# Patient Record
Sex: MALE | Race: BLACK OR AFRICAN AMERICAN | Employment: OTHER | URBAN - METROPOLITAN AREA
[De-identification: names, ages, dates, MRNs, and addresses within clinical notes are randomized per-mention and may not be internally consistent; named-entity substitution may affect disease eponyms.]

---

## 2017-04-26 ENCOUNTER — HOSPITAL ENCOUNTER (OUTPATIENT)
Age: 57
Discharge: HOME | End: 2017-04-26
Payer: COMMERCIAL

## 2017-04-26 DIAGNOSIS — N18.4 CKD (CHRONIC KIDNEY DISEASE) STAGE 4, GFR 15-29 ML/MIN (HCC): ICD-10-CM

## 2017-04-26 LAB
ABSOLUTE EOS #: 0.1 K/UL (ref 0–0.4)
ABSOLUTE LYMPH #: 0.7 K/UL (ref 1–4.8)
ABSOLUTE MONO #: 0.4 K/UL (ref 0.1–1.2)
ANION GAP SERPL CALCULATED.3IONS-SCNC: 17 MMOL/L (ref 9–17)
BASOPHILS # BLD: 0 %
BASOPHILS ABSOLUTE: 0 K/UL (ref 0–0.2)
BUN BLDV-MCNC: 35 MG/DL (ref 6–20)
BUN/CREAT BLD: ABNORMAL (ref 9–20)
CALCIUM IONIZED: 1.11 MMOL/L (ref 1.13–1.33)
CALCIUM SERPL-MCNC: 9.2 MG/DL (ref 8.6–10.4)
CHLORIDE BLD-SCNC: 101 MMOL/L (ref 98–107)
CO2: 22 MMOL/L (ref 20–31)
CREAT SERPL-MCNC: 3.1 MG/DL (ref 0.7–1.2)
CREATININE URINE: 249.8 MG/DL (ref 39–259)
DIFFERENTIAL TYPE: ABNORMAL
EOSINOPHILS RELATIVE PERCENT: 2 %
GFR AFRICAN AMERICAN: 25 ML/MIN
GFR NON-AFRICAN AMERICAN: 21 ML/MIN
GFR SERPL CREATININE-BSD FRML MDRD: ABNORMAL ML/MIN/{1.73_M2}
GFR SERPL CREATININE-BSD FRML MDRD: ABNORMAL ML/MIN/{1.73_M2}
GLUCOSE BLD-MCNC: 238 MG/DL (ref 70–99)
HCT VFR BLD CALC: 34.4 % (ref 41–53)
HEMOGLOBIN: 11.9 G/DL (ref 13.5–17.5)
LYMPHOCYTES # BLD: 15 %
MCH RBC QN AUTO: 28.9 PG (ref 26–34)
MCHC RBC AUTO-ENTMCNC: 34.6 G/DL (ref 31–37)
MCV RBC AUTO: 83.4 FL (ref 80–100)
MONOCYTES # BLD: 10 %
PDW BLD-RTO: 13.8 % (ref 12.5–15.4)
PHOSPHORUS: 3.5 MG/DL (ref 2.5–4.5)
PLATELET # BLD: 253 K/UL (ref 140–450)
PLATELET ESTIMATE: ABNORMAL
PMV BLD AUTO: 7.1 FL (ref 6–12)
POTASSIUM SERPL-SCNC: 4.6 MMOL/L (ref 3.7–5.3)
PTH INTACT: 155 PG/ML (ref 15–65)
RBC # BLD: 4.12 M/UL (ref 4.5–5.9)
RBC # BLD: ABNORMAL 10*6/UL
SEG NEUTROPHILS: 73 %
SEGMENTED NEUTROPHILS ABSOLUTE COUNT: 3.3 K/UL (ref 1.8–7.7)
SODIUM BLD-SCNC: 140 MMOL/L (ref 135–144)
TOTAL PROTEIN, URINE: 450 MG/DL
VITAMIN D 25-HYDROXY: 11.5 NG/ML (ref 30–100)
WBC # BLD: 4.5 K/UL (ref 3.5–11)
WBC # BLD: ABNORMAL 10*3/UL

## 2017-04-26 PROCEDURE — 82306 VITAMIN D 25 HYDROXY: CPT

## 2017-04-26 PROCEDURE — 36415 COLL VENOUS BLD VENIPUNCTURE: CPT

## 2017-04-26 PROCEDURE — 84100 ASSAY OF PHOSPHORUS: CPT

## 2017-04-26 PROCEDURE — 84156 ASSAY OF PROTEIN URINE: CPT

## 2017-04-26 PROCEDURE — 85025 COMPLETE CBC W/AUTO DIFF WBC: CPT

## 2017-04-26 PROCEDURE — 82330 ASSAY OF CALCIUM: CPT

## 2017-04-26 PROCEDURE — 80048 BASIC METABOLIC PNL TOTAL CA: CPT

## 2017-04-26 PROCEDURE — 83970 ASSAY OF PARATHORMONE: CPT

## 2017-04-26 PROCEDURE — 82570 ASSAY OF URINE CREATININE: CPT

## 2017-05-31 ENCOUNTER — HOSPITAL ENCOUNTER (OUTPATIENT)
Age: 57
Discharge: HOME | End: 2017-05-31
Payer: COMMERCIAL

## 2017-05-31 DIAGNOSIS — N18.4 CKD (CHRONIC KIDNEY DISEASE) STAGE 4, GFR 15-29 ML/MIN (HCC): ICD-10-CM

## 2017-05-31 LAB
ANION GAP SERPL CALCULATED.3IONS-SCNC: 14 MMOL/L (ref 9–17)
BUN BLDV-MCNC: 35 MG/DL (ref 6–20)
BUN/CREAT BLD: ABNORMAL (ref 9–20)
CALCIUM SERPL-MCNC: 9 MG/DL (ref 8.6–10.4)
CHLORIDE BLD-SCNC: 103 MMOL/L (ref 98–107)
CO2: 22 MMOL/L (ref 20–31)
CREAT SERPL-MCNC: 2.98 MG/DL (ref 0.7–1.2)
GFR AFRICAN AMERICAN: 26 ML/MIN
GFR NON-AFRICAN AMERICAN: 22 ML/MIN
GFR SERPL CREATININE-BSD FRML MDRD: ABNORMAL ML/MIN/{1.73_M2}
GFR SERPL CREATININE-BSD FRML MDRD: ABNORMAL ML/MIN/{1.73_M2}
GLUCOSE BLD-MCNC: 191 MG/DL (ref 70–99)
POTASSIUM SERPL-SCNC: 4.1 MMOL/L (ref 3.7–5.3)
SODIUM BLD-SCNC: 139 MMOL/L (ref 135–144)

## 2017-05-31 PROCEDURE — 36415 COLL VENOUS BLD VENIPUNCTURE: CPT

## 2017-05-31 PROCEDURE — 80048 BASIC METABOLIC PNL TOTAL CA: CPT

## 2017-11-17 ENCOUNTER — HOSPITAL ENCOUNTER (OUTPATIENT)
Age: 57
Setting detail: OUTPATIENT SURGERY
Discharge: HOME | End: 2017-11-17
Attending: UROLOGY | Admitting: UROLOGY
Payer: COMMERCIAL

## 2017-11-17 ENCOUNTER — HOSPITAL ENCOUNTER (OUTPATIENT)
Dept: ULTRASOUND IMAGING | Age: 57
Discharge: HOME | End: 2017-11-17
Payer: COMMERCIAL

## 2017-11-17 VITALS
DIASTOLIC BLOOD PRESSURE: 82 MMHG | WEIGHT: 261.2 LBS | TEMPERATURE: 97.7 F | BODY MASS INDEX: 37.39 KG/M2 | RESPIRATION RATE: 21 BRPM | OXYGEN SATURATION: 98 % | HEART RATE: 74 BPM | HEIGHT: 70 IN | SYSTOLIC BLOOD PRESSURE: 144 MMHG

## 2017-11-17 DIAGNOSIS — R97.20 ELEVATED PSA: ICD-10-CM

## 2017-11-17 PROBLEM — N40.0 PROSTATISM: Status: ACTIVE | Noted: 2017-11-17

## 2017-11-17 LAB
-: NORMAL
AMORPHOUS: NORMAL
BACTERIA: NORMAL
BILIRUBIN URINE: NEGATIVE
CASTS UA: NORMAL /LPF
COLOR: YELLOW
COMMENT UA: ABNORMAL
CRYSTALS, UA: NORMAL /HPF
EPITHELIAL CELLS UA: NORMAL /HPF (ref 0–5)
GLUCOSE URINE: NEGATIVE
KETONES, URINE: NEGATIVE
LEUKOCYTE ESTERASE, URINE: NEGATIVE
MUCUS: NORMAL
NITRITE, URINE: NEGATIVE
OTHER OBSERVATIONS UA: NORMAL
PH UA: 6.5 (ref 5–8)
PROTEIN UA: ABNORMAL
RBC UA: NORMAL /HPF (ref 0–2)
RENAL EPITHELIAL, UA: NORMAL /HPF
SPECIFIC GRAVITY UA: 1 (ref 1–1.03)
TRICHOMONAS: NORMAL
TURBIDITY: CLEAR
URINE HGB: ABNORMAL
UROBILINOGEN, URINE: NORMAL
WBC UA: NORMAL /HPF (ref 0–5)
YEAST: NORMAL

## 2017-11-17 PROCEDURE — 88305 TISSUE EXAM BY PATHOLOGIST: CPT

## 2017-11-17 PROCEDURE — 55700 US PROSTATE NEEDLE PUNCH: CPT

## 2017-11-17 PROCEDURE — 99152 MOD SED SAME PHYS/QHP 5/>YRS: CPT | Performed by: UROLOGY

## 2017-11-17 PROCEDURE — 7100000011 HC PHASE II RECOVERY - ADDTL 15 MIN: Performed by: UROLOGY

## 2017-11-17 PROCEDURE — 6360000002 HC RX W HCPCS: Performed by: UROLOGY

## 2017-11-17 PROCEDURE — 99153 MOD SED SAME PHYS/QHP EA: CPT | Performed by: UROLOGY

## 2017-11-17 PROCEDURE — 81001 URINALYSIS AUTO W/SCOPE: CPT

## 2017-11-17 PROCEDURE — 7100000010 HC PHASE II RECOVERY - FIRST 15 MIN: Performed by: UROLOGY

## 2017-11-17 PROCEDURE — 3600000012 HC SURGERY LEVEL 2 ADDTL 15MIN: Performed by: UROLOGY

## 2017-11-17 PROCEDURE — 2580000003 HC RX 258: Performed by: UROLOGY

## 2017-11-17 PROCEDURE — 3600000002 HC SURGERY LEVEL 2 BASE: Performed by: UROLOGY

## 2017-11-17 RX ORDER — SODIUM CHLORIDE 9 MG/ML
INJECTION, SOLUTION INTRAVENOUS CONTINUOUS
Status: DISCONTINUED | OUTPATIENT
Start: 2017-11-17 | End: 2017-11-17 | Stop reason: HOSPADM

## 2017-11-17 RX ORDER — HYDROCODONE BITARTRATE AND ACETAMINOPHEN 5; 325 MG/1; MG/1
1 TABLET ORAL EVERY 8 HOURS PRN
Qty: 12 TABLET | Refills: 0 | Status: SHIPPED | OUTPATIENT
Start: 2017-11-17 | End: 2017-11-20

## 2017-11-17 RX ORDER — CEPHALEXIN 500 MG/1
500 CAPSULE ORAL 3 TIMES DAILY
Qty: 15 CAPSULE | Refills: 0 | Status: SHIPPED | OUTPATIENT
Start: 2017-11-17 | End: 2017-11-22

## 2017-11-17 RX ORDER — FENTANYL CITRATE 50 UG/ML
INJECTION, SOLUTION INTRAMUSCULAR; INTRAVENOUS PRN
Status: DISCONTINUED | OUTPATIENT
Start: 2017-11-17 | End: 2017-11-17 | Stop reason: HOSPADM

## 2017-11-17 RX ORDER — MIDAZOLAM HYDROCHLORIDE 1 MG/ML
INJECTION INTRAMUSCULAR; INTRAVENOUS PRN
Status: DISCONTINUED | OUTPATIENT
Start: 2017-11-17 | End: 2017-11-17 | Stop reason: HOSPADM

## 2017-11-17 RX ADMIN — SODIUM CHLORIDE: 9 INJECTION, SOLUTION INTRAVENOUS at 11:44

## 2017-11-17 ASSESSMENT — PAIN SCALES - GENERAL: PAINLEVEL_OUTOF10: 0

## 2017-11-17 NOTE — H&P
HISTORY and PHYSICAL    NAME:  Rene Cruz  MRN: [id number]   YOB: 1960   Date: 11/17/2017   Age: 59 y.o. Gender: male         COMPLAINT AND PRESENT HISTORY: Rene Cruz is a 59 y.o. male who is scheduled to have a cystoscopy with US prostate biopsy by Dr. Brendan Iverson. He states that his PCP found that he has an elevated PSA. He denies any hematuria, dysuria, abdominal, flank pain or fever or chills. He states he has lost 15 pounds over the past 6 weeks due to lack of appetite. History of chronic kidney disease stage 4 reported in the past with chronic anemia. 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 Family History   Problem Relation Age of Onset    Heart Disease Mother     Early Death Mother     High Blood Pressure Brother     Diabetes Brother     High Blood Pressure Brother     Diabetes Brother     Cancer Brother     High Blood Pressure Brother     High Blood Pressure Brother          Social History     Social History    Marital status: Single     Spouse name: N/A    Number of children: N/A    Years of education: N/A     Social History Main Topics    Smoking status: Never Smoker    Smokeless tobacco: Never Used    Alcohol use No    Drug use: No    Sexual activity: Not Asked     Other Topics Concern    None     Social History Narrative    None                 Review of Systems:    Allergies   Allergen Reactions    Lisinopril Swelling       No current facility-administered medications on file prior to encounter.       Current Outpatient Prescriptions on File Prior to Encounter   Medication Sig Dispense Refill    NIFEdipine (PROCARDIA XL) 90 MG extended release tablet Take 1 tablet by mouth daily 90 tablet 3    doxazosin (CARDURA) 2 MG tablet Take 2 tablets by mouth nightly 180 tablet 3    metolazone (ZAROXOLYN) 5 MG tablet Take 1 tablet by mouth three times a week 15 tablet 3    bumetanide (BUMEX) 2 MG tablet Take 1 tablet by mouth 2 times daily 60 tablet 5    spironolactone (ALDACTONE) 25 MG tablet Take 1 tablet by mouth daily 90 tablet 3    glimepiride (AMARYL) 2 MG tablet Take 2 mg by mouth 2 times daily      vitamin D (CHOLECALCIFEROL) 90443 UNIT CAPS Take 1 capsule by mouth once a week for 8 doses 8 capsule 0    vitamin D (ERGOCALCIFEROL) 63946 UNITS CAPS capsule Take 1 capsule by mouth once a week for 8 doses 8 capsule 0    aspirin 325 MG tablet Take 325 mg by mouth daily            ROS:    GENERAL HEALTH:  Denies any problems with weight, appetite, or sleep. Skin:  No itching or rashes. No lesions. No easy bruising or bleeding. HEENT:  Denies cephalgia or head injury. No eye or ear pain. No sinusitis, rhinorhea or epistaxis. No frequent sorethroat, dysphagia or hoarseness. No masses, pain, stiffness or injury to the neck. Cardio-Respiratory: No hemoptysis, shortness of breath, chest pain, dizziness, orthopnea or palpitations. Gastrointestinal:  No constipation, diarrhea, uriel stools, red or black in the stool. No nausea or vomiting. Genitourinary:  No dysuria, hematuria, discharge, incontinence. No recent urinary tract infection. Locomotor:  Denies bone, joint or muscle  problems. No need for assistance with ambulation. Neuro:  Denies  neuropathy, syncopal episodes, weakness, vertigo, arthralgia, myalgia or numbness or tingling. Behavioral/Psych:  Pt denies current feeling of depression or significant anxiety.              GENERAL PHYSICAL EXAM:            Vitals: BP (!) 155/83   Pulse 79   Temp 97.9 °F (36.6 °C) (Oral)   Resp 17   Ht 5' 10\" (1.778

## 2017-11-21 LAB — SURGICAL PATHOLOGY REPORT: NORMAL

## 2017-11-29 ENCOUNTER — HOSPITAL ENCOUNTER (OUTPATIENT)
Age: 57
Discharge: HOME | End: 2017-11-29
Payer: COMMERCIAL

## 2017-11-29 DIAGNOSIS — N18.4 ANEMIA IN STAGE 4 CHRONIC KIDNEY DISEASE (HCC): ICD-10-CM

## 2017-11-29 DIAGNOSIS — E55.9 AVITAMINOSIS D: ICD-10-CM

## 2017-11-29 DIAGNOSIS — N18.4 CKD (CHRONIC KIDNEY DISEASE) STAGE 4, GFR 15-29 ML/MIN (HCC): ICD-10-CM

## 2017-11-29 DIAGNOSIS — D63.1 ANEMIA IN STAGE 4 CHRONIC KIDNEY DISEASE (HCC): ICD-10-CM

## 2017-11-29 LAB
ABSOLUTE EOS #: 0.14 K/UL (ref 0–0.44)
ABSOLUTE IMMATURE GRANULOCYTE: <0.03 K/UL (ref 0–0.3)
ABSOLUTE LYMPH #: 1.11 K/UL (ref 1.1–3.7)
ABSOLUTE MONO #: 0.64 K/UL (ref 0.1–1.2)
ALBUMIN SERPL-MCNC: 4 G/DL (ref 3.5–5.2)
ANION GAP SERPL CALCULATED.3IONS-SCNC: 16 MMOL/L (ref 9–17)
BASOPHILS # BLD: 1 % (ref 0–2)
BASOPHILS ABSOLUTE: 0.03 K/UL (ref 0–0.2)
BUN BLDV-MCNC: 52 MG/DL (ref 6–20)
BUN/CREAT BLD: ABNORMAL (ref 9–20)
CALCIUM SERPL-MCNC: 8.7 MG/DL (ref 8.6–10.4)
CHLORIDE BLD-SCNC: 97 MMOL/L (ref 98–107)
CO2: 27 MMOL/L (ref 20–31)
CREAT SERPL-MCNC: 3.66 MG/DL (ref 0.7–1.2)
CREATININE URINE: 151 MG/DL (ref 39–259)
CREATININE URINE: 159.6 MG/DL (ref 39–259)
DIFFERENTIAL TYPE: ABNORMAL
EOSINOPHILS RELATIVE PERCENT: 2 % (ref 1–4)
GFR AFRICAN AMERICAN: 21 ML/MIN
GFR NON-AFRICAN AMERICAN: 17 ML/MIN
GFR SERPL CREATININE-BSD FRML MDRD: ABNORMAL ML/MIN/{1.73_M2}
GFR SERPL CREATININE-BSD FRML MDRD: ABNORMAL ML/MIN/{1.73_M2}
GLUCOSE BLD-MCNC: 174 MG/DL (ref 70–99)
HCT VFR BLD CALC: 38.2 % (ref 40.7–50.3)
HEMOGLOBIN: 12.3 G/DL (ref 13–17)
IMMATURE GRANULOCYTES: 0 %
LYMPHOCYTES # BLD: 19 % (ref 24–43)
MCH RBC QN AUTO: 28.2 PG (ref 25.2–33.5)
MCHC RBC AUTO-ENTMCNC: 32.2 G/DL (ref 28.4–34.8)
MCV RBC AUTO: 87.6 FL (ref 82.6–102.9)
MICROALBUMIN/CREAT 24H UR: 1823 MG/L
MICROALBUMIN/CREAT UR-RTO: 1142 MCG/MG CREAT
MONOCYTES # BLD: 11 % (ref 3–12)
PDW BLD-RTO: 13.4 % (ref 11.8–14.4)
PHOSPHORUS: 3.1 MG/DL (ref 2.5–4.5)
PLATELET # BLD: 287 K/UL (ref 138–453)
PLATELET ESTIMATE: ABNORMAL
PMV BLD AUTO: 9.3 FL (ref 8.1–13.5)
POTASSIUM SERPL-SCNC: 3.9 MMOL/L (ref 3.7–5.3)
PTH INTACT: 144.1 PG/ML (ref 15–65)
RBC # BLD: 4.36 M/UL (ref 4.21–5.77)
RBC # BLD: ABNORMAL 10*6/UL
SEG NEUTROPHILS: 67 % (ref 36–65)
SEGMENTED NEUTROPHILS ABSOLUTE COUNT: 3.91 K/UL (ref 1.5–8.1)
SODIUM BLD-SCNC: 140 MMOL/L (ref 135–144)
TOTAL PROTEIN, URINE: 261 MG/DL
VITAMIN D 25-HYDROXY: 14 NG/ML (ref 30–100)
WBC # BLD: 5.8 K/UL (ref 3.5–11.3)
WBC # BLD: ABNORMAL 10*3/UL

## 2017-11-29 PROCEDURE — 82570 ASSAY OF URINE CREATININE: CPT

## 2017-11-29 PROCEDURE — 83970 ASSAY OF PARATHORMONE: CPT

## 2017-11-29 PROCEDURE — 80048 BASIC METABOLIC PNL TOTAL CA: CPT

## 2017-11-29 PROCEDURE — 82306 VITAMIN D 25 HYDROXY: CPT

## 2017-11-29 PROCEDURE — 82040 ASSAY OF SERUM ALBUMIN: CPT

## 2017-11-29 PROCEDURE — 36415 COLL VENOUS BLD VENIPUNCTURE: CPT

## 2017-11-29 PROCEDURE — 84156 ASSAY OF PROTEIN URINE: CPT

## 2017-11-29 PROCEDURE — 85025 COMPLETE CBC W/AUTO DIFF WBC: CPT

## 2017-11-29 PROCEDURE — 84100 ASSAY OF PHOSPHORUS: CPT

## 2017-11-29 PROCEDURE — 82043 UR ALBUMIN QUANTITATIVE: CPT

## 2018-02-02 ENCOUNTER — HOSPITAL ENCOUNTER (OUTPATIENT)
Dept: PREADMISSION TESTING | Age: 58
Discharge: HOME | End: 2018-02-06
Payer: COMMERCIAL

## 2018-02-02 VITALS
HEIGHT: 70 IN | DIASTOLIC BLOOD PRESSURE: 78 MMHG | HEART RATE: 89 BPM | OXYGEN SATURATION: 100 % | BODY MASS INDEX: 38.37 KG/M2 | WEIGHT: 268 LBS | RESPIRATION RATE: 18 BRPM | SYSTOLIC BLOOD PRESSURE: 145 MMHG

## 2018-02-02 LAB
ABSOLUTE EOS #: 0.1 K/UL (ref 0–0.4)
ABSOLUTE IMMATURE GRANULOCYTE: ABNORMAL K/UL (ref 0–0.3)
ABSOLUTE LYMPH #: 0.8 K/UL (ref 1–4.8)
ABSOLUTE MONO #: 0.4 K/UL (ref 0.2–0.8)
ANION GAP SERPL CALCULATED.3IONS-SCNC: 12 MMOL/L (ref 9–17)
BASOPHILS # BLD: 0 % (ref 0–2)
BASOPHILS ABSOLUTE: 0 K/UL (ref 0–0.2)
BUN BLDV-MCNC: 41 MG/DL (ref 6–20)
BUN/CREAT BLD: 13 (ref 9–20)
CALCIUM SERPL-MCNC: 9.2 MG/DL (ref 8.6–10.4)
CHLORIDE BLD-SCNC: 96 MMOL/L (ref 98–107)
CO2: 27 MMOL/L (ref 20–31)
CREAT SERPL-MCNC: 3.24 MG/DL (ref 0.7–1.2)
DIFFERENTIAL TYPE: ABNORMAL
EKG ATRIAL RATE: 75 BPM
EKG P AXIS: 60 DEGREES
EKG P-R INTERVAL: 208 MS
EKG Q-T INTERVAL: 388 MS
EKG QRS DURATION: 92 MS
EKG QTC CALCULATION (BAZETT): 433 MS
EKG R AXIS: -64 DEGREES
EKG T AXIS: -35 DEGREES
EKG VENTRICULAR RATE: 75 BPM
EOSINOPHILS RELATIVE PERCENT: 3 % (ref 1–4)
GFR AFRICAN AMERICAN: 24 ML/MIN
GFR NON-AFRICAN AMERICAN: 20 ML/MIN
GFR SERPL CREATININE-BSD FRML MDRD: ABNORMAL ML/MIN/{1.73_M2}
GFR SERPL CREATININE-BSD FRML MDRD: ABNORMAL ML/MIN/{1.73_M2}
GLUCOSE BLD-MCNC: 161 MG/DL (ref 70–99)
HCT VFR BLD CALC: 37.9 % (ref 41–53)
HEMOGLOBIN: 12.6 G/DL (ref 13.5–17.5)
IMMATURE GRANULOCYTES: ABNORMAL %
LYMPHOCYTES # BLD: 18 % (ref 24–44)
MCH RBC QN AUTO: 28.8 PG (ref 26–34)
MCHC RBC AUTO-ENTMCNC: 33.3 G/DL (ref 31–37)
MCV RBC AUTO: 86.6 FL (ref 80–100)
MONOCYTES # BLD: 8 % (ref 1–7)
NRBC AUTOMATED: ABNORMAL PER 100 WBC
PDW BLD-RTO: 13.6 % (ref 11.5–14.5)
PLATELET # BLD: 299 K/UL (ref 130–400)
PLATELET ESTIMATE: ABNORMAL
PMV BLD AUTO: 6.4 FL (ref 6–12)
POTASSIUM SERPL-SCNC: 3.9 MMOL/L (ref 3.7–5.3)
RBC # BLD: 4.38 M/UL (ref 4.5–5.9)
RBC # BLD: ABNORMAL 10*6/UL
SEG NEUTROPHILS: 71 % (ref 36–66)
SEGMENTED NEUTROPHILS ABSOLUTE COUNT: 3.2 K/UL (ref 1.8–7.7)
SODIUM BLD-SCNC: 135 MMOL/L (ref 135–144)
WBC # BLD: 4.5 K/UL (ref 3.5–11)
WBC # BLD: ABNORMAL 10*3/UL

## 2018-02-02 PROCEDURE — 93005 ELECTROCARDIOGRAM TRACING: CPT

## 2018-02-02 PROCEDURE — 36415 COLL VENOUS BLD VENIPUNCTURE: CPT

## 2018-02-02 PROCEDURE — 85025 COMPLETE CBC W/AUTO DIFF WBC: CPT

## 2018-02-02 PROCEDURE — 80048 BASIC METABOLIC PNL TOTAL CA: CPT

## 2018-02-02 RX ORDER — HYDROCHLOROTHIAZIDE 25 MG/1
25 TABLET ORAL DAILY
COMMUNITY
End: 2018-09-21

## 2018-02-02 NOTE — H&P
urinary incontinence HAS MICROSCOPIC  hematuria  INTEGUMENT/BREAST:  negative for rash and skin lesion(s)  HEMATOLOGIC/LYMPHATIC:  negative for lymphadenopathy  ENDOCRINE: HAS  Diabetes TYPE 2, MONITORS OCCASIONALLY , RANGES 180 . thyroid irregularities  MUSCULOSKELETAL:  negative for  pain, joint swelling, stiff joints, decreased range of motion and muscle weakness  NEUROLOGICAL:  negative for headaches, dizziness, seizures, memory problems, gait problems, tremor, numbness, syncope, near syncope and history of head injury, stroke or TIA  BEHAVIOR/PSYCH:  negative for depressed mood, anxiety and substance abuse issues    EXAM:  BP (!) 145/78   Pulse 89   Resp 18   Ht 5' 10\" (1.778 m)   Wt 268 lb (121.6 kg)   SpO2 100%   BMI 38.45 kg/m²   BP (!) 145/78   Pulse 89   Resp 18   Ht 5' 10\" (1.778 m)   Wt 268 lb (121.6 kg)   SpO2 100%   BMI 38.45 kg/m²   General appearance: alert, appears stated age and cooperative  Head: Normocephalic, without obvious abnormality, atraumatic  Eyes: conjunctivae/corneas clear. PERRL, EOM's intact. Fundi benign. Nose: Nares normal. Septum midline. Mucosa normal. No drainage or sinus tenderness. Throat: lips, mucosa, and tongue normal; teeth and gums normal AIRWAY IS NOT VISIBLE   Neck: no adenopathy, no carotid bruit, no JVD, supple, symmetrical, trachea midline and thyroid not enlarged, symmetric, no tenderness/mass/nodules  Lungs: clear to auscultation bilaterally LUNG SOUNDS ARE DIMINISHED BILATERALLY WITH RONCHORUS COUGH   Heart: regular rate and rhythm, S1, S2 normal, no murmur, click, rub or gallop  Abdomen: soft, non-tender; bowel sounds normal; no masses,  no organomegaly  Extremities: extremities normal, atraumatic, no cyanosis or edema RIGHT ANKLE + 3 EDEMA   Pulses: 2+ and symmetric  Skin: Skin color, texture, turgor normal. No rashes or lesions  Neurologic: Alert and oriented X 3, normal strength and tone. Normal symmetric reflexes.  Normal coordination and

## 2018-02-15 ENCOUNTER — ANESTHESIA EVENT (OUTPATIENT)
Dept: OPERATING ROOM | Age: 58
End: 2018-02-15
Payer: COMMERCIAL

## 2018-02-15 RX ORDER — SODIUM CHLORIDE 9 MG/ML
INJECTION, SOLUTION INTRAVENOUS CONTINUOUS
Status: CANCELLED | OUTPATIENT
Start: 2018-02-15

## 2018-02-16 ENCOUNTER — HOSPITAL ENCOUNTER (OUTPATIENT)
Age: 58
Setting detail: OUTPATIENT SURGERY
Discharge: HOME | End: 2018-02-16
Attending: UROLOGY | Admitting: UROLOGY
Payer: COMMERCIAL

## 2018-02-16 ENCOUNTER — HOSPITAL ENCOUNTER (OUTPATIENT)
Dept: ULTRASOUND IMAGING | Age: 58
Discharge: HOME | End: 2018-02-18
Payer: COMMERCIAL

## 2018-02-16 ENCOUNTER — ANESTHESIA (OUTPATIENT)
Dept: OPERATING ROOM | Age: 58
End: 2018-02-16
Payer: COMMERCIAL

## 2018-02-16 VITALS
WEIGHT: 268 LBS | DIASTOLIC BLOOD PRESSURE: 70 MMHG | RESPIRATION RATE: 18 BRPM | HEART RATE: 83 BPM | TEMPERATURE: 97.7 F | BODY MASS INDEX: 38.37 KG/M2 | OXYGEN SATURATION: 97 % | SYSTOLIC BLOOD PRESSURE: 118 MMHG | HEIGHT: 70 IN

## 2018-02-16 VITALS
DIASTOLIC BLOOD PRESSURE: 69 MMHG | RESPIRATION RATE: 20 BRPM | OXYGEN SATURATION: 98 % | SYSTOLIC BLOOD PRESSURE: 127 MMHG

## 2018-02-16 DIAGNOSIS — R97.20 ELEVATED PSA: ICD-10-CM

## 2018-02-16 LAB
GLUCOSE BLD-MCNC: 159 MG/DL (ref 75–110)
GLUCOSE BLD-MCNC: 159 MG/DL (ref 75–110)

## 2018-02-16 PROCEDURE — 2580000003 HC RX 258: Performed by: UROLOGY

## 2018-02-16 PROCEDURE — 2580000003 HC RX 258: Performed by: NURSE ANESTHETIST, CERTIFIED REGISTERED

## 2018-02-16 PROCEDURE — 7100000011 HC PHASE II RECOVERY - ADDTL 15 MIN: Performed by: UROLOGY

## 2018-02-16 PROCEDURE — 6360000002 HC RX W HCPCS: Performed by: NURSE ANESTHETIST, CERTIFIED REGISTERED

## 2018-02-16 PROCEDURE — 88305 TISSUE EXAM BY PATHOLOGIST: CPT

## 2018-02-16 PROCEDURE — 2500000003 HC RX 250 WO HCPCS: Performed by: NURSE ANESTHETIST, CERTIFIED REGISTERED

## 2018-02-16 PROCEDURE — 88344 IMHCHEM/IMCYTCHM EA MLT ANTB: CPT

## 2018-02-16 PROCEDURE — 6360000002 HC RX W HCPCS: Performed by: UROLOGY

## 2018-02-16 PROCEDURE — 2580000003 HC RX 258: Performed by: ANESTHESIOLOGY

## 2018-02-16 PROCEDURE — 3700000000 HC ANESTHESIA ATTENDED CARE: Performed by: UROLOGY

## 2018-02-16 PROCEDURE — 55700 US BIOPSY PROSTATE NEEDLE/PUNCH: CPT

## 2018-02-16 PROCEDURE — 7100000010 HC PHASE II RECOVERY - FIRST 15 MIN: Performed by: UROLOGY

## 2018-02-16 PROCEDURE — 3600000002 HC SURGERY LEVEL 2 BASE: Performed by: UROLOGY

## 2018-02-16 PROCEDURE — 3700000001 HC ADD 15 MINUTES (ANESTHESIA): Performed by: UROLOGY

## 2018-02-16 PROCEDURE — 82947 ASSAY GLUCOSE BLOOD QUANT: CPT

## 2018-02-16 PROCEDURE — 3600000012 HC SURGERY LEVEL 2 ADDTL 15MIN: Performed by: UROLOGY

## 2018-02-16 RX ORDER — SODIUM CHLORIDE 0.9 % (FLUSH) 0.9 %
10 SYRINGE (ML) INJECTION PRN
Status: DISCONTINUED | OUTPATIENT
Start: 2018-02-16 | End: 2018-02-16 | Stop reason: HOSPADM

## 2018-02-16 RX ORDER — FENTANYL CITRATE 50 UG/ML
50 INJECTION, SOLUTION INTRAMUSCULAR; INTRAVENOUS EVERY 5 MIN PRN
Status: DISCONTINUED | OUTPATIENT
Start: 2018-02-16 | End: 2018-02-16 | Stop reason: HOSPADM

## 2018-02-16 RX ORDER — FENTANYL CITRATE 50 UG/ML
INJECTION, SOLUTION INTRAMUSCULAR; INTRAVENOUS PRN
Status: DISCONTINUED | OUTPATIENT
Start: 2018-02-16 | End: 2018-02-16 | Stop reason: SDUPTHER

## 2018-02-16 RX ORDER — ONDANSETRON 2 MG/ML
4 INJECTION INTRAMUSCULAR; INTRAVENOUS
Status: DISCONTINUED | OUTPATIENT
Start: 2018-02-16 | End: 2018-02-16 | Stop reason: HOSPADM

## 2018-02-16 RX ORDER — HYDROMORPHONE HCL 110MG/55ML
0.5 PATIENT CONTROLLED ANALGESIA SYRINGE INTRAVENOUS EVERY 5 MIN PRN
Status: DISCONTINUED | OUTPATIENT
Start: 2018-02-16 | End: 2018-02-16 | Stop reason: HOSPADM

## 2018-02-16 RX ORDER — SODIUM CHLORIDE 0.9 % (FLUSH) 0.9 %
10 SYRINGE (ML) INJECTION EVERY 12 HOURS SCHEDULED
Status: DISCONTINUED | OUTPATIENT
Start: 2018-02-16 | End: 2018-02-16 | Stop reason: HOSPADM

## 2018-02-16 RX ORDER — SODIUM CHLORIDE, SODIUM LACTATE, POTASSIUM CHLORIDE, CALCIUM CHLORIDE 600; 310; 30; 20 MG/100ML; MG/100ML; MG/100ML; MG/100ML
INJECTION, SOLUTION INTRAVENOUS CONTINUOUS
Status: DISCONTINUED | OUTPATIENT
Start: 2018-02-16 | End: 2018-02-16 | Stop reason: HOSPADM

## 2018-02-16 RX ORDER — FENTANYL CITRATE 50 UG/ML
25 INJECTION, SOLUTION INTRAMUSCULAR; INTRAVENOUS EVERY 5 MIN PRN
Status: DISCONTINUED | OUTPATIENT
Start: 2018-02-16 | End: 2018-02-16 | Stop reason: HOSPADM

## 2018-02-16 RX ORDER — PROMETHAZINE HYDROCHLORIDE 25 MG/ML
6.25 INJECTION, SOLUTION INTRAMUSCULAR; INTRAVENOUS
Status: DISCONTINUED | OUTPATIENT
Start: 2018-02-16 | End: 2018-02-16 | Stop reason: HOSPADM

## 2018-02-16 RX ORDER — HYDROMORPHONE HCL 110MG/55ML
0.25 PATIENT CONTROLLED ANALGESIA SYRINGE INTRAVENOUS EVERY 5 MIN PRN
Status: DISCONTINUED | OUTPATIENT
Start: 2018-02-16 | End: 2018-02-16 | Stop reason: HOSPADM

## 2018-02-16 RX ORDER — PROPOFOL 10 MG/ML
INJECTION, EMULSION INTRAVENOUS PRN
Status: DISCONTINUED | OUTPATIENT
Start: 2018-02-16 | End: 2018-02-16 | Stop reason: SDUPTHER

## 2018-02-16 RX ORDER — LIDOCAINE HYDROCHLORIDE 10 MG/ML
1 INJECTION, SOLUTION EPIDURAL; INFILTRATION; INTRACAUDAL; PERINEURAL
Status: DISCONTINUED | OUTPATIENT
Start: 2018-02-16 | End: 2018-02-16 | Stop reason: HOSPADM

## 2018-02-16 RX ORDER — SODIUM CHLORIDE, SODIUM LACTATE, POTASSIUM CHLORIDE, CALCIUM CHLORIDE 600; 310; 30; 20 MG/100ML; MG/100ML; MG/100ML; MG/100ML
INJECTION, SOLUTION INTRAVENOUS CONTINUOUS PRN
Status: DISCONTINUED | OUTPATIENT
Start: 2018-02-16 | End: 2018-02-16 | Stop reason: SDUPTHER

## 2018-02-16 RX ORDER — LIDOCAINE HYDROCHLORIDE 20 MG/ML
INJECTION, SOLUTION INFILTRATION; PERINEURAL PRN
Status: DISCONTINUED | OUTPATIENT
Start: 2018-02-16 | End: 2018-02-16 | Stop reason: SDUPTHER

## 2018-02-16 RX ORDER — CEPHALEXIN 250 MG/1
250 CAPSULE ORAL 3 TIMES DAILY
Qty: 15 CAPSULE | Refills: 0 | Status: SHIPPED | OUTPATIENT
Start: 2018-02-16 | End: 2018-02-21

## 2018-02-16 RX ADMIN — PROPOFOL 50 MG: 10 INJECTION, EMULSION INTRAVENOUS at 10:43

## 2018-02-16 RX ADMIN — PROPOFOL 50 MG: 10 INJECTION, EMULSION INTRAVENOUS at 10:30

## 2018-02-16 RX ADMIN — PROPOFOL 50 MG: 10 INJECTION, EMULSION INTRAVENOUS at 10:22

## 2018-02-16 RX ADMIN — CEFAZOLIN 3 G: 1 INJECTION, POWDER, FOR SOLUTION INTRAMUSCULAR; INTRAVENOUS at 10:21

## 2018-02-16 RX ADMIN — SODIUM CHLORIDE, POTASSIUM CHLORIDE, SODIUM LACTATE AND CALCIUM CHLORIDE: 600; 310; 30; 20 INJECTION, SOLUTION INTRAVENOUS at 10:18

## 2018-02-16 RX ADMIN — LIDOCAINE HYDROCHLORIDE 60 MG: 20 INJECTION, SOLUTION INFILTRATION; PERINEURAL at 10:22

## 2018-02-16 RX ADMIN — PROPOFOL 50 MG: 10 INJECTION, EMULSION INTRAVENOUS at 10:38

## 2018-02-16 RX ADMIN — FENTANYL CITRATE 50 MCG: 50 INJECTION, SOLUTION INTRAMUSCULAR; INTRAVENOUS at 10:22

## 2018-02-16 RX ADMIN — FENTANYL CITRATE 50 MCG: 50 INJECTION, SOLUTION INTRAMUSCULAR; INTRAVENOUS at 10:28

## 2018-02-16 RX ADMIN — PROPOFOL 50 MG: 10 INJECTION, EMULSION INTRAVENOUS at 10:34

## 2018-02-16 RX ADMIN — SODIUM CHLORIDE, POTASSIUM CHLORIDE, SODIUM LACTATE AND CALCIUM CHLORIDE: 600; 310; 30; 20 INJECTION, SOLUTION INTRAVENOUS at 08:58

## 2018-02-16 ASSESSMENT — PULMONARY FUNCTION TESTS
PIF_VALUE: 1

## 2018-02-16 ASSESSMENT — PAIN SCALES - GENERAL
PAINLEVEL_OUTOF10: 0

## 2018-02-16 ASSESSMENT — PAIN - FUNCTIONAL ASSESSMENT: PAIN_FUNCTIONAL_ASSESSMENT: 0-10

## 2018-02-16 NOTE — ANESTHESIA PRE PROCEDURE
Department of Anesthesiology  Preprocedure Note       Name:  Kathia Zamora   Age:  59 y.o.  :  1960                                          MRN:  [id number]         Date:  2018      Surgeon: Bobby Chan):  Josefina Araujo MD    Procedure: Procedure(s):  US  PROSTATE BIOPSY WITH SATURATION    Medications prior to admission:   Prior to Admission medications    Medication Sig Start Date End Date Taking?  Authorizing Provider   SITagliptin (JANUVIA) 50 MG tablet Take 50 mg by mouth daily   Yes Historical Provider, MD   ciprofloxacin (CIPRO) 500 MG tablet Take 500 mg by mouth 2 times daily Taking for 5 days started this on    Yes Historical Provider, MD   hydrochlorothiazide (HYDRODIURIL) 25 MG tablet Take 25 mg by mouth daily   Yes Historical Provider, MD   atorvastatin (LIPITOR) 10 MG tablet Take 10 mg by mouth daily   Yes Historical Provider, MD   furosemide (LASIX) 40 MG tablet Take 40 mg by mouth 2 times daily On hold as of 2018   Yes Historical Provider, MD   doxazosin (CARDURA) 2 MG tablet Take 1 tablet by mouth daily  Patient taking differently: Take 2 mg by mouth nightly Takes 2 tablets nightly 17  Yes Anabel Aguilera MD   NIFEdipine (PROCARDIA XL) 90 MG extended release tablet Take 1 tablet by mouth daily 10/20/17  Yes Anabel Aguilera MD   metolazone (ZAROXOLYN) 5 MG tablet Take 1 tablet by mouth three times a week  Patient taking differently: Take 5 mg by mouth three times a week On hold as of 2018  Yes Anabel Aguilera MD   vitamin D (CHOLECALCIFEROL) 01381 UNIT CAPS Take 1 capsule by mouth once a week for 8 doses 17 Yes Anabel Aguilera MD   aspirin 325 MG tablet Take 325 mg by mouth daily Taking as needed, on hold as of 2018   Yes Historical Provider, MD   glimepiride (AMARYL) 2 MG tablet Take 2 mg by mouth 2 times daily   Yes Historical Provider, MD   vitamin D (CHOLECALCIFEROL) 65307 UNIT CAPS Take 1 capsule by mouth once a week for 8 doses 17 Evaluation   no history of anesthetic complications:   Airway: Mallampati: III  TM distance: >3 FB   Neck ROM: full  Mouth opening: > = 3 FB Dental: normal exam         Pulmonary: breath sounds clear to auscultation  (+) sleep apnea: on CPAP and noncompliant,      (-) COPD and asthma                           Cardiovascular:  Exercise tolerance: good (>4 METS),   (+) hypertension:, past MI: > 6 months, CAD:, hyperlipidemia    (-) dysrhythmias,  angina and  RACHEL      Rhythm: regular  Rate: normal           Beta Blocker:  Not on Beta Blocker         Neuro/Psych:      (-) seizures and CVA           GI/Hepatic/Renal:   (+) renal disease: CRI,      (-) GERD and liver disease       Endo/Other:        (-) hypothyroidism, hyperthyroidism               Abdominal:   (+) obese,         Vascular: negative vascular ROS. Anesthesia Plan      MAC     ASA 3       Induction: intravenous. MIPS: Postoperative opioids intended and Prophylactic antiemetics administered. Anesthetic plan and risks discussed with patient. Plan discussed with CRNA.     Attending anesthesiologist reviewed and agrees with Claribel Bal MD   2/16/2018

## 2018-02-16 NOTE — H&P (VIEW-ONLY)
gait     Labs:  Hemoglobin   Date/Time Value Ref Range Status   02/02/2018 10:08 AM 12.6 (L) 13.5 - 17.5 g/dL Final     Hematocrit   Date/Time Value Ref Range Status   02/02/2018 10:08 AM 37.9 (L) 41 - 53 % Final     WBC   Date/Time Value Ref Range Status   02/02/2018 10:08 AM 4.5 3.5 - 11.0 k/uL Final     Sodium   Date/Time Value Ref Range Status   11/29/2017 12:50  135 - 144 mmol/L Final     Potassium   Date/Time Value Ref Range Status   11/29/2017 12:50 PM 3.9 3.7 - 5.3 mmol/L Final     Chloride   Date/Time Value Ref Range Status   11/29/2017 12:50 PM 97 (L) 98 - 107 mmol/L Final     CO2   Date/Time Value Ref Range Status   11/29/2017 12:50 PM 27 20 - 31 mmol/L Final     BUN   Date/Time Value Ref Range Status   11/29/2017 12:50 PM 52 (H) 6 - 20 mg/dL Final     CREATININE   Date/Time Value Ref Range Status   11/29/2017 12:50 PM 3.66 (H) 0.70 - 1.20 mg/dL Final     Glucose   Date/Time Value Ref Range Status   11/29/2017 12:50  (H) 70 - 99 mg/dL Final       Lab Review:    CBC:   Lab Results   Component Value Date    WBC 4.5 02/02/2018    RBC 4.38 02/02/2018    HGB 12.6 02/02/2018    HCT 37.9 02/02/2018    MCV 86.6 02/02/2018    MCH 28.8 02/02/2018    MCHC 33.3 02/02/2018    RDW 13.6 02/02/2018     02/02/2018     BMP:    Lab Results   Component Value Date    GLUCOSE 161 02/02/2018     02/02/2018    K 3.9 02/02/2018    CL 96 02/02/2018    CO2 27 02/02/2018    ANIONGAP 12 02/02/2018    BUN 41 02/02/2018    CREATININE 3.24 02/02/2018    BUNCRER 13 02/02/2018    CALCIUM 9.2 02/02/2018    LABGLOM 20 02/02/2018    GFRAA 24 02/02/2018    GFR      02/02/2018    GFR NOT REPORTED 02/02/2018     U/A:    Lab Results   Component Value Date    COLORU YELLOW 11/17/2017    TURBIDITY CLEAR 11/17/2017    SPECGRAV 1.003 11/17/2017    HGBUR 1+ 11/17/2017    PHUR 6.5 11/17/2017    PROTEINU 2+ 11/17/2017    GLUCOSEU NEGATIVE 11/17/2017    KETUA NEGATIVE 11/17/2017    BILIRUBINUR NEGATIVE 11/17/2017    UROBILINOGEN

## 2018-02-16 NOTE — INTERVAL H&P NOTE
History and Physical Update    Pt Name: Ilir Anne  MRN: [id number]  YOB: 1960  Date of evaluation: 2/16/2018  ELIDA LAUGHLIN PRESENTS TODAY FOR A CYSTOSCOPY WITH PROSTATE BIOPSY. HE IS ALERT AND ORIENTED AND DENIES ANY INTERVAL CHANGES. HIS BLOOD SUGAR AT 9:00 . HE DENIES TAKING BLOOD THINNERS. [] Please see the original hard copy H&P by                            in the short chart . [x] Please see the original H&P in the [facility] notes. BY ME BELOW. [x] I have examined the patient and reviewed the H&P/Consult and there are no changes   to the patient or plans. Vital signs: BP (!) 146/63   Pulse 98   Temp 97.7 °F (36.5 °C) (Oral)   Resp 16   Ht 5' 10\" (1.778 m)   Wt 268 lb (121.6 kg)   SpO2 96%   BMI 38.45 kg/m²     Allergies:  Lisinopril    Medications:   No current facility-administered medications on file prior to encounter.       Current Outpatient Prescriptions on File Prior to Encounter   Medication Sig Dispense Refill    atorvastatin (LIPITOR) 10 MG tablet Take 10 mg by mouth daily      furosemide (LASIX) 40 MG tablet Take 40 mg by mouth 2 times daily On hold as of 2/14/2018      doxazosin (CARDURA) 2 MG tablet Take 1 tablet by mouth daily (Patient taking differently: Take 2 mg by mouth nightly Takes 2 tablets nightly) 90 tablet 3    NIFEdipine (PROCARDIA XL) 90 MG extended release tablet Take 1 tablet by mouth daily 90 tablet 3    vitamin D (CHOLECALCIFEROL) 53432 UNIT CAPS Take 1 capsule by mouth once a week for 8 doses 8 capsule 0    aspirin 325 MG tablet Take 325 mg by mouth daily Taking as needed, on hold as of 2/14/2018      glimepiride (AMARYL) 2 MG tablet Take 2 mg by mouth 2 times daily      vitamin D (CHOLECALCIFEROL) 96322 UNIT CAPS Take 1 capsule by mouth once a week for 8 doses 8 capsule 0    metolazone (ZAROXOLYN) 5 MG tablet Take 1 tablet by mouth three times a week (Patient taking differently: Take 5 mg by mouth three times a week On hold as of 2/14/2018) 15 tablet 3       Heart: Heart sounds are normal.  Regular rate and rhythm without murmur, gallop or rub.    Lungs:clear to auscultation without wheezes or rales   Abdomen: soft, nontender, normal bowel sounds  PEDAL PULSES + 2   [] I have examined the patient and reviewed the H&P/Consult and have noted the following changes:        ROB TEE, ACNP-BC  Electronically signed 2/16/2018 at 9:03 AM

## 2018-02-16 NOTE — OP NOTE
[facility]    Operative Note    Rebecca Galarza  YOB: 1960  [id number]      Pre-operative Diagnosis: pSA elevation    Post-operative Diagnosis: Same    Procedure: prostate ultrasound, with ultrasound-guided saturation biopsy    Anesthesia: MAC    Surgeons/Assistants: Dr Bacilio Mast    Estimated Blood Loss: None    Complications: None    Specimens: Was Obtained: Saturation biopsy prostate tissue    Indications:this patient is 59years old e has a persistent pSA elevation patient was scheduled for Ultrasound-guided biopsy, since the prior biopsy was negative we discussed with the patient saturation biopsy    Operative Findings: the patient was brought to the operating room he was positioned I left lateral  Position we then proceeded  with ultrasound of the prostate  it was found to be heterogeneous.     Saturation biopsies were then obtained a total of 24 biopsies    The patient was returned to recovery in stable condition    Recommendations follow-up in the office to discuss pathology report    Electronically signed by Sydnee Bolanos MD on 2/16/2018 at 9:03 AM

## 2018-02-16 NOTE — ANESTHESIA POSTPROCEDURE EVALUATION
Department of Anesthesiology  Postprocedure Note    Patient: Bee Gan  MRN: [id number]  YOB: 1960  Date of evaluation: 2/16/2018  Time:  11:52 AM     Procedure Summary     Date:  02/16/18 Room / Location:  [facility]    Anesthesia Start:  1018 Anesthesia Stop:  7265    Procedure:  US  PROSTATE BIOPSY WITH SATURATION (N/A ) Diagnosis:  (PSA ELEVATION)    Surgeon:  Titi Monge MD Responsible Provider:  Mushtaq Colindres MD    Anesthesia Type:  MAC ASA Status:  3          Anesthesia Type: MAC    Last vitals: Reviewed and per EMR flowsheets.        Anesthesia Post Evaluation    Patient location during evaluation: PACU  Patient participation: complete - patient participated  Level of consciousness: awake and alert  Airway patency: patent  Nausea & Vomiting: no nausea and no vomiting  Complications: no  Cardiovascular status: blood pressure returned to baseline  Respiratory status: acceptable  Hydration status: euvolemic

## 2018-02-20 LAB — SURGICAL PATHOLOGY REPORT: NORMAL

## 2018-11-24 ENCOUNTER — HOSPITAL ENCOUNTER (INPATIENT)
Age: 58
LOS: 5 days | Discharge: HOME HEALTH | DRG: 871 | End: 2018-11-29
Attending: EMERGENCY MEDICINE | Admitting: INTERNAL MEDICINE
Payer: COMMERCIAL

## 2018-11-24 ENCOUNTER — APPOINTMENT (OUTPATIENT)
Dept: GENERAL RADIOLOGY | Age: 58
DRG: 871 | End: 2018-11-24
Payer: COMMERCIAL

## 2018-11-24 DIAGNOSIS — I21.4 NSTEMI (NON-ST ELEVATED MYOCARDIAL INFARCTION) (HCC): ICD-10-CM

## 2018-11-24 DIAGNOSIS — Z99.2 STAGE 5 CHRONIC KIDNEY DISEASE ON CHRONIC DIALYSIS (HCC): ICD-10-CM

## 2018-11-24 DIAGNOSIS — N18.6 STAGE 5 CHRONIC KIDNEY DISEASE ON CHRONIC DIALYSIS (HCC): ICD-10-CM

## 2018-11-24 DIAGNOSIS — J18.9 PNEUMONIA DUE TO ORGANISM: ICD-10-CM

## 2018-11-24 DIAGNOSIS — I50.9 ACUTE ON CHRONIC CONGESTIVE HEART FAILURE, UNSPECIFIED HEART FAILURE TYPE (HCC): Primary | ICD-10-CM

## 2018-11-24 DIAGNOSIS — A41.9 SEPSIS, DUE TO UNSPECIFIED ORGANISM: ICD-10-CM

## 2018-11-24 LAB
-: ABNORMAL
ABSOLUTE EOS #: 0.51 K/UL (ref 0–0.4)
ABSOLUTE IMMATURE GRANULOCYTE: 0 K/UL (ref 0–0.3)
ABSOLUTE LYMPH #: 1.79 K/UL (ref 1–4.8)
ABSOLUTE MONO #: 1.15 K/UL (ref 0.1–0.8)
ALBUMIN SERPL-MCNC: 3.3 G/DL (ref 3.5–5.2)
ALBUMIN/GLOBULIN RATIO: 0.8 (ref 1–2.5)
ALLEN TEST: ABNORMAL
ALP BLD-CCNC: 68 U/L (ref 40–129)
ALT SERPL-CCNC: 22 U/L (ref 5–41)
AMORPHOUS: ABNORMAL
ANION GAP SERPL CALCULATED.3IONS-SCNC: 16 MMOL/L (ref 9–17)
ANION GAP: 8 MMOL/L (ref 7–16)
AST SERPL-CCNC: 24 U/L
ATYPICAL LYMPHOCYTE ABSOLUTE COUNT: 0.13 K/UL
ATYPICAL LYMPHOCYTES: 1 %
BACTERIA: ABNORMAL
BASOPHILS # BLD: 0 % (ref 0–2)
BASOPHILS ABSOLUTE: 0 K/UL (ref 0–0.2)
BILIRUB SERPL-MCNC: 0.3 MG/DL (ref 0.3–1.2)
BILIRUBIN URINE: NEGATIVE
BNP INTERPRETATION: ABNORMAL
BUN BLDV-MCNC: 50 MG/DL (ref 8–23)
BUN/CREAT BLD: ABNORMAL (ref 9–20)
CALCIUM SERPL-MCNC: 8.8 MG/DL (ref 8.6–10.4)
CASTS UA: ABNORMAL /LPF (ref 0–2)
CHLORIDE BLD-SCNC: 101 MMOL/L (ref 98–107)
CO2: 21 MMOL/L (ref 20–31)
COLOR: YELLOW
CREAT SERPL-MCNC: 4.46 MG/DL (ref 0.7–1.2)
CRYSTALS, UA: ABNORMAL /HPF
DIFFERENTIAL TYPE: ABNORMAL
EOSINOPHILS RELATIVE PERCENT: 4 % (ref 1–4)
EPITHELIAL CELLS UA: ABNORMAL /HPF (ref 0–5)
FIO2: ABNORMAL
GFR AFRICAN AMERICAN: 16 ML/MIN
GFR NON-AFRICAN AMERICAN: 14 ML/MIN
GFR NON-AFRICAN AMERICAN: 16 ML/MIN
GFR SERPL CREATININE-BSD FRML MDRD: 19 ML/MIN
GFR SERPL CREATININE-BSD FRML MDRD: ABNORMAL ML/MIN/{1.73_M2}
GLUCOSE BLD-MCNC: 136 MG/DL (ref 74–100)
GLUCOSE BLD-MCNC: 137 MG/DL (ref 70–99)
GLUCOSE BLD-MCNC: 203 MG/DL (ref 75–110)
GLUCOSE URINE: NEGATIVE
HCO3 VENOUS: 25.8 MMOL/L (ref 22–29)
HCT VFR BLD CALC: 28.5 % (ref 40.7–50.3)
HCT VFR BLD CALC: 31.5 % (ref 40.7–50.3)
HEMOGLOBIN: 8.6 G/DL (ref 13–17)
HEMOGLOBIN: 9.7 G/DL (ref 13–17)
IMMATURE GRANULOCYTES: 0 %
INR BLD: 1
KETONES, URINE: NEGATIVE
LACTIC ACID, WHOLE BLOOD: 1.1 MMOL/L (ref 0.7–2.1)
LACTIC ACID, WHOLE BLOOD: 2.2 MMOL/L (ref 0.7–2.1)
LEUKOCYTE ESTERASE, URINE: NEGATIVE
LYMPHOCYTES # BLD: 14 % (ref 24–44)
MCH RBC QN AUTO: 27.2 PG (ref 25.2–33.5)
MCH RBC QN AUTO: 27.5 PG (ref 25.2–33.5)
MCHC RBC AUTO-ENTMCNC: 30.2 G/DL (ref 28.4–34.8)
MCHC RBC AUTO-ENTMCNC: 30.8 G/DL (ref 28.4–34.8)
MCV RBC AUTO: 89.2 FL (ref 82.6–102.9)
MCV RBC AUTO: 90.2 FL (ref 82.6–102.9)
MODE: ABNORMAL
MONOCYTES # BLD: 9 % (ref 1–7)
MORPHOLOGY: ABNORMAL
MUCUS: ABNORMAL
NEGATIVE BASE EXCESS, VEN: ABNORMAL (ref 0–2)
NITRITE, URINE: NEGATIVE
NRBC AUTOMATED: 0 PER 100 WBC
NRBC AUTOMATED: 0 PER 100 WBC
O2 DEVICE/FLOW/%: ABNORMAL
O2 SAT, VEN: 36 % (ref 60–85)
OTHER OBSERVATIONS UA: ABNORMAL
PARTIAL THROMBOPLASTIN TIME: 25 SEC (ref 20.5–30.5)
PARTIAL THROMBOPLASTIN TIME: 26.7 SEC (ref 20.5–30.5)
PARTIAL THROMBOPLASTIN TIME: 27.8 SEC (ref 20.5–30.5)
PATIENT TEMP: ABNORMAL
PCO2, VEN: 41.5 MM HG (ref 41–51)
PDW BLD-RTO: 14.5 % (ref 11.8–14.4)
PDW BLD-RTO: 14.5 % (ref 11.8–14.4)
PH UA: 5 (ref 5–8)
PH VENOUS: 7.4 (ref 7.32–7.43)
PLATELET # BLD: 235 K/UL (ref 138–453)
PLATELET # BLD: 278 K/UL (ref 138–453)
PLATELET ESTIMATE: ABNORMAL
PMV BLD AUTO: 9.7 FL (ref 8.1–13.5)
PMV BLD AUTO: 9.8 FL (ref 8.1–13.5)
PO2, VEN: 21.3 MM HG (ref 30–50)
POC CHLORIDE: 107 MMOL/L (ref 98–107)
POC CREATININE: 3.97 MG/DL (ref 0.51–1.19)
POC HEMATOCRIT: 33 % (ref 41–53)
POC HEMOGLOBIN: 11.1 G/DL (ref 13.5–17.5)
POC IONIZED CALCIUM: 1.15 MMOL/L (ref 1.15–1.33)
POC LACTIC ACID: 2.09 MMOL/L (ref 0.56–1.39)
POC PCO2 TEMP: ABNORMAL MM HG
POC PH TEMP: ABNORMAL
POC PO2 TEMP: ABNORMAL MM HG
POC POTASSIUM: 4.4 MMOL/L (ref 3.5–4.5)
POC SODIUM: 141 MMOL/L (ref 138–146)
POSITIVE BASE EXCESS, VEN: 1 (ref 0–3)
POTASSIUM SERPL-SCNC: 4.5 MMOL/L (ref 3.7–5.3)
PRO-BNP: 7894 PG/ML
PROCALCITONIN: 0.49 NG/ML
PROTEIN UA: ABNORMAL
PROTHROMBIN TIME: 11 SEC (ref 9–12)
RBC # BLD: 3.16 M/UL (ref 4.21–5.77)
RBC # BLD: 3.53 M/UL (ref 4.21–5.77)
RBC # BLD: ABNORMAL 10*6/UL
RBC UA: ABNORMAL /HPF (ref 0–2)
RENAL EPITHELIAL, UA: ABNORMAL /HPF
SAMPLE SITE: ABNORMAL
SEG NEUTROPHILS: 72 % (ref 36–66)
SEGMENTED NEUTROPHILS ABSOLUTE COUNT: 9.22 K/UL (ref 1.8–7.7)
SODIUM BLD-SCNC: 138 MMOL/L (ref 135–144)
SPECIFIC GRAVITY UA: 1.02 (ref 1–1.03)
TOTAL CO2, VENOUS: 27 MMOL/L (ref 23–30)
TOTAL PROTEIN: 7.4 G/DL (ref 6.4–8.3)
TRICHOMONAS: ABNORMAL
TROPONIN INTERP: ABNORMAL
TROPONIN T: 0.07 NG/ML
TROPONIN T: 0.09 NG/ML
TROPONIN T: 0.1 NG/ML
TSH SERPL DL<=0.05 MIU/L-ACNC: 1.5 MIU/L (ref 0.3–5)
TURBIDITY: CLEAR
URINE HGB: ABNORMAL
UROBILINOGEN, URINE: NORMAL
WBC # BLD: 10.5 K/UL (ref 3.5–11.3)
WBC # BLD: 12.8 K/UL (ref 3.5–11.3)
WBC # BLD: ABNORMAL 10*3/UL
WBC UA: ABNORMAL /HPF (ref 0–5)
YEAST: ABNORMAL

## 2018-11-24 PROCEDURE — 96367 TX/PROPH/DG ADDL SEQ IV INF: CPT

## 2018-11-24 PROCEDURE — 83605 ASSAY OF LACTIC ACID: CPT

## 2018-11-24 PROCEDURE — 83036 HEMOGLOBIN GLYCOSYLATED A1C: CPT

## 2018-11-24 PROCEDURE — 94660 CPAP INITIATION&MGMT: CPT

## 2018-11-24 PROCEDURE — 84484 ASSAY OF TROPONIN QUANT: CPT

## 2018-11-24 PROCEDURE — 84145 PROCALCITONIN (PCT): CPT

## 2018-11-24 PROCEDURE — 2700000000 HC OXYGEN THERAPY PER DAY

## 2018-11-24 PROCEDURE — 85014 HEMATOCRIT: CPT

## 2018-11-24 PROCEDURE — 85025 COMPLETE CBC W/AUTO DIFF WBC: CPT

## 2018-11-24 PROCEDURE — 93005 ELECTROCARDIOGRAM TRACING: CPT

## 2018-11-24 PROCEDURE — 85610 PROTHROMBIN TIME: CPT

## 2018-11-24 PROCEDURE — 99285 EMERGENCY DEPT VISIT HI MDM: CPT

## 2018-11-24 PROCEDURE — 82435 ASSAY OF BLOOD CHLORIDE: CPT

## 2018-11-24 PROCEDURE — 2580000003 HC RX 258: Performed by: EMERGENCY MEDICINE

## 2018-11-24 PROCEDURE — 87086 URINE CULTURE/COLONY COUNT: CPT

## 2018-11-24 PROCEDURE — 83880 ASSAY OF NATRIURETIC PEPTIDE: CPT

## 2018-11-24 PROCEDURE — 85027 COMPLETE CBC AUTOMATED: CPT

## 2018-11-24 PROCEDURE — 83550 IRON BINDING TEST: CPT

## 2018-11-24 PROCEDURE — 81001 URINALYSIS AUTO W/SCOPE: CPT

## 2018-11-24 PROCEDURE — 71046 X-RAY EXAM CHEST 2 VIEWS: CPT

## 2018-11-24 PROCEDURE — 86738 MYCOPLASMA ANTIBODY: CPT

## 2018-11-24 PROCEDURE — 96375 TX/PRO/DX INJ NEW DRUG ADDON: CPT

## 2018-11-24 PROCEDURE — 82330 ASSAY OF CALCIUM: CPT

## 2018-11-24 PROCEDURE — 82728 ASSAY OF FERRITIN: CPT

## 2018-11-24 PROCEDURE — 6360000002 HC RX W HCPCS: Performed by: EMERGENCY MEDICINE

## 2018-11-24 PROCEDURE — 96365 THER/PROPH/DIAG IV INF INIT: CPT

## 2018-11-24 PROCEDURE — 82947 ASSAY GLUCOSE BLOOD QUANT: CPT

## 2018-11-24 PROCEDURE — 94640 AIRWAY INHALATION TREATMENT: CPT

## 2018-11-24 PROCEDURE — 94762 N-INVAS EAR/PLS OXIMTRY CONT: CPT

## 2018-11-24 PROCEDURE — 6370000000 HC RX 637 (ALT 250 FOR IP): Performed by: EMERGENCY MEDICINE

## 2018-11-24 PROCEDURE — 6360000002 HC RX W HCPCS: Performed by: STUDENT IN AN ORGANIZED HEALTH CARE EDUCATION/TRAINING PROGRAM

## 2018-11-24 PROCEDURE — 84443 ASSAY THYROID STIM HORMONE: CPT

## 2018-11-24 PROCEDURE — 80053 COMPREHEN METABOLIC PANEL: CPT

## 2018-11-24 PROCEDURE — 82803 BLOOD GASES ANY COMBINATION: CPT

## 2018-11-24 PROCEDURE — 85730 THROMBOPLASTIN TIME PARTIAL: CPT

## 2018-11-24 PROCEDURE — 87040 BLOOD CULTURE FOR BACTERIA: CPT

## 2018-11-24 PROCEDURE — 94664 DEMO&/EVAL PT USE INHALER: CPT

## 2018-11-24 PROCEDURE — 84295 ASSAY OF SERUM SODIUM: CPT

## 2018-11-24 PROCEDURE — 2060000000 HC ICU INTERMEDIATE R&B

## 2018-11-24 PROCEDURE — 36415 COLL VENOUS BLD VENIPUNCTURE: CPT

## 2018-11-24 PROCEDURE — 84132 ASSAY OF SERUM POTASSIUM: CPT

## 2018-11-24 PROCEDURE — 6370000000 HC RX 637 (ALT 250 FOR IP): Performed by: STUDENT IN AN ORGANIZED HEALTH CARE EDUCATION/TRAINING PROGRAM

## 2018-11-24 PROCEDURE — 85045 AUTOMATED RETICULOCYTE COUNT: CPT

## 2018-11-24 PROCEDURE — 82565 ASSAY OF CREATININE: CPT

## 2018-11-24 PROCEDURE — 83540 ASSAY OF IRON: CPT

## 2018-11-24 RX ORDER — FUROSEMIDE 10 MG/ML
40 INJECTION INTRAMUSCULAR; INTRAVENOUS 2 TIMES DAILY
Status: DISCONTINUED | OUTPATIENT
Start: 2018-11-25 | End: 2018-11-24

## 2018-11-24 RX ORDER — 0.9 % SODIUM CHLORIDE 0.9 %
250 INTRAVENOUS SOLUTION INTRAVENOUS ONCE
Status: DISCONTINUED | OUTPATIENT
Start: 2018-11-24 | End: 2018-11-24

## 2018-11-24 RX ORDER — ALBUTEROL SULFATE 90 UG/1
2 AEROSOL, METERED RESPIRATORY (INHALATION)
Status: DISCONTINUED | OUTPATIENT
Start: 2018-11-24 | End: 2018-11-29 | Stop reason: HOSPADM

## 2018-11-24 RX ORDER — POTASSIUM CHLORIDE 7.45 MG/ML
10 INJECTION INTRAVENOUS PRN
Status: DISCONTINUED | OUTPATIENT
Start: 2018-11-24 | End: 2018-11-29 | Stop reason: HOSPADM

## 2018-11-24 RX ORDER — FUROSEMIDE 10 MG/ML
40 INJECTION INTRAMUSCULAR; INTRAVENOUS 2 TIMES DAILY
Status: DISCONTINUED | OUTPATIENT
Start: 2018-11-25 | End: 2018-11-26

## 2018-11-24 RX ORDER — POTASSIUM CHLORIDE 20 MEQ/1
40 TABLET, EXTENDED RELEASE ORAL PRN
Status: DISCONTINUED | OUTPATIENT
Start: 2018-11-24 | End: 2018-11-29 | Stop reason: HOSPADM

## 2018-11-24 RX ORDER — HEPARIN SODIUM 1000 [USP'U]/ML
4000 INJECTION, SOLUTION INTRAVENOUS; SUBCUTANEOUS ONCE
Status: DISCONTINUED | OUTPATIENT
Start: 2018-11-24 | End: 2018-11-26

## 2018-11-24 RX ORDER — ASPIRIN 81 MG/1
81 TABLET, CHEWABLE ORAL ONCE
Status: DISCONTINUED | OUTPATIENT
Start: 2018-11-24 | End: 2018-11-24

## 2018-11-24 RX ORDER — ONDANSETRON 2 MG/ML
4 INJECTION INTRAMUSCULAR; INTRAVENOUS ONCE
Status: COMPLETED | OUTPATIENT
Start: 2018-11-24 | End: 2018-11-24

## 2018-11-24 RX ORDER — DEXTROSE MONOHYDRATE 25 G/50ML
12.5 INJECTION, SOLUTION INTRAVENOUS PRN
Status: DISCONTINUED | OUTPATIENT
Start: 2018-11-24 | End: 2018-11-29 | Stop reason: HOSPADM

## 2018-11-24 RX ORDER — HEPARIN SODIUM 1000 [USP'U]/ML
2000 INJECTION, SOLUTION INTRAVENOUS; SUBCUTANEOUS PRN
Status: DISCONTINUED | OUTPATIENT
Start: 2018-11-24 | End: 2018-11-24

## 2018-11-24 RX ORDER — ACETAMINOPHEN 325 MG/1
650 TABLET ORAL EVERY 4 HOURS PRN
Status: DISCONTINUED | OUTPATIENT
Start: 2018-11-24 | End: 2018-11-29 | Stop reason: HOSPADM

## 2018-11-24 RX ORDER — ASPIRIN 81 MG/1
324 TABLET, CHEWABLE ORAL ONCE
Status: COMPLETED | OUTPATIENT
Start: 2018-11-24 | End: 2018-11-24

## 2018-11-24 RX ORDER — ONDANSETRON 2 MG/ML
4 INJECTION INTRAMUSCULAR; INTRAVENOUS EVERY 6 HOURS PRN
Status: DISCONTINUED | OUTPATIENT
Start: 2018-11-24 | End: 2018-11-29 | Stop reason: HOSPADM

## 2018-11-24 RX ORDER — FUROSEMIDE 10 MG/ML
40 INJECTION INTRAMUSCULAR; INTRAVENOUS ONCE
Status: COMPLETED | OUTPATIENT
Start: 2018-11-24 | End: 2018-11-24

## 2018-11-24 RX ORDER — HEPARIN SODIUM 1000 [USP'U]/ML
4000 INJECTION, SOLUTION INTRAVENOUS; SUBCUTANEOUS PRN
Status: DISCONTINUED | OUTPATIENT
Start: 2018-11-24 | End: 2018-11-24

## 2018-11-24 RX ORDER — NICOTINE POLACRILEX 4 MG
15 LOZENGE BUCCAL PRN
Status: DISCONTINUED | OUTPATIENT
Start: 2018-11-24 | End: 2018-11-29 | Stop reason: HOSPADM

## 2018-11-24 RX ORDER — ALBUTEROL SULFATE 2.5 MG/3ML
2.5 SOLUTION RESPIRATORY (INHALATION)
Status: DISCONTINUED | OUTPATIENT
Start: 2018-11-24 | End: 2018-11-29 | Stop reason: HOSPADM

## 2018-11-24 RX ORDER — DOXAZOSIN 2 MG/1
4 TABLET ORAL DAILY
Status: DISCONTINUED | OUTPATIENT
Start: 2018-11-25 | End: 2018-11-29 | Stop reason: HOSPADM

## 2018-11-24 RX ORDER — HEPARIN SODIUM 1000 [USP'U]/ML
4000 INJECTION, SOLUTION INTRAVENOUS; SUBCUTANEOUS PRN
Status: DISCONTINUED | OUTPATIENT
Start: 2018-11-24 | End: 2018-11-25

## 2018-11-24 RX ORDER — HEPARIN SODIUM 10000 [USP'U]/100ML
1000 INJECTION, SOLUTION INTRAVENOUS CONTINUOUS
Status: DISCONTINUED | OUTPATIENT
Start: 2018-11-24 | End: 2018-11-24

## 2018-11-24 RX ORDER — ATORVASTATIN CALCIUM 40 MG/1
40 TABLET, FILM COATED ORAL NIGHTLY
Status: DISCONTINUED | OUTPATIENT
Start: 2018-11-25 | End: 2018-11-29 | Stop reason: HOSPADM

## 2018-11-24 RX ORDER — ACETAMINOPHEN 325 MG/1
650 TABLET ORAL EVERY 4 HOURS PRN
Status: CANCELLED | OUTPATIENT
Start: 2018-11-24

## 2018-11-24 RX ORDER — LEVOFLOXACIN 5 MG/ML
500 INJECTION, SOLUTION INTRAVENOUS
Status: DISCONTINUED | OUTPATIENT
Start: 2018-11-26 | End: 2018-11-29 | Stop reason: HOSPADM

## 2018-11-24 RX ORDER — IPRATROPIUM BROMIDE AND ALBUTEROL SULFATE 2.5; .5 MG/3ML; MG/3ML
1 SOLUTION RESPIRATORY (INHALATION)
Status: DISCONTINUED | OUTPATIENT
Start: 2018-11-24 | End: 2018-11-24

## 2018-11-24 RX ORDER — ALBUTEROL SULFATE 90 UG/1
2 AEROSOL, METERED RESPIRATORY (INHALATION)
Status: DISCONTINUED | OUTPATIENT
Start: 2018-11-24 | End: 2018-11-24

## 2018-11-24 RX ORDER — DEXTROSE MONOHYDRATE 50 MG/ML
100 INJECTION, SOLUTION INTRAVENOUS PRN
Status: DISCONTINUED | OUTPATIENT
Start: 2018-11-24 | End: 2018-11-29 | Stop reason: HOSPADM

## 2018-11-24 RX ORDER — POTASSIUM CHLORIDE 20MEQ/15ML
40 LIQUID (ML) ORAL PRN
Status: DISCONTINUED | OUTPATIENT
Start: 2018-11-24 | End: 2018-11-29 | Stop reason: HOSPADM

## 2018-11-24 RX ORDER — SODIUM CHLORIDE 0.9 % (FLUSH) 0.9 %
10 SYRINGE (ML) INJECTION PRN
Status: CANCELLED | OUTPATIENT
Start: 2018-11-24

## 2018-11-24 RX ORDER — SODIUM CHLORIDE 0.9 % (FLUSH) 0.9 %
10 SYRINGE (ML) INJECTION PRN
Status: DISCONTINUED | OUTPATIENT
Start: 2018-11-24 | End: 2018-11-29 | Stop reason: HOSPADM

## 2018-11-24 RX ORDER — ASPIRIN 81 MG/1
81 TABLET, CHEWABLE ORAL DAILY
Status: DISCONTINUED | OUTPATIENT
Start: 2018-11-25 | End: 2018-11-29 | Stop reason: HOSPADM

## 2018-11-24 RX ORDER — LEVOFLOXACIN 5 MG/ML
750 INJECTION, SOLUTION INTRAVENOUS EVERY 24 HOURS
Status: DISCONTINUED | OUTPATIENT
Start: 2018-11-24 | End: 2018-11-24 | Stop reason: DRUGHIGH

## 2018-11-24 RX ORDER — ATORVASTATIN CALCIUM 80 MG/1
40 TABLET, FILM COATED ORAL ONCE
Status: COMPLETED | OUTPATIENT
Start: 2018-11-24 | End: 2018-11-24

## 2018-11-24 RX ORDER — HEPARIN SODIUM 1000 [USP'U]/ML
4000 INJECTION, SOLUTION INTRAVENOUS; SUBCUTANEOUS ONCE
Status: DISCONTINUED | OUTPATIENT
Start: 2018-11-24 | End: 2018-11-24

## 2018-11-24 RX ORDER — SODIUM CHLORIDE 0.9 % (FLUSH) 0.9 %
10 SYRINGE (ML) INJECTION EVERY 12 HOURS SCHEDULED
Status: DISCONTINUED | OUTPATIENT
Start: 2018-11-24 | End: 2018-11-29 | Stop reason: HOSPADM

## 2018-11-24 RX ORDER — NIFEDIPINE 90 MG/1
90 TABLET, FILM COATED, EXTENDED RELEASE ORAL DAILY
Status: DISCONTINUED | OUTPATIENT
Start: 2018-11-25 | End: 2018-11-29 | Stop reason: HOSPADM

## 2018-11-24 RX ORDER — SODIUM CHLORIDE 0.9 % (FLUSH) 0.9 %
10 SYRINGE (ML) INJECTION EVERY 12 HOURS SCHEDULED
Status: CANCELLED | OUTPATIENT
Start: 2018-11-24

## 2018-11-24 RX ORDER — LEVOFLOXACIN 5 MG/ML
750 INJECTION, SOLUTION INTRAVENOUS ONCE
Status: COMPLETED | OUTPATIENT
Start: 2018-11-24 | End: 2018-11-25

## 2018-11-24 RX ORDER — HEPARIN SODIUM 1000 [USP'U]/ML
2000 INJECTION, SOLUTION INTRAVENOUS; SUBCUTANEOUS PRN
Status: DISCONTINUED | OUTPATIENT
Start: 2018-11-24 | End: 2018-11-25

## 2018-11-24 RX ORDER — ALBUTEROL SULFATE 2.5 MG/3ML
5 SOLUTION RESPIRATORY (INHALATION)
Status: DISCONTINUED | OUTPATIENT
Start: 2018-11-24 | End: 2018-11-24

## 2018-11-24 RX ADMIN — ONDANSETRON 4 MG: 2 INJECTION INTRAMUSCULAR; INTRAVENOUS at 17:31

## 2018-11-24 RX ADMIN — CEFTRIAXONE SODIUM 1 G: 1 INJECTION, POWDER, FOR SOLUTION INTRAMUSCULAR; INTRAVENOUS at 17:29

## 2018-11-24 RX ADMIN — AZITHROMYCIN MONOHYDRATE 500 MG: 500 INJECTION, POWDER, LYOPHILIZED, FOR SOLUTION INTRAVENOUS at 18:20

## 2018-11-24 RX ADMIN — LEVOFLOXACIN 750 MG: 5 INJECTION, SOLUTION INTRAVENOUS at 23:11

## 2018-11-24 RX ADMIN — INSULIN LISPRO 2 UNITS: 100 INJECTION, SOLUTION INTRAVENOUS; SUBCUTANEOUS at 23:22

## 2018-11-24 RX ADMIN — ATORVASTATIN CALCIUM 40 MG: 80 TABLET, FILM COATED ORAL at 19:16

## 2018-11-24 RX ADMIN — FUROSEMIDE 40 MG: 10 INJECTION, SOLUTION INTRAMUSCULAR; INTRAVENOUS at 19:43

## 2018-11-24 RX ADMIN — ALBUTEROL SULFATE 5 MG: 5 SOLUTION RESPIRATORY (INHALATION) at 16:28

## 2018-11-24 RX ADMIN — ASPIRIN 81 MG 324 MG: 81 TABLET ORAL at 17:31

## 2018-11-24 ASSESSMENT — ENCOUNTER SYMPTOMS
ABDOMINAL PAIN: 0
BLOOD IN STOOL: 0
COUGH: 1
DIARRHEA: 0
RHINORRHEA: 0
SHORTNESS OF BREATH: 1
VOMITING: 1
NAUSEA: 1
SORE THROAT: 0

## 2018-11-24 NOTE — ED PROVIDER NOTES
N/A    Years of education: N/A     Occupational History    Not on file. Social History Main Topics    Smoking status: Never Smoker    Smokeless tobacco: Never Used    Alcohol use No    Drug use: No    Sexual activity: Not on file     Other Topics Concern    Not on file     Social History Narrative    No narrative on file       Family History   Problem Relation Age of Onset    Heart Disease Mother     Early Death Mother     High Blood Pressure Brother     Diabetes Brother     High Blood Pressure Brother     Diabetes Brother     Cancer Brother     High Blood Pressure Brother     High Blood Pressure Brother      Allergies:  Lisinopril    Home Medications:  Prior to Admission medications    Medication Sig Start Date End Date Taking?  Authorizing Provider   NIFEdipine (ADALAT CC) 90 MG extended release tablet TAKE 1 TABLET DAILY 9/28/18   Jay Jones MD   Mirabegron ER (MYRBETRIQ) 25 MG TB24 Take by mouth    Historical Provider, MD   calcitRIOL (ROCALTROL) 0.25 MCG capsule Take 1 capsule by mouth three times a week Monday/Wednesday/Friday 9/21/18 10/21/18  Braulio Phillips MD   spironolactone (ALDACTONE) 25 MG tablet Take 25 mg by mouth daily    Historical Provider, MD   doxazosin (CARDURA) 4 MG tablet Take 1 tablet by mouth daily 6/14/18   Arin Hernandez MD   SITagliptin (JANUVIA) 50 MG tablet Take 50 mg by mouth daily    Historical Provider, MD   furosemide (LASIX) 40 MG tablet Take 40 mg by mouth 2 times daily On hold as of 2/14/2018    Historical Provider, MD   vitamin D (CHOLECALCIFEROL) 32317 UNIT CAPS Take 1 capsule by mouth once a week for 8 doses 11/30/17 1/19/18  Arin Hernandez MD   metolazone (ZAROXOLYN) 5 MG tablet Take 1 tablet by mouth three times a week  Patient taking differently: Take 5 mg by mouth three times a week On hold as of 2/14/2018 6/2/17   Arin Hernandez MD   vitamin D (CHOLECALCIFEROL) 94511 UNIT CAPS Take 1 capsule by mouth once a week for 8 doses 4/27/17 2/16/18  Arin Hernandez MD   glimepiride (AMARYL) 2 MG tablet Take 2 mg by mouth 2 times daily    Historical Provider, MD       REVIEW OF SYSTEMS    (2-9 systems for level 4, 10 or more for level 5)      Review of Systems   Constitutional: Positive for chills. Negative for fever. HENT: Negative for rhinorrhea and sore throat. Respiratory: Positive for cough and shortness of breath. Cardiovascular: Positive for leg swelling. Negative for chest pain. Gastrointestinal: Positive for nausea and vomiting. Negative for abdominal pain, blood in stool and diarrhea. Genitourinary: Negative for hematuria. Musculoskeletal: Negative for myalgias. Skin: Negative for rash. Neurological: Negative for syncope. Hematological: Does not bruise/bleed easily. PHYSICAL EXAM   (up to 7 for level 4, 8 or more for level 5)      INITIAL VITALS:   /70   Pulse 103   Temp 98.3 °F (36.8 °C) (Oral)   Resp 30   Ht 5' 10\" (1.778 m)   Wt 260 lb 1.6 oz (118 kg)   SpO2 98%   BMI 37.32 kg/m²     Physical Exam   Constitutional: He is oriented to person, place, and time. He appears well-developed and well-nourished. He appears distressed (mild respiratory distress: tachypnea 35). HENT:   Head: Normocephalic and atraumatic. Eyes: Right eye exhibits no discharge. Left eye exhibits no discharge. Neck: No tracheal deviation present. Cardiovascular: Regular rhythm, normal heart sounds and intact distal pulses. No murmur heard. tachycardic   Pulmonary/Chest: No stridor. Tachypnea noted. He is in respiratory distress (mild). He has wheezes. He has rales. crackles   Abdominal: Soft. Bowel sounds are normal. He exhibits no distension and no mass. There is no tenderness. There is no guarding. Obese, no rigidity or rebound tenderness   Musculoskeletal: Normal range of motion. He exhibits edema (2+ pitting to right pre-tibial region, 1+ pitting to leg pretibial region). He exhibits no deformity.    No tenderness with palpation of b/l Cardiology    Inpatient consult to Nephrology    Inpatient consult to Internal Medicine    Inpatient consult to Social Work    OT eval and treat    PT evaluation and treat    Initiate ED Aerosol Protocol    MDI Treatment    BIPAP    Initiate Oxygen Therapy Protocol    Pulse oximetry, continuous    Initiate RT Protocol    Respiratory care evaluation only    HHN Treatment    Venous Blood Gas, POC    Creatinine W/GFR Point of Care    Lactic Acid, POC    POCT Glucose    Anion Gap (Calc) POC    POCT Glucose    POC Glucose Fingerstick    EKG 12 Lead    EKG 12 Lead    ECHO Complete 2D W Doppler W Color    Insert peripheral IV    PATIENT STATUS (FROM ED OR OR/PROCEDURAL) Inpatient       MEDICATIONS ORDERED:  Orders Placed This Encounter   Medications    DISCONTD: albuterol (PROVENTIL) nebulizer solution 5 mg    DISCONTD: ipratropium-albuterol (DUONEB) nebulizer solution 1 ampule    DISCONTD: albuterol (PROVENTIL) nebulizer solution 5 mg    DISCONTD: albuterol sulfate  (90 Base) MCG/ACT inhaler 2 puff    AND Linked Order Group     albuterol sulfate  (90 Base) MCG/ACT inhaler 2 puff     ipratropium (ATROVENT HFA) 17 MCG/ACT inhaler 2 puff    DISCONTD: ipratropium (ATROVENT) 0.02 % nebulizer solution 0.5 mg    aspirin chewable tablet 324 mg    ondansetron (ZOFRAN) injection 4 mg    cefTRIAXone (ROCEPHIN) 1 g IVPB in 50 mL D5W minibag    azithromycin (ZITHROMAX) 500 mg in dextrose 5 % 250 mL IVPB    DISCONTD: aspirin chewable tablet 81 mg    atorvastatin (LIPITOR) tablet 40 mg    furosemide (LASIX) injection 40 mg    DISCONTD: heparin (porcine) injection 4,000 Units    DISCONTD: heparin (porcine) injection 4,000 Units    DISCONTD: heparin (porcine) injection 2,000 Units    DISCONTD: heparin 25,000 units in dextrose 5% 250 mL infusion    DISCONTD: 0.9 % sodium chloride bolus    doxazosin (CARDURA) tablet 4 mg    NIFEdipine (ADALAT CC) extended release tablet 90 mg    Result Value Ref Range    PTT 27.8 20.5 - 30.5 sec   Troponin   Result Value Ref Range    Troponin T 0.09 (H) <0.03 ng/mL    Troponin Interp         TSH with Reflex   Result Value Ref Range    TSH 1.50 0.30 - 5.00 mIU/L   Venous Blood Gas, POC   Result Value Ref Range    pH, Johnnie 7.401 7.320 - 7.430    pCO2, Johnnie 41.5 41.0 - 51.0 mm Hg    pO2, Johnnie 21.3 (L) 30.0 - 50.0 mm Hg    HCO3, Venous 25.8 22.0 - 29.0 mmol/L    Total CO2, Venous 27 23.0 - 30.0 mmol/L    Negative Base Excess, Johnnie NOT REPORTED 0.0 - 2.0    Positive Base Excess, Johnnie 1 0.0 - 3.0    O2 Sat, Johnnie 36 (L) 60.0 - 85.0 %    O2 Device/Flow/% NOT REPORTED     Syd Test NOT REPORTED     Sample Site NOT REPORTED     Mode NOT REPORTED     FIO2 NOT REPORTED     Pt Temp NOT REPORTED     POC pH Temp NOT REPORTED     POC pCO2 Temp NOT REPORTED mm Hg    POC pO2 Temp NOT REPORTED mm Hg   Creatinine W/GFR Point of Care   Result Value Ref Range    POC Creatinine 3.97 (H) 0.51 - 1.19 mg/dL    GFR Comment 19 (L) >60 mL/min    GFR Non- 16 (L) >60 mL/min    GFR Comment         Lactic Acid, POC   Result Value Ref Range    POC Lactic Acid 2.09 (H) 0.56 - 1.39 mmol/L   POCT Glucose   Result Value Ref Range    POC Glucose 136 (H) 74 - 100 mg/dL   Anion Gap (Calc) POC   Result Value Ref Range    Anion Gap 8 7 - 16 mmol/L   POC Glucose Fingerstick   Result Value Ref Range    POC Glucose 203 (H) 75 - 110 mg/dL   EKG 12 Lead   Result Value Ref Range    Ventricular Rate 103 BPM    Atrial Rate 103 BPM    P-R Interval 186 ms    QRS Duration 90 ms    Q-T Interval 356 ms    QTc Calculation (Bazett) 466 ms    P Axis 64 degrees    R Axis -56 degrees    T Axis 74 degrees       RADIOLOGY:  Xr Chest Standard (2 Vw)    Result Date: 11/24/2018  EXAMINATION: TWO VIEWS OF THE CHEST 11/24/2018 5:03 pm COMPARISON: Chest x-ray from 12/21/2016 HISTORY: ORDERING SYSTEM PROVIDED HISTORY: SOB TECHNOLOGIST PROVIDED HISTORY: SOB FINDINGS: There is stable enlargement of cardiac (>2)   Temp > 38.3C or < 36C   HR > 90   RR > 20   WBC > 12 or < 4 or >10% bands  SIRS (>2) and confirmed or suspected source of infection = Sepsis    Sepsis Identified   Date: 11/24/18  Time: 1714  Sepsis Orders:   CBC: Yes   CMP: Yes   PT/PTT: Yes   Blood Cultures x2: Yes   Urinalysis and Urine Culture: Yes   Lactate: Yes   Broad Spectrum Antibiotics Given (within 3 hours of sepsis identification, after blood cultures): Yes              IV Crystalloid given: Yes    If lactate >2.0 MUST repeat within 6 hours    Is lactate > 4.0:  No  If lactate >4.0 OR hypotension 30ml/kg crystalloid MUST be ordered. Fluids must be completed within 3 hours of sepsis identification. PROCEDURES:  None    CONSULTS:  IP CONSULT TO CARDIOLOGY  IP CONSULT TO NEPHROLOGY  IP CONSULT TO INTERNAL MEDICINE  IP CONSULT TO SOCIAL WORK    CRITICAL CARE:  Please see attending physician note. FINAL IMPRESSION      1. Acute on chronic congestive heart failure, unspecified heart failure type ([facility] [address]    2. Pneumonia due to organism    3. Sepsis, due to unspecified organism ([facility] [address]    4.  NSTEMI (non-ST elevated myocardial infarction) [facility])          DISPOSITION / PLAN     DISPOSITION Admitted 11/24/2018 07:52:36 PM    PATIENT REFERRED TO:  [address] [facility],[address]  [address]  [place] [id number]            DISCHARGE MEDICATIONS:  Current Discharge Medication List          Carole Cheema [place]  Emergency Medicine Resident    (Please note that portions ofthis note were completed with a voice recognition program.  Efforts were made to edit the dictations but occasionally words are mis-transcribed.)       Carole Cheema DO  Resident  11/25/18 [id number]

## 2018-11-24 NOTE — ED NOTES
Patient came in complaining of SOB, frequent non-productive cough. RR in 30's, O2 sats on RA were 90%, patient placed on 2L NC. Patient had no complaints of pain. Patient was recently hospitalized approximately 6 weeks ago for the same thing, admitted to [facility] for 4 days, and given IV atb's. Symptoms had improved until about 2-3 days ago. Labs obtained, EKG completed, [facility] sent. Will continue to monitor.      Cory Funez RN  11/24/18 [id number]

## 2018-11-25 ENCOUNTER — APPOINTMENT (OUTPATIENT)
Dept: GENERAL RADIOLOGY | Age: 58
DRG: 871 | End: 2018-11-25
Payer: COMMERCIAL

## 2018-11-25 PROBLEM — R06.03 RESPIRATORY DISTRESS: Status: ACTIVE | Noted: 2018-11-25

## 2018-11-25 PROBLEM — R79.89 ELEVATED TROPONIN: Status: ACTIVE | Noted: 2018-02-16

## 2018-11-25 PROBLEM — D64.9 ANEMIA: Status: ACTIVE | Noted: 2018-11-25

## 2018-11-25 PROBLEM — I50.33 ACUTE ON CHRONIC DIASTOLIC CONGESTIVE HEART FAILURE (HCC): Status: ACTIVE | Noted: 2018-11-24

## 2018-11-25 PROBLEM — R77.8 ELEVATED TROPONIN: Status: ACTIVE | Noted: 2018-02-16

## 2018-11-25 PROBLEM — I10 ESSENTIAL HYPERTENSION: Status: ACTIVE | Noted: 2018-11-25

## 2018-11-25 LAB
ABSOLUTE EOS #: 0.1 K/UL (ref 0–0.4)
ABSOLUTE IMMATURE GRANULOCYTE: 0.29 K/UL (ref 0–0.3)
ABSOLUTE LYMPH #: 0.67 K/UL (ref 1–4.8)
ABSOLUTE MONO #: 1.15 K/UL (ref 0.1–0.8)
ABSOLUTE RETIC #: 0.05 M/UL (ref 0.03–0.08)
ADENOVIRUS PCR: NOT DETECTED
AMPHETAMINE SCREEN URINE: NEGATIVE
ANION GAP SERPL CALCULATED.3IONS-SCNC: 16 MMOL/L (ref 9–17)
BARBITURATE SCREEN URINE: NEGATIVE
BASOPHILS # BLD: 0 % (ref 0–2)
BASOPHILS ABSOLUTE: 0 K/UL (ref 0–0.2)
BENZODIAZEPINE SCREEN, URINE: NEGATIVE
BORDETELLA PERTUSSIS PCR: NOT DETECTED
BUN BLDV-MCNC: 47 MG/DL (ref 8–23)
BUN/CREAT BLD: ABNORMAL (ref 9–20)
BUPRENORPHINE URINE: NORMAL
CALCIUM SERPL-MCNC: 8.4 MG/DL (ref 8.6–10.4)
CANNABINOID SCREEN URINE: NEGATIVE
CHLAMYDIA PNEUMONIAE BY PCR: NOT DETECTED
CHLORIDE BLD-SCNC: 107 MMOL/L (ref 98–107)
CO2: 20 MMOL/L (ref 20–31)
COCAINE METABOLITE, URINE: NEGATIVE
CORONAVIRUS 229E PCR: NOT DETECTED
CORONAVIRUS HKU1 PCR: NOT DETECTED
CORONAVIRUS NL63 PCR: NOT DETECTED
CORONAVIRUS OC43 PCR: NOT DETECTED
CREAT SERPL-MCNC: 4.28 MG/DL (ref 0.7–1.2)
CREATININE URINE: 73.8 MG/DL (ref 39–259)
CULTURE: NO GROWTH
DIFFERENTIAL TYPE: ABNORMAL
DIRECT EXAM: NORMAL
EOSINOPHILS RELATIVE PERCENT: 1 % (ref 1–4)
FERRITIN: 363 UG/L (ref 30–400)
GFR AFRICAN AMERICAN: 17 ML/MIN
GFR NON-AFRICAN AMERICAN: 14 ML/MIN
GFR SERPL CREATININE-BSD FRML MDRD: ABNORMAL ML/MIN/{1.73_M2}
GFR SERPL CREATININE-BSD FRML MDRD: ABNORMAL ML/MIN/{1.73_M2}
GLUCOSE BLD-MCNC: 151 MG/DL (ref 75–110)
GLUCOSE BLD-MCNC: 161 MG/DL (ref 75–110)
GLUCOSE BLD-MCNC: 168 MG/DL (ref 75–110)
GLUCOSE BLD-MCNC: 181 MG/DL (ref 70–99)
GLUCOSE BLD-MCNC: 197 MG/DL (ref 75–110)
GLUCOSE BLD-MCNC: 199 MG/DL (ref 75–110)
HCT VFR BLD CALC: 26.5 % (ref 40.7–50.3)
HEMOGLOBIN: 8.2 G/DL (ref 13–17)
HUMAN METAPNEUMOVIRUS PCR: NOT DETECTED
IMMATURE GRANULOCYTES: 3 %
IMMATURE RETIC FRACT: 17.1 % (ref 2.7–18.3)
INFLUENZA A BY PCR: NOT DETECTED
INFLUENZA A H1 (2009) PCR: NORMAL
INFLUENZA A H1 PCR: NORMAL
INFLUENZA A H3 PCR: NORMAL
INFLUENZA B BY PCR: NOT DETECTED
IRON SATURATION: 8 % (ref 20–55)
IRON: 13 UG/DL (ref 59–158)
LYMPHOCYTES # BLD: 7 % (ref 24–44)
Lab: NORMAL
MCH RBC QN AUTO: 27.9 PG (ref 25.2–33.5)
MCHC RBC AUTO-ENTMCNC: 30.9 G/DL (ref 28.4–34.8)
MCV RBC AUTO: 90.1 FL (ref 82.6–102.9)
MDMA URINE: NORMAL
METHADONE SCREEN, URINE: NEGATIVE
METHAMPHETAMINE, URINE: NORMAL
MONOCYTES # BLD: 12 % (ref 1–7)
MORPHOLOGY: ABNORMAL
MYCOPLASMA PNEUMONIAE PCR: NOT DETECTED
NRBC AUTOMATED: 0 PER 100 WBC
OPIATES, URINE: NEGATIVE
OXYCODONE SCREEN URINE: NEGATIVE
PARAINFLUENZA 1 PCR: NOT DETECTED
PARAINFLUENZA 2 PCR: NOT DETECTED
PARAINFLUENZA 3 PCR: NOT DETECTED
PARAINFLUENZA 4 PCR: NOT DETECTED
PDW BLD-RTO: 14.6 % (ref 11.8–14.4)
PHENCYCLIDINE, URINE: NEGATIVE
PLATELET # BLD: 228 K/UL (ref 138–453)
PLATELET ESTIMATE: ABNORMAL
PMV BLD AUTO: 10.4 FL (ref 8.1–13.5)
POTASSIUM SERPL-SCNC: 4.5 MMOL/L (ref 3.7–5.3)
PROPOXYPHENE, URINE: NORMAL
RBC # BLD: 2.94 M/UL (ref 4.21–5.77)
RBC # BLD: ABNORMAL 10*6/UL
RESP SYNCYTIAL VIRUS PCR: NOT DETECTED
RETIC %: 1.7 % (ref 0.5–1.9)
RETIC HEMOGLOBIN: 21.7 PG (ref 28.2–35.7)
RHINO/ENTEROVIRUS PCR: NOT DETECTED
SEG NEUTROPHILS: 77 % (ref 36–66)
SEGMENTED NEUTROPHILS ABSOLUTE COUNT: 7.39 K/UL (ref 1.8–7.7)
SODIUM BLD-SCNC: 143 MMOL/L (ref 135–144)
SOURCE: NORMAL
SPECIMEN DESCRIPTION: NORMAL
STATUS: NORMAL
TEST INFORMATION: NORMAL
TOTAL IRON BINDING CAPACITY: 156 UG/DL (ref 250–450)
TRICYCLIC ANTIDEPRESSANTS, UR: NORMAL
TROPONIN INTERP: ABNORMAL
TROPONIN T: 0.06 NG/ML
TROPONIN T: 0.08 NG/ML
TROPONIN T: 0.09 NG/ML
UNSATURATED IRON BINDING CAPACITY: 143 UG/DL (ref 112–347)
UREA NITROGEN, UR: 356 MG/DL
WBC # BLD: 9.6 K/UL (ref 3.5–11.3)
WBC # BLD: ABNORMAL 10*3/UL

## 2018-11-25 PROCEDURE — 86317 IMMUNOASSAY INFECTIOUS AGENT: CPT

## 2018-11-25 PROCEDURE — 85025 COMPLETE CBC W/AUTO DIFF WBC: CPT

## 2018-11-25 PROCEDURE — 2580000003 HC RX 258: Performed by: STUDENT IN AN ORGANIZED HEALTH CARE EDUCATION/TRAINING PROGRAM

## 2018-11-25 PROCEDURE — 83540 ASSAY OF IRON: CPT

## 2018-11-25 PROCEDURE — 87899 AGENT NOS ASSAY W/OPTIC: CPT

## 2018-11-25 PROCEDURE — 2060000000 HC ICU INTERMEDIATE R&B

## 2018-11-25 PROCEDURE — 87804 INFLUENZA ASSAY W/OPTIC: CPT

## 2018-11-25 PROCEDURE — 87486 CHLMYD PNEUM DNA AMP PROBE: CPT

## 2018-11-25 PROCEDURE — 86704 HEP B CORE ANTIBODY TOTAL: CPT

## 2018-11-25 PROCEDURE — G8987 SELF CARE CURRENT STATUS: HCPCS

## 2018-11-25 PROCEDURE — 6360000002 HC RX W HCPCS: Performed by: STUDENT IN AN ORGANIZED HEALTH CARE EDUCATION/TRAINING PROGRAM

## 2018-11-25 PROCEDURE — 71046 X-RAY EXAM CHEST 2 VIEWS: CPT

## 2018-11-25 PROCEDURE — 6370000000 HC RX 637 (ALT 250 FOR IP): Performed by: STUDENT IN AN ORGANIZED HEALTH CARE EDUCATION/TRAINING PROGRAM

## 2018-11-25 PROCEDURE — 87633 RESP VIRUS 12-25 TARGETS: CPT

## 2018-11-25 PROCEDURE — 80048 BASIC METABOLIC PNL TOTAL CA: CPT

## 2018-11-25 PROCEDURE — 99223 1ST HOSP IP/OBS HIGH 75: CPT | Performed by: INTERNAL MEDICINE

## 2018-11-25 PROCEDURE — 87449 NOS EACH ORGANISM AG IA: CPT

## 2018-11-25 PROCEDURE — G8988 SELF CARE GOAL STATUS: HCPCS

## 2018-11-25 PROCEDURE — 94660 CPAP INITIATION&MGMT: CPT

## 2018-11-25 PROCEDURE — 87340 HEPATITIS B SURFACE AG IA: CPT

## 2018-11-25 PROCEDURE — 87581 M.PNEUMON DNA AMP PROBE: CPT

## 2018-11-25 PROCEDURE — 82570 ASSAY OF URINE CREATININE: CPT

## 2018-11-25 PROCEDURE — 85045 AUTOMATED RETICULOCYTE COUNT: CPT

## 2018-11-25 PROCEDURE — 80307 DRUG TEST PRSMV CHEM ANLYZR: CPT

## 2018-11-25 PROCEDURE — 2700000000 HC OXYGEN THERAPY PER DAY

## 2018-11-25 PROCEDURE — 84540 ASSAY OF URINE/UREA-N: CPT

## 2018-11-25 PROCEDURE — 87798 DETECT AGENT NOS DNA AMP: CPT

## 2018-11-25 PROCEDURE — 97166 OT EVAL MOD COMPLEX 45 MIN: CPT

## 2018-11-25 PROCEDURE — 36415 COLL VENOUS BLD VENIPUNCTURE: CPT

## 2018-11-25 PROCEDURE — 84484 ASSAY OF TROPONIN QUANT: CPT

## 2018-11-25 PROCEDURE — 83550 IRON BINDING TEST: CPT

## 2018-11-25 PROCEDURE — 97535 SELF CARE MNGMENT TRAINING: CPT

## 2018-11-25 PROCEDURE — 94762 N-INVAS EAR/PLS OXIMTRY CONT: CPT

## 2018-11-25 PROCEDURE — 82947 ASSAY GLUCOSE BLOOD QUANT: CPT

## 2018-11-25 PROCEDURE — 82728 ASSAY OF FERRITIN: CPT

## 2018-11-25 RX ORDER — CARVEDILOL 6.25 MG/1
6.25 TABLET ORAL 2 TIMES DAILY WITH MEALS
Status: DISCONTINUED | OUTPATIENT
Start: 2018-11-25 | End: 2018-11-25

## 2018-11-25 RX ORDER — CARVEDILOL 3.12 MG/1
3.12 TABLET ORAL 2 TIMES DAILY WITH MEALS
Status: DISCONTINUED | OUTPATIENT
Start: 2018-11-25 | End: 2018-11-29 | Stop reason: HOSPADM

## 2018-11-25 RX ADMIN — DOXAZOSIN 4 MG: 2 TABLET ORAL at 10:00

## 2018-11-25 RX ADMIN — INSULIN LISPRO 3 UNITS: 100 INJECTION, SOLUTION INTRAVENOUS; SUBCUTANEOUS at 12:31

## 2018-11-25 RX ADMIN — FUROSEMIDE 40 MG: 10 INJECTION, SOLUTION INTRAMUSCULAR; INTRAVENOUS at 10:00

## 2018-11-25 RX ADMIN — CEFEPIME HYDROCHLORIDE 2 G: 2 INJECTION, POWDER, FOR SOLUTION INTRAVENOUS at 10:31

## 2018-11-25 RX ADMIN — INSULIN LISPRO 3 UNITS: 100 INJECTION, SOLUTION INTRAVENOUS; SUBCUTANEOUS at 10:02

## 2018-11-25 RX ADMIN — NIFEDIPINE 90 MG: 90 TABLET, FILM COATED, EXTENDED RELEASE ORAL at 09:59

## 2018-11-25 RX ADMIN — CARVEDILOL 3.12 MG: 3.12 TABLET, FILM COATED ORAL at 17:45

## 2018-11-25 RX ADMIN — ACETAMINOPHEN 650 MG: 325 TABLET ORAL at 01:05

## 2018-11-25 RX ADMIN — DESMOPRESSIN ACETATE 40 MG: 0.2 TABLET ORAL at 20:53

## 2018-11-25 RX ADMIN — FUROSEMIDE 40 MG: 10 INJECTION, SOLUTION INTRAMUSCULAR; INTRAVENOUS at 17:48

## 2018-11-25 RX ADMIN — ASPIRIN 81 MG: 81 TABLET, CHEWABLE ORAL at 10:00

## 2018-11-25 RX ADMIN — Medication 10 ML: at 21:00

## 2018-11-25 RX ADMIN — INSULIN LISPRO 2 UNITS: 100 INJECTION, SOLUTION INTRAVENOUS; SUBCUTANEOUS at 20:53

## 2018-11-25 RX ADMIN — INSULIN LISPRO 3 UNITS: 100 INJECTION, SOLUTION INTRAVENOUS; SUBCUTANEOUS at 17:48

## 2018-11-25 RX ADMIN — Medication 10 ML: at 10:00

## 2018-11-25 RX ADMIN — CARVEDILOL 6.25 MG: 6.25 TABLET, FILM COATED ORAL at 11:56

## 2018-11-25 ASSESSMENT — PAIN SCALES - GENERAL
PAINLEVEL_OUTOF10: 0
PAINLEVEL_OUTOF10: 0

## 2018-11-25 NOTE — PROGRESS NOTES
down depending on response in the next few days. May need a repeat CT chest x-ray does not show improvement of infiltrates after diuresis. Monitor I's and O's closely. Attending Physician Statement  I have discussed the care of Lori Burr, including pertinent history and exam findings,  with the resident. I have seen and examined the patient and the key elements of all parts of the encounter have been performed by me. I agree with the assessment, plan and orders as documented by the resident.     Divina Del Toro MD

## 2018-11-25 NOTE — PROGRESS NOTES
Pain: Denies    Oxygen Therapy  O2 Device: Nasal cannula  O2 Flow Rate (L/min): 4 L/min    Social/Functional History  Social/Functional History  Lives With: Alone  Type of Home: House  Home Layout: Two level, Bed/Bath upstairs, 1/2 bath on main level  Home Access: Stairs to enter without rails  Entrance Stairs - Number of Steps: 1 step   Bathroom Shower/Tub: Tub/Shower unit  Bathroom Toilet: Standard  Bathroom Equipment: Grab bars in shower  Home Equipment:  (pt reports no use of DME at baseline )  ADL Assistance: [address]: Independent  Homemaking Responsibilities: Yes  Meal Prep Responsibility: Primary  Laundry Responsibility: Primary  Cleaning Responsibility: Primary  Shopping Responsibility: Primary  Ambulation Assistance: Independent  Transfer Assistance: Independent  Active : Yes  Mode of Transportation: Truck  Occupation: Retired  Type of occupation: 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 Objective   Vision: Within Functional Limits  Hearing: Within functional limits    Orientation  Overall Orientation Status: Within Functional Limits     Balance  Sitting Balance: Modified independent  (~4 minutes on EOB )  Standing Balance: Supervision  Standing Balance  Time: ~1 minute  Activity: pt completed sit > stand transfer and took a few steps  Sit to stand: Supervision  Stand to sit: Supervision  Comment: pt steady on feet but with increased SOB upon standing.  Pt returned to supine in bed and reported feeling fatigued     ADL  Feeding: Modified independent   Grooming: Modified independent   UE Bathing: Supervision  LE Bathing: Stand by assistance  UE Dressing: Supervision  LE Dressing: Stand by assistance  Toileting: Stand by assistance    RUE Tone: Normotonic  LUE Tone: Normotonic    Coordination  Movements Are Fluid And Coordinated:

## 2018-11-25 NOTE — H&P
[facility]     Department of Internal Medicine - Staff Internal Medicine Service          ADMISSION NOTE/HISTORY AND PHYSICAL EXAMINATION   ______________________________________________________________________    HISTORY OBTAIN FROM:  patient    CHIEF COMPLAINT:  SOB    HISTORY OF PRESENT ILLNESS:      PMH: CAD, CHF, CKD  Stage IV, HLD, HTN, DM type II. The patient is a pleasant 59 y.o. male with significant past medical history as above presents with a chief complaint of shortness of breath ×3 days. Associated intermittent clear/yellowish sputum production. Associated chills ×3 days. Nausea today with one episode of non-bloody/nonbilious emesis. Patient is also noted that he normally has bilateral lower extremity swelling but has not worsened recently. Denies any recent travel, surgeries, or long travel. Patient denies fevers, neck stiffness, chest pain, abdominal pain, diarrhea, numbness/tingling, or weight loss. Patient has a history of diastolic heart failure. Echo in 2016 showed an ejection fraction of 55%. Some wall hypokinesis. Patient also has a history of CK stage IV and follows with Dr. Montana Bolanos. Chronic kidney disease is secondary to diabetic nephropathy and hypertension. Baseline creatinine appears to be between 3-3 0.2. Renal ultrasound in 9/2018 showed no hydronephrosis but increased echogenicity indicating medical renal disease. In the emergency department patient was afebrile. Patient was tachycardic at 111 but hemodynamically stable. Patient was noted to be tachypneic and in mild respiratory distress. BiPAP was placed on patient in the emergency department. BMP showed a creatinine elevated at 4.46 and a BUN of 50. Glucose 137. Lactic acid 2.2. ProBNP [id number]. Troponin T elevated at 0.10 and 0.07. Leukocytosis at 12.8. Patient was found to be anemic at 9.7. Blood cultures and urine culture were drawn. UA negative.   Pro-calcitonin elevated at 0.49. Chest x-ray demonstrated moderate pulmonary vascular congestion with somewhat asymmetrical pulmonary edema. Concern for pneumonia underlying the pulmonary edema. Cardiology and nephrology were consulted from the emergency department. Nephrology clear patient for 40 mg IV Lasix twice a day. Cardiology requested heparin drip, aspirin, Lipitor-trend troponins. Patient was given 1 dose of azithromycin and ceftriaxone in the emergency department.     PAST MEDICAL HISTORY:        Diagnosis Date    Bowel obstruction ([facility] [address] 12/26/13    CAD (coronary artery disease)     CHF (congestive heart failure) (HCC)     last episode 18 months ago (2/2/18)    Chronic kidney disease     Diabetes mellitus ([facility] [address]     Hyperlipidemia     Hypertension     MI, old 12/26/13    Sleep apnea     pt has machine and does not use it       PAST SURGICAL HISTORY:        Procedure Laterality Date    ABDOMEN SURGERY      COLON SURGERY      COLONOSCOPY      CYSTOSCOPY  11/17/2017    FOOT SURGERY      [place] GENITAL SURG PROC,MALE UNLISTED N/A 2/16/2018    US  PROSTATE BIOPSY WITH SATURATION performed by Lo Pinzon MD at [facility] [place] operated by [facility]  11/17/2017    PROSTATE BIOPSY N/A 11/17/2017    CYSTOSCOPY PROSTATE US BIOPSY performed by Lo Pinzon MD at [facility], [place] [place] by [facility]  02/16/2018       MEDICATION PRIOR TO ADMISSION:  Prescriptions Prior to Admission: NIFEdipine (ADALAT CC) 90 MG extended release tablet, TAKE 1 TABLET DAILY  Mirabegron ER (MYRBETRIQ) 25 MG TB24, Take by mouth  calcitRIOL (ROCALTROL) 0.25 MCG capsule, Take 1 capsule by mouth three times a week Monday/Wednesday/Friday  spironolactone (ALDACTONE) 25 MG tablet, Take 25 mg by mouth daily  doxazosin (CARDURA) 4 MG tablet, Take 1 tablet by mouth daily  SITagliptin (JANUVIA) 50 MG tablet, Take 50 mg by mouth daily  furosemide (LASIX) 40 MG tablet, Take 40 mg by mouth 2 times daily On hold as of 2/14/2018  vitamin D (CHOLECALCIFEROL) 39920 UNIT CAPS,

## 2018-11-25 NOTE — CONSULTS
Saleem Aceves MD   SITagliptin (JANUVIA) 50 MG tablet Take 50 mg by mouth daily    Historical Provider, MD   furosemide (LASIX) 40 MG tablet Take 40 mg by mouth 2 times daily On hold as of 2/14/2018    Historical Provider, MD   vitamin D (CHOLECALCIFEROL) 64552 UNIT CAPS Take 1 capsule by mouth once a week for 8 doses 11/30/17 1/19/18  Saleem Aceves MD   metolazone (ZAROXOLYN) 5 MG tablet Take 1 tablet by mouth three times a week  Patient taking differently: Take 5 mg by mouth three times a week On hold as of 2/14/2018 6/2/17   Saleem Acevse MD   vitamin D (CHOLECALCIFEROL) 51501 UNIT CAPS Take 1 capsule by mouth once a week for 8 doses 4/27/17 2/16/18  Saleem Aceves MD   glimepiride (AMARYL) 2 MG tablet Take 2 mg by mouth 2 times daily    Historical Provider, MD      Current Facility-Administered Medications: insulin lispro (HUMALOG) injection vial 0-18 Units, 0-18 Units, Subcutaneous, TID   insulin lispro (HUMALOG) injection vial 0-9 Units, 0-9 Units, Subcutaneous, Nightly  cefepime (MAXIPIME) 2 g IVPB minibag, 2 g, Intravenous, Q24H  carvedilol (COREG) tablet 6.25 mg, 6.25 mg, Oral, BID WC  albuterol sulfate  (90 Base) MCG/ACT inhaler 2 puff, 2 puff, Inhalation, As Directed RT PRN **AND** ipratropium (ATROVENT HFA) 17 MCG/ACT inhaler 2 puff, 2 puff, Inhalation, As Directed RT PRN **AND** MDI Treatment, , , As Directed RT PRN  doxazosin (CARDURA) tablet 4 mg, 4 mg, Oral, Daily  NIFEdipine (ADALAT CC) extended release tablet 90 mg, 90 mg, Oral, Daily  sodium chloride flush 0.9 % injection 10 mL, 10 mL, Intravenous, 2 times per day  sodium chloride flush 0.9 % injection 10 mL, 10 mL, Intravenous, PRN  magnesium hydroxide (MILK OF MAGNESIA) 400 MG/5ML suspension 30 mL, 30 mL, Oral, Daily PRN  ondansetron (ZOFRAN) injection 4 mg, 4 mg, Intravenous, Q6H PRN  potassium chloride (KLOR-CON M) extended release tablet 40 mEq, 40 mEq, Oral, PRN **OR** potassium chloride 20 MEQ/15ML (10%) oral solution 40 mEq, 40 mEq,
09/14/2016    . PR3 ANCA:    Lab Results   Component Value Date    PR3 51 09/14/2016     Urine Creatinine:    Lab Results   Component Value Date    LABCREA 73.8 11/25/2018     Urinalysis:  U/A: Lab Results   Component Value Date    NITRU NEGATIVE 11/24/2018    COLORU YELLOW 11/24/2018    PHUR 5.0 11/24/2018    WBCUA None 11/24/2018    RBCUA 0 TO 2 11/24/2018    MUCUS 1+ 11/24/2018    TRICHOMONAS NOT REPORTED 11/24/2018    YEAST NOT REPORTED 11/24/2018    BACTERIA NOT REPORTED 11/24/2018    SPECGRAV 1.016 11/24/2018    LEUKOCYTESUR NEGATIVE 11/24/2018    UROBILINOGEN Normal 11/24/2018    BILIRUBINUR NEGATIVE 11/24/2018    GLUCOSEU NEGATIVE 11/24/2018    [address] NEGATIVE 11/24/2018    AMORPHOUS NOT REPORTED 11/24/2018     Radiology:  Reviewed as available. CXR   Impression   Findings most suggestive of moderate pulmonary vascular congestion with   somewhat asymmetric pulmonary edema.  Superimposed atelectasis and/or   pneumonia is not excluded.  Continued imaging follow-up is recommended. Assessment:  1. JAGRUTI - sec to CRS 1 vs progression of intrinsic renal disease   2. Chronic Kidney Disease Stage 4 with baseline creatinine 4  mg per DL. Etiology likely secondary to diabetic nephropathy and hypertension. Ultrasound showed 10.6 and 10.8 cm kidneys in past.   3. Acute on Chronic CHF exacerbation - secondary to poor med compliance   4. HTN  5. Elevated Troponin   6. DM II    Plan:  1. Continue IV diuretics and call with labs once available. 2. Avoid nephrotoxic agents if possible. 3. DW option of RRT if diuretic response not achieved    Thank you for the consultation. Please do not hesitate to call with questions. Electronically signed by Hattie Feng CNP, APRN - CNP on 11/25/2018 at 9:04 AM    Attending Physician Statement  I have discussed the care of Nicole Larkin, including pertinent history and exam findings,  with the CNP. I have reviewed the key elements of all parts of the encounter with the CNP.

## 2018-11-26 LAB
-: NORMAL
ABSOLUTE EOS #: 0.31 K/UL (ref 0–0.44)
ABSOLUTE IMMATURE GRANULOCYTE: 0.07 K/UL (ref 0–0.3)
ABSOLUTE LYMPH #: 0.7 K/UL (ref 1.1–3.7)
ABSOLUTE MONO #: 1.18 K/UL (ref 0.1–1.2)
ADENOVIRUS PCR: ABNORMAL
ANION GAP SERPL CALCULATED.3IONS-SCNC: 15 MMOL/L (ref 9–17)
BASOPHILS # BLD: 0 % (ref 0–2)
BASOPHILS ABSOLUTE: 0.03 K/UL (ref 0–0.2)
BORDETELLA PERTUSSIS PCR: ABNORMAL
BUN BLDV-MCNC: 54 MG/DL (ref 8–23)
BUN/CREAT BLD: ABNORMAL (ref 9–20)
CALCIUM SERPL-MCNC: 8.3 MG/DL (ref 8.6–10.4)
CHLAMYDIA PNEUMONIAE BY PCR: ABNORMAL
CHLORIDE BLD-SCNC: 104 MMOL/L (ref 98–107)
CO2: 20 MMOL/L (ref 20–31)
CORONAVIRUS 229E PCR: ABNORMAL
CORONAVIRUS HKU1 PCR: ABNORMAL
CORONAVIRUS NL63 PCR: ABNORMAL
CORONAVIRUS OC43 PCR: ABNORMAL
CREAT SERPL-MCNC: 4.78 MG/DL (ref 0.7–1.2)
DIFFERENTIAL TYPE: ABNORMAL
EKG ATRIAL RATE: 103 BPM
EKG ATRIAL RATE: 104 BPM
EKG ATRIAL RATE: 112 BPM
EKG P AXIS: 64 DEGREES
EKG P AXIS: 64 DEGREES
EKG P AXIS: 67 DEGREES
EKG P-R INTERVAL: 178 MS
EKG P-R INTERVAL: 182 MS
EKG P-R INTERVAL: 186 MS
EKG Q-T INTERVAL: 338 MS
EKG Q-T INTERVAL: 356 MS
EKG Q-T INTERVAL: 374 MS
EKG QRS DURATION: 82 MS
EKG QRS DURATION: 90 MS
EKG QRS DURATION: 90 MS
EKG QTC CALCULATION (BAZETT): 461 MS
EKG QTC CALCULATION (BAZETT): 466 MS
EKG QTC CALCULATION (BAZETT): 491 MS
EKG R AXIS: -52 DEGREES
EKG R AXIS: -54 DEGREES
EKG R AXIS: -56 DEGREES
EKG T AXIS: 66 DEGREES
EKG T AXIS: 67 DEGREES
EKG T AXIS: 74 DEGREES
EKG VENTRICULAR RATE: 103 BPM
EKG VENTRICULAR RATE: 104 BPM
EKG VENTRICULAR RATE: 112 BPM
EOSINOPHILS RELATIVE PERCENT: 3 % (ref 1–4)
ESTIMATED AVERAGE GLUCOSE: 120 MG/DL
GFR AFRICAN AMERICAN: 15 ML/MIN
GFR NON-AFRICAN AMERICAN: 13 ML/MIN
GFR SERPL CREATININE-BSD FRML MDRD: ABNORMAL ML/MIN/{1.73_M2}
GFR SERPL CREATININE-BSD FRML MDRD: ABNORMAL ML/MIN/{1.73_M2}
GLUCOSE BLD-MCNC: 190 MG/DL (ref 75–110)
GLUCOSE BLD-MCNC: 194 MG/DL (ref 70–99)
GLUCOSE BLD-MCNC: 195 MG/DL (ref 75–110)
GLUCOSE BLD-MCNC: 210 MG/DL (ref 75–110)
HBA1C MFR BLD: 5.8 % (ref 4–6)
HCT VFR BLD CALC: 28.7 % (ref 40.7–50.3)
HEMOGLOBIN: 8.5 G/DL (ref 13–17)
HUMAN METAPNEUMOVIRUS PCR: ABNORMAL
IMMATURE GRANULOCYTES: 1 %
INFLUENZA A BY PCR: ABNORMAL
INFLUENZA A H1 (2009) PCR: ABNORMAL
INFLUENZA A H1 PCR: ABNORMAL
INFLUENZA A H3 PCR: ABNORMAL
INFLUENZA B BY PCR: ABNORMAL
LYMPHOCYTES # BLD: 7 % (ref 24–43)
MCH RBC QN AUTO: 26.9 PG (ref 25.2–33.5)
MCHC RBC AUTO-ENTMCNC: 29.6 G/DL (ref 28.4–34.8)
MCV RBC AUTO: 90.8 FL (ref 82.6–102.9)
MONOCYTES # BLD: 12 % (ref 3–12)
MYCOPLASMA PNEUMONIAE IGG: 4.6
MYCOPLASMA PNEUMONIAE IGM: 0.61
MYCOPLASMA PNEUMONIAE PCR: ABNORMAL
NRBC AUTOMATED: 0 PER 100 WBC
PARAINFLUENZA 1 PCR: ABNORMAL
PARAINFLUENZA 2 PCR: ABNORMAL
PARAINFLUENZA 3 PCR: ABNORMAL
PARAINFLUENZA 4 PCR: ABNORMAL
PDW BLD-RTO: 14.4 % (ref 11.8–14.4)
PLATELET # BLD: 240 K/UL (ref 138–453)
PLATELET ESTIMATE: ABNORMAL
PMV BLD AUTO: 10.1 FL (ref 8.1–13.5)
POTASSIUM SERPL-SCNC: 4.1 MMOL/L (ref 3.7–5.3)
RBC # BLD: 3.16 M/UL (ref 4.21–5.77)
RBC # BLD: ABNORMAL 10*6/UL
REASON FOR REJECTION: NORMAL
RESP SYNCYTIAL VIRUS PCR: ABNORMAL
RHINO/ENTEROVIRUS PCR: ABNORMAL
SEG NEUTROPHILS: 77 % (ref 36–65)
SEGMENTED NEUTROPHILS ABSOLUTE COUNT: 7.66 K/UL (ref 1.5–8.1)
SODIUM BLD-SCNC: 139 MMOL/L (ref 135–144)
SOURCE: ABNORMAL
SURGICAL PATHOLOGY REPORT: NORMAL
TROPONIN INTERP: ABNORMAL
TROPONIN INTERP: ABNORMAL
TROPONIN T: 0.09 NG/ML
TROPONIN T: 0.09 NG/ML
WBC # BLD: 10 K/UL (ref 3.5–11.3)
WBC # BLD: ABNORMAL 10*3/UL
ZZ NTE CLEAN UP: ORDERED TEST: NORMAL
ZZ NTE WITH NAME CLEAN UP: SPECIMEN SOURCE: NORMAL

## 2018-11-26 PROCEDURE — G8978 MOBILITY CURRENT STATUS: HCPCS

## 2018-11-26 PROCEDURE — 2060000000 HC ICU INTERMEDIATE R&B

## 2018-11-26 PROCEDURE — 94660 CPAP INITIATION&MGMT: CPT

## 2018-11-26 PROCEDURE — 6370000000 HC RX 637 (ALT 250 FOR IP): Performed by: STUDENT IN AN ORGANIZED HEALTH CARE EDUCATION/TRAINING PROGRAM

## 2018-11-26 PROCEDURE — 84484 ASSAY OF TROPONIN QUANT: CPT

## 2018-11-26 PROCEDURE — 85025 COMPLETE CBC W/AUTO DIFF WBC: CPT

## 2018-11-26 PROCEDURE — 99233 SBSQ HOSP IP/OBS HIGH 50: CPT | Performed by: INTERNAL MEDICINE

## 2018-11-26 PROCEDURE — G8979 MOBILITY GOAL STATUS: HCPCS

## 2018-11-26 PROCEDURE — 97161 PT EVAL LOW COMPLEX 20 MIN: CPT

## 2018-11-26 PROCEDURE — 6360000002 HC RX W HCPCS

## 2018-11-26 PROCEDURE — 82947 ASSAY GLUCOSE BLOOD QUANT: CPT

## 2018-11-26 PROCEDURE — 80048 BASIC METABOLIC PNL TOTAL CA: CPT

## 2018-11-26 PROCEDURE — 6360000002 HC RX W HCPCS: Performed by: STUDENT IN AN ORGANIZED HEALTH CARE EDUCATION/TRAINING PROGRAM

## 2018-11-26 PROCEDURE — 36415 COLL VENOUS BLD VENIPUNCTURE: CPT

## 2018-11-26 PROCEDURE — 2580000003 HC RX 258: Performed by: STUDENT IN AN ORGANIZED HEALTH CARE EDUCATION/TRAINING PROGRAM

## 2018-11-26 PROCEDURE — 97116 GAIT TRAINING THERAPY: CPT

## 2018-11-26 RX ORDER — FUROSEMIDE 10 MG/ML
INJECTION INTRAMUSCULAR; INTRAVENOUS
Status: COMPLETED
Start: 2018-11-26 | End: 2018-11-26

## 2018-11-26 RX ORDER — HEPARIN SODIUM 5000 [USP'U]/ML
5000 INJECTION, SOLUTION INTRAVENOUS; SUBCUTANEOUS EVERY 8 HOURS SCHEDULED
Status: DISCONTINUED | OUTPATIENT
Start: 2018-11-26 | End: 2018-11-29 | Stop reason: HOSPADM

## 2018-11-26 RX ORDER — FUROSEMIDE 10 MG/ML
80 INJECTION INTRAMUSCULAR; INTRAVENOUS EVERY 8 HOURS
Status: DISCONTINUED | OUTPATIENT
Start: 2018-11-26 | End: 2018-11-28

## 2018-11-26 RX ORDER — ALPRAZOLAM 0.25 MG/1
0.25 TABLET ORAL NIGHTLY PRN
Status: COMPLETED | OUTPATIENT
Start: 2018-11-26 | End: 2018-11-26

## 2018-11-26 RX ADMIN — INSULIN LISPRO 3 UNITS: 100 INJECTION, SOLUTION INTRAVENOUS; SUBCUTANEOUS at 20:14

## 2018-11-26 RX ADMIN — ASPIRIN 81 MG: 81 TABLET, CHEWABLE ORAL at 09:37

## 2018-11-26 RX ADMIN — CEFEPIME HYDROCHLORIDE 2 G: 2 INJECTION, POWDER, FOR SOLUTION INTRAVENOUS at 09:30

## 2018-11-26 RX ADMIN — FUROSEMIDE 80 MG: 10 INJECTION, SOLUTION INTRAMUSCULAR; INTRAVENOUS at 16:07

## 2018-11-26 RX ADMIN — HEPARIN SODIUM 5000 UNITS: 5000 INJECTION, SOLUTION INTRAVENOUS; SUBCUTANEOUS at 09:36

## 2018-11-26 RX ADMIN — DESMOPRESSIN ACETATE 40 MG: 0.2 TABLET ORAL at 19:52

## 2018-11-26 RX ADMIN — DOXAZOSIN 4 MG: 2 TABLET ORAL at 09:37

## 2018-11-26 RX ADMIN — HEPARIN SODIUM 5000 UNITS: 5000 INJECTION, SOLUTION INTRAVENOUS; SUBCUTANEOUS at 20:14

## 2018-11-26 RX ADMIN — INSULIN LISPRO 3 UNITS: 100 INJECTION, SOLUTION INTRAVENOUS; SUBCUTANEOUS at 12:58

## 2018-11-26 RX ADMIN — Medication 10 ML: at 19:53

## 2018-11-26 RX ADMIN — ACETAMINOPHEN 650 MG: 325 TABLET ORAL at 00:31

## 2018-11-26 RX ADMIN — CARVEDILOL 3.12 MG: 3.12 TABLET, FILM COATED ORAL at 18:16

## 2018-11-26 RX ADMIN — ALPRAZOLAM 0.25 MG: 0.25 TABLET ORAL at 21:33

## 2018-11-26 RX ADMIN — FUROSEMIDE 40 MG: 10 INJECTION, SOLUTION INTRAMUSCULAR; INTRAVENOUS at 09:37

## 2018-11-26 RX ADMIN — LEVOFLOXACIN 500 MG: 5 INJECTION, SOLUTION INTRAVENOUS at 21:34

## 2018-11-26 RX ADMIN — CARVEDILOL 3.12 MG: 3.12 TABLET, FILM COATED ORAL at 09:37

## 2018-11-26 RX ADMIN — NIFEDIPINE 90 MG: 90 TABLET, FILM COATED, EXTENDED RELEASE ORAL at 09:37

## 2018-11-26 RX ADMIN — INSULIN LISPRO 3 UNITS: 100 INJECTION, SOLUTION INTRAVENOUS; SUBCUTANEOUS at 18:46

## 2018-11-26 ASSESSMENT — PAIN SCALES - GENERAL
PAINLEVEL_OUTOF10: 2
PAINLEVEL_OUTOF10: 0

## 2018-11-26 NOTE — PROGRESS NOTES
[facility]  Progress Note                   Date:   11/26/2018  Patient name: Dianna Medina  Date of admission:  11/24/2018  4:13 PM  MRN:   [id number]  YOB: 1960  PCP: Mansi Wang    Reason for Admission: Heart failure exacerbated by sotalol ([address] [I50.9]    Subjective:       Clinical Changes /Abnormalities: Patient seen & examined alone in room. Denies CP. States SOB is a little better. Does not like CPAP and did not wear overnight. Tele reviewed - SR/ST with occ PVCs. Review of Systems    Medications:   Scheduled Meds:   heparin (porcine)  5,000 Units Subcutaneous 3 times per day    insulin lispro  0-18 Units Subcutaneous TID WC    insulin lispro  0-9 Units Subcutaneous Nightly    cefepime  2 g Intravenous Q24H    carvedilol  3.125 mg Oral BID WC    doxazosin  4 mg Oral Daily    NIFEdipine  90 mg Oral Daily    sodium chloride flush  10 mL Intravenous 2 times per day    atorvastatin  40 mg Oral Nightly    aspirin  81 mg Oral Daily    levofloxacin  500 mg Intravenous Q48H    furosemide  40 mg Intravenous BID     Continuous Infusions:   dextrose       CBC:   Recent Labs      11/24/18   2216  11/25/18   0403  11/26/18   0608   WBC  10.5  9.6  10.0   HGB  8.6*  8.2*  8.5*   PLT  235  228  240     BMP:    Recent Labs      11/24/18   1658  11/25/18   1053  11/26/18   0608   NA  138  143  139   K  4.5  4.5  4.1   CL  101  107  104   CO2  21  20  20   BUN  50*  47*  54*   CREATININE  4.46*  4.28*  4.78*   GLUCOSE  137*  181*  194*     Hepatic:  Recent Labs      11/24/18   1658   AST  24   ALT  22   BILITOT  0.30   ALKPHOS  68     Troponin: No results for input(s): TROPONINI in the last 72 hours. BNP: No results for input(s): BNP in the last 72 hours. Lipids: No results for input(s): CHOL, HDL in the last 72 hours.     Invalid input(s): LDLCALCU  INR:   Recent Labs      11/24/18   1658   INR  1.0       Objective:   Vitals: /63   Pulse 95   Temp 97.9 °F

## 2018-11-26 NOTE — PROGRESS NOTES
4.1   CL  101  107  104   CO2  21  20  20   BUN  50*  47*  54*   CREATININE  4.46*  4.28*  4.78*   GLUCOSE  137*  181*  194*   CALCIUM  8.8  8.4*  8.3*      BNP:  Lab Results   Component Value Date     09/08/2018     PHOSPHORUS:  No results for input(s): PHOS in the last 72 hours. MAGNESIUM: No results for input(s): MG in the last 72 hours. ALBUMIN:   Recent Labs      11/24/18   1658   LABALBU  3.3*     IRON:    Lab Results   Component Value Date    IRON 13 11/25/2018     IRON SATURATION:  Lab Results   Component Value Date    LABIRON 8 11/25/2018     TIBC:    Lab Results   Component Value Date    TIBC 156 11/25/2018     FERRITIN:    Lab Results   Component Value Date    FERRITIN 363 11/25/2018     TATIANA: No results found for: TATIANA    SPEP: Lab Results   Component Value Date    PROT 7.4 11/24/2018     UPEP: No results found for: TPU   HEPBSAG:No results found for: HEPBSAG  HEPCAB:No results found for: HEPCAB  C3: No results found for: C3  C4: No results found for: C4  MPO ANCA:   Lab Results   Component Value Date    MPO 25 09/14/2016    .   PR3 ANCA:    Lab Results   Component Value Date    PR3 51 09/14/2016     URINE SODIUM:  No results found for: JEFFERSON   URINE CREATININE:  Lab Results   Component Value Date    LABCREA 73.8 11/25/2018     URINE EOSINOPHILS: No results found for: UREO  URINE PROTEIN:  No results found for: TPU  URINALYSIS:  U/A: Lab Results   Component Value Date    NITRU NEGATIVE 11/24/2018    COLORU YELLOW 11/24/2018    PHUR 5.0 11/24/2018    WBCUA None 11/24/2018    RBCUA 0 TO 2 11/24/2018    MUCUS 1+ 11/24/2018    TRICHOMONAS NOT REPORTED 11/24/2018    YEAST NOT REPORTED 11/24/2018    BACTERIA NOT REPORTED 11/24/2018    SPECGRAV 1.016 11/24/2018    LEUKOCYTESUR NEGATIVE 11/24/2018    UROBILINOGEN Normal 11/24/2018    BILIRUBINUR NEGATIVE 11/24/2018    GLUCOSEU NEGATIVE 11/24/2018    KETUA NEGATIVE 11/24/2018    AMORPHOUS NOT REPORTED 11/24/2018     ANTIGBM:  Lab Results   Component Value

## 2018-11-26 NOTE — PROGRESS NOTES
main level  Home Access: Stairs to enter without rails  Entrance Stairs - Number of Steps: 1 step   Bathroom Shower/Tub: Tub/Shower unit  Bathroom Toilet: Standard  Bathroom Equipment: Grab bars in shower  Home Equipment:  (pt reports no use of DME at baseline )  ADL Assistance: [address]: Independent  Homemaking Responsibilities: Yes  Meal Prep Responsibility: Primary  Laundry Responsibility: Primary  Cleaning Responsibility: Primary  Shopping Responsibility: Primary  Ambulation Assistance: Independent  Transfer Assistance: Independent  Active : Yes  Mode of Transportation: Truck  Occupation: Retired  Type of occupation: 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    Cognition   Cognition  Overall Cognitive Status: Exceptions  Problem Solving: Assistance required to generate solutions  Insights: Decreased awareness of deficits    Objective     Observation/Palpation  Posture: Good    AROM RLE (degrees)  RLE AROM: WFL  AROM LLE (degrees)  LLE AROM : WFL  AROM RUE (degrees)  RUE AROM : WFL  AROM LUE (degrees)  LUE AROM : WFL  Strength RLE  Strength RLE: WFL  Strength LLE  Strength LLE: WFL  Strength RUE  Strength RUE: WFL  Strength LUE  Strength LUE: WFL     Sensation  Overall Sensation Status: WFL  Bed mobility  Rolling to Right: Modified independent  Supine to Sit: Independent  Scooting: Independent  Transfers  Sit to Stand: Supervision  Stand to sit: Supervision  Comment: no loss of balance  Ambulation  Ambulation?: Yes  More Ambulation?: No  Ambulation 1  Surface: level tile  Device: No Device  Other Apparatus: O2 (2L)  Assistance: Stand by assistance  Quality of Gait: slow gait, short step length  Distance: 150 ft with 1 standing rest break  Comments: cues throughout for proper breathing  Stairs/Curb  Stairs?: No     Balance  Posture: Good  Sitting - Static: Good  Sitting - Dynamic: Good  Standing - Static: Good  Standing - Dynamic: Fair;+  Comments: standing balance without device  Other exercises  Other exercises?: Yes  Other exercises 1: Seated B LE AROM x 10-15 reps, rests for breathing  Other exercises 2: Education and demonstration of decreased resp rate and deep breaths to raise chest, pt understands but limited follow through     Assessment   Body structures, Functions, Activity limitations: Decreased functional mobility ; Decreased endurance  Assessment: Pt limited by SOB, SpO2 >90% throughout, resp rate 20-44, able to improve with cues   Prognosis: Good  Decision Making: Low Complexity  Patient Education: PT POC, safety, breathing  REQUIRES PT FOLLOW UP: Yes  Activity Tolerance  Activity Tolerance: Patient Tolerated treatment well;Patient limited by endurance         Plan   Plan  Times per week: 5-6x/week  Current Treatment Recommendations: Strengthening, Balance Training, Functional Mobility Training, Stair training, Gait Training, Endurance Training  Safety Devices  Type of devices: Call light within reach, Left in chair, Nurse notified, Gait belt  Restraints  Initially in place: No    G-Code  PT G-Codes  Functional Assessment Tool Used: [facility]  Score: 23/24  Functional Limitation: Mobility: Walking and moving around  Mobility: Walking and Moving Around Current Status ():  At least 1 percent but less than 20 percent impaired, limited or restricted  Mobility: Walking and Moving Around Goal Status (): 0 percent impaired, limited or restricted    -PAC Score  -PAC Inpatient Mobility Raw Score : 23  AM-PAC Inpatient T-Scale Score : 56.93  Mobility Inpatient CMS 0-100% Score: 11.2  Mobility Inpatient CMS G-Code Modifier : CI          Goals  Short term goals  Time Frame for Short term goals: 4 visits  Short term goal 1: Pt amb 300 ft without device indep, proper awareness of need for rests  Short term goal 2: Pt negotiate 12 stairs with 1 rail and supervision  Short term goal 3: Pt indep with HEP for endurance and breathing  Patient Goals   Patient goals :  To go home       Therapy Time   Individual Concurrent Group Co-treatment   Time In 0903         Time Out 0927         Minutes 24         Timed Code Treatment Minutes: [address] [facility] [address]

## 2018-11-26 NOTE — PROGRESS NOTES
Patient complaining of not being able to breath while on bipap. Bipap taken off. Breath sounds clear/dim. RN in with patient at this time. Patient sitting on side of bed.

## 2018-11-27 ENCOUNTER — APPOINTMENT (OUTPATIENT)
Dept: INTERVENTIONAL RADIOLOGY/VASCULAR | Age: 58
DRG: 871 | End: 2018-11-27
Payer: COMMERCIAL

## 2018-11-27 LAB
ABSOLUTE EOS #: 0.21 K/UL (ref 0–0.44)
ABSOLUTE IMMATURE GRANULOCYTE: 0.06 K/UL (ref 0–0.3)
ABSOLUTE LYMPH #: 0.7 K/UL (ref 1.1–3.7)
ABSOLUTE MONO #: 1.04 K/UL (ref 0.1–1.2)
ALBUMIN SERPL-MCNC: 3.1 G/DL (ref 3.5–5.2)
ALBUMIN SERPL-MCNC: 3.1 G/DL (ref 3.5–5.2)
ANION GAP SERPL CALCULATED.3IONS-SCNC: 15 MMOL/L (ref 9–17)
BASOPHILS # BLD: 0 % (ref 0–2)
BASOPHILS ABSOLUTE: <0.03 K/UL (ref 0–0.2)
BUN BLDV-MCNC: 68 MG/DL (ref 8–23)
BUN/CREAT BLD: ABNORMAL (ref 9–20)
CALCIUM SERPL-MCNC: 8.6 MG/DL (ref 8.6–10.4)
CHLORIDE BLD-SCNC: 101 MMOL/L (ref 98–107)
CO2: 21 MMOL/L (ref 20–31)
CREAT SERPL-MCNC: 5.48 MG/DL (ref 0.7–1.2)
DIFFERENTIAL TYPE: ABNORMAL
EOSINOPHILS RELATIVE PERCENT: 2 % (ref 1–4)
GFR AFRICAN AMERICAN: 13 ML/MIN
GFR NON-AFRICAN AMERICAN: 11 ML/MIN
GFR SERPL CREATININE-BSD FRML MDRD: ABNORMAL ML/MIN/{1.73_M2}
GFR SERPL CREATININE-BSD FRML MDRD: ABNORMAL ML/MIN/{1.73_M2}
GLUCOSE BLD-MCNC: 139 MG/DL (ref 75–110)
GLUCOSE BLD-MCNC: 147 MG/DL (ref 75–110)
GLUCOSE BLD-MCNC: 156 MG/DL (ref 70–99)
GLUCOSE BLD-MCNC: 196 MG/DL (ref 75–110)
HBV SURFACE AB TITR SER: <3.5 MIU/ML
HBV SURFACE AB TITR SER: <3.5 MIU/ML
HCT VFR BLD CALC: 26.3 % (ref 40.7–50.3)
HEMOGLOBIN: 8.1 G/DL (ref 13–17)
HEPATITIS B CORE TOTAL ANTIBODY: NONREACTIVE
HEPATITIS B SURFACE ANTIGEN: NONREACTIVE
HEPATITIS B SURFACE ANTIGEN: NONREACTIVE
IMMATURE GRANULOCYTES: 1 %
LV EF: 48 %
LVEF MODALITY: NORMAL
LYMPHOCYTES # BLD: 7 % (ref 24–43)
MCH RBC QN AUTO: 27.3 PG (ref 25.2–33.5)
MCHC RBC AUTO-ENTMCNC: 30.8 G/DL (ref 28.4–34.8)
MCV RBC AUTO: 88.6 FL (ref 82.6–102.9)
MONOCYTES # BLD: 11 % (ref 3–12)
NRBC AUTOMATED: 0 PER 100 WBC
PARTIAL THROMBOPLASTIN TIME: 31.2 SEC (ref 20.5–30.5)
PATHOLOGIST REVIEW: NORMAL
PDW BLD-RTO: 14.3 % (ref 11.8–14.4)
PLATELET # BLD: 253 K/UL (ref 138–453)
PLATELET ESTIMATE: ABNORMAL
PMV BLD AUTO: 10.1 FL (ref 8.1–13.5)
POTASSIUM SERPL-SCNC: 4.1 MMOL/L (ref 3.7–5.3)
RBC # BLD: 2.97 M/UL (ref 4.21–5.77)
RBC # BLD: ABNORMAL 10*6/UL
SEG NEUTROPHILS: 79 % (ref 36–65)
SEGMENTED NEUTROPHILS ABSOLUTE COUNT: 7.84 K/UL (ref 1.5–8.1)
SODIUM BLD-SCNC: 137 MMOL/L (ref 135–144)
WBC # BLD: 9.9 K/UL (ref 3.5–11.3)
WBC # BLD: ABNORMAL 10*3/UL

## 2018-11-27 PROCEDURE — 85025 COMPLETE CBC W/AUTO DIFF WBC: CPT

## 2018-11-27 PROCEDURE — 36556 INSERT NON-TUNNEL CV CATH: CPT

## 2018-11-27 PROCEDURE — 5A1D70Z PERFORMANCE OF URINARY FILTRATION, INTERMITTENT, LESS THAN 6 HOURS PER DAY: ICD-10-PCS | Performed by: INTERNAL MEDICINE

## 2018-11-27 PROCEDURE — 77001 FLUOROGUIDE FOR VEIN DEVICE: CPT

## 2018-11-27 PROCEDURE — B2141ZZ FLUOROSCOPY OF RIGHT HEART USING LOW OSMOLAR CONTRAST: ICD-10-PCS | Performed by: RADIOLOGY

## 2018-11-27 PROCEDURE — 86317 IMMUNOASSAY INFECTIOUS AGENT: CPT

## 2018-11-27 PROCEDURE — C1769 GUIDE WIRE: HCPCS

## 2018-11-27 PROCEDURE — 6370000000 HC RX 637 (ALT 250 FOR IP): Performed by: STUDENT IN AN ORGANIZED HEALTH CARE EDUCATION/TRAINING PROGRAM

## 2018-11-27 PROCEDURE — 2709999900 HC NON-CHARGEABLE SUPPLY

## 2018-11-27 PROCEDURE — 90937 HEMODIALYSIS REPEATED EVAL: CPT

## 2018-11-27 PROCEDURE — 93306 TTE W/DOPPLER COMPLETE: CPT

## 2018-11-27 PROCEDURE — 6360000002 HC RX W HCPCS: Performed by: INTERNAL MEDICINE

## 2018-11-27 PROCEDURE — 94660 CPAP INITIATION&MGMT: CPT

## 2018-11-27 PROCEDURE — 87340 HEPATITIS B SURFACE AG IA: CPT

## 2018-11-27 PROCEDURE — 80048 BASIC METABOLIC PNL TOTAL CA: CPT

## 2018-11-27 PROCEDURE — 2580000003 HC RX 258: Performed by: STUDENT IN AN ORGANIZED HEALTH CARE EDUCATION/TRAINING PROGRAM

## 2018-11-27 PROCEDURE — 6360000002 HC RX W HCPCS: Performed by: STUDENT IN AN ORGANIZED HEALTH CARE EDUCATION/TRAINING PROGRAM

## 2018-11-27 PROCEDURE — 86704 HEP B CORE ANTIBODY TOTAL: CPT

## 2018-11-27 PROCEDURE — 99233 SBSQ HOSP IP/OBS HIGH 50: CPT | Performed by: INTERNAL MEDICINE

## 2018-11-27 PROCEDURE — 82947 ASSAY GLUCOSE BLOOD QUANT: CPT

## 2018-11-27 PROCEDURE — C1894 INTRO/SHEATH, NON-LASER: HCPCS

## 2018-11-27 PROCEDURE — 76937 US GUIDE VASCULAR ACCESS: CPT

## 2018-11-27 PROCEDURE — 85730 THROMBOPLASTIN TIME PARTIAL: CPT

## 2018-11-27 PROCEDURE — 82040 ASSAY OF SERUM ALBUMIN: CPT

## 2018-11-27 PROCEDURE — 02H633Z INSERTION OF INFUSION DEVICE INTO RIGHT ATRIUM, PERCUTANEOUS APPROACH: ICD-10-PCS | Performed by: RADIOLOGY

## 2018-11-27 PROCEDURE — 6360000002 HC RX W HCPCS: Performed by: RADIOLOGY

## 2018-11-27 PROCEDURE — 36415 COLL VENOUS BLD VENIPUNCTURE: CPT

## 2018-11-27 PROCEDURE — 2060000000 HC ICU INTERMEDIATE R&B

## 2018-11-27 PROCEDURE — C1752 CATH,HEMODIALYSIS,SHORT-TERM: HCPCS

## 2018-11-27 PROCEDURE — 2700000000 HC OXYGEN THERAPY PER DAY

## 2018-11-27 RX ORDER — 0.9 % SODIUM CHLORIDE 0.9 %
250 INTRAVENOUS SOLUTION INTRAVENOUS PRN
Status: DISCONTINUED | OUTPATIENT
Start: 2018-11-27 | End: 2018-11-29 | Stop reason: HOSPADM

## 2018-11-27 RX ORDER — HEPARIN SODIUM 1000 [USP'U]/ML
1200 INJECTION, SOLUTION INTRAVENOUS; SUBCUTANEOUS PRN
Status: DISCONTINUED | OUTPATIENT
Start: 2018-11-27 | End: 2018-11-29

## 2018-11-27 RX ORDER — HEPARIN SODIUM 5000 [USP'U]/ML
INJECTION, SOLUTION INTRAVENOUS; SUBCUTANEOUS
Status: COMPLETED | OUTPATIENT
Start: 2018-11-27 | End: 2018-11-27

## 2018-11-27 RX ORDER — FENTANYL CITRATE 50 UG/ML
INJECTION, SOLUTION INTRAMUSCULAR; INTRAVENOUS
Status: COMPLETED | OUTPATIENT
Start: 2018-11-27 | End: 2018-11-27

## 2018-11-27 RX ORDER — 0.9 % SODIUM CHLORIDE 0.9 %
150 INTRAVENOUS SOLUTION INTRAVENOUS PRN
Status: DISCONTINUED | OUTPATIENT
Start: 2018-11-27 | End: 2018-11-29 | Stop reason: HOSPADM

## 2018-11-27 RX ORDER — HEPARIN SODIUM 1000 [USP'U]/ML
1300 INJECTION, SOLUTION INTRAVENOUS; SUBCUTANEOUS PRN
Status: DISCONTINUED | OUTPATIENT
Start: 2018-11-27 | End: 2018-11-29

## 2018-11-27 RX ADMIN — HEPARIN SODIUM 5000 UNITS: 5000 INJECTION, SOLUTION INTRAVENOUS; SUBCUTANEOUS at 21:13

## 2018-11-27 RX ADMIN — HEPARIN SODIUM 6000 UNITS: 5000 INJECTION, SOLUTION INTRAVENOUS; SUBCUTANEOUS at 11:30

## 2018-11-27 RX ADMIN — DESMOPRESSIN ACETATE 40 MG: 0.2 TABLET ORAL at 21:11

## 2018-11-27 RX ADMIN — HEPARIN SODIUM 1200 UNITS: 1000 INJECTION, SOLUTION INTRAVENOUS; SUBCUTANEOUS at 16:15

## 2018-11-27 RX ADMIN — CEFEPIME HYDROCHLORIDE 2 G: 2 INJECTION, POWDER, FOR SOLUTION INTRAVENOUS at 10:57

## 2018-11-27 RX ADMIN — NIFEDIPINE 90 MG: 90 TABLET, FILM COATED, EXTENDED RELEASE ORAL at 13:01

## 2018-11-27 RX ADMIN — FENTANYL CITRATE 50 MCG: 50 INJECTION INTRAMUSCULAR; INTRAVENOUS at 11:23

## 2018-11-27 RX ADMIN — CARVEDILOL 3.12 MG: 3.12 TABLET, FILM COATED ORAL at 13:01

## 2018-11-27 RX ADMIN — DOXAZOSIN 4 MG: 2 TABLET ORAL at 13:00

## 2018-11-27 RX ADMIN — FUROSEMIDE 80 MG: 10 INJECTION, SOLUTION INTRAMUSCULAR; INTRAVENOUS at 03:52

## 2018-11-27 RX ADMIN — Medication 10 ML: at 21:12

## 2018-11-27 RX ADMIN — HEPARIN SODIUM 1300 UNITS: 1000 INJECTION, SOLUTION INTRAVENOUS; SUBCUTANEOUS at 16:15

## 2018-11-27 RX ADMIN — INSULIN LISPRO 2 UNITS: 100 INJECTION, SOLUTION INTRAVENOUS; SUBCUTANEOUS at 21:11

## 2018-11-27 RX ADMIN — HEPARIN SODIUM 5000 UNITS: 5000 INJECTION, SOLUTION INTRAVENOUS; SUBCUTANEOUS at 06:40

## 2018-11-27 RX ADMIN — HEPARIN SODIUM 6500 UNITS: 5000 INJECTION, SOLUTION INTRAVENOUS; SUBCUTANEOUS at 11:31

## 2018-11-27 ASSESSMENT — PAIN SCALES - GENERAL
PAINLEVEL_OUTOF10: 0
PAINLEVEL_OUTOF10: 0

## 2018-11-27 NOTE — CARE COORDINATION
Attempted to meet with pt to discuss OP dialysis placement. Pt currently in IR per bedside RN. SW will f/u with pt this afternoon.

## 2018-11-27 NOTE — PROGRESS NOTES
[facility]  Progress Note                   Date:   11/27/2018  Patient name: Kristine Escalona  Date of admission:  11/24/2018  4:13 PM  MRN:   [id number]  YOB: 1960  PCP: Skye Bangura    Reason for Admission: Heart failure exacerbated by sotalol ([address] [I50.9]    Subjective:       Clinical Changes /Abnormalities: Patient seen & examined. Denies CP. Improving sob. On 02 per NC. Family in room   Review of Systems    Medications:   Scheduled Meds:   heparin (porcine)  5,000 Units Subcutaneous 3 times per day    furosemide  80 mg Intravenous Q8H    insulin lispro  0-18 Units Subcutaneous TID WC    insulin lispro  0-9 Units Subcutaneous Nightly    cefepime  2 g Intravenous Q24H    carvedilol  3.125 mg Oral BID WC    doxazosin  4 mg Oral Daily    NIFEdipine  90 mg Oral Daily    sodium chloride flush  10 mL Intravenous 2 times per day    atorvastatin  40 mg Oral Nightly    aspirin  81 mg Oral Daily    levofloxacin  500 mg Intravenous Q48H     Continuous Infusions:   dextrose       CBC:   Recent Labs      11/25/18   0403  11/26/18   0608  11/27/18   0554   WBC  9.6  10.0  9.9   HGB  8.2*  8.5*  8.1*   PLT  228  240  253     BMP:    Recent Labs      11/25/18   1053  11/26/18   0608  11/27/18   0554   NA  143  139  137   K  4.5  4.1  4.1   CL  107  104  101   CO2  20  20  21   BUN  47*  54*  68*   CREATININE  4.28*  4.78*  5.48*   GLUCOSE  181*  194*  156*     Hepatic:  Recent Labs      11/24/18   1658   AST  24   ALT  22   BILITOT  0.30   ALKPHOS  68     Troponin: No results for input(s): TROPONINI in the last 72 hours. BNP: No results for input(s): BNP in the last 72 hours. Lipids: No results for input(s): CHOL, HDL in the last 72 hours.     Invalid input(s): LDLCALCU  INR:   Recent Labs      11/24/18   1658   INR  1.0       Objective:   Vitals: BP (!) 142/65   Pulse 94   Temp 97.9 °F (36.6 °C) (Axillary)   Resp 25   Ht 5' 10\" (1.778 m)   Wt 251 lb 9.6 oz (114.1

## 2018-11-27 NOTE — PROGRESS NOTES
NEPHROLOGY PROGRESS NOTE      SUBJECTIVE     Continues to have shortness of breath. Had some issues with BiPAP last night. Lasix dose was increased to 80 mg 3 times a day. Suboptimal urine output. Not accurately recorded below. Appetite is decent. No chest pain. Blood pressure stable. Renal function continues to decline. OBJECTIVE     Vitals:    11/27/18 0259 11/27/18 0400 11/27/18 0645 11/27/18 0702   BP:  (!) 125/54  115/73   Pulse:  86  91   Resp: 25 (!) 31  28   Temp:  97.8 °F (36.6 °C)  97.9 °F (36.6 °C)   TempSrc:  Oral  Axillary   SpO2:  99%  100%   Weight:   251 lb 9.6 oz (114.1 kg)    Height:         24HR INTAKE/OUTPUT:    Intake/Output Summary (Last 24 hours) at 11/27/18 0952  Last data filed at 11/27/18 0820   Gross per 24 hour   Intake             1196 ml   Output              600 ml   Net              596 ml       General appearance:Awake, alert, in no acute distress  HEENT: PERRLA  Respiratory::vesicular breath sounds,Reduced air entry  Cardiovascular:S1 S2 normal,no gallop or organic murmur. Abdomen:Non tender/non distended. Bowel sounds present  Extremities: No Cyanosis or Clubbing,Lower extremity edema  Neurological:Alert and oriented. No abnormalities of mood, affect, memory, mentation, or behavior are noted      MEDICATIONS     Scheduled Meds:    heparin (porcine)  5,000 Units Subcutaneous 3 times per day    furosemide  80 mg Intravenous Q8H    insulin lispro  0-18 Units Subcutaneous TID WC    insulin lispro  0-9 Units Subcutaneous Nightly    cefepime  2 g Intravenous Q24H    carvedilol  3.125 mg Oral BID WC    doxazosin  4 mg Oral Daily    NIFEdipine  90 mg Oral Daily    sodium chloride flush  10 mL Intravenous 2 times per day    atorvastatin  40 mg Oral Nightly    aspirin  81 mg Oral Daily    levofloxacin  500 mg Intravenous Q48H     Continuous Infusions:    dextrose       PRN Meds:  albuterol sulfate HFA **AND** ipratropium **AND** MDI Treatment, sodium chloride flush,

## 2018-11-27 NOTE — PROGRESS NOTES
[facility]  Occupational Therapy Not Seen Note    DATE: 2018  Name: Dia Quintana  : 1960  MRN: [id number]    Patient not available for Occupational Therapy due to:    [] Testing:    [x] Hemodialysis: Per RN, [facility]     [] Blood Transfusion in Progress    []Refusal by Patient:    [] Surgery/Procedure:    [] Strict Bedrest    [] Sedation    [] Spine Precautions     [] Pt being transferred to palliative care at this time. Spoke with pt/family and OT services to be defered. [] Pt independent with functional mobility and functional tasks.  Pt with no OT acute care needs at this time, will defer OT eval.    [] Other    Next Scheduled Treatment: Check back     Signature: STARR Baker

## 2018-11-27 NOTE — PROGRESS NOTES
hours.  PT/INR:   Recent Labs      11/24/18   1658   PROTIME  11.0   INR  1.0       CARDIAC ENZYMES: No results for input(s): CKMB, CKMBINDEX, TROPONINI in the last 72 hours. Invalid input(s): CKTOTAL;3  FASTING LIPID PANEL:  Lab Results   Component Value Date    CHOL 206 (H) 12/21/2016    HDL 33 (L) 12/21/2016    TRIG 235 (H) 12/21/2016     LIVER PROFILE:   Recent Labs      11/24/18   1658   AST  24   ALT  22   BILITOT  0.30   ALKPHOS  68        Imaging:  CXR   Impression   Findings most suggestive of moderate pulmonary vascular congestion with   somewhat asymmetric pulmonary edema.  Superimposed atelectasis and/or   pneumonia is not excluded.  Continued imaging follow-up is recommended.      Assessment and Plan:     Acute on Chronic CHF exacerbation  -CXR= Moderate pulmonary congestion with irregularities. -Echo 0116: Grade 1 diastolic dysfunction. EF >55%  -Follow up Echocardiogram  -Pro-BNP 2,230.   -Cardiology consulted from ED.  -TSH 1.50  -Urine drug screen negative.   -Strict I/O's, daily weights, and fluid restriction.      Acute respiratory Failure- secondary to pulmonary edema and pneumonia  -Tachpneic 35 requiring BiPAP on admission.   -Refusing to wear BiPAP still.  -Lasix 40 mg BID per nephrology.   -Follow up CXR. CXR yesterday edema vs pneumonia. -BiPAP as needed. -Monitor vitals and pulse oximetry      Lactic Acidosis  -Likely decreased perfusion.   -2.2 on admission. Bicarb 21  -Diuretics and plan as above.      Pneumonia  -CXR= Moderate pulmonary congestion with irregularities.   -Procalcitonin elevated  -SIRS 2/4. Qsofa= 1  -Follow up blood and urine cultures (UA negative)- Negative thus far. -Pending resp. Culture. -Mycoplasma IgM negative.   -Rapid influenza, legionella negative,strep pneumonia negative.   -Ceftriaxone and Azithromycin in ED  -Levaquin and Cefepime renall dosed IV.   -Monitor daily vitals and labs.      Sepsis -SIRS 2/4 (Tachycardia/Leukocytosis)- on admission.

## 2018-11-28 ENCOUNTER — APPOINTMENT (OUTPATIENT)
Dept: INTERVENTIONAL RADIOLOGY/VASCULAR | Age: 58
DRG: 871 | End: 2018-11-28
Payer: COMMERCIAL

## 2018-11-28 LAB
ABSOLUTE EOS #: 0.25 K/UL (ref 0–0.44)
ABSOLUTE IMMATURE GRANULOCYTE: 0.08 K/UL (ref 0–0.3)
ABSOLUTE LYMPH #: 0.67 K/UL (ref 1.1–3.7)
ABSOLUTE MONO #: 0.92 K/UL (ref 0.1–1.2)
ANION GAP SERPL CALCULATED.3IONS-SCNC: 15 MMOL/L (ref 9–17)
BASOPHILS # BLD: 0 % (ref 0–2)
BASOPHILS ABSOLUTE: 0 K/UL (ref 0–0.2)
BUN BLDV-MCNC: 55 MG/DL (ref 8–23)
BUN/CREAT BLD: ABNORMAL (ref 9–20)
C-REACTIVE PROTEIN: 172.2 MG/L (ref 0–5)
CALCIUM SERPL-MCNC: 8.5 MG/DL (ref 8.6–10.4)
CHLORIDE BLD-SCNC: 103 MMOL/L (ref 98–107)
CO2: 23 MMOL/L (ref 20–31)
CREAT SERPL-MCNC: 4.41 MG/DL (ref 0.7–1.2)
DIFFERENTIAL TYPE: ABNORMAL
EOSINOPHILS RELATIVE PERCENT: 3 % (ref 1–4)
GFR AFRICAN AMERICAN: 17 ML/MIN
GFR NON-AFRICAN AMERICAN: 14 ML/MIN
GFR SERPL CREATININE-BSD FRML MDRD: ABNORMAL ML/MIN/{1.73_M2}
GFR SERPL CREATININE-BSD FRML MDRD: ABNORMAL ML/MIN/{1.73_M2}
GLUCOSE BLD-MCNC: 165 MG/DL (ref 70–99)
GLUCOSE BLD-MCNC: 167 MG/DL (ref 75–110)
GLUCOSE BLD-MCNC: 198 MG/DL (ref 75–110)
GLUCOSE BLD-MCNC: 208 MG/DL (ref 75–110)
HCT VFR BLD CALC: 27.6 % (ref 40.7–50.3)
HEMOGLOBIN: 8.3 G/DL (ref 13–17)
IMMATURE GRANULOCYTES: 1 %
LYMPHOCYTES # BLD: 8 % (ref 24–43)
MCH RBC QN AUTO: 26.9 PG (ref 25.2–33.5)
MCHC RBC AUTO-ENTMCNC: 30.1 G/DL (ref 28.4–34.8)
MCV RBC AUTO: 89.3 FL (ref 82.6–102.9)
MONOCYTES # BLD: 11 % (ref 3–12)
MORPHOLOGY: NORMAL
NRBC AUTOMATED: 0 PER 100 WBC
PDW BLD-RTO: 14.4 % (ref 11.8–14.4)
PLATELET # BLD: 265 K/UL (ref 138–453)
PLATELET ESTIMATE: ABNORMAL
PMV BLD AUTO: 9.8 FL (ref 8.1–13.5)
POTASSIUM SERPL-SCNC: 3.9 MMOL/L (ref 3.7–5.3)
RBC # BLD: 3.09 M/UL (ref 4.21–5.77)
RBC # BLD: ABNORMAL 10*6/UL
SEG NEUTROPHILS: 77 % (ref 36–65)
SEGMENTED NEUTROPHILS ABSOLUTE COUNT: 6.48 K/UL (ref 1.5–8.1)
SODIUM BLD-SCNC: 141 MMOL/L (ref 135–144)
WBC # BLD: 8.4 K/UL (ref 3.5–11.3)
WBC # BLD: ABNORMAL 10*3/UL

## 2018-11-28 PROCEDURE — 90935 HEMODIALYSIS ONE EVALUATION: CPT

## 2018-11-28 PROCEDURE — C1750 CATH, HEMODIALYSIS,LONG-TERM: HCPCS

## 2018-11-28 PROCEDURE — 76937 US GUIDE VASCULAR ACCESS: CPT

## 2018-11-28 PROCEDURE — B2141ZZ FLUOROSCOPY OF RIGHT HEART USING LOW OSMOLAR CONTRAST: ICD-10-PCS | Performed by: RADIOLOGY

## 2018-11-28 PROCEDURE — 6360000002 HC RX W HCPCS: Performed by: STUDENT IN AN ORGANIZED HEALTH CARE EDUCATION/TRAINING PROGRAM

## 2018-11-28 PROCEDURE — 85025 COMPLETE CBC W/AUTO DIFF WBC: CPT

## 2018-11-28 PROCEDURE — 77001 FLUOROGUIDE FOR VEIN DEVICE: CPT

## 2018-11-28 PROCEDURE — 2060000000 HC ICU INTERMEDIATE R&B

## 2018-11-28 PROCEDURE — 6360000002 HC RX W HCPCS: Performed by: INTERNAL MEDICINE

## 2018-11-28 PROCEDURE — 6370000000 HC RX 637 (ALT 250 FOR IP): Performed by: STUDENT IN AN ORGANIZED HEALTH CARE EDUCATION/TRAINING PROGRAM

## 2018-11-28 PROCEDURE — 2580000003 HC RX 258: Performed by: STUDENT IN AN ORGANIZED HEALTH CARE EDUCATION/TRAINING PROGRAM

## 2018-11-28 PROCEDURE — 0JH63XZ INSERTION OF TUNNELED VASCULAR ACCESS DEVICE INTO CHEST SUBCUTANEOUS TISSUE AND FASCIA, PERCUTANEOUS APPROACH: ICD-10-PCS | Performed by: RADIOLOGY

## 2018-11-28 PROCEDURE — 36415 COLL VENOUS BLD VENIPUNCTURE: CPT

## 2018-11-28 PROCEDURE — 82947 ASSAY GLUCOSE BLOOD QUANT: CPT

## 2018-11-28 PROCEDURE — 2709999900 HC NON-CHARGEABLE SUPPLY

## 2018-11-28 PROCEDURE — 80048 BASIC METABOLIC PNL TOTAL CA: CPT

## 2018-11-28 PROCEDURE — 6360000002 HC RX W HCPCS: Performed by: RADIOLOGY

## 2018-11-28 PROCEDURE — 6370000000 HC RX 637 (ALT 250 FOR IP): Performed by: INTERNAL MEDICINE

## 2018-11-28 PROCEDURE — 86140 C-REACTIVE PROTEIN: CPT

## 2018-11-28 PROCEDURE — C1769 GUIDE WIRE: HCPCS

## 2018-11-28 PROCEDURE — 99232 SBSQ HOSP IP/OBS MODERATE 35: CPT | Performed by: INTERNAL MEDICINE

## 2018-11-28 PROCEDURE — 36558 INSERT TUNNELED CV CATH: CPT

## 2018-11-28 RX ORDER — MIDAZOLAM HYDROCHLORIDE 1 MG/ML
INJECTION INTRAMUSCULAR; INTRAVENOUS
Status: COMPLETED | OUTPATIENT
Start: 2018-11-28 | End: 2018-11-28

## 2018-11-28 RX ORDER — HEPARIN SODIUM 1000 [USP'U]/ML
1300 INJECTION, SOLUTION INTRAVENOUS; SUBCUTANEOUS PRN
Status: DISCONTINUED | OUTPATIENT
Start: 2018-11-28 | End: 2018-11-29 | Stop reason: HOSPADM

## 2018-11-28 RX ORDER — HEPARIN SODIUM 1000 [USP'U]/ML
1200 INJECTION, SOLUTION INTRAVENOUS; SUBCUTANEOUS PRN
Status: DISCONTINUED | OUTPATIENT
Start: 2018-11-28 | End: 2018-11-29 | Stop reason: HOSPADM

## 2018-11-28 RX ORDER — HEPARIN SODIUM 5000 [USP'U]/ML
INJECTION, SOLUTION INTRAVENOUS; SUBCUTANEOUS
Status: COMPLETED | OUTPATIENT
Start: 2018-11-28 | End: 2018-11-28

## 2018-11-28 RX ORDER — FENTANYL CITRATE 50 UG/ML
INJECTION, SOLUTION INTRAMUSCULAR; INTRAVENOUS
Status: COMPLETED | OUTPATIENT
Start: 2018-11-28 | End: 2018-11-28

## 2018-11-28 RX ADMIN — LEVOFLOXACIN 500 MG: 5 INJECTION, SOLUTION INTRAVENOUS at 21:44

## 2018-11-28 RX ADMIN — HEPARIN SODIUM 1.6 ML: 5000 INJECTION, SOLUTION INTRAVENOUS; SUBCUTANEOUS at 12:32

## 2018-11-28 RX ADMIN — ASPIRIN 81 MG: 81 TABLET, CHEWABLE ORAL at 13:28

## 2018-11-28 RX ADMIN — NIFEDIPINE 90 MG: 90 TABLET, FILM COATED, EXTENDED RELEASE ORAL at 13:28

## 2018-11-28 RX ADMIN — HEPARIN SODIUM 1200 UNITS: 1000 INJECTION, SOLUTION INTRAVENOUS; SUBCUTANEOUS at 11:42

## 2018-11-28 RX ADMIN — INSULIN LISPRO 6 UNITS: 100 INJECTION, SOLUTION INTRAVENOUS; SUBCUTANEOUS at 14:22

## 2018-11-28 RX ADMIN — Medication 10 ML: at 13:30

## 2018-11-28 RX ADMIN — CARVEDILOL 3.12 MG: 3.12 TABLET, FILM COATED ORAL at 19:07

## 2018-11-28 RX ADMIN — CARVEDILOL 3.12 MG: 3.12 TABLET, FILM COATED ORAL at 13:29

## 2018-11-28 RX ADMIN — FENTANYL CITRATE 50 MCG: 50 INJECTION INTRAMUSCULAR; INTRAVENOUS at 12:25

## 2018-11-28 RX ADMIN — DOXAZOSIN 4 MG: 2 TABLET ORAL at 13:29

## 2018-11-28 RX ADMIN — HEPARIN SODIUM 1300 UNITS: 1000 INJECTION, SOLUTION INTRAVENOUS; SUBCUTANEOUS at 11:42

## 2018-11-28 RX ADMIN — Medication 10 ML: at 21:46

## 2018-11-28 RX ADMIN — INSULIN LISPRO 2 UNITS: 100 INJECTION, SOLUTION INTRAVENOUS; SUBCUTANEOUS at 21:42

## 2018-11-28 RX ADMIN — FUROSEMIDE 80 MG: 10 INJECTION, SOLUTION INTRAMUSCULAR; INTRAVENOUS at 02:59

## 2018-11-28 RX ADMIN — DESMOPRESSIN ACETATE 40 MG: 0.2 TABLET ORAL at 21:42

## 2018-11-28 RX ADMIN — CEFEPIME HYDROCHLORIDE 2 G: 2 INJECTION, POWDER, FOR SOLUTION INTRAVENOUS at 14:23

## 2018-11-28 RX ADMIN — MIDAZOLAM HYDROCHLORIDE 1 MG: 1 INJECTION, SOLUTION INTRAMUSCULAR; INTRAVENOUS at 12:25

## 2018-11-28 ASSESSMENT — PAIN SCALES - GENERAL
PAINLEVEL_OUTOF10: 0

## 2018-11-28 NOTE — PROGRESS NOTES
CO2  20  21  23   BUN  54*  68*  55*   CREATININE  4.78*  5.48*  4.41*     BNP: No results for input(s): BNP in the last 72 hours. PT/INR:   No results for input(s): PROTIME, INR in the last 72 hours. CARDIAC ENZYMES: No results for input(s): CKMB, CKMBINDEX, TROPONINI in the last 72 hours. Invalid input(s): CKTOTAL;3  FASTING LIPID PANEL:  Lab Results   Component Value Date    CHOL 206 (H) 12/21/2016    HDL 33 (L) 12/21/2016    TRIG 235 (H) 12/21/2016     LIVER PROFILE:   No results for input(s): AST, ALT, ALB, BILIDIR, BILITOT, ALKPHOS in the last 72 hours. Imaging:  CXR   Impression   Findings most suggestive of moderate pulmonary vascular congestion with   somewhat asymmetric pulmonary edema.  Superimposed atelectasis and/or   pneumonia is not excluded.  Continued imaging follow-up is recommended.      Assessment and Plan:     Acute on Chronic systolic/diastolic CHF exacerbation  -CXR= Moderate pulmonary congestion with irregularities. -Echo 5700: Grade 1 diastolic dysfunction. EF >55%  -Echo: EF 48%. Diastolic dysfunction.   -Pro-BNP 7,894.   -Cardiology consulted from ED.  -TSH 1.50  -Urine drug screen negative.   -Strict I/O's, daily weights, and fluid restriction.      Acute respiratory Failure- secondary to pulmonary edema and pneumonia-resolved  -Tachpneic 35 requiring BiPAP on admission.   -Follow up CXR. CXR yesterday edema vs pneumonia. -BiPAP as needed. -Monitor vitals and pulse oximetry      Pneumonia  -CXR= Moderate pulmonary congestion with irregularities.   -Procalcitonin elevated  -SIRS 2/4. Qsofa= 1  -Follow up blood and urine cultures (UA negative)- Negative thus far. -Pending resp. Culture. -Mycoplasma IgM negative.   -Rapid influenza, legionella negative,strep pneumonia negative.   -Ceftriaxone and Azithromycin in ED  -Levaquin and Cefepime renall dosed IV.   -Monitor daily vitals and labs.      Sepsis -SIRS 2/4 (Tachycardia/Leukocytosis)- on admission. performed by me.

## 2018-11-28 NOTE — CARE COORDINATION
Received voicemail from [facility] with [facility]. [facility] stated that referral for OP dialysis placement was received and is currently being processed. SW to f/u on status of referral.     Pt receiving his 2nd tx this day. Needs perm cath prior to d/c.

## 2018-11-29 ENCOUNTER — APPOINTMENT (OUTPATIENT)
Dept: GENERAL RADIOLOGY | Age: 58
DRG: 871 | End: 2018-11-29
Payer: COMMERCIAL

## 2018-11-29 VITALS
HEART RATE: 64 BPM | BODY MASS INDEX: 34.09 KG/M2 | RESPIRATION RATE: 18 BRPM | OXYGEN SATURATION: 96 % | HEIGHT: 70 IN | DIASTOLIC BLOOD PRESSURE: 72 MMHG | TEMPERATURE: 97.4 F | SYSTOLIC BLOOD PRESSURE: 134 MMHG | WEIGHT: 238.1 LBS

## 2018-11-29 LAB
ABSOLUTE EOS #: 0.35 K/UL (ref 0–0.44)
ABSOLUTE IMMATURE GRANULOCYTE: 0.06 K/UL (ref 0–0.3)
ABSOLUTE LYMPH #: 0.79 K/UL (ref 1.1–3.7)
ABSOLUTE MONO #: 1.04 K/UL (ref 0.1–1.2)
ANION GAP SERPL CALCULATED.3IONS-SCNC: 15 MMOL/L (ref 9–17)
BASOPHILS # BLD: 0 % (ref 0–2)
BASOPHILS ABSOLUTE: 0.03 K/UL (ref 0–0.2)
BUN BLDV-MCNC: 44 MG/DL (ref 8–23)
BUN/CREAT BLD: ABNORMAL (ref 9–20)
CALCIUM SERPL-MCNC: 8.7 MG/DL (ref 8.6–10.4)
CHLORIDE BLD-SCNC: 97 MMOL/L (ref 98–107)
CO2: 25 MMOL/L (ref 20–31)
CREAT SERPL-MCNC: 4.32 MG/DL (ref 0.7–1.2)
DIFFERENTIAL TYPE: ABNORMAL
EOSINOPHILS RELATIVE PERCENT: 4 % (ref 1–4)
GFR AFRICAN AMERICAN: 17 ML/MIN
GFR NON-AFRICAN AMERICAN: 14 ML/MIN
GFR SERPL CREATININE-BSD FRML MDRD: ABNORMAL ML/MIN/{1.73_M2}
GFR SERPL CREATININE-BSD FRML MDRD: ABNORMAL ML/MIN/{1.73_M2}
GLUCOSE BLD-MCNC: 108 MG/DL (ref 75–110)
GLUCOSE BLD-MCNC: 111 MG/DL (ref 75–110)
GLUCOSE BLD-MCNC: 131 MG/DL (ref 75–110)
GLUCOSE BLD-MCNC: 138 MG/DL (ref 70–99)
HCT VFR BLD CALC: 28.7 % (ref 40.7–50.3)
HEMOGLOBIN: 8.9 G/DL (ref 13–17)
HEPATITIS B CORE TOTAL ANTIBODY: NONREACTIVE
IMMATURE GRANULOCYTES: 1 %
LYMPHOCYTES # BLD: 10 % (ref 24–43)
MCH RBC QN AUTO: 26.9 PG (ref 25.2–33.5)
MCHC RBC AUTO-ENTMCNC: 31 G/DL (ref 28.4–34.8)
MCV RBC AUTO: 86.7 FL (ref 82.6–102.9)
MONOCYTES # BLD: 13 % (ref 3–12)
NRBC AUTOMATED: 0 PER 100 WBC
PDW BLD-RTO: 14.1 % (ref 11.8–14.4)
PLATELET # BLD: 296 K/UL (ref 138–453)
PLATELET ESTIMATE: ABNORMAL
PMV BLD AUTO: 10.2 FL (ref 8.1–13.5)
POTASSIUM SERPL-SCNC: 4 MMOL/L (ref 3.7–5.3)
RBC # BLD: 3.31 M/UL (ref 4.21–5.77)
RBC # BLD: ABNORMAL 10*6/UL
SEG NEUTROPHILS: 72 % (ref 36–65)
SEGMENTED NEUTROPHILS ABSOLUTE COUNT: 6.07 K/UL (ref 1.5–8.1)
SODIUM BLD-SCNC: 137 MMOL/L (ref 135–144)
WBC # BLD: 8.3 K/UL (ref 3.5–11.3)
WBC # BLD: ABNORMAL 10*3/UL

## 2018-11-29 PROCEDURE — 99239 HOSP IP/OBS DSCHRG MGMT >30: CPT | Performed by: INTERNAL MEDICINE

## 2018-11-29 PROCEDURE — 6360000002 HC RX W HCPCS: Performed by: STUDENT IN AN ORGANIZED HEALTH CARE EDUCATION/TRAINING PROGRAM

## 2018-11-29 PROCEDURE — 80048 BASIC METABOLIC PNL TOTAL CA: CPT

## 2018-11-29 PROCEDURE — 2700000000 HC OXYGEN THERAPY PER DAY

## 2018-11-29 PROCEDURE — 6370000000 HC RX 637 (ALT 250 FOR IP): Performed by: INTERNAL MEDICINE

## 2018-11-29 PROCEDURE — 82947 ASSAY GLUCOSE BLOOD QUANT: CPT

## 2018-11-29 PROCEDURE — 71045 X-RAY EXAM CHEST 1 VIEW: CPT

## 2018-11-29 PROCEDURE — 85025 COMPLETE CBC W/AUTO DIFF WBC: CPT

## 2018-11-29 PROCEDURE — 36415 COLL VENOUS BLD VENIPUNCTURE: CPT

## 2018-11-29 PROCEDURE — 6370000000 HC RX 637 (ALT 250 FOR IP): Performed by: STUDENT IN AN ORGANIZED HEALTH CARE EDUCATION/TRAINING PROGRAM

## 2018-11-29 PROCEDURE — 2580000003 HC RX 258: Performed by: STUDENT IN AN ORGANIZED HEALTH CARE EDUCATION/TRAINING PROGRAM

## 2018-11-29 PROCEDURE — 90937 HEMODIALYSIS REPEATED EVAL: CPT

## 2018-11-29 PROCEDURE — 94762 N-INVAS EAR/PLS OXIMTRY CONT: CPT

## 2018-11-29 RX ORDER — HEPARIN SODIUM 1000 [USP'U]/ML
1600 INJECTION, SOLUTION INTRAVENOUS; SUBCUTANEOUS PRN
Status: DISCONTINUED | OUTPATIENT
Start: 2018-11-29 | End: 2018-11-29 | Stop reason: HOSPADM

## 2018-11-29 RX ORDER — ASPIRIN 81 MG/1
81 TABLET, CHEWABLE ORAL DAILY
Qty: 30 TABLET | Refills: 3 | Status: ON HOLD | OUTPATIENT
Start: 2018-11-30 | End: 2019-10-23

## 2018-11-29 RX ORDER — CARVEDILOL 3.12 MG/1
3.12 TABLET ORAL 2 TIMES DAILY WITH MEALS
Qty: 60 TABLET | Refills: 3 | Status: ON HOLD | OUTPATIENT
Start: 2018-11-29 | End: 2019-06-19 | Stop reason: HOSPADM

## 2018-11-29 RX ORDER — ATORVASTATIN CALCIUM 40 MG/1
40 TABLET, FILM COATED ORAL NIGHTLY
Qty: 30 TABLET | Refills: 3 | Status: SHIPPED | OUTPATIENT
Start: 2018-11-29 | End: 2019-02-19

## 2018-11-29 RX ORDER — LEVOFLOXACIN 500 MG/1
500 TABLET, FILM COATED ORAL EVERY OTHER DAY
Qty: 3 TABLET | Refills: 0 | Status: SHIPPED | OUTPATIENT
Start: 2018-11-29 | End: 2018-12-04

## 2018-11-29 RX ADMIN — CARVEDILOL 3.12 MG: 3.12 TABLET, FILM COATED ORAL at 14:49

## 2018-11-29 RX ADMIN — DOXAZOSIN 4 MG: 2 TABLET ORAL at 14:49

## 2018-11-29 RX ADMIN — ASPIRIN 81 MG: 81 TABLET, CHEWABLE ORAL at 14:49

## 2018-11-29 RX ADMIN — Medication 10 ML: at 14:51

## 2018-11-29 RX ADMIN — CEFEPIME HYDROCHLORIDE 2 G: 2 INJECTION, POWDER, FOR SOLUTION INTRAVENOUS at 14:50

## 2018-11-29 RX ADMIN — HEPARIN SODIUM 5000 UNITS: 5000 INJECTION, SOLUTION INTRAVENOUS; SUBCUTANEOUS at 07:50

## 2018-11-29 RX ADMIN — NIFEDIPINE 90 MG: 90 TABLET, FILM COATED, EXTENDED RELEASE ORAL at 14:50

## 2018-11-29 ASSESSMENT — PAIN SCALES - GENERAL
PAINLEVEL_OUTOF10: 0

## 2018-11-29 NOTE — PROGRESS NOTES
NEPHROLOGY PROGRESS NOTE      SUBJECTIVE     Patient seen and examined. Hemodynamically stable. Blood pressure under Good control  No SOB  Blood pressure under good control. OBJECTIVE     Vitals:    11/29/18 0737 11/29/18 0925 11/29/18 0935 11/29/18 1005   BP: 118/70 127/76 (!) 142/67 (!) 160/79   Pulse: 94 90 86 85   Resp:  18     Temp: 97.5 °F (36.4 °C) 98.6 °F (37 °C)     TempSrc: Oral      SpO2:       Weight:  242 lb 11.6 oz (110.1 kg)     Height:         24HR INTAKE/OUTPUT:      Intake/Output Summary (Last 24 hours) at 11/29/18 1120  Last data filed at 11/29/18 0737   Gross per 24 hour   Intake              710 ml   Output             3295 ml   Net            -2585 ml       General appearance:Awake and alert  HEENT: Pupils are reactive to light  Respiratory: Bilateral air entry and clear to auscultation   Cardiovascular: S1 and S2 audible no S3.   Abdomen: Soft and nontender bowel sounds are positive  Extremities: No edema  Neurological: Alert and awake      MEDICATIONS     Scheduled Meds:    heparin (porcine)  5,000 Units Subcutaneous 3 times per day    insulin lispro  0-18 Units Subcutaneous TID WC    insulin lispro  0-9 Units Subcutaneous Nightly    cefepime  2 g Intravenous Q24H    carvedilol  3.125 mg Oral BID WC    doxazosin  4 mg Oral Daily    NIFEdipine  90 mg Oral Daily    sodium chloride flush  10 mL Intravenous 2 times per day    atorvastatin  40 mg Oral Nightly    aspirin  81 mg Oral Daily    levofloxacin  500 mg Intravenous Q48H       INVESTIGATIONS     Last 3 CMP:    Recent Labs      11/27/18   0554  11/27/18   1233  11/28/18   0613  11/29/18   0541   NA  137   --   141  137   K  4.1   --   3.9  4.0   CL  101   --   103  97*   CO2  21   --   23  25   BUN  68*   --   55*  44*   CREATININE  5.48*   --   4.41*  4.32*   CALCIUM  8.6   --   8.5*  8.7   LABALBU  3.1*  3.1*   --    --        Last 3 CBC:  Recent Labs      11/27/18   0554  11/28/18   0613  11/29/18   0541   WBC  9.9  8.4

## 2018-11-29 NOTE — DISCHARGE INSTR - DIET
is normal and important to have some cholesterol in your bloodstream. But too much cholesterol can cause plaque to build up within your arteries, which can eventually lead to a heart attack or stroke. The two types of cholesterol that are most commonly referred to are:   · Low-density lipoprotein (LDL) cholesterol  Also known as bad cholesterol, this is the cholesterol that tends to build up along your arteries. Bad cholesterol levels are increased by eating fats that are saturated or hydrogenated. Optimal level of this cholesterol is less than 100. Over 130 starts to get risky for heart disease. · High-density lipoprotein (HDL) cholesterol  Also known as good cholesterol, this type of cholesterol actually carries cholesterol away from your arteries and may, therefore, help lower your risk of having a heart attack. You want this level to be high (ideally greater than 60). It is a risk to have a level less than 40. You can raise this good cholesterol by eating olive oil, canola oil, avocados, or nuts. Exercise raises this level, too. Fat    Fat is calorie dense and packs a lot of calories into a small amount of food. Even though fats should be limited due to their high calorie content, not all fats are bad. In fact, some fats are quite healthful. Fat can be broken down into four main types.    · The good-for-you fats are:   ¨ Monounsaturated fat  found in oils such as olive and canola, avocados, and nuts and natural nut butters; can decrease cholesterol levels, while keeping levels of HDL cholesterol high   ¨ Polyunsaturated fat  found in oils such as safflower, sunflower, soybean, corn, and sesame; can decrease total cholesterol and LDL cholesterol   ¨ Omega-3 fatty acids  particularly those found in fatty fish (such as salmon, trout, tuna, mackerel, herring, and sardines); can decrease risk of arrhythmias, decrease triglyceride levels, and slightly lower blood pressure   · The fats that you want to limit are: ¨ Saturated fat  found in animal products, many fast foods, and a few vegetables; increases total blood cholesterol, including LDL levels   § Animal fats that are saturated include: butter, lard, whole-milk dairy products, meat fat, and poultry skin   § Vegetable fats that are saturated include: hydrogenated shortening, palm oil, coconut oil, cocoa butter   ¨ Hydrogenated or trans fat  found in margarine and vegetable shortening, most shelf stable snack foods, and fried foods; increases LDL and decreases HDL     It is generally recommended that you limit your total fat for the day to less than 30% of your total calories. If you follow an 1800-calorie heart healthy diet, for example, this would mean 60 grams of fat or less per day. Saturated fat and trans fat in your diet raises your blood cholesterol the most, much more than dietary cholesterol does. For this reason, on a heart-healthy diet, less than 7% of your calories should come from saturated fat and ideally 0% from trans fat. On an 1800-calorie diet, this translates into less than 14 grams of saturated fat per day, leaving 46 grams of fat to come from mono- and polyunsaturated fats.    Food Choices on a Heart Healthy Diet   Food Category   Foods Recommended   Foods to Avoid   Grains   Breads and rolls without salted tops Most dry and cooked cereals Unsalted crackers and breadsticks Low-sodium or homemade breadcrumbs or stuffing All rice and pastas   Breads, rolls, and crackers with salted tops High-fat baked goods (eg, muffins, donuts, pastries) Quick breads, self-rising flour, and biscuit mixes Regular bread crumbs Instant hot cereals Commercially prepared rice, pasta, or stuffing mixes   Vegetables   Most fresh, frozen, and low-sodium canned vegetables Low-sodium and salt-free vegetable juices Canned vegetables if unsalted or rinsed   Regular canned vegetables and juices, including sauerkraut and pickled vegetables Frozen vegetables with sauces

## 2018-11-29 NOTE — PROGRESS NOTES
[facility]  Internal Medicine Residency Program  Inpatient Daily Progress Note  ______________________________________________________________________________    Patient: Ariane Pump  YOB: 1960   ZBJ:[id number]    Acct: [de-identified]     Admit date: 11/24/2018  Today's date: 11/29/18  Number of days in the hospital: 5  Expected Discharge Date: 11/27/18    Admitting Diagnosis: Acute on chronic diastolic congestive heart failure ([address]    Subjective:   Pt examined at bedside. Chart &results reviewed. Afebrile last 24 hours. Hemodynamically stable. -130- improved. Patient wore Bipap intermittently     Troponin 0.10->0.07->0.09->0.09->0.08->0. 09. Likely renal origin per cardiology. Echo: EF 48%. Diastolic dysfunction. Cardiology started Coreg 3.125 BID. Creatinine 4.46->4.28->4.78->5.48-4.41->4.32  Diuretic resistant. IR placement of non-tunneled cath. Dialysis yesterday & today per nephrology. Dialysis scheduled for this Saturday. TRS starting. Patient states that he feels his breathing is improving. CXR yesterday shows pulmonary edema vs pneumonia. Follow up CXR today to evaluate after dialysis. Will treat as hospital acquired pneumonia as patient recently at [facility] for pneumonia. Renally dosed Cefepime and Levaquin.     Denies chest pain, abdominal pain, N/V/D, abdominal pain, numbness/tingling. Review of Systems    Objective:   Vital Sign:  /61   Pulse 88   Temp 98.6 °F (37 °C) (Oral)   Resp 20   Ht 5' 10\" (1.778 m)   Wt 242 lb 1 oz (109.8 kg)   SpO2 96%   BMI 34.73 kg/m²       In: 410   Out: 400 [Urine:400]    Physical Exam:  CONSTITUTIONAL:  awake, alert, cooperative, no apparent distress, and appears stated age  LUNGS: Good inspiration b/l. No rales appreciated.    CARDIOVASCULAR:  Normal apical impulse, regular rate and rhythm, normal S1 and S2, no S3 or S4, and no murmur noted  ABDOMEN:  No scars, normal

## 2018-11-30 LAB
CULTURE: NORMAL
CULTURE: NORMAL
Lab: NORMAL
Lab: NORMAL
SPECIMEN DESCRIPTION: NORMAL
SPECIMEN DESCRIPTION: NORMAL
STATUS: NORMAL
STATUS: NORMAL

## 2018-12-02 NOTE — DISCHARGE SUMMARY
pneumonia underlying pulmonary edema. a tone and was elevated at 0.49. Per nephrology patient was cleared for 40 mg IV Lasix twice daily. Patient also demonstrated troponemia and cardiology was consulted but ultimately did not recommend heparin drip. Sirs 2 out of 4. Diuresis was continued. However, due to the SIRS criteria and elevated pro-calcitonin as well as history of fevers and chills patient was started on Levaquin and cefepime. Patient had a recent history 2 months ago being treated in the hospital for pneumonia and escalation of antibiotics was performed. Mycoplasma IgM, rapid influenza, Legionella, and strep pneumonia antigen negative. Troponin plateaued between 7.92-0.94. Per cardiology troponin elevation likely secondary to CKD. Fractional excretion of urea was 43% indicating intrinsic renal disease. Renal ultrasound from September showed no hydronephrosis and increased echogenicity indicating medical renal disease. Creatinine kept climbing despite Lasix 40 mg IV twice a day. At this time nephrology increase Lasix to 80 mg IV every 8 hours. However, patient was diuretic resistant and urine output did not increase. Creatinine increased to a high of 5.48. Nephrology then placed a non-tunneled catheter and dialysis started. Prior to discharge patient received 3 sessions of dialysis. And patient was set up for outpatient dialysis on Tuesdays Thursdays and Saturdays. Prior to discharge patient was afebrile and leukocytosis had resolved. Patient was given Levaquin by mouth to finish course of a total 10 day therapy of antibiotics. Patient was restarted on home diabetic medications of Amaryl and Januvia. Patient was restarted on home antihypertensives. Coreg 3.125 was started per cardiology and the prescription was given to the patient. Outpatient ischemia workup per cardiology. Echo during this admission showed an ejection fraction of 99% with diastolic dysfunction.   After 3 sessions of dialysis patient's breathing improved. Patient will follow up with cardiology and nephrology. Follow up with outpatient scheduled dialysis sessions. Lipitor and aspirin were also started on the patient.     Procedures:  None    Any Hospital Acquired Infections: none    Discharge Functional Status:  stable    Disposition: home    Patient Instructions:   Discharge Medication List as of 11/30/2018  8:50 AM      START taking these medications    Details   aspirin 81 MG chewable tablet Take 1 tablet by mouth daily, Disp-30 tablet, R-3Normal      atorvastatin (LIPITOR) 40 MG tablet Take 1 tablet by mouth nightly, Disp-30 tablet, R-3Normal      carvedilol (COREG) 3.125 MG tablet Take 1 tablet by mouth 2 times daily (with meals), Disp-60 tablet, R-3Normal      levofloxacin (LEVAQUIN) 500 MG tablet Take 1 tablet by mouth every other day for 3 doses, Disp-3 tablet, R-0Normal         CONTINUE these medications which have CHANGED    Details   SITagliptin (JANUVIA) 25 MG tablet Take 1 tablet by mouth daily, Disp-60 tablet, R-2Normal         CONTINUE these medications which have NOT CHANGED    Details   NIFEdipine (ADALAT CC) 90 MG extended release tablet TAKE 1 TABLET DAILY, Disp-90 tablet, R-3Normal      Mirabegron ER (MYRBETRIQ) 25 MG TB24 Take by mouthHistorical Med      calcitRIOL (ROCALTROL) 0.25 MCG capsule Take 1 capsule by mouth three times a week Monday/Wednesday/Friday, Disp-12 capsule, R-3Normal      doxazosin (CARDURA) 4 MG tablet Take 1 tablet by mouth daily, Disp-90 tablet, R-3Normal      furosemide (LASIX) 40 MG tablet Take 40 mg by mouth 2 times daily On hold as of 2/14/2018Historical Med      vitamin D (CHOLECALCIFEROL) 13519 UNIT CAPS Take 1 capsule by mouth once a week for 8 doses, Disp-8 capsule, R-0Normal      metolazone (ZAROXOLYN) 5 MG tablet Take 1 tablet by mouth three times a week, Disp-15 tablet, R-3Normal      vitamin D (CHOLECALCIFEROL) 88425 UNIT CAPS Take 1 capsule by mouth once a

## 2018-12-17 ENCOUNTER — HOSPITAL ENCOUNTER (EMERGENCY)
Age: 58
Discharge: HOME | End: 2018-12-17
Attending: EMERGENCY MEDICINE
Payer: COMMERCIAL

## 2018-12-17 VITALS
BODY MASS INDEX: 37.03 KG/M2 | DIASTOLIC BLOOD PRESSURE: 78 MMHG | SYSTOLIC BLOOD PRESSURE: 145 MMHG | WEIGHT: 250 LBS | HEART RATE: 82 BPM | RESPIRATION RATE: 16 BRPM | TEMPERATURE: 97.7 F | OXYGEN SATURATION: 99 % | HEIGHT: 69 IN

## 2018-12-17 DIAGNOSIS — T18.5XXA RECTAL FOREIGN BODY, INITIAL ENCOUNTER: Primary | ICD-10-CM

## 2018-12-17 PROCEDURE — 99284 EMERGENCY DEPT VISIT MOD MDM: CPT

## 2018-12-17 ASSESSMENT — PAIN DESCRIPTION - LOCATION: LOCATION: RECTUM

## 2018-12-17 ASSESSMENT — ENCOUNTER SYMPTOMS
NAUSEA: 0
BLOOD IN STOOL: 0
ANAL BLEEDING: 0
ABDOMINAL PAIN: 0
RECTAL PAIN: 1
DIARRHEA: 0
VOMITING: 0

## 2018-12-17 ASSESSMENT — PAIN DESCRIPTION - PAIN TYPE: TYPE: ACUTE PAIN

## 2018-12-17 ASSESSMENT — PAIN SCALES - WONG BAKER: WONGBAKER_NUMERICALRESPONSE: 6

## 2018-12-17 NOTE — ED NOTES
LOU from [place] [facility] sent to radiology dept to be loaded into [email or web address], [address]  12/17/18 5601

## 2018-12-17 NOTE — ED PROVIDER NOTES
D (CHOLECALCIFEROL) 08583 UNIT CAPS Take 1 capsule by mouth once a week for 8 doses 4/27/17 2/16/18  Soha Quiles MD       REVIEW OF SYSTEMS    (2-9 systems for level 4, 10 or more for level 5)      Review of Systems   Gastrointestinal: Positive for rectal pain. Negative for abdominal pain, anal bleeding, blood in stool, diarrhea, nausea and vomiting. PHYSICAL EXAM   (up to 7 for level 4, 8 or more for level 5)      INITIAL VITALS:   BP (!) 145/78   Pulse 82   Temp 97.7 °F (36.5 °C) (Oral)   Resp 16   Ht 5' 9\" (1.753 m)   Wt 250 lb (113.4 kg)   SpO2 99%   BMI 36.92 kg/m²     Physical Exam   Constitutional: He is oriented to person, place, and time. He appears well-developed and well-nourished. No distress. HENT:   Head: Normocephalic. Eyes: Conjunctivae are normal. No scleral icterus. Cardiovascular: Normal rate. Exam reveals no friction rub. No murmur heard. Regularly irregular. Pulmonary/Chest: Effort normal and breath sounds normal. No stridor. No respiratory distress. He has no wheezes. He has no rales. Abdominal: Soft. There is no tenderness. There is no rebound and no guarding. Neurological: He is alert and oriented to person, place, and time. Skin: Skin is warm and dry. No rash (over exposed skin) noted. He is not diaphoretic. Psychiatric: He has a normal mood and affect. His behavior is normal.   Nursing note and vitals reviewed. DIAGNOSTICS     PLAN (LABS / IMAGING / EKG):  Orders Placed This Encounter   Procedures    Inpatient consult to General Surgery     MEDICATIONS ORDERED:  No orders of the defined types were placed in this encounter. DIAGNOSTIC RESULTS / EMERGENCYDEPARTMENT COURSE / [facility]     LABS:  No results found for this visit on 12/17/18. EMERGENCY DEPARTMENT COURSE:  Patient evaluated by attending physician and myself. Patient sent in with concerns for foreign body in his rectum.   He states that he inserted last night into his rectum, and subsequently

## 2018-12-17 NOTE — CONSULTS
(CHOLECALCIFEROL) 90920 UNIT CAPS Take 1 capsule by mouth once a week for 8 doses 11/30/17 1/19/18  Mejia Stringer MD   vitamin D (CHOLECALCIFEROL) 08906 UNIT CAPS Take 1 capsule by mouth once a week for 8 doses 4/27/17 2/16/18  Mejia Stringer MD    Scheduled Meds:  Continuous Infusions:  PRN Meds:. Allergies  is allergic to lisinopril. Family History  family history includes Cancer in his brother; Diabetes in his brother and brother; Early Death in his mother; Heart Disease in his mother; High Blood Pressure in his brother, brother, brother, and brother. Social History   reports that he has never smoked. He has never used smokeless tobacco.   reports that he does not drink alcohol. reports that he does not use drugs. Review of Systems  General Denies any fever or chills  HEENT  Denies any diplopia, tinnitus or vertigo  Resp Denies any shortness of breath, cough or wheezing  Cardiac Denies any chest pain, palpitations, claudication or edema  GI Reports carrot stuck in his rectum    Denies any frequency, urgency, hesitancy or incontinence  Heme Denies bruising or bleeding easily  Endocrine Reports hx of diabetes mellitus   Neuro Denies any focal motor or sensory deficits    PHYSICAL:   VITALS:  height is 5' 9\" (1.753 m) and weight is 250 lb (113.4 kg). His oral temperature is 97.7 °F (36.5 °C). His blood pressure is 145/78 (abnormal) and his pulse is 82. His respiration is 16 and oxygen saturation is 99%. CONSTITUTIONAL: Alert and oriented times 3, no acute distress and cooperative to examination.   HEENT: Head is normocephalic, atraumatic  NECK: Soft, trachea midline and straight  LUNGS: no respiratory distress, no audible wheezing  CARDIOVASCULAR: +S1/2  ABDOMEN: soft, nontender, nondistended, no guarding or rebound tenderness, no peritoneal signs  ANORECTAL: patent normal appearing anus, rectal tone intact, no bloody on exam, carrot lodged in the rectum approximately 5cm from the anal verge   NEUROLOGIC: no

## 2018-12-25 PROBLEM — R77.8 ELEVATED TROPONIN: Status: RESOLVED | Noted: 2018-02-16 | Resolved: 2018-12-25

## 2018-12-25 PROBLEM — R79.89 ELEVATED TROPONIN: Status: RESOLVED | Noted: 2018-02-16 | Resolved: 2018-12-25

## 2019-01-11 ENCOUNTER — HOSPITAL ENCOUNTER (OUTPATIENT)
Dept: VASCULAR LAB | Age: 59
Discharge: HOME | End: 2019-01-11
Payer: COMMERCIAL

## 2019-01-11 PROCEDURE — G0365 VESSEL MAPPING HEMO ACCESS: HCPCS

## 2019-02-04 ENCOUNTER — INITIAL CONSULT (OUTPATIENT)
Dept: VASCULAR SURGERY | Age: 59
End: 2019-02-04
Payer: MEDICARE

## 2019-02-04 VITALS
OXYGEN SATURATION: 97 % | HEART RATE: 85 BPM | BODY MASS INDEX: 36.22 KG/M2 | WEIGHT: 253 LBS | SYSTOLIC BLOOD PRESSURE: 138 MMHG | DIASTOLIC BLOOD PRESSURE: 76 MMHG | HEIGHT: 70 IN

## 2019-02-04 DIAGNOSIS — Z99.2 ESRD ON DIALYSIS (HCC): Primary | ICD-10-CM

## 2019-02-04 DIAGNOSIS — N18.6 ESRD ON DIALYSIS (HCC): Primary | ICD-10-CM

## 2019-02-04 PROCEDURE — 99203 OFFICE O/P NEW LOW 30 MIN: CPT | Performed by: SURGERY

## 2019-02-04 RX ORDER — NIFEDIPINE 30 MG/1
30 TABLET, FILM COATED, EXTENDED RELEASE ORAL 2 TIMES DAILY
COMMUNITY
End: 2021-12-24 | Stop reason: HOSPADM

## 2019-02-07 ASSESSMENT — ENCOUNTER SYMPTOMS
GASTROINTESTINAL NEGATIVE: 1
ALLERGIC/IMMUNOLOGIC NEGATIVE: 1
CHEST TIGHTNESS: 0
SHORTNESS OF BREATH: 0

## 2019-02-20 ENCOUNTER — HOSPITAL ENCOUNTER (OUTPATIENT)
Age: 59
Setting detail: OUTPATIENT SURGERY
Discharge: HOME | End: 2019-02-20
Attending: SURGERY | Admitting: SURGERY
Payer: MEDICARE

## 2019-02-20 ENCOUNTER — ANESTHESIA EVENT (OUTPATIENT)
Dept: OPERATING ROOM | Age: 59
End: 2019-02-20
Payer: MEDICARE

## 2019-02-20 ENCOUNTER — ANESTHESIA (OUTPATIENT)
Dept: OPERATING ROOM | Age: 59
End: 2019-02-20
Payer: MEDICARE

## 2019-02-20 VITALS
HEART RATE: 74 BPM | OXYGEN SATURATION: 95 % | BODY MASS INDEX: 35.79 KG/M2 | SYSTOLIC BLOOD PRESSURE: 135 MMHG | DIASTOLIC BLOOD PRESSURE: 53 MMHG | HEIGHT: 70 IN | TEMPERATURE: 97.7 F | WEIGHT: 250 LBS | RESPIRATION RATE: 23 BRPM

## 2019-02-20 VITALS
DIASTOLIC BLOOD PRESSURE: 54 MMHG | SYSTOLIC BLOOD PRESSURE: 109 MMHG | RESPIRATION RATE: 19 BRPM | OXYGEN SATURATION: 99 %

## 2019-02-20 LAB
GFR NON-AFRICAN AMERICAN: 13 ML/MIN
GFR SERPL CREATININE-BSD FRML MDRD: 16 ML/MIN
GFR SERPL CREATININE-BSD FRML MDRD: ABNORMAL ML/MIN/{1.73_M2}
GLUCOSE BLD-MCNC: 195 MG/DL (ref 74–100)
POC CHLORIDE: 102 MMOL/L (ref 98–107)
POC CREATININE: 4.6 MG/DL (ref 0.51–1.19)
POC HEMATOCRIT: 46 % (ref 41–53)
POC HEMOGLOBIN: 15.6 G/DL (ref 13.5–17.5)
POC POTASSIUM: 4.5 MMOL/L (ref 3.5–4.5)
POC SODIUM: 141 MMOL/L (ref 138–146)

## 2019-02-20 PROCEDURE — 7100000010 HC PHASE II RECOVERY - FIRST 15 MIN: Performed by: SURGERY

## 2019-02-20 PROCEDURE — 82435 ASSAY OF BLOOD CHLORIDE: CPT

## 2019-02-20 PROCEDURE — 84295 ASSAY OF SERUM SODIUM: CPT

## 2019-02-20 PROCEDURE — 6360000002 HC RX W HCPCS: Performed by: NURSE ANESTHETIST, CERTIFIED REGISTERED

## 2019-02-20 PROCEDURE — 2580000003 HC RX 258: Performed by: SURGERY

## 2019-02-20 PROCEDURE — 3600000003 HC SURGERY LEVEL 3 BASE: Performed by: SURGERY

## 2019-02-20 PROCEDURE — 85014 HEMATOCRIT: CPT

## 2019-02-20 PROCEDURE — 3700000000 HC ANESTHESIA ATTENDED CARE: Performed by: SURGERY

## 2019-02-20 PROCEDURE — 3600000013 HC SURGERY LEVEL 3 ADDTL 15MIN: Performed by: SURGERY

## 2019-02-20 PROCEDURE — 6370000000 HC RX 637 (ALT 250 FOR IP): Performed by: SURGERY

## 2019-02-20 PROCEDURE — 82565 ASSAY OF CREATININE: CPT

## 2019-02-20 PROCEDURE — 84132 ASSAY OF SERUM POTASSIUM: CPT

## 2019-02-20 PROCEDURE — 2580000003 HC RX 258: Performed by: NURSE ANESTHETIST, CERTIFIED REGISTERED

## 2019-02-20 PROCEDURE — 2500000003 HC RX 250 WO HCPCS: Performed by: SURGERY

## 2019-02-20 PROCEDURE — 3700000001 HC ADD 15 MINUTES (ANESTHESIA): Performed by: SURGERY

## 2019-02-20 PROCEDURE — 36821 AV FUSION DIRECT ANY SITE: CPT | Performed by: SURGERY

## 2019-02-20 PROCEDURE — 82947 ASSAY GLUCOSE BLOOD QUANT: CPT

## 2019-02-20 PROCEDURE — 7100000011 HC PHASE II RECOVERY - ADDTL 15 MIN: Performed by: SURGERY

## 2019-02-20 PROCEDURE — 6360000002 HC RX W HCPCS: Performed by: SURGERY

## 2019-02-20 PROCEDURE — 93005 ELECTROCARDIOGRAM TRACING: CPT

## 2019-02-20 PROCEDURE — 2709999900 HC NON-CHARGEABLE SUPPLY: Performed by: SURGERY

## 2019-02-20 RX ORDER — SODIUM CHLORIDE 9 MG/ML
INJECTION, SOLUTION INTRAVENOUS CONTINUOUS PRN
Status: DISCONTINUED | OUTPATIENT
Start: 2019-02-20 | End: 2019-02-20 | Stop reason: SDUPTHER

## 2019-02-20 RX ORDER — SODIUM CHLORIDE 9 MG/ML
INJECTION, SOLUTION INTRAVENOUS CONTINUOUS
Status: DISCONTINUED | OUTPATIENT
Start: 2019-02-20 | End: 2019-02-20 | Stop reason: HOSPADM

## 2019-02-20 RX ORDER — CEFAZOLIN SODIUM 1 G/3ML
INJECTION, POWDER, FOR SOLUTION INTRAMUSCULAR; INTRAVENOUS PRN
Status: DISCONTINUED | OUTPATIENT
Start: 2019-02-20 | End: 2019-02-20 | Stop reason: SDUPTHER

## 2019-02-20 RX ORDER — HEPARIN SODIUM 1000 [USP'U]/ML
INJECTION, SOLUTION INTRAVENOUS; SUBCUTANEOUS PRN
Status: DISCONTINUED | OUTPATIENT
Start: 2019-02-20 | End: 2019-02-20 | Stop reason: SDUPTHER

## 2019-02-20 RX ORDER — MEPERIDINE HYDROCHLORIDE 50 MG/ML
12.5 INJECTION INTRAMUSCULAR; INTRAVENOUS; SUBCUTANEOUS EVERY 5 MIN PRN
Status: DISCONTINUED | OUTPATIENT
Start: 2019-02-20 | End: 2019-02-20 | Stop reason: HOSPADM

## 2019-02-20 RX ORDER — PROPOFOL 10 MG/ML
INJECTION, EMULSION INTRAVENOUS CONTINUOUS PRN
Status: DISCONTINUED | OUTPATIENT
Start: 2019-02-20 | End: 2019-02-20 | Stop reason: SDUPTHER

## 2019-02-20 RX ORDER — LIDOCAINE HYDROCHLORIDE 10 MG/ML
INJECTION, SOLUTION INFILTRATION; PERINEURAL PRN
Status: DISCONTINUED | OUTPATIENT
Start: 2019-02-20 | End: 2019-02-20 | Stop reason: ALTCHOICE

## 2019-02-20 RX ORDER — FENTANYL CITRATE 50 UG/ML
25 INJECTION, SOLUTION INTRAMUSCULAR; INTRAVENOUS EVERY 5 MIN PRN
Status: DISCONTINUED | OUTPATIENT
Start: 2019-02-20 | End: 2019-02-20 | Stop reason: HOSPADM

## 2019-02-20 RX ORDER — FENTANYL CITRATE 50 UG/ML
50 INJECTION, SOLUTION INTRAMUSCULAR; INTRAVENOUS EVERY 5 MIN PRN
Status: DISCONTINUED | OUTPATIENT
Start: 2019-02-20 | End: 2019-02-20 | Stop reason: HOSPADM

## 2019-02-20 RX ORDER — PROPOFOL 10 MG/ML
INJECTION, EMULSION INTRAVENOUS PRN
Status: DISCONTINUED | OUTPATIENT
Start: 2019-02-20 | End: 2019-02-20 | Stop reason: SDUPTHER

## 2019-02-20 RX ORDER — ONDANSETRON 2 MG/ML
4 INJECTION INTRAMUSCULAR; INTRAVENOUS
Status: DISCONTINUED | OUTPATIENT
Start: 2019-02-20 | End: 2019-02-20 | Stop reason: HOSPADM

## 2019-02-20 RX ORDER — FENTANYL CITRATE 50 UG/ML
INJECTION, SOLUTION INTRAMUSCULAR; INTRAVENOUS PRN
Status: DISCONTINUED | OUTPATIENT
Start: 2019-02-20 | End: 2019-02-20 | Stop reason: SDUPTHER

## 2019-02-20 RX ADMIN — FENTANYL CITRATE 50 MCG: 50 INJECTION, SOLUTION INTRAMUSCULAR; INTRAVENOUS at 11:19

## 2019-02-20 RX ADMIN — SODIUM CHLORIDE: 900 INJECTION, SOLUTION INTRAVENOUS at 11:14

## 2019-02-20 RX ADMIN — LIDOCAINE HYDROCHLORIDE 50 MG: 10 INJECTION, SOLUTION INFILTRATION; PERINEURAL at 11:20

## 2019-02-20 RX ADMIN — CEFAZOLIN 2000 MG: 1 INJECTION, POWDER, FOR SOLUTION INTRAMUSCULAR; INTRAVENOUS at 11:35

## 2019-02-20 RX ADMIN — FENTANYL CITRATE 25 MCG: 50 INJECTION, SOLUTION INTRAMUSCULAR; INTRAVENOUS at 12:05

## 2019-02-20 RX ADMIN — PROPOFOL 100 MCG/KG/MIN: 10 INJECTION, EMULSION INTRAVENOUS at 11:20

## 2019-02-20 RX ADMIN — SODIUM CHLORIDE: 9 INJECTION, SOLUTION INTRAVENOUS at 09:49

## 2019-02-20 RX ADMIN — FENTANYL CITRATE 25 MCG: 50 INJECTION, SOLUTION INTRAMUSCULAR; INTRAVENOUS at 11:57

## 2019-02-20 RX ADMIN — PROPOFOL 50 MG: 10 INJECTION, EMULSION INTRAVENOUS at 11:55

## 2019-02-20 RX ADMIN — HEPARIN SODIUM 5000 UNITS: 1000 INJECTION INTRAVENOUS; SUBCUTANEOUS at 11:51

## 2019-02-20 ASSESSMENT — PULMONARY FUNCTION TESTS
PIF_VALUE: 1
PIF_VALUE: 0
PIF_VALUE: 1
PIF_VALUE: 0
PIF_VALUE: 1
PIF_VALUE: 1
PIF_VALUE: 0
PIF_VALUE: 1
PIF_VALUE: 0
PIF_VALUE: 0
PIF_VALUE: 1
PIF_VALUE: 0
PIF_VALUE: 1
PIF_VALUE: 0
PIF_VALUE: 1
PIF_VALUE: 0
PIF_VALUE: 1
PIF_VALUE: 0
PIF_VALUE: 1
PIF_VALUE: 0
PIF_VALUE: 1
PIF_VALUE: 0
PIF_VALUE: 1
PIF_VALUE: 0
PIF_VALUE: 1
PIF_VALUE: 0
PIF_VALUE: 1
PIF_VALUE: 0
PIF_VALUE: 1
PIF_VALUE: 0
PIF_VALUE: 1
PIF_VALUE: 1
PIF_VALUE: 0
PIF_VALUE: 1
PIF_VALUE: 0
PIF_VALUE: 1
PIF_VALUE: 0
PIF_VALUE: 1
PIF_VALUE: 0
PIF_VALUE: 1
PIF_VALUE: 0
PIF_VALUE: 1
PIF_VALUE: 0
PIF_VALUE: 0
PIF_VALUE: 1

## 2019-02-20 NOTE — BRIEF OP NOTE
Brief Postoperative Note  ______________________________________________________________    Patient: Rosalia Simmons  YOB: 1960  MRN: [id number]  Date of Procedure: 2/20/2019    Pre-Op Diagnosis: RENAL FAILURE     Post-Op Diagnosis: Same       Procedure(s):  Left radial cephalic fistula creation (columba type)    Anesthesia: Monitor Anesthesia Care, local    Surgeon(s): Chayo Meza MD    Assistant: Pat Zayas, PGY 3    Estimated Blood Loss (mL): 15 ml    Complications: None    Findings: Weak thrill, good radial artery signal distal to fistula.     Chayo Meza MD  Date: 2/20/2019  Time: 12:42 PM

## 2019-02-20 NOTE — H&P
History and Physical Update: Vascular Surgery    DATE OF PROCEDURE: 2/20/2019   PATIENT NAME: Rosi Mireles   MRN: [id number]  YOB: 1960  GENDER: male       Patient seen and examined. History and Physical from 2/4/18 reviewed. No new changes. Heart and Lung exam within normal limits. Patient is here for: Left upper extremity AVF creation       Electronically signed by Caesar Vega  on 2/20/2019              Division of Vascular Surgery          New Consult        Physician Requesting Consult:  Dr. Dragan Randolph     Reason for Consult:   Dialysis Access     Chief Complaint:       I am on dialysis     History of Present Illness:      Rosi Mireles is a 59 y.o. right handed gentleman who presents with ESRD on hemodialysis thru a tunneled catheter. He denies being on dialysis before, no upper extremity swelling/numbness/tingling.   Denies any chest pain or shortness of breath on exertion.     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    Sleep apnea       pt has machine and does not use it            Surgical History:      Past Surgical History   Past Surgical History:   Procedure Laterality Date    ABDOMEN SURGERY        COLON SURGERY        COLONOSCOPY        CYSTOSCOPY   11/17/2017    FOOT SURGERY        [place] GENITAL SURG PROC,MALE UNLISTED N/A 2/16/2018     US  PROSTATE BIOPSY WITH SATURATION performed by Magda Stringer MD at [address]   11/17/2017    PROSTATE BIOPSY N/A 11/17/2017     CYSTOSCOPY PROSTATE US BIOPSY performed by Magda Stringer MD at [address] [place] [address]   02/16/2018            Family History:      Family History         Family History Problem Relation Age of Onset    Heart Disease Mother      Early Death Mother      High Blood Pressure Brother      Diabetes Brother      High Blood Pressure Brother      Diabetes Brother      Cancer Brother      High Blood Pressure Brother      High Blood Pressure Brother              Allergies:       Lisinopril     Medications:      Current Facility-Administered Medications          Current Outpatient Prescriptions   Medication Sig Dispense Refill    NIFEdipine (ADALAT CC) 30 MG extended release tablet Take 30 mg by mouth 2 times daily        aspirin 81 MG chewable tablet Take 1 tablet by mouth daily 30 tablet 3    atorvastatin (LIPITOR) 40 MG tablet Take 1 tablet by mouth nightly 30 tablet 3    carvedilol (COREG) 3.125 MG tablet Take 1 tablet by mouth 2 times daily (with meals) 60 tablet 3    SITagliptin (JANUVIA) 25 MG tablet Take 1 tablet by mouth daily 60 tablet 2    furosemide (LASIX) 40 MG tablet Take 40 mg by mouth 2 times daily On hold as of 2/14/2018          No current facility-administered medications for this visit.             Social History:      Tobacco:    reports that he has never smoked. He has never used smokeless tobacco.  Alcohol:      reports that he does not drink alcohol. Drug Use:  reports that he does not use drugs.     Review of Systems:      Review of Systems   Constitutional: Negative for chills and fever. HENT: Negative. Eyes: Negative for visual disturbance. Respiratory: Negative for chest tightness and shortness of breath. Cardiovascular: Negative for chest pain and leg swelling. Gastrointestinal: Negative. Endocrine: Negative. Genitourinary: Negative. Musculoskeletal: Negative. Skin: Negative. Allergic/Immunologic: Negative. Neurological: Negative for weakness and numbness. Hematological: Negative.     Psychiatric/Behavioral: Negative.          Physical Exam:      Vitals:  /76 (Site: Right Upper Arm, Position: Sitting, Cuff

## 2019-02-20 NOTE — PROGRESS NOTES
Patient admitted, consent signed and questions answered. Patient ready for procedure. Call light to reach with side rails up 2 of 2. Family at bedside with patient. History and physical needs updated.

## 2019-02-20 NOTE — PROGRESS NOTES
Received post AV fistula creation procedure to [facility] room 07. Assessment obtained. Restrictions reviewed with patient. Post procedure pathway initiated. arm site soft , drsg dry and intact. No hematoma noted. Patient without complaints.

## 2019-02-21 LAB
EKG ATRIAL RATE: 82 BPM
EKG P AXIS: 61 DEGREES
EKG P-R INTERVAL: 206 MS
EKG Q-T INTERVAL: 404 MS
EKG QRS DURATION: 94 MS
EKG QTC CALCULATION (BAZETT): 472 MS
EKG R AXIS: -74 DEGREES
EKG T AXIS: -10 DEGREES
EKG VENTRICULAR RATE: 82 BPM

## 2019-02-21 NOTE — OP NOTE
[address] [address]                                OPERATIVE REPORT    PATIENT NAME: Leidy Munoz                   :        1960  MED REC NO:   [id number]                             ROOM:  ACCOUNT NO:   [de-identified]                           ADMIT DATE: 2019  PROVIDER:     Laury Donald MD    DATE OF PROCEDURE:  2019    PREOPERATIVE DIAGNOSIS:  End-stage renal disease, on hemodialysis. POSTOPERATIVE DIAGNOSIS:  End-stage renal disease, on hemodialysis. PROCEDURE:  Left radiocephalic fistula creation (Ursula type). ANESTHESIA:  Monitored anesthesia care with local.    SURGEON:  aLury Donald MD    ASSISTANT:  Sean Matos DO, PGY-3    ESTIMATED BLOOD LOSS:  15 mL. COMPLICATIONS:  None. FINDINGS:  There is a weak thrill with a good radial artery signal  distal to the fistula. INDICATION FOR PROCEDURE:  The patient is a 57-year-old gentleman who is  currently undergoing hemodialysis through a tunneled catheter. He  presented to the office to discuss surgical access. I reviewed his vein  mapping. He did have a somewhat marginal vein in his left upper  extremity, but I did think that we could attempt to try to create a  fistula and mature it. He understood the risks and benefits of  procedure and elected to continue. DESCRIPTION OF PROCEDURE:  The patient was brought to the operating  room. After appropriate anesthesia was delivered, the left upper  extremity was prepped and draped in a standard fashion. Time-out was  performed and agreed upon. Antibiotics were given during this period. I began by placing a Penrose as a light tourniquet at the upper forearm. Using the ultrasound, the cephalic vein was identified. There were not too many branches and although small it did seem usable to attempt   Fistula creation.   This was then marked out at the level  of the wrist.  Local anesthetic was given and longitudinal incision was  created. Sharp dissection was then ensued until the cephalic vein was  dissected proximally and distally. Distally it did branch at 3   points. Once I dissected it out completely, I then  dissected deeper to identify the radial artery. After giving more local  Anesthetic, I opened up the radial sheath, identified the radial artery. Proximal and distal  control was then obtained after doing sharp dissection. Vessel loops  were placed for control. The patient was given 5000 units of heparin  and allowed to circulate. I began by transecting the distal branches of  the cephalic vein after ligating with small clips. Branch points were  then connected. The vein flushed easily, and was then spatulated. I made a  longitudinal  arteriotomy and extended it to roughly 4-5 mm in the radial artery. An end-to-side anastomosis from the radial artery to cephalic  Vein was performed. Prior to completion, usual flushing maneuvers were performed, and the  anastomosis was hemostatic. Soft tissue was then dissected free to  allow the vein to have a more natural curve and prevent any kinking. There was a small narrowing seen in the vein a few centimeters from the  anastomosis. There was a weak thrill that could be felt and it was  confirmed with continuous-wave Doppler with continuous flow up to the  forearm. There was a good radial artery signal distal to the fistula. At this point, the incision was then irrigated and dried. It was  hemostatic. It was then closed with interrupted layers of Monocryl  followed by Benzoin, Steri-Strip and sterile dressing. The patient  tolerated the procedure well and was then brought to his room for  recovery.         Vicky Lofton MD    D: 02/20/2019 14:46:03       T: 02/21/2019 2:24:53     [email or web address]  Job#: [id number]     Doc#: [id number]    CC:

## 2019-03-04 ENCOUNTER — OFFICE VISIT (OUTPATIENT)
Dept: VASCULAR SURGERY | Age: 59
End: 2019-03-04

## 2019-03-04 VITALS
OXYGEN SATURATION: 98 % | HEART RATE: 78 BPM | DIASTOLIC BLOOD PRESSURE: 80 MMHG | BODY MASS INDEX: 36.22 KG/M2 | HEIGHT: 70 IN | SYSTOLIC BLOOD PRESSURE: 146 MMHG | WEIGHT: 253 LBS

## 2019-03-04 DIAGNOSIS — I77.0 AVF (ARTERIOVENOUS FISTULA) (HCC): Primary | ICD-10-CM

## 2019-03-04 PROCEDURE — 99024 POSTOP FOLLOW-UP VISIT: CPT | Performed by: SURGERY

## 2019-03-04 ASSESSMENT — ENCOUNTER SYMPTOMS
SHORTNESS OF BREATH: 0
CHEST TIGHTNESS: 0

## 2019-03-06 ENCOUNTER — HOSPITAL ENCOUNTER (OUTPATIENT)
Dept: CARDIAC CATH/INVASIVE PROCEDURES | Age: 59
Discharge: HOME | End: 2019-03-06
Payer: MEDICARE

## 2019-03-06 ENCOUNTER — TELEPHONE (OUTPATIENT)
Dept: CARDIOTHORACIC SURGERY | Age: 59
End: 2019-03-06

## 2019-03-06 VITALS
DIASTOLIC BLOOD PRESSURE: 70 MMHG | HEIGHT: 70 IN | WEIGHT: 253 LBS | OXYGEN SATURATION: 98 % | SYSTOLIC BLOOD PRESSURE: 127 MMHG | RESPIRATION RATE: 19 BRPM | HEART RATE: 64 BPM | BODY MASS INDEX: 36.22 KG/M2 | TEMPERATURE: 98.2 F

## 2019-03-06 LAB — GLUCOSE BLD-MCNC: 164 MG/DL (ref 75–110)

## 2019-03-06 PROCEDURE — 2709999900 HC NON-CHARGEABLE SUPPLY

## 2019-03-06 PROCEDURE — 6360000004 HC RX CONTRAST MEDICATION

## 2019-03-06 PROCEDURE — 82947 ASSAY GLUCOSE BLOOD QUANT: CPT

## 2019-03-06 PROCEDURE — 6360000002 HC RX W HCPCS

## 2019-03-06 PROCEDURE — C1894 INTRO/SHEATH, NON-LASER: HCPCS

## 2019-03-06 PROCEDURE — 7100000010 HC PHASE II RECOVERY - FIRST 15 MIN

## 2019-03-06 PROCEDURE — 7100000011 HC PHASE II RECOVERY - ADDTL 15 MIN

## 2019-03-06 PROCEDURE — 36832 AV FISTULA REVISION OPEN: CPT | Performed by: SURGERY

## 2019-03-06 PROCEDURE — 36901 INTRO CATH DIALYSIS CIRCUIT: CPT | Performed by: SURGERY

## 2019-03-06 RX ORDER — SODIUM CHLORIDE 9 MG/ML
INJECTION, SOLUTION INTRAVENOUS CONTINUOUS
Status: DISCONTINUED | OUTPATIENT
Start: 2019-03-06 | End: 2019-03-07 | Stop reason: HOSPADM

## 2019-03-06 RX ADMIN — SODIUM CHLORIDE: 9 INJECTION, SOLUTION INTRAVENOUS at 08:37

## 2019-03-14 ENCOUNTER — APPOINTMENT (OUTPATIENT)
Dept: CT IMAGING | Age: 59
End: 2019-03-14
Payer: MEDICARE

## 2019-03-14 ENCOUNTER — HOSPITAL ENCOUNTER (OUTPATIENT)
Age: 59
Setting detail: OBSERVATION
Discharge: HOME | End: 2019-03-15
Attending: EMERGENCY MEDICINE | Admitting: EMERGENCY MEDICINE
Payer: MEDICARE

## 2019-03-14 ENCOUNTER — APPOINTMENT (OUTPATIENT)
Dept: GENERAL RADIOLOGY | Age: 59
End: 2019-03-14
Payer: MEDICARE

## 2019-03-14 DIAGNOSIS — R07.9 CHEST PAIN, UNSPECIFIED TYPE: Primary | ICD-10-CM

## 2019-03-14 LAB
ABSOLUTE EOS #: 0.2 K/UL (ref 0–0.44)
ABSOLUTE IMMATURE GRANULOCYTE: 0.03 K/UL (ref 0–0.3)
ABSOLUTE LYMPH #: 1.2 K/UL (ref 1.1–3.7)
ABSOLUTE MONO #: 0.74 K/UL (ref 0.1–1.2)
ANION GAP SERPL CALCULATED.3IONS-SCNC: 16 MMOL/L (ref 9–17)
BASOPHILS # BLD: 1 % (ref 0–2)
BASOPHILS ABSOLUTE: 0.03 K/UL (ref 0–0.2)
BUN BLDV-MCNC: 26 MG/DL (ref 8–23)
BUN/CREAT BLD: ABNORMAL (ref 9–20)
CALCIUM SERPL-MCNC: 9.6 MG/DL (ref 8.6–10.4)
CHLORIDE BLD-SCNC: 93 MMOL/L (ref 98–107)
CO2: 25 MMOL/L (ref 20–31)
CREAT SERPL-MCNC: 3.91 MG/DL (ref 0.7–1.2)
D-DIMER QUANTITATIVE: 1.1 MG/L FEU
DIFFERENTIAL TYPE: ABNORMAL
EOSINOPHILS RELATIVE PERCENT: 3 % (ref 1–4)
GFR AFRICAN AMERICAN: 19 ML/MIN
GFR NON-AFRICAN AMERICAN: 16 ML/MIN
GFR SERPL CREATININE-BSD FRML MDRD: ABNORMAL ML/MIN/{1.73_M2}
GFR SERPL CREATININE-BSD FRML MDRD: ABNORMAL ML/MIN/{1.73_M2}
GLUCOSE BLD-MCNC: 188 MG/DL (ref 70–99)
HCT VFR BLD CALC: 36.7 % (ref 40.7–50.3)
HEMOGLOBIN: 12 G/DL (ref 13–17)
IMMATURE GRANULOCYTES: 1 %
LYMPHOCYTES # BLD: 20 % (ref 24–43)
MCH RBC QN AUTO: 29.1 PG (ref 25.2–33.5)
MCHC RBC AUTO-ENTMCNC: 32.7 G/DL (ref 28.4–34.8)
MCV RBC AUTO: 88.9 FL (ref 82.6–102.9)
MONOCYTES # BLD: 13 % (ref 3–12)
NRBC AUTOMATED: 0 PER 100 WBC
PDW BLD-RTO: 13.9 % (ref 11.8–14.4)
PLATELET # BLD: ABNORMAL K/UL (ref 138–453)
PLATELET ESTIMATE: ABNORMAL
PLATELET, FLUORESCENCE: 211 K/UL (ref 138–453)
PLATELET, IMMATURE FRACTION: 2 % (ref 1.1–10.3)
PMV BLD AUTO: ABNORMAL FL (ref 8.1–13.5)
POTASSIUM SERPL-SCNC: 4.1 MMOL/L (ref 3.7–5.3)
RBC # BLD: 4.13 M/UL (ref 4.21–5.77)
RBC # BLD: ABNORMAL 10*6/UL
SEG NEUTROPHILS: 63 % (ref 36–65)
SEGMENTED NEUTROPHILS ABSOLUTE COUNT: 3.7 K/UL (ref 1.5–8.1)
SODIUM BLD-SCNC: 134 MMOL/L (ref 135–144)
TROPONIN INTERP: ABNORMAL
TROPONIN T: ABNORMAL NG/ML
TROPONIN, HIGH SENSITIVITY: 74 NG/L (ref 0–22)
WBC # BLD: 5.9 K/UL (ref 3.5–11.3)
WBC # BLD: ABNORMAL 10*3/UL

## 2019-03-14 PROCEDURE — 85025 COMPLETE CBC W/AUTO DIFF WBC: CPT

## 2019-03-14 PROCEDURE — 6360000004 HC RX CONTRAST MEDICATION: Performed by: EMERGENCY MEDICINE

## 2019-03-14 PROCEDURE — 84484 ASSAY OF TROPONIN QUANT: CPT

## 2019-03-14 PROCEDURE — 71260 CT THORAX DX C+: CPT

## 2019-03-14 PROCEDURE — 93005 ELECTROCARDIOGRAM TRACING: CPT

## 2019-03-14 PROCEDURE — 80048 BASIC METABOLIC PNL TOTAL CA: CPT

## 2019-03-14 PROCEDURE — 85379 FIBRIN DEGRADATION QUANT: CPT

## 2019-03-14 PROCEDURE — 71046 X-RAY EXAM CHEST 2 VIEWS: CPT

## 2019-03-14 PROCEDURE — 85055 RETICULATED PLATELET ASSAY: CPT

## 2019-03-14 PROCEDURE — 99285 EMERGENCY DEPT VISIT HI MDM: CPT

## 2019-03-14 RX ADMIN — IOVERSOL 75 ML: 741 INJECTION INTRA-ARTERIAL; INTRAVENOUS at 23:22

## 2019-03-14 ASSESSMENT — ENCOUNTER SYMPTOMS
GASTROINTESTINAL NEGATIVE: 1
EYES NEGATIVE: 1
SHORTNESS OF BREATH: 1
ALLERGIC/IMMUNOLOGIC NEGATIVE: 1

## 2019-03-14 ASSESSMENT — PAIN DESCRIPTION - LOCATION: LOCATION: CHEST

## 2019-03-14 ASSESSMENT — PAIN SCALES - GENERAL: PAINLEVEL_OUTOF10: 8

## 2019-03-14 ASSESSMENT — PAIN DESCRIPTION - ORIENTATION: ORIENTATION: LEFT

## 2019-03-14 ASSESSMENT — PAIN DESCRIPTION - PAIN TYPE: TYPE: ACUTE PAIN

## 2019-03-14 ASSESSMENT — PAIN DESCRIPTION - DESCRIPTORS: DESCRIPTORS: PRESSURE

## 2019-03-15 ENCOUNTER — APPOINTMENT (OUTPATIENT)
Dept: NUCLEAR MEDICINE | Age: 59
End: 2019-03-15
Payer: MEDICARE

## 2019-03-15 VITALS
BODY MASS INDEX: 35.65 KG/M2 | WEIGHT: 249 LBS | OXYGEN SATURATION: 99 % | RESPIRATION RATE: 16 BRPM | HEIGHT: 70 IN | HEART RATE: 72 BPM | SYSTOLIC BLOOD PRESSURE: 138 MMHG | DIASTOLIC BLOOD PRESSURE: 85 MMHG | TEMPERATURE: 97.2 F

## 2019-03-15 PROBLEM — R07.9 CHEST PAIN: Status: ACTIVE | Noted: 2019-03-15

## 2019-03-15 LAB
EKG ATRIAL RATE: 98 BPM
EKG P AXIS: 53 DEGREES
EKG P-R INTERVAL: 190 MS
EKG Q-T INTERVAL: 352 MS
EKG QRS DURATION: 92 MS
EKG QTC CALCULATION (BAZETT): 449 MS
EKG R AXIS: -79 DEGREES
EKG T AXIS: 54 DEGREES
EKG VENTRICULAR RATE: 98 BPM
LV EF: 36 %
LVEF MODALITY: NORMAL
TROPONIN INTERP: ABNORMAL
TROPONIN INTERP: ABNORMAL
TROPONIN T: ABNORMAL NG/ML
TROPONIN T: ABNORMAL NG/ML
TROPONIN, HIGH SENSITIVITY: 68 NG/L (ref 0–22)
TROPONIN, HIGH SENSITIVITY: 69 NG/L (ref 0–22)

## 2019-03-15 PROCEDURE — G0378 HOSPITAL OBSERVATION PER HR: HCPCS

## 2019-03-15 PROCEDURE — 6360000002 HC RX W HCPCS: Performed by: INTERNAL MEDICINE

## 2019-03-15 PROCEDURE — 93017 CV STRESS TEST TRACING ONLY: CPT

## 2019-03-15 PROCEDURE — 6370000000 HC RX 637 (ALT 250 FOR IP): Performed by: STUDENT IN AN ORGANIZED HEALTH CARE EDUCATION/TRAINING PROGRAM

## 2019-03-15 PROCEDURE — 2580000003 HC RX 258: Performed by: INTERNAL MEDICINE

## 2019-03-15 PROCEDURE — A9500 TC99M SESTAMIBI: HCPCS | Performed by: INTERNAL MEDICINE

## 2019-03-15 PROCEDURE — 78452 HT MUSCLE IMAGE SPECT MULT: CPT

## 2019-03-15 PROCEDURE — 3430000000 HC RX DIAGNOSTIC RADIOPHARMACEUTICAL: Performed by: INTERNAL MEDICINE

## 2019-03-15 PROCEDURE — 36415 COLL VENOUS BLD VENIPUNCTURE: CPT

## 2019-03-15 PROCEDURE — 84484 ASSAY OF TROPONIN QUANT: CPT

## 2019-03-15 RX ORDER — SODIUM CHLORIDE 0.9 % (FLUSH) 0.9 %
10 SYRINGE (ML) INJECTION PRN
Status: DISCONTINUED | OUTPATIENT
Start: 2019-03-15 | End: 2019-03-15 | Stop reason: HOSPADM

## 2019-03-15 RX ORDER — AMINOPHYLLINE DIHYDRATE 25 MG/ML
100 INJECTION, SOLUTION INTRAVENOUS
Status: DISCONTINUED | OUTPATIENT
Start: 2019-03-15 | End: 2019-03-15

## 2019-03-15 RX ORDER — ACETAMINOPHEN 325 MG/1
650 TABLET ORAL EVERY 4 HOURS PRN
Status: DISCONTINUED | OUTPATIENT
Start: 2019-03-15 | End: 2019-03-15 | Stop reason: HOSPADM

## 2019-03-15 RX ORDER — SODIUM CHLORIDE 0.9 % (FLUSH) 0.9 %
10 SYRINGE (ML) INJECTION PRN
Status: DISCONTINUED | OUTPATIENT
Start: 2019-03-15 | End: 2019-03-15

## 2019-03-15 RX ORDER — SODIUM CHLORIDE 0.9 % (FLUSH) 0.9 %
10 SYRINGE (ML) INJECTION EVERY 12 HOURS SCHEDULED
Status: DISCONTINUED | OUTPATIENT
Start: 2019-03-15 | End: 2019-03-15 | Stop reason: HOSPADM

## 2019-03-15 RX ORDER — FUROSEMIDE 40 MG/1
40 TABLET ORAL 2 TIMES DAILY
Status: DISCONTINUED | OUTPATIENT
Start: 2019-03-15 | End: 2019-03-15 | Stop reason: HOSPADM

## 2019-03-15 RX ORDER — NITROGLYCERIN 0.4 MG/1
0.4 TABLET SUBLINGUAL EVERY 5 MIN PRN
Status: DISCONTINUED | OUTPATIENT
Start: 2019-03-15 | End: 2019-03-15

## 2019-03-15 RX ORDER — CARVEDILOL 3.12 MG/1
3.12 TABLET ORAL 2 TIMES DAILY WITH MEALS
Status: DISCONTINUED | OUTPATIENT
Start: 2019-03-15 | End: 2019-03-15 | Stop reason: HOSPADM

## 2019-03-15 RX ORDER — SODIUM CHLORIDE 9 MG/ML
INJECTION, SOLUTION INTRAVENOUS ONCE
Status: DISCONTINUED | OUTPATIENT
Start: 2019-03-15 | End: 2019-03-15

## 2019-03-15 RX ORDER — METOPROLOL TARTRATE 5 MG/5ML
2.5 INJECTION INTRAVENOUS PRN
Status: DISCONTINUED | OUTPATIENT
Start: 2019-03-15 | End: 2019-03-15

## 2019-03-15 RX ORDER — NIFEDIPINE 30 MG/1
30 TABLET, EXTENDED RELEASE ORAL 2 TIMES DAILY
Status: DISCONTINUED | OUTPATIENT
Start: 2019-03-15 | End: 2019-03-15 | Stop reason: HOSPADM

## 2019-03-15 RX ORDER — ASPIRIN 81 MG/1
81 TABLET, CHEWABLE ORAL DAILY
Status: DISCONTINUED | OUTPATIENT
Start: 2019-03-15 | End: 2019-03-15 | Stop reason: HOSPADM

## 2019-03-15 RX ADMIN — NIFEDIPINE 30 MG: 30 TABLET, FILM COATED, EXTENDED RELEASE ORAL at 14:10

## 2019-03-15 RX ADMIN — REGADENOSON 0.4 MG: 0.08 INJECTION, SOLUTION INTRAVENOUS at 11:13

## 2019-03-15 RX ADMIN — Medication 10 ML: at 11:06

## 2019-03-15 RX ADMIN — TETRAKIS(2-METHOXYISOBUTYLISOCYANIDE)COPPER(I) TETRAFLUOROBORATE 16.3 MILLICURIE: 1 INJECTION, POWDER, LYOPHILIZED, FOR SOLUTION INTRAVENOUS at 09:52

## 2019-03-15 RX ADMIN — ASPIRIN 81 MG: 81 TABLET, CHEWABLE ORAL at 14:10

## 2019-03-15 RX ADMIN — LINAGLIPTIN 5 MG: 5 TABLET, FILM COATED ORAL at 14:10

## 2019-03-15 RX ADMIN — SODIUM CHLORIDE, PRESERVATIVE FREE 10 ML: 5 INJECTION INTRAVENOUS at 11:13

## 2019-03-15 RX ADMIN — SODIUM CHLORIDE, PRESERVATIVE FREE 10 ML: 5 INJECTION INTRAVENOUS at 09:52

## 2019-03-15 RX ADMIN — TETRAKIS(2-METHOXYISOBUTYLISOCYANIDE)COPPER(I) TETRAFLUOROBORATE 39.5 MILLICURIE: 1 INJECTION, POWDER, LYOPHILIZED, FOR SOLUTION INTRAVENOUS at 11:13

## 2019-03-15 ASSESSMENT — PAIN DESCRIPTION - FREQUENCY: FREQUENCY: CONTINUOUS

## 2019-03-15 ASSESSMENT — PAIN DESCRIPTION - PROGRESSION
CLINICAL_PROGRESSION: NOT CHANGED

## 2019-03-15 ASSESSMENT — PAIN DESCRIPTION - PAIN TYPE: TYPE: ACUTE PAIN

## 2019-03-15 ASSESSMENT — PAIN DESCRIPTION - ORIENTATION: ORIENTATION: LEFT

## 2019-03-15 ASSESSMENT — PAIN DESCRIPTION - ONSET: ONSET: ON-GOING

## 2019-03-15 ASSESSMENT — PAIN DESCRIPTION - DESCRIPTORS: DESCRIPTORS: CONSTANT;ACHING

## 2019-03-15 ASSESSMENT — PAIN SCALES - GENERAL: PAINLEVEL_OUTOF10: 5

## 2019-03-15 ASSESSMENT — PAIN DESCRIPTION - LOCATION: LOCATION: CHEST

## 2019-03-31 ENCOUNTER — APPOINTMENT (OUTPATIENT)
Dept: GENERAL RADIOLOGY | Age: 59
DRG: 246 | End: 2019-03-31
Payer: MEDICARE

## 2019-03-31 ENCOUNTER — HOSPITAL ENCOUNTER (INPATIENT)
Age: 59
LOS: 1 days | Discharge: HOME | DRG: 246 | End: 2019-04-02
Attending: EMERGENCY MEDICINE | Admitting: EMERGENCY MEDICINE
Payer: MEDICARE

## 2019-03-31 DIAGNOSIS — R07.9 CHEST PAIN, UNSPECIFIED TYPE: Primary | ICD-10-CM

## 2019-03-31 LAB
ANION GAP SERPL CALCULATED.3IONS-SCNC: 16 MMOL/L (ref 9–17)
BNP INTERPRETATION: ABNORMAL
BUN BLDV-MCNC: 31 MG/DL (ref 8–23)
BUN/CREAT BLD: ABNORMAL (ref 9–20)
CALCIUM SERPL-MCNC: 9.3 MG/DL (ref 8.6–10.4)
CHLORIDE BLD-SCNC: 96 MMOL/L (ref 98–107)
CO2: 25 MMOL/L (ref 20–31)
CREAT SERPL-MCNC: 4.62 MG/DL (ref 0.7–1.2)
GFR AFRICAN AMERICAN: 16 ML/MIN
GFR NON-AFRICAN AMERICAN: 13 ML/MIN
GFR SERPL CREATININE-BSD FRML MDRD: ABNORMAL ML/MIN/{1.73_M2}
GFR SERPL CREATININE-BSD FRML MDRD: ABNORMAL ML/MIN/{1.73_M2}
GLUCOSE BLD-MCNC: 131 MG/DL (ref 70–99)
HCT VFR BLD CALC: 35.8 % (ref 40.7–50.3)
HEMOGLOBIN: 12 G/DL (ref 13–17)
MCH RBC QN AUTO: 29.4 PG (ref 25.2–33.5)
MCHC RBC AUTO-ENTMCNC: 33.5 G/DL (ref 28.4–34.8)
MCV RBC AUTO: 87.7 FL (ref 82.6–102.9)
NRBC AUTOMATED: 0 PER 100 WBC
PDW BLD-RTO: 13.5 % (ref 11.8–14.4)
PLATELET # BLD: 183 K/UL (ref 138–453)
PMV BLD AUTO: 9.5 FL (ref 8.1–13.5)
POTASSIUM SERPL-SCNC: 3.8 MMOL/L (ref 3.7–5.3)
PRO-BNP: 636 PG/ML
RBC # BLD: 4.08 M/UL (ref 4.21–5.77)
SODIUM BLD-SCNC: 137 MMOL/L (ref 135–144)
TROPONIN INTERP: ABNORMAL
TROPONIN INTERP: ABNORMAL
TROPONIN T: ABNORMAL NG/ML
TROPONIN T: ABNORMAL NG/ML
TROPONIN, HIGH SENSITIVITY: 68 NG/L (ref 0–22)
TROPONIN, HIGH SENSITIVITY: 75 NG/L (ref 0–22)
WBC # BLD: 5.4 K/UL (ref 3.5–11.3)

## 2019-03-31 PROCEDURE — 6370000000 HC RX 637 (ALT 250 FOR IP): Performed by: EMERGENCY MEDICINE

## 2019-03-31 PROCEDURE — 80048 BASIC METABOLIC PNL TOTAL CA: CPT

## 2019-03-31 PROCEDURE — 6360000002 HC RX W HCPCS: Performed by: STUDENT IN AN ORGANIZED HEALTH CARE EDUCATION/TRAINING PROGRAM

## 2019-03-31 PROCEDURE — 6370000000 HC RX 637 (ALT 250 FOR IP): Performed by: STUDENT IN AN ORGANIZED HEALTH CARE EDUCATION/TRAINING PROGRAM

## 2019-03-31 PROCEDURE — 93005 ELECTROCARDIOGRAM TRACING: CPT

## 2019-03-31 PROCEDURE — G0378 HOSPITAL OBSERVATION PER HR: HCPCS

## 2019-03-31 PROCEDURE — 96374 THER/PROPH/DIAG INJ IV PUSH: CPT

## 2019-03-31 PROCEDURE — 85027 COMPLETE CBC AUTOMATED: CPT

## 2019-03-31 PROCEDURE — 71046 X-RAY EXAM CHEST 2 VIEWS: CPT

## 2019-03-31 PROCEDURE — 83880 ASSAY OF NATRIURETIC PEPTIDE: CPT

## 2019-03-31 PROCEDURE — 99285 EMERGENCY DEPT VISIT HI MDM: CPT

## 2019-03-31 PROCEDURE — 84484 ASSAY OF TROPONIN QUANT: CPT

## 2019-03-31 PROCEDURE — 2580000003 HC RX 258: Performed by: STUDENT IN AN ORGANIZED HEALTH CARE EDUCATION/TRAINING PROGRAM

## 2019-03-31 RX ORDER — ACETAMINOPHEN 325 MG/1
650 TABLET ORAL ONCE
Status: COMPLETED | OUTPATIENT
Start: 2019-03-31 | End: 2019-03-31

## 2019-03-31 RX ORDER — SODIUM CHLORIDE 0.9 % (FLUSH) 0.9 %
10 SYRINGE (ML) INJECTION PRN
Status: DISCONTINUED | OUTPATIENT
Start: 2019-03-31 | End: 2019-04-02 | Stop reason: HOSPADM

## 2019-03-31 RX ORDER — ASPIRIN 81 MG/1
81 TABLET, CHEWABLE ORAL DAILY
Status: DISCONTINUED | OUTPATIENT
Start: 2019-03-31 | End: 2019-04-02 | Stop reason: HOSPADM

## 2019-03-31 RX ORDER — SODIUM CHLORIDE 0.9 % (FLUSH) 0.9 %
10 SYRINGE (ML) INJECTION EVERY 12 HOURS SCHEDULED
Status: DISCONTINUED | OUTPATIENT
Start: 2019-03-31 | End: 2019-04-02 | Stop reason: HOSPADM

## 2019-03-31 RX ORDER — ASPIRIN 81 MG/1
324 TABLET, CHEWABLE ORAL ONCE
Status: COMPLETED | OUTPATIENT
Start: 2019-03-31 | End: 2019-03-31

## 2019-03-31 RX ORDER — KETOROLAC TROMETHAMINE 15 MG/ML
15 INJECTION, SOLUTION INTRAMUSCULAR; INTRAVENOUS ONCE
Status: COMPLETED | OUTPATIENT
Start: 2019-03-31 | End: 2019-03-31

## 2019-03-31 RX ORDER — CARVEDILOL 3.12 MG/1
3.12 TABLET ORAL 2 TIMES DAILY WITH MEALS
Status: DISCONTINUED | OUTPATIENT
Start: 2019-03-31 | End: 2019-04-02 | Stop reason: HOSPADM

## 2019-03-31 RX ORDER — NITROGLYCERIN 0.4 MG/1
0.4 TABLET SUBLINGUAL ONCE
Status: COMPLETED | OUTPATIENT
Start: 2019-03-31 | End: 2019-03-31

## 2019-03-31 RX ORDER — FUROSEMIDE 20 MG/1
40 TABLET ORAL 2 TIMES DAILY
Status: DISCONTINUED | OUTPATIENT
Start: 2019-04-01 | End: 2019-04-02 | Stop reason: HOSPADM

## 2019-03-31 RX ORDER — ACETAMINOPHEN 325 MG/1
650 TABLET ORAL EVERY 4 HOURS PRN
Status: DISCONTINUED | OUTPATIENT
Start: 2019-03-31 | End: 2019-04-02 | Stop reason: HOSPADM

## 2019-03-31 RX ORDER — NIFEDIPINE 30 MG/1
30 TABLET, EXTENDED RELEASE ORAL 2 TIMES DAILY
Status: DISCONTINUED | OUTPATIENT
Start: 2019-03-31 | End: 2019-04-02 | Stop reason: HOSPADM

## 2019-03-31 RX ADMIN — ACETAMINOPHEN 650 MG: 325 TABLET ORAL at 15:58

## 2019-03-31 RX ADMIN — NIFEDIPINE 30 MG: 30 TABLET, FILM COATED, EXTENDED RELEASE ORAL at 20:10

## 2019-03-31 RX ADMIN — Medication 10 ML: at 20:11

## 2019-03-31 RX ADMIN — KETOROLAC TROMETHAMINE 15 MG: 15 INJECTION, SOLUTION INTRAMUSCULAR; INTRAVENOUS at 18:09

## 2019-03-31 RX ADMIN — ASPIRIN 81 MG 324 MG: 81 TABLET ORAL at 17:15

## 2019-03-31 RX ADMIN — CARVEDILOL 3.12 MG: 3.12 TABLET, FILM COATED ORAL at 20:10

## 2019-03-31 RX ADMIN — NITROGLYCERIN 0.4 MG: 0.4 TABLET SUBLINGUAL at 17:15

## 2019-03-31 ASSESSMENT — PAIN DESCRIPTION - ORIENTATION: ORIENTATION: LEFT

## 2019-03-31 ASSESSMENT — ENCOUNTER SYMPTOMS
SORE THROAT: 0
VOMITING: 0
WHEEZING: 0
ABDOMINAL PAIN: 0
SHORTNESS OF BREATH: 0
BACK PAIN: 0
SINUS PRESSURE: 0
NAUSEA: 0
CHEST TIGHTNESS: 0
ABDOMINAL DISTENTION: 0
SINUS PAIN: 0
COLOR CHANGE: 0
DIARRHEA: 0
COUGH: 1

## 2019-03-31 ASSESSMENT — PAIN DESCRIPTION - LOCATION: LOCATION: CHEST

## 2019-03-31 ASSESSMENT — PAIN SCALES - GENERAL
PAINLEVEL_OUTOF10: 2
PAINLEVEL_OUTOF10: 2

## 2019-03-31 NOTE — ED PROVIDER NOTES
[facility]     Emergency Department     Faculty Note/ Attestation      Pt Name: Nishi Ro                                       MRN: [id number]  [place] 1960  Date of evaluation: 3/31/2019    Patients PCP:    DINORAH WEISS [address] [place] [address] & [facility]  I performed a history and physical examination of the patient and discussed management with the resident. I reviewed the residents note and agree with the documented findings and plan of care. Any areas of disagreement are noted on the chart. I was personally present for the key portions of any procedures. I have documented in the chart those procedures where I was not present during the key portions. I have reviewed the emergency nurses triage note. I agree with the chief complaint, past medical history, past surgical history, allergies, medications, social and family history as documented unless otherwise noted below. For Physician Assistant/ Nurse Practitioner cases/documentation I have personally evaluated this patient and have completed at least one if not all key elements of the E/M (history, physical exam, and MDM). Additional findings are as noted. Initial Screens:             Vitals:    Vitals:    03/31/19 1505   Pulse: 94   Resp: 19   Temp: 97.7 °F (36.5 °C)   TempSrc: Oral   SpO2: 100%   Weight: 250 lb (113.4 kg)       CHIEF COMPLAINT       Chief Complaint   Patient presents with    Chest Pain    Shortness of Breath       The pt has been having chest pain ever since being seen and having his fistula in place. The pt notes a pressure like sensation in the mid chest non radiating. Notes SOB feels like he cannot get enough breath.   The pt had a EF in NOvember of 2018 of 48% and just on last visit it had dropped to 36%    DIAGNOSTIC RESULTS     RADIOLOGY:   No orders to display     EKG Interpretation    Interpreted by emergency department physician    Rhythm: normal sinus   Rate: normal  Axis: left  Ectopy: none  Conduction: left anterior fasciclar block and 1st degree AV block  ST Segments: no acute change  T Waves: no acute change  Q Waves: PRWP    EKG  Impression: non-specific EKG      LABS:  Labs Reviewed - No data to display    EMERGENCY DEPARTMENT COURSE:     -------------------------   , Temp: 97.7 °F (36.5 °C), Pulse: 94, Resp: 19  Physical Exam   Constitutional: He is oriented to person, place, and time. He appears well-developed and well-nourished. HENT:   Head: Normocephalic and atraumatic. Right Ear: External ear normal.   Left Ear: External ear normal.   Eyes: Right eye exhibits no discharge. Left eye exhibits no discharge. No scleral icterus. Neck: Normal range of motion. No tracheal deviation present. Pulmonary/Chest: Effort normal. No stridor. No respiratory distress. Musculoskeletal: Normal range of motion. Neurological: He is alert and oriented to person, place, and time. Coordination normal.   Skin: Skin is warm and dry. He is not diaphoretic. Psychiatric: He has a normal mood and affect. His behavior is normal.         Comments  The patient presents with complaint of chest pain. Concerns that need urgent evaluation include:  Acute Coronary Syndrome: Aortic Dissection:  Pulmonary embolism:  Pneumothorax  Pneumonia  Esophogeal Rupture:  Pericardial Tamponade     Based on history and physical exam the patient is unlikely to have The pt has no signs of rupture has no recent procedures no severe hypertension or ripping pain that started suddenly consistent with dissection. The pt has no signs of colapsed lung. The pt will need X ray for pneumonia due to cough, and will need ACS work up . PE less likely given no hypoxia and recent CT showed no PE. The pt will need cardiac US as well. Daigle DO, RDMS.   Attending Emergency Physician          Jaelyn Hannah DO  03/31/19 1135

## 2019-03-31 NOTE — ED NOTES
Pt to ED with c/o left sided CP, intermittent with SOB for two weeks. Pt states he was admitted a few weeks ago for chest pain, states \"I'm not leaving until you figure out why I am having pain. \"  Pt walks with steady gait, able to speak in full sentences.   Pt placed on full cardiac monitor upon arrival.  Pt is a dialysis patient and receives his treatments Tues, Thurs and Sat through a lizz in right upper chest.              Jerome Bhat, PEGGY  03/31/19 5213

## 2019-03-31 NOTE — ED PROVIDER NOTES
[facility]  Emergency Department Encounter  Emergency Medicine Resident     Pt Name: Stefanie Vang  MRN: [id number]  [place] 1960  Date of evaluation: 3/31/19  PCP:  Rosales Vizcaino [facility]       Chief Complaint   Patient presents with    Chest Pain    Shortness of Breath       HISTORY OF PRESENT ILLNESS  (Location/Symptom, Timing/Onset, Context/Setting, Quality, Duration, Modifying Factors, Severity.)    Stefanie Vang is a 59 y.o. male who presents with left upper chest pain. Patient states that he has had this pain ongoing for the past day and half. Patient notes no palliative or provoking factors. Patient states the chest pain is having now is similar to the chest pain that he was having the last time he was in the emergency room and admitted the observation unit. At that time the patient received a cardiac stress test which did not demonstrate any perfusion defects but did demonstrate an ejection fraction of 36%. Patient states that he has been taking all of his prescribed medication as prescribed. Chest pain is located in the left upper chest.  Patient denies it is worse with a deep breath or cough. States that there is occasional chest wall tenderness with deep palpation to an unrelated area. Notes intermittent cough that is dry without hemoptysis. Denies any nausea or vomiting. Denies any back pain. Denies any epigastric burning. Denies any lightheadedness or dizziness. Denies any syncope. The patient is a patient of Dr. Mitch Delgado. Patient called Dr. Bossman Friedman office today who told patient come to the emergency room    PAST MEDICAL / SURGICAL / SOCIAL / FAMILY HISTORY    has a past medical history of Bowel obstruction ([address] CAD (coronary artery disease), CHF (congestive heart failure) ([address] Chronic kidney disease, Diabetes mellitus ([address] Hemodialysis patient ([address] Hyperlipidemia, Hypertension, MI, old, and Sleep apnea.      has a past surgical history that includes Foot surgery (Left, 2005); Colonoscopy; Cystoscopy (11/17/2017); Prostate biopsy (11/17/2017); Prostate biopsy (N/A, 11/17/2017); Abdomen surgery (2013); Prostate Biopsy (02/16/2018); pr genital surg proc,male unlisted (N/A, 2/16/2018); Tonsillectomy (1968); Vasectomy (1983); Middle ear surgery (1966); Nose surgery (1968); Mouth surgery (2007); AV fistula creation (Left, 02/20/2019); Dialysis fistula creation (Left, 2/20/2019); and other surgical history (Left, 03/06/2019).     Social History     Socioeconomic History    Marital status: Single     Spouse name: Not on file    Number of children: Not on file    Years of education: Not on file    Highest education level: Not on file   Occupational History    Not on file   Social Needs    Financial resource strain: Not on file    Food insecurity:     Worry: Not on file     Inability: Not on file    Transportation needs:     Medical: Not on file     Non-medical: Not on file   Tobacco Use    Smoking status: Never Smoker    Smokeless tobacco: Never Used   Substance and Sexual Activity    Alcohol use: No    Drug use: No    Sexual activity: Not on file   Lifestyle    Physical activity:     Days per week: Not on file     Minutes per session: Not on file    Stress: Not on file   Relationships    Social connections:     Talks on phone: Not on file     Gets together: Not on file     Attends Restorationist service: Not on file     Active member of club or organization: Not on file     Attends meetings of clubs or organizations: Not on file     Relationship status: Not on file    Intimate partner violence:     Fear of current or ex partner: Not on file     Emotionally abused: Not on file     Physically abused: Not on file     Forced sexual activity: Not on file   Other Topics Concern    Not on file   Social History Narrative    Not on file       Family History   Problem Relation Age of Onset    Heart Disease Mother         CHF    Early Death Mother  High Blood Pressure Brother     Diabetes Brother     Asthma Brother     High Blood Pressure Brother     Diabetes Brother     High Blood Pressure Brother     Diabetes Brother        Allergies:    Chocolate and Lisinopril    Home Medications:  Prior to Admission medications    Medication Sig Start Date End Date Taking? Authorizing Provider   NIFEdipine (ADALAT CC) 30 MG extended release tablet Take 30 mg by mouth 2 times daily    Historical Provider, MD   aspirin 81 MG chewable tablet Take 1 tablet by mouth daily  Patient taking differently: Take 81 mg by mouth daily LAST DOSE 02/14/2019 11/30/18   Aquiles Raza MD   carvedilol (COREG) 3.125 MG tablet Take 1 tablet by mouth 2 times daily (with meals) 11/29/18   Aquiles Raza MD   SITagliptin (JANUVIA) 25 MG tablet Take 1 tablet by mouth daily 11/29/18   Aquiles Raza MD   furosemide (LASIX) 40 MG tablet Take 40 mg by mouth 2 times daily     Historical Provider, MD       REVIEW OF SYSTEMS    (2-9 systems for level 4, 10 or more for level 5)    Review of Systems   Constitutional: Negative for chills, fatigue and fever. HENT: Negative for congestion, sinus pressure, sinus pain and sore throat. Respiratory: Positive for cough. Negative for chest tightness, shortness of breath and wheezing. Cardiovascular: Positive for chest pain. Negative for palpitations. Gastrointestinal: Negative for abdominal distention, abdominal pain, diarrhea, nausea and vomiting. Genitourinary: Negative for dysuria, frequency and urgency. Musculoskeletal: Negative for back pain. Skin: Negative for color change and pallor. Neurological: Negative for dizziness, syncope, weakness, light-headedness and headaches. Psychiatric/Behavioral: Negative for confusion. The patient is not nervous/anxious. All other systems reviewed and are negative.       PHYSICAL EXAM   (up to 7 for level 4, 8 or more for level 5)    INITIAL VITALS:   ED Triage Vitals   BP Temp Temp Source Pulse Resp SpO2 Height Weight   03/31/19 1510 03/31/19 1505 03/31/19 1505 03/31/19 1505 03/31/19 1505 03/31/19 1505 -- 03/31/19 1505   (!) 159/122 97.7 °F (36.5 °C) Oral 94 19 100 %  250 lb (113.4 kg)       Physical Exam   Constitutional: He is oriented to person, place, and time. Vital signs are normal. He appears well-developed and well-nourished. No distress. HENT:   Head: Normocephalic and atraumatic. Eyes: Pupils are equal, round, and reactive to light. Neck: No JVD present. No tracheal deviation present. Cardiovascular: Normal rate, regular rhythm, normal heart sounds and intact distal pulses. No murmur heard. Pulses:       Carotid pulses are 2+ on the right side, and 2+ on the left side. Radial pulses are 2+ on the right side, and 2+ on the left side. Pulmonary/Chest: Effort normal and breath sounds normal. No respiratory distress. He has no decreased breath sounds. He exhibits tenderness. Tenderness to palpation of the indicated areas       Abdominal: Soft. Bowel sounds are normal. He exhibits no distension. There is no tenderness. Musculoskeletal: Normal range of motion. He exhibits no edema. Right lower leg: He exhibits no edema. Left lower leg: He exhibits no edema. Neurological: He is alert and oriented to person, place, and time. GCS eye subscore is 4. GCS verbal subscore is 5. GCS motor subscore is 6. Skin: Skin is warm and dry. Capillary refill takes less than 2 seconds. Nursing note and vitals reviewed.       DIFFERENTIAL  DIAGNOSIS   PLAN (LABS / IMAGING / EKG):  Orders Placed This Encounter   Procedures    XR CHEST STANDARD (2 VW)    CBC    Basic Metabolic Panel w/ Reflex to MG    Troponin    Brain Natriuretic Peptide    Troponin    Inpatient consult to Cardiology    EKG 12 Lead    PATIENT STATUS (FROM ED OR OR/PROCEDURAL) Observation       MEDICATIONS ORDERED:  Orders Placed This Encounter   Medications    acetaminophen (TYLENOL) tablet 650 mg    aspirin chewable tablet 324 mg    nitroGLYCERIN (NITROSTAT) SL tablet 0.4 mg    ketorolac (TORADOL) injection 15 mg       DDX:   Emergent: ACS/NSTEMI/STEMI/angina, arrhythmia, trauma, aortic dissection, PE, PNA, pneumothroax, esophageal rupture, tamponade, Cocaine use  Nonemergent: pneumonia, pericarditis, GERD, MSK, Endocarditis, anxiety     Evaluate for: diaphoresis, present chest pain, tachypnea, BP both arms, heart sounds, JVD, tender chest wall, wheezing      DIAGNOSTIC RESULTS / EMERGENCYDEPARTMENT COURSE / MDM   LABS:  Labs Reviewed   CBC - Abnormal; Notable for the following components:       Result Value    RBC 4.08 (*)     Hemoglobin 12.0 (*)     Hematocrit 35.8 (*)     All other components within normal limits   BASIC METABOLIC PANEL W/ REFLEX TO MG FOR LOW K - Abnormal; Notable for the following components:    Glucose 131 (*)     BUN 31 (*)     CREATININE 4.62 (*)     Chloride 96 (*)     GFR Non- 13 (*)     GFR  16 (*)     All other components within normal limits   TROPONIN - Abnormal; Notable for the following components:    Troponin, High Sensitivity 75 (*)     All other components within normal limits   BRAIN NATRIURETIC PEPTIDE - Abnormal; Notable for the following components:    Pro- (*)     All other components within normal limits   TROPONIN - Abnormal; Notable for the following components:    Troponin, High Sensitivity 68 (*)     All other components within normal limits       RADIOLOGY:  Xr Chest Standard (2 Vw)    Result Date: 3/31/2019  EXAMINATION: TWO VIEWS OF THE CHEST 3/31/2019 3:45 pm COMPARISON: 03/14/2019 HISTORY: ORDERING SYSTEM PROVIDED HISTORY: chest pain TECHNOLOGIST PROVIDED HISTORY: chest pain Initial encounter. Acute illness. FINDINGS: The lungs are clear. No pneumothorax or pleural effusion is seen.   Cardiac and mediastinal contours are normal.  Right-sided tunneled dialysis catheter tip remains at the superior cavoatrial junction. No acute osseous abnormality is seen. Stable examination. No acute cardiopulmonary abnormality. EKG    EKG Interpretation    Interpreted by emergency department physician at 1505    Rhythm: normal sinus   Rate: normal  Axis: left  Ectopy: none  Conduction: left anterior fasciclar block, first-degree AV block  ST Segments: no acute change  T Waves: non specific changes  Q Waves: none    Clinical Impression: Sinus rhythm with frequent PACs. First-degree AV block with MN interval at 226 ms. No ST segment changes,    All EKG's are interpreted by the Emergency Department Physician whoeither signs or Co-signs this chart in the absence of a cardiologist.    EMERGENCY DEPARTMENT COURSE:  ED Course as of Mar 31 1827   Sun Mar 31, 2019   1559 Pro-BNP(!): 636 [WK]      ED Course User Index  [WK] Ramana Craig DO       MDM  Number of Diagnoses or Management Options  Chest pain, unspecified type: new, needed workup  Diagnosis management comments: Patient has a history of similar chest pain and has been seen in the observation unit in the past. Patient of Dr. Patel Finley whose staff instructed patient to come to the emergency room tonight. Initial troponin of 75 which decreased to 68. ProBNP elevated at 636. Due to concern of persistent chest pain and noted decreased ejection fraction over the past few months without major changes in medication, recommended the patient come in for observation and to see cardiology in the morning. Cardiology fellow was paged who stated that cardiology see patient in the morning. Patient agreeable to this plan.        Amount and/or Complexity of Data Reviewed  Clinical lab tests: ordered and reviewed  Tests in the radiology section of CPT®: ordered and reviewed  Tests in the medicine section of CPT®: ordered and reviewed  Review and summarize past medical records: yes  Discuss the patient with other providers: yes  Independent visualization of images, tracings, or specimens: yes    Risk of Complications, Morbidity, and/or Mortality  Presenting problems: moderate  Diagnostic procedures: moderate  Management options: moderate    Patient Progress  Patient progress: stable      PROCEDURES:  none    CONSULTS:  IP CONSULT TO CARDIOLOGY    CRITICAL CARE:  Please see attending note    FINAL IMPRESSION     1. Chest pain, unspecified type          DISPOSITION / [address] Admitted 03/31/2019 05:29:05 PM      Evaluation and treatment course in the ED, and plan of care upon discharge was discussed in length with the patient. Patient had no further questions prior to being discharged and was instructed to return to the ED for new or worsening symptoms. Any changes to existing medications or new prescriptions were reviewed with patient and they expressed understanding of how to correctly take their medications and the possible side effects.     PATIENT REFERRED TO:  [id number] [facility],[address] [facility]  [address]  [place] [phone]            DISCHARGE MEDICATIONS:  New Prescriptions    No medications on file       Baljeet Carpio DO  Emergency Medicine Resident Physician, PGY-1    (Please note that portions of this note were completed with a voice recognition program.  Efforts were made to edit the dictations but occasionally words are mis-transcribed.)          Baljeet Carpio MD  03/31/19 [id number]

## 2019-04-01 ENCOUNTER — APPOINTMENT (OUTPATIENT)
Dept: CARDIAC CATH/INVASIVE PROCEDURES | Age: 59
DRG: 246 | End: 2019-04-01
Payer: MEDICARE

## 2019-04-01 PROBLEM — Z98.61 S/P PERCUTANEOUS TRANSLUMINAL CORONARY ANGIOPLASTY: Status: ACTIVE | Noted: 2019-04-01

## 2019-04-01 LAB
ACTIVATED CLOTTING TIME: 253 SEC (ref 79–149)
EKG ATRIAL RATE: 68 BPM
EKG ATRIAL RATE: 92 BPM
EKG P AXIS: 62 DEGREES
EKG P AXIS: 63 DEGREES
EKG P-R INTERVAL: 226 MS
EKG P-R INTERVAL: 240 MS
EKG Q-T INTERVAL: 384 MS
EKG Q-T INTERVAL: 496 MS
EKG QRS DURATION: 162 MS
EKG QRS DURATION: 94 MS
EKG QTC CALCULATION (BAZETT): 474 MS
EKG QTC CALCULATION (BAZETT): 527 MS
EKG R AXIS: -72 DEGREES
EKG R AXIS: -74 DEGREES
EKG T AXIS: 52 DEGREES
EKG T AXIS: 58 DEGREES
EKG VENTRICULAR RATE: 68 BPM
EKG VENTRICULAR RATE: 92 BPM
GLUCOSE BLD-MCNC: 200 MG/DL (ref 75–110)
LV EF: 45 %
LVEF MODALITY: NORMAL

## 2019-04-01 PROCEDURE — C1760 CLOSURE DEV, VASC: HCPCS

## 2019-04-01 PROCEDURE — 93005 ELECTROCARDIOGRAM TRACING: CPT

## 2019-04-01 PROCEDURE — C1874 STENT, COATED/COV W/DEL SYS: HCPCS

## 2019-04-01 PROCEDURE — 92928 PRQ TCAT PLMT NTRAC ST 1 LES: CPT | Performed by: INTERNAL MEDICINE

## 2019-04-01 PROCEDURE — C1894 INTRO/SHEATH, NON-LASER: HCPCS

## 2019-04-01 PROCEDURE — 7100000010 HC PHASE II RECOVERY - FIRST 15 MIN

## 2019-04-01 PROCEDURE — 2580000003 HC RX 258: Performed by: INTERNAL MEDICINE

## 2019-04-01 PROCEDURE — C1887 CATHETER, GUIDING: HCPCS

## 2019-04-01 PROCEDURE — 4A023N8 MEASUREMENT OF CARDIAC SAMPLING AND PRESSURE, BILATERAL, PERCUTANEOUS APPROACH: ICD-10-PCS | Performed by: INTERNAL MEDICINE

## 2019-04-01 PROCEDURE — 6370000000 HC RX 637 (ALT 250 FOR IP): Performed by: STUDENT IN AN ORGANIZED HEALTH CARE EDUCATION/TRAINING PROGRAM

## 2019-04-01 PROCEDURE — 94760 N-INVAS EAR/PLS OXIMETRY 1: CPT

## 2019-04-01 PROCEDURE — 82947 ASSAY GLUCOSE BLOOD QUANT: CPT

## 2019-04-01 PROCEDURE — 93458 L HRT ARTERY/VENTRICLE ANGIO: CPT | Performed by: INTERNAL MEDICINE

## 2019-04-01 PROCEDURE — C1769 GUIDE WIRE: HCPCS

## 2019-04-01 PROCEDURE — 7100000011 HC PHASE II RECOVERY - ADDTL 15 MIN

## 2019-04-01 PROCEDURE — 1200000000 HC SEMI PRIVATE

## 2019-04-01 PROCEDURE — 6370000000 HC RX 637 (ALT 250 FOR IP): Performed by: INTERNAL MEDICINE

## 2019-04-01 PROCEDURE — 6370000000 HC RX 637 (ALT 250 FOR IP)

## 2019-04-01 PROCEDURE — 2709999900 HC NON-CHARGEABLE SUPPLY

## 2019-04-01 PROCEDURE — 6360000004 HC RX CONTRAST MEDICATION

## 2019-04-01 PROCEDURE — C1725 CATH, TRANSLUMIN NON-LASER: HCPCS

## 2019-04-01 PROCEDURE — 2500000003 HC RX 250 WO HCPCS

## 2019-04-01 PROCEDURE — 93005 ELECTROCARDIOGRAM TRACING: CPT | Performed by: INTERNAL MEDICINE

## 2019-04-01 PROCEDURE — 6360000002 HC RX W HCPCS

## 2019-04-01 PROCEDURE — 85347 COAGULATION TIME ACTIVATED: CPT

## 2019-04-01 PROCEDURE — B2151ZZ FLUOROSCOPY OF LEFT HEART USING LOW OSMOLAR CONTRAST: ICD-10-PCS | Performed by: INTERNAL MEDICINE

## 2019-04-01 PROCEDURE — 5A1D70Z PERFORMANCE OF URINARY FILTRATION, INTERMITTENT, LESS THAN 6 HOURS PER DAY: ICD-10-PCS | Performed by: INTERNAL MEDICINE

## 2019-04-01 PROCEDURE — 027034Z DILATION OF CORONARY ARTERY, ONE ARTERY WITH DRUG-ELUTING INTRALUMINAL DEVICE, PERCUTANEOUS APPROACH: ICD-10-PCS | Performed by: INTERNAL MEDICINE

## 2019-04-01 PROCEDURE — B2111ZZ FLUOROSCOPY OF MULTIPLE CORONARY ARTERIES USING LOW OSMOLAR CONTRAST: ICD-10-PCS | Performed by: INTERNAL MEDICINE

## 2019-04-01 RX ORDER — CLOPIDOGREL BISULFATE 75 MG/1
75 TABLET ORAL DAILY
Status: DISCONTINUED | OUTPATIENT
Start: 2019-04-02 | End: 2019-04-02 | Stop reason: HOSPADM

## 2019-04-01 RX ORDER — SODIUM CHLORIDE 0.9 % (FLUSH) 0.9 %
10 SYRINGE (ML) INJECTION PRN
Status: DISCONTINUED | OUTPATIENT
Start: 2019-04-01 | End: 2019-04-02 | Stop reason: HOSPADM

## 2019-04-01 RX ORDER — ATORVASTATIN CALCIUM 40 MG/1
40 TABLET, FILM COATED ORAL NIGHTLY
Status: DISCONTINUED | OUTPATIENT
Start: 2019-04-01 | End: 2019-04-02 | Stop reason: HOSPADM

## 2019-04-01 RX ORDER — ATORVASTATIN CALCIUM 20 MG/1
20 TABLET, FILM COATED ORAL NIGHTLY
Status: DISCONTINUED | OUTPATIENT
Start: 2019-04-01 | End: 2019-04-01

## 2019-04-01 RX ORDER — PANTOPRAZOLE SODIUM 40 MG/1
40 TABLET, DELAYED RELEASE ORAL
Status: DISCONTINUED | OUTPATIENT
Start: 2019-04-01 | End: 2019-04-02 | Stop reason: HOSPADM

## 2019-04-01 RX ORDER — SODIUM CHLORIDE 0.9 % (FLUSH) 0.9 %
10 SYRINGE (ML) INJECTION EVERY 12 HOURS SCHEDULED
Status: DISCONTINUED | OUTPATIENT
Start: 2019-04-01 | End: 2019-04-02 | Stop reason: HOSPADM

## 2019-04-01 RX ADMIN — Medication 10 ML: at 13:22

## 2019-04-01 RX ADMIN — ACETAMINOPHEN 650 MG: 325 TABLET ORAL at 16:14

## 2019-04-01 RX ADMIN — NIFEDIPINE 30 MG: 30 TABLET, FILM COATED, EXTENDED RELEASE ORAL at 23:19

## 2019-04-01 RX ADMIN — CARVEDILOL 3.12 MG: 3.12 TABLET, FILM COATED ORAL at 18:10

## 2019-04-01 RX ADMIN — PANTOPRAZOLE SODIUM 40 MG: 40 TABLET, DELAYED RELEASE ORAL at 16:14

## 2019-04-01 RX ADMIN — LINAGLIPTIN 5 MG: 5 TABLET, FILM COATED ORAL at 18:55

## 2019-04-01 RX ADMIN — FUROSEMIDE 40 MG: 20 TABLET ORAL at 18:10

## 2019-04-01 RX ADMIN — DESMOPRESSIN ACETATE 40 MG: 0.2 TABLET ORAL at 20:44

## 2019-04-01 ASSESSMENT — PAIN DESCRIPTION - ORIENTATION: ORIENTATION: RIGHT

## 2019-04-01 ASSESSMENT — PAIN SCALES - GENERAL
PAINLEVEL_OUTOF10: 1
PAINLEVEL_OUTOF10: 3

## 2019-04-01 ASSESSMENT — PAIN DESCRIPTION - PAIN TYPE: TYPE: ACUTE PAIN

## 2019-04-01 ASSESSMENT — PAIN DESCRIPTION - LOCATION: LOCATION: GROIN

## 2019-04-01 NOTE — OP NOTE
[facility]        Date:   4/1/2019  Patient name:  Damon Morin  Date of admission:  3/31/2019  2:58 PM  MRN:   [id number]  YOB: 1960    CARDIAC CATHETERIZATION    Operators:  Jeffrey Levin MD      Procedure performed:       [x] Left Heart Catheterization. [] Graft Angiography. [x] Left Ventriculography. [] Right Heart Catheterization. [x] Coronary Angiography. [] Aortic Valve Studies. [x] PCI:      [] Other:       Pre Procedure Conscious Sedation Data:    ASA Class:    [] I [x] II [] III [] IV    Mallampati Class:  [] I [x] II [] III [] IV      Indication:  [] STEMI      [] + Stress test  [] ACS      [] + EKG Changes  [] Non Q MI       [] Significant Risk Factors  [x] Recurrent Angina             [] Diabetes Mellitus    [] New LBBB      [] Uncontrolled HTN. [x] CHF / Low EF changes     [] Abnormal CTA / Ca Score  [] Other:     Procedure:  Access:  [x] Femoral artery  [] Radial  artery       [x] Right   [] Left    Procedure: After informed consent was obtained with explanation of the risks and benefits, patient was brought to the cath lab. The access area was prepped and draped in sterile fashion. 1% lidocaine was used for local block. The artery was cannulated with 6  Fr sheath with brisk arterial blood return. The side port was frequently flushed and aspirated with normal saline. Findings:    LMCA: Normal 0% stenosis. LAD: distal 60% stenosis reduced to 0% s/p PTCA/TIM 2.75x38 mm with resultant CHERYL-III flow.       LCx: Normal 0% stenosis. RCA: Normal 0% stenosis. The LV gram was performed in the CEDILLO 30 position. LVEF: 45%. LV Wall Motion: anterior wall hypokinesis         Conclusions:  1. Single vessel distal LAD disease  2. Successful PTCA/TIM of the distal LAD  3. Mildly decreased LV systolic function with anterior hypokinesis    Recommendations:  1. Medical Therapy, including DAPT. 2. Risk Factors Modification.   3. Post Cath Protocol      I have reviewed the case / procedure with resident / fellow  I have examined the patient personally  Patient agree with treatment plan, correction innotes was made as appropriate, and discussed final arrangement based on  my evaluation and exam.    Risk and benefit of procedure if planned were explained in details. Procedure was performed by me, with all aspect of the procedure being done using standard protocol. Note was modified based on my own assessment and treatment.     Cata Vela MD  [facility]

## 2019-04-01 NOTE — CONSULTS
[facility]  CONSULT NOTE                  Date:   4/1/2019  Patient name: aPtric Snowden  Date of admission:  3/31/2019  2:58 PM  MRN:   [id number]  YOB: 1960    Reason for Admission: recurrent chest pain     History Obtained From:  Patient and chart review     HISTORY OF PRESENT ILLNESS:    Patient with pertinent hx of diabetes, hypertension and ESRD on HD since last 4 months via right upper chest perma-cath, follows with Dr Evangelina Quinonez for Non obstructive CAD , recently discharged after admission for chest pain when he underwent nuclear stress test which was apparently negative for ischemia but showed reduced EF. He returned to ED last night for recurrent chest pains in left upper chest. It is intermittent for last one month, feels like pressure but lasts for few seconds according to him, non radiating and is not locally tender. He reports that \"something is wrong with him\". He otherwise denies any significant dyspnea on exertion, orthopnea or PND. Past Medical History:   has a past medical history of Bowel obstruction ([facility] [address] CAD (coronary artery disease), CHF (congestive heart failure) ([facility] [address] Chronic kidney disease, Diabetes mellitus ([facility] [address] Hemodialysis patient ([facility] [address] [facility] Hyperlipidemia, Hypertension, MI, old, and Sleep apnea. Past Surgical History:   has a past surgical history that includes Foot surgery (Left, 2005); Colonoscopy; Cystoscopy (11/17/2017); Prostate biopsy (11/17/2017); Prostate biopsy (N/A, 11/17/2017); Abdomen surgery (2013); Prostate Biopsy (02/16/2018); pr genital surg proc,male unlisted (N/A, 2/16/2018); Tonsillectomy (1968); Vasectomy (1983); Middle ear surgery (1966); Nose surgery (1968); Mouth surgery (2007); AV fistula creation (Left, 02/20/2019); Dialysis fistula creation (Left, 2/20/2019); and other surgical history (Left, 03/06/2019). Home Medications:    Prior to Admission medications    Medication Sig Start Date End Date Taking?  Authorizing Provider NIFEdipine (ADALAT CC) 30 MG extended release tablet Take 30 mg by mouth 2 times daily    Historical Provider, MD   aspirin 81 MG chewable tablet Take 1 tablet by mouth daily  Patient taking differently: Take 81 mg by mouth daily LAST DOSE 02/14/2019 11/30/18   Nikos Newton MD   carvedilol (COREG) 3.125 MG tablet Take 1 tablet by mouth 2 times daily (with meals) 11/29/18   Nikos Newton MD   SITagliptin (JANUVIA) 25 MG tablet Take 1 tablet by mouth daily 11/29/18   Nikos Newton MD   furosemide (LASIX) 40 MG tablet Take 40 mg by mouth 2 times daily     Historical Provider, MD       Allergies:  Chocolate and Lisinopril    Social History:   reports that he has never smoked. He has never used smokeless tobacco. He reports that he does not drink alcohol or use drugs. Family History:   negative for early CAD    REVIEW OF SYSTEMS:    · Constitutional: there has been no significant in functional capacity. · Eyes: No visual changes or diplopia. · ENT: No Headaches, hearing loss or vertigo. No mouth sores or sore throat. · Cardiovascular: +ve left upper chest chest pain as mentioned above, no exertional dyspnea, orthopnea, palpitations or pre syncope  · Respiratory: No hx of productive cough, pleuritic chest pain   · Gastrointestinal: No abdominal pain, appetite loss, blood in stools. No change in bowel habits. · Genitourinary: No dysuria, trouble voiding, or hematuria. · Musculoskeletal:  No gait disturbance, No weakness or joint complaints. · Integumentary: No rash or pruritis. · Neurological: No headache, weakness, numbness or tingling. No change in gait, balance, coordination. · Psychiatric: No anxiety, or depression. · Endocrine: No temperature intolerance. No excessive thirst, fluid intake, or urination. No tremor. · Hematologic/Lymphatic: No abnormal bruising or bleeding, blood clots or swollen lymph nodes. · Allergic/Immunologic: No nasal congestion or hives.     PHYSICAL EXAM: Physical Examination:    /78   Pulse 66   Temp 98.6 °F (37 °C) (Oral)   Resp 16   Wt 250 lb (113.4 kg)   SpO2 97%   BMI 35.87 kg/m²    Constitutional and General Appearance: alert, cooperative, in no distress   HEENT: PERRL, no cervical lymphadenopathy. Normal oral mucosa  Right upper chest perma cath    Respiratory:  · Normal excursion and expansion without use of accessory muscles  · Resp Auscultation: Good respiratory effort. No for increased work of breathing. On auscultation: clear to auscultation bilaterally, no wheezing, no rales. Cardiovascular:  · The apical impulse is not displaced  · Heart tones are crisp and normal. regular S1 and S2. Murmurs: None   · Jugular venous pulsation Normal  · Thre is no carotid bruit   · Peripheral pulses are symmetrical and full   Abdomen:  · No masses or tenderness  · Bowel sounds present  Extremities:  ·  No Cyanosis or Clubbing  ·  Lower extremity edema: No edema   ·  Skin: Warm and dry  Neurological:  · Not done     DATA:    Diagnostics:      EKG:   SR, LAFB, Old inferoposterior infract (no significant change from previous ecg)       Previous cardiac testing:     STRESS 3/15/19: No ischemia or infarct. EF 38%.      ECHO 11/27/18: EF 48%, mild MR/TR, DD.      CATH 7/19/11: Nonobstructive CAD with EF 50%. Labs:     CBC:   Recent Labs     03/31/19  1520   WBC 5.4   HGB 12.0*   HCT 35.8*        BMP:   Recent Labs     03/31/19  1520      K 3.8   CO2 25   BUN 31*   CREATININE 4.62*   LABGLOM 13*   GLUCOSE 131*       FASTING LIPID PANEL:  Lab Results   Component Value Date    HDL 33 12/21/2016    TRIG 235 12/21/2016         IMPRESSION:    · Recurrent chest pain   · Non-obstructive CAD  · Mildly reduced LV systolic function with LVEF 48% on last echo in 11/2018 and 36% on recent stress test   · ESRD on HD  · LAFB  · Chronic troponin elevation likely from impaired renal clearance   · Hypertension  · Hyperlipidemia. · Type 2 diabetes mellitus. · History of prostate enlargement. · GERD. · Minimal nonobstructive CAD in 07/2011 with overall preserved LV function. RECOMMENDATIONS:  Given recurrent symptoms, multiple coronary risk factors, abnormal ecg and reduced LVEF, recommend cardiac cath with coronary angiography for definitive diagnosis and r/o any critical or Multivessel CAD. The patient understands and is willing top proceed. I discussed in detail the risks, benefits, and alternatives to the procedure including but not limited to risk of bleeding/hematoma requiring surgical intervention, contrast induced allergy and/or nephropathy, arrythmia, CVA, MI or death. Patience Nose and coreg     Further recs post cath.        Electronically signed by Too Solis MD on 4/1/2019 at 8:57 AM.  [address] [facility]   [address] [facility]

## 2019-04-01 NOTE — H&P
obstruction ([address] CAD (coronary artery disease), CHF (congestive heart failure) ([address] Chronic kidney disease, Diabetes mellitus ([address] Hemodialysis patient ([facility] [address] [facility] Hyperlipidemia, Hypertension, MI, old, and Sleep apnea. I have reviewed the past medical history with the patient and it is pertinent to this complaint. SURGICAL HISTORY      has a past surgical history that includes Foot surgery (Left, ); Colonoscopy; Cystoscopy (2017); Prostate biopsy (2017); Prostate biopsy (N/A, 2017); Abdomen surgery (); Prostate Biopsy (2018); pr genital surg proc,male unlisted (N/A, 2018); Tonsillectomy (); Vasectomy (); Middle ear surgery (); Nose surgery (); Mouth surgery (); AV fistula creation (Left, 2019); Dialysis fistula creation (Left, 2019); and other surgical history (Left, 2019). I have reviewed and agree with Surgical History entered and it is pertinent to this complaint. CURRENT MEDICATIONS       aspirin chewable tablet 81 mg Daily   carvedilol (COREG) tablet 3.125 mg BID WC   furosemide (LASIX) tablet 40 mg BID   NIFEdipine (PROCARDIA XL) extended release tablet 30 mg BID   linagliptin (TRADJENTA) tablet 5 mg Daily   sodium chloride flush 0.9 % injection 10 mL 2 times per day   sodium chloride flush 0.9 % injection 10 mL PRN   acetaminophen (TYLENOL) tablet 650 mg Q4H PRN       All medication charted and reviewed. ALLERGIES     is allergic to chocolate and lisinopril. FAMILY HISTORY     indicated that his mother is . He indicated that his father is alive. He indicated that all of his four brothers are alive. He indicated that his maternal grandmother is . He indicated that his maternal grandfather is . He indicated that his paternal grandmother is . He indicated that his paternal grandfather is .      family history includes Asthma in his brother; Diabetes in his brother, brother, and brother; Early Death in his mother; Heart Disease in his mother; High Blood Pressure in his brother, brother, and brother. The patient denies any pertinent family history. I have reviewed and agree with the family history entered. I have reviewed the Family History and it is not significant to the case    SOCIAL HISTORY      reports that he has never smoked. He has never used smokeless tobacco. He reports that he does not drink alcohol or use drugs. I have reviewed and agree with all Social.  There are no concerns for substance abuse/use. PHYSICAL EXAM     INITIAL VITALS:  weight is 250 lb (113.4 kg). His oral temperature is 98.6 °F (37 °C). His blood pressure is 136/78 and his pulse is 66. His respiration is 16 and oxygen saturation is 97%.       CONSTITUTIONAL: AOx4, no apparent distress, appears stated age    HEAD: normocephalic, atraumatic   EYES: PERRLA, EOMI    ENT: moist mucous membranes, uvula midline   NECK: supple, symmetric   BACK: symmetric   LUNGS: clear to auscultation bilaterally   CARDIOVASCULAR: regular rate and rhythm, no murmurs, rubs or gallops   ABDOMEN: soft, non-tender, non-distended with normal active bowel sounds   NEUROLOGIC:  MAEx4, no focal sensory or motor deficits   MUSCULOSKELETAL: no clubbing, cyanosis or edema   SKIN: no rash or wounds       DIFFERENTIAL DIAGNOSIS/MDM:   ACS, angina, cardiac ischemia, pericarditis, cardiac, Cardiac Arrhythmia, CHF, COPD, Pneumonia, PE, Aortic Dissection    DIAGNOSTIC RESULTS     EKG: All EKG's are interpreted by the Observation Physician who either signs or Co-signs this chart in the absence of a cardiologist.    EKG Interpretation    Interpreted by observation physician    Rhythm: normal sinus   Rate: normal, irregular rhythm, pulsus paradox   Axis: normal,   Ectopy: none  Conduction: 1st degree AV block  ST Segments: no acute change  T Waves: no acute change  Q Waves: none    Clinical Impression: non-specific EKG, abnormal EKG     Nalini PINON DO Ismael      RADIOLOGY:   I directly visualized the following  images and reviewed the radiologist interpretations:    Xr Chest Standard (2 Vw)    Result Date: 3/31/2019  EXAMINATION: TWO VIEWS OF THE CHEST 3/31/2019 3:45 pm COMPARISON: 03/14/2019 HISTORY: ORDERING SYSTEM PROVIDED HISTORY: chest pain TECHNOLOGIST PROVIDED HISTORY: chest pain Initial encounter. Acute illness. FINDINGS: The lungs are clear. No pneumothorax or pleural effusion is seen. Cardiac and mediastinal contours are normal.  Right-sided tunneled dialysis catheter tip remains at the superior cavoatrial junction. No acute osseous abnormality is seen. Stable examination. No acute cardiopulmonary abnormality. LABS:  I have reviewed and interpreted all available lab results.   Labs Reviewed   CBC - Abnormal; Notable for the following components:       Result Value    RBC 4.08 (*)     Hemoglobin 12.0 (*)     Hematocrit 35.8 (*)     All other components within normal limits   BASIC METABOLIC PANEL W/ REFLEX TO MG FOR LOW K - Abnormal; Notable for the following components:    Glucose 131 (*)     BUN 31 (*)     CREATININE 4.62 (*)     Chloride 96 (*)     GFR Non- 13 (*)     GFR  16 (*)     All other components within normal limits   TROPONIN - Abnormal; Notable for the following components:    Troponin, High Sensitivity 75 (*)     All other components within normal limits   BRAIN NATRIURETIC PEPTIDE - Abnormal; Notable for the following components:    Pro- (*)     All other components within normal limits   TROPONIN - Abnormal; Notable for the following components:    Troponin, High Sensitivity 68 (*)     All other components within normal limits       SCREENING TOOLS:    HEART Risk Score for Chest Pain Patients   History and Physical Exam Suspicion Level  (Nausea, Vomiting, Diaphoresis, Radiation, Exertion)   Slightly Suspicious (0 pts)   Moderately Suspicious (1 pt)   Highly Suspicious (2 pts)   EKG Interpretation   Normal (0 pts)   Non-Specific Repolarization Disturbance (1 pt)   Significant ST-Depression (2 pts)   Age of Patient (in years)   = 39 (0 pts)   46-64 (1 pt)   = 65 (2 pts)   Risk Factors   No Risk Factors (0 pts)   1-2 Risk Factors (1 pt)   = 3 Risk Factors (2 pts)   Risk Factors Include:   Hypercholesterolemia   Hypertension   Diabetes Mellitus   Cigarette smoking   Positive family history   Obesity   CAD   (SLE, CKDz, HIV, Cocaine abuse)   Troponin Levels   = Normal Limit (0 pts)   1-3 Times Normal Limit (1 pt)   > 3 Times Normal Limit (2 pts)  TOTAL:4    Percent Risk for Major Adverse Cardiac Event (MACE)  0-3 pts indicates low risk for MACE   2.5% (DISCHARGE)   4-7 pts indicates moderate risk for MACE  20.3% (OBS)  8-10 pts indicates high risk for MACE  72.7% (EARLY INVASIVE TX)    CDU IMPRESSION / PLAN      Andrews Paul is a 59 y.o. male who presents with    1, Acute on chronic left sided chest pain , treated with SL nitroglycerin ,  etiology unknown. 1.  Cardiac workup in the emergency department: Initial elevated troponin of 75 which decreased to 68, elevated proBNP at 636, chest x-ray showed no acute cardiopulmonary pathology, elevated creatinine however consistent with baseline at 4.62.  2. EKG abnormal in Observation unit, dysrythmia   3. Cardiology consult    - cardiac catheterization today    - proximal LAD stent placed   4. Nephrology consulted regarding patient catheterization and dye load   - pending     · Continue home medications  · Monitor vitals, labs, and imaging  · DISPO: pending consults and clinical improvement    CONSULTS:    IP CONSULT TO CARDIOLOGY    PROCEDURES:  Not indicated       PATIENT REFERRED TO:    Zoey Salcido  [id number]   [address] [place] [address] [phone]            --  Anita Bermeo DO   Emergency Medicine Resident     This dictation was generated by voice recognition computer software.   Although all attempts are made to edit the dictation for accuracy, there may be errors in the transcription that are not intended.

## 2019-04-01 NOTE — CONSULTS
Consults           REASON FOR  NEPHROLOGY CONSULT     Fluid and BP management in ESRD     ACCESS   Tunneled catheter    DRY WEIGHT   ? NEPHROLOGIST   Dr Amadou Dao               This is a 59 y.o. male was hospitalized for recurrent chest pain  episodes. He was recently in the hospital with chest pain and apparently stress test was negative for any ongoing ischemia. But in view of his persistent chest pain he was asked to get hospitalized. He underwent coronary angiogram and had a stent placement done. No immediate intraoperative complications reported. Denies any palpitations/syncope/shortness of breath. There been consulted for dialysis management. His regular days are Tuesday Thursday Saturday. His dialysis compliance is good. Had dialysis last Saturday. PAST MEDICAL HISTORY         Diagnosis Date    Bowel obstruction ([address] 12/26/2013    CAD (coronary artery disease) 2013    ?     CHF (congestive heart failure) ([address] 2013    last episode 11/2018    Chronic kidney disease     Diabetes mellitus ([address] 2012    NIDDM    Hemodialysis patient ([facility] [address] 11/28/2018    TUNNELD CATHETER RIGHT DIALYSIS ACCESSTUES THURS AND AT [facility]    Hyperlipidemia 2013    ON RX    Hypertension 2013    ON RX    MI, old 12/26/2013    Sleep apnea 2015    pt has machine and does not use it         PAST SURGICAL HISTORY         Procedure Laterality Date    ABDOMEN SURGERY  2013    BOWEL OBSTRUCTION    AV FISTULA CREATION Left 02/20/2019    COLONOSCOPY      CYSTOSCOPY  11/17/2017    DIALYSIS FISTULA CREATION Left 2/20/2019    AV FISTULA CREATION ARM [facility] by Shabnam Ocampo MD at [facility] Left 2005    [address]  2007    1 WISDOM TOOTH REMOVED    NOSE SURGERY  1968    CAUTERY TO STOP NOSE BEEDS    OTHER SURGICAL HISTORY Left 03/06/2019    fistulagram    VT GENITAL SURG PROC,MALE UNLISTED N/A 2/16/2018    US  PROSTATE BIOPSY WITH SATURATION performed by Pierre Staton MD at [address]  11/17/2017    PROSTATE BIOPSY N/A 11/17/2017    CYSTOSCOPY PROSTATE US BIOPSY performed by Pierre Staton MD at [address]  02/16/2018    TONSILLECTOMY  1968    VASECTOMY  1983       MEDICATIONS     Home Meds:                Medications Prior to Admission: NIFEdipine (ADALAT CC) 30 MG extended release tablet, Take 30 mg by mouth 2 times daily  aspirin 81 MG chewable tablet, Take 1 tablet by mouth daily (Patient taking differently: Take 81 mg by mouth daily LAST DOSE 02/14/2019)  carvedilol (COREG) 3.125 MG tablet, Take 1 tablet by mouth 2 times daily (with meals)  SITagliptin (JANUVIA) 25 MG tablet, Take 1 tablet by mouth daily  furosemide (LASIX) 40 MG tablet, Take 40 mg by mouth 2 times daily   Scheduled Meds:    atorvastatin  40 mg Oral Nightly    sodium chloride flush  10 mL Intravenous 2 times per day    [START ON 4/2/2019] clopidogrel  75 mg Oral Daily    pantoprazole  40 mg Oral BID AC    aspirin  81 mg Oral Daily    carvedilol  3.125 mg Oral BID WC    furosemide  40 mg Oral BID    NIFEdipine  30 mg Oral BID    linagliptin  5 mg Oral Daily    sodium chloride flush  10 mL Intravenous 2 times per day     Continuous Infusions:   PRN Meds:  sodium chloride flush, sodium chloride flush, acetaminophen    ALLERGY     Chocolate and Lisinopril    SOCIAL HISTORY     Social History     Socioeconomic History    Marital status: Single     Spouse name: Not on file    Number of children: Not on file    Years of education: Not on file    Highest education level: Not on file   Occupational History    Not on file   Social Needs    Financial resource strain: Not on file    Food insecurity:     Worry: Not on file     Inability: Not on file    Transportation needs:     Medical: Not on file     Non-medical: Not on file   Tobacco Use    Smoking status: Never Smoker    Smokeless tobacco: Never Used   Substance and Sexual Activity    Alcohol use: No    Drug use: No    Sexual activity: Not on file   Lifestyle    Physical activity:     Days per week: Not on file     Minutes per session: Not on file    Stress: Not on file   Relationships    Social connections:     Talks on phone: Not on file     Gets together: Not on file     Attends Hinduism service: Not on file     Active member of club or organization: Not on file     Attends meetings of clubs or organizations: Not on file     Relationship status: Not on file    Intimate partner violence:     Fear of current or ex partner: Not on file     Emotionally abused: Not on file     Physically abused: Not on file     Forced sexual activity: Not on file   Other Topics Concern    Not on file   Social History Narrative    Not on file       FAMILY HISTORY      Family History   Problem Relation Age of Onset    Heart Disease Mother         CHF    Early Death Mother     High Blood Pressure Brother     Diabetes Brother     Asthma Brother     High Blood Pressure Brother     Diabetes Brother     High Blood Pressure Brother     Diabetes Brother           REVIEW OF SYSTEM      Constitutional: No asthenia/weight loss/anorexia    HEENT : No epistaxis/visual blurriness/rhinorrhoea/sorethroat/trauma  Cardiovascular: Present chest pain/no palpitation/  Respiratory: No cough/fever/SOB/Wheezing    Gastrointestinal: No abdominal pain/nausea/vomiting/diarrhoea/constipation  Genitourinary: No dysuria/pyuria/hematuria/incomplete emptying of bladder  Musculoskeletal:  No gait disturbance/weakness or joint complaints  Integumentary: No rash or pruritis. Neurological: No headache/diplopia/change in muscle strength/numbness or tingling. No change in gait, balance, coordination, mood, affect, memory, mentation, behavior. Psychiatric: No anxiety/depression. Endocrine: No temperature intolerance.  No excessive thirst, fluid intake, or urination. No tremor. Hematologic/Lymphatic: No abnormal bruising or bleeding, blood clots or swolle lymph nodes. Allergic/Immunologic: No nasal congestion or hives    EXAMINATION       Vitals:    04/01/19 1215 04/01/19 1230 04/01/19 1245 04/01/19 1300   BP: (!) 141/76 (!) 142/79 131/80 (!) 148/80   Pulse: 73 71 73 70   Resp: 22 21 20 19   Temp: 97.5 °F (36.4 °C)      TempSrc: Oral      SpO2: 100% 100% 100% 100%   Weight:         24HR INTAKE/OUTPUT:      Intake/Output Summary (Last 24 hours) at 4/1/2019 1428  Last data filed at 4/1/2019 1210  Gross per 24 hour   Intake --   Output 400 ml   Net -400 ml       General appearance:Awake, alert, in no acute distress  Skin: warm and dry, no rash or erythema  Eyes: conjunctivae normal and sclera anicteric  ENT: no thrush no pharyngeal congestion  orodental hygiene   Neck: No JVD, Lymphadenopathty or thyromegaly  Respiratory: vesicular breath sounds,no wheeze/crackles  Cardiovascular: S1 S2 normal,no gallop or organic murmur. No carotid bruit  Abdomen:Non tender/non distended. Bowel sounds present  Extremities: No Cyanosis or Clubbing,Lower extremity edema  Neurological:Alert and oriented. No abnormalities of mood, affect, memory, mentation, or behavior are noted    INVESTIGATIONS     PTH:  No results found for: PTH  abs:   CBC:   Recent Labs     03/31/19  1520   WBC 5.4   RBC 4.08*   HGB 12.0*   HCT 35.8*   MCV 87.7   MCH 29.4   MCHC 33.5   RDW 13.5      MPV 9.5      BMP:   Recent Labs     03/31/19  1520      K 3.8   CL 96*   CO2 25   BUN 31*   CREATININE 4.62*   GLUCOSE 131*   CALCIUM 9.3        Phosphorus:  No results for input(s): PHOS in the last 72 hours. Magnesium: No results for input(s): MG in the last 72 hours. Albumin: No results for input(s): LABALBU in the last 72 hours. ASSESSMENT     1. Hospitalized with chest pain for which a coronary angiogram was carried out. Underwent stent placement to LAD.   2.  ESRD secondary to diabetic nephrosclerosis on intermittent maintenance hemodialysis Tuesday Thursday Saturday using tunnel catheter. Has left upper extremity immature AV fistula. [facility] unit.   3. Type 2 diabetes  4. Essential hypertension  5. Anemia of chronic disease    PLAN     1. Hemodialysis tomorrow  2. Resume all home medications  3. Okay to discharge tomorrow after dialysis      Thank you for the consultation. Please do not hesitate to call with questions.     This note is created with the assistance of a speech-recognition program. While intending to generate a document that actually reflects the content of the visit, no guarantees can be provided that every mistake has been identified and corrected by editing      Carmen Lu MD, MRCP Sathya Boyle), [address]   4/1/2019 2:28 PM  [facility]

## 2019-04-01 NOTE — PROGRESS NOTES
Pt wanted to take is Tradjenta tonight d/t his sugar being 200. Perfect served on call obs doctor to let them know and make sure this wasn't an issue. On call doctor advised to hold medication until morning. I asked for a rationale d/t pt being very adamant that he get this medication since he has not had it today.

## 2019-04-01 NOTE — FLOWSHEET NOTE
03/31/19 2016   Encounter Summary   Services provided to: Patient   Referral/Consult From: [address] Children;Significant other   Place of [address] [facility]   Continue Visiting   (3/31/2019)   Complexity of Encounter Low   Length of Encounter 15 minutes   Spiritual Assessment Completed Yes   Routine   Type Initial   Assessment Calm; Approachable;Coping   Intervention Active listening;Explored feelings, thoughts, concerns;Nurtured hope;Sustaining presence/ Ministry of presence   Outcome Expressed gratitude;Engaged in conversation;Expressed feelings/needs/concerns;Coping;Receptive   Spiritual/[employer]ist   Type Spiritual support   Assessment Calm; Approachable;Coping   · Facts:  visited with patient during rounding to offer spiritual and emotional support. Patient was alone at the time of the visit.  engaged patient in conversation, explored feelings and concerns.  provided a listening presence as patient shared about his health condition. Patient stated that he was having chest pains but he was ok.  nurtured hope, affirmed feelings and provided presence and support. · Feelings: Patient expressed disappointment with his dinner. · Family: Mention was made of three grown children and a significant other named Cicero. · Latoya: Patient stated that he attends [employer] but declined prayer at this time. Follow-up:  will remain available for spiritual and emotional support as needed.

## 2019-04-01 NOTE — PROGRESS NOTES
Pt's femoral site has been oozing small amount since coming up to the floor. Pt has been compliant with keeping his leg in a straight position and is laying flat. Applied pressure for 10 minutes and it was still oozing, so a safe guard was applied. Pulses are strong and intact, abdomen is soft, non tender, incision looks good and no hematoma is noted, and vitals are stable. Mitesh De Leon with [facility] about the safe guard.  Will keep patient supine for at least another hour/

## 2019-04-01 NOTE — PROGRESS NOTES
Patient's belongings from room 339-2 brought to patient in [facility]. Patient transferred to room 2010 with all belongings. No change to right groin.

## 2019-04-01 NOTE — PROGRESS NOTES
Patient admitted, consent signed, all questions answered. Pt ready for procedure. Call light to reach with side rails up 2 of 2. Right wrist and bilateral groin hair clipped. No family  at bedside with patient.

## 2019-04-01 NOTE — PROGRESS NOTES
Received post procedure to [facility] to room 1. Assessment obtained. Restrictions reviewed with patient. Post procedure pathway initiated. Right groin site soft , band aid dry and intact. No hematoma noted. No family at side. Patient without complaints. Head of bed flat with right leg straight. Taking lunch well.

## 2019-04-01 NOTE — PROGRESS NOTES
Safeguard removed at 1800. No oozing/bleeding/swelling or discoloration noted at incision. Abdomen is still soft, and non-tender. Placed a bandage on incision and advised patient to let us know if he notices any discomfort or bleeding.

## 2019-04-02 ENCOUNTER — APPOINTMENT (OUTPATIENT)
Dept: DIALYSIS | Age: 59
DRG: 246 | End: 2019-04-02
Payer: MEDICARE

## 2019-04-02 VITALS
OXYGEN SATURATION: 100 % | BODY MASS INDEX: 35.27 KG/M2 | HEART RATE: 92 BPM | SYSTOLIC BLOOD PRESSURE: 132 MMHG | WEIGHT: 245.81 LBS | TEMPERATURE: 98.2 F | RESPIRATION RATE: 16 BRPM | DIASTOLIC BLOOD PRESSURE: 81 MMHG

## 2019-04-02 LAB
ANION GAP SERPL CALCULATED.3IONS-SCNC: 15 MMOL/L (ref 9–17)
BUN BLDV-MCNC: 49 MG/DL (ref 8–23)
BUN/CREAT BLD: ABNORMAL (ref 9–20)
CALCIUM SERPL-MCNC: 8.3 MG/DL (ref 8.6–10.4)
CHLORIDE BLD-SCNC: 100 MMOL/L (ref 98–107)
CO2: 22 MMOL/L (ref 20–31)
CREAT SERPL-MCNC: 5.28 MG/DL (ref 0.7–1.2)
GFR AFRICAN AMERICAN: 14 ML/MIN
GFR NON-AFRICAN AMERICAN: 11 ML/MIN
GFR SERPL CREATININE-BSD FRML MDRD: ABNORMAL ML/MIN/{1.73_M2}
GFR SERPL CREATININE-BSD FRML MDRD: ABNORMAL ML/MIN/{1.73_M2}
GLUCOSE BLD-MCNC: 158 MG/DL (ref 75–110)
GLUCOSE BLD-MCNC: 165 MG/DL (ref 70–99)
HCT VFR BLD CALC: 29.5 % (ref 40.7–50.3)
HEMOGLOBIN: 9.5 G/DL (ref 13–17)
MCH RBC QN AUTO: 28.8 PG (ref 25.2–33.5)
MCHC RBC AUTO-ENTMCNC: 32.2 G/DL (ref 28.4–34.8)
MCV RBC AUTO: 89.4 FL (ref 82.6–102.9)
NRBC AUTOMATED: 0 PER 100 WBC
PDW BLD-RTO: 14 % (ref 11.8–14.4)
PLATELET # BLD: 158 K/UL (ref 138–453)
PMV BLD AUTO: 9.4 FL (ref 8.1–13.5)
POTASSIUM SERPL-SCNC: 3.9 MMOL/L (ref 3.7–5.3)
RBC # BLD: 3.3 M/UL (ref 4.21–5.77)
SODIUM BLD-SCNC: 137 MMOL/L (ref 135–144)
WBC # BLD: 6 K/UL (ref 3.5–11.3)

## 2019-04-02 PROCEDURE — 85027 COMPLETE CBC AUTOMATED: CPT

## 2019-04-02 PROCEDURE — 6370000000 HC RX 637 (ALT 250 FOR IP): Performed by: INTERNAL MEDICINE

## 2019-04-02 PROCEDURE — 36415 COLL VENOUS BLD VENIPUNCTURE: CPT

## 2019-04-02 PROCEDURE — 82947 ASSAY GLUCOSE BLOOD QUANT: CPT

## 2019-04-02 PROCEDURE — 80048 BASIC METABOLIC PNL TOTAL CA: CPT

## 2019-04-02 PROCEDURE — 6370000000 HC RX 637 (ALT 250 FOR IP): Performed by: STUDENT IN AN ORGANIZED HEALTH CARE EDUCATION/TRAINING PROGRAM

## 2019-04-02 RX ORDER — NITROGLYCERIN 0.4 MG/1
0.4 TABLET SUBLINGUAL EVERY 5 MIN PRN
Qty: 25 TABLET | Refills: 3 | Status: SHIPPED | OUTPATIENT
Start: 2019-04-02 | End: 2019-09-20

## 2019-04-02 RX ORDER — ATORVASTATIN CALCIUM 40 MG/1
40 TABLET, FILM COATED ORAL NIGHTLY
Qty: 30 TABLET | Refills: 3 | Status: SHIPPED | OUTPATIENT
Start: 2019-04-02 | End: 2021-02-09

## 2019-04-02 RX ORDER — CLOPIDOGREL BISULFATE 75 MG/1
75 TABLET ORAL DAILY
Qty: 30 TABLET | Refills: 3 | Status: SHIPPED | OUTPATIENT
Start: 2019-04-02 | End: 2019-09-20

## 2019-04-02 RX ADMIN — ASPIRIN 81 MG: 81 TABLET, CHEWABLE ORAL at 16:03

## 2019-04-02 RX ADMIN — CLOPIDOGREL 75 MG: 75 TABLET, FILM COATED ORAL at 16:04

## 2019-04-02 RX ADMIN — LINAGLIPTIN 5 MG: 5 TABLET, FILM COATED ORAL at 16:03

## 2019-04-02 RX ADMIN — PANTOPRAZOLE SODIUM 40 MG: 40 TABLET, DELAYED RELEASE ORAL at 16:03

## 2019-04-02 RX ADMIN — NIFEDIPINE 30 MG: 30 TABLET, FILM COATED, EXTENDED RELEASE ORAL at 16:04

## 2019-04-02 RX ADMIN — CARVEDILOL 3.12 MG: 3.12 TABLET, FILM COATED ORAL at 16:03

## 2019-04-02 RX ADMIN — ACETAMINOPHEN 650 MG: 325 TABLET ORAL at 16:40

## 2019-04-02 RX ADMIN — FUROSEMIDE 40 MG: 20 TABLET ORAL at 16:03

## 2019-04-02 ASSESSMENT — PAIN DESCRIPTION - PAIN TYPE: TYPE: ACUTE PAIN

## 2019-04-02 ASSESSMENT — PAIN SCALES - GENERAL
PAINLEVEL_OUTOF10: 5
PAINLEVEL_OUTOF10: 0
PAINLEVEL_OUTOF10: 5
PAINLEVEL_OUTOF10: 0
PAINLEVEL_OUTOF10: 3

## 2019-04-02 ASSESSMENT — PAIN DESCRIPTION - DESCRIPTORS: DESCRIPTORS: ACHING;CRAMPING

## 2019-04-02 ASSESSMENT — PAIN DESCRIPTION - PROGRESSION: CLINICAL_PROGRESSION: GRADUALLY IMPROVING

## 2019-04-02 ASSESSMENT — PAIN DESCRIPTION - ONSET: ONSET: SUDDEN

## 2019-04-02 ASSESSMENT — PAIN DESCRIPTION - ORIENTATION: ORIENTATION: RIGHT

## 2019-04-02 ASSESSMENT — PAIN DESCRIPTION - LOCATION: LOCATION: CHEST

## 2019-04-02 NOTE — CARE COORDINATION
Discharge [facility] Case Management Department  Written by: Lanny Gutierrez RN    Patient Name: Ty Borges  Attending Provider: No att. providers found  Admit Date: 3/31/2019  2:58 PM  MRN: [id number]  Account: [de-identified]                     : 1960  Discharge Date: 2019      Disposition: home    Lanny Gutierrez RN

## 2019-04-02 NOTE — PROGRESS NOTES
Patient informed of plan of d/c. Patient stated that his ride will be here in about an hour. Patient wants to eat his lunch and then he will be ready to review his papers.   Electronically signed by Jas Hallman RN on 4/2/2019 at 4:10 PM

## 2019-04-02 NOTE — RESEARCH
Asked by Dr Leonela Sanford to evaluate pt for protocol Xience Short DAPT Study # sponsored by [address] [place] [address] [place].  -Patient met inclusion/exclusion criteria. Pt approached @ __15__:___30_  -Patient read study consent. Questions answered. Pt wishes to participate in study. Consent signed voluntarily by pt @ _16___:___10 . No family present in room during consenting process. All risks/benefits/alternative treatment discussed with Pt per Dr. Leonela Sanford.  -A copy of the signed consent was given to the patient.

## 2019-04-02 NOTE — PROGRESS NOTES
Discharge instructions reviewed with patient, all questions answered. Patient waiting on ride.   Electronically signed by Leslie Gutierrez RN on 4/2/2019 at 4:51 PM\

## 2019-04-02 NOTE — PROGRESS NOTES
NEPHROLOGY DIALYSIS NOTE    PROCEDURE    Patient seen on Hemodialysis  4/2/2019 at 1:28 PM  Access cannulated without problems      TREATMENT ORDERS   See dialysis flowsheet for specifics on access, blood flow rate, dialysate baths, duration of dialysis, anticoagulation and other technical information. Dialysis Bath  K+ (Potassium): 3  Ca+ (Calcium): 2.25  Na+ (Sodium): 140  HCO3 (Bicarb): 35       INVESTIGATIONS     Last 3 CMP:    Recent Labs     03/31/19  1520 04/02/19  0512    137   K 3.8 3.9   CL 96* 100   CO2 25 22   BUN 31* 49*   CREATININE 4.62* 5.28*   CALCIUM 9.3 8.3*       Last 3 CBC:  Recent Labs     03/31/19  1520 04/02/19  0512   WBC 5.4 6.0   RBC 4.08* 3.30*   HGB 12.0* 9.5*   HCT 35.8* 29.5*   MCV 87.7 89.4   MCH 29.4 28.8   MCHC 33.5 32.2   RDW 13.5 14.0    158   MPV 9.5 9.4         GFR: CrCl cannot be calculated (Unknown ideal weight.). Phosphorus:  No results for input(s): PHOS in the last 72 hours. Magnesium: No results for input(s): MG in the last 72 hours. Albumin: No results for input(s): LABALBU in the last 72 hours.   PTH                :No results found for: PTH           MEDICATIONS     Scheduled Meds:    atorvastatin  40 mg Oral Nightly    sodium chloride flush  10 mL Intravenous 2 times per day    clopidogrel  75 mg Oral Daily    pantoprazole  40 mg Oral BID AC    aspirin  81 mg Oral Daily    carvedilol  3.125 mg Oral BID WC    furosemide  40 mg Oral BID    NIFEdipine  30 mg Oral BID    linagliptin  5 mg Oral Daily    sodium chloride flush  10 mL Intravenous 2 times per day     Continuous Infusions:   PRN Meds:  sodium chloride flush, sodium chloride flush, acetaminophen  Home Meds:                Medications Prior to Admission: NIFEdipine (ADALAT CC) 30 MG extended release tablet, Take 30 mg by mouth 2 times daily  aspirin 81 MG chewable tablet, Take 1 tablet by mouth daily (Patient taking differently: Take 81 mg by mouth daily LAST DOSE 02/14/2019)  carvedilol (COREG) 3.125 MG tablet, Take 1 tablet by mouth 2 times daily (with meals)  SITagliptin (JANUVIA) 25 MG tablet, Take 1 tablet by mouth daily  furosemide (LASIX) 40 MG tablet, Take 40 mg by mouth 2 times daily     EXAMINATION     Vitals BP (!) 143/76   Pulse 81   Temp 97.9 °F (36.6 °C)   Resp 16   Wt 248 lb 14.4 oz (112.9 kg)   SpO2 100%   BMI 35.71 kg/m²   LE edema  Absent, no icterus,clubbing,JVP not elevated  CVS :S1 S2 normal,no gallop or organic murmur  RS:Vesicular breath sounds,no crackles/wheeze  CNS:awake,alert  PA: non tender,non distended,Bowel sounds present    ASSESSMENT AND PLAN     1. Hospitalized with chest pain for which a coronary angiogram was carried out. Underwent stent placement to LAD. 2.  ESRD secondary to diabetic nephrosclerosis on intermittent maintenance hemodialysis Tuesday Thursday Saturday using tunnel catheter. Has left upper extremity immature AV fistula. [facility].   3. Type 2 diabetes  4. Essential hypertension  5. Anemia of chronic disease        The patient was seen and examined while on dialysis. Professional oversight of the patients dialysis care,access care and dialysis related co-morbidities were addressed as necessary with the patient and /or staff.   Ok to [employer] from Nephrology stand point    This note is created with the assistance of a speech-recognition program. While intending to generate a document that actually reflects the content of the visit, no guarantees can be provided that every mistake has been identified and corrected by editing    Renee Black MD, MRCP Mikhail Castano   4/2/2019 1:28 PM    [facility]

## 2019-04-02 NOTE — PROGRESS NOTES
CDU Daily Progress Note  Attending Physician       Pt Name: Leena Epstein  MRN: [id number]  [place] 1960  Date of evaluation: 4/2/19    I performed a history and physical examination of the patient and discussed management with the resident. I reviewed the residents note and agree with the documented findings and plan of care. Any areas of disagreement are noted on the chart. I was personally present for the key portions of any procedures. I have documented in the chart those procedures where I was not present during the key portions. I have reviewed the emergency nurses triage note. I agree with the chief complaint, past medical history, past surgical history, allergies, medications, social and family history as documented unless otherwise noted below. Documentation of the HPI, Physical Exam and Medical Decision Making performed by medical students or scribes is based on my personal performance of the HPI, PE and MDM. For Physician Assistant/ Nurse Practitioner cases/documentation I have personally evaluated this patient and have completed at least one if not all key elements of the E/M (history, physical exam, and MDM). Additional findings are as noted. The Family History, Social History and Review of Systems are unchanged from the previous day. No significant events overnight. TIM placed in distal LAD.  Hemodialysis today    Clyde Solis MD  Attending Physician  Critical Decision Unit

## 2019-04-02 NOTE — PROGRESS NOTES
OBS/CDU   RESIDENT NOTE      Patients PCP is:  DINORAH GONZALEZ        SUBJECTIVE      No acute events overnight. Has been able to tolerate a general diet  without nausea or vomiting. The patient is urinating on his own and is passing flatus. Denies fever, chills, nausea, vomiting, chest pain, shortness of breath, abdominal pain, focal weakness, numbness, tingling, urinary/bowel symptoms, vision changes, visual hallucinations, or headache. Patient planned for hemodialysis today. PHYSICAL EXAM      General: NAD, AO X 3  Heent: EMOI, PERRL  Neck: SUPPLE, NO JVD  Cardiovascular: RRR, S1S2  Pulmonary: CTAB, NO SOB  Abdomen: SOFT, NTTP, ND, +BS  Extremities: +2/4 PULSES DISTAL, NO SWELLING  Neuro / Psych: NO NUMBNESS OR TINGLING, MENTATION AT BASELINE    PERTINENT TEST /EXAMS      I have reviewed all available laboratory results. MEDICATIONS CURRENT     atorvastatin (LIPITOR) tablet 40 mg Nightly   sodium chloride flush 0.9 % injection 10 mL 2 times per day   sodium chloride flush 0.9 % injection 10 mL PRN   clopidogrel (PLAVIX) tablet 75 mg Daily   pantoprazole (PROTONIX) tablet 40 mg BID AC   aspirin chewable tablet 81 mg Daily   carvedilol (COREG) tablet 3.125 mg BID WC   furosemide (LASIX) tablet 40 mg BID   NIFEdipine (PROCARDIA XL) extended release tablet 30 mg BID   linagliptin (TRADJENTA) tablet 5 mg Daily   sodium chloride flush 0.9 % injection 10 mL 2 times per day   sodium chloride flush 0.9 % injection 10 mL PRN   acetaminophen (TYLENOL) tablet 650 mg Q4H PRN       All medication charted and reviewed.     CONSULTS      IP CONSULT TO CARDIOLOGY  IP CONSULT TO SOCIAL WORK  IP CONSULT TO NEPHROLOGY    ASSESSMENT/PLAN       Ruth Casey is a 59 y.o. male who presents with      1, Acute on chronic left sided chest pain , treated with SL nitroglycerin ,  etiology LAD occlusion treated with TIM distal LAD.      1.  Cardiac workup in the emergency department: Initial elevated troponin of 75 which decreased to 68, elevated proBNP at 636, chest x-ray showed no acute cardiopulmonary pathology, elevated creatinine however consistent with baseline at 4.62.  2. EKG abnormal in Observation unit, dysrythmia   3. Cardiology consult               - cardiac catheterization today               - proximal LAD stent placed    -CARDIAC CATH 4/1/19 Results,                                  LAD: distal 60% stenosis reduced to 0% s/p PTCA/TIM 2.75x38 mm with resultant CHERYL-III flow. 4. Nephrology consulted regarding patient catheterization and dye load              - plan for HD today 4/2   - ok for discharge after dialysis           · Continue home medications  · Monitor vitals, labs, and imaging  · DISPO: pending consults and clinical improvement    --  Adair Stevens  Emergency Medicine Resident Physician     This dictation was generated by voice recognition computer software. Although all attempts are made to edit the dictation for accuracy, there may be errors in the transcription that are not intended.

## 2019-04-02 NOTE — CARE COORDINATION
Transitional planning-talked with patient-plan on going home. Not wanting any home care at this time.

## 2019-04-02 NOTE — PROGRESS NOTES
Treatment time: 4 hours  Net UF: 74.1    Pre weight: 112.9kg  Post weight: 111.5kg  EDW: 11.5kg    Access used: CVC  Access function: Well    Medications or blood products given: No    Regular outpatient schedule: TTS    Summary of response to treatment: Patient responded well to treatment today, no complaints at time of Discharge. Copy of dialysis treatment record placed in chart, to be scanned into EMR.

## 2019-04-02 NOTE — PROGRESS NOTES
Patient informed me that he doesn't take any morning medications before hemo. Patient will take medications when he comes back.    Electronically signed by Evans Rooney RN on 4/2/2019 at 8:47 AM

## 2019-04-03 LAB — GLUCOSE BLD-MCNC: 145 MG/DL (ref 75–110)

## 2019-04-06 NOTE — PROGRESS NOTES
OBS/CDU   RESIDENT NOTE FOR INPATIENT STATUS      INPATIENT       The Patient Now Meets Inpatient Criteria as of 03/31/19 @ 14:58     For the Following reasons:    [x]   Severity of Signs/ Symptoms indicate admission need   [x]   Medical Condition Would be Threatened in lower level of care   []   Diagnostic studies procedures results justify inpatient care   [x]   Adverse event likely if not inpatient admission   []   Pre-existing conditions warrants inpatient care     The patient requires inpatient care for further evaluation of their Chest pain [R07.9]  Chest pain [R07.9]

## 2019-04-06 NOTE — DISCHARGE SUMMARY
CDU Discharge Summary        Patient:  John Quinn  YOB: 1960    MRN: [id number]   Acct: [de-identified]    Primary Care Physician: Kinga Trejo    Admit date:  3/31/2019  2:58 PM  Discharge date: 4/2/2019  5:21 PM     Discharge Diagnoses:     1, Acute on chronic left sided chest pain , treated with SL nitroglycerin ,  etiology proximal LAD occlusion treated with Cardiac catheterization and LAD stent placement. Follow-up:  Call today/tomorrow for a follow up appointment with Kinga Trejo , or return to the Emergency Room with worsening symptoms    Stressed to patient the importance of following up with primary care doctor for further workup/management of symptoms. Pt verbalizes understanding and agrees with plan. Discharge Medication Changes:       Medication List      START taking these medications    atorvastatin 40 MG tablet  Commonly known as:  LIPITOR  Take 1 tablet by mouth nightly     clopidogrel 75 MG tablet  Commonly known as:  PLAVIX  Take 1 tablet by mouth daily     nitroGLYCERIN 0.4 MG SL tablet  Commonly known as:  NITROSTAT  Place 1 tablet under the tongue every 5 minutes as needed for Chest pain up to max of 3 total doses. If no relief after 1 dose, call 911.         CHANGE how you take these medications    aspirin 81 MG chewable tablet  Take 1 tablet by mouth daily  What changed:  additional instructions        CONTINUE taking these medications    carvedilol 3.125 MG tablet  Commonly known as:  COREG  Take 1 tablet by mouth 2 times daily (with meals)     furosemide 40 MG tablet  Commonly known as:  LASIX     NIFEdipine 30 MG extended release tablet  Commonly known as:  ADALAT CC     SITagliptin 25 MG tablet  Commonly known as:  JANUVIA  Take 1 tablet by mouth daily           Where to Get Your Medications      These medications were sent to [address] [place] [id number] Glucose 165 (H) 70 - 99 mg/dL    BUN 49 (H) 8 - 23 mg/dL    CREATININE 5.28 (HH) 0.70 - 1.20 mg/dL    Bun/Cre Ratio NOT REPORTED 9 - 20    Calcium 8.3 (L) 8.6 - 10.4 mg/dL    Sodium 137 135 - 144 mmol/L    Potassium 3.9 3.7 - 5.3 mmol/L    Chloride 100 98 - 107 mmol/L    CO2 22 20 - 31 mmol/L    Anion Gap 15 9 - 17 mmol/L    GFR Non-African American 11 (L) >60 mL/min    GFR  14 (L) >60 mL/min    GFR Comment          GFR Staging NOT REPORTED    CBC   Result Value Ref Range    WBC 6.0 3.5 - 11.3 k/uL    RBC 3.30 (L) 4.21 - 5.77 m/uL    Hemoglobin 9.5 (L) 13.0 - 17.0 g/dL    Hematocrit 29.5 (L) 40.7 - 50.3 %    MCV 89.4 82.6 - 102.9 fL    MCH 28.8 25.2 - 33.5 pg    MCHC 32.2 28.4 - 34.8 g/dL    RDW 14.0 11.8 - 14.4 %    Platelets 621 803 - 899 k/uL    MPV 9.4 8.1 - 13.5 fL    NRBC Automated 0.0 0.0 per 100 WBC   POC Glucose Fingerstick   Result Value Ref Range    POC Glucose 200 (H) 75 - 110 mg/dL   POC Glucose Fingerstick   Result Value Ref Range    POC Glucose 158 (H) 75 - 110 mg/dL   POC Glucose Fingerstick   Result Value Ref Range    POC Glucose 145 (H) 75 - 110 mg/dL   EKG 12 Lead   Result Value Ref Range    Ventricular Rate 92 BPM    Atrial Rate 92 BPM    P-R Interval 226 ms    QRS Duration 94 ms    Q-T Interval 384 ms    QTc Calculation (Bazett) 474 ms    P Axis 62 degrees    R Axis -72 degrees    T Axis 52 degrees   EKG 12 Lead   Result Value Ref Range    Ventricular Rate 68 BPM    Atrial Rate 68 BPM    P-R Interval 240 ms    QRS Duration 162 ms    Q-T Interval 496 ms    QTc Calculation (Bazett) 527 ms    P Axis 63 degrees    R Axis -74 degrees    T Axis 58 degrees     Xr Chest Standard (2 Vw)    Result Date: 3/31/2019  EXAMINATION: TWO VIEWS OF THE CHEST 3/31/2019 3:45 pm COMPARISON: 03/14/2019 HISTORY: ORDERING SYSTEM PROVIDED HISTORY: chest pain TECHNOLOGIST PROVIDED HISTORY: chest pain Initial encounter. Acute illness. FINDINGS: The lungs are clear.   No pneumothorax or pleural effusion is seen.  Cardiac and mediastinal contours are normal.  Right-sided tunneled dialysis catheter tip remains at the superior cavoatrial junction. No acute osseous abnormality is seen. Stable examination. No acute cardiopulmonary abnormality. Physical Exam:    General appearance - NAD, AOx 3   Lungs -CTAB, no R/R/R  Heart - RRR, no M/R/G  Abdomen - Soft, NT/ND  Neurological:  MAEx4, No focal motor deficit, sensory loss  Extremities - Cap refil <2 sec in all ext., no edema  Skin -warm, dry      Hospital Course:  Clinical course has improved, labs and imaging reviewed. Adri Melchor originally presented to the hospital on 3/31/2019  2:58 PM with chest pain . At that time it was determined that He required further observation and testing and consultation. Labs and imaging were followed daily. Imaging results as above. He is medically stable to be discharged. Disposition: Home    Patient stated that they will not drive themselves home from the hospital if they have gotten pain killers/ narcotics earlier that day and that they will arrange for transportation on their own or work with the  for a ride. Patient counseled NOT to drive while under the influence of narcotics/ pain killers. Condition: Good    Patient stable and ready for discharge home. I have discussed plan of care with patient and they are in understanding. They were instructed to read discharge paperwork. All of their questions and concerns were addressed. Time Spent: 1 day      --  Eloy Calderon,   Emergency Medicine Resident Physician    This dictation was generated by voice recognition computer software. Although all attempts are made to edit the dictation for accuracy, there may be errors in the transcription that are not intended.

## 2019-04-08 ENCOUNTER — OFFICE VISIT (OUTPATIENT)
Dept: VASCULAR SURGERY | Age: 59
End: 2019-04-08

## 2019-04-08 VITALS
SYSTOLIC BLOOD PRESSURE: 138 MMHG | HEART RATE: 89 BPM | DIASTOLIC BLOOD PRESSURE: 77 MMHG | HEIGHT: 70 IN | OXYGEN SATURATION: 99 % | WEIGHT: 248 LBS | BODY MASS INDEX: 35.5 KG/M2

## 2019-04-08 DIAGNOSIS — I77.0 AVF (ARTERIOVENOUS FISTULA) (HCC): Primary | ICD-10-CM

## 2019-04-08 PROCEDURE — 99024 POSTOP FOLLOW-UP VISIT: CPT | Performed by: SURGERY

## 2019-04-08 RX ORDER — TRAMADOL HYDROCHLORIDE 50 MG/1
TABLET ORAL
Refills: 0 | Status: ON HOLD | COMMUNITY
Start: 2019-04-05 | End: 2019-10-23 | Stop reason: HOSPADM

## 2019-04-08 RX ORDER — LIDOCAINE AND PRILOCAINE 25; 25 MG/G; MG/G
CREAM TOPICAL
COMMUNITY
Start: 2019-03-18

## 2019-04-08 ASSESSMENT — ENCOUNTER SYMPTOMS: SHORTNESS OF BREATH: 1

## 2019-04-09 NOTE — PROGRESS NOTES
Division of Vascular Surgery        Postoperative Follow Up    Fistulagram, AVF revision, branch ligation (3/6/19)    History of Present Illness:      Marlee Hodgkins is a 59 y.o. gentleman presents for postoperative follow up after undergoing revision of his LUE AVF. He underwent PCI for single vessel disease of his LAD on 4/1 and is recovering from that. His arm has not caused him any issues, denies pain, weakness, numbness in his hand. Review of Systems:     Review of Systems   Constitutional: Negative for chills and fever. Respiratory: Positive for shortness of breath. Cardiovascular: Positive for chest pain. Skin: Negative. Neurological: Negative for weakness and numbness. Physical Exam:     Vitals:  /77 (Site: Right Upper Arm, Position: Sitting, Cuff Size: Medium Adult)   Pulse 89   Ht 5' 10\" (1.778 m)   Wt 248 lb (112.5 kg)   SpO2 99%   BMI 35.58 kg/m²     Physical Exam   Constitutional: He is oriented to person, place, and time. He appears well-developed and well-nourished. Cardiovascular: Normal rate, regular rhythm and normal heart sounds. Pulses:       Radial pulses are 1+ on the left side. Thrill over fistula in forearm   Pulmonary/Chest: Effort normal. No respiratory distress. Musculoskeletal:        Left forearm: He exhibits no tenderness and no swelling. Left hand: He exhibits normal capillary refill. Normal sensation noted. Normal strength noted. Neurological: He is alert and oriented to person, place, and time. He has normal strength. No sensory deficit. GCS eye subscore is 4. GCS verbal subscore is 5. GCS motor subscore is 6. Skin: Skin is intact. Capillary refill takes less than 2 seconds.        Imaging/Labs:     Bedside ultrasound shows 4-5 mm cephalic vein fistula, no branches    Assessment and Plan:     ESRD on hemodialysis, s/p LUE AVF creation with recent revision  · Will start cannulation of AVF next week with one needle followed by 2 needles. Once there are no issues with cannulation for a week or so, we can work on getting catheter removed by IR or myself if there issues with IR scheduling. · He will follow up as needed    Electronically signed by Tracie Putnam MD on 4/8/19 at 9:23 PM      [address] [place] [address],[address]: [phone]  C: [phone]  Email: [email or web address]

## 2019-04-25 ENCOUNTER — TELEPHONE (OUTPATIENT)
Dept: CARDIOTHORACIC SURGERY | Age: 59
End: 2019-04-25

## 2019-04-25 NOTE — TELEPHONE ENCOUNTER
----- Message from Rene Terrell MD sent at 4/25/2019 12:11 PM EDT -----  2 week break, have dialysis center call me if they have issues accessing after that.    ----- Message -----  From: Alicia Jaimes  Sent: 4/25/2019   9:44 AM  To: Rene Terrell MD    Patient called with concerns about his fistula. Patient states that the first two times that he was at dialysis that there was no problems and that it did not hurt, the third time he states there was a harder time and there was some pain,  the fourth and fifth time at dialysis it did not work at all and that there was quite a bit of pain. Patient was wondering if the fistula needs more time to mature. Patient was also wondering what he should do. Please advise. Thank you.

## 2019-06-10 ENCOUNTER — OFFICE VISIT (OUTPATIENT)
Dept: VASCULAR SURGERY | Age: 59
End: 2019-06-10
Payer: MEDICARE

## 2019-06-10 VITALS
TEMPERATURE: 97.1 F | OXYGEN SATURATION: 98 % | HEART RATE: 83 BPM | SYSTOLIC BLOOD PRESSURE: 136 MMHG | BODY MASS INDEX: 35.93 KG/M2 | DIASTOLIC BLOOD PRESSURE: 74 MMHG | WEIGHT: 251 LBS | HEIGHT: 70 IN

## 2019-06-10 DIAGNOSIS — N18.6 ESRD ON DIALYSIS (HCC): Primary | ICD-10-CM

## 2019-06-10 DIAGNOSIS — Z99.2 ESRD ON DIALYSIS (HCC): Primary | ICD-10-CM

## 2019-06-10 LAB
EKG ATRIAL RATE: 87 BPM
EKG P AXIS: 70 DEGREES
EKG P-R INTERVAL: 198 MS
EKG Q-T INTERVAL: 442 MS
EKG QRS DURATION: 150 MS
EKG QTC CALCULATION (BAZETT): 531 MS
EKG R AXIS: -78 DEGREES
EKG T AXIS: 52 DEGREES
EKG VENTRICULAR RATE: 87 BPM

## 2019-06-10 PROCEDURE — G8417 CALC BMI ABV UP PARAM F/U: HCPCS | Performed by: SURGERY

## 2019-06-10 PROCEDURE — 1036F TOBACCO NON-USER: CPT | Performed by: SURGERY

## 2019-06-10 PROCEDURE — 93010 ELECTROCARDIOGRAM REPORT: CPT | Performed by: INTERNAL MEDICINE

## 2019-06-10 PROCEDURE — 3017F COLORECTAL CA SCREEN DOC REV: CPT | Performed by: SURGERY

## 2019-06-10 PROCEDURE — 99213 OFFICE O/P EST LOW 20 MIN: CPT | Performed by: SURGERY

## 2019-06-10 PROCEDURE — G8427 DOCREV CUR MEDS BY ELIG CLIN: HCPCS | Performed by: SURGERY

## 2019-06-10 RX ORDER — B,C/FOLIC/ZINC/SELENOMETH/D3/E 0.8-12.5MG
1 TABLET ORAL EVERY OTHER DAY
Refills: 3 | COMMUNITY
Start: 2019-05-14

## 2019-06-12 ASSESSMENT — ENCOUNTER SYMPTOMS
GASTROINTESTINAL NEGATIVE: 1
ALLERGIC/IMMUNOLOGIC NEGATIVE: 1
COLOR CHANGE: 0
SHORTNESS OF BREATH: 0
CHEST TIGHTNESS: 0

## 2019-06-13 NOTE — PROGRESS NOTES
Division of Vascular Surgery        Follow Up    Malfunctioning AVF    History of Present Illness:      Ty Borges is a 59 y.o. gentleman presents for follow up regarding his fistula. Dialysis center has issues with cannulation. Sometimes the bottom access infiltrates other times the top one does. Most recently developed swelling and bruising over the top part of his forearm. He denies any pain/weakness or numbness to suggest ischemic steal in his hand. He has a tunneled catheter which they are intermittently using for dialysis. Review of Systems:     Review of Systems   Constitutional: Negative for chills and fever. HENT: Negative. Eyes: Negative for visual disturbance. Respiratory: Negative for chest tightness and shortness of breath. Cardiovascular: Negative for chest pain. Gastrointestinal: Negative. Endocrine: Negative. Genitourinary: Negative. Musculoskeletal: Negative. Skin: Negative for color change and wound. Allergic/Immunologic: Negative. Neurological: Negative for weakness and numbness. Hematological: Negative. Psychiatric/Behavioral: Negative. Physical Exam:     Vitals:  /74 (Site: Right Upper Arm, Position: Sitting, Cuff Size: Large Adult)   Pulse 83   Temp 97.1 °F (36.2 °C) (Oral)   Ht 5' 10\" (1.778 m)   Wt 251 lb (113.9 kg)   SpO2 98%   BMI 36.01 kg/m²     Physical Exam   Constitutional: He is oriented to person, place, and time. He appears well-developed and well-nourished. Eyes: Conjunctivae and EOM are normal.   Neck: Carotid bruit is not present. Cardiovascular: Normal rate and regular rhythm. Pulses:       Radial pulses are 1+ on the right side. Weak thrill distally in forearm   Pulmonary/Chest: Effort normal. No respiratory distress. Abdominal: Soft. Normal appearance. Musculoskeletal:        Right forearm: He exhibits swelling. He exhibits no tenderness. Right hand: He exhibits normal capillary refill.

## 2019-06-17 ENCOUNTER — APPOINTMENT (OUTPATIENT)
Dept: GENERAL RADIOLOGY | Age: 59
DRG: 302 | End: 2019-06-17
Payer: MEDICARE

## 2019-06-17 ENCOUNTER — HOSPITAL ENCOUNTER (INPATIENT)
Age: 59
LOS: 2 days | Discharge: HOME | DRG: 302 | End: 2019-06-19
Attending: EMERGENCY MEDICINE | Admitting: INTERNAL MEDICINE
Payer: MEDICARE

## 2019-06-17 DIAGNOSIS — I21.4 NSTEMI (NON-ST ELEVATED MYOCARDIAL INFARCTION) (HCC): Primary | ICD-10-CM

## 2019-06-17 LAB
-: NORMAL
ABSOLUTE EOS #: 0.16 K/UL (ref 0–0.44)
ABSOLUTE IMMATURE GRANULOCYTE: 0.04 K/UL (ref 0–0.3)
ABSOLUTE LYMPH #: 0.77 K/UL (ref 1.1–3.7)
ABSOLUTE MONO #: 0.77 K/UL (ref 0.1–1.2)
ANION GAP SERPL CALCULATED.3IONS-SCNC: 19 MMOL/L (ref 9–17)
BASOPHILS # BLD: 1 % (ref 0–2)
BASOPHILS ABSOLUTE: 0.03 K/UL (ref 0–0.2)
BNP INTERPRETATION: ABNORMAL
BUN BLDV-MCNC: 56 MG/DL (ref 8–23)
BUN/CREAT BLD: ABNORMAL (ref 9–20)
CALCIUM SERPL-MCNC: 9.8 MG/DL (ref 8.6–10.4)
CHLORIDE BLD-SCNC: 94 MMOL/L (ref 98–107)
CO2: 22 MMOL/L (ref 20–31)
CREAT SERPL-MCNC: 5.47 MG/DL (ref 0.7–1.2)
DIFFERENTIAL TYPE: ABNORMAL
EOSINOPHILS RELATIVE PERCENT: 3 % (ref 1–4)
GFR AFRICAN AMERICAN: 13 ML/MIN
GFR NON-AFRICAN AMERICAN: 11 ML/MIN
GFR SERPL CREATININE-BSD FRML MDRD: ABNORMAL ML/MIN/{1.73_M2}
GFR SERPL CREATININE-BSD FRML MDRD: ABNORMAL ML/MIN/{1.73_M2}
GLUCOSE BLD-MCNC: 128 MG/DL (ref 75–110)
GLUCOSE BLD-MCNC: 171 MG/DL (ref 70–99)
HCT VFR BLD CALC: 40.1 % (ref 40.7–50.3)
HEMOGLOBIN: 12.7 G/DL (ref 13–17)
IMMATURE GRANULOCYTES: 1 %
LYMPHOCYTES # BLD: 12 % (ref 24–43)
MCH RBC QN AUTO: 29 PG (ref 25.2–33.5)
MCHC RBC AUTO-ENTMCNC: 31.7 G/DL (ref 28.4–34.8)
MCV RBC AUTO: 91.6 FL (ref 82.6–102.9)
MONOCYTES # BLD: 12 % (ref 3–12)
NRBC AUTOMATED: 0 PER 100 WBC
PARTIAL THROMBOPLASTIN TIME: 24.2 SEC (ref 20.5–30.5)
PDW BLD-RTO: 14.3 % (ref 11.8–14.4)
PLATELET # BLD: 209 K/UL (ref 138–453)
PLATELET ESTIMATE: ABNORMAL
PMV BLD AUTO: 9.4 FL (ref 8.1–13.5)
POTASSIUM SERPL-SCNC: 4 MMOL/L (ref 3.7–5.3)
PRO-BNP: 1895 PG/ML
RBC # BLD: 4.38 M/UL (ref 4.21–5.77)
RBC # BLD: ABNORMAL 10*6/UL
REASON FOR REJECTION: NORMAL
SEG NEUTROPHILS: 71 % (ref 36–65)
SEGMENTED NEUTROPHILS ABSOLUTE COUNT: 4.63 K/UL (ref 1.5–8.1)
SODIUM BLD-SCNC: 135 MMOL/L (ref 135–144)
TROPONIN INTERP: ABNORMAL
TROPONIN INTERP: ABNORMAL
TROPONIN T: ABNORMAL NG/ML
TROPONIN T: ABNORMAL NG/ML
TROPONIN, HIGH SENSITIVITY: 144 NG/L (ref 0–22)
TROPONIN, HIGH SENSITIVITY: 161 NG/L (ref 0–22)
WBC # BLD: 6.4 K/UL (ref 3.5–11.3)
WBC # BLD: ABNORMAL 10*3/UL
ZZ NTE CLEAN UP: ORDERED TEST: NORMAL
ZZ NTE WITH NAME CLEAN UP: SPECIMEN SOURCE: NORMAL

## 2019-06-17 PROCEDURE — 2060000000 HC ICU INTERMEDIATE R&B

## 2019-06-17 PROCEDURE — 93005 ELECTROCARDIOGRAM TRACING: CPT

## 2019-06-17 PROCEDURE — 85730 THROMBOPLASTIN TIME PARTIAL: CPT

## 2019-06-17 PROCEDURE — 6370000000 HC RX 637 (ALT 250 FOR IP): Performed by: STUDENT IN AN ORGANIZED HEALTH CARE EDUCATION/TRAINING PROGRAM

## 2019-06-17 PROCEDURE — 83880 ASSAY OF NATRIURETIC PEPTIDE: CPT

## 2019-06-17 PROCEDURE — 84484 ASSAY OF TROPONIN QUANT: CPT

## 2019-06-17 PROCEDURE — 71046 X-RAY EXAM CHEST 2 VIEWS: CPT

## 2019-06-17 PROCEDURE — 80048 BASIC METABOLIC PNL TOTAL CA: CPT

## 2019-06-17 PROCEDURE — 93005 ELECTROCARDIOGRAM TRACING: CPT | Performed by: STUDENT IN AN ORGANIZED HEALTH CARE EDUCATION/TRAINING PROGRAM

## 2019-06-17 PROCEDURE — 6360000002 HC RX W HCPCS: Performed by: STUDENT IN AN ORGANIZED HEALTH CARE EDUCATION/TRAINING PROGRAM

## 2019-06-17 PROCEDURE — 82947 ASSAY GLUCOSE BLOOD QUANT: CPT

## 2019-06-17 PROCEDURE — 99285 EMERGENCY DEPT VISIT HI MDM: CPT

## 2019-06-17 PROCEDURE — 85025 COMPLETE CBC W/AUTO DIFF WBC: CPT

## 2019-06-17 RX ORDER — ONDANSETRON 2 MG/ML
4 INJECTION INTRAMUSCULAR; INTRAVENOUS EVERY 6 HOURS PRN
Status: DISCONTINUED | OUTPATIENT
Start: 2019-06-17 | End: 2019-06-19 | Stop reason: HOSPADM

## 2019-06-17 RX ORDER — NITROGLYCERIN 0.4 MG/1
0.4 TABLET SUBLINGUAL EVERY 5 MIN PRN
Status: DISCONTINUED | OUTPATIENT
Start: 2019-06-17 | End: 2019-06-17 | Stop reason: SDUPTHER

## 2019-06-17 RX ORDER — MORPHINE SULFATE 4 MG/ML
2 INJECTION, SOLUTION INTRAMUSCULAR; INTRAVENOUS ONCE
Status: DISCONTINUED | OUTPATIENT
Start: 2019-06-17 | End: 2019-06-17

## 2019-06-17 RX ORDER — ATORVASTATIN CALCIUM 80 MG/1
40 TABLET, FILM COATED ORAL NIGHTLY
Status: DISCONTINUED | OUTPATIENT
Start: 2019-06-17 | End: 2019-06-19 | Stop reason: HOSPADM

## 2019-06-17 RX ORDER — DEXTROSE MONOHYDRATE 50 MG/ML
100 INJECTION, SOLUTION INTRAVENOUS PRN
Status: DISCONTINUED | OUTPATIENT
Start: 2019-06-17 | End: 2019-06-19 | Stop reason: HOSPADM

## 2019-06-17 RX ORDER — FUROSEMIDE 20 MG/1
40 TABLET ORAL 2 TIMES DAILY
Status: DISCONTINUED | OUTPATIENT
Start: 2019-06-18 | End: 2019-06-19 | Stop reason: HOSPADM

## 2019-06-17 RX ORDER — POTASSIUM CHLORIDE 20 MEQ/1
40 TABLET, EXTENDED RELEASE ORAL PRN
Status: DISCONTINUED | OUTPATIENT
Start: 2019-06-17 | End: 2019-06-19 | Stop reason: HOSPADM

## 2019-06-17 RX ORDER — NITROGLYCERIN 0.4 MG/1
0.4 TABLET SUBLINGUAL EVERY 5 MIN PRN
Status: DISCONTINUED | OUTPATIENT
Start: 2019-06-17 | End: 2019-06-19 | Stop reason: HOSPADM

## 2019-06-17 RX ORDER — POTASSIUM CHLORIDE 1.5 G/1.77G
40 POWDER, FOR SOLUTION ORAL PRN
Status: DISCONTINUED | OUTPATIENT
Start: 2019-06-17 | End: 2019-06-19 | Stop reason: HOSPADM

## 2019-06-17 RX ORDER — HEPARIN SODIUM 1000 [USP'U]/ML
2000 INJECTION, SOLUTION INTRAVENOUS; SUBCUTANEOUS PRN
Status: DISCONTINUED | OUTPATIENT
Start: 2019-06-17 | End: 2019-06-19

## 2019-06-17 RX ORDER — NICOTINE POLACRILEX 4 MG
15 LOZENGE BUCCAL PRN
Status: DISCONTINUED | OUTPATIENT
Start: 2019-06-17 | End: 2019-06-19 | Stop reason: HOSPADM

## 2019-06-17 RX ORDER — MAGNESIUM SULFATE 1 G/100ML
1 INJECTION INTRAVENOUS PRN
Status: DISCONTINUED | OUTPATIENT
Start: 2019-06-17 | End: 2019-06-19 | Stop reason: HOSPADM

## 2019-06-17 RX ORDER — POTASSIUM CHLORIDE 7.45 MG/ML
10 INJECTION INTRAVENOUS PRN
Status: DISCONTINUED | OUTPATIENT
Start: 2019-06-17 | End: 2019-06-19 | Stop reason: HOSPADM

## 2019-06-17 RX ORDER — HEPARIN SODIUM 10000 [USP'U]/100ML
8.78 INJECTION, SOLUTION INTRAVENOUS CONTINUOUS
Status: DISCONTINUED | OUTPATIENT
Start: 2019-06-17 | End: 2019-06-19

## 2019-06-17 RX ORDER — NIFEDIPINE 30 MG/1
30 TABLET, EXTENDED RELEASE ORAL 2 TIMES DAILY
Status: DISCONTINUED | OUTPATIENT
Start: 2019-06-17 | End: 2019-06-19 | Stop reason: HOSPADM

## 2019-06-17 RX ORDER — FAMOTIDINE 20 MG/1
20 TABLET, FILM COATED ORAL DAILY
Status: DISCONTINUED | OUTPATIENT
Start: 2019-06-18 | End: 2019-06-19 | Stop reason: HOSPADM

## 2019-06-17 RX ORDER — HEPARIN SODIUM 1000 [USP'U]/ML
4000 INJECTION, SOLUTION INTRAVENOUS; SUBCUTANEOUS ONCE
Status: COMPLETED | OUTPATIENT
Start: 2019-06-17 | End: 2019-06-17

## 2019-06-17 RX ORDER — DEXTROSE MONOHYDRATE 25 G/50ML
12.5 INJECTION, SOLUTION INTRAVENOUS PRN
Status: DISCONTINUED | OUTPATIENT
Start: 2019-06-17 | End: 2019-06-19 | Stop reason: HOSPADM

## 2019-06-17 RX ORDER — HEPARIN SODIUM 1000 [USP'U]/ML
4000 INJECTION, SOLUTION INTRAVENOUS; SUBCUTANEOUS PRN
Status: DISCONTINUED | OUTPATIENT
Start: 2019-06-17 | End: 2019-06-19

## 2019-06-17 RX ADMIN — HEPARIN SODIUM AND DEXTROSE 8.78 UNITS/KG/HR: 10000; 5 INJECTION INTRAVENOUS at 21:55

## 2019-06-17 RX ADMIN — HEPARIN SODIUM 4000 UNITS: 1000 INJECTION, SOLUTION INTRAVENOUS; SUBCUTANEOUS at 21:55

## 2019-06-17 RX ADMIN — NITROGLYCERIN 0.4 MG: 0.4 TABLET SUBLINGUAL at 21:59

## 2019-06-17 ASSESSMENT — ENCOUNTER SYMPTOMS
COUGH: 0
NAUSEA: 0
VOMITING: 0
RHINORRHEA: 0
DIARRHEA: 0
ABDOMINAL PAIN: 0
SHORTNESS OF BREATH: 1

## 2019-06-18 ENCOUNTER — APPOINTMENT (OUTPATIENT)
Dept: DIALYSIS | Age: 59
DRG: 302 | End: 2019-06-18
Payer: MEDICARE

## 2019-06-18 LAB
ANION GAP SERPL CALCULATED.3IONS-SCNC: 15 MMOL/L (ref 9–17)
BUN BLDV-MCNC: 51 MG/DL (ref 8–23)
BUN/CREAT BLD: ABNORMAL (ref 9–20)
CALCIUM SERPL-MCNC: 8.8 MG/DL (ref 8.6–10.4)
CHLORIDE BLD-SCNC: 99 MMOL/L (ref 98–107)
CO2: 25 MMOL/L (ref 20–31)
CREAT SERPL-MCNC: 5.12 MG/DL (ref 0.7–1.2)
EKG ATRIAL RATE: 91 BPM
EKG P AXIS: 54 DEGREES
EKG P-R INTERVAL: 234 MS
EKG Q-T INTERVAL: 410 MS
EKG QRS DURATION: 170 MS
EKG QTC CALCULATION (BAZETT): 504 MS
EKG R AXIS: -79 DEGREES
EKG T AXIS: 52 DEGREES
EKG VENTRICULAR RATE: 91 BPM
ESTIMATED AVERAGE GLUCOSE: 148 MG/DL
GFR AFRICAN AMERICAN: 14 ML/MIN
GFR NON-AFRICAN AMERICAN: 12 ML/MIN
GFR SERPL CREATININE-BSD FRML MDRD: ABNORMAL ML/MIN/{1.73_M2}
GFR SERPL CREATININE-BSD FRML MDRD: ABNORMAL ML/MIN/{1.73_M2}
GLUCOSE BLD-MCNC: 139 MG/DL (ref 75–110)
GLUCOSE BLD-MCNC: 257 MG/DL (ref 70–99)
GLUCOSE BLD-MCNC: 98 MG/DL (ref 75–110)
HBA1C MFR BLD: 6.8 % (ref 4–6)
LV EF: 48 %
LVEF MODALITY: NORMAL
PARTIAL THROMBOPLASTIN TIME: 36.6 SEC (ref 20.5–30.5)
PARTIAL THROMBOPLASTIN TIME: 41.1 SEC (ref 20.5–30.5)
PARTIAL THROMBOPLASTIN TIME: >120 SEC (ref 20.5–30.5)
POTASSIUM SERPL-SCNC: 4.2 MMOL/L (ref 3.7–5.3)
SODIUM BLD-SCNC: 139 MMOL/L (ref 135–144)
TROPONIN INTERP: ABNORMAL
TROPONIN INTERP: ABNORMAL
TROPONIN T: ABNORMAL NG/ML
TROPONIN T: ABNORMAL NG/ML
TROPONIN, HIGH SENSITIVITY: 133 NG/L (ref 0–22)
TROPONIN, HIGH SENSITIVITY: 138 NG/L (ref 0–22)

## 2019-06-18 PROCEDURE — 6370000000 HC RX 637 (ALT 250 FOR IP): Performed by: INTERNAL MEDICINE

## 2019-06-18 PROCEDURE — 82947 ASSAY GLUCOSE BLOOD QUANT: CPT

## 2019-06-18 PROCEDURE — 2060000000 HC ICU INTERMEDIATE R&B

## 2019-06-18 PROCEDURE — 84484 ASSAY OF TROPONIN QUANT: CPT

## 2019-06-18 PROCEDURE — 2580000003 HC RX 258: Performed by: NURSE PRACTITIONER

## 2019-06-18 PROCEDURE — 6360000002 HC RX W HCPCS: Performed by: NURSE PRACTITIONER

## 2019-06-18 PROCEDURE — 93306 TTE W/DOPPLER COMPLETE: CPT

## 2019-06-18 PROCEDURE — 80048 BASIC METABOLIC PNL TOTAL CA: CPT

## 2019-06-18 PROCEDURE — 83036 HEMOGLOBIN GLYCOSYLATED A1C: CPT

## 2019-06-18 PROCEDURE — 36415 COLL VENOUS BLD VENIPUNCTURE: CPT

## 2019-06-18 PROCEDURE — 6370000000 HC RX 637 (ALT 250 FOR IP)

## 2019-06-18 PROCEDURE — 99223 1ST HOSP IP/OBS HIGH 75: CPT | Performed by: INTERNAL MEDICINE

## 2019-06-18 PROCEDURE — 85730 THROMBOPLASTIN TIME PARTIAL: CPT

## 2019-06-18 PROCEDURE — 93010 ELECTROCARDIOGRAM REPORT: CPT | Performed by: INTERNAL MEDICINE

## 2019-06-18 PROCEDURE — 6370000000 HC RX 637 (ALT 250 FOR IP): Performed by: NURSE PRACTITIONER

## 2019-06-18 PROCEDURE — 90935 HEMODIALYSIS ONE EVALUATION: CPT

## 2019-06-18 RX ORDER — ASPIRIN 81 MG/1
324 TABLET, CHEWABLE ORAL ONCE
Status: DISCONTINUED | OUTPATIENT
Start: 2019-06-18 | End: 2019-06-18

## 2019-06-18 RX ORDER — FUROSEMIDE 20 MG/1
TABLET ORAL
Status: DISPENSED
Start: 2019-06-18 | End: 2019-06-19

## 2019-06-18 RX ORDER — ASPIRIN 81 MG/1
81 TABLET ORAL DAILY
Status: DISCONTINUED | OUTPATIENT
Start: 2019-06-19 | End: 2019-06-19 | Stop reason: HOSPADM

## 2019-06-18 RX ORDER — ISOSORBIDE MONONITRATE 30 MG/1
30 TABLET, EXTENDED RELEASE ORAL DAILY
Status: DISCONTINUED | OUTPATIENT
Start: 2019-06-18 | End: 2019-06-19 | Stop reason: HOSPADM

## 2019-06-18 RX ORDER — CARVEDILOL 3.12 MG/1
3.12 TABLET ORAL 2 TIMES DAILY WITH MEALS
Status: DISCONTINUED | OUTPATIENT
Start: 2019-06-18 | End: 2019-06-18

## 2019-06-18 RX ORDER — SODIUM CHLORIDE 0.9 % (FLUSH) 0.9 %
10 SYRINGE (ML) INJECTION EVERY 12 HOURS SCHEDULED
Status: DISCONTINUED | OUTPATIENT
Start: 2019-06-18 | End: 2019-06-19 | Stop reason: HOSPADM

## 2019-06-18 RX ORDER — CARVEDILOL 6.25 MG/1
6.25 TABLET ORAL 2 TIMES DAILY WITH MEALS
Status: DISCONTINUED | OUTPATIENT
Start: 2019-06-18 | End: 2019-06-19 | Stop reason: HOSPADM

## 2019-06-18 RX ORDER — CLOPIDOGREL BISULFATE 75 MG/1
75 TABLET ORAL DAILY
Status: DISCONTINUED | OUTPATIENT
Start: 2019-06-18 | End: 2019-06-19 | Stop reason: HOSPADM

## 2019-06-18 RX ORDER — SODIUM CHLORIDE 0.9 % (FLUSH) 0.9 %
10 SYRINGE (ML) INJECTION PRN
Status: DISCONTINUED | OUTPATIENT
Start: 2019-06-18 | End: 2019-06-19 | Stop reason: HOSPADM

## 2019-06-18 RX ADMIN — NIFEDIPINE 30 MG: 30 TABLET, FILM COATED, EXTENDED RELEASE ORAL at 00:34

## 2019-06-18 RX ADMIN — HEPARIN SODIUM 2000 UNITS: 1000 INJECTION INTRAVENOUS; SUBCUTANEOUS at 04:50

## 2019-06-18 RX ADMIN — ATORVASTATIN CALCIUM 40 MG: 80 TABLET, FILM COATED ORAL at 00:31

## 2019-06-18 RX ADMIN — LINAGLIPTIN 5 MG: 5 TABLET, FILM COATED ORAL at 17:28

## 2019-06-18 RX ADMIN — CLOPIDOGREL 75 MG: 75 TABLET, FILM COATED ORAL at 15:03

## 2019-06-18 RX ADMIN — FAMOTIDINE 20 MG: 20 TABLET, FILM COATED ORAL at 14:57

## 2019-06-18 RX ADMIN — Medication 10 ML: at 20:08

## 2019-06-18 RX ADMIN — HEPARIN SODIUM AND DEXTROSE 12.82 UNITS/KG/HR: 10000; 5 INJECTION INTRAVENOUS at 17:32

## 2019-06-18 RX ADMIN — CARVEDILOL 6.25 MG: 6.25 TABLET, FILM COATED ORAL at 15:03

## 2019-06-18 RX ADMIN — ISOSORBIDE MONONITRATE 30 MG: 30 TABLET ORAL at 17:24

## 2019-06-18 RX ADMIN — FUROSEMIDE 40 MG: 20 TABLET ORAL at 17:27

## 2019-06-18 RX ADMIN — ATORVASTATIN CALCIUM 40 MG: 80 TABLET, FILM COATED ORAL at 20:08

## 2019-06-18 RX ADMIN — HEPARIN SODIUM 2000 UNITS: 1000 INJECTION INTRAVENOUS; SUBCUTANEOUS at 14:47

## 2019-06-18 RX ADMIN — ASPIRIN 325 MG: 325 TABLET, COATED ORAL at 15:03

## 2019-06-18 ASSESSMENT — PAIN SCALES - GENERAL
PAINLEVEL_OUTOF10: 0
PAINLEVEL_OUTOF10: 2
PAINLEVEL_OUTOF10: 0
PAINLEVEL_OUTOF10: 2

## 2019-06-18 ASSESSMENT — PAIN - FUNCTIONAL ASSESSMENT: PAIN_FUNCTIONAL_ASSESSMENT: ACTIVITIES ARE NOT PREVENTED

## 2019-06-18 ASSESSMENT — PAIN DESCRIPTION - ONSET: ONSET: ON-GOING

## 2019-06-18 ASSESSMENT — PAIN DESCRIPTION - ORIENTATION: ORIENTATION: LEFT

## 2019-06-18 ASSESSMENT — PAIN DESCRIPTION - PAIN TYPE
TYPE: ACUTE PAIN
TYPE: ACUTE PAIN

## 2019-06-18 ASSESSMENT — PAIN DESCRIPTION - FREQUENCY: FREQUENCY: CONTINUOUS

## 2019-06-18 ASSESSMENT — PAIN DESCRIPTION - LOCATION
LOCATION: CHEST
LOCATION: CHEST

## 2019-06-18 ASSESSMENT — PAIN DESCRIPTION - DESCRIPTORS: DESCRIPTORS: TIGHTNESS;SQUEEZING

## 2019-06-18 ASSESSMENT — PAIN DESCRIPTION - PROGRESSION: CLINICAL_PROGRESSION: NOT CHANGED

## 2019-06-18 NOTE — ED NOTES
Report received from [facility].      Vilma Herman RN  06/17/19 [id number]

## 2019-06-18 NOTE — PROGRESS NOTES
Dialysis Post Treatment Note  Patient tolerated treatment well. Denies complaints at time of discharge.    Vitals:    06/18/19 1320   BP: (!) 168/70   Pulse: 57   Resp: 16   Temp: 98.2 °F (36.8 °C)   SpO2:      Pre-Weight =112.7 Post-weight = Weight: 245 lb 6 oz (111.3 kg)  Total Liters Processed = Total Liters Processed (l/min): 71.5 l/min  Rinseback Volume (mL) = Rinseback Volume (ml): 370 ml  Net Removal (mL) = 1500 ml @[id number]  Length of treatment=210 minutes

## 2019-06-18 NOTE — CONSULTS
[facility] Cardiology Cardiology    Consult / H&P               Today's Date: 6/18/2019  Patient Name: Brianda Perez  Date of admission: 6/17/2019  8:30 PM  Patient's age: 59 y.o., 1960  Admission Dx: NSTEMI (non-ST elevated myocardial infarction) [facility]) [I21.4]    Reason for Consult:  Cardiac evaluation    Requesting Physician: Jo Ann Fried MD    CHIEF COMPLAINT:  Chest pressure    History Obtained From:  Patient, EMR    HISTORY OF PRESENT ILLNESS:    Brianda Perez 59 y.o. male with ESRD on HD (TTS), CAD s/p PCI to LAD (04/2019), DM, Hyperlipidemia. He is presenting with symptoms of chest pressure for few days. He complains of left sided pressure, non radiating, non shifting and not associated with other symptoms, he does not have associated symptoms of shortness of breath at rest. He states the pressure is constant and not worse with exertion or relieved by rest.     Past Medical History:   has a past medical history of Bowel obstruction ([address] CAD (coronary artery disease), CHF (congestive heart failure) ([facility] [address] Chronic kidney disease, Diabetes mellitus ([facility] [address] Hemodialysis patient ([facility] [address] Hyperlipidemia, Hypertension, MI, old, Patient in clinical research study, and Sleep apnea. Past Surgical History:   has a past surgical history that includes Foot surgery (Left, 2005); Colonoscopy; Cystoscopy (11/17/2017); Prostate biopsy (11/17/2017); Prostate biopsy (N/A, 11/17/2017); Abdomen surgery (2013); Prostate Biopsy (02/16/2018); pr genital surg proc,male unlisted (N/A, 2/16/2018); Tonsillectomy (1968); Vasectomy (1983); Middle ear surgery (1966); Nose surgery (1968); Mouth surgery (2007); AV fistula creation (Left, 02/20/2019); Dialysis fistula creation (Left, 2/20/2019); and other surgical history (Left, 03/06/2019). Home Medications:    Prior to Admission medications    Medication Sig Start Date End Date Taking?  Authorizing Provider   Multiple Vitamins-Minerals (RENAPLEX-D) TABS Take 1 tablet by mouth daily 5/14/19   Historical Provider, MD   traMADol (ULTRAM) 50 MG tablet take 1 tablet by mouth every 6 hours if needed 4/5/19   Historical Provider, MD   lidocaine-prilocaine (EMLA) 2.5-2.5 % cream  3/18/19   Historical Provider, MD   atorvastatin (LIPITOR) 40 MG tablet Take 1 tablet by mouth nightly 4/2/19   ROB Hyde CNP   clopidogrel (PLAVIX) 75 MG tablet Take 1 tablet by mouth daily 4/2/19   ROB Hyde CNP   nitroGLYCERIN (NITROSTAT) 0.4 MG SL tablet Place 1 tablet under the tongue every 5 minutes as needed for Chest pain up to max of 3 total doses.  If no relief after 1 dose, call 911. 4/2/19   ROB Hyde CNP   NIFEdipine (ADALAT CC) 30 MG extended release tablet Take 30 mg by mouth 2 times daily    Historical Provider, MD   aspirin 81 MG chewable tablet Take 1 tablet by mouth daily  Patient taking differently: Take 81 mg by mouth daily LAST DOSE 02/14/2019 11/30/18   Edouard Madison MD   carvedilol (COREG) 3.125 MG tablet Take 1 tablet by mouth 2 times daily (with meals) 11/29/18   Edouard Madison MD   SITagliptin (JANUVIA) 25 MG tablet Take 1 tablet by mouth daily 11/29/18   Edouard Madison MD   furosemide (LASIX) 40 MG tablet Take 40 mg by mouth 2 times daily     Historical Provider, MD      Current Facility-Administered Medications: clopidogrel (PLAVIX) tablet 75 mg, 75 mg, Oral, Daily  linagliptin (TRADJENTA) tablet 5 mg, 5 mg, Oral, Daily  sodium chloride flush 0.9 % injection 10 mL, 10 mL, Intravenous, 2 times per day  sodium chloride flush 0.9 % injection 10 mL, 10 mL, Intravenous, PRN  aspirin chewable tablet 324 mg, 324 mg, Oral, Once  aspirin EC tablet 325 mg, 325 mg, Oral, Daily  insulin lispro (HUMALOG) injection vial 0-12 Units, 0-12 Units, Subcutaneous, TID   carvedilol (COREG) tablet 6.25 mg, 6.25 mg, Oral, BID   isosorbide mononitrate (IMDUR) extended release tablet 30 mg, 30 mg, Oral, Daily  heparin (porcine) injection 4,000 Units, 4,000 Units, Intravenous, PRN  heparin (porcine) injection 2,000 Units, 2,000 Units, Intravenous, PRN  heparin 25,000 units in dextrose 5% 250 mL infusion, 8.78 Units/kg/hr, Intravenous, Continuous  nitroGLYCERIN (NITROSTAT) SL tablet 0.4 mg, 0.4 mg, Sublingual, Q5 Min PRN  atorvastatin (LIPITOR) tablet 40 mg, 40 mg, Oral, Nightly  furosemide (LASIX) tablet 40 mg, 40 mg, Oral, BID  NIFEdipine (PROCARDIA XL) extended release tablet 30 mg, 30 mg, Oral, BID  potassium chloride (KLOR-CON M) extended release tablet 40 mEq, 40 mEq, Oral, PRN **OR** potassium chloride (KLOR-CON) packet 40 mEq, 40 mEq, Oral, PRN **OR** potassium chloride 10 mEq/100 mL IVPB (Peripheral Line), 10 mEq, Intravenous, PRN  magnesium sulfate 1 g in dextrose 5% 100 mL IVPB, 1 g, Intravenous, PRN  magnesium hydroxide (MILK OF MAGNESIA) 400 MG/5ML suspension 30 mL, 30 mL, Oral, Daily PRN  ondansetron (ZOFRAN) injection 4 mg, 4 mg, Intravenous, Q6H PRN  famotidine (PEPCID) tablet 20 mg, 20 mg, Oral, Daily  glucose (GLUTOSE) 40 % oral gel 15 g, 15 g, Oral, PRN  dextrose 50 % IV solution, 12.5 g, Intravenous, PRN  glucagon (rDNA) injection 1 mg, 1 mg, Intramuscular, PRN  dextrose 5 % solution, 100 mL/hr, Intravenous, PRN  insulin lispro (HUMALOG) injection vial 0-6 Units, 0-6 Units, Subcutaneous, Nightly    Allergies:  Chocolate and Lisinopril    Social History:   reports that he has never smoked. He has never used smokeless tobacco. He reports that he does not drink alcohol or use drugs. Family History: family history includes Asthma in his brother; Diabetes in his brother, brother, and brother; Early Death in his mother; Heart Disease in his mother; High Blood Pressure in his brother, brother, and brother. No h/o sudden cardiac death. No for premature CAD    REVIEW OF SYSTEMS:    · Constitutional: there has been no unanticipated weight loss. There's been No change in energy level, No change in activity level.      · Eyes: No visual changes or abnormalities of mood, affect, memory, mentation, or behavior are noted    DATA:    Diagnostics:      EKG:   sinsus with RBBB and LAFB    ECHO: 11/2018  Left ventricle is mildly enlarged. Global left ventricular systolic function is mildly reduced. Calculated EF via Le''s method is 48 %. Mild tricuspid regurgitation. Mild mitral regurgitation. Left atrium is mildly dilated. Diastolic dysfunction with elevated left sided filling pressures, E/E' =  15.45 . Stress Test:   03/2019  . No discrete perfusion abnormality to suggest myocardial   ischemia/infarction. 2. Inferior and inferoseptal hypokinesis.  Calculated ejection fraction of   36%. 3. Risk stratification: Intermediate   Notes concerning risk stratification:         Cardiac Angiography:   04/01/2019  LMCA: Normal 0% stenosis. LAD: distal 60% stenosis reduced to 0% s/p PTCA/TIM 2.75x38 mm with resultant CHERYL-III flow.       LCx: Normal 0% stenosis. RCA: Normal 0% stenosis. The LV gram was performed in the CEDILLO 30 position.      LVEF: 45%. LV Wall Motion: anterior wall hypokinesis           Conclusions:  3. Single vessel distal LAD disease  4. Successful PTCA/TIM of the distal LAD  5. Mildly decreased LV systolic function with anterior hypokinesis        Labs:     CBC:   Recent Labs     06/17/19 2051   WBC 6.4   HGB 12.7*   HCT 40.1*        BMP:   Recent Labs     06/17/19 2051      K 4.0   CO2 22   BUN 56*   CREATININE 5.47*   LABGLOM 11*   GLUCOSE 171*     BNP: No results for input(s): BNP in the last 72 hours. PT/INR: No results for input(s): PROTIME, INR in the last 72 hours. APTT:  Recent Labs     06/17/19  2156 06/18/19  0349   APTT 24.2 36.6*     CARDIAC ENZYMES:No results for input(s): CKTOTAL, CKMB, CKMBINDEX, TROPONINI in the last 72 hours.   FASTING LIPID PANEL:  Lab Results   Component Value Date    HDL 33 12/21/2016    TRIG 235 12/21/2016     LIVER PROFILE:No results for input(s): AST, ALT, LABALBU in the last 72 hours. Results for Manuel Villarreal (MRN [id number]) as of 6/18/2019 13:42   Ref. Range 6/17/2019 20:51 6/17/2019 21:39 6/17/2019 23:47 6/18/2019 03:49   Troponin, High Sensitivity Latest Ref Range: 0 - 22 ng/L 161 (HH) 144 (HH) 138 (HH) 133 (HH)           Patient Active Problem List   Diagnosis    CKD (chronic kidney disease) stage 4, GFR 15-29 ml/min ([facility])    Anemia of chronic renal failure    Type 2 diabetes mellitus, without long-term current use of insulin ([facility])    Chest pain at rest    Acute congestive heart failure ([facility])    Pneumonia    Rising PSA level    Prostatism    Acute on chronic diastolic congestive heart failure ([facility])    Anemia    Essential hypertension    Respiratory distress    AVF (arteriovenous fistula) ([facility])    Chest pain    S/P PTCA - TIM distal LAD - Dr. Nichole Prutit 4/1/19    NSTEMI (non-ST elevated myocardial infarction) ([facility] [address]     IMPRESSION:    1. CAD s/p TIM to LAD 04/01/2019 -cath done for symptoms of angina   2. Mild LV systolic dysfunction EF 56% 11/2018  3. HTN  4. ESRD on HD (TTS)    RECOMMENDATIONS:  1. Elevated troponins likely secondary to ESRD and elevated BP  2. Chest pressure also likely related to elevated BP  3. Will recommend better BP control, increase Coreg to 6.25 mg BID and Imdur 30 mg daily for antianginal and blood pressure control   4. Stop IV heparin tomorrow    Will discuss with rounding attending Dr. Jocelyne Franklin for final recommendations. Hunter Lewis MD  Cardiology Fellow    Attestation signed by      Attending Physician Statement:    I have discussed the care of  Trujillo Rides , including pertinent history and exam findings, with the Cardiology fellow/resident. I have seen and examined the patient and the key elements of all parts of the encounter have been performed by me.  I agree with the assessment, plan and orders as documented by the fellow/resident, after I modified exam findings and plan of treatments, and the final version is my approved version of the assessment. Additional Comments:   Patient with single vessel CAD with recent PCI-LAD and ESRD admitted with chest pressure, likely associated with uncontrolled HTN, ECG shows chronic bifascicular block (RBBB+LAFB) and no new changes as compared to prior ECGs, troponins are elevated likely from impaired renal clearance. Recommend optimizing antinaginal therapy. Increase coreg and add imdur. Continue IV heparin for now. Will trend troponins, if flat or downward trend with no recurrence of CP, will discontinue heparin drip tomorrow.

## 2019-06-18 NOTE — ED PROVIDER NOTES
[address] [facility]  Emergency Department  Emergency Medicine Resident Sign-out     Care of Carlito Bonilla was assumed from Dr. Leisa Holguin and is being seen for Chest Pain  . The patient's initial evaluation and plan have been discussed with the prior provider who initially evaluated the patient. EMERGENCY DEPARTMENT COURSE / MEDICAL DECISION MAKING:       MEDICATIONS GIVEN:  Orders Placed This Encounter   Medications    DISCONTD: morphine injection 2 mg    heparin (porcine) injection 4,000 Units    heparin (porcine) injection 4,000 Units    heparin (porcine) injection 2,000 Units    heparin 25,000 units in dextrose 5% 250 mL infusion    nitroGLYCERIN (NITROSTAT) SL tablet 0.4 mg    atorvastatin (LIPITOR) tablet 40 mg    furosemide (LASIX) tablet 40 mg    NIFEdipine (PROCARDIA XL) extended release tablet 30 mg    OR Linked Order Group     potassium chloride (KLOR-CON M) extended release tablet 40 mEq     potassium chloride (KLOR-CON) packet 40 mEq     potassium chloride 10 mEq/100 mL IVPB (Peripheral Line)    magnesium sulfate 1 g in dextrose 5% 100 mL IVPB    magnesium hydroxide (MILK OF MAGNESIA) 400 MG/5ML suspension 30 mL    ondansetron (ZOFRAN) injection 4 mg    famotidine (PEPCID) tablet 20 mg     May cancel order if already on H2 blocker/PPI alternative as a home medication to avoid duplication of therapy.     glucose (GLUTOSE) 40 % oral gel 15 g    dextrose 50 % IV solution    glucagon (rDNA) injection 1 mg    dextrose 5 % solution    DISCONTD: nitroGLYCERIN (NITROSTAT) SL tablet 0.4 mg    insulin lispro (HUMALOG) injection vial 0-6 Units       LABS / RADIOLOGY:     Labs Reviewed   CBC WITH AUTO DIFFERENTIAL - Abnormal; Notable for the following components:       Result Value    Hemoglobin 12.7 (*)     Hematocrit 40.1 (*)     Seg Neutrophils 71 (*)     Lymphocytes 12 (*)     Immature Granulocytes 1 (*)     Absolute Lymph # 0.77 (*)     All other components within normal limits   BASIC METABOLIC PANEL - Abnormal; Notable for the following components:    Glucose 171 (*)     BUN 56 (*)     CREATININE 5.47 (*)     Chloride 94 (*)     Anion Gap 19 (*)     GFR Non- 11 (*)     GFR  13 (*)     All other components within normal limits   TROPONIN - Abnormal; Notable for the following components:    Troponin, High Sensitivity 161 (*)     All other components within normal limits   BRAIN NATRIURETIC PEPTIDE - Abnormal; Notable for the following components:    Pro-BNP 1,895 (*)     All other components within normal limits   TROPONIN - Abnormal; Notable for the following components:    Troponin, High Sensitivity 144 (*)     All other components within normal limits   APTT - Abnormal; Notable for the following components:    PTT 36.6 (*)     All other components within normal limits   TROPONIN - Abnormal; Notable for the following components:    Troponin, High Sensitivity 138 (*)     All other components within normal limits   TROPONIN - Abnormal; Notable for the following components:    Troponin, High Sensitivity 133 (*)     All other components within normal limits   POC GLUCOSE FINGERSTICK - Abnormal; Notable for the following components:    POC Glucose 128 (*)     All other components within normal limits   SPECIMEN REJECTION   APTT   APTT   PREVIOUS SPECIMEN   APTT       Xr Chest Standard (2 Vw)    Result Date: 6/17/2019  EXAMINATION: TWO XRAY VIEWS OF THE CHEST 6/17/2019 9:20 pm COMPARISON: 03/31/2019 HISTORY: ORDERING SYSTEM PROVIDED HISTORY: chest pain TECHNOLOGIST PROVIDED HISTORY: chest pain Acuity: Unknown Type of Exam: Unknown FINDINGS: The lungs are clear. The cardiac silhouette is within normal limits. There is no pneumothorax or pleural effusion. The right internal jugular catheter terminates at the cavoatrial junction. 1.  No acute abnormality.        RECENT VITALS:     Temp: 98.5 °F (36.9 °C),  Pulse: 77, Resp: 24, BP: (!) 156/73, SpO2: 98 %      This patient is a 59 y.o. Male with Hx of CAD here with CP found to have NSTEMI      ED Course as of Jun 18 0502   Mon Jun 17, 2019   217 23-year-old male history of CAD here with chest pain shortness of breath being admitted for an NSTEMI.    [KW]   2308 Been running, troponins trending cardiology aware this patient patient admitted to cardiac stepdown unit. [KW]   Tue Jun 18, 2019   0455 Patient Signed out to Dr. Maura Arriaga awaiting a bed on the floor.     [KW]      ED Course User Index  [KW] Kelly Portillo DO       OUTSTANDING TASKS / RECOMMENDATIONS:    1. Trend troponins.       FINAL IMPRESSION:     1. NSTEMI (non-ST elevated myocardial infarction) [facility])        DISPOSITION:         DISPOSITION:  []  Discharge   []  Transfer -    [x]  Admission -     []  Against Medical Advice   []  Eloped   FOLLOW-UP: [id number] [facility],[address] [place] [address]  [address]  [address]  [phone]           DISCHARGE MEDICATIONS: New Prescriptions    No medications on file          Kelly Portillo DO  Emergency Medicine Resident  [facility]       Kelly Portillo, [place]  06/18/19 5415

## 2019-06-18 NOTE — ED NOTES
Bed: 18  Expected date:   Expected time:   Means of arrival:   Comments:     Arabella Augustin RN  06/17/19 0098

## 2019-06-18 NOTE — CONSULTS
Renal Consult Note    Patient :  Duc Mary; 59 y.o. MRN# [id number]  Location:  8492/5427-88  Attending:  Matias Infante MD  Admit Date:  6/17/2019   Hospital Day: 1    Reason for Consult:     Asked by Dr Matias Infante MD to see for JAGRUTI/Elevated Creatinine. History Obtained From:     patient, electronic medical record    HD Access:     previous  Left  AV fistula and temporary dialysis catheter in the left side anterior chest     HD Unit:     [facility]    Nephrologist:     Dr. Calderon De Santiago (patient reported)  / Saji Chaudhry (per chart review)    Dry Weight:         Admission Weight:     114.1 kg     History of Present Illness:     Duc Mary; 59 y.o. male with past medical history as mentioned below presented to the hospital with the chief complaint of chest pain of 4 days duration. Pain was reported to be squeezing / crushing progressively increased in intensity over the days, on admission 7-8/10 , currently 5-6/10, no radiation, present on rest, no associated N/V/D diaphoresis, cough, hemoptysis, abdominal pain, fever, chills. Patient reported similar kind of chest pain before previous hospitalization and stated that he needed stent placement during that admission. Patient reported hx of CAD with stent placed on the previous admission. Patient also reported compliance to all his medication. Patient reported that he receives dialysis through TTS. Patient reported that earlier it was through LUE AV fistula but it was malfunctioning since last couple of occasions and he has followed up with Dr. Rhiannon Chaudhary for recanalization of the same per the chart review. Patient is currently getting his dialysis through his tunnel catheter which he stated is temporary. Patient stated that he has a hx of DM, HTN for which he regularly takes his medications. Past History/Allergies? Social History:     Past Medical History:   Diagnosis Date    Bowel obstruction ([address] 12/26/2013    CAD (coronary artery disease) 2013    ?     CHF (congestive heart failure) [facility] 2013    last episode 11/2018    Chronic kidney disease     Diabetes mellitus ([facility] 2012    NIDDM    Hemodialysis patient ([facility] 11/28/2018    TUNNELD CATHETER RIGHT DIALYSIS ACCESSTUES THURS AND AT [facility]    Hyperlipidemia 2013    ON RX    Hypertension 2013    ON RX    MI, old 12/26/2013    Patient in clinical research study 04/01/2019    XIENCE SHORT DAPT    Sleep apnea 2015    pt has machine and does not use it       Allergies   Allergen Reactions    Chocolate      Tickles throat    Lisinopril Swelling       Social History     Socioeconomic History    Marital status: Single     Spouse name: Not on file    Number of children: Not on file    Years of education: Not on file    Highest education level: Not on file   Occupational History    Not on file   Social Needs    Financial resource strain: Not on file    Food insecurity:     Worry: Not on file     Inability: Not on file    Transportation needs:     Medical: Not on file     Non-medical: Not on file   Tobacco Use    Smoking status: Never Smoker    Smokeless tobacco: Never Used   Substance and Sexual Activity    Alcohol use: No    Drug use: No    Sexual activity: Not on file   Lifestyle    Physical activity:     Days per week: Not on file     Minutes per session: Not on file    Stress: Not on file   Relationships    Social connections:     Talks on phone: Not on file     Gets together: Not on file     Attends Voodoo service: Not on file     Active member of club or organization: Not on file     Attends meetings of clubs or organizations: Not on file     Relationship status: Not on file    Intimate partner violence:     Fear of current or ex partner: Not on file     Emotionally abused: Not on file     Physically abused: Not on file     Forced sexual activity: Not on file   Other Topics Concern    Not on file   Social History Narrative    Not on file       Family History: Family History   Problem Relation Age of Onset    Heart Disease Mother         CHF    Early Death Mother     High Blood Pressure Brother     Diabetes Brother     Asthma Brother     High Blood Pressure Brother     Diabetes Brother     High Blood Pressure Brother     Diabetes Brother        Outpatient Medications:     Medications Prior to Admission: Multiple Vitamins-Minerals (RENAPLEX-D) TABS, Take 1 tablet by mouth daily  traMADol (ULTRAM) 50 MG tablet, take 1 tablet by mouth every 6 hours if needed  lidocaine-prilocaine (EMLA) 2.5-2.5 % cream,   atorvastatin (LIPITOR) 40 MG tablet, Take 1 tablet by mouth nightly  clopidogrel (PLAVIX) 75 MG tablet, Take 1 tablet by mouth daily  nitroGLYCERIN (NITROSTAT) 0.4 MG SL tablet, Place 1 tablet under the tongue every 5 minutes as needed for Chest pain up to max of 3 total doses. If no relief after 1 dose, call 911.   NIFEdipine (ADALAT CC) 30 MG extended release tablet, Take 30 mg by mouth 2 times daily  aspirin 81 MG chewable tablet, Take 1 tablet by mouth daily (Patient taking differently: Take 81 mg by mouth daily LAST DOSE 02/14/2019)  carvedilol (COREG) 3.125 MG tablet, Take 1 tablet by mouth 2 times daily (with meals)  SITagliptin (JANUVIA) 25 MG tablet, Take 1 tablet by mouth daily  furosemide (LASIX) 40 MG tablet, Take 40 mg by mouth 2 times daily     Current Medications:     Scheduled Meds:    carvedilol  3.125 mg Oral BID WC    clopidogrel  75 mg Oral Daily    linagliptin  5 mg Oral Daily    sodium chloride flush  10 mL Intravenous 2 times per day    aspirin  324 mg Oral Once    aspirin  325 mg Oral Daily    insulin lispro  0-12 Units Subcutaneous TID     atorvastatin  40 mg Oral Nightly    furosemide  40 mg Oral BID    NIFEdipine  30 mg Oral BID    famotidine  20 mg Oral Daily    insulin lispro  0-6 Units Subcutaneous Nightly     Continuous Infusions:    heparin (porcine) 10.78 Units/kg/hr (06/18/19 7980)    dextrose       PRN Meds:  sodium chloride flush, heparin (porcine), heparin (porcine), nitroGLYCERIN, potassium chloride **OR** potassium alternative oral replacement **OR** potassium chloride, magnesium sulfate, magnesium hydroxide, ondansetron, glucose, dextrose, glucagon (rDNA), dextrose    Review of Systems:     Constitutional: No fever, no chills, no lethargy, no weakness. HEENT:  No headache, otalgia, itchy eyes, nasal discharge or sore throat. Cardiac:  No chest pain, dyspnea, orthopnea or PND. Chest:  No cough, phlegm or wheezing. Abdomen:  No abdominal pain, nausea or vomiting. Neuro:  No focal weakness, abnormal movements orseizure like activity. Skin:   No rashes, no itching. :   No hematuria, no pyuria, no dysuria, no flank pain. Extremities:  No swelling or joint pains. ROS was otherwise negative except as mentioned in the [address] Input/Output:     No intake/output data recorded. Vital Signs:   Temperature:  Temp: 97.5 °F (36.4 °C)  TMax:   Temp (24hrs), Av °F (36.7 °C), Min:97.5 °F (36.4 °C), Max:98.5 °F (36.9 °C)    Respirations:  Resp: 20  Pulse:   Pulse: 80  BP:    BP: (!) 150/73  BP Range: Systolic (83FJV), PGW:586 , Min:101 , LTC:580       Diastolic (51PIJ), IKX:16, Min:73, Max:89      Physical Examination:     General:  AAO x 3, speaking in full sentences, no accessory muscle use. HEENT: Atraumatic, normocephalic, no throat congestion, moist mucosa. Eyes:   Pupils equal, round and reactive to light, EOMI. Neck:   No JVD, no thyromegaly, no lymphadenopathy. Chest:  Bilateral vesicular breath sounds, no rales or wheezes. Cardiac:  S1 S2 RR, no murmurs, gallops or rubs, JVP not raised. Abdomen: Soft, non-tender, no masses or organomegaly, BS audible. :   No suprapubic or flank tenderness. Neuro:  AAO x 3, No FND. SKIN:  No rashes, good skin turgor. Extremities:  No edema, palpable peripheral pulses, no calf tenderness.     Labs:       Recent Labs     19   WBC 6.4   RBC 4.38 HGB 12.7*   HCT 40.1*   MCV 91.6   MCH 29.0   MCHC 31.7   RDW 14.3      MPV 9.4      BMP:   Recent Labs     06/17/19 2051      K 4.0   CL 94*   CO2 22   BUN 56*   CREATININE 5.47*   GLUCOSE 171*   CALCIUM 9.8      Phosphorus:   No results for input(s): PHOS in the last 72 hours. Magnesium:  No results for input(s): MG in the last 72 hours. Albumin:  No results for input(s): LABALBU in the last 72 hours. BNP:      Lab Results   Component Value Date     09/08/2018     PTH:     Lab Results   Component Value Date    IPTH 144.1 11/29/2017     Blood cultures:  No results found for: [facility]    Urinalysis/Chemistries:      Lab Results   Component Value Date    NITRU NEGATIVE 11/24/2018    COLORU YELLOW 11/24/2018    PHUR 5.0 11/24/2018    WBCUA None 11/24/2018    RBCUA 0 TO 2 11/24/2018    MUCUS 1+ 11/24/2018    TRICHOMONAS NOT REPORTED 11/24/2018    YEAST NOT REPORTED 11/24/2018    BACTERIA NOT REPORTED 11/24/2018    SPECGRAV 1.016 11/24/2018    LEUKOCYTESUR NEGATIVE 11/24/2018    UROBILINOGEN Normal 11/24/2018    BILIRUBINUR NEGATIVE 11/24/2018    GLUCOSEU NEGATIVE 11/24/2018    KETUA NEGATIVE 11/24/2018    AMORPHOUS NOT REPORTED 11/24/2018       Radiology:     CXR:     Assessment:     1. ESRD secondary to Diabetic Nephrosclerosis  On Intermittent  Hemodialysis. His regular HD days are TTS  at [facility]  Hemodialysis facility using LUE AV fistula/ temp tunnel  catheter  under Dr. Callie Bains. His dry weight is not known. Admitted with chest pain and NSTEMI/ACS ? 2. Anemia of chronic disease  3. Secondary hyperparathyroidism  4. Essential Hypertension   5. Type 2 DM  6. NSTEMI / ACS   7. CAD s/p TIM to LAD, EF 45 %    8. BATSHEVA    Plan:   1. HD as per schedule. HD today and BMP 4 hrs after HD. 2. Strict Input and Output, Daily weigh and document in the chart. 3. Low Potassium, Low phosphorus and low salt diet. Fluids to be restricted to 1500ml/day.   4. IV Aranesp/Epogen for anemia of chronic disease with HD weekly. 5. IV Zemplar per protocol for secondary hyperparathyroidism with HD thrice a week. 6. Cardiology evaluation pending. Nutrition   Please ensure that patient is on a renal diet/TF. Thank you for the consultation. Please do not hesitate to contact us for any further questions/concerns. We will continue to follow along with you. Stephon Rivas M.D. Internal Medicine Resident , PGY-1  [address] [place] [address]  06/18/19 9:47 AM     Attending Physician Statement  I have discussed the care of Clary Bound, including pertinent history and exam findings with the resident/fellow. I have reviewed the key elements of all parts of the encounter with the resident/fellow. I have seen and examined the patient with the resident/fellow. I agree with the assessment and plan and status of the problem list as documented. Addiitionally I recommend that with the patient having similar symptomatology as he had prior to his stent placement in April of 2019, consideration should be for cardiac catheterization. Continue dialysis as per his usual schedule.     Bing Pedraza MD  Nephrology Attending Physician  [facility]

## 2019-06-18 NOTE — ED PROVIDER NOTES
I performed a history and physical examination of the patient and discussed management with the resident. I reviewed the residents note and agree with the documented findings and plan of care. Any areas of disagreement are noted on the chart. I was personally present for the key portions of any procedures. I have documented in the chart those procedures where I was not present during the key portions. I have reviewed the emergency nurses triage note. I agree with the chief complaint, past medical history, past surgical history, allergies, medications, social and family history as documented unless otherwise noted below. Documentation of the HPI, Physical Exam and Medical Decision Making performed by medical students or scribes is based on my personal performance of the HPI, PE and MDM. For Phys Assistant/ Nurse Practitioner cases/documentation I have personally evaluated this patient and have completed at least one if not all key elements of the E/M (history, physical exam, and MDM). I find the patient's history and physical exam are consistent with the NP/PA documentation. I agree with the care provided, treatment rendered, disposition and followup plan. Additional findings are as noted. Ardelle Siemens. Klaudia Verde MD  Attending Emergency  Physician      EKG Interpretation    Interpreted by me    Rhythm: normal sinus with first-degree AV block  Rate: normal  Axis: normal  Ectopy: none  Conduction: Right bundle branch block and left anterior hemiblock. ST Segments: no acute change  T Waves: no acute change  Q Waves: none    Clinical Impression: Findings as noted above. In comparison with prior tracing dated 1 April 2018, there are no obvious significant morphologic changes noted. EKG    PRESENTING WITH C/O CHEST PRESSURE WITH MILD SOB FOR 4-5D. NONRADIATING. NO ASSOC'ED DIAPHORESIS, NAUSEA, VOMITING. NO COUGH, HEMOPTYSIS, ABD PAIN, FEVER, CHILLS. HX OF CAD, PCI, ESRD, HEMODIALYSIS, HTN, DM.  TAKING ALL MEDS AS

## 2019-06-18 NOTE — ED PROVIDER NOTES
[facility] HANDOFF       Handoff taken on the following patient    Pt Name: Leena Epstein  PCP:  [facility]       Chief Complaint   Patient presents with    Chest Pain         CURRENT MEDICATIONS     Previous Medications  Previous Medications    ASPIRIN 81 MG CHEWABLE TABLET    Take 1 tablet by mouth daily    ATORVASTATIN (LIPITOR) 40 MG TABLET    Take 1 tablet by mouth nightly    CARVEDILOL (COREG) 3.125 MG TABLET    Take 1 tablet by mouth 2 times daily (with meals)    CLOPIDOGREL (PLAVIX) 75 MG TABLET    Take 1 tablet by mouth daily    FUROSEMIDE (LASIX) 40 MG TABLET    Take 40 mg by mouth 2 times daily     LIDOCAINE-PRILOCAINE (EMLA) 2.5-2.5 % CREAM        MULTIPLE VITAMINS-MINERALS (RENAPLEX-D) TABS    Take 1 tablet by mouth daily    NIFEDIPINE (ADALAT CC) 30 MG EXTENDED RELEASE TABLET    Take 30 mg by mouth 2 times daily    NITROGLYCERIN (NITROSTAT) 0.4 MG SL TABLET    Place 1 tablet under the tongue every 5 minutes as needed for Chest pain up to max of 3 total doses. If no relief after 1 dose, call 911.     SITAGLIPTIN (JANUVIA) 25 MG TABLET    Take 1 tablet by mouth daily    TRAMADOL (ULTRAM) 50 MG TABLET    take 1 tablet by mouth every 6 hours if needed       Encounter Medications  Orders Placed This Encounter   Medications    DISCONTD: morphine injection 2 mg    heparin (porcine) injection 4,000 Units    heparin (porcine) injection 4,000 Units    heparin (porcine) injection 2,000 Units    heparin 25,000 units in dextrose 5% 250 mL infusion    nitroGLYCERIN (NITROSTAT) SL tablet 0.4 mg    atorvastatin (LIPITOR) tablet 40 mg    furosemide (LASIX) tablet 40 mg    NIFEdipine (PROCARDIA XL) extended release tablet 30 mg    OR Linked Order Group     potassium chloride (KLOR-CON M) extended release tablet 40 mEq     potassium chloride (KLOR-CON) packet 40 mEq     potassium chloride 10 mEq/100 mL IVPB (Peripheral Line)  magnesium sulfate 1 g in dextrose 5% 100 mL IVPB    magnesium hydroxide (MILK OF MAGNESIA) 400 MG/5ML suspension 30 mL    ondansetron (ZOFRAN) injection 4 mg    famotidine (PEPCID) tablet 20 mg     May cancel order if already on H2 blocker/PPI alternative as a home medication to avoid duplication of therapy.  glucose (GLUTOSE) 40 % oral gel 15 g    dextrose 50 % IV solution    glucagon (rDNA) injection 1 mg    dextrose 5 % solution    DISCONTD: nitroGLYCERIN (NITROSTAT) SL tablet 0.4 mg    insulin lispro (HUMALOG) injection vial 0-6 Units       ALLERGIES     is allergic to chocolate and lisinopril. RECENT VITALS:   Temp: 98.5 °F (36.9 °C),  Pulse: 73, Resp: 22, BP: (!) 161/75    RADIOLOGY:   XR CHEST STANDARD (2 VW)   Final Result   1. No acute abnormality.              LABS:  Labs Reviewed   CBC WITH AUTO DIFFERENTIAL - Abnormal; Notable for the following components:       Result Value    Hemoglobin 12.7 (*)     Hematocrit 40.1 (*)     Seg Neutrophils 71 (*)     Lymphocytes 12 (*)     Immature Granulocytes 1 (*)     Absolute Lymph # 0.77 (*)     All other components within normal limits   BASIC METABOLIC PANEL - Abnormal; Notable for the following components:    Glucose 171 (*)     BUN 56 (*)     CREATININE 5.47 (*)     Chloride 94 (*)     Anion Gap 19 (*)     GFR Non- 11 (*)     GFR  13 (*)     All other components within normal limits   TROPONIN - Abnormal; Notable for the following components:    Troponin, High Sensitivity 161 (*)     All other components within normal limits   BRAIN NATRIURETIC PEPTIDE - Abnormal; Notable for the following components:    Pro-BNP 1,895 (*)     All other components within normal limits   TROPONIN - Abnormal; Notable for the following components:    Troponin, High Sensitivity 144 (*)     All other components within normal limits   APTT - Abnormal; Notable for the following components:    PTT 36.6 (*)     All other components

## 2019-06-18 NOTE — CARE COORDINATION
Case Management Initial Discharge Plan  Marlee Hodgkins,             Met with:patient to discuss discharge plans. Information verified: address, contacts, phone number, , insurance Yes  PCP: Lauren Knott  Date of last visit: 3 months has appt in july    Insurance Provider: medicare/bcbs    Discharge Planning    Living Arrangements:  Alone   Support Systems:  Family Members    Home has 2 stories  2 stairs to climb to get into front door, 1 flight stairs to climb to reach second floor      Patient able to perform ADL's:Independent    Current Services (outpatient & in home) [facility] t, th, sat   DME equipment: cpap       Pharmacy: [facility] [address]   Potential Assistance Purchasing Medications:  No  Does patient want to participate in local refill/ meds to beds program?  No    Potential Assistance Needed:  N/A    Patient agreeable to home care: not indicated at this time  Freedom of choice provided:  n/a    Prior SNF/Rehab Placement and Facility: none  Agreeable to SNF/Rehab: plan is to return home  Freedom of choice provided: n/a   Evaluation: no    Expected Discharge date:     Patient expects to be discharged to:  home  Follow Up Appointment: Best Day/ Time:      Transportation provider: family  Transportation arrangements needed for discharge: No    Readmission Risk              Risk of Unplanned Readmission:        23             Does patient have a readmission risk score greater than 14?: Yes  If yes, follow-up appointment must be made within 7 days of discharge.      Discharge Plan: return home           Electronically signed by Estrella Tesfaye RN on 19 at 4:50 PM

## 2019-06-18 NOTE — ED NOTES
Labeled blood specimen collected and sent to lab via tube system.      Joellen Montelongo RN  06/17/19 0664

## 2019-06-18 NOTE — ED PROVIDER NOTES
[facility]  ED  Emergency Department Encounter  Emergency Medicine Resident     Pt Name: Karlene Gutierrez  MRN: [id number]  [place] 1960  Date ofevaluation: 6/17/19  PCP:  Ros[facility] Vizcain[facility]       Chief Complaint   Patient presents with    Chest Pain     HISTORY OF PRESENT ILLNESS  (Location/Symptom, Timing/Onset, Context/Setting, Quality, Duration, Modifying Factors, Severity.)      Karlene Gutierrez is a 59 y.o. male w/ hx of CAD, ESRD on dialysis TTS who presents with complaint of left-sided squeezing chest pain x4 days with progressive worsening, with associated intermittent shortness of breath. Patient denies diaphoresis, nausea, vomiting, radiation of pain, palpitations, lightheadedness, leg swelling. States the pain is present regardless of whether he is exerting himself or resting. Patient states pain feels similar to when he was seen here on 3/31, during which he had a cardiac cath and was found to have distal LAD occlusion, had stent placement. LV EF of 45% per cath report, EF of 36% per stress test on 3/15. States his pain is currently only a 3/10 however states it was only a 1/10 when he was seen here prior to his cardiac cath. Patient denies missing any dialysis. REVIEW OF SYSTEMS    (2-9 systems for level 4, 10 or more for level 5)      Review of Systems   Constitutional: Negative for chills and fever. HENT: Negative for congestion and rhinorrhea. Eyes: Negative for visual disturbance. Respiratory: Positive for shortness of breath. Negative for cough. Cardiovascular: Positive for chest pain. Negative for palpitations and leg swelling. Gastrointestinal: Negative for abdominal pain, diarrhea, nausea and vomiting. Genitourinary: Negative for difficulty urinating. Musculoskeletal: Negative for gait problem and neck pain. Skin: Negative for rash. Neurological: Negative for dizziness, weakness and headaches.    Psychiatric/Behavioral: Negative Not on file    Intimate partner violence:     Fear of current or ex partner: Not on file     Emotionally abused: Not on file     Physically abused: Not on file     Forced sexual activity: Not on file   Other Topics Concern    Not on file   Social History Narrative    Not on file            Family History   Problem Relation Age of Onset    Heart Disease Mother         CHF    Early Death Mother     High Blood Pressure Brother     Diabetes Brother     Asthma Brother     High Blood Pressure Brother     Diabetes Brother     High Blood Pressure Brother     Diabetes Brother        Portions of the past medical history, past surgical history, social history, and family history were discussed and reviewed with thepatient/family and is included in HPI if pertinent. ALLERGIES / IMMUNIZATIONS / HOME MEDICATIONS     Allergies:  Chocolate and Lisinopril    IMMUNIZATIONS      There is no immunization history on file for this patient. Home Medications:  Prior to Admission medications    Medication Sig Start Date End Date Taking? Authorizing Provider   Multiple Vitamins-Minerals (RENAPLEX-D) TABS Take 1 tablet by mouth daily 5/14/19   Historical Provider, MD   traMADol (ULTRAM) 50 MG tablet take 1 tablet by mouth every 6 hours if needed 4/5/19   Historical Provider, MD   lidocaine-prilocaine (EMLA) 2.5-2.5 % cream  3/18/19   Historical Provider, MD   atorvastatin (LIPITOR) 40 MG tablet Take 1 tablet by mouth nightly 4/2/19   ROB Winston CNP   clopidogrel (PLAVIX) 75 MG tablet Take 1 tablet by mouth daily 4/2/19   ROB Winston CNP   nitroGLYCERIN (NITROSTAT) 0.4 MG SL tablet Place 1 tablet under the tongue every 5 minutes as needed for Chest pain up to max of 3 total doses.  If no relief after 1 dose, call 911. 4/2/19   ROB Winston CNP   NIFEdipine (ADALAT CC) 30 MG extended release tablet Take 30 mg by mouth 2 times daily    Historical Provider, MD   aspirin 81 MG chewable tablet Take 1 tablet by mouth daily  Patient taking differently: Take 81 mg by mouth daily LAST DOSE 02/14/2019 11/30/18   Rosaura Delarosa MD   carvedilol (COREG) 3.125 MG tablet Take 1 tablet by mouth 2 times daily (with meals) 11/29/18   Rosaura Delarosa MD   SITagliptin (JANUVIA) 25 MG tablet Take 1 tablet by mouth daily 11/29/18   Rosaura Delarosa MD   furosemide (LASIX) 40 MG tablet Take 40 mg by mouth 2 times daily     Historical Provider, MD       PHYSICAL EXAM   (up to 7 for level 4, 8 or more for level 5)      INITIAL VITALS:    height is 5' 10\" (1.778 m) and weight is 251 lb (113.9 kg). His oral temperature is 98.5 °F (36.9 °C). His blood pressure is 154/81 (abnormal) and his pulse is 85. His respiration is 29 and oxygen saturation is 99%. Physical Exam   Constitutional: He is oriented to person, place, and time. He appears well-developed and well-nourished. No distress, comfortable appearing   HENT:   Head: Normocephalic and atraumatic. Eyes: Pupils are equal, round, and reactive to light. EOM are normal.   Neck: Normal range of motion. Neck supple. Cardiovascular: Normal rate, regular rhythm, normal heart sounds and intact distal pulses. Pulmonary/Chest: Effort normal and breath sounds normal. No stridor. No respiratory distress. He has no wheezes. He has no rales. He exhibits no tenderness. Abdominal: Soft. Bowel sounds are normal. He exhibits no distension. There is no tenderness. Musculoskeletal: Normal range of motion. He exhibits no edema or deformity. Neurological: He is alert and oriented to person, place, and time. Skin: Skin is warm and dry. Capillary refill takes less than 2 seconds. Psychiatric: He has a normal mood and affect. His behavior is normal.   Vitals reviewed.     Vitals:    Vitals:    06/17/19 2038 06/17/19 2123 06/17/19 2131 06/17/19 2146   BP:  (!) 157/89 (!) 153/77 (!) 154/81   Pulse: 92 91 88 85   Resp:  28 20 29   Temp:       TempSrc: 0. 04 0.00 - 0.30 k/uL    WBC Morphology NOT REPORTED     RBC Morphology NOT REPORTED     Platelet Estimate NOT REPORTED    Basic Metabolic Panel   Result Value Ref Range    Glucose 171 (H) 70 - 99 mg/dL    BUN 56 (H) 8 - 23 mg/dL    CREATININE 5.47 (HH) 0.70 - 1.20 mg/dL    Bun/Cre Ratio NOT REPORTED 9 - 20    Calcium 9.8 8.6 - 10.4 mg/dL    Sodium 135 135 - 144 mmol/L    Potassium 4.0 3.7 - 5.3 mmol/L    Chloride 94 (L) 98 - 107 mmol/L    CO2 22 20 - 31 mmol/L    Anion Gap 19 (H) 9 - 17 mmol/L    GFR Non-African American 11 (L) >60 mL/min    GFR  13 (L) >60 mL/min    GFR Comment          GFR Staging NOT REPORTED    Troponin   Result Value Ref Range    Troponin, High Sensitivity 161 (HH) 0 - 22 ng/L    Troponin T NOT REPORTED <0.03 ng/mL    Troponin Interp NOT REPORTED    Brain Natriuretic Peptide   Result Value Ref Range    Pro-BNP 1,895 (H) <300 pg/mL    BNP Interpretation Pro-BNP Reference Range:    Troponin   Result Value Ref Range    Troponin, High Sensitivity 144 (HH) 0 - 22 ng/L    Troponin T NOT REPORTED <0.03 ng/mL    Troponin Interp NOT REPORTED    SPECIMEN REJECTION   Result Value Ref Range    Specimen Source . BLOOD     Ordered Test PTT     Reason for Rejection Unable to perform testing: Specimen clotted.      - NOT REPORTED    APTT   Result Value Ref Range    PTT 24.2 20.5 - 30.5 sec     Labs Reviewed   CBC WITH AUTO DIFFERENTIAL - Abnormal; Notable for the following components:       Result Value    Hemoglobin 12.7 (*)     Hematocrit 40.1 (*)     Seg Neutrophils 71 (*)     Lymphocytes 12 (*)     Immature Granulocytes 1 (*)     Absolute Lymph # 0.77 (*)     All other components within normal limits   BASIC METABOLIC PANEL - Abnormal; Notable for the following components:    Glucose 171 (*)     BUN 56 (*)     CREATININE 5.47 (*)     Chloride 94 (*)     Anion Gap 19 (*)     GFR Non- 11 (*)     GFR  13 (*)     All other components within normal limits TROPONIN - Abnormal; Notable for the following components:    Troponin, High Sensitivity 161 (*)     All other components within normal limits   BRAIN NATRIURETIC PEPTIDE - Abnormal; Notable for the following components:    Pro-BNP 1,895 (*)     All other components within normal limits   TROPONIN - Abnormal; Notable for the following components:    Troponin, High Sensitivity 144 (*)     All other components within normal limits   SPECIMEN REJECTION   APTT   APTT   APTT   PREVIOUS SPECIMEN     RADIOLOGY:  Xr Chest Standard (2 Vw)    Result Date: 6/17/2019  EXAMINATION: TWO XRAY VIEWS OF THE CHEST 6/17/2019 9:20 pm COMPARISON: 03/31/2019 HISTORY: ORDERING SYSTEM PROVIDED HISTORY: chest pain TECHNOLOGIST PROVIDED HISTORY: chest pain Acuity: Unknown Type of Exam: Unknown FINDINGS: The lungs are clear. The cardiac silhouette is within normal limits. There is no pneumothorax or pleural effusion. The right internal jugular catheter terminates at the cavoatrial junction. 1.  No acute abnormality. EKG  EKG Interpretation    Interpreted by emergency department physician    Rhythm: normal sinus   Rate: normal  Axis: left  Ectopy: none  Conduction: right bundle branch block (complete), left anterior fasciclar block and 1st degree AV block  ST Segments: no acute change  T Waves: no acute change  Q Waves: none    Clinical Impression: no acute changes, no significant changes when compared to EKG from 4/1/19    Skye Rivas    All EKG's are interpreted by the Emergency Department Physician who either signs or Co-signs this chart in the absence of a cardiologist.    ED BEDSIDE ULTRASOUND:   Not indicated    [address] [place] [address] / [facility]   41-year-old male with history of CAD, end-stage renal disease here with complaint of left-sided squeezing chest discomfort x4 days with associated intermittent shortness of breath.   Patient had similar symptoms approximately 3 months prior and underwent a cath and had stent placement. Has not missed dialysis. On evaluation patient is in no distress, comfortable appearing, mucous membranes are moist, heart is rhythm rate and rhythm, lungs are clear to auscultation bilaterally, abdomen is soft nontender, he has 2+ radial pulses bilaterally. Will obtain ACS work-up. Given prior history anticipate admission. Patient's troponin is elevated at 161. Will consult cardiology. Will start heparin bolus and drip for nSTEMI. Patient already took full dose aspirin at home. Cardiology agrees with heparin bolus and drip. Recommending to trend troponins. Patient admitted to [facility]. Patient is agreeable with plan. Patient boarding in ED. Care signed out to Dr. Estrada Roles:  Procedures    CONSULTS:  IP CONSULT TO CARDIOLOGY  IP CONSULT TO HOSPITALIST  IP CONSULT TO CARDIOLOGY  IP CONSULT TO NEPHROLOGY    CRITICAL CARE:  See attending note    FINAL IMPRESSION      1. NSTEMI (non-ST elevated myocardial infarction) ([facility], [place] [address]        DISPOSITION / PLAN     DISPOSITION Admitted 06/17/2019 09:55:39 PM    If the patient was admitted, some of the above orders,medications, labs, and consults may have been placed by the admitting team(s) and were auto populated above when I refreshed my note prior to signing it. If there is any question, please check for the responsible providerfor individual orders in the EHR system.     PATIENT REFERRED TO:  [id number] [facility],[address] [facility] [address]  [address]  [phone]          DISCHARGE MEDICATIONS:  New Prescriptions    No medications on file     Mahogany Robertson DO  Emergency Medicine Resident    (Please note that portions of this note were completed with a voice recognition program.  Janeth Castañeda made to edit the dictations but occasionally words are mis-transcribed.)      Mahogany Robertson DO  06/17/19 4847

## 2019-06-18 NOTE — ED PROVIDER NOTES
normal limits   BASIC METABOLIC PANEL - Abnormal; Notable for the following components:    Glucose 171 (*)     BUN 56 (*)     CREATININE 5.47 (*)     Chloride 94 (*)     Anion Gap 19 (*)     GFR Non- 11 (*)     GFR  13 (*)     All other components within normal limits   TROPONIN - Abnormal; Notable for the following components:    Troponin, High Sensitivity 161 (*)     All other components within normal limits   BRAIN NATRIURETIC PEPTIDE - Abnormal; Notable for the following components:    Pro-BNP 1,895 (*)     All other components within normal limits   TROPONIN - Abnormal; Notable for the following components:    Troponin, High Sensitivity 144 (*)     All other components within normal limits   APTT - Abnormal; Notable for the following components:    PTT 36.6 (*)     All other components within normal limits   TROPONIN - Abnormal; Notable for the following components:    Troponin, High Sensitivity 138 (*)     All other components within normal limits   TROPONIN - Abnormal; Notable for the following components:    Troponin, High Sensitivity 133 (*)     All other components within normal limits   POC GLUCOSE FINGERSTICK - Abnormal; Notable for the following components:    POC Glucose 128 (*)     All other components within normal limits   SPECIMEN REJECTION   APTT   APTT   PREVIOUS SPECIMEN   APTT       Xr Chest Standard (2 Vw)    Result Date: 6/17/2019  EXAMINATION: TWO XRAY VIEWS OF THE CHEST 6/17/2019 9:20 pm COMPARISON: 03/31/2019 HISTORY: ORDERING SYSTEM PROVIDED HISTORY: chest pain TECHNOLOGIST PROVIDED HISTORY: chest pain Acuity: Unknown Type of Exam: Unknown FINDINGS: The lungs are clear. The cardiac silhouette is within normal limits. There is no pneumothorax or pleural effusion. The right internal jugular catheter terminates at the cavoatrial junction. 1.  No acute abnormality.        RECENT VITALS:     Temp: 98.5 °F (36.9 °C),  Pulse: 80, Resp: 27, BP: (!) 175/74,

## 2019-06-18 NOTE — PROGRESS NOTES
Pharmacy Note     Renal Dose Adjustment    Adri Melchor is a 59 y.o. male. Pharmacist assessment of renally cleared medications. Recent Labs     06/17/19 2051   BUN 56*       Recent Labs     06/17/19 2051   CREATININE 5.47*       Estimated Creatinine Clearance: 18 mL/min (A) (based on SCr of 5.47 mg/dL [facility])). Estimated CrCl using Ideal Body Weight: 18 mL/min (based on  kg)    Height:   Ht Readings from Last 1 Encounters:   06/17/19 5' 10\" (1.778 m)     Weight:  Wt Readings from Last 1 Encounters:   06/17/19 251 lb (113.9 kg)       The following medication dose has been adjusted based upon renal function per P&T Guidelines:             Famotidine 20 mg  PO twice daily changed to 20 mg once daily.     Samy Shahid [address]  6/17/2019 11:25 PM

## 2019-06-18 NOTE — ED NOTES
Pt returned from xray. Pt resting on cart with call light within reach. NAD noted, RR even and NL.  Will continue to monitor     Cris Lee RN  06/17/19 2123

## 2019-06-18 NOTE — ED NOTES
Pt updated on plan of care. Pt agreeable to admission. Pt resting on cart with call light within reach. NAD noted, RR even and NL. Will continue to monitor.       Julio Ventura RN  06/17/19 2046

## 2019-06-18 NOTE — ED NOTES
Pt to ED c/o chest pain. Pt reporting he was doing nothing when the chest pain began 4 days ago. Pt denies taking any medications for the chest pain pta. Pt reporting he is a dialysis pt who get dialyzed on Tuesday, Thursday, and Saturday x6 months. Pt reporting completing full dialysis treatment on Saturday. Pt reporting the chest pain is on the right and feels like pressure. Pt reporting recent LDA blockage with stent placement in march. Pt placed on cardiac monitor, bp cuff, and pulse ox. Pt resting on cart with call light within reach. NAD noted, RR even and NL. Will continue to monitor.      Albaro Mohan RN  06/17/19 203       Albaro Mohan RN  06/17/19 171

## 2019-06-18 NOTE — H&P
[place]    Hyperlipidemia 2013    ON RX    Hypertension 2013    ON RX    MI, old 12/26/2013    Patient in clinical research study 04/01/2019    XIENCE SHORT DAPT    Sleep apnea 2015    pt has machine and does not use it        Past Surgical History:     Past Surgical History:   Procedure Laterality Date    ABDOMEN SURGERY  2013    BOWEL OBSTRUCTION    AV FISTULA CREATION Left 02/20/2019    COLONOSCOPY      CYSTOSCOPY  11/17/2017    DIALYSIS FISTULA CREATION Left 2/20/2019    AV FISTULA CREATION ARM performed by Pino Roque MD at [facility] Left 2005 2005 [facility]    EAR DRUM REPAIRED    MOUTH SURGERY  2007    1 WISDOM TOOTH REMOVED    NOSE SURGERY  1968    CAUTERY TO STOP NOSE BEEDS    OTHER SURGICAL HISTORY Left 03/06/2019    fistulagram    UT GENITAL SURG PROC,MALE UNLISTED N/A 2/16/2018    US  PROSTATE BIOPSY WITH SATURATION performed by Yang Herndon MD at [address]  11/17/2017    PROSTATE BIOPSY N/A 11/17/2017    CYSTOSCOPY PROSTATE US BIOPSY performed by Yang Herndon MD at [address]  02/16/2018    [facility]        Medications Prior to Admission:     Prior to Admission medications    Medication Sig Start Date End Date Taking?  Authorizing Provider   Multiple Vitamins-Minerals (RENAPLEX-D) TABS Take 1 tablet by mouth daily 5/14/19   Historical Provider, MD   traMADol (ULTRAM) 50 MG tablet take 1 tablet by mouth every 6 hours if needed 4/5/19   Historical Provider, MD   lidocaine-prilocaine (EMLA) 2.5-2.5 % cream  3/18/19   Historical Provider, MD   atorvastatin (LIPITOR) 40 MG tablet Take 1 tablet by mouth nightly 4/2/19   ROB Hyde CNP   clopidogrel (PLAVIX) 75 MG tablet Take 1 tablet by mouth daily 4/2/19   ROB Hyde CNP   nitroGLYCERIN (NITROSTAT) 0.4 MG SL tablet Place 1 tablet under the tongue every 5 minutes as needed for Chest pain up to max of 3 total doses. If no relief after 1 dose, call 911. 4/2/19   Antione Mims APRN - CNP   NIFEdipine (ADALAT CC) 30 MG extended release tablet Take 30 mg by mouth 2 times daily    Historical Provider, MD   aspirin 81 MG chewable tablet Take 1 tablet by mouth daily  Patient taking differently: Take 81 mg by mouth daily LAST DOSE 02/14/2019 11/30/18   Alycia Turcios MD   carvedilol (COREG) 3.125 MG tablet Take 1 tablet by mouth 2 times daily (with meals) 11/29/18   Alycia Turcios MD   SITagliptin (JANUVIA) 25 MG tablet Take 1 tablet by mouth daily 11/29/18   Alycia Turcios MD   furosemide (LASIX) 40 MG tablet Take 40 mg by mouth 2 times daily     Historical Provider, MD        Allergies:     Lisinopril    Social History:     Tobacco:    reports that he has never smoked. He has never used smokeless tobacco.  Alcohol:      reports that he does not drink alcohol. Drug Use:  reports that he does not use drugs. Family History:     Family History   Problem Relation Age of Onset    Heart Disease Mother         CHF    Early Death Mother     High Blood Pressure Brother     Diabetes Brother     Asthma Brother     High Blood Pressure Brother     Diabetes Brother     High Blood Pressure Brother     Diabetes Brother        Review of Systems:     Positive and Negative as described in HPI. CONSTITUTIONAL:  negative for fevers, chills, sweats, fatigue, weight loss  HEENT:  negative for vision, hearing changes, runny nose, throat pain  RESPIRATORY:  negative for shortness of breath, cough, congestion, wheezing.   CARDIOVASCULAR:  + for chest pain, denies palpitations  GASTROINTESTINAL:  negative for nausea, vomiting, diarrhea, constipation, change in bowel habits, abdominal pain   GENITOURINARY:  negative for difficulty of urination, burning with urination, frequency   INTEGUMENT:  negative for rash, skin lesions, easy bruising   HEMATOLOGIC/LYMPHATIC:  negative for swelling/edema ALLERGIC/IMMUNOLOGIC:  negative for urticaria , itching  ENDOCRINE:  negative increase in drinking, increase in urination, hot or cold intolerance  MUSCULOSKELETAL:  negative joint pains, muscle aches, swelling of joints  NEUROLOGICAL:  negative for headaches, dizziness, lightheadedness, numbness, pain, tingling extremities  BEHAVIOR/PSYCH:  negative for depression, anxiety    Physical Exam:   BP (!) 170/72   Pulse 57   Temp 97.5 °F (36.4 °C) (Oral)   Resp 17   Ht 5' 10\" (1.778 m)   Wt 246 lb 4.1 oz (111.7 kg)   SpO2 97%   BMI 35.33 kg/m²   Temp (24hrs), Av.8 °F (36.6 °C), Min:97.5 °F (36.4 °C), Max:98.5 °F (36.9 °C)    Recent Labs     19  2333   POCGLU 128*       Intake/Output Summary (Last 24 hours) at 2019 1300  Last data filed at 2019 0830  Gross per 24 hour   Intake 240 ml   Output --   Net 240 ml       General Appearance:  alert, well appearing, and in no acute distress, seen in dialysis room  Mental status: oriented to person, place, and time with normal affect  Head:  normocephalic, atraumatic. Eye: no icterus, redness, pupils equal and reactive, extraocular eye movements intact, conjunctiva clear  Ear: normal external ear, no discharge, hearing intact  Nose:  no drainage noted  Mouth: mucous membranes moist  Neck: supple, no carotid bruits, thyroid not palpable  Lungs: Bilateral equal air entry, clear to ausculation, no wheezing, rales or rhonchi, normal effort  Cardiovascular: normal rate, regular rhythm, no murmur, gallop, rub, no reproducible tenderness.   Abdomen: Soft, nontender, nondistended, normal bowel sounds, no hepatomegaly or splenomegaly  Neurologic: There are no new focal motor or sensory deficits, normal muscle tone and bulk, no abnormal sensation, normal speech, cranial nerves II through XII grossly intact  Skin: No gross lesions, rashes, bruising or bleeding on exposed skin area  Extremities:  peripheral pulses palpable, no pedal edema or calf pain with palpation  Psych: normal affect    Investigations:      Laboratory Testing:  Recent Results (from the past 24 hour(s))   EKG 12 Lead    Collection Time: 06/17/19  8:35 PM   Result Value Ref Range    Ventricular Rate 91 BPM    Atrial Rate 91 BPM    P-R Interval 234 ms    QRS Duration 170 ms    Q-T Interval 410 ms    QTc Calculation (Bazett) 504 ms    P Axis 54 degrees    R Axis -79 degrees    T Axis 52 degrees   CBC WITH AUTO DIFFERENTIAL    Collection Time: 06/17/19  8:51 PM   Result Value Ref Range    WBC 6.4 3.5 - 11.3 k/uL    RBC 4.38 4.21 - 5.77 m/uL    Hemoglobin 12.7 (L) 13.0 - 17.0 g/dL    Hematocrit 40.1 (L) 40.7 - 50.3 %    MCV 91.6 82.6 - 102.9 fL    MCH 29.0 25.2 - 33.5 pg    MCHC 31.7 28.4 - 34.8 g/dL    RDW 14.3 11.8 - 14.4 %    Platelets 483 249 - 744 k/uL    MPV 9.4 8.1 - 13.5 fL    NRBC Automated 0.0 0.0 per 100 WBC    Differential Type NOT REPORTED     Seg Neutrophils 71 (H) 36 - 65 %    Lymphocytes 12 (L) 24 - 43 %    Monocytes 12 3 - 12 %    Eosinophils % 3 1 - 4 %    Basophils 1 0 - 2 %    Immature Granulocytes 1 (H) 0 %    Segs Absolute 4.63 1.50 - 8.10 k/uL    Absolute Lymph # 0.77 (L) 1.10 - 3.70 k/uL    Absolute Mono # 0.77 0.10 - 1.20 k/uL    Absolute Eos # 0.16 0.00 - 0.44 k/uL    Basophils # 0.03 0.00 - 0.20 k/uL    Absolute Immature Granulocyte 0.04 0.00 - 0.30 k/uL    WBC Morphology NOT REPORTED     RBC Morphology NOT REPORTED     Platelet Estimate NOT REPORTED    Basic Metabolic Panel    Collection Time: 06/17/19  8:51 PM   Result Value Ref Range    Glucose 171 (H) 70 - 99 mg/dL    BUN 56 (H) 8 - 23 mg/dL    CREATININE 5.47 (HH) 0.70 - 1.20 mg/dL    Bun/Cre Ratio NOT REPORTED 9 - 20    Calcium 9.8 8.6 - 10.4 mg/dL    Sodium 135 135 - 144 mmol/L    Potassium 4.0 3.7 - 5.3 mmol/L    Chloride 94 (L) 98 - 107 mmol/L    CO2 22 20 - 31 mmol/L    Anion Gap 19 (H) 9 - 17 mmol/L    GFR Non-African American 11 (L) >60 mL/min    GFR  13 (L) >60 mL/min    GFR Comment          GFR Staging NOT REPORTED    Troponin    Collection Time: 06/17/19  8:51 PM   Result Value Ref Range    Troponin, High Sensitivity 161 (HH) 0 - 22 ng/L    Troponin T NOT REPORTED <0.03 ng/mL    Troponin Interp NOT REPORTED    Brain Natriuretic Peptide    Collection Time: 06/17/19  8:51 PM   Result Value Ref Range    Pro-BNP 1,895 (H) <300 pg/mL    BNP Interpretation Pro-BNP Reference Range:    Troponin    Collection Time: 06/17/19  9:39 PM   Result Value Ref Range    Troponin, High Sensitivity 144 (HH) 0 - 22 ng/L    Troponin T NOT REPORTED <0.03 ng/mL    Troponin Interp NOT REPORTED    SPECIMEN REJECTION    Collection Time: 06/17/19  9:39 PM   Result Value Ref Range    Specimen Source . BLOOD     Ordered Test PTT     Reason for Rejection Unable to perform testing: Specimen clotted.      - NOT REPORTED    APTT    Collection Time: 06/17/19  9:56 PM   Result Value Ref Range    PTT 24.2 20.5 - 30.5 sec   POC Glucose Fingerstick    Collection Time: 06/17/19 11:33 PM   Result Value Ref Range    POC Glucose 128 (H) 75 - 110 mg/dL   Troponin    Collection Time: 06/17/19 11:47 PM   Result Value Ref Range    Troponin, High Sensitivity 138 (HH) 0 - 22 ng/L    Troponin T NOT REPORTED <0.03 ng/mL    Troponin Interp NOT REPORTED    APTT    Collection Time: 06/18/19  3:49 AM   Result Value Ref Range    PTT 36.6 (H) 20.5 - 30.5 sec   Troponin    Collection Time: 06/18/19  3:49 AM   Result Value Ref Range    Troponin, High Sensitivity 133 (HH) 0 - 22 ng/L    Troponin T NOT REPORTED <0.03 ng/mL    Troponin Interp NOT REPORTED    BASIC METABOLIC PANEL    Collection Time: 06/18/19 10:49 AM   Result Value Ref Range    Glucose 257 (H) 70 - 99 mg/dL    BUN 51 (H) 8 - 23 mg/dL    CREATININE 5.12 (HH) 0.70 - 1.20 mg/dL    Bun/Cre Ratio NOT REPORTED 9 - 20    Calcium 8.8 8.6 - 10.4 mg/dL    Sodium 139 135 - 144 mmol/L    Potassium 4.2 3.7 - 5.3 mmol/L    Chloride 99 98 - 107 mmol/L    CO2 25 20 - 31 mmol/L    Anion Gap 15 9 - 17 mmol/L    GFR Non- 12 (L) >60 mL/min    GFR  14 (L) >60 mL/min    GFR Comment          GFR Staging NOT REPORTED        Imaging/Diagnostics:    Xr Chest Standard (2 Vw)    Result Date: 6/17/2019  1. No acute abnormality. Assessment :      Primary Problem  NSTEMI (non-ST elevated myocardial infarction) [facility])    Active Hospital Problems    Diagnosis Date Noted    NSTEMI (non-ST elevated myocardial infarction) ([facility] [I21.4] 06/17/2019     Priority: High    S/P PTCA - TIM distal LAD - Dr. Sandhya Herrera 4/1/19 [Z98.61] 04/01/2019     Priority: High    Acute on chronic diastolic congestive heart failure ([address] [I50.33] 11/24/2018    Chest pain at rest [R07.9] 12/21/2016    Anemia of chronic renal failure [N18.9, D63.1] 10/17/2016    CKD (chronic kidney disease) stage 4, GFR 15-29 ml/min ([facility] [address] [N18.4] 10/17/2016    Type 2 diabetes mellitus, without long-term current use of insulin ([address] [E11.9] 10/17/2016       Plan:     Patient status Admit as inpatient in the  Progressive Unit/Step down    1. Continue scheduled dialysis  2. Heparin drip  3. Pain control  4. Diabetes control  5. Consult cardiology for possible cath, consult nephrology being a dialysis patient    Consultations:   IP CONSULT TO CARDIOLOGY  IP CONSULT TO HOSPITALIST  IP CONSULT TO CARDIOLOGY  IP CONSULT TO NEPHROLOGY     Patient is admitted as inpatient status because of co-morbidities listed above, severity of signs and symptoms as outlined, requirement for current medical therapies and most importantly because of direct risk to patient if care not provided in a hospital setting.     Arin Beck MD  6/18/2019  1:00 PM    Copy sent to Dr. Carla Vences

## 2019-06-19 VITALS
WEIGHT: 245.3 LBS | TEMPERATURE: 97.9 F | DIASTOLIC BLOOD PRESSURE: 65 MMHG | BODY MASS INDEX: 35.12 KG/M2 | HEART RATE: 74 BPM | HEIGHT: 70 IN | SYSTOLIC BLOOD PRESSURE: 128 MMHG | RESPIRATION RATE: 16 BRPM | OXYGEN SATURATION: 98 %

## 2019-06-19 LAB
ALBUMIN SERPL-MCNC: 3.4 G/DL (ref 3.5–5.2)
ALBUMIN/GLOBULIN RATIO: 0.9 (ref 1–2.5)
ALP BLD-CCNC: 81 U/L (ref 40–129)
ALT SERPL-CCNC: 29 U/L (ref 5–41)
ANION GAP SERPL CALCULATED.3IONS-SCNC: 15 MMOL/L (ref 9–17)
AST SERPL-CCNC: 21 U/L
BILIRUB SERPL-MCNC: 0.2 MG/DL (ref 0.3–1.2)
BNP INTERPRETATION: ABNORMAL
BUN BLDV-MCNC: 39 MG/DL (ref 8–23)
BUN/CREAT BLD: ABNORMAL (ref 9–20)
CALCIUM SERPL-MCNC: 8 MG/DL (ref 8.6–10.4)
CHLORIDE BLD-SCNC: 98 MMOL/L (ref 98–107)
CHOLESTEROL/HDL RATIO: 4.4
CHOLESTEROL: 133 MG/DL
CO2: 22 MMOL/L (ref 20–31)
CREAT SERPL-MCNC: 4.39 MG/DL (ref 0.7–1.2)
EKG ATRIAL RATE: 73 BPM
EKG P AXIS: 56 DEGREES
EKG P-R INTERVAL: 250 MS
EKG Q-T INTERVAL: 468 MS
EKG QRS DURATION: 162 MS
EKG QTC CALCULATION (BAZETT): 515 MS
EKG R AXIS: -78 DEGREES
EKG T AXIS: -17 DEGREES
EKG VENTRICULAR RATE: 73 BPM
GFR AFRICAN AMERICAN: 17 ML/MIN
GFR NON-AFRICAN AMERICAN: 14 ML/MIN
GFR SERPL CREATININE-BSD FRML MDRD: ABNORMAL ML/MIN/{1.73_M2}
GFR SERPL CREATININE-BSD FRML MDRD: ABNORMAL ML/MIN/{1.73_M2}
GLUCOSE BLD-MCNC: 136 MG/DL (ref 70–99)
GLUCOSE BLD-MCNC: 139 MG/DL (ref 75–110)
GLUCOSE BLD-MCNC: 146 MG/DL (ref 75–110)
HCT VFR BLD CALC: 33.7 % (ref 40.7–50.3)
HDLC SERPL-MCNC: 30 MG/DL
HEMOGLOBIN: 10.5 G/DL (ref 13–17)
LDL CHOLESTEROL: 57 MG/DL (ref 0–130)
MAGNESIUM: 2.2 MG/DL (ref 1.6–2.6)
MCH RBC QN AUTO: 29 PG (ref 25.2–33.5)
MCHC RBC AUTO-ENTMCNC: 31.2 G/DL (ref 28.4–34.8)
MCV RBC AUTO: 93.1 FL (ref 82.6–102.9)
NRBC AUTOMATED: 0 PER 100 WBC
PARTIAL THROMBOPLASTIN TIME: 31.3 SEC (ref 20.5–30.5)
PARTIAL THROMBOPLASTIN TIME: 31.8 SEC (ref 20.5–30.5)
PARTIAL THROMBOPLASTIN TIME: 35 SEC (ref 20.5–30.5)
PDW BLD-RTO: 14.3 % (ref 11.8–14.4)
PLATELET # BLD: 175 K/UL (ref 138–453)
PMV BLD AUTO: 9.2 FL (ref 8.1–13.5)
POTASSIUM SERPL-SCNC: 3.8 MMOL/L (ref 3.7–5.3)
PRO-BNP: 2567 PG/ML
RBC # BLD: 3.62 M/UL (ref 4.21–5.77)
SODIUM BLD-SCNC: 135 MMOL/L (ref 135–144)
TOTAL PROTEIN: 7 G/DL (ref 6.4–8.3)
TRIGL SERPL-MCNC: 230 MG/DL
TROPONIN INTERP: ABNORMAL
TROPONIN T: ABNORMAL NG/ML
TROPONIN, HIGH SENSITIVITY: 123 NG/L (ref 0–22)
VLDLC SERPL CALC-MCNC: ABNORMAL MG/DL (ref 1–30)
WBC # BLD: 4.9 K/UL (ref 3.5–11.3)

## 2019-06-19 PROCEDURE — 82947 ASSAY GLUCOSE BLOOD QUANT: CPT

## 2019-06-19 PROCEDURE — 83880 ASSAY OF NATRIURETIC PEPTIDE: CPT

## 2019-06-19 PROCEDURE — 99239 HOSP IP/OBS DSCHRG MGMT >30: CPT | Performed by: FAMILY MEDICINE

## 2019-06-19 PROCEDURE — 6370000000 HC RX 637 (ALT 250 FOR IP): Performed by: NURSE PRACTITIONER

## 2019-06-19 PROCEDURE — 2580000003 HC RX 258: Performed by: NURSE PRACTITIONER

## 2019-06-19 PROCEDURE — 84484 ASSAY OF TROPONIN QUANT: CPT

## 2019-06-19 PROCEDURE — 93005 ELECTROCARDIOGRAM TRACING: CPT | Performed by: NURSE PRACTITIONER

## 2019-06-19 PROCEDURE — 85027 COMPLETE CBC AUTOMATED: CPT

## 2019-06-19 PROCEDURE — 85730 THROMBOPLASTIN TIME PARTIAL: CPT

## 2019-06-19 PROCEDURE — 80053 COMPREHEN METABOLIC PANEL: CPT

## 2019-06-19 PROCEDURE — 6370000000 HC RX 637 (ALT 250 FOR IP): Performed by: INTERNAL MEDICINE

## 2019-06-19 PROCEDURE — 80061 LIPID PANEL: CPT

## 2019-06-19 PROCEDURE — 6360000002 HC RX W HCPCS: Performed by: NURSE PRACTITIONER

## 2019-06-19 PROCEDURE — 83735 ASSAY OF MAGNESIUM: CPT

## 2019-06-19 PROCEDURE — 36415 COLL VENOUS BLD VENIPUNCTURE: CPT

## 2019-06-19 PROCEDURE — 93010 ELECTROCARDIOGRAM REPORT: CPT | Performed by: INTERNAL MEDICINE

## 2019-06-19 RX ORDER — ISOSORBIDE MONONITRATE 30 MG/1
30 TABLET, EXTENDED RELEASE ORAL DAILY
Qty: 30 TABLET | Refills: 3 | Status: SHIPPED | OUTPATIENT
Start: 2019-06-20 | End: 2019-09-20

## 2019-06-19 RX ORDER — CARVEDILOL 6.25 MG/1
6.25 TABLET ORAL 2 TIMES DAILY WITH MEALS
Qty: 60 TABLET | Refills: 3 | Status: SHIPPED | OUTPATIENT
Start: 2019-06-19 | End: 2021-12-24 | Stop reason: HOSPADM

## 2019-06-19 RX ADMIN — ASPIRIN 81 MG: 81 TABLET ORAL at 09:19

## 2019-06-19 RX ADMIN — HEPARIN SODIUM 2000 UNITS: 1000 INJECTION INTRAVENOUS; SUBCUTANEOUS at 05:16

## 2019-06-19 RX ADMIN — CLOPIDOGREL 75 MG: 75 TABLET, FILM COATED ORAL at 09:20

## 2019-06-19 RX ADMIN — CARVEDILOL 6.25 MG: 6.25 TABLET, FILM COATED ORAL at 09:19

## 2019-06-19 RX ADMIN — ISOSORBIDE MONONITRATE 30 MG: 30 TABLET ORAL at 09:19

## 2019-06-19 RX ADMIN — Medication 10 ML: at 09:22

## 2019-06-19 RX ADMIN — FAMOTIDINE 20 MG: 20 TABLET, FILM COATED ORAL at 09:20

## 2019-06-19 RX ADMIN — NIFEDIPINE 30 MG: 30 TABLET, FILM COATED, EXTENDED RELEASE ORAL at 09:20

## 2019-06-19 RX ADMIN — LINAGLIPTIN 5 MG: 5 TABLET, FILM COATED ORAL at 09:20

## 2019-06-19 RX ADMIN — FUROSEMIDE 40 MG: 20 TABLET ORAL at 09:20

## 2019-06-19 ASSESSMENT — PAIN SCALES - GENERAL
PAINLEVEL_OUTOF10: 0
PAINLEVEL_OUTOF10: 0

## 2019-06-19 ASSESSMENT — ENCOUNTER SYMPTOMS
DIARRHEA: 0
CONSTIPATION: 0
WHEEZING: 0
SINUS PRESSURE: 0
SHORTNESS OF BREATH: 0
VOICE CHANGE: 0
ABDOMINAL PAIN: 0
SORE THROAT: 0
BLOOD IN STOOL: 0
VOMITING: 0
NAUSEA: 0
COUGH: 0

## 2019-06-19 NOTE — PROGRESS NOTES
ventricle is mildly enlarged. Global left ventricular systolic function is mildly reduced. Calculated EF via Le''s method is 48 %. Mild tricuspid regurgitation. Mild mitral regurgitation. Left atrium is mildly dilated. Diastolic dysfunction with elevated left sided filling pressures, E/E' =  15.45 .     Stress Test:   03/2019  . No discrete perfusion abnormality to suggest myocardial   ischemia/infarction. 2. Inferior and inferoseptal hypokinesis.  Calculated ejection fraction of   36%. 3. Risk stratification: Intermediate   Notes concerning risk stratification:            Cardiac Angiography:   04/01/2019  LMCA: Normal 0% stenosis. LAD: distal 60% stenosis reduced to 0% s/p PTCA/TIM 2.75x38 mm with resultant CHERYL-III flow.       LCx: Normal 0% stenosis. RCA: Normal 0% stenosis.     The LV gram was performed in the CEDILLO 30 position.      LVEF: 45%. LV Wall Motion: anterior wall hypokinesis           Conclusions:  1. Single vessel distal LAD disease  2. Successful PTCA/TIM of the distal LAD  3. Mildly decreased LV systolic function with anterior hypokinesis        Objective:   Vitals: BP (!) 143/62   Pulse 76   Temp 97.6 °F (36.4 °C) (Oral)   Resp 16   Ht 5' 10\" (1.778 m)   Wt 245 lb 4.8 oz (111.3 kg)   SpO2 98%   BMI 35.20 kg/m²   General appearance: alert and cooperative with exam  HEENT: Head: Normocephalic, no lesions, without obvious abnormality. Neck: no JVD, trachea midline, no adenopathy  Lungs: Clear to auscultation  Heart: Regular rate and rhythm, s1/s2 auscultated, no murmurs  Abdomen: soft, non-tender, bowel sounds active  Extremities: no edema  Neurologic: not done        Assessment / Acute Cardiac Problems:   1. CAD s/p TIM to LAD 04/01/2019 -cath done for symptoms of angina   2. Mild LV systolic dysfunction EF 75% 11/2018  3. HTN  4. ESRD on HD (TTS)      1.      Patient Active Problem List:     CKD (chronic kidney disease) stage 4, GFR 15-29 ml/min ([facility])     Anemia of chronic renal failure     Type 2 diabetes mellitus, without long-term current use of insulin (HCC)     Chest pain at rest     Acute congestive heart failure (HCC)     Pneumonia     Rising PSA level     Prostatism     Acute on chronic diastolic congestive heart failure (HCC)     Anemia     Essential hypertension     Respiratory distress     AVF (arteriovenous fistula) ([facility])     Chest pain     S/P PTCA - TIM distal LAD - Dr. Piña Presume 4/1/19     NSTEMI (non-ST elevated myocardial infarction) ([facility] [address]      Plan of Treatment:   1. Elevated trops secondary to ESRD and HTN. 2. No currently CP. Flat trops. Will continue with maximum medical therapy. Continue BB, imdur, ASA, statin, plavix, CCB   3. PO lasix and fluid management per nephrology  4. Will d/c heparin gtt.    5. Ok to d/c home and will follow up as outpt     Electronically signed by ROB Christine CNP on 6/19/2019 at 9:46 AM  [address]  [phone]

## 2019-06-19 NOTE — PLAN OF CARE
Problem: Pain:  Goal: Pain level will decrease  Description  Pain level will decrease  Outcome: Ongoing  Goal: Control of chronic pain  Description  Control of chronic pain  Outcome: Ongoing     Problem: Falls - Risk of:  Goal: Will remain free from falls  Description  Will remain free from falls  Outcome: Ongoing  Goal: Absence of physical injury  Description  Absence of physical injury  Outcome: Ongoing     Problem: DAILY CARE  Goal: Daily care needs are met  Outcome: Ongoing     Problem: SKIN INTEGRITY  Goal: Skin integrity is maintained or improved  Outcome: Ongoing     Problem: KNOWLEDGE DEFICIT  Goal: Patient/S.O. demonstrates understanding of disease process, treatment plan, medications, and discharge instructions.   Outcome: Ongoing     Problem: DISCHARGE BARRIERS  Goal: Patient's continuum of care needs are met  Outcome: Ongoing    Electronically signed by Korin Covarrubias RN on 6/19/2019 at 1:59 AM

## 2019-06-19 NOTE — PROGRESS NOTES
[facility]      [place]     Admit Date: 6/17/2019  Bed/Room No.  3005/3005-01  Admitting Physician : Isaura Perez MD  Code Status :[address] Day:  LOS: 2 days   Chief Complaint:     Chief Complaint   Patient presents with    Chest Pain     Principal Problem:    NSTEMI (non-ST elevated myocardial infarction) ([facility] [address]  Active Problems:    S/P PTCA - TIM distal LAD - Dr. Angela Lamb 4/1/19    CKD (chronic kidney disease) stage 4, GFR 15-29 ml/min ([facility])    Anemia of chronic renal failure    Type 2 diabetes mellitus, without long-term current use of insulin ([facility])    Chest pain at rest    Acute on chronic diastolic congestive heart failure ([facility] [address]  Resolved Problems:    * No resolved hospital problems. *    Subjective : Interval History/Significant events :  06/19/19    Patient is chest pain free. Breathing is stable , blood pressure is controlled . He denies any palpitations . Vitals - Stable afebrile  Labs - stable    Nursing notes , Consults notes reviewed. Overnight events and updates discussed with Nursing staff . Background History:         Leena Epstien is 59 y.o. male  Who was admitted to the hospital on 6/17/2019 for treatment of NSTEMI (non-ST elevated myocardial infarction) ([facility] Patient came to emergency room with left-sided chest pain for 4 days with associated dyspnea. He has known history of ESRD on hemodialysis, CAD s/p LAD stent, HFrEF. Patient reported compliance with hemodialysis. Initial evaluation showed temperature 98.5, pulse 86, respiration 15, blood pressure 101/79. Lab evaluation showed elevated proBNP 1895, troponin I 4 4, normal electrolytes, creatinine 5.47, BUN 56, hemoglobin 12.7, WBC 6.4. EKG showed normal sinus rhythm without any acute ST/T wave changes. Serial troponins did not show any increase. Echocardiogram showed dilated cardiomyopathy without any wall motion abnormality.   Patient was treated with IV heparin, cardiology was consulted for further management. PMH:  Past Medical History:   Diagnosis Date    Bowel obstruction ([facility] 12/26/2013    CAD (coronary artery disease) 2013    ?  [facility]est[facility] 2013    last episode 11/2018    Chronic kidney disease     Diabetes mellitus ([facility] 2012    NIDDM    Hemod[facility] patient ([facility] 11/28/2018    TUNNELD CATHETER RIGHT DIALYSIS ACCESSTUES THURS AND AT [place]    Hyperlipidemia 2013    ON RX    Hypertension 2013    ON RX    MI, old 12/26/2013    Patient in clinical research study 04/01/2019    XIENCE SHORT DAPT    Sleep apnea 2015    pt has machine and does not use it      Allergies: Allergies   Allergen Reactions    Lisinopril Swelling      Medications :    clopidogrel 75 mg Oral Daily   linagliptin 5 mg Oral Daily   sodium chloride flush 10 mL Intravenous 2 times per day   insulin lispro 0-12 Units Subcutaneous TID WC   carvedilol 6.25 mg Oral BID WC   isosorbide mononitrate 30 mg Oral Daily   aspirin 81 mg Oral Daily   atorvastatin 40 mg Oral Nightly   furosemide 40 mg Oral BID   NIFEdipine 30 mg Oral BID   famotidine 20 mg Oral Daily   insulin lispro 0-6 Units Subcutaneous Nightly       Review of Systems   Review of Systems   Constitutional: Negative for activity change, appetite change, chills, fatigue, fever and unexpected weight change. HENT: Negative for congestion, mouth sores, postnasal drip, sinus pressure, sore throat and voice change. Eyes: Negative for visual disturbance. Respiratory: Negative for cough, shortness of breath and wheezing. Cardiovascular: Negative for chest pain and palpitations. Gastrointestinal: Negative for abdominal pain, blood in stool, constipation, diarrhea, nausea and vomiting. Endocrine: Negative for polyuria. Genitourinary: Negative for difficulty urinating, dysuria, frequency and urgency. Musculoskeletal: Negative for arthralgias, joint swelling and myalgias.    Neurological: Negative for dizziness, tremors, speech difficulty, light-headedness and headaches. Objective :      Current Vitals : Temp: 97.6 °F (36.4 °C),  Pulse: 76, Resp: 16, BP: (!) 143/62, SpO2: 98 %  Last 24 Hrs Vitals   Patient Vitals for the past 24 hrs:   BP Temp Temp src Pulse Resp SpO2 Weight   06/19/19 0657 (!) 143/62 97.6 °F (36.4 °C) Oral 76 16 98 % --   06/19/19 0630 -- -- -- -- -- -- 245 lb 4.8 oz (111.3 kg)   06/19/19 0345 117/73 98 °F (36.7 °C) Axillary -- 16 97 % --   06/18/19 2315 118/66 98.6 °F (37 °C) Oral -- 16 96 % --   06/18/19 1900 (!) 113/56 98.2 °F (36.8 °C) Oral -- 18 98 % --   06/18/19 1503 (!) 147/82 -- -- 76 -- -- --   06/18/19 1320 (!) 168/70 98.2 °F (36.8 °C) -- 57 16 -- 245 lb 6 oz (111.3 kg)   06/18/19 1257 (!) 170/72 -- -- 57 -- -- --   06/18/19 1233 (!) 171/81 -- -- 52 -- -- --   06/18/19 1203 (!) 153/80 -- -- 52 -- -- --   06/18/19 1129 (!) 147/74 -- -- 63 -- -- --   06/18/19 1122 (!) 122/47 97.5 °F (36.4 °C) Oral 86 17 97 % --   06/18/19 1056 (!) 165/76 -- -- 75 -- -- --   06/18/19 1020 (!) 163/79 -- -- 77 -- -- --     Intake / output   06/18 0701 - 06/19 0700  In: 1278.1 [P.O.:580; I.V.:698.1]  Out: 200 [Urine:200]  Physical Exam:  Physical Exam   Constitutional: He is oriented to person, place, and time. No distress. HENT:   Head: Normocephalic and atraumatic. Mouth/Throat: Oropharynx is clear and moist. No oropharyngeal exudate. Eyes: Pupils are equal, round, and reactive to light. Conjunctivae and EOM are normal. No scleral icterus. Neck: No JVD present. No thyromegaly present. Cardiovascular: Normal rate, regular rhythm and normal heart sounds. No murmur heard. Pulmonary/Chest: Effort normal and breath sounds normal. He has no wheezes. He has no rales. tunneled catheter  in place    Abdominal: Soft. He exhibits no mass. There is no tenderness. Lymphadenopathy:     He has no cervical adenopathy. Neurological: He is alert and oriented to person, place, and time.    Skin: He is not diaphoretic. Nursing note and vitals reviewed. Lower Extremities : No ankle Edema , No calf Tenderness     Laboratory findings:    Recent Labs     06/17/19 2051 06/19/19  0425   WBC 6.4 4.9   HGB 12.7* 10.5*   HCT 40.1* 33.7*    175     Recent Labs     06/17/19 2051 06/18/19  1049 06/19/19  0425    139 135   K 4.0 4.2 3.8   CL 94* 99 98   CO2 22 25 22   GLUCOSE 171* 257* 136*   BUN 56* 51* 39*   CREATININE 5.47* 5.12* 4.39*   MG  --   --  2.2   CALCIUM 9.8 8.8 8.0*     Recent Labs     06/18/19 0902 06/19/19  0425   PROT  --  7.0   LABALBU  --  3.4*   LABA1C 6.8*  --    AST  --  21   ALT  --  29   ALKPHOS  --  81   BILITOT  --  0.20*   CHOL  --  133   HDL  --  30*   LDLCHOLESTEROL  --  57   CHOLHDLRATIO  --  4.4   TRIG  --  230*   VLDL  --  NOT REPORTED*          Specific Gravity, UA   Date Value Ref Range Status   11/24/2018 1.016 1.005 - 1.030 Final     Protein, UA   Date Value Ref Range Status   11/24/2018 3+ (A) NEG Final     RBC, UA   Date Value Ref Range Status   11/24/2018 0 TO 2 0 - 2 /HPF Final     Bacteria, UA   Date Value Ref Range Status   11/24/2018 NOT REPORTED NONE Final     Nitrite, Urine   Date Value Ref Range Status   11/24/2018 NEGATIVE NEG Final     WBC, UA   Date Value Ref Range Status   11/24/2018 None 0 - 5 /HPF Final     Leukocyte Esterase, Urine   Date Value Ref Range Status   11/24/2018 NEGATIVE NEG Final     Lab Results   Component Value Date    LABA1C 6.8 (H) 06/18/2019     Lab Results   Component Value Date     06/18/2019         Imaging / Clinical Data :-   Xr Chest Standard (2 Vw)    Result Date: 6/17/2019  1. No acute abnormality. Clinical Course : stable  Assessment and Plan  :        1. Angina -medical management. No ischemic work-up at this time. Patient has known single-vessel CAD with PCI and TIM placement to LAD April 2019. Imdur added , Coreg increased . Continue Lipitor, Plavix, aspirin. 2. ESRD on hemodialysis - TTS schedule  . 3. Dilated cardiomyopathy -   4. Essential hypertension - controlled   5. Type 2 diabetes mellitus - controlled, last A1C 6.8  . Discharge home. Plan and updates discussed with patient ,  answers  explained to satisfaction.    Plan discussed with Staff Aura WOODS     (Please note that portions of this note were completed with a voice recognition program. Efforts were made to edit the dictations but occasionally words are mis-transcribed.)      Merlene Barnes MD  6/19/2019

## 2019-06-19 NOTE — PROGRESS NOTES
Renal Progress Note    Patient :  Jermain Ledbetter; 59 y.o. MRN# [id number]  Location:  [facility]15-40  Attending:  Merlene Barnes MD  Admit Date:  6/17/2019   Hospital Day: 2    Reason for Consult:     Asked by Dr Merlene Barnes MD to see for JAGRUTI/Elevated Creatinine. History Obtained From:     patient, electronic medical record    HD Access:     previous  Left  AV fistula and temporary dialysis catheter in the left side anterior chest     HD Unit:     [facility]    Nephrologist:     Dr. Cielo Pickett (patient reported)  / Carol Ann Bauer (per chart review)    Dry Weight:         Admission Weight:     114.1 kg     History of Present Illness:     Jermain Ledbetter; 59 y.o. male with past medical history as mentioned below presented to the hospital with the chief complaint of chest pain of 4 days duration. Pain was reported to be squeezing / crushing progressively increased in intensity over the days, on admission 7-8/10 , currently 5-6/10, no radiation, present on rest, no associated N/V/D diaphoresis, cough, hemoptysis, abdominal pain, fever, chills. Patient reported similar kind of chest pain before previous hospitalization and stated that he needed stent placement during that admission. Patient reported hx of CAD with stent placed on the previous admission. Patient also reported compliance to all his medication. Patient reported that he receives dialysis through TTS. Patient reported that earlier it was through LUE AV fistula but it was malfunctioning since last couple of occasions and he has followed up with Dr. Liv Rabago for recanalization of the same per the chart review. Patient is currently getting his dialysis through his tunnel catheter which he stated is temporary. Patient stated that he has a hx of DM, HTN for which he regularly takes his medications. Today, his pain is better on Imdur and increased Coreg dose. He continues on a heparin drip.   We are awaiting further cardiology recommendations, may go home on new medication regimen. Past History/Allergies? Social History:     Past Medical History:   Diagnosis Date    Bowel obstruction ([facility] 12/26/2013    CAD (coronary artery disease) 2013    ?     CHF (congestive heart failure) ([facility] 2013    last episode 11/2018    Chronic kidney disease     Diabetes mellitus ([facility] 2012    NIDDM    Hemodialysis patient ([facility] 11/28/2018    TUNNELD CATHETER RIGHT DIALYSIS ACCESSTUES THURS AND AT [facility]    Hyperlipidemia 2013    ON RX    Hypertension 2013    ON RX    MI, old 12/26/2013    Patient in clinical research study 04/01/2019    XIENCE SHORT DAPT    Sleep apnea 2015    pt has machine and does not use it       Allergies   Allergen Reactions    Lisinopril Swelling       Social History     Socioeconomic History    Marital status: Single     Spouse name: Not on file    Number of children: Not on file    Years of education: Not on file    Highest education level: Not on file   Occupational History    Not on file   Social Needs    Financial resource strain: Not on file    Food insecurity:     Worry: Not on file     Inability: Not on file    Transportation needs:     Medical: Not on file     Non-medical: Not on file   Tobacco Use    Smoking status: Never Smoker    Smokeless tobacco: Never Used   Substance and Sexual Activity    Alcohol use: No    Drug use: No    Sexual activity: Not on file   Lifestyle    Physical activity:     Days per week: Not on file     Minutes per session: Not on file    Stress: Not on file   Relationships    Social connections:     Talks on phone: Not on file     Gets together: Not on file     Attends Sabianist service: Not on file     Active member of club or organization: Not on file     Attends meetings of clubs or organizations: Not on file     Relationship status: Not on file    Intimate partner violence:     Fear of current or ex partner: Not on file     Emotionally abused: Not on file     Physically abused: Not on file Forced sexual activity: Not on file   Other Topics Concern    Not on file   Social History Narrative    Not on file       Family History:        Family History   Problem Relation Age of Onset    Heart Disease Mother         CHF    Early Death Mother     High Blood Pressure Brother     Diabetes Brother     Asthma Brother     High Blood Pressure Brother     Diabetes Brother     High Blood Pressure Brother     Diabetes Brother        Outpatient Medications:     Medications Prior to Admission: Multiple Vitamins-Minerals (RENAPLEX-D) TABS, Take 1 tablet by mouth daily  traMADol (ULTRAM) 50 MG tablet, take 1 tablet by mouth every 6 hours if needed  lidocaine-prilocaine (EMLA) 2.5-2.5 % cream,   atorvastatin (LIPITOR) 40 MG tablet, Take 1 tablet by mouth nightly  clopidogrel (PLAVIX) 75 MG tablet, Take 1 tablet by mouth daily  nitroGLYCERIN (NITROSTAT) 0.4 MG SL tablet, Place 1 tablet under the tongue every 5 minutes as needed for Chest pain up to max of 3 total doses. If no relief after 1 dose, call 911.   NIFEdipine (ADALAT CC) 30 MG extended release tablet, Take 30 mg by mouth 2 times daily  aspirin 81 MG chewable tablet, Take 1 tablet by mouth daily (Patient taking differently: Take 81 mg by mouth daily LAST DOSE 02/14/2019)  carvedilol (COREG) 3.125 MG tablet, Take 1 tablet by mouth 2 times daily (with meals)  SITagliptin (JANUVIA) 25 MG tablet, Take 1 tablet by mouth daily  furosemide (LASIX) 40 MG tablet, Take 40 mg by mouth 2 times daily     Current Medications:     Scheduled Meds:    clopidogrel  75 mg Oral Daily    linagliptin  5 mg Oral Daily    sodium chloride flush  10 mL Intravenous 2 times per day    insulin lispro  0-12 Units Subcutaneous TID     carvedilol  6.25 mg Oral BID WC    isosorbide mononitrate  30 mg Oral Daily    aspirin  81 mg Oral Daily    atorvastatin  40 mg Oral Nightly    furosemide  40 mg Oral BID    NIFEdipine  30 mg Oral BID    famotidine  20 mg Oral Daily    insulin lispro  0-6 Units Subcutaneous Nightly     Continuous Infusions:    heparin (porcine) 10.799 Units/kg/hr (19 0511)    dextrose       PRN Meds:  sodium chloride flush, heparin (porcine), heparin (porcine), nitroGLYCERIN, potassium chloride **OR** potassium alternative oral replacement **OR** potassium chloride, magnesium sulfate, magnesium hydroxide, ondansetron, glucose, dextrose, glucagon (rDNA), dextrose    Review of Systems:     Constitutional: No fever, no chills, no lethargy, no weakness. HEENT:  No headache, otalgia, itchy eyes, nasal discharge or sore throat. Cardiac:  No chest pain currently, no dyspnea currently, orthopnea or PND. Chest:   No cough, phlegm or wheezing. Abdomen:  No abdominal pain, nausea or vomiting. Neuro:  No focal weakness, abnormal movements orseizure like activity. Skin:   No rashes, no itching. :   No hematuria, no pyuria, no dysuria, no flank pain. Extremities:  No swelling or joint pains. ROS was otherwise negative except as mentioned in the [address] Input/Output:     I/O last 3 completed shifts: In: 1278.1 [P.O.:580; I.V.:698.1]  Out: 200 [Urine:200]      Vital Signs:   Temperature:  Temp: 97.6 °F (36.4 °C)  TMax:   Temp (24hrs), Av.9 °F (36.6 °C), Min:97.5 °F (36.4 °C), Max:98.6 °F (37 °C)    Respirations:  Resp: 16  Pulse:   Pulse: 76  BP:    BP: (!) 143/62  BP Range: Systolic (99BYZ), ZDN:980 , Min:113 , HGT:989       Diastolic (25LVI), LDA:17, Min:47, Max:82      Physical Examination:     General:  AAO x 3, speaking in full sentences, no accessory muscle use. HEENT: Atraumatic, normocephalic, moist mucosa. Eyes:   Pupils equal, normal conjunctiva, no scleral icterus, EOMI. Neck:   No JVD, midline trachea, no accessory muscle use  Chest:   Bilateral vesicular breath sounds, no rales or wheezes. Cardiac:  S1 S2 RR, no murmurs, gallops or rubs, JVP not raised. Abdomen: Soft, non-tender, not distended, active bowel sounds.   Neuro:  AAO x 3 SKIN:  No rashes, good skin turgor. Extremities:  No edema     Labs:       Recent Labs     06/17/19 2051 06/19/19  0425   WBC 6.4 4.9   RBC 4.38 3.62*   HGB 12.7* 10.5*   HCT 40.1* 33.7*   MCV 91.6 93.1   MCH 29.0 29.0   MCHC 31.7 31.2   RDW 14.3 14.3    175   MPV 9.4 9.2      BMP:   Recent Labs     06/17/19 2051 06/18/19  1049 06/19/19  0425    139 135   K 4.0 4.2 3.8   CL 94* 99 98   CO2 22 25 22   BUN 56* 51* 39*   CREATININE 5.47* 5.12* 4.39*   GLUCOSE 171* 257* 136*   CALCIUM 9.8 8.8 8.0*      Phosphorus:   No results for input(s): PHOS in the last 72 hours. Magnesium:    Recent Labs     06/19/19  0425   MG 2.2     Albumin:    Recent Labs     06/19/19  0425   LABALBU 3.4*     BNP:      Lab Results   Component Value Date     09/08/2018     PTH:     Lab Results   Component Value Date    IPTH 144.1 11/29/2017     Blood cultures:  No results found for: [facility]    Urinalysis/Chemistries:      Lab Results   Component Value Date    NITRU NEGATIVE 11/24/2018    COLORU YELLOW 11/24/2018    PHUR 5.0 11/24/2018    WBCUA None 11/24/2018    RBCUA 0 TO 2 11/24/2018    MUCUS 1+ 11/24/2018    TRICHOMONAS NOT REPORTED 11/24/2018    YEAST NOT REPORTED 11/24/2018    BACTERIA NOT REPORTED 11/24/2018    SPECGRAV 1.016 11/24/2018    LEUKOCYTESUR NEGATIVE 11/24/2018    UROBILINOGEN Normal 11/24/2018    BILIRUBINUR NEGATIVE 11/24/2018    GLUCOSEU NEGATIVE 11/24/2018    KETUA NEGATIVE 11/24/2018    AMORPHOUS NOT REPORTED 11/24/2018       Radiology:     CXR:     Assessment:     1. ESRD secondary to Diabetic Nephrosclerosis  On Intermittent  Hemodialysis. His regular HD days are TTS  at [address]  Hemodialysis facility using LUE AV fistula/ temp tunnel  catheter  under Dr. Madhu Alonzo. His dry weight is not known. Admitted with chest pain which has improved on medication adjustments, although he was getting some chest pain and shortness of breath with dialysis yesterday. 2. Anemia of chronic disease  3. Secondary hyperparathyroidism  4. Essential Hypertension   5. Type 2 DM  6. NSTEMI / ACS   7. CAD s/p TIM to LAD, EF 45 %    8. BATSHEVA    Plan:   1. HD as per schedule. HD next tomorrow on Thursdy, 6/20/19  2. Strict Input and Output, Daily weigh and document in the chart. 3. Low Potassium, Low phosphorus and low salt diet. Fluids to be restricted to 1500ml/day. 4. Cardiology evaluation and discharge planning and medication adjustments per their service      Nutrition   Renal diet as ordered     We will continue to follow along with you.         Sridhar Nascimento MD  Nephrology Attending Physician  [facility]

## 2019-06-19 NOTE — PROGRESS NOTES
Discharge order received. Discharge instructions provided to the patient in verbal and written form. IV removed. Patient dressed and waiting for ride to home.

## 2019-06-19 NOTE — DISCHARGE SUMMARY
[facility]      Discharge Summary     Patient ID: yT Borges  :  1960   MRN: [id number]     ACCOUNT:  [de-identified]   Patient Location : [address] [place] [address]  Patient's PCP: David Lynne Date: 2019   Discharge Date: 2019     Length of Stay: 2  Code Status:  Prior  Admitting Physician: Roderick Harada, MD  Discharge Physician: Cornelio Madrigal MD     Active Discharge Diagnosis :     Primary Problem  NSTEMI (non-ST elevated myocardial infarction) [facility] Problems    Diagnosis Date Noted    S/P PTCA - TIM distal LAD - Dr. Fly Reyes 19 [Z98.61] 2019     Priority: High    NSTEMI (non-ST elevated myocardial infarction) ([facility] [I21.4] 2019    Acute on chronic diastolic congestive heart failure ([facility] [I50.33] 2018    Chest pain at rest [R07.9] 2016    Anemia of chronic renal failure [N18.9, D63.1] 10/17/2016    CKD (chronic kidney disease) stage 4, GFR 15-29 ml/min (HCC) [N18.4] 10/17/2016    Type 2 diabetes mellitus, without long-term current use of insulin ([facility] [E11.9] 10/17/2016       Admission Condition:  fair     Discharged Condition: stable    Hospital Stay:     Hospital Course:  Ty Borges is a 59 y.o. male who was admitted for the management of   NSTEMI (non-ST elevated myocardial infarction) ([facility] , presented to ER with Chest Pain  Patient came to emergency room with left-sided chest pain for 4 days with associated dyspnea. He has known history of ESRD on hemodialysis, CAD s/p LAD stent, HFrEF. Patient reported compliance with hemodialysis. Initial evaluation showed temperature 98.5, pulse 86, respiration 15, blood pressure 101/79. Lab evaluation showed elevated proBNP 1895, troponin I 4 4, normal electrolytes, creatinine 5.47, BUN 56, hemoglobin 12.7, WBC 6.4. EKG showed normal sinus rhythm without any acute ST/T wave changes. Serial troponins did not show any increase. Echocardiogram showed dilated cardiomyopathy without any wall motion abnormality. Patient was treated with IV heparin, cardiology was consulted for further management. Significant therapeutic interventions:     1. Angina -medical management. No ischemic work-up at this time. Patient has known single-vessel CAD with PCI and TIM placement to LAD April 2019. Imdur added , Coreg increased . Continue Lipitor, Plavix, aspirin. 2. ESRD on hemodialysis - TTS schedule  . 3. Dilated cardiomyopathy -   4. Essential hypertension - controlled   5. Type 2 diabetes mellitus - controlled, last A1C 6.8  . Significant Diagnostic Studies:   Labs / Micro:/Radiology  Recent Labs     06/19/19  0425 06/17/19 2051   WBC 4.9 6.4   HGB 10.5* 12.7*   HCT 33.7* 40.1*   MCV 93.1 91.6    209     Labs Renal Latest Ref Rng & Units 6/19/2019 6/18/2019 6/17/2019 4/2/2019 3/31/2019   BUN 8 - 23 mg/dL 39(H) 51(H) 56(H) 49(H) 31(H)   Cr 0.70 - 1.20 mg/dL 4.39(H) 5.12(HH) 5.47(HH) 5.28(HH) 4.62(H)   K 3.7 - 5.3 mmol/L 3.8 4.2 4.0 3.9 3.8   Na 135 - 144 mmol/L 135 139 135 137 137     Lab Results   Component Value Date    ALT 29 06/19/2019    AST 21 06/19/2019    ALKPHOS 81 06/19/2019    BILITOT 0.20 (L) 06/19/2019     Lab Results   Component Value Date    TSH 1.50 11/24/2018     Lab Results   Component Value Date    HEPBCAB NONREACTIVE 11/27/2018     Lab Results   Component Value Date    COLORU YELLOW 11/24/2018    NITRU NEGATIVE 11/24/2018    GLUCOSEU NEGATIVE 11/24/2018    KETUA NEGATIVE 11/24/2018    UROBILINOGEN Normal 11/24/2018    BILIRUBINUR NEGATIVE 11/24/2018     Lab Results   Component Value Date    LABA1C 6.8 (H) 06/18/2019     Lab Results   Component Value Date     06/18/2019     Lab Results   Component Value Date    INR 1.0 11/24/2018    PROTIME 11.0 11/24/2018       Xr Chest Standard (2 Vw)    Result Date: 6/17/2019  1. No acute abnormality.         Echocardiogram 6/18/2019  Mild to moderate reduction tablet  Commonly known as:  PLAVIX  Take 1 tablet by mouth daily     furosemide 40 MG tablet  Commonly known as:  LASIX     lidocaine-prilocaine 2.5-2.5 % cream  Commonly known as:  EMLA     NIFEdipine 30 MG extended release tablet  Commonly known as:  ADALAT CC     nitroGLYCERIN 0.4 MG SL tablet  Commonly known as:  NITROSTAT  Place 1 tablet under the tongue every 5 minutes as needed for Chest pain up to max of 3 total doses. If no relief after 1 dose, call 911. RENAPLEX-D Tabs     SITagliptin 25 MG tablet  Commonly known as:  JANUVIA  Take 1 tablet by mouth daily     traMADol 50 MG tablet  Commonly known as:  ULTRAM           Where to Get Your Medications      These medications were sent to [address] [place] [id number]    Phone:  [phone]   · carvedilol 6.25 MG tablet  · isosorbide mononitrate 30 MG extended release tablet         Time Spent on discharge is  31 mins in patient examination, evaluation, counseling as well as medication reconciliation, prescriptions for required medications, discharge plan and follow up. Electronically signed by   Magda Randle MD  6/19/2019        Thank you Dr. Carla Vences for the opportunity to be involved in this patient's care.

## 2019-07-17 ENCOUNTER — HOSPITAL ENCOUNTER (EMERGENCY)
Age: 59
Discharge: HOME | End: 2019-07-17
Attending: EMERGENCY MEDICINE
Payer: MEDICARE

## 2019-07-17 ENCOUNTER — HOSPITAL ENCOUNTER (OUTPATIENT)
Dept: CARDIAC CATH/INVASIVE PROCEDURES | Age: 59
Discharge: HOME | End: 2019-07-17
Payer: MEDICARE

## 2019-07-17 ENCOUNTER — APPOINTMENT (OUTPATIENT)
Dept: ULTRASOUND IMAGING | Age: 59
End: 2019-07-17
Payer: MEDICARE

## 2019-07-17 ENCOUNTER — APPOINTMENT (OUTPATIENT)
Dept: CT IMAGING | Age: 59
End: 2019-07-17
Payer: MEDICARE

## 2019-07-17 VITALS
SYSTOLIC BLOOD PRESSURE: 112 MMHG | HEART RATE: 80 BPM | DIASTOLIC BLOOD PRESSURE: 62 MMHG | TEMPERATURE: 98.8 F | WEIGHT: 250 LBS | BODY MASS INDEX: 35.79 KG/M2 | HEIGHT: 70 IN | RESPIRATION RATE: 18 BRPM | OXYGEN SATURATION: 98 %

## 2019-07-17 VITALS
OXYGEN SATURATION: 100 % | SYSTOLIC BLOOD PRESSURE: 142 MMHG | RESPIRATION RATE: 20 BRPM | TEMPERATURE: 97.4 F | BODY MASS INDEX: 35.79 KG/M2 | HEIGHT: 70 IN | HEART RATE: 80 BPM | DIASTOLIC BLOOD PRESSURE: 67 MMHG | WEIGHT: 250 LBS

## 2019-07-17 DIAGNOSIS — N43.3 HYDROCELE IN ADULT: Primary | ICD-10-CM

## 2019-07-17 LAB
ABSOLUTE EOS #: 0.16 K/UL (ref 0–0.44)
ABSOLUTE IMMATURE GRANULOCYTE: 0.06 K/UL (ref 0–0.3)
ABSOLUTE LYMPH #: 0.76 K/UL (ref 1.1–3.7)
ABSOLUTE MONO #: 1.34 K/UL (ref 0.1–1.2)
ANION GAP SERPL CALCULATED.3IONS-SCNC: 14 MMOL/L (ref 9–17)
BASOPHILS # BLD: 0 % (ref 0–2)
BASOPHILS ABSOLUTE: 0.03 K/UL (ref 0–0.2)
BUN BLDV-MCNC: 33 MG/DL (ref 8–23)
BUN/CREAT BLD: ABNORMAL (ref 9–20)
CALCIUM SERPL-MCNC: 8.9 MG/DL (ref 8.6–10.4)
CHLORIDE BLD-SCNC: 96 MMOL/L (ref 98–107)
CO2: 28 MMOL/L (ref 20–31)
CREAT SERPL-MCNC: 4.27 MG/DL (ref 0.7–1.2)
DIFFERENTIAL TYPE: ABNORMAL
EOSINOPHILS RELATIVE PERCENT: 2 % (ref 1–4)
GFR AFRICAN AMERICAN: 17 ML/MIN
GFR NON-AFRICAN AMERICAN: 14 ML/MIN
GFR SERPL CREATININE-BSD FRML MDRD: ABNORMAL ML/MIN/{1.73_M2}
GFR SERPL CREATININE-BSD FRML MDRD: ABNORMAL ML/MIN/{1.73_M2}
GLUCOSE BLD-MCNC: 198 MG/DL (ref 70–99)
HCT VFR BLD CALC: 34.4 % (ref 40.7–50.3)
HEMOGLOBIN: 10.6 G/DL (ref 13–17)
IMMATURE GRANULOCYTES: 1 %
LACTIC ACID, SEPSIS WHOLE BLOOD: 1.7 MMOL/L (ref 0.5–1.9)
LACTIC ACID, SEPSIS: NORMAL MMOL/L (ref 0.5–1.9)
LYMPHOCYTES # BLD: 9 % (ref 24–43)
MCH RBC QN AUTO: 28.4 PG (ref 25.2–33.5)
MCHC RBC AUTO-ENTMCNC: 30.8 G/DL (ref 28.4–34.8)
MCV RBC AUTO: 92.2 FL (ref 82.6–102.9)
MONOCYTES # BLD: 17 % (ref 3–12)
NRBC AUTOMATED: 0 PER 100 WBC
PDW BLD-RTO: 14.4 % (ref 11.8–14.4)
PLATELET # BLD: 195 K/UL (ref 138–453)
PLATELET ESTIMATE: ABNORMAL
PMV BLD AUTO: 9.2 FL (ref 8.1–13.5)
POTASSIUM SERPL-SCNC: 4.1 MMOL/L (ref 3.7–5.3)
RBC # BLD: 3.73 M/UL (ref 4.21–5.77)
RBC # BLD: ABNORMAL 10*6/UL
SEG NEUTROPHILS: 71 % (ref 36–65)
SEGMENTED NEUTROPHILS ABSOLUTE COUNT: 5.7 K/UL (ref 1.5–8.1)
SODIUM BLD-SCNC: 138 MMOL/L (ref 135–144)
WBC # BLD: 8.1 K/UL (ref 3.5–11.3)
WBC # BLD: ABNORMAL 10*3/UL

## 2019-07-17 PROCEDURE — 36832 AV FISTULA REVISION OPEN: CPT | Performed by: SURGERY

## 2019-07-17 PROCEDURE — 7100000010 HC PHASE II RECOVERY - FIRST 15 MIN

## 2019-07-17 PROCEDURE — 76870 US EXAM SCROTUM: CPT

## 2019-07-17 PROCEDURE — 99284 EMERGENCY DEPT VISIT MOD MDM: CPT

## 2019-07-17 PROCEDURE — 36217 PLACE CATHETER IN ARTERY: CPT | Performed by: SURGERY

## 2019-07-17 PROCEDURE — 2709999900 HC NON-CHARGEABLE SUPPLY

## 2019-07-17 PROCEDURE — 6360000002 HC RX W HCPCS

## 2019-07-17 PROCEDURE — 36216 PLACE CATHETER IN ARTERY: CPT | Performed by: SURGERY

## 2019-07-17 PROCEDURE — 80048 BASIC METABOLIC PNL TOTAL CA: CPT

## 2019-07-17 PROCEDURE — 36902 INTRO CATH DIALYSIS CIRCUIT: CPT | Performed by: SURGERY

## 2019-07-17 PROCEDURE — 83605 ASSAY OF LACTIC ACID: CPT

## 2019-07-17 PROCEDURE — 6370000000 HC RX 637 (ALT 250 FOR IP): Performed by: STUDENT IN AN ORGANIZED HEALTH CARE EDUCATION/TRAINING PROGRAM

## 2019-07-17 PROCEDURE — C2623 CATH, TRANSLUMIN, DRUG-COAT: HCPCS

## 2019-07-17 PROCEDURE — 74176 CT ABD & PELVIS W/O CONTRAST: CPT

## 2019-07-17 PROCEDURE — 75710 ARTERY X-RAYS ARM/LEG: CPT | Performed by: SURGERY

## 2019-07-17 PROCEDURE — C1769 GUIDE WIRE: HCPCS

## 2019-07-17 PROCEDURE — 2500000003 HC RX 250 WO HCPCS

## 2019-07-17 PROCEDURE — 7100000011 HC PHASE II RECOVERY - ADDTL 15 MIN

## 2019-07-17 PROCEDURE — 93976 VASCULAR STUDY: CPT

## 2019-07-17 PROCEDURE — 85025 COMPLETE CBC W/AUTO DIFF WBC: CPT

## 2019-07-17 PROCEDURE — 76937 US GUIDE VASCULAR ACCESS: CPT

## 2019-07-17 PROCEDURE — 76937 US GUIDE VASCULAR ACCESS: CPT | Performed by: SURGERY

## 2019-07-17 PROCEDURE — C1894 INTRO/SHEATH, NON-LASER: HCPCS

## 2019-07-17 PROCEDURE — C1887 CATHETER, GUIDING: HCPCS

## 2019-07-17 PROCEDURE — 6360000004 HC RX CONTRAST MEDICATION

## 2019-07-17 RX ORDER — DOXYCYCLINE 100 MG/1
100 TABLET ORAL 2 TIMES DAILY
Qty: 28 TABLET | Refills: 0 | Status: SHIPPED | OUTPATIENT
Start: 2019-07-17 | End: 2019-07-31

## 2019-07-17 RX ORDER — DOXYCYCLINE HYCLATE 100 MG
100 TABLET ORAL ONCE
Status: COMPLETED | OUTPATIENT
Start: 2019-07-17 | End: 2019-07-17

## 2019-07-17 RX ORDER — SODIUM CHLORIDE 9 MG/ML
INJECTION, SOLUTION INTRAVENOUS CONTINUOUS
Status: DISCONTINUED | OUTPATIENT
Start: 2019-07-17 | End: 2019-07-18 | Stop reason: HOSPADM

## 2019-07-17 RX ADMIN — SODIUM CHLORIDE: 9 INJECTION, SOLUTION INTRAVENOUS at 07:35

## 2019-07-17 RX ADMIN — DOXYCYCLINE HYCLATE 100 MG: 100 TABLET, COATED ORAL at 06:02

## 2019-07-17 ASSESSMENT — PAIN DESCRIPTION - LOCATION
LOCATION: SCROTUM
LOCATION: GROIN

## 2019-07-17 ASSESSMENT — PAIN DESCRIPTION - DESCRIPTORS
DESCRIPTORS: ACHING
DESCRIPTORS: DULL;CONSTANT

## 2019-07-17 ASSESSMENT — PAIN DESCRIPTION - FREQUENCY
FREQUENCY: INTERMITTENT
FREQUENCY: CONTINUOUS

## 2019-07-17 ASSESSMENT — PAIN DESCRIPTION - PAIN TYPE: TYPE: ACUTE PAIN

## 2019-07-17 ASSESSMENT — ENCOUNTER SYMPTOMS
NAUSEA: 0
WHEEZING: 0
TROUBLE SWALLOWING: 0
ABDOMINAL PAIN: 0
VOMITING: 0
SHORTNESS OF BREATH: 0
CHEST TIGHTNESS: 0
SORE THROAT: 0
PHOTOPHOBIA: 0
COUGH: 0
BACK PAIN: 0

## 2019-07-17 ASSESSMENT — PAIN DESCRIPTION - ONSET: ONSET: ON-GOING

## 2019-07-17 ASSESSMENT — PAIN SCALES - GENERAL
PAINLEVEL_OUTOF10: 4
PAINLEVEL_OUTOF10: 6

## 2019-07-17 ASSESSMENT — PAIN DESCRIPTION - PROGRESSION: CLINICAL_PROGRESSION: GRADUALLY WORSENING

## 2019-07-17 ASSESSMENT — PAIN - FUNCTIONAL ASSESSMENT: PAIN_FUNCTIONAL_ASSESSMENT: PREVENTS OR INTERFERES SOME ACTIVE ACTIVITIES AND ADLS

## 2019-07-17 NOTE — PROGRESS NOTES
Patient admitted, consent signed, all questions answered. Pt ready for procedure. Call light to reach with side rails up 2 of 2. Daughter at bedside with patient. IV attempted per Gerald Lee RN without success. Order placed for ultrasound IV.     Audible bruit and palpable thrill Lt wrist

## 2019-07-17 NOTE — PROGRESS NOTES
All discharge instructions reviewed, questions answered, paper signed and given copy. Patient discharged per ambulatory (pt request) with daughter and belongings.

## 2019-07-17 NOTE — H&P
Division of Vascular Surgery        H&P Update    Patient's History and Physical from 6/12/19 was reviewed. There are no changes today. Plan to proceed with fistulagram and possible revision. Electronically signed by Celi Rothman MD on 7/17/19 at 12:51 PM      [address] [place] [address],[address] [place] [address] North: [phone]  C: [phone]  Email: [email or web address]

## 2019-07-17 NOTE — ED PROVIDER NOTES
[facility]  ED  Emergency Department Encounter  EmergencyMedicine Resident     Pt Name:Sanjay Cleary  MRN: [id number]  [place] 1960  Date of evaluation: 7/17/19  PCP:  Rosales Vizcaino [facility]       Chief Complaint   Patient presents with    Groin Swelling       HISTORY OF PRESENT ILLNESS  (Location/Symptom, Timing/Onset, Context/Setting, Quality, Duration, Modifying Factors, Severity.)      Braydon Quiles is a 62 y.o. male who presents with Right-sided groin swelling groin swelling patient states that over the past 4 days has been experiencing increased swelling and pain to the right groin. Patient also noticed a bulge in his scrotum. Patient denies any dysuria, penile discharge. Patient without any erythema, warmth to his scrotum. Patient not a diabetic. He states that he was scheduled for a AV fistula procedure later this morning as he is a new end-stage renal disease patient. Patient denies any increased pedal swelling. Patient compliant with dialysis. No prior history of internal hernia repair. Patient denies any fevers, chills, sweats, nausea, vomiting. Pain worsened by movement or ambulation. PAST MEDICAL / SURGICAL / SOCIAL / FAMILY HISTORY      has a past medical history of Bowel obstruction ([address] CAD (coronary artery disease), CHF (congestive heart failure) ([address] Chronic kidney disease, Diabetes mellitus ([address] Hemodialysis patient ([address] Hyperlipidemia, Hypertension, MI, old, Patient in clinical research study, and Sleep apnea. has a past surgical history that includes Foot surgery (Left, 2005); Colonoscopy; Cystoscopy (11/17/2017); Prostate biopsy (11/17/2017); Prostate biopsy (N/A, 11/17/2017); Abdomen surgery (2013); Prostate Biopsy (02/16/2018); pr genital surg proc,male unlisted (N/A, 2/16/2018); Tonsillectomy (1968); Vasectomy (1983); Middle ear surgery (1966); Nose surgery (1968); Mouth surgery (2007);  AV fistula creation (Left, tablet by mouth 2 times daily for 14 days 7/17/19 7/31/19 Yes Iraida Maxwell MD   carvedilol (COREG) 6.25 MG tablet Take 1 tablet by mouth 2 times daily (with meals) 6/19/19   Therese Levi MD   isosorbide mononitrate (IMDUR) 30 MG extended release tablet Take 1 tablet by mouth daily 6/20/19   Therese Levi MD   Multiple Vitamins-Minerals (RENAPLEX-D) TABS Take 1 tablet by mouth daily 5/14/19   Historical Provider, MD   traMADol (ULTRAM) 50 MG tablet take 1 tablet by mouth every 6 hours if needed 4/5/19   Historical Provider, MD   lidocaine-prilocaine (EMLA) 2.5-2.5 % cream  3/18/19   Historical Provider, MD   atorvastatin (LIPITOR) 40 MG tablet Take 1 tablet by mouth nightly 4/2/19   ROB Ramírez CNP   clopidogrel (PLAVIX) 75 MG tablet Take 1 tablet by mouth daily 4/2/19   ROB Ramírez CNP   nitroGLYCERIN (NITROSTAT) 0.4 MG SL tablet Place 1 tablet under the tongue every 5 minutes as needed for Chest pain up to max of 3 total doses. If no relief after 1 dose, call 911. 4/2/19   ROB Ramírez CNP   NIFEdipine (ADALAT CC) 30 MG extended release tablet Take 30 mg by mouth 2 times daily    Historical Provider, MD   aspirin 81 MG chewable tablet Take 1 tablet by mouth daily  Patient taking differently: Take 81 mg by mouth daily LAST DOSE 02/14/2019 11/30/18   Landon Perez MD   SITagliptin (JANUVIA) 25 MG tablet Take 1 tablet by mouth daily 11/29/18   Landon Perez MD   furosemide (LASIX) 40 MG tablet Take 40 mg by mouth 2 times daily     Historical Provider, MD       REVIEW OF SYSTEMS    (2-9 systems for level 4, 10 or more for level 5)      Review of Systems   Constitutional: Negative for chills and fever. HENT: Negative for sore throat and trouble swallowing. Eyes: Negative for photophobia. Respiratory: Negative for cough, chest tightness, shortness of breath and wheezing. Cardiovascular: Negative for chest pain and palpitations.    Gastrointestinal: Negative for abdominal pain, nausea and vomiting. Endocrine: Negative for polyuria. Genitourinary: Positive for scrotal swelling. Negative for decreased urine volume, discharge, dysuria, flank pain, hematuria, penile pain, penile swelling, testicular pain and urgency. Musculoskeletal: Negative for back pain and neck pain. Skin: Negative for rash and wound. Neurological: Negative for syncope, weakness, light-headedness and headaches. Psychiatric/Behavioral: Negative for agitation and confusion. PHYSICAL EXAM   (up to 7 for level 4, 8 or more for level 5)      INITIAL VITALS:   /62   Pulse 80   Temp 98.8 °F (37.1 °C) (Oral)   Resp 18   Ht 5' 10\" (1.778 m)   Wt 250 lb (113.4 kg)   SpO2 98%   BMI 35.87 kg/m²     Physical Exam   Constitutional: He is oriented to person, place, and time. He appears well-developed and well-nourished. HENT:   Head: Normocephalic and atraumatic. Nose: Nose normal.   Mouth/Throat: Oropharynx is clear and moist.   Eyes: Pupils are equal, round, and reactive to light. EOM are normal.   Neck: Normal range of motion. Neck supple. Cardiovascular: Normal rate, regular rhythm, normal heart sounds and intact distal pulses. Pulmonary/Chest: Breath sounds normal. No respiratory distress. He has no wheezes. He has no rales. Abdominal: Soft. Bowel sounds are normal. He exhibits no distension. There is no tenderness. Genitourinary:   Genitourinary Comments:  prominent, moderately tender mass present in the right scrotum. Testicle palpitated superior to scrotal mass. Area nonreducible. No swelling, erythema, warmth to the scrotum. No purulent discharge from skin. Penis without discharge or penile lesions. Musculoskeletal: Normal range of motion. He exhibits no edema or deformity. Neurological: He is alert and oriented to person, place, and time. He has normal reflexes. Skin: Skin is warm and dry. No rash noted. No erythema.    Psychiatric: He has a

## 2019-07-19 NOTE — OP NOTE
the anastomosis with a  micropuncture needle under ultrasound guidance. Ultrasound images were  saved. Wire passed easily and exchanged for a 4-Citizen of [place] micropuncture  sheath. A fistulogram was performed. I could not get the contrast to  inject all the way down towards the anastomosis. So, the 4-Citizen of [place]  sheath was then exchanged for a 5-Citizen of [place] sheath. Using a Kumpe catheter  and angled Bentson wire, the radial artery was catheterized, and  hand-injection angiogram was performed and showed a widely patent radial  artery. In the proximal anastomosis, there was moderate-to-severe stenosis. Next I performed percutaneous angioplasty with a 4-mm IN. PACT  drug-coated balloon for prolonged insufflation time of the anastomosis and   proximal cephalic vein. The patient did  receive 5000 units of heparin prior to this. Completion fistulogram was  performed which showed improved flow to that segment of the fistula. There was a branch that was identified and marked out under fluoroscopy  and ultrasound. Local anesthetic was given, and a small transverse  incision was created; and using sharp and blunt dissection, the fistula  was identified along with the branch. This was ligated with silk ties. Once this was done, the 5-Citizen of [place] sheath had been removed. Manual  pressure was held for hemostasis. There was overall much improved  thrill throughout the forearm. I then irrigated the wound and closed in  interrupted layers of Monocryl, followed by Benzoin, Steri-Strips, and  sterile dressing and Band-Aid over the percutaneous access site. The  patient tolerated the procedure well.         Abdias Lopes MD    D: 07/17/2019 14:22:49       T: 07/17/2019 15:31:54     [email or web address]  Job#: [id number]     Doc#: [id number]    CC:

## 2019-08-16 ENCOUNTER — OFFICE VISIT (OUTPATIENT)
Dept: VASCULAR SURGERY | Age: 59
End: 2019-08-16

## 2019-08-16 VITALS
DIASTOLIC BLOOD PRESSURE: 77 MMHG | WEIGHT: 248 LBS | HEIGHT: 70 IN | HEART RATE: 58 BPM | SYSTOLIC BLOOD PRESSURE: 146 MMHG | BODY MASS INDEX: 35.5 KG/M2 | TEMPERATURE: 97.1 F | OXYGEN SATURATION: 98 %

## 2019-08-16 DIAGNOSIS — N18.6 ESRD ON DIALYSIS (HCC): Primary | ICD-10-CM

## 2019-08-16 DIAGNOSIS — Z99.2 ESRD ON DIALYSIS (HCC): Primary | ICD-10-CM

## 2019-08-16 PROCEDURE — 99024 POSTOP FOLLOW-UP VISIT: CPT | Performed by: SURGERY

## 2019-08-16 NOTE — PROGRESS NOTES
Plan:     ESRD on hemodialysis  · Start to use fistula again for dialysis, fistula was marked out for easier access  · 1 needle this week followed by 2 needles  · If no issues after 3-4 weeks will remove tunneled catheter in the office  · If there are issues will revise fistula with a graft for easier access    Electronically signed by Melody Elizabeth MD on 8/16/19 at 3:18 PM      [address] [place] [address],[address]: [phone]  C: [phone]  Email: [email or web address]

## 2019-08-18 ASSESSMENT — ENCOUNTER SYMPTOMS
CHEST TIGHTNESS: 0
SHORTNESS OF BREATH: 0
COLOR CHANGE: 0

## 2019-09-16 ENCOUNTER — OFFICE VISIT (OUTPATIENT)
Dept: VASCULAR SURGERY | Age: 59
End: 2019-09-16

## 2019-09-16 VITALS
BODY MASS INDEX: 36.51 KG/M2 | OXYGEN SATURATION: 98 % | HEIGHT: 70 IN | TEMPERATURE: 97.6 F | WEIGHT: 255 LBS | DIASTOLIC BLOOD PRESSURE: 62 MMHG | SYSTOLIC BLOOD PRESSURE: 144 MMHG | HEART RATE: 83 BPM

## 2019-09-16 DIAGNOSIS — N18.6 ESRD ON DIALYSIS (HCC): Primary | ICD-10-CM

## 2019-09-16 DIAGNOSIS — Z99.2 ESRD ON DIALYSIS (HCC): Primary | ICD-10-CM

## 2019-09-16 PROCEDURE — 99024 POSTOP FOLLOW-UP VISIT: CPT | Performed by: SURGERY

## 2019-09-16 RX ORDER — CALCIUM CARBONATE 200(500)MG
1 TABLET,CHEWABLE ORAL DAILY PRN
COMMUNITY

## 2019-09-17 ASSESSMENT — ENCOUNTER SYMPTOMS
CHEST TIGHTNESS: 0
COLOR CHANGE: 0
SHORTNESS OF BREATH: 0

## 2019-09-17 NOTE — PROGRESS NOTES
Division of Vascular Surgery        Postoperative Follow Up    LUE AVF  LUE AVF revision, branch ligation  Fistulagram, angioplasty    History of Present Illness:      Abdias Baeza is a 62 y.o. gentleman presents for postoperative follow up after undergoing fistula revision with branch ligation and angioplasty. They are still having difficulties with cannulating the second needle and there is concern for outflow stenosis. They have only been able to cannulate him with one needle and are using the catheter for the other one. Review of Systems:     Review of Systems   Constitutional: Negative for activity change and fever. Respiratory: Negative for chest tightness and shortness of breath. Cardiovascular: Negative for chest pain. Skin: Negative for color change and wound. Neurological: Negative for weakness and numbness. Physical Exam:     Vitals:  BP (!) 144/62 (Site: Right Upper Arm, Position: Sitting, Cuff Size: Large Adult)   Pulse 83   Temp 97.6 °F (36.4 °C) (Oral)   Ht 5' 10\" (1.778 m)   Wt 255 lb (115.7 kg)   SpO2 98%   BMI 36.59 kg/m²     Physical Exam   Constitutional: He is oriented to person, place, and time. He appears well-developed and well-nourished. Cardiovascular: Normal rate and regular rhythm. Pulses:       Radial pulses are 1+ on the left side. Good thrill proximally in the fistula, pulsatile in the upper forearm   Pulmonary/Chest: Effort normal. No respiratory distress. Musculoskeletal:        Left forearm: He exhibits no swelling. Left hand: He exhibits normal capillary refill. Normal sensation noted. Normal strength noted. Neurological: He is alert and oriented to person, place, and time. He has normal strength. No sensory deficit. GCS eye subscore is 4. GCS verbal subscore is 5. GCS motor subscore is 6. Skin: Skin is warm and intact. Capillary refill takes less than 2 seconds.    Well healed surgical incisions       Imaging/Labs: Ultrasound performed in the office, reveals a good size cephalic vein fistula    Assessment and Plan:     ESRD on hemodialysis, malfunctioning AVF  · Discussed performing fistulagram again to evaluate and possibly treat  · If we cannot get it to work then will consider moving it up his arm. · Would like to give the fistulagram a try because there is still a good thrill in it. · Will plan on giving sedation thru his tunneled catheter    Electronically signed by Manjit Garcia MD on 9/17/19 at [address] [place] [address] Street: [phone]  C: [phone]  Email: [email or web address]

## 2019-09-20 ENCOUNTER — HOSPITAL ENCOUNTER (OUTPATIENT)
Dept: CARDIAC CATH/INVASIVE PROCEDURES | Age: 59
Discharge: HOME | End: 2019-09-20
Payer: COMMERCIAL

## 2019-09-20 VITALS
DIASTOLIC BLOOD PRESSURE: 78 MMHG | RESPIRATION RATE: 26 BRPM | OXYGEN SATURATION: 98 % | HEIGHT: 70 IN | WEIGHT: 250 LBS | BODY MASS INDEX: 35.79 KG/M2 | HEART RATE: 77 BPM | TEMPERATURE: 97.6 F | SYSTOLIC BLOOD PRESSURE: 134 MMHG

## 2019-09-20 PROCEDURE — 7100000011 HC PHASE II RECOVERY - ADDTL 15 MIN

## 2019-09-20 PROCEDURE — 36901 INTRO CATH DIALYSIS CIRCUIT: CPT | Performed by: SURGERY

## 2019-09-20 PROCEDURE — C1894 INTRO/SHEATH, NON-LASER: HCPCS

## 2019-09-20 PROCEDURE — 75710 ARTERY X-RAYS ARM/LEG: CPT | Performed by: SURGERY

## 2019-09-20 PROCEDURE — 2709999900 HC NON-CHARGEABLE SUPPLY

## 2019-09-20 PROCEDURE — 7100000010 HC PHASE II RECOVERY - FIRST 15 MIN

## 2019-09-20 PROCEDURE — 36216 PLACE CATHETER IN ARTERY: CPT | Performed by: SURGERY

## 2019-09-20 PROCEDURE — 2500000003 HC RX 250 WO HCPCS

## 2019-09-20 PROCEDURE — 6360000004 HC RX CONTRAST MEDICATION

## 2019-09-20 PROCEDURE — 75774 ARTERY X-RAY EACH VESSEL: CPT | Performed by: SURGERY

## 2019-09-20 PROCEDURE — 6360000002 HC RX W HCPCS

## 2019-09-20 PROCEDURE — 76937 US GUIDE VASCULAR ACCESS: CPT | Performed by: SURGERY

## 2019-09-20 NOTE — BRIEF OP NOTE
Brief Postoperative Note  ______________________________________________________________    Patient: Rony Benitez  YOB: 1960  MRN: [id number]  Date of Procedure: 9/20/19    Pre-Op Diagnosis: ESRD on hemodialysis, malfunctioning AVF    Post-Op Diagnosis: Same       Procedure:  1) US guided vascular access of LUE AVF  2) Fistulagram    Anesthesia: IV sedation, local    Surgeon: Dr. Niru Watkins    Estimated Blood Loss (mL): 3 ml    Complications: None    Findings: Severe stenosis of proximal cephalic vein. Plan: Patient will need open revision, after discussing with him he would prefer to just move up his arm. Will plan for LUE AVG placement.     Christianne Biggs MD  Date: 9/20/2019  Time: 12:46 PM

## 2019-09-20 NOTE — H&P
Division of Vascular Surgery        H&P Update    Patient's Office Note from 9/17/19 was reviewed. There are no changes today. Plan to proceed with LUE Fistulagram.      Electronically signed by Christianne Biggs MD on 9/20/19 at 12:45 PM      [address] [place] [address],[address]: [phone]  C: [phone]  Email: [email or web address]    History of Present Illness:      Rony Benitez is a 62 y.o. gentleman presents for postoperative follow up after undergoing fistula revision with branch ligation and angioplasty. They are still having difficulties with cannulating the second needle and there is concern for outflow stenosis. They have only been able to cannulate him with one needle and are using the catheter for the other one.     Review of Systems:      Review of Systems   Constitutional: Negative for activity change and fever. Respiratory: Negative for chest tightness and shortness of breath. Cardiovascular: Negative for chest pain. Skin: Negative for color change and wound. Neurological: Negative for weakness and numbness.         Physical Exam:      Vitals:  BP (!) 144/62 (Site: Right Upper Arm, Position: Sitting, Cuff Size: Large Adult)   Pulse 83   Temp 97.6 °F (36.4 °C) (Oral)   Ht 5' 10\" (1.778 m)   Wt 255 lb (115.7 kg)   SpO2 98%   BMI 36.59 kg/m²      Physical Exam   Constitutional: He is oriented to person, place, and time. He appears well-developed and well-nourished. Cardiovascular: Normal rate and regular rhythm. Pulses:       Radial pulses are 1+ on the left side. Good thrill proximally in the fistula, pulsatile in the upper forearm   Pulmonary/Chest: Effort normal. No respiratory distress. Musculoskeletal:        Left forearm: He exhibits no swelling. Left hand: He exhibits normal capillary refill. Normal sensation noted. Normal strength noted. Neurological: He is alert and oriented to person, place, and time. He has normal strength.  No

## 2019-09-24 ENCOUNTER — TELEPHONE (OUTPATIENT)
Dept: VASCULAR SURGERY | Age: 59
End: 2019-09-24

## 2019-09-24 NOTE — TELEPHONE ENCOUNTER
Patient called to see when his surgery is scheduled with Dr. Dia Abrams. I told him the new date is Wed. Oct. 9th at 9am with an arrival of 7:30am.  Patient is aware and verbalized understanding.

## 2019-09-30 ENCOUNTER — TELEPHONE (OUTPATIENT)
Dept: VASCULAR SURGERY | Age: 59
End: 2019-09-30

## 2019-09-30 NOTE — TELEPHONE ENCOUNTER
Patient notified that surgery has been pushed back another hour d/t another surgery placed this afternoon for same day. Patient's surgery start time is 11am with a 9:30am arrival time. Patient verbalized understanding and asked that his surgery not be moved again.

## 2019-10-09 ENCOUNTER — HOSPITAL ENCOUNTER (OUTPATIENT)
Age: 59
Setting detail: OUTPATIENT SURGERY
Discharge: HOME | End: 2019-10-09
Attending: SURGERY | Admitting: SURGERY
Payer: COMMERCIAL

## 2019-10-09 PROCEDURE — 2500000003 HC RX 250 WO HCPCS

## 2019-10-09 PROCEDURE — 10060 I&D ABSCESS SIMPLE/SINGLE: CPT

## 2019-10-23 ENCOUNTER — ANESTHESIA EVENT (OUTPATIENT)
Dept: OPERATING ROOM | Age: 59
End: 2019-10-23
Payer: COMMERCIAL

## 2019-10-23 ENCOUNTER — HOSPITAL ENCOUNTER (OUTPATIENT)
Age: 59
Setting detail: OUTPATIENT SURGERY
Discharge: HOME | End: 2019-10-23
Attending: SURGERY | Admitting: SURGERY
Payer: COMMERCIAL

## 2019-10-23 ENCOUNTER — ANESTHESIA (OUTPATIENT)
Dept: OPERATING ROOM | Age: 59
End: 2019-10-23
Payer: COMMERCIAL

## 2019-10-23 VITALS — DIASTOLIC BLOOD PRESSURE: 83 MMHG | OXYGEN SATURATION: 99 % | TEMPERATURE: 96.8 F | SYSTOLIC BLOOD PRESSURE: 136 MMHG

## 2019-10-23 VITALS
HEIGHT: 70 IN | DIASTOLIC BLOOD PRESSURE: 81 MMHG | SYSTOLIC BLOOD PRESSURE: 151 MMHG | RESPIRATION RATE: 23 BRPM | OXYGEN SATURATION: 98 % | WEIGHT: 250 LBS | HEART RATE: 72 BPM | TEMPERATURE: 98.4 F | BODY MASS INDEX: 35.79 KG/M2

## 2019-10-23 DIAGNOSIS — Z95.828 S/P ARTERIOVENOUS (AV) GRAFT PLACEMENT: Primary | ICD-10-CM

## 2019-10-23 LAB
EKG ATRIAL RATE: 73 BPM
EKG P AXIS: 15 DEGREES
EKG P-R INTERVAL: 212 MS
EKG Q-T INTERVAL: 468 MS
EKG QRS DURATION: 164 MS
EKG QTC CALCULATION (BAZETT): 515 MS
EKG R AXIS: -68 DEGREES
EKG T AXIS: -10 DEGREES
EKG VENTRICULAR RATE: 73 BPM
GFR NON-AFRICAN AMERICAN: 14 ML/MIN
GFR SERPL CREATININE-BSD FRML MDRD: 17 ML/MIN
GFR SERPL CREATININE-BSD FRML MDRD: ABNORMAL ML/MIN/{1.73_M2}
GLUCOSE BLD-MCNC: 243 MG/DL (ref 74–100)
POC CHLORIDE: 100 MMOL/L (ref 98–107)
POC CREATININE: 4.25 MG/DL (ref 0.51–1.19)
POC HEMATOCRIT: 38 % (ref 41–53)
POC HEMOGLOBIN: 12.8 G/DL (ref 13.5–17.5)
POC POTASSIUM: 4.2 MMOL/L (ref 3.5–4.5)
POC SODIUM: 138 MMOL/L (ref 138–146)

## 2019-10-23 PROCEDURE — 6360000002 HC RX W HCPCS: Performed by: NURSE ANESTHETIST, CERTIFIED REGISTERED

## 2019-10-23 PROCEDURE — 2500000003 HC RX 250 WO HCPCS: Performed by: NURSE ANESTHETIST, CERTIFIED REGISTERED

## 2019-10-23 PROCEDURE — 84295 ASSAY OF SERUM SODIUM: CPT

## 2019-10-23 PROCEDURE — 7100000001 HC PACU RECOVERY - ADDTL 15 MIN

## 2019-10-23 PROCEDURE — 3700000000 HC ANESTHESIA ATTENDED CARE: Performed by: SURGERY

## 2019-10-23 PROCEDURE — 7100000011 HC PHASE II RECOVERY - ADDTL 15 MIN: Performed by: SURGERY

## 2019-10-23 PROCEDURE — 2709999900 HC NON-CHARGEABLE SUPPLY: Performed by: SURGERY

## 2019-10-23 PROCEDURE — 6370000000 HC RX 637 (ALT 250 FOR IP): Performed by: SURGERY

## 2019-10-23 PROCEDURE — 85014 HEMATOCRIT: CPT

## 2019-10-23 PROCEDURE — 37607 LIG/BANDING ANGIOACS AV FSTL: CPT | Performed by: SURGERY

## 2019-10-23 PROCEDURE — 7100000010 HC PHASE II RECOVERY - FIRST 15 MIN: Performed by: SURGERY

## 2019-10-23 PROCEDURE — 82565 ASSAY OF CREATININE: CPT

## 2019-10-23 PROCEDURE — 76937 US GUIDE VASCULAR ACCESS: CPT

## 2019-10-23 PROCEDURE — 6360000002 HC RX W HCPCS: Performed by: SURGERY

## 2019-10-23 PROCEDURE — 2500000003 HC RX 250 WO HCPCS: Performed by: SURGERY

## 2019-10-23 PROCEDURE — C1768 GRAFT, VASCULAR: HCPCS | Performed by: SURGERY

## 2019-10-23 PROCEDURE — 82435 ASSAY OF BLOOD CHLORIDE: CPT

## 2019-10-23 PROCEDURE — 3700000001 HC ADD 15 MINUTES (ANESTHESIA): Performed by: SURGERY

## 2019-10-23 PROCEDURE — 6370000000 HC RX 637 (ALT 250 FOR IP)

## 2019-10-23 PROCEDURE — 3600000014 HC SURGERY LEVEL 4 ADDTL 15MIN: Performed by: SURGERY

## 2019-10-23 PROCEDURE — 2580000003 HC RX 258: Performed by: NURSE ANESTHETIST, CERTIFIED REGISTERED

## 2019-10-23 PROCEDURE — 36830 ARTERY-VEIN NONAUTOGRAFT: CPT | Performed by: SURGERY

## 2019-10-23 PROCEDURE — 93005 ELECTROCARDIOGRAM TRACING: CPT | Performed by: SURGERY

## 2019-10-23 PROCEDURE — 3600000004 HC SURGERY LEVEL 4 BASE: Performed by: SURGERY

## 2019-10-23 PROCEDURE — 82947 ASSAY GLUCOSE BLOOD QUANT: CPT

## 2019-10-23 PROCEDURE — 84132 ASSAY OF SERUM POTASSIUM: CPT

## 2019-10-23 PROCEDURE — 6370000000 HC RX 637 (ALT 250 FOR IP): Performed by: NURSE ANESTHETIST, CERTIFIED REGISTERED

## 2019-10-23 PROCEDURE — 2580000003 HC RX 258: Performed by: SURGERY

## 2019-10-23 PROCEDURE — 7100000000 HC PACU RECOVERY - FIRST 15 MIN

## 2019-10-23 DEVICE — GRAFT VASC L40CM DIA6MM BOV CAR ART CLLGN FOR FUNC HAD ACCS: Type: IMPLANTABLE DEVICE | Site: ARM | Status: FUNCTIONAL

## 2019-10-23 RX ORDER — MAGNESIUM HYDROXIDE 1200 MG/15ML
LIQUID ORAL CONTINUOUS PRN
Status: COMPLETED | OUTPATIENT
Start: 2019-10-23 | End: 2019-10-23

## 2019-10-23 RX ORDER — FENTANYL CITRATE 50 UG/ML
INJECTION, SOLUTION INTRAMUSCULAR; INTRAVENOUS PRN
Status: DISCONTINUED | OUTPATIENT
Start: 2019-10-23 | End: 2019-10-23 | Stop reason: SDUPTHER

## 2019-10-23 RX ORDER — SODIUM CHLORIDE 0.9 % (FLUSH) 0.9 %
10 SYRINGE (ML) INJECTION PRN
Status: DISCONTINUED | OUTPATIENT
Start: 2019-10-23 | End: 2019-10-23 | Stop reason: HOSPADM

## 2019-10-23 RX ORDER — PROPOFOL 10 MG/ML
INJECTION, EMULSION INTRAVENOUS CONTINUOUS PRN
Status: DISCONTINUED | OUTPATIENT
Start: 2019-10-23 | End: 2019-10-23 | Stop reason: SDUPTHER

## 2019-10-23 RX ORDER — HYDROCODONE BITARTRATE AND ACETAMINOPHEN 5; 325 MG/1; MG/1
1 TABLET ORAL ONCE
Status: COMPLETED | OUTPATIENT
Start: 2019-10-23 | End: 2019-10-23

## 2019-10-23 RX ORDER — SODIUM CHLORIDE 9 MG/ML
INJECTION, SOLUTION INTRAVENOUS CONTINUOUS PRN
Status: DISCONTINUED | OUTPATIENT
Start: 2019-10-23 | End: 2019-10-23 | Stop reason: SDUPTHER

## 2019-10-23 RX ORDER — LIDOCAINE HYDROCHLORIDE 10 MG/ML
INJECTION, SOLUTION EPIDURAL; INFILTRATION; INTRACAUDAL; PERINEURAL PRN
Status: DISCONTINUED | OUTPATIENT
Start: 2019-10-23 | End: 2019-10-23 | Stop reason: SDUPTHER

## 2019-10-23 RX ORDER — LIDOCAINE HYDROCHLORIDE 20 MG/ML
JELLY TOPICAL PRN
Status: DISCONTINUED | OUTPATIENT
Start: 2019-10-23 | End: 2019-10-23 | Stop reason: SDUPTHER

## 2019-10-23 RX ORDER — HEPARIN SODIUM 1000 [USP'U]/ML
INJECTION, SOLUTION INTRAVENOUS; SUBCUTANEOUS PRN
Status: DISCONTINUED | OUTPATIENT
Start: 2019-10-23 | End: 2019-10-23 | Stop reason: ALTCHOICE

## 2019-10-23 RX ORDER — LIDOCAINE HYDROCHLORIDE 10 MG/ML
INJECTION, SOLUTION INFILTRATION; PERINEURAL PRN
Status: DISCONTINUED | OUTPATIENT
Start: 2019-10-23 | End: 2019-10-23 | Stop reason: ALTCHOICE

## 2019-10-23 RX ORDER — HEPARIN SODIUM 1000 [USP'U]/ML
INJECTION, SOLUTION INTRAVENOUS; SUBCUTANEOUS PRN
Status: DISCONTINUED | OUTPATIENT
Start: 2019-10-23 | End: 2019-10-23 | Stop reason: SDUPTHER

## 2019-10-23 RX ORDER — SODIUM CHLORIDE 9 MG/ML
INJECTION, SOLUTION INTRAVENOUS CONTINUOUS
Status: DISCONTINUED | OUTPATIENT
Start: 2019-10-23 | End: 2019-10-23 | Stop reason: HOSPADM

## 2019-10-23 RX ORDER — HYDROCODONE BITARTRATE AND ACETAMINOPHEN 5; 325 MG/1; MG/1
1 TABLET ORAL EVERY 6 HOURS PRN
Qty: 28 TABLET | Refills: 0 | Status: SHIPPED | OUTPATIENT
Start: 2019-10-23 | End: 2019-10-30

## 2019-10-23 RX ORDER — SODIUM CHLORIDE 0.9 % (FLUSH) 0.9 %
10 SYRINGE (ML) INJECTION EVERY 12 HOURS SCHEDULED
Status: DISCONTINUED | OUTPATIENT
Start: 2019-10-23 | End: 2019-10-23 | Stop reason: HOSPADM

## 2019-10-23 RX ORDER — ASPIRIN 325 MG
325 TABLET ORAL DAILY
COMMUNITY
End: 2021-12-24 | Stop reason: HOSPADM

## 2019-10-23 RX ORDER — PROPOFOL 10 MG/ML
INJECTION, EMULSION INTRAVENOUS PRN
Status: DISCONTINUED | OUTPATIENT
Start: 2019-10-23 | End: 2019-10-23 | Stop reason: SDUPTHER

## 2019-10-23 RX ORDER — CEFAZOLIN SODIUM 2 G/50ML
SOLUTION INTRAVENOUS PRN
Status: DISCONTINUED | OUTPATIENT
Start: 2019-10-23 | End: 2019-10-23 | Stop reason: SDUPTHER

## 2019-10-23 RX ADMIN — FENTANYL CITRATE 50 MCG: 50 INJECTION, SOLUTION INTRAMUSCULAR; INTRAVENOUS at 10:16

## 2019-10-23 RX ADMIN — HEPARIN SODIUM 5000 UNITS: 1000 INJECTION INTRAVENOUS; SUBCUTANEOUS at 11:05

## 2019-10-23 RX ADMIN — FENTANYL CITRATE 25 MCG: 50 INJECTION, SOLUTION INTRAMUSCULAR; INTRAVENOUS at 12:44

## 2019-10-23 RX ADMIN — FENTANYL CITRATE 25 MCG: 50 INJECTION, SOLUTION INTRAMUSCULAR; INTRAVENOUS at 12:36

## 2019-10-23 RX ADMIN — LIDOCAINE HYDROCHLORIDE 50 MG: 10 INJECTION, SOLUTION EPIDURAL; INFILTRATION; INTRACAUDAL at 10:24

## 2019-10-23 RX ADMIN — SODIUM CHLORIDE: 9 INJECTION, SOLUTION INTRAVENOUS at 08:02

## 2019-10-23 RX ADMIN — CEFAZOLIN SODIUM 2 G: 2 SOLUTION INTRAVENOUS at 10:27

## 2019-10-23 RX ADMIN — LIDOCAINE HYDROCHLORIDE 50 MG: 10 INJECTION, SOLUTION EPIDURAL; INFILTRATION; INTRACAUDAL at 10:41

## 2019-10-23 RX ADMIN — SODIUM CHLORIDE: 900 INJECTION, SOLUTION INTRAVENOUS at 10:14

## 2019-10-23 RX ADMIN — HYDROCODONE BITARTRATE AND ACETAMINOPHEN 1 TABLET: 5; 325 TABLET ORAL at 13:39

## 2019-10-23 RX ADMIN — LIDOCAINE HYDROCHLORIDE 3 ML: 20 JELLY TOPICAL at 10:27

## 2019-10-23 RX ADMIN — PROPOFOL 35 MG: 10 INJECTION, EMULSION INTRAVENOUS at 10:24

## 2019-10-23 RX ADMIN — PROPOFOL 35 MG: 10 INJECTION, EMULSION INTRAVENOUS at 11:04

## 2019-10-23 RX ADMIN — PROPOFOL 50 MCG/KG/MIN: 10 INJECTION, EMULSION INTRAVENOUS at 10:24

## 2019-10-23 ASSESSMENT — PULMONARY FUNCTION TESTS
PIF_VALUE: 0
PIF_VALUE: 1
PIF_VALUE: 0
PIF_VALUE: 2
PIF_VALUE: 0
PIF_VALUE: 1
PIF_VALUE: 0

## 2019-10-23 ASSESSMENT — PAIN SCALES - GENERAL: PAINLEVEL_OUTOF10: 7

## 2019-10-23 ASSESSMENT — ENCOUNTER SYMPTOMS
SHORTNESS OF BREATH: 0
COLOR CHANGE: 0

## 2019-10-24 ENCOUNTER — TELEPHONE (OUTPATIENT)
Dept: VASCULAR SURGERY | Age: 59
End: 2019-10-24

## 2019-10-29 ENCOUNTER — TELEPHONE (OUTPATIENT)
Dept: VASCULAR SURGERY | Age: 59
End: 2019-10-29

## 2019-11-25 ENCOUNTER — OFFICE VISIT (OUTPATIENT)
Dept: VASCULAR SURGERY | Age: 59
End: 2019-11-25

## 2019-11-25 VITALS
TEMPERATURE: 97 F | SYSTOLIC BLOOD PRESSURE: 139 MMHG | BODY MASS INDEX: 35.93 KG/M2 | DIASTOLIC BLOOD PRESSURE: 81 MMHG | RESPIRATION RATE: 16 BRPM | HEART RATE: 96 BPM | OXYGEN SATURATION: 97 % | WEIGHT: 251 LBS | HEIGHT: 70 IN

## 2019-11-25 DIAGNOSIS — Z95.828 S/P ARTERIOVENOUS (AV) GRAFT PLACEMENT: Primary | ICD-10-CM

## 2019-11-25 PROCEDURE — 99024 POSTOP FOLLOW-UP VISIT: CPT | Performed by: SURGERY

## 2019-12-01 ASSESSMENT — ENCOUNTER SYMPTOMS
CHEST TIGHTNESS: 0
SHORTNESS OF BREATH: 0
COLOR CHANGE: 0

## 2019-12-16 ENCOUNTER — OFFICE VISIT (OUTPATIENT)
Dept: VASCULAR SURGERY | Age: 59
End: 2019-12-16
Payer: COMMERCIAL

## 2019-12-16 VITALS
DIASTOLIC BLOOD PRESSURE: 76 MMHG | HEART RATE: 86 BPM | BODY MASS INDEX: 37.08 KG/M2 | HEIGHT: 70 IN | WEIGHT: 259 LBS | TEMPERATURE: 97.7 F | SYSTOLIC BLOOD PRESSURE: 135 MMHG | OXYGEN SATURATION: 100 %

## 2019-12-16 DIAGNOSIS — Z95.828 S/P ARTERIOVENOUS (AV) GRAFT PLACEMENT: Primary | ICD-10-CM

## 2019-12-16 PROCEDURE — 99024 POSTOP FOLLOW-UP VISIT: CPT | Performed by: SURGERY

## 2019-12-16 PROCEDURE — 36589 REMOVAL TUNNELED CV CATH: CPT | Performed by: SURGERY

## 2020-11-03 PROBLEM — R07.9 CHEST PAIN: Status: RESOLVED | Noted: 2019-03-15 | Resolved: 2020-11-03

## 2020-11-09 ENCOUNTER — TELEPHONE (OUTPATIENT)
Dept: VASCULAR SURGERY | Age: 60
End: 2020-11-09

## 2020-11-13 NOTE — TELEPHONE ENCOUNTER
Patricia Mayer MD; ROB Delcid - NP; [facility] Clinical Staff               Leah from dialysis called stating that patient's access is clotted and want to know what you want to do for patient to declot access.  They cannot run treatment today due to access being clotted. . Please advise. Thank you.

## 2020-11-13 NOTE — TELEPHONE ENCOUNTER
Dr. Jalen Chaparro said to have the patient get declotted at [facility] because he won't be able to do it. He recommended it get done today or Monday. Spoke to Leah at dialysis who stated the patient is scheduled at [facility] for Monday at 2:30pm. Dr. Jalen Chaparro stated to keep that appt.    Leah verbalized understanding

## 2020-11-13 NOTE — TELEPHONE ENCOUNTER
Pt ran yesterday for treatment they cannulated two needles in the venous and patient ran his treatment with no issues. But this morning pt called dialysis stating that he did not feel a bruit RN Dondra Schirmer advised that pt comes into clinic for evaluation.  And she confirmed there was no bruit/thrill

## 2020-11-17 ENCOUNTER — TELEPHONE (OUTPATIENT)
Dept: VASCULAR SURGERY | Age: 60
End: 2020-11-17

## 2020-11-17 NOTE — TELEPHONE ENCOUNTER
RN Tylor Luna calls from pts Dialysis stating that he had a declot done by Dr. Syed Culver on Monday and in the post op instructions it states \"Please refer to surgeon for possible new access as graft not going to be open for long\"

## 2020-12-10 ENCOUNTER — TELEPHONE (OUTPATIENT)
Dept: VASCULAR SURGERY | Age: 60
End: 2020-12-10

## 2020-12-11 ENCOUNTER — OFFICE VISIT (OUTPATIENT)
Dept: VASCULAR SURGERY | Age: 60
End: 2020-12-11
Payer: COMMERCIAL

## 2020-12-11 VITALS
HEART RATE: 76 BPM | BODY MASS INDEX: 35.5 KG/M2 | RESPIRATION RATE: 16 BRPM | SYSTOLIC BLOOD PRESSURE: 135 MMHG | DIASTOLIC BLOOD PRESSURE: 77 MMHG | HEIGHT: 70 IN | TEMPERATURE: 98.2 F | WEIGHT: 248 LBS | OXYGEN SATURATION: 97 %

## 2020-12-11 PROCEDURE — 99214 OFFICE O/P EST MOD 30 MIN: CPT | Performed by: SURGERY

## 2020-12-11 RX ORDER — TRAMADOL HYDROCHLORIDE 50 MG/1
TABLET ORAL
COMMUNITY
Start: 2020-10-21

## 2020-12-13 ASSESSMENT — ENCOUNTER SYMPTOMS
ALLERGIC/IMMUNOLOGIC NEGATIVE: 1
COLOR CHANGE: 0
SHORTNESS OF BREATH: 0
ABDOMINAL PAIN: 0
CHEST TIGHTNESS: 0

## 2020-12-13 NOTE — PROGRESS NOTES
Division of Vascular Surgery        Follow Up      Left radial cephalic Av fistula creation (2/20/19)  Left AV fistula revision, branch ligation (3/6/19)  Fistulagram angioplasty (7/17/19)  LUE AV graft (10/23/19)    Chief Complaint:      Malfunctioning AV graft    History of Present Illness:      Yudi Tapia is a 56 y.o. gentleman who presents for evaluation of his AV graft. Just before Thanksgiving his access clotted and it was opened up at [facility]. Apparently it did not stay open for long and had to be open up again at [facility] a few days later. Since then his graft has had a good thrill and has been working well. Dialysis center has issues with cannulating him at times, he tells me that when they put the needles in they are going outwards and does not seem that they are grasping the exact location of the graft and the direction the needles should be facing. He denies any pain, weakness, numbness/tingling in his hand to suggest ischemic steal.  He has a great thrill over his graft and a palpable radial artery pulse. Medical History:     Past Medical History:   Diagnosis Date    Bowel obstruction ([address] 12/26/2013    CAD (coronary artery disease) 2013    ?    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/2013    Patient in clinical research study 04/01/2019    XIENCE SHORT DAPT    Sleep apnea 2015    pt has machine and does not use it       Surgical History:     Past Surgical History:   Procedure Laterality Date    ABDOMEN SURGERY  2013    BOWEL OBSTRUCTION    AV FISTULA CREATION Left 02/20/2019  AV FISTULA REPAIR  07/17/2019    AV fistula revision, branch ligation / Percutaneous angioplasty of proximal anastomosis and outflow tract of dialysis circuit with drug coated balloon  /  Dr Patience Mccray  03/31/2019    TIM LAD    CYSTOSCOPY  11/17/2017    DIALYSIS FISTULA CREATION Left 2/20/2019    AV FISTULA CREATION ARM performed by Jamshid Landa MD at [address] [place] [address] 10/23/2019    LEFT UPPER EXTREMITY AV GRAFT CREATION WITH [id number] ARTEGRAFT, LEFT UPPER EXTREMITY AV FISTULA LIGATION performed by Jamshid Landa MD at [facility] 2005 2005 [facility]    EAR DRUM REPAIRED    MOUTH SURGERY  2007 1 WISDOM TOOTH REMOVED    NOSE SURGERY  1968    CAUTERY TO STOP NOSE BEEDS    OTHER SURGICAL HISTORY Left 03/06/2019    fistulagram    ME GENITAL SURG PROC,MALE UNLISTED N/A 2/16/2018    US  PROSTATE BIOPSY WITH SATURATION performed by Cosmo Linton MD at [facility] N/A 11/17/2017    CYSTOSCOPY PROSTATE US BIOPSY performed by Cosmo Linton MD at [facility]  02/16/2018   [facility]    VASCULAR SURGERY  09/20/2019    LUE FISTULAGRAM  /  DR Manohar Farnsworth  /  Findings: Severe stenosis of proximal cephalic vein. / Will plan for LUE AVG placement.     VASECTOMY  1983       Family History:     Family History   Problem Relation Age of Onset    Heart Disease Mother         CHF    Early Death Mother     High Blood Pressure Brother     Diabetes Brother     Asthma Brother     High Blood Pressure Brother     Diabetes Brother     High Blood Pressure Brother     Diabetes Brother        Allergies:       Lisinopril    Medications:      Current Outpatient Medications   Medication Sig Dispense Refill    aspirin 325 MG tablet Take 325 mg by mouth daily  calcium carbonate (TUMS) 500 MG chewable tablet Take 1 tablet by mouth daily as needed       carvedilol (COREG) 6.25 MG tablet Take 1 tablet by mouth 2 times daily (with meals) 60 tablet 3    Multiple Vitamins-Minerals (RENAPLEX-D) TABS Take 1 tablet by mouth every other day Pt takes occasionally  3    lidocaine-prilocaine (EMLA) 2.5-2.5 % cream       atorvastatin (LIPITOR) 40 MG tablet Take 1 tablet by mouth nightly 30 tablet 3    NIFEdipine (ADALAT CC) 30 MG extended release tablet Take 30 mg by mouth 2 times daily      SITagliptin (JANUVIA) 25 MG tablet Take 1 tablet by mouth daily 60 tablet 2    furosemide (LASIX) 40 MG tablet Take 40 mg by mouth 2 times daily       traMADol (ULTRAM) 50 MG tablet take 1 tablet by mouth every 6 hours if needed       No current facility-administered medications for this visit. Social History:     Tobacco:    reports that he has never smoked. He has never used smokeless tobacco.  Alcohol:      reports no history of alcohol use. Drug Use:  reports no history of drug use. Review of Systems:     Review of Systems   Constitutional: Negative for chills and fever. HENT: Negative for congestion. Eyes: Negative for visual disturbance. Respiratory: Negative for chest tightness and shortness of breath. Cardiovascular: Negative for chest pain and leg swelling. Gastrointestinal: Negative for abdominal pain. Endocrine: Negative. Genitourinary: Negative. Musculoskeletal: Negative. Skin: Negative for color change and wound. Allergic/Immunologic: Negative. Neurological: Negative for facial asymmetry, speech difficulty, weakness and numbness. Hematological: Negative. Psychiatric/Behavioral: Negative.       Physical Exam: Vitals:  /77 (Site: Right Lower Arm, Position: Sitting, Cuff Size: Medium Adult)   Pulse 76   Temp 98.2 °F (36.8 °C)   Resp 16   Ht 5' 10\" (1.778 m) Comment: per patient  Wt 248 lb (112.5 kg)   SpO2 97%   BMI 35.58 kg/m²     Physical Exam  Constitutional:       Appearance: He is well-developed and well-groomed. Eyes:      Extraocular Movements: Extraocular movements intact. Conjunctiva/sclera: Conjunctivae normal.   Neck:      Musculoskeletal: Full passive range of motion without pain. Vascular: No carotid bruit. Cardiovascular:      Rate and Rhythm: Normal rate and regular rhythm. Pulses:           Radial pulses are 2+ on the left side. Arteriovenous access: left arteriovenous access is present. Comments: Good thrill over AV graft  Pulmonary:      Effort: Pulmonary effort is normal. No respiratory distress. Abdominal:      Palpations: Abdomen is soft. Tenderness: There is no abdominal tenderness. Musculoskeletal:      Left upper arm: He exhibits no tenderness and no swelling. Left hand: He exhibits no tenderness and normal capillary refill. Normal sensation noted. Normal strength noted. Skin:     General: Skin is warm. Capillary Refill: Capillary refill takes less than 2 seconds. Comments: Old suture still in place form his declot procedure over his arm   Neurological:      Mental Status: He is alert and oriented to person, place, and time. GCS: GCS eye subscore is 4. GCS verbal subscore is 5. GCS motor subscore is 6. Sensory: Sensation is intact. Motor: Motor function is intact.    Psychiatric:         Mood and Affect: Mood normal.         Speech: Speech normal.         Behavior: Behavior normal.       Imaging/Labs:     Ultrasound performed in the office reveals widely patent AV graft    Assessment and Plan:     ESRD on hemodialysis, Patent AV graft  · Graft marked out under ultrasound so it may help cannulate him easier · Continue to do hand  exercises   · Will discuss with dialysis center if there are further issues with cannulating him  · He would also like me to take care of any further issues with his graft, he was not a fan of [employer]    Electronically signed by Marta Soriano MD on 12/13/20 at 3:24 PM EST      [address]: [phone]  C: [phone]  Email: [email or web address]

## 2021-02-09 ENCOUNTER — HOSPITAL ENCOUNTER (INPATIENT)
Dept: CARDIAC CATH/INVASIVE PROCEDURES | Age: 61
LOS: 2 days | Discharge: HOME | DRG: 286 | End: 2021-02-11
Attending: INTERNAL MEDICINE | Admitting: INTERNAL MEDICINE
Payer: COMMERCIAL

## 2021-02-09 DIAGNOSIS — I50.43 CHF (CONGESTIVE HEART FAILURE), NYHA CLASS I, ACUTE ON CHRONIC, COMBINED (HCC): ICD-10-CM

## 2021-02-09 LAB
ANION GAP SERPL CALCULATED.3IONS-SCNC: 13 MMOL/L (ref 9–17)
BUN BLDV-MCNC: 49 MG/DL (ref 8–23)
BUN BLDV-MCNC: 54 MG/DL (ref 8–23)
BUN/CREAT BLD: ABNORMAL (ref 9–20)
CALCIUM SERPL-MCNC: 8.6 MG/DL (ref 8.6–10.4)
CHLORIDE BLD-SCNC: 97 MMOL/L (ref 98–107)
CO2: 27 MMOL/L (ref 20–31)
CREAT SERPL-MCNC: 4.68 MG/DL (ref 0.7–1.2)
GFR AFRICAN AMERICAN: 15 ML/MIN
GFR NON-AFRICAN AMERICAN: 12 ML/MIN
GFR NON-AFRICAN AMERICAN: 13 ML/MIN
GFR SERPL CREATININE-BSD FRML MDRD: 15 ML/MIN
GFR SERPL CREATININE-BSD FRML MDRD: ABNORMAL ML/MIN/{1.73_M2}
GLUCOSE BLD-MCNC: 279 MG/DL (ref 74–100)
GLUCOSE BLD-MCNC: 315 MG/DL (ref 70–99)
PLATELET # BLD: 193 K/UL (ref 138–453)
POC CHLORIDE: 103 MMOL/L (ref 98–107)
POC CREATININE: 4.86 MG/DL (ref 0.51–1.19)
POC HEMATOCRIT: 46 % (ref 41–53)
POC HEMOGLOBIN: 15.6 G/DL (ref 13.5–17.5)
POC POTASSIUM: 4.6 MMOL/L (ref 3.5–4.5)
POC SODIUM: 137 MMOL/L (ref 138–146)
POTASSIUM SERPL-SCNC: 4.2 MMOL/L (ref 3.7–5.3)
SODIUM BLD-SCNC: 137 MMOL/L (ref 135–144)

## 2021-02-09 PROCEDURE — 7100000000 HC PACU RECOVERY - FIRST 15 MIN

## 2021-02-09 PROCEDURE — 6360000004 HC RX CONTRAST MEDICATION

## 2021-02-09 PROCEDURE — 2709999900 HC NON-CHARGEABLE SUPPLY

## 2021-02-09 PROCEDURE — 2500000003 HC RX 250 WO HCPCS

## 2021-02-09 PROCEDURE — 5A1D70Z PERFORMANCE OF URINARY FILTRATION, INTERMITTENT, LESS THAN 6 HOURS PER DAY: ICD-10-PCS | Performed by: INTERNAL MEDICINE

## 2021-02-09 PROCEDURE — C1769 GUIDE WIRE: HCPCS

## 2021-02-09 PROCEDURE — 82435 ASSAY OF BLOOD CHLORIDE: CPT

## 2021-02-09 PROCEDURE — 36415 COLL VENOUS BLD VENIPUNCTURE: CPT

## 2021-02-09 PROCEDURE — 82565 ASSAY OF CREATININE: CPT

## 2021-02-09 PROCEDURE — 85049 AUTOMATED PLATELET COUNT: CPT

## 2021-02-09 PROCEDURE — 99233 SBSQ HOSP IP/OBS HIGH 50: CPT | Performed by: INTERNAL MEDICINE

## 2021-02-09 PROCEDURE — 4A023N8 MEASUREMENT OF CARDIAC SAMPLING AND PRESSURE, BILATERAL, PERCUTANEOUS APPROACH: ICD-10-PCS | Performed by: INTERNAL MEDICINE

## 2021-02-09 PROCEDURE — 85014 HEMATOCRIT: CPT

## 2021-02-09 PROCEDURE — C1760 CLOSURE DEV, VASC: HCPCS

## 2021-02-09 PROCEDURE — 94761 N-INVAS EAR/PLS OXIMETRY MLT: CPT

## 2021-02-09 PROCEDURE — 93458 L HRT ARTERY/VENTRICLE ANGIO: CPT | Performed by: INTERNAL MEDICINE

## 2021-02-09 PROCEDURE — 6360000002 HC RX W HCPCS

## 2021-02-09 PROCEDURE — 6370000000 HC RX 637 (ALT 250 FOR IP): Performed by: INTERNAL MEDICINE

## 2021-02-09 PROCEDURE — 82947 ASSAY GLUCOSE BLOOD QUANT: CPT

## 2021-02-09 PROCEDURE — 2580000003 HC RX 258: Performed by: INTERNAL MEDICINE

## 2021-02-09 PROCEDURE — 1200000000 HC SEMI PRIVATE

## 2021-02-09 PROCEDURE — 7100000001 HC PACU RECOVERY - ADDTL 15 MIN

## 2021-02-09 PROCEDURE — 2580000003 HC RX 258: Performed by: STUDENT IN AN ORGANIZED HEALTH CARE EDUCATION/TRAINING PROGRAM

## 2021-02-09 PROCEDURE — 6370000000 HC RX 637 (ALT 250 FOR IP): Performed by: STUDENT IN AN ORGANIZED HEALTH CARE EDUCATION/TRAINING PROGRAM

## 2021-02-09 PROCEDURE — B2151ZZ FLUOROSCOPY OF LEFT HEART USING LOW OSMOLAR CONTRAST: ICD-10-PCS | Performed by: INTERNAL MEDICINE

## 2021-02-09 PROCEDURE — B2111ZZ FLUOROSCOPY OF MULTIPLE CORONARY ARTERIES USING LOW OSMOLAR CONTRAST: ICD-10-PCS | Performed by: INTERNAL MEDICINE

## 2021-02-09 PROCEDURE — 84295 ASSAY OF SERUM SODIUM: CPT

## 2021-02-09 PROCEDURE — 84132 ASSAY OF SERUM POTASSIUM: CPT

## 2021-02-09 PROCEDURE — C1894 INTRO/SHEATH, NON-LASER: HCPCS

## 2021-02-09 PROCEDURE — 84520 ASSAY OF UREA NITROGEN: CPT

## 2021-02-09 PROCEDURE — 80048 BASIC METABOLIC PNL TOTAL CA: CPT

## 2021-02-09 PROCEDURE — 90935 HEMODIALYSIS ONE EVALUATION: CPT

## 2021-02-09 RX ORDER — DEXTROSE MONOHYDRATE 25 G/50ML
12.5 INJECTION, SOLUTION INTRAVENOUS PRN
Status: DISCONTINUED | OUTPATIENT
Start: 2021-02-09 | End: 2021-02-11 | Stop reason: HOSPADM

## 2021-02-09 RX ORDER — ASPIRIN 325 MG
325 TABLET ORAL DAILY
Status: DISCONTINUED | OUTPATIENT
Start: 2021-02-09 | End: 2021-02-11 | Stop reason: HOSPADM

## 2021-02-09 RX ORDER — ACETAMINOPHEN 325 MG/1
650 TABLET ORAL EVERY 4 HOURS PRN
Status: DISCONTINUED | OUTPATIENT
Start: 2021-02-09 | End: 2021-02-11 | Stop reason: HOSPADM

## 2021-02-09 RX ORDER — B,C/FOLIC/ZINC/SELENOMETH/D3/E 0.8-12.5MG
1 TABLET ORAL EVERY OTHER DAY
Status: DISCONTINUED | OUTPATIENT
Start: 2021-02-09 | End: 2021-02-09 | Stop reason: RX

## 2021-02-09 RX ORDER — SODIUM CHLORIDE 0.9 % (FLUSH) 0.9 %
10 SYRINGE (ML) INJECTION PRN
Status: DISCONTINUED | OUTPATIENT
Start: 2021-02-09 | End: 2021-02-11 | Stop reason: HOSPADM

## 2021-02-09 RX ORDER — SODIUM CHLORIDE 0.9 % (FLUSH) 0.9 %
10 SYRINGE (ML) INJECTION EVERY 12 HOURS SCHEDULED
Status: DISCONTINUED | OUTPATIENT
Start: 2021-02-09 | End: 2021-02-11 | Stop reason: HOSPADM

## 2021-02-09 RX ORDER — CARVEDILOL 6.25 MG/1
6.25 TABLET ORAL 2 TIMES DAILY WITH MEALS
Status: DISCONTINUED | OUTPATIENT
Start: 2021-02-09 | End: 2021-02-11 | Stop reason: HOSPADM

## 2021-02-09 RX ORDER — SODIUM CHLORIDE 9 MG/ML
INJECTION, SOLUTION INTRAVENOUS CONTINUOUS
Status: ACTIVE | OUTPATIENT
Start: 2021-02-09 | End: 2021-02-09

## 2021-02-09 RX ORDER — NICOTINE POLACRILEX 4 MG
15 LOZENGE BUCCAL PRN
Status: DISCONTINUED | OUTPATIENT
Start: 2021-02-09 | End: 2021-02-11 | Stop reason: HOSPADM

## 2021-02-09 RX ORDER — GLIMEPIRIDE 1 MG/1
1 TABLET ORAL
Status: ON HOLD | COMMUNITY
End: 2021-11-14 | Stop reason: SDUPTHER

## 2021-02-09 RX ORDER — FUROSEMIDE 40 MG/1
40 TABLET ORAL 2 TIMES DAILY
Status: DISCONTINUED | OUTPATIENT
Start: 2021-02-09 | End: 2021-02-11 | Stop reason: HOSPADM

## 2021-02-09 RX ORDER — ATORVASTATIN CALCIUM 40 MG/1
40 TABLET, FILM COATED ORAL NIGHTLY
Qty: 30 TABLET | Refills: 3 | Status: SHIPPED | OUTPATIENT
Start: 2021-02-09

## 2021-02-09 RX ORDER — DEXTROSE MONOHYDRATE 50 MG/ML
100 INJECTION, SOLUTION INTRAVENOUS PRN
Status: DISCONTINUED | OUTPATIENT
Start: 2021-02-09 | End: 2021-02-11 | Stop reason: HOSPADM

## 2021-02-09 RX ORDER — GLIMEPIRIDE 2 MG/1
1 TABLET ORAL
Status: DISCONTINUED | OUTPATIENT
Start: 2021-02-10 | End: 2021-02-11 | Stop reason: HOSPADM

## 2021-02-09 RX ORDER — NIFEDIPINE 30 MG/1
30 TABLET, EXTENDED RELEASE ORAL 2 TIMES DAILY
Status: DISCONTINUED | OUTPATIENT
Start: 2021-02-09 | End: 2021-02-11 | Stop reason: HOSPADM

## 2021-02-09 RX ADMIN — INSULIN LISPRO 2 UNITS: 100 INJECTION, SOLUTION INTRAVENOUS; SUBCUTANEOUS at 20:29

## 2021-02-09 RX ADMIN — SODIUM CHLORIDE: 9 INJECTION, SOLUTION INTRAVENOUS at 10:33

## 2021-02-09 RX ADMIN — SODIUM CHLORIDE, PRESERVATIVE FREE 10 ML: 5 INJECTION INTRAVENOUS at 20:29

## 2021-02-09 RX ADMIN — NIFEDIPINE 30 MG: 30 TABLET, FILM COATED, EXTENDED RELEASE ORAL at 20:29

## 2021-02-09 ASSESSMENT — PAIN DESCRIPTION - PAIN TYPE
TYPE: CHRONIC PAIN
TYPE: CHRONIC PAIN

## 2021-02-09 ASSESSMENT — PAIN DESCRIPTION - ORIENTATION: ORIENTATION: LEFT

## 2021-02-09 ASSESSMENT — PAIN SCALES - GENERAL: PAINLEVEL_OUTOF10: 3

## 2021-02-09 ASSESSMENT — PAIN DESCRIPTION - ONSET
ONSET: ON-GOING
ONSET: ON-GOING

## 2021-02-09 ASSESSMENT — PAIN DESCRIPTION - FREQUENCY: FREQUENCY: INTERMITTENT

## 2021-02-09 NOTE — OP NOTE
[facility]    CARDIAC CATHETERIZATION    Date:   2/9/2021  Patient name:  Piotr Coleman  Date of admission:  2/9/2021  9:50 AM  MRN:   [id number]  YOB: 1960    Operators:  Primary:   Catina Valencia MD (Attending Physician)        Procedure performed:     [x] Left Heart Catheterization. [] Graft Angiography. [x] Left Ventriculography. [] Right Heart Catheterization. [] Coronary Angiography. [] Aortic Valve Studies. [] PCI:      [] Other:       Pre Procedure Conscious Sedation Data:  ASA Class:    [] I [x] II [] III [] IV    Mallampati Class:  [] I [x] II [] III [] IV      Indication:  [] STEMI      [] + Stress test  [] ACS      [] + EKG Changes  [] Non Q MI       [] Significant Risk Factors  [] Recurrent Angina             [] Diabetes Mellitus    [] New LBBB      [] Uncontrolled HTN. [] CHF / Low EF changes     [] Abnormal CTA / Ca Score      Procedure:  Access:  [x] Femoral  [] Radial  artery       [x] Right  [] Left    Procedure: After informed consent was obtained with explanation of the risks and benefits, patient was brought to the cath lab. The access area was prepped and draped in sterile fashion. 1% lidocaine was used for local block. The artery was cannulated with 6  Fr sheath with brisk arterial blood return. The side port was frequently flushed and aspirated with normal saline. Findings:     Cardiac Arteries and Lesion Findings       LMCA: Normal 0% stenosis. LAD: Patent distal stent with 30-40% stenosis     LCx: Normal 0% stenosis. RCA: Normal 0% stenosis. Small, non dominant      Coronary Tree        Dominance: Right     Estimated Blood Loss: 5 mL     Conclusions        Procedure Summary        Patent LAD stent        Recommendations        Medical treatments    Risk factor modification.         ____________________________________________________________________    History and Risk Factors    [x] Hypertension     [] Family history of CAD  [x] Hyperlipidemia     [] Cerebrovascular Disease   [] Prior MI       [] Peripheral Vascular disease   [] Prior PCI              [] Diabetes Mellitus    [] Left Main PCI. [] Currently on Dialysis. [] Prior CABG. [] Currently smoker. [] Cardiac Arrest outside of healthcare facility. [] Yes    [x] No        Witnessed     [] Yes   [] No     Arrest after arrival of EMS  [] Yes   [] No     [] Cardiac Arrest at other Facility. [] Yes   [x] No    Pre-Procedure Information. Heart Failure       [x] Yes    [] No        Class  [] I      [x] II  [] III    [] IV. New Diagnosis    [] Yes  [x] No    HF Type      [x] Systolic   [] Diastolic          [] Unknown. Diagnostic Test:   EKG       [] Normal   [x] Abnormal    New antiarrhythmia medications:    [] Yes   [] No   New onset atrial fibrillation / Flutter     [] Yes   [] No   ECG Abnormalities:      [] V. Fib   [] Shira V. Tach           [] NS V. T   [] New LBBB           [] T. Inv  []  ST dev > 0.5 mm         [] PVC's freq  [] PVC's infrequent    Stress Test Performed:      [x] Yes    [] No     Type:     [] Stress Echo   [] Exercise Stress Test (no imaging)      [x] Stress Nuclear  [] Stress Imaging     Results   [] Negative   [x] Positive        [] Indeterminate  [] Unavailable     If Positive/ Risk / Extent of Ischemia:       [] Low  [x] Intermediate         [] High  [] Unavailable      Cardiac CTA Performed:     [] Yes    [x] No      Results   [] CAD   [] Non obstructive CAD      [] No CAD   [] Uncertain      [] Unknown   [] Structural Disease. Pre Procedure Medications:   [x] Yes    [] No         [x] ASA   [] Beta Blockers      [] Nitrate   [] Ca Channel Blockers      [] Ranolazine   [] Statin       [] Plavix/Others antiplatelets      Electronically signed on 2/9/2021 at 11:40 AM by:    Honorio Liu MD  Fellow, [address]      I interviewed the patient personally, and examined by me during the visit.   I have

## 2021-02-09 NOTE — PROGRESS NOTES
Dialysis Post Treatment Note  Vitals:    02/09/21 1758   BP: 122/68   Pulse: 86   Resp: 16   Temp: 97.8 °F (36.6 °C)   SpO2: 100%     Pre-Weight = 117.6kg  Post-weight = Weight: 253 lb 4.9 oz (114.9 kg)  Total Liters Processed = Total Liters Processed (l/min): 71.3 l/min  Rinseback Volume (mL) = Rinseback Volume (ml): 330 ml  Net Removal (mL) = NET Removed (ml): 2210 ml  Patient's dry weight= unknown  Type of access used= L upper arm AVG  Length of treatment= 3.5 hrs off 28 min early per pt request    Pt tolerated HD well. Breathing diaphragmatic, NC O2 placed at 2L for pt stating SOB during HD. Pt requested to come off machine early d/t start of cramping in legs. After rinse back pt stated he felt better. Pt back to room via stretcher per transport.

## 2021-02-09 NOTE — PROGRESS NOTES
Report called to Calvin Solis (covering for [place]) on 2nd floor. Report also called to dialysis RN. Pt transported to dialysis with belongings.

## 2021-02-09 NOTE — PROGRESS NOTES
PHARMACY NOTE  Piotr Coleman was ordered [facility]. Per the [address], this medication is non-formulary and not stocked by pharmacy. The medication can be reordered at discharge. Leopold Dresser, Pharm. D.  2/9/2021  1:36 PM

## 2021-02-09 NOTE — PROGRESS NOTES
Patient is complaining of SOB. Will admit patient today. Will consult nephrology and pulmonary medicine.         Linda Ramirez   CV fellow

## 2021-02-09 NOTE — H&P
[facility]  Procedure History and Physical Update          Patient Name: Kaci Borges  MRN:    [id number]  YOB: 1960  Date of evaluation:  2/9/2021    Procedure:    Cardiac cath +/- PCI  Indication for procedure:  Abnormal stress test      Please refer to the office note completed by Dr. Laura Muse  on 01/18/2021 in the medical record and note that:    [x] I have examined the patient and reviewed the H&P/Consult and there are no changes to be made to the assessment or plan. [] I have examined the patient and reviewed the H&P/Consult and have noted the following changes:    Past Medical History:   Diagnosis Date    Bowel obstruction ([facility] 12/26/2013    CAD (coronary artery disease) 2013    ?     CHF (congestive heart failure) ([facility] 2013    last episode 11/2018    Chronic kidney disease     Diabetes mellitus ([facility] 2012    NIDDM    Dialysis patient ([facility]     Tenisha Moreno  /  Angela Yoo  /  Rajat Nailer    Groin swelling 07/17/2019    Hemodialysis patient ([facility] 11/28/2018    TUNNELD CATHETER RIGHT DIALYSIS ACCESSTUES THURS AND AT [facility]    Hydrocele 07/17/2019    Hyperlipidemia 2013    ON RX    Hypertension 2013    ON RX    MI, old 12/26/2013    Patient in clinical research study 04/01/2019    XIENCE SHORT DAPT    Sleep apnea 2015    pt has machine and does not use it       Past Surgical History:   Procedure Laterality Date    ABDOMEN SURGERY  2013    BOWEL OBSTRUCTION    AV FISTULA CREATION Left 02/20/2019    AV FISTULA REPAIR  07/17/2019    AV fistula revision, branch ligation / Percutaneous angioplasty of proximal anastomosis and outflow tract of dialysis circuit with drug coated balloon  /  Dr Estefani Cassidy  03/31/2019    TIM LAD    CYSTOSCOPY  11/17/2017    DIALYSIS FISTULA CREATION Left 2/20/2019    AV FISTULA CREATION ARM performed by Aaron Rodriguez MD at [address] [place] [address] 10/23/2019    LEFT UPPER EXTREMITY AV GRAFT CREATION WITH [id number] ARTEGRAFT, LEFT UPPER EXTREMITY AV FISTULA LIGATION performed by Kj Soria MD at [facility] Left 2005 [facility]    EAR DRUM REPAIRED    MOUTH SURGERY  2007    1 WISDOM TOOTH REMOVED    NOSE SURGERY  1968    CAUTERY TO STOP NOSE BEEDS    OTHER SURGICAL HISTORY Left 03/06/2019    fistulagram    CA GENITAL SURG PROC,MALE UNLISTED N/A 2/16/2018    US  PROSTATE BIOPSY WITH SATURATION performed by Ruth Garner MD at [address] [place] [address] N/A 11/17/2017    CYSTOSCOPY PROSTATE US BIOPSY performed by Ruth Garner MD at [address] [place] [address]  02/16/2018   [facility]    VASCULAR SURGERY  09/20/2019    LUE FISTULAGRAM  /  DR Lui Henley  /  Findings: Severe stenosis of proximal cephalic vein. / Will plan for LUE AVG placement.  VASECTOMY  1983       Family History   Problem Relation Age of Onset    Heart Disease Mother         CHF    Early Death Mother     High Blood Pressure Brother     Diabetes Brother     Asthma Brother     High Blood Pressure Brother     Diabetes Brother     High Blood Pressure Brother     Diabetes Brother        Allergies   Allergen Reactions    Lisinopril Swelling       Prior to Admission medications    Medication Sig Start Date End Date Taking?  Authorizing Provider   traMADol (ULTRAM) 50 MG tablet take 1 tablet by mouth every 6 hours if needed 10/21/20   Historical Provider, MD   aspirin 325 MG tablet Take 325 mg by mouth daily    Historical Provider, MD   calcium carbonate (TUMS) 500 MG chewable tablet Take 1 tablet by mouth daily as needed     Historical Provider, MD   carvedilol (COREG) 6.25 MG tablet Take 1 tablet by mouth 2 times daily (with meals) 6/19/19   Gonzalo Gomez MD   Multiple Vitamins-Minerals (RENAPLEX-D) TABS Take 1 tablet by mouth every other day Pt takes occasionally 5/14/19   Historical Provider, MD lidocaine-prilocaine (EMLA) 2.5-2.5 % cream  3/18/19   Historical Provider, MD   atorvastatin (LIPITOR) 40 MG tablet Take 1 tablet by mouth nightly 4/2/19   ROB Perez CNP   NIFEdipine (ADALAT CC) 30 MG extended release tablet Take 30 mg by mouth 2 times daily    Historical Provider, MD   SITagliptin (JANUVIA) 25 MG tablet Take 1 tablet by mouth daily 11/29/18   Karyna Jimenez MD   furosemide (LASIX) 40 MG tablet Take 40 mg by mouth 2 times daily     Historical Provider, MD         There were no vitals filed for this visit. Constitutional and General Appearance:   alert, cooperative, no distress and appears stated age  [de-identified]:  · PERRL, EOMI  Respiratory:  · Normal excursion and expansion without use of accessory muscles  · Resp Auscultation:  Good respiratory effort. No for increased work of breathing. On auscultation: clear to auscultation bilaterally  Cardiovascular:  · Regular rate and rhythm. · S1/S2  · No murmurs. · The apical impulse is not displaced    Extremities:  · No cyanosis or clubbing  · Lower extremity edema: No.  Skin:  · Warm and dry  Neurological:  · Alert and oriented. · Moves all extremities well    Assessment:     1 Abnormal stress test  2. RACHEL   3 Coronary angiography 4/2019 shows single-vessel in distal LAD which was reduced s/p PTCA/DE  4 ESRD on HD   5. HTN      01/27/2021   1. NO ischemia (Reversible defect). 2. Moderate inferior transmural infarction (Fixed defect). 3. Overall reduced LV function with EF 40%. 4. Inferior wall hypokinesia. Plan:  · Proceed with planned procedure. - Cardiac cath /PCI  · Further orders to follow. Risks, benefits, and alternatives of cardiac catheterization were discussed, in detail, with patient. Risks include, but not limited to, bleeding, requiring blood transfusion, vascular complication requiring surgery, , CVA, MI, death and anesthesia complications including intubation were discussed.  Patient verbalized

## 2021-02-09 NOTE — CONSULTS
Renal Consult Note    Patient :  Negro Quezada; 56 y.o. MRN# [id number]  Location:  2002/2002-01  Attending:  Nahomy Owen MD  Admit Date:  2/9/2021   Hospital Day: 0    Reason for Consult:     Asked by Dr Nahomy Owen MD to see for JAGRUTI/Elevated Creatinine. History Obtained From:     patient, electronic medical record, dialysis nurse    HD Access:     previous  Left AV fistula    HD Unit:     [facility]    Nephrologist:     Dr. Damián Acevedo Weight:     114 kgs    Admission Weight:     117.6 kgs. History of Present Illness:     Negro Quezada; 56 y.o. male with past medical history as mentioned below presented to the hospital with the chief complaint of abnormal stress test with history of coronary artery disease and stent placement to distal LAD, he also has past medical history of ESRD on HD along with hypertension. Patient was admitted under cardiology to receive cardiac catheterization which was done today 2/9/2021 and found to have patent LAD stent. Patient does complain of positive shortness of breath. Nephrology is consulted due to history of ESRD on HD. Past History/Allergies? Social History:     Past Medical History:   Diagnosis Date    Bowel obstruction ([address] 12/26/2013    CAD (coronary artery disease) 2013    ?     CHF (congestive heart failure) ([facility] 2013    last episode 11/2018    Chronic kidney disease     Diabetes mellitus ([facility] 2012    NIDDM    Dialysis patient ([facility]     Coreen Velasco  /  Lee Bitter  /  Rich Leventhal    Groin swelling 07/17/2019    Hemodialysis patient ([facility] 11/28/2018    TUNNELD CATHETER RIGHT DIALYSIS ACCESSTUES THURS AND AT [place]    Hydrocele 07/17/2019    Hyperlipidemia 2013    ON RX    Hypertension 2013    ON RX    MI, old 12/26/2013    Patient in clinical research study 04/01/2019    XIENCE SHORT DAPT    Sleep apnea 2015    pt has machine and does not use it       Past Surgical History:   Procedure Laterality Date    ABDOMEN SURGERY  2013    BOWEL OBSTRUCTION    AV FISTULA CREATION Left 02/20/2019    AV FISTULA REPAIR  07/17/2019    AV fistula revision, branch ligation / Percutaneous angioplasty of proximal anastomosis and outflow tract of dialysis circuit with drug coated balloon  /  Dr Rudy Sommer  02/09/2021    Dr. Asha Bryson, [address]  03/31/2019    TIM LAD    CYSTOSCOPY  11/17/2017    DIALYSIS FISTULA CREATION Left 2/20/2019    AV FISTULA CREATION ARM performed by Kavita Muniz MD at [facility] 10/23/2019    LEFT UPPER EXTREMITY AV GRAFT CREATION WITH [id number] ARTEGRAFT, LEFT UPPER EXTREMITY AV FISTULA LIGATION performed by Kavita Muniz MD at [facility] 2005    [address]  2007    1 WISDOM TOOTH REMOVED    NOSE SURGERY  1968    CAUTERY TO STOP NOSE BEEDS    OTHER SURGICAL HISTORY Left 03/06/2019    fistulagram    CT GENITAL SURG PROC,MALE UNLISTED N/A 2/16/2018    US  PROSTATE BIOPSY WITH SATURATION performed by Emmie Taylor MD at [address] N/A 11/17/2017    CYSTOSCOPY PROSTATE US BIOPSY performed by Emmie Taylor MD at [address]  02/16/2018   [facility] VASCULAR SURGERY  09/20/2019    RUBI Jimenes  /  DR Thompson So  /  Findings: Severe stenosis of proximal cephalic vein. / Will plan for LUE AVG placement.     VASECTOMY  1983       Allergies   Allergen Reactions    Lisinopril Swelling       Social History     Socioeconomic History    Marital status: Single     Spouse name: Not on file    Number of children: Not on file    Years of education: Not on file    Highest education level: Not on file   Occupational History    Not on file   Social Needs    Financial resource strain: Not on file    Food insecurity     Worry: Not on file     Inability: Not on file  Transportation needs     Medical: Not on file     Non-medical: Not on file   Tobacco Use    Smoking status: Never Smoker    Smokeless tobacco: Never Used   Substance and Sexual Activity    Alcohol use: No    Drug use: No    Sexual activity: Not on file   Lifestyle    Physical activity     Days per week: Not on file     Minutes per session: Not on file    Stress: Not on file   Relationships    Social connections     Talks on phone: Not on file     Gets together: Not on file     Attends Taoist service: Not on file     Active member of club or organization: Not on file     Attends meetings of clubs or organizations: Not on file     Relationship status: Not on file    Intimate partner violence     Fear of current or ex partner: Not on file     Emotionally abused: Not on file     Physically abused: Not on file     Forced sexual activity: Not on file   Other Topics Concern    Not on file   Social History Narrative    Not on file       Family History:        Family History   Problem Relation Age of Onset    Heart Disease Mother         CHF    Early Death Mother     High Blood Pressure Brother     Diabetes Brother     Asthma Brother     High Blood Pressure Brother     Diabetes Brother     High Blood Pressure Brother     Diabetes Brother        Outpatient Medications:     Medications Prior to Admission: glimepiride (AMARYL) 1 MG tablet, Take 1 mg by mouth every morning (before breakfast)  atorvastatin (LIPITOR) 40 MG tablet, Take 1 tablet by mouth nightly  traMADol (ULTRAM) 50 MG tablet, take 1 tablet by mouth every 6 hours if needed  calcium carbonate (TUMS) 500 MG chewable tablet, Take 1 tablet by mouth daily as needed   carvedilol (COREG) 6.25 MG tablet, Take 1 tablet by mouth 2 times daily (with meals)  Multiple Vitamins-Minerals (RENAPLEX-D) TABS, Take 1 tablet by mouth every other day Pt takes occasionally  lidocaine-prilocaine (EMLA) 2.5-2.5 % cream,   NIFEdipine (ADALAT CC) 30 MG extended release tablet, Take 30 mg by mouth 2 times daily  furosemide (LASIX) 40 MG tablet, Take 40 mg by mouth 2 times daily   aspirin 325 MG tablet, Take 325 mg by mouth daily    Current Medications:     Scheduled Meds:    sodium chloride flush  10 mL Intravenous 2 times per day    furosemide  40 mg Oral BID    NIFEdipine  30 mg Oral BID    carvedilol  6.25 mg Oral BID WC    aspirin  325 mg Oral Daily    [START ON 2/10/2021] glimepiride  1 mg Oral QAM AC    sodium chloride flush  10 mL Intravenous 2 times per day     Continuous Infusions:    sodium chloride 75 mL/hr at 21 1033    dextrose       PRN Meds:  sodium chloride flush, acetaminophen, sodium chloride flush, acetaminophen, glucose, dextrose, glucagon (rDNA), dextrose    Review of Systems:     Constitutional: No fever, no chills, no lethargy, no weakness. HEENT:  No headache, otalgia, itchy eyes, nasal discharge or sore throat. Cardiac:  No chest pain, positive dyspnea, orthopnea or PND. Chest:   No cough, phlegm or wheezing. Abdomen:  No abdominal pain, nausea or vomiting. Neuro:  No focal weakness, abnormal movements orseizure like activity. Skin:   No rashes, no itching. :   No hematuria, no pyuria, no dysuria, no flank pain. Extremities:  No swelling or joint pains. ROS was otherwise negative except as mentioned in the [address] Input/Output:     No intake/output data recorded. Vital Signs:   Temperature:  Temp: 97.4 °F (36.3 °C)  TMax:   Temp (24hrs), Av.4 °F (36.3 °C), Min:97.4 °F (36.3 °C), Max:97.4 °F (36.3 °C)    Respirations:  Resp: 16  Pulse:   Pulse: 94  BP:    BP: 125/86  BP Range: Systolic (17BOB), SHQ:140 , Min:124 , SLP:197       Diastolic (87WWW), STL:83, Min:80, Max:87      Physical Examination:     General:  AAO x 3, speaking in full sentences, no accessory muscle use. HEENT: Atraumatic, normocephalic, no throat congestion, moist mucosa. Eyes:   Pupils equal, round and reactive to light, EOMI.   Neck: Supple  Chest:   Bilateral vesicular breath sounds, positive some crackles, no wheezes. Cardiac:  S1 S2 RR, no murmurs, gallops or rubs. Abdomen: Soft, non-tender, no masses or organomegaly, BS audible. :   No suprapubic or flank tenderness. Neuro:  AAO x 3, No FND. SKIN:  No rashes, good skin turgor. Extremities:  No edema. Labs:       Recent Labs     02/09/21  1023         BMP:   Recent Labs     02/09/21  1023 02/09/21  1029 02/09/21  1500   NA  --   --  137   K  --   --  4.2   CL  --   --  97*   CO2  --   --  27   BUN 49*  --  54*   CREATININE  --  4.86* 4.68*   GLUCOSE  --   --  315*   CALCIUM  --   --  8.6      BNP:      Lab Results   Component Value Date     09/08/2018     PTH:     Lab Results   Component Value Date    IPTH 144.1 11/29/2017     Urinalysis/Chemistries:      Lab Results   Component Value Date    NITRU NEGATIVE 11/24/2018    COLORU YELLOW 11/24/2018    PHUR 5.0 11/24/2018    WBCUA None 11/24/2018    RBCUA 0 TO 2 11/24/2018    MUCUS 1+ 11/24/2018    TRICHOMONAS NOT REPORTED 11/24/2018    YEAST NOT REPORTED 11/24/2018    BACTERIA NOT REPORTED 11/24/2018    SPECGRAV 1.016 11/24/2018    LEUKOCYTESUR NEGATIVE 11/24/2018    UROBILINOGEN Normal 11/24/2018    BILIRUBINUR NEGATIVE 11/24/2018    GLUCOSEU NEGATIVE 11/24/2018    [address] NEGATIVE 11/24/2018    AMORPHOUS NOT REPORTED 11/24/2018       Radiology:     Reviewed. Assessment:     1. ESRD on Hemodialysis. His regular HD days are TTS at [facility] using left AV fistula under Dr. Erika Youssef. His dry weight is 114 kgs. Admitted with 117.6 kgs. 2.  Positive dyspnea and shortness of breath-improved with dialysis. 3.  Positive stress test negative cardiac catheterization done on 2/9/2021.  4.  History of coronary artery disease and stent placement to distal LAD. 5.  Anemia of chronic disease. 6. Secondary hyperparathyroidism. 7. Hypertension. Plan:   1.  Patient was seen and examined on HD at bedside. Orders were confirmed with the HD nurse. 2. Strict Input and Output, Daily weigh and document in the chart. 3. Low Potassium, Low phosphorus and low salt diet. Fluids to be restricted to 1500ml/day. 4. IV Aranesp/Epogen for anemia of chronic disease with HD weekly. 5. IV Zemplar per protocol for secondary hyperparathyroidism with HD thrice a week. 6.  Follow cardiology plan. 7.  Okay to discharge from nephrology standpoint postdialysis today. Nutrition   Please ensure that patient is on a renal diet/TF. Thank you for the consultation. Please do not hesitate to contact us for any further questions/concerns. We will continue to follow along with you. Edgardo Sanches MD  [facility]     This note is created with the assistance of a speech-recognition program. While intending to generate a document that actually reflects the content of the visit, no guarantees can be provided that every mistake has been identified and corrected by editing.

## 2021-02-09 NOTE — PROGRESS NOTES
Patient arrived to unit and oriented to room. Patient requested to use bathroom to void. After use, patient then requested to be allowed to eat meal before author did eval/vitals. Patient is presently in no distress at this time. Dialysis site examined with positive thrill noted with dry intact dressing. Will follow-up with patient after meal to connect to monitor and obtain vital signs.

## 2021-02-09 NOTE — PLAN OF CARE
Problem: Pain:  Goal: Pain level will decrease  Description: Pain level will decrease  Outcome: Met This Shift     Problem: Pain:  Goal: Control of acute pain  Description: Control of acute pain  Outcome: Met This Shift     Problem: Pain:  Goal: Control of chronic pain  Description: Control of chronic pain  Outcome: Met This Shift     Problem: Anxiety:  Goal: Level of anxiety will decrease  Description: Level of anxiety will decrease  Outcome: Met This Shift

## 2021-02-09 NOTE — PROGRESS NOTES
Received post procedure to [facility] to room 4. Assessment obtained. Restrictions reviewed with patient. Post procedure pathway initiated. Rt site soft , band aid dry and intact. No hematoma noted. Family at side. Patient without complaints. Head of bed flat with Rt leg straight.

## 2021-02-10 ENCOUNTER — APPOINTMENT (OUTPATIENT)
Dept: GENERAL RADIOLOGY | Age: 61
DRG: 286 | End: 2021-02-10
Attending: INTERNAL MEDICINE
Payer: COMMERCIAL

## 2021-02-10 LAB
ANION GAP SERPL CALCULATED.3IONS-SCNC: 12 MMOL/L (ref 9–17)
BUN BLDV-MCNC: 38 MG/DL (ref 8–23)
BUN/CREAT BLD: ABNORMAL (ref 9–20)
CALCIUM SERPL-MCNC: 8.3 MG/DL (ref 8.6–10.4)
CHLORIDE BLD-SCNC: 98 MMOL/L (ref 98–107)
CO2: 28 MMOL/L (ref 20–31)
CREAT SERPL-MCNC: 4.17 MG/DL (ref 0.7–1.2)
GFR AFRICAN AMERICAN: 18 ML/MIN
GFR NON-AFRICAN AMERICAN: 15 ML/MIN
GFR SERPL CREATININE-BSD FRML MDRD: ABNORMAL ML/MIN/{1.73_M2}
GFR SERPL CREATININE-BSD FRML MDRD: ABNORMAL ML/MIN/{1.73_M2}
GLUCOSE BLD-MCNC: 151 MG/DL (ref 75–110)
GLUCOSE BLD-MCNC: 162 MG/DL (ref 70–99)
GLUCOSE BLD-MCNC: 179 MG/DL (ref 75–110)
GLUCOSE BLD-MCNC: 196 MG/DL (ref 75–110)
GLUCOSE BLD-MCNC: 202 MG/DL (ref 75–110)
GLUCOSE BLD-MCNC: 219 MG/DL (ref 75–110)
GLUCOSE BLD-MCNC: 314 MG/DL (ref 75–110)
HCT VFR BLD CALC: 34.2 % (ref 40.7–50.3)
HEMOGLOBIN: 10.4 G/DL (ref 13–17)
LV EF: 38 %
LVEF MODALITY: NORMAL
MAGNESIUM: 1.9 MG/DL (ref 1.6–2.6)
POTASSIUM SERPL-SCNC: 4 MMOL/L (ref 3.7–5.3)
SODIUM BLD-SCNC: 138 MMOL/L (ref 135–144)

## 2021-02-10 PROCEDURE — 83735 ASSAY OF MAGNESIUM: CPT

## 2021-02-10 PROCEDURE — 94761 N-INVAS EAR/PLS OXIMETRY MLT: CPT

## 2021-02-10 PROCEDURE — 6370000000 HC RX 637 (ALT 250 FOR IP): Performed by: STUDENT IN AN ORGANIZED HEALTH CARE EDUCATION/TRAINING PROGRAM

## 2021-02-10 PROCEDURE — 71045 X-RAY EXAM CHEST 1 VIEW: CPT

## 2021-02-10 PROCEDURE — 94660 CPAP INITIATION&MGMT: CPT

## 2021-02-10 PROCEDURE — 85014 HEMATOCRIT: CPT

## 2021-02-10 PROCEDURE — 36415 COLL VENOUS BLD VENIPUNCTURE: CPT

## 2021-02-10 PROCEDURE — 99232 SBSQ HOSP IP/OBS MODERATE 35: CPT | Performed by: INTERNAL MEDICINE

## 2021-02-10 PROCEDURE — 80048 BASIC METABOLIC PNL TOTAL CA: CPT

## 2021-02-10 PROCEDURE — 2700000000 HC OXYGEN THERAPY PER DAY

## 2021-02-10 PROCEDURE — 6360000002 HC RX W HCPCS: Performed by: INTERNAL MEDICINE

## 2021-02-10 PROCEDURE — 1200000000 HC SEMI PRIVATE

## 2021-02-10 PROCEDURE — 6370000000 HC RX 637 (ALT 250 FOR IP): Performed by: INTERNAL MEDICINE

## 2021-02-10 PROCEDURE — 99254 IP/OBS CNSLTJ NEW/EST MOD 60: CPT | Performed by: INTERNAL MEDICINE

## 2021-02-10 PROCEDURE — 85018 HEMOGLOBIN: CPT

## 2021-02-10 PROCEDURE — 2580000003 HC RX 258: Performed by: STUDENT IN AN ORGANIZED HEALTH CARE EDUCATION/TRAINING PROGRAM

## 2021-02-10 PROCEDURE — 93306 TTE W/DOPPLER COMPLETE: CPT

## 2021-02-10 RX ORDER — MAGNESIUM SULFATE IN WATER 40 MG/ML
2000 INJECTION, SOLUTION INTRAVENOUS ONCE
Status: COMPLETED | OUTPATIENT
Start: 2021-02-10 | End: 2021-02-10

## 2021-02-10 RX ADMIN — MAGNESIUM SULFATE 2000 MG: 2 INJECTION INTRAVENOUS at 03:57

## 2021-02-10 RX ADMIN — INSULIN LISPRO 1 UNITS: 100 INJECTION, SOLUTION INTRAVENOUS; SUBCUTANEOUS at 13:50

## 2021-02-10 RX ADMIN — FUROSEMIDE 40 MG: 40 TABLET ORAL at 17:15

## 2021-02-10 RX ADMIN — SODIUM CHLORIDE, PRESERVATIVE FREE 10 ML: 5 INJECTION INTRAVENOUS at 09:03

## 2021-02-10 RX ADMIN — CARVEDILOL 6.25 MG: 6.25 TABLET, FILM COATED ORAL at 17:15

## 2021-02-10 RX ADMIN — INSULIN LISPRO 1 UNITS: 100 INJECTION, SOLUTION INTRAVENOUS; SUBCUTANEOUS at 21:37

## 2021-02-10 RX ADMIN — CARVEDILOL 6.25 MG: 6.25 TABLET, FILM COATED ORAL at 09:04

## 2021-02-10 RX ADMIN — INSULIN LISPRO 2 UNITS: 100 INJECTION, SOLUTION INTRAVENOUS; SUBCUTANEOUS at 08:55

## 2021-02-10 RX ADMIN — NIFEDIPINE 30 MG: 30 TABLET, FILM COATED, EXTENDED RELEASE ORAL at 09:03

## 2021-02-10 RX ADMIN — NIFEDIPINE 30 MG: 30 TABLET, FILM COATED, EXTENDED RELEASE ORAL at 21:36

## 2021-02-10 RX ADMIN — SODIUM CHLORIDE, PRESERVATIVE FREE 10 ML: 5 INJECTION INTRAVENOUS at 21:36

## 2021-02-10 RX ADMIN — INSULIN LISPRO 1 UNITS: 100 INJECTION, SOLUTION INTRAVENOUS; SUBCUTANEOUS at 17:23

## 2021-02-10 RX ADMIN — FUROSEMIDE 40 MG: 40 TABLET ORAL at 09:04

## 2021-02-10 ASSESSMENT — PAIN SCALES - GENERAL: PAINLEVEL_OUTOF10: 0

## 2021-02-10 NOTE — PROGRESS NOTES
Provider on-call for Dr. Dinora Elizabeth; Dr. Jaswinder Hutton made aware that patient is requesting insulin at discharge, stating that his oral diabetic medication is no longer effect in lowering his blood sugars at home. Response received that this will be addressed at discharge.

## 2021-02-10 NOTE — CONSULTS
PULMONARY & CRITICAL CARE MEDICINE CONSULT NOTE     Patient:  Del Galvan  MRN: [id number]  Admit date: 2/9/2021  Primary Care Physician: Yolette Palacio  Consulting Physician: Nay Canela MD    HISTORY     CHIEF COMPLAINT/REASON FOR CONSULT:    HISTORY OF PRESENT ILLNESS:  The patient is a 56 y.o. male with past medical history as mentioned below who presented initially to the hospital with chief complaint of abnormal stress test from outpatient cardiac work-up. He has a history of coronary artery disease and stent placement to distal LAD. He also has a history of end-stage renal disease on hemodialysis. Patient  underwent stress test outpatient and was advised admission on cardiology  to undergo cardiac catheterization which he underwent on 2/9/2021. He was found to have a patent LAD stent. He still has persistent shortness of breath. Patient attests to the fact that he has a history of sleep apnea and had a CPAP machine but has not used it in the last couple of years. He reports that he would likely need recalibration or a repeat sleep study. He does not report any significant history of smoking. He reports compliance with his dialysis follow-up  He also has a history of hypertension and congestive heart failure. Reports no history of alcohol abuse. Pulmonology has been consulted as patient has persistent dyspnea. At the time of my evaluation patient is sitting comfortably in the chair. He is on 2 L nasal cannula however his oxygen saturation is 98%. He is able to ambulate for his ADLs without significant shortness of breath. Chest x-ray this a.m. which is suggestive of pulmonary vascular congestion    His echo performed in 2019 is suggestive of LV dysfunction-mild to moderate-45 to 50% EF with global hypokinesis. With moderate diastolic dysfunction due to hypertensive and myocardial calcification from CKD.     Pulmonology has been consulted for the patient for evaluation of shortness of breath. PAST MEDICAL HISTORY:       Diagnosis Date    Bowel obstruction ([facility] 12/26/2013    CAD (coronary artery disease) 2013    ?     CHF (congestive heart failure) ([facility] 2013    last episode 11/2018    Chronic kidney disease     Diabetes mellitus ([facility] 2012    NIDDM    Dialysis patient ([facility]     [facility]  /  Marie Flower  /  Erika Grater    Groin swelling 07/17/2019    Hemodialysis patient ([facility] 11/28/2018    TUNNELD CATHETER RIGHT DIALYSIS ACCESSTUES THURS AND AT [place]    Hydrocele 07/17/2019    Hyperlipidemia 2013    ON RX    Hypertension 2013    ON RX    MI, old 12/26/2013    Patient in clinical research study 04/01/2019    XIENCE SHORT DAPT    Sleep apnea 2015    pt has machine and does not use it     PAST SURGICAL HISTORY:        Procedure Laterality Date    ABDOMEN SURGERY  2013    BOWEL OBSTRUCTION    AV FISTULA CREATION Left 02/20/2019    AV FISTULA REPAIR  07/17/2019    AV fistula revision, branch ligation / Percutaneous angioplasty of proximal anastomosis and outflow tract of dialysis circuit with drug coated balloon  /  Dr Nuvia Mckenzie  02/09/2021    Dr. Kierra Rose, [facility]  03/31/2019    TIM LAD    CYSTOSCOPY  11/17/2017    DIALYSIS FISTULA CREATION Left 2/20/2019    AV FISTULA CREATION ARM performed by Jocelyn Ferrell MD at [facility] 10/23/2019    LEFT UPPER EXTREMITY AV GRAFT CREATION WITH [id number] ARTEGRAFT, LEFT UPPER EXTREMITY AV FISTULA LIGATION performed by Jocelyn Ferrell MD at [address] Left 2005    [address]  2007    1 WISDOM TOOTH REMOVED    NOSE SURGERY  1968    CAUTERY TO STOP NOSE BEEDS    OTHER SURGICAL HISTORY Left 03/06/2019    fistulagram    IA GENITAL SURG PROC,MALE UNLISTED N/A 2/16/2018      PROSTATE BIOPSY WITH SATURATION performed by Maricarmen Slade MD at [facility] 11/17/2017    CYSTOSCOPY PROSTATE US BIOPSY performed by Maricarmen Slade MD at [address] [place] [address]  02/16/2018    [address] [facility] VASCULAR SURGERY  09/20/2019    LUE FISTULAGRAM  /  DR Maricel Gonzalez  /  Findings: Severe stenosis of proximal cephalic vein. / Will plan for LUE AVG placement.  VASECTOMY  1983     FAMILY HISTORY:       Problem Relation Age of Onset    Heart Disease Mother         CHF    Early Death Mother     High Blood Pressure Brother     Diabetes Brother     Asthma Brother     High Blood Pressure Brother     Diabetes Brother     High Blood Pressure Brother     Diabetes Brother      SOCIAL HISTORY:   TOBACCO:   reports that he has never smoked. He has never used smokeless tobacco.  ETOH:  reports no history of alcohol use. DRUGS: reports no history of drug use. AVOCATION/OCCUPATIONAL EXPOSURE HISTORY:    There is no  history of exposure to asbestos or silica dust.  The patient denies coal, foundry, quarry or [employer] exposure. The patient does not have any new pet dogs, cats, turtles or exotic birds at home. There is no history of TB or TB exposure. The patient denies using [employer]. There is no exposure to sick contacts. Travel history is not significant history of risk factors for pulmonary disease. ALLERGIES:    Allergies   Allergen Reactions    Lisinopril Swelling         HOME MEDICATIONS:  Prior to Admission medications    Medication Sig Start Date End Date Taking?  Authorizing Provider   glimepiride (AMARYL) 1 MG tablet Take 1 mg by mouth every morning (before breakfast)   Yes Historical Provider, MD   atorvastatin (LIPITOR) 40 MG tablet Take 1 tablet by mouth nightly 2/9/21  Yes Clara Velarde MD   traMADol Frannie Border) 50 MG tablet take 1 tablet by mouth every 6 hours if needed 10/21/20  Yes Historical Provider, MD   calcium carbonate (TUMS) 500 MG chewable tablet Take 1 tablet by mouth daily as needed    Yes Historical Provider, MD   carvedilol (COREG) 6.25 MG tablet Take 1 tablet by mouth 2 times daily (with meals) 6/19/19  Yes Jemima Mclaughlin MD   Multiple Vitamins-Minerals (RENAPLEX-D) TABS Take 1 tablet by mouth every other day Pt takes occasionally 5/14/19  Yes Historical Provider, MD   lidocaine-prilocaine (EMLA) 2.5-2.5 % cream  3/18/19  Yes Historical Provider, MD   NIFEdipine (ADALAT CC) 30 MG extended release tablet Take 30 mg by mouth 2 times daily   Yes Historical Provider, MD   furosemide (LASIX) 40 MG tablet Take 40 mg by mouth 2 times daily    Yes Historical Provider, MD   aspirin 325 MG tablet Take 325 mg by mouth daily    Historical Provider, MD     IMMUNIZATIONS:    There is no immunization history on file for this patient.     REVIEW OF SYSTEMS:  General: negative for chills, fatigue or fever  ENT: negative for headaches, nasal congestion, sore throat or visual changes  Allergy and Immunology: negative for postnasal drip or seasonal allergies  Hematological and Lymphatic: negative for bleeding problems, swollen lymph nodes  Respiratory: positive for shortness of breath negative for hemoptysis  Cardiovascular: negative for edema or palpitations  Gastrointestinal: negative for abdominal pain, change in bowel habits or nausea/vomiting  Genito-Urinary: negative for dysuria or urinary frequency/urgency  Musculoskeletal: negative for joint pain or joint swelling  Neurological: negative for numbness/tingling, seizures or weakness  Dermatological: negative for pruritus or rash    PHYSICAL EXAMINATION     VITAL SIGNS:   /69   Pulse 88   Temp 97.5 °F (36.4 °C) (Oral)   Resp 25   Ht 5' 10\" (1.778 m)   Wt 253 lb 4.9 oz (114.9 kg)   SpO2 98%   BMI 36.35 kg/m²     SYSTEMIC EXAMINATION:   General appearance - alert, well appearing, and in no distress  Mental status - alert, oriented to person, place, and time  Eyes - pupils equal and reactive, extraocular eye movements intact  Mouth - mucous membranes moist, pharynx normal without lesions  Neck - supple, no significant adenopathy  Chest -decreased breath sounds on lung bases. Bilateral rales/ wheezes  Heart - normal rate, regular rhythm, normal S1, S2, no murmurs, rubs, clicks or gallops  Abdomen - soft, nontender, nondistended, no masses or organomegaly  Neurological - alert, oriented, normal speech, no focal findings or movement disorder noted}  Extremities - peripheral pulses normal, no pedal edema, no clubbing or cyanosis  Skin - normal coloration and turgor, no rashes, no suspicious skin lesions noted     DATA REVIEW     Medications: Current Inpatient  Scheduled Meds:   sodium chloride flush  10 mL Intravenous 2 times per day    furosemide  40 mg Oral BID    NIFEdipine  30 mg Oral BID    carvedilol  6.25 mg Oral BID WC    aspirin  325 mg Oral Daily    glimepiride  1 mg Oral QAM AC    sodium chloride flush  10 mL Intravenous 2 times per day    insulin lispro  0-6 Units Subcutaneous TID WC    insulin lispro  0-3 Units Subcutaneous Nightly     Continuous Infusions:   dextrose       INPUT/OUTPUT:  In: 300 [P.O.:250]  Out: 530 [Urine:530]    Ventilator Settings:  Vent Information  Skin Assessment: Clean, dry, & intact  SpO2: 98 %  Additional Respiratory  Assessments  Pulse: 88  Resp: 25  SpO2: 98 %  Lab Results   Component Value Date    MODE NOT REPORTED 11/24/2018       LABS:-  ABGs:   Lab Results   Component Value Date    PH 7.45 09/08/2018    PCO2 33 (L) 09/08/2018    PO2 131 (HH) 09/08/2018    HCO3 23 09/08/2018     No results for input(s): PHART, PO2ART, VLS8NYE, OJH0XSR, BEART, J5ATTFQN in the last 72 hours.   No results found for: POCPH, POCPCO2, POCPO2, POCHCO3, VBOV4LUC  CBC:   Recent Labs     02/09/21  1023 02/10/21  0236   HGB  --  10.4*   HCT  --  34.2*     --      BMP:   Recent Labs     02/09/21  1023 02/09/21  1029 02/09/21  1500 02/10/21  0236   NA  --   --  137 138   K  --   --  4.2 4.0   CL  --   --  97* 98   CO2  --   --  27 28 cardiomegaly cannot rule out pericardial effusion cardiomegaly is much worse than it was in 2019. Echocardiogram in 2019 shows ejection fraction of 48% and mildly reduced systolic function mildly dilated left ventricle and moderate diastolic dysfunction at that time right atrium was normal in size and normal RV size and function. He does have history of obstructive sleep apnea diagnosed 3 4 years ago he claimed that he stopped using CPAP for 2-1/2 years as he did not think that he needed the CPAP he did not have a problem using CPAP at that time. Recommend echocardiogram to rule out pericardial effusion, to check LV systolic or diastolic function, pulmonary hypertension and RV function and valvular etiology as a cause of cardiomegaly  Continue with diuretics. Aggressive hemodialysis and fluid removal.  Continue supplemental O2. I have advised the patient that he will need to use the CPAP at home/BiPAP. I have discussed with him compliance about CPAP/BiPAP especially with CHF hypertension end-stage renal disease and coronary artery disease. I have advised him that he should restart home CPAP and to be checked with DME before starting CPAP. He will need outpatient sleep study/titration study as he will need new titration and possible new setting on new machine for CPAP/BiPAP but in the meantime advised him to start using his home CPAP after discharge. If no etiology apparent on echocardiogram then will get CT scan of the chest as he has bilateral crackles present    Please note that this chart was generated using voice recognition Dragon dictation software. Although every effort was made to ensure the accuracy of this automated transcription, some errors in transcription may have occurred.      Neymar Edwards MD  2/10/2021 5:40 PM

## 2021-02-10 NOTE — PROGRESS NOTES
5 beat run of vtach @3588. Dr. Cheryle Juba notified, requested BMP/Mag draw. Continuing to monitor.

## 2021-02-10 NOTE — PLAN OF CARE
Problem: Pain:  Goal: Pain level will decrease  Description: Pain level will decrease  2/9/2021 2244 by Jessica Pfeiffer RN  Outcome: Ongoing  2/9/2021 1028 by Eleni Ashraf RN  Outcome: Met This Shift  2/9/2021 1027 by Eleni Ashraf RN  Outcome: Met This Shift  Goal: Control of acute pain  Description: Control of acute pain  2/9/2021 2244 by Jessica Pfeiffer RN  Outcome: Ongoing  2/9/2021 1028 by Eleni Ashraf RN  Outcome: Met This Shift  2/9/2021 1027 by Eleni Ashraf RN  Outcome: Met This Shift  Goal: Control of chronic pain  Description: Control of chronic pain  2/9/2021 2244 by Jessica Pfeiffer RN  Outcome: Ongoing  2/9/2021 1028 by Eleni Ashraf RN  Outcome: Met This Shift  2/9/2021 1027 by Eleni Ashraf RN  Outcome: Met This Shift     Problem: Anxiety:  Goal: Level of anxiety will decrease  Description: Level of anxiety will decrease  2/9/2021 2244 by Jessica Pfeiffer RN  Outcome: Ongoing  2/9/2021 1028 by Eleni Ashraf RN  Outcome: Met This Shift  2/9/2021 1027 by Eleni Ashraf RN  Outcome: Met This Shift

## 2021-02-10 NOTE — PROGRESS NOTES
Subjective: patient evaluated . Pt received dialysis yesterday . No Access problems reported . No new issues overnite. Stable hemodynamics. Patient had hemodialysis yesterday, had 2.2 L removed. It is to be remembered that he had dialysis on Monday as well. He is still somewhat short of breath. Does not have much in terms of edema in the lower extremities. Does use CPAP machine at home    Objective:  CURRENT TEMPERATURE:  Temp: 98.7 °F (37.1 °C)  MAXIMUM TEMPERATURE OVER 24HRS:  Temp (24hrs), Av.4 °F (36.3 °C), Min:96.8 °F (36 °C), Max:98.7 °F (37.1 °C)    CURRENT RESPIRATORY RATE:  Resp: 21  CURRENT PULSE:  Pulse: 92  CURRENT BLOOD PRESSURE:  BP: 131/69  24HR BLOOD PRESSURE RANGE:  Systolic (70ZES), VBV:124 , Min:122 , YRS:983   ; Diastolic (39QKE), MVF:39, Min:63, Max:91    24HR INTAKE/OUTPUT:      Intake/Output Summary (Last 24 hours) at 2/10/2021 0934  Last data filed at 2/10/2021 2591  Gross per 24 hour   Intake 210 ml   Output 2890 ml   Net -2680 ml     Patient Vitals for the past 96 hrs (Last 3 readings):   Weight   21 1758 253 lb 4.9 oz (114.9 kg)   21 1445 259 lb 4.2 oz (117.6 kg)   21 1019 266 lb (120.7 kg)         Physical Exam:  General appearance:Awake, alert, in no acute distress  Skin: warm and dry, no rash or erythema  Eyes: conjunctivae normal and sclera anicteric  ENT:no thrush no pharyngeal congestion   Neck:  No JVD, No Thyromegaly  Pulmonary: Has some scattered crackles at the bases left greater than right  Cardiovascular: normal S1 and S2. NO rubs and NO murmur.  No S3 OR S4   Abdomen: soft nontender, bowel sounds present, no organomegaly,  no ascites   Extremities: no significant lower extremity edema     Access:  previous  Left AV fistula    Current Medications:        sodium chloride flush 0.9 % injection 10 mL, 2 times per day      sodium chloride flush 0.9 % injection 10 mL, PRN      acetaminophen (TYLENOL) tablet 650 mg, Q4H PRN      furosemide (LASIX) tablet 40 mg, BID      NIFEdipine (PROCARDIA XL) extended release tablet 30 mg, BID      carvedilol (COREG) tablet 6.25 mg, BID WC      aspirin tablet 325 mg, Daily      glimepiride (AMARYL) tablet 1 mg, QAM AC      sodium chloride flush 0.9 % injection 10 mL, 2 times per day      sodium chloride flush 0.9 % injection 10 mL, PRN      acetaminophen (TYLENOL) tablet 650 mg, Q4H PRN      glucose (GLUTOSE) 40 % oral gel 15 g, PRN      dextrose 50 % IV solution, PRN      glucagon (rDNA) injection 1 mg, PRN      dextrose 5 % solution, PRN      insulin lispro (HUMALOG) injection vial 0-6 Units, TID WC      insulin lispro (HUMALOG) injection vial 0-3 Units, Nightly      Labs:   CBC:   Recent Labs     02/09/21  1023 02/10/21  0236   HGB  --  10.4*   HCT  --  34.2*     --       BMP:   Recent Labs     02/09/21  1023 02/09/21  1029 02/09/21  1500 02/10/21  0236   NA  --   --  137 138   K  --   --  4.2 4.0   CL  --   --  97* 98   CO2  --   --  27 28   BUN 49*  --  54* 38*   CREATININE  --  4.86* 4.68* 4.17*   GLUCOSE  --   --  315* 162*   CALCIUM  --   --  8.6 8.3*          Magnesium:   Recent Labs     02/10/21  0236   MG 1.9         Assessment:  1 ESRD:dialysis Aedhmrs-Qcbvefna-Dguwazei. He is received dialysis Monday and Tuesday this week. Dry weight has been taken down. Still appears mildly dyspneic  2. HTN:  3 DM:  4 EDEMA : Improved  5. BATSHEVA:    Plan:  1. We will check a chest x-ray today. Consider doing a high-resolution CT scan of the chest.  Pulmonary consult is pending. 2.  Hemodialysis tomorrow. May need to challenge dry weight further down as tolerated. It is noted that yesterday when he went for his dialysis he did cramp. That does limit some degree of ultrafiltration  3. Follow up labs ordered. 4. Following       Please do not hesitate to call with questions.     Electronically signed by Nia Esparza MD on 2/10/2021 at 9:34 AM

## 2021-02-10 NOTE — PROGRESS NOTES
[facility]  Progress Note                   Date:   2/10/2021  Patient name: Matthew Gomez  Date of admission:  2/9/2021  3:16 PM  MRN:   [id number]  YOB: 1960  PCP: Mary Munoz    Reason for Admission: Abnormal stress ECG [R94.39]  CHF (congestive heart failure), NYHA class I, acute on chronic, combined ([facility] [I50.43]    Subjective:       Clinical Changes /Abnormalities:  Patient seen and examined in room sitting up in bed after discussion with Dr. Orta Him. Denies chest pain. Report ongoing SOB with NC oxygen on. SR on tele with PVCs. Review of Systems    Medications:   Scheduled Meds:   sodium chloride flush  10 mL Intravenous 2 times per day    furosemide  40 mg Oral BID    NIFEdipine  30 mg Oral BID    carvedilol  6.25 mg Oral BID WC    aspirin  325 mg Oral Daily    glimepiride  1 mg Oral QAM AC    sodium chloride flush  10 mL Intravenous 2 times per day    insulin lispro  0-6 Units Subcutaneous TID WC    insulin lispro  0-3 Units Subcutaneous Nightly     Continuous Infusions:   dextrose       CBC:   Recent Labs     02/09/21  1023 02/10/21  0236   HGB  --  10.4*     --      BMP:    Recent Labs     02/09/21  1023 02/09/21  1029 02/09/21  1500 02/10/21  0236   NA  --   --  137 138   K  --   --  4.2 4.0   CL  --   --  97* 98   CO2  --   --  27 28   BUN 49*  --  54* 38*   CREATININE  --  4.86* 4.68* 4.17*   GLUCOSE  --   --  315* 162*     Hepatic:No results for input(s): AST, ALT, ALB, BILITOT, ALKPHOS in the last 72 hours. Troponin: No results for input(s): TROPHS in the last 72 hours. BNP: No results for input(s): BNP in the last 72 hours. Lipids: No results for input(s): CHOL, HDL in the last 72 hours. Invalid input(s): LDLCALCU  INR: No results for input(s): INR in the last 72 hours. DIAGNOSTIC DATA  CATH 2/9/2021  Findings:      Cardiac Arteries and Lesion Findings       LMCA: Normal 0% stenosis.      LAD: Patent distal stent with 30-40% stenosis     LCx: Normal 0% stenosis. RCA: Normal 0% stenosis. Small, non dominant      Coronary Tree        Dominance: Right      Estimated Blood Loss: 5 mL      Conclusions        Procedure Summary        Patent LAD stent        Recommendations        Medical treatments    Risk factor modification. STRESS  01/27/2021   1. NO ischemia (Reversible defect). 2. Moderate inferior transmural infarction (Fixed defect). 3. Overall reduced LV function with EF 40%. 4. Inferior wall hypokinesia. ECHO 6/18/2019  Mildly dilated LV cavity with moderate concentric LVH  Mild-moderate reduction in LV systolic function, estimated EF 45-50% with  global hypokinesis  Moderate diastolic dysfunction due to hypertensive cardiomyopathy and  myocardial calcification from CKD  No significant valvular thickening or stenosis seen. Mild mitral regurgitation  No pericardial effusion or thickening  Objective:   Vitals: /69   Pulse 92   Temp 98.7 °F (37.1 °C) (Oral)   Resp 21   Ht 5' 10\" (1.778 m)   Wt 253 lb 4.9 oz (114.9 kg)   SpO2 97%   BMI 36.35 kg/m²   General appearance: alert and cooperative with exam  HEENT: Head: Normocephalic, no lesions, without obvious abnormality. Neck:no JVD, trachea midline, no adenopathy  Lungs: crackles in bases. Heart: Regular rate and rhythm, s1/s2 auscultated, no murmurs. SR on tele. Abdomen: soft, non-tender, bowel sounds active  Extremities: no edema  Neurologic: not done        Assessment / Acute Cardiac Problems:   1. CAD  2. ESRD on HD  3. HTN  4. Chronic HFrEF 45-50% on ECHO 2019  5.  OSZ      Patient Active Problem List:     CKD (chronic kidney disease) stage 4, GFR 15-29 ml/min (HCC)     Anemia of chronic renal failure     Type 2 diabetes mellitus with chronic kidney disease on chronic dialysis (HCC)     Chest pain at rest     Acute congestive heart failure (HCC)     Pneumonia     Rising PSA level     Prostatism     Acute on chronic diastolic congestive heart failure ([facility]     Anemia     Essential hypertension     Respiratory distress     AVF (arteriovenous fistula) ([facility])     S/P PTCA - TIM distal LAD - Dr. Doug Mcdonough 4/1/19     NSTEMI (non-ST elevated myocardial infarction) ([facility]     S/P arteriovenous (AV) graft placement     CHF (congestive heart failure), NYHA class I, acute on chronic, combined ([facility]     ESRD (end stage renal disease) ([facility]     Coronary artery disease involving native coronary artery of native heart without angina pectoris     BATSHEVA (obstructive sleep apnea)      Plan of Treatment:   1. CAD  CATH 2/9/2021 reviewed  Patent LAD stent. Continue ASA, BB  2. HTN continue coreg and nifedipine  3. Chronic CHF. LVEF 45-50% on ECHO 2019 Appreciated fluid recs per nephrology. 4. SOB  Pulmonary consulted. Appreciate recommendations. 5. CKD  ESRD on HD Managed by nephrology  6. Keep K > 4.0 and Mag > 2.0  Stable.      Electronically signed by ROB Gallegos NP on 2/10/2021 at 10:13 AM  [address]  [phone]

## 2021-02-10 NOTE — CARE COORDINATION
Case Management Initial Discharge Plan  Gala Roy,             Met with:patient to discuss discharge plans. Information verified: address, contacts, phone number, , insurance Yes, living at [address] in [facility] [place]    Emergency Contact/Next of Sonia Tao name & number: brother Declan Erickson [phone]    PCP: [facility] Provider: KARLA/medicare    Discharge Planning    Living Arrangements:  Alone   Support Systems:  Spouse/Significant Other, Family Members, Children    Home-living at [address] in [facility]    Patient able to perform ADL's:Independent    Current Services (outpatient & in home) none  DME equipment: CPAP-doesn't use  DME provider:     Receiving oral anticoagulation therapy? No    If indicated:   Physician managing anticoagulation treatment:   Where does patient obtain lab work for ATC treatment? Potential Assistance Needed:  N/A    Patient agreeable to home care: No  [place] of choice provided:  n/a    Prior SNF/Rehab Placement and Facility: N/A  Agreeable to SNF/Rehab: No  [place] of choice provided: n/a     Evaluation: n/a    Expected Discharge date:  02/10/21    Patient expects to be discharged to:  [facility]  Follow Up Appointment: Best Day/ Time:      Transportation provider: girlfriend  Transportation arrangements needed for discharge: No    Readmission Risk              Risk of Unplanned Readmission:        11             Does patient have a readmission risk score greater than 14?: No  If yes, follow-up appointment must be made within 7 days of discharge.      Goals of Care: no chest pain      Discharge Plan: to [facility] in [place]  Dialysis [facility] at [address] on [address]    Electronically signed by Uday Espinosa RN on 2/10/21 at 9:29 AM EST

## 2021-02-11 ENCOUNTER — APPOINTMENT (OUTPATIENT)
Dept: DIALYSIS | Age: 61
DRG: 286 | End: 2021-02-11
Attending: INTERNAL MEDICINE
Payer: COMMERCIAL

## 2021-02-11 VITALS
RESPIRATION RATE: 20 BRPM | WEIGHT: 253.09 LBS | OXYGEN SATURATION: 98 % | BODY MASS INDEX: 36.23 KG/M2 | HEART RATE: 80 BPM | DIASTOLIC BLOOD PRESSURE: 65 MMHG | SYSTOLIC BLOOD PRESSURE: 108 MMHG | HEIGHT: 70 IN | TEMPERATURE: 98.4 F

## 2021-02-11 LAB
ANION GAP SERPL CALCULATED.3IONS-SCNC: 15 MMOL/L (ref 9–17)
BUN BLDV-MCNC: 63 MG/DL (ref 8–23)
BUN/CREAT BLD: ABNORMAL (ref 9–20)
CALCIUM SERPL-MCNC: 8.5 MG/DL (ref 8.6–10.4)
CHLORIDE BLD-SCNC: 94 MMOL/L (ref 98–107)
CO2: 24 MMOL/L (ref 20–31)
CREAT SERPL-MCNC: 4.92 MG/DL (ref 0.7–1.2)
GFR AFRICAN AMERICAN: 15 ML/MIN
GFR NON-AFRICAN AMERICAN: 12 ML/MIN
GFR SERPL CREATININE-BSD FRML MDRD: ABNORMAL ML/MIN/{1.73_M2}
GFR SERPL CREATININE-BSD FRML MDRD: ABNORMAL ML/MIN/{1.73_M2}
GLUCOSE BLD-MCNC: 172 MG/DL (ref 75–110)
GLUCOSE BLD-MCNC: 259 MG/DL (ref 75–110)
GLUCOSE BLD-MCNC: 329 MG/DL (ref 70–99)
POTASSIUM SERPL-SCNC: 4.1 MMOL/L (ref 3.7–5.3)
SODIUM BLD-SCNC: 133 MMOL/L (ref 135–144)

## 2021-02-11 PROCEDURE — 6370000000 HC RX 637 (ALT 250 FOR IP): Performed by: INTERNAL MEDICINE

## 2021-02-11 PROCEDURE — 6370000000 HC RX 637 (ALT 250 FOR IP): Performed by: STUDENT IN AN ORGANIZED HEALTH CARE EDUCATION/TRAINING PROGRAM

## 2021-02-11 PROCEDURE — 90935 HEMODIALYSIS ONE EVALUATION: CPT

## 2021-02-11 PROCEDURE — 90935 HEMODIALYSIS ONE EVALUATION: CPT | Performed by: INTERNAL MEDICINE

## 2021-02-11 PROCEDURE — 82947 ASSAY GLUCOSE BLOOD QUANT: CPT

## 2021-02-11 PROCEDURE — 80048 BASIC METABOLIC PNL TOTAL CA: CPT

## 2021-02-11 PROCEDURE — 94660 CPAP INITIATION&MGMT: CPT

## 2021-02-11 PROCEDURE — 2580000003 HC RX 258: Performed by: STUDENT IN AN ORGANIZED HEALTH CARE EDUCATION/TRAINING PROGRAM

## 2021-02-11 PROCEDURE — 99233 SBSQ HOSP IP/OBS HIGH 50: CPT | Performed by: INTERNAL MEDICINE

## 2021-02-11 RX ADMIN — INSULIN LISPRO 3 UNITS: 100 INJECTION, SOLUTION INTRAVENOUS; SUBCUTANEOUS at 07:51

## 2021-02-11 RX ADMIN — SODIUM CHLORIDE, PRESERVATIVE FREE 10 ML: 5 INJECTION INTRAVENOUS at 07:52

## 2021-02-11 RX ADMIN — ASPIRIN 325 MG: 325 TABLET ORAL at 08:39

## 2021-02-11 RX ADMIN — NIFEDIPINE 30 MG: 30 TABLET, FILM COATED, EXTENDED RELEASE ORAL at 14:20

## 2021-02-11 RX ADMIN — FUROSEMIDE 40 MG: 40 TABLET ORAL at 07:51

## 2021-02-11 RX ADMIN — CARVEDILOL 6.25 MG: 6.25 TABLET, FILM COATED ORAL at 07:51

## 2021-02-11 RX ADMIN — GLIMEPIRIDE 1 MG: 2 TABLET ORAL at 07:51

## 2021-02-11 RX ADMIN — INSULIN LISPRO 1 UNITS: 100 INJECTION, SOLUTION INTRAVENOUS; SUBCUTANEOUS at 14:21

## 2021-02-11 RX ADMIN — CARVEDILOL 6.25 MG: 6.25 TABLET, FILM COATED ORAL at 15:54

## 2021-02-11 NOTE — PROGRESS NOTES
[x]  NON INVASIVE VENTILATION  [x]  PROVIDE OPTIMAL VENTILATION/ACCEPTABLE SP02  []  IMPLEMENT NON INVASIVE VENTILATION PROTOCOL  [x]  ASSESSMENT SKIN INTEGRITY  [x]  PATIENT EDUCATION AS NEEDED  [x]  BIPAP AS NEEDED

## 2021-02-11 NOTE — PROGRESS NOTES
PULMONARY & CRITICAL CARE MEDICINE PROGRESS  NOTE     Patient:  Edwar Ortiz  MRN: [id number]  Admit date: 2/9/2021    SUBJECTIVE     I personally interviewed/examined the patient; reviewed interval history, interpreted all available radiographic, laboratory data at the time of service. Chief Compliant/Reason for Initial Consult: Shortness of breath. Interval history 2/11/2021:  Patient seen. Chart reviewed. Overnight. Uses BiPAP. He has benefited out of it. This morning he is going for the dialysis. At the time of transport to dialysis he has not been on any supplemental oxygen. And did not report any shortness of breath. However during early part of the morning he was on 1 to 1.5 L nasal cannula. He reports that he has not ambulated much so he cannot comment on dyspnea on exertion. Is going for his dialysis the same. HISTORY OF PRESENT ILLNESS:  The patient is a 56 y.o. male with past medical history as mentioned below who presented initially to the hospital with chief complaint of abnormal stress test from outpatient cardiac work-up. He has a history of coronary artery disease and stent placement to distal LAD. He also has a history of end-stage renal disease on hemodialysis. Patient  underwent stress test outpatient and was advised admission on cardiology  to undergo cardiac catheterization which he underwent on 2/9/2021. He was found to have a patent LAD stent. He still has persistent shortness of breath. Patient attests to the fact that he has a history of sleep apnea and had a CPAP machine but has not used it in the last couple of years. He reports that he would likely need recalibration or a repeat sleep study. He does not report any significant history of smoking. He reports compliance with his dialysis follow-up  He also has a history of hypertension and congestive heart failure.   Reports no history of alcohol abuse.  Pulmonology has been consulted as patient has persistent dyspnea. At the time of my evaluation patient is sitting comfortably in the chair. He is on 2 L nasal cannula however his oxygen saturation is 98%. He is able to ambulate for his ADLs without significant shortness of breath. Chest x-ray this a.m. which is suggestive of pulmonary vascular congestion   His echo performed in 2019 is suggestive of LV dysfunction-mild to moderate-45 to 50% EF with global hypokinesis. With moderate diastolic dysfunction due to hypertensive and myocardial calcification from CKD.     Pulmonology has been consulted for the patient for evaluation of shortness of breath.       REVIEW OF SYSTEMS:  General: negative for chills, fatigue or fever  ENT: negative for headaches, nasal congestion, sore throat or visual changes  Allergy and Immunology: negative for postnasal drip or seasonal allergies  Hematological and Lymphatic: negative for bleeding problems, swollen lymph nodes  Respiratory: positive for shortness of breath negative for hemoptysis  Cardiovascular: negative for edema or palpitations  Gastrointestinal: negative for abdominal pain, change in bowel habits or nausea/vomiting  Genito-Urinary: negative for dysuria or urinary frequency/urgency  Musculoskeletal: negative for joint pain or joint swelling  Neurological: negative for numbness/tingling, seizures or weakness  Dermatological: negative for pruritus or rash    OBJECTIVE     Respiratory Support:  Vent Information  Skin Assessment: Clean, dry, & intact  FiO2 : 25 %  SpO2: 98 %  Mask Type: Full face mask  Mask Size: Large  Lab Results   Component Value Date    MODE NOT REPORTED 11/24/2018     O2 Flow Rate (L/min): 2 L/min  SpO2: 98 %    Vitals:   /66   Pulse 79   Temp 98 °F (36.7 °C)   Resp 22   Ht 5' 10\" (1.778 m)   Wt 259 lb 11.2 oz (117.8 kg)   SpO2 98%   BMI 37.26 kg/m²     SYSTEMIC EXAMINATION:   General appearance - alert, well appearing, and in no distress  Mental status - alert, oriented to person, place, and time  Eyes - pupils equal and reactive, extraocular eye movements intact  Mouth - mucous membranes moist, pharynx normal without lesions  Neck - supple, no significant adenopathy  Chest -decreased breath sounds on lung bases. Bilateral rales/ wheezes  Heart - normal rate, regular rhythm, normal S1, S2, no murmurs, rubs, clicks or gallops  Abdomen - soft, nontender, nondistended, no masses or organomegaly  Neurological - alert, oriented, normal speech, no focal findings or movement disorder noted}  Extremities - peripheral pulses normal, no pedal edema, no clubbing or cyanosis  Skin - normal coloration and turgor, no rashes, no suspicious skin lesions noted     DATA REVIEW     Medications:  Scheduled Meds:   sodium chloride flush  10 mL Intravenous 2 times per day    furosemide  40 mg Oral BID    NIFEdipine  30 mg Oral BID    carvedilol  6.25 mg Oral BID WC    aspirin  325 mg Oral Daily    glimepiride  1 mg Oral QAM AC    sodium chloride flush  10 mL Intravenous 2 times per day    insulin lispro  0-6 Units Subcutaneous TID WC    insulin lispro  0-3 Units Subcutaneous Nightly     Continuous Infusions:   dextrose       LABS:-  ABGs:   Lab Results   Component Value Date    PH 7.45 09/08/2018    PCO2 33 (L) 09/08/2018    PO2 131 (HH) 09/08/2018    HCO3 23 09/08/2018     CBC:   Recent Labs     02/09/21  1023 02/10/21  0236   HGB  --  10.4*   HCT  --  34.2*     --      BMP:   Recent Labs     02/09/21  1500 02/10/21  0236 02/11/21  1004    138 133*   K 4.2 4.0 4.1   CL 97* 98 94*   CO2 27 28 24   BUN 54* 38* 63*   CREATININE 4.68* 4.17* 4.92*   GLUCOSE 315* 162* 329*     BNP:   Lab Results   Component Value Date     (H) 09/08/2018     D-Dimer:  Lab Results   Component Value Date    DDIMER 1.10 03/14/2019     Others:   Lab Results   Component Value Date    TSH 1.50 11/24/2018     Lab Results   Component Value Date    .2 (H) 11/28/2018     No results found for: [place]  Lab Results   Component Value Date    IRON 13 (L) 11/25/2018    TIBC 156 (L) 11/25/2018    FERRITIN 363 11/25/2018     No results found for: SPEP, UPEP  No results found for: PSA, CEA, , [id number],     Input/Output:    Intake/Output Summary (Last 24 hours) at 2/11/2021 1113  Last data filed at 2/11/2021 0511  Gross per 24 hour   Intake 1050 ml   Output 230 ml   Net 820 ml       Microbiology: Reviewed as available     Pathology: Reviewed as available     Radiology: Reviewed as available    Echocardiogram: 2/10/2021  Summary   Mildly dilated left ventricle with moderately reduced systolic function with   EF 35-40%. Evidence of diastolic dysfunction. Left atrium is moderately dilated. Right atrial enlargement. Mildly dilated right ventricle with reduced function. Aortic sclerosis without stenosis. Mild mitral regurgitation. Moderate tricuspid regurgitation. Estimated right ventricular systolic   pressure is 38 mmHg. Mild pulmonic regurgitation. ASSESSMENT AND PLAN     Assessment:  //Acute on chronic combined and diastolic systolic heart failure. Improved with dialysis. //Obstructive sleep apnea. Poor compliance to CPAP   //Pulmonary hypertension.  //Uncontrolled diabetes mellitus  //ESRD on hemodialysis.  //Positive stress test.  Negative cardiac cath 2/1/2021  //History of CAD status post stent-distal LAD. S/P PTCA - TIM distal LAD - Dr. Romina Fuentes 4/1/19    Plan:  Patient's echocardiogram is not suggestive of pleural effusion however his systolic function is reduced. He does have pulmonary hypertension, left atrial dilatation and cardiomegaly. He is currently on diuretic therapy with Lasix 40 twice daily. He is making urine. Continue with diuretics. Aggressive hemodialysis and fluid removal.  Continue supplemental O2. Overnight he did use his BiPAP for 7 to 8 hours. His current BiPAP settings are at 12/8 with FiO2 of 25%.   He was on room air at the time of going for dialysis. However he did use 2 L nasal cannula. Compliance to CPAP/BiPAP especially in the setting of congestive heart failure hypertension ESRD and CAD has been discussed extensively with the patient. He voices understanding. He has been advised to restart CPAP and follow-up with pulmonology in case of any change of calibration/setting change. Advised the patient that he will need to use the CPAP at home/BiPAP. Advised him that he should restart home CPAP and to be checked with DME before starting CPAP. He will need outpatient sleep study/titration study as he will need new titration and possible new setting on new machine for CPAP/BiPAP but in the meantime advised him to start using his home CPAP after discharge. Continue incentive spirometry, pulmonary toilet, aspiration precautions and bronchodilators  Continue to monitor I/O with a goal of even/negative fluid balance  DVT and stress ulcer prophylaxis  Physical/occupational/speech therapy; increase activity as permitted and tolerated. It was my pleasure to evaluate Ty Bauer today. We will continue to follow. I would like to thank you for allowing me to participate in the care of this patient. Please feel free to call with any further questions or concerns. Devin Allen M.D. Internal Medicine Resident , PGY-3  [facility]  02/11/21 11:13 AM      Please note that this chart was generated using voice recognition Dragon dictation software. Although every effort was made to ensure the accuracy of this automated transcription, some errors in transcription may have occurred. Attending Physician Statement  I have discussed the care of Ty Bauer, including pertinent history and exam findings with the resident. I have reviewed the key elements of all parts of the encounter with the resident. I have seen and examined the patient with the resident.   I agree with the assessment and plan and status of the problem list as documented. Patient seen today, chart reviewed, labs seen overnight events noted. He remained hemodynamically stable overnight this morning he was off oxygen and maintaining saturation but off and on he is on 2 L nasal cannula. He denies any change in his dyspnea on activities he is not complaining of shortness of breath at rest is orthopnea which is positive no pedal edema is present bilateral basal crackles is present. Echocardiogram showed ejection fraction of 91% he has diastolic dysfunction and moderate tricuspid regurgitation on echocardiogram.  Estimated RV systolic pressure of 38. Combination left heart failure obstructive sleep apnea and end-stage renal disease causing pulmonary hypertension. He did use BiPAP last night for about 7 hours and did not complain of problem with the BiPAP 12/6. He is on Lasix 40 mg twice daily and he is getting hemodialysis today we need aggressive fluid removal  Need optimization of fluid status by hemodialysis and adjustment of diuretics as he still urinate. Need optimization of cardiac treatment per cardiology. Discussed with patient that he will need home CPAP to continue until will be scheduled for sleep study and likely will need titration study on follow-up in office. Need home O2 evaluation on discharge. Will need follow-up in CHF clinic. Will need evaluation by cardiology for AICD    Discussed with nursing staff, treatment and plan discussed. Please note that this chart was generated using voice recognition Dragon dictation software. Although every effort was made to ensure the accuracy of this automated transcription, some errors in transcription may have occurred.      Douglas Hernandez MD  2/11/2021 5:53 PM

## 2021-02-11 NOTE — PLAN OF CARE
Problem: Pain:  Goal: Pain level will decrease  Description: Pain level will decrease  2/11/2021 0739 by Shauna Brasher RN  Outcome: Ongoing  2/10/2021 1947 by Beba Rice RN  Outcome: Ongoing  Goal: Control of acute pain  Description: Control of acute pain  2/11/2021 0739 by Shauna Brasher RN  Outcome: Ongoing  2/10/2021 1947 by Beba Rice RN  Outcome: Ongoing  Goal: Control of chronic pain  Description: Control of chronic pain  2/11/2021 0739 by Shauna Brasher RN  Outcome: Ongoing  2/10/2021 1947 by Beba Rice RN  Outcome: Ongoing     Problem: Anxiety:  Goal: Level of anxiety will decrease  Description: Level of anxiety will decrease  2/11/2021 0739 by Shauna Brasher RN  Outcome: Ongoing  2/10/2021 1947 by Beba Rice RN  Outcome: Ongoing     Problem: Falls - Risk of:  Goal: Will remain free from falls  Description: Will remain free from falls  Outcome: Ongoing  Goal: Absence of physical injury  Description: Absence of physical injury  Outcome: Ongoing

## 2021-02-11 NOTE — PLAN OF CARE
Problem: Pain:  Goal: Pain level will decrease  Description: Pain level will decrease  2/11/2021 1529 by Hakan Sharp RN  Outcome: Ongoing  2/11/2021 0739 by Jennifer Ford RN  Outcome: Ongoing  Goal: Control of acute pain  Description: Control of acute pain  2/11/2021 1529 by Hakan Sharp RN  Outcome: Ongoing  2/11/2021 0739 by Jennifer Ford RN  Outcome: Ongoing  Goal: Control of chronic pain  Description: Control of chronic pain  2/11/2021 1529 by Hakan Sharp RN  Outcome: Ongoing  2/11/2021 0739 by Jennifer Frod RN  Outcome: Ongoing     Problem: Anxiety:  Goal: Level of anxiety will decrease  Description: Level of anxiety will decrease  2/11/2021 1529 by Hakan Sharp RN  Outcome: Ongoing  2/11/2021 0739 by Jennifer Ford RN  Outcome: Ongoing     Problem: Falls - Risk of:  Goal: Will remain free from falls  Description: Will remain free from falls  2/11/2021 1529 by Hakan Sharp RN  Outcome: Ongoing  2/11/2021 0739 by Jennifer Ford RN  Outcome: Ongoing  Goal: Absence of physical injury  Description: Absence of physical injury  2/11/2021 1529 by Hakan Sharp RN  Outcome: Ongoing  2/11/2021 0739 by Jennifer Ford RN  Outcome: Ongoing

## 2021-02-11 NOTE — PROGRESS NOTES
[facility]  Progress Note                   Date:   2/11/2021  Patient name: Urbano Garcia  Date of admission:  2/9/2021  3:16 PM  MRN:   [id number]  YOB: 1960  PCP: Gamal Zacarias    Reason for Admission: Abnormal stress ECG [R94.39]  CHF (congestive heart failure), NYHA class I, acute on chronic, combined ([facility] [address] [I50.43]    Subjective:       Clinical Changes /Abnormalities:  Patient seen and examined in dialysis after discussions with Dr. Amanda Lee and RN. S/p Cath 2/9/2021 with patent LAD stent and recs for medical treatment. Denies chest pain or SOB today during HD treatment. Of Monitor but SR on tele this am.     24HR INTAKE/OUTPUT:       Intake/Output Summary (Last 24 hours) at 2/11/2021 0913  Last data filed at 2/11/2021 0511      Gross per 24 hour   Intake 1050 ml   Output 230 ml   Net 820 ml       Review of Systems    Medications:   Scheduled Meds:   sodium chloride flush  10 mL Intravenous 2 times per day    furosemide  40 mg Oral BID    NIFEdipine  30 mg Oral BID    carvedilol  6.25 mg Oral BID WC    aspirin  325 mg Oral Daily    glimepiride  1 mg Oral QAM AC    sodium chloride flush  10 mL Intravenous 2 times per day    insulin lispro  0-6 Units Subcutaneous TID WC    insulin lispro  0-3 Units Subcutaneous Nightly     Continuous Infusions:   dextrose       CBC:   Recent Labs     02/09/21  1023 02/10/21  0236   HGB  --  10.4*     --      BMP:    Recent Labs     02/09/21  1500 02/10/21  0236 02/11/21  1004    138 133*   K 4.2 4.0 4.1   CL 97* 98 94*   CO2 27 28 24   BUN 54* 38* 63*   CREATININE 4.68* 4.17* 4.92*   GLUCOSE 315* 162* 329*     Hepatic:No results for input(s): AST, ALT, ALB, BILITOT, ALKPHOS in the last 72 hours. Troponin: No results for input(s): TROPHS in the last 72 hours. BNP: No results for input(s): BNP in the last 72 hours. Lipids: No results for input(s): CHOL, HDL in the last 72 hours.     Invalid input(s): LDLCALCU  INR: No results for input(s): INR in the last 72 hours. DIAGNOSTIC DATA  CATH 2/9/2021  Findings:      Cardiac Arteries and Lesion Findings       LMCA: Normal 0% stenosis. LAD: Patent distal stent with 30-40% stenosis     LCx: Normal 0% stenosis. RCA: Normal 0% stenosis. Small, non dominant      Coronary Tree        Dominance: Right      Estimated Blood Loss: 5 mL      Conclusions        Procedure Summary        Patent LAD stent        Recommendations        Medical treatments    Risk factor modification. STRESS  01/27/2021  1. NO ischemia (Reversible defect). 2. Moderate inferior transmural infarction (Fixed defect). 3. Overall reduced LV function with EF 40%. 4. Inferior wall hypokinesia. ECHO 6/18/2019  Mildly dilated LV cavity with moderate concentric LVH  Mild-moderate reduction in LV systolic function, estimated EF 45-50% with  global hypokinesis  Moderate diastolic dysfunction due to hypertensive cardiomyopathy and  myocardial calcification from CKD  No significant valvular thickening or stenosis seen. Mild mitral regurgitation  No pericardial effusion or thickening  Objective:   Vitals: BP (!) 127/56   Pulse 67   Temp 98 °F (36.7 °C)   Resp 22   Ht 5' 10\" (1.778 m)   Wt 259 lb 11.2 oz (117.8 kg)   SpO2 98%   BMI 37.26 kg/m²   General appearance: alert and cooperative with exam  HEENT: Head: Normocephalic, no lesions, without obvious abnormality. Neck:no JVD, trachea midline, no adenopathy  Lungs: crackles in bases. Heart: Regular rate and rhythm, s1/s2 auscultated, no murmurs. SR on tele. Abdomen: soft, non-tender, bowel sounds active  Extremities: no edema  Neurologic: not done        Assessment / Acute Cardiac Problems:   1. CAD  2. ESRD on HD  3. HTN  4. Chronic HFrEF 45-50% on ECHO 2019  5.  OSZ      Patient Active Problem List:     CKD (chronic kidney disease) stage 4, GFR 15-29 ml/min (HCC)     Anemia of chronic renal failure     Type 2 diabetes mellitus with chronic kidney disease on chronic dialysis (HCC)     Chest pain at rest     Acute congestive heart failure ([facility])     Pneumonia     Rising PSA level     Prostatism     Acute on chronic diastolic congestive heart failure ([facility])     Anemia     Essential hypertension     Respiratory distress     AVF (arteriovenous fistula) ([facility])     S/P PTCA - TIM distal LAD - Dr. Lennox Gouge 4/1/19     NSTEMI (non-ST elevated myocardial infarction) ([facility] [address]     S/P arteriovenous (AV) graft placement     CHF (congestive heart failure), NYHA class I, acute on chronic, combined ([facility] [address]     ESRD (end stage renal disease) ([facility] [address]     Coronary artery disease involving native coronary artery of native heart without angina pectoris     BATSHEVA (obstructive sleep apnea)      Plan of Treatment:   1. CAD  CATH 2/9/2021 reviewed  Patent LAD stent. Continue ASA, statin, and BB. Not on ACE d/t allergy. 2. HTN continue coreg and nifedipine  3. Acute on Chronic combined systolic and diastolic CHF. LVEF 45-50% on ECHO 2019 Appreciated fluid recs per nephrology. Continue BB and Lasix. 4. Pulmonary HTN and BATSHEVA followed by Pulmonary. Appreciate recs. Recs to continue home use of BiPAP and diuresis. 5. CKD  ESRD on HD Managed by nephrology  6. Keep K > 4.0 and Mag > 2.0  Stable. 7. Will plan for discharge later today if okay with nephrology and pulmonary. OP follow up as scheduled in 2 weeks.      Electronically signed by ROB Cui NP on 2/11/2021 at 11:46 AM  [address]  [phone]

## 2021-02-11 NOTE — PLAN OF CARE
Problem: Pain:  Description: Pain management should include both nonpharmacologic and pharmacologic interventions.   Goal: Pain level will decrease  Description: Pain level will decrease  Outcome: Ongoing  Goal: Control of acute pain  Description: Control of acute pain  Outcome: Ongoing  Goal: Control of chronic pain  Description: Control of chronic pain  Outcome: Ongoing     Problem: Anxiety:  Goal: Level of anxiety will decrease  Description: Level of anxiety will decrease  Outcome: Ongoing

## 2021-02-11 NOTE — PROGRESS NOTES
Nephrology Progress Note        Subjective: patient evaluated on dialysis. Pt is stable on dialysis. Access cannulated without problems. No new issues overnite. Stable hemodynamics. He is still has mild shortness of breath as I talked to him although I think a little better than yesterday. No fever, nausea vomiting or diarrhea. Pulmonary note reviewed. Chest x-ray from yesterday does show worsening cardiomegaly and vascular congestion. Repeat cardiac echo does show drop in his EF now down to 35 to 60% with diastolic dysfunction and pulmonary hypertension. Today on dialysis, will attempt to drop his weight by 0.5 to 1 kg or more. Usually has blood pressures not the issue, he does however have a tendency to cramp which limits ultrafiltration as an outpatient. Objective:  CURRENT TEMPERATURE:  Temp: 98.2 °F (36.8 °C)  MAXIMUM TEMPERATURE OVER 24HRS:  Temp (24hrs), Av.7 °F (36.5 °C), Min:97.3 °F (36.3 °C), Max:98.2 °F (36.8 °C)    CURRENT RESPIRATORY RATE:  Resp: 16  CURRENT PULSE:  Pulse: 95  CURRENT BLOOD PRESSURE:  BP: 114/63  24HR BLOOD PRESSURE RANGE:  Systolic (57GKY), FNH:044 , Min:104 , OYE:158   ; Diastolic (19KBH), CMC:17, Min:56, Max:69    24HR INTAKE/OUTPUT:      Intake/Output Summary (Last 24 hours) at 2021 0913  Last data filed at 2021 0511  Gross per 24 hour   Intake 1050 ml   Output 230 ml   Net 820 ml     Patient Vitals for the past 96 hrs (Last 3 readings):   Weight   21 0511 257 lb 4.8 oz (116.7 kg)   21 1758 253 lb 4.9 oz (114.9 kg)   21 1445 259 lb 4.2 oz (117.6 kg)         Physical Exam:  General appearance:Awake, alert, in no acute distress  Skin: warm and dry, no rash or erythema  Eyes: conjunctivae normal and sclera anicteric  ENT:no thrush no pharyngeal congestion   Neck:  No JVD, No Thyromegaly  Pulmonary: He has bilateral basal crackles on exam  Cardiovascular: normal S1 and S2. NO rubs and NO murmur.  No S3 OR S4   Abdomen: soft nontender, bowel sounds present, no organomegaly,  no ascites   Extremities: no cyanosis, clubbing or edema     Access:  previous  Left AV fistula    Current Medications:        sodium chloride flush 0.9 % injection 10 mL, 2 times per day      sodium chloride flush 0.9 % injection 10 mL, PRN      acetaminophen (TYLENOL) tablet 650 mg, Q4H PRN      furosemide (LASIX) tablet 40 mg, BID      NIFEdipine (PROCARDIA XL) extended release tablet 30 mg, BID      carvedilol (COREG) tablet 6.25 mg, BID WC      aspirin tablet 325 mg, Daily      glimepiride (AMARYL) tablet 1 mg, QAM AC      sodium chloride flush 0.9 % injection 10 mL, 2 times per day      sodium chloride flush 0.9 % injection 10 mL, PRN      acetaminophen (TYLENOL) tablet 650 mg, Q4H PRN      glucose (GLUTOSE) 40 % oral gel 15 g, PRN      dextrose 50 % IV solution, PRN      glucagon (rDNA) injection 1 mg, PRN      dextrose 5 % solution, PRN      insulin lispro (HUMALOG) injection vial 0-6 Units, TID WC      insulin lispro (HUMALOG) injection vial 0-3 Units, Nightly      Labs:   CBC:   Recent Labs     02/09/21  1023 02/10/21  0236   HGB  --  10.4*   HCT  --  34.2*     --       BMP:   Recent Labs     02/09/21  1023 02/09/21  1029 02/09/21  1500 02/10/21  0236   NA  --   --  137 138   K  --   --  4.2 4.0   CL  --   --  97* 98   CO2  --   --  27 28   BUN 49*  --  54* 38*   CREATININE  --  4.86* 4.68* 4.17*   GLUCOSE  --   --  315* 162*   CALCIUM  --   --  8.6 8.3*        Magnesium:   Recent Labs     02/10/21  0236   MG 1.9       Dialysis bath: Dialysis Bath  K+ (Potassium): 3  Ca+ (Calcium): 2.25  Na+ (Sodium): 137  HCO3 (Bicarb): 35    Radiology:  Reviewed as available. Assessment:  1 ESRD:dialysis Lgrxwxs-Cyijwssb-Zcxobknd. He is received dialysis Monday and Tuesday this week. Dry weight has been taken down. Still appears mildly dyspneic. Will drop dry weight further down as tolerated from a blood pressure standpoint  2. HTN: Stable BP this morning  3 DM: 4 EDEMA : Improved  5. BATSHEVA. Pulmonary note reviewed  6. Repeat cardiac echo showing further drop in EF along with diastolic dysfunction and pulmonary hypertension. Plan:  1. Wt removal and dialysis orders reviewed. 2.  Long discussion with patient conducted regarding compliance with his BP meds as well as fluids particularly as an outpatient. His limiting factor usually is not his blood pressure however he tends to cramp. He may have to use sodium modeling as an outpatient on the dialysis machine to help with his cramps and assist with additional fluid removal.  We will arrange that as an outpatient. 3. Cont pt on aranesp and zemplar per protocol. 4. Follow up labs ordered. [address] for disc from my standpoint after dialysis     Please do not hesitate to call with questions.     Electronically signed by Callum Deal MD on 2/11/2021 at 9:13 AM

## 2021-02-11 NOTE — PROGRESS NOTES
Dialysis Post Treatment Note  Vitals:    02/11/21 1330   BP: 108/65   Pulse: 80   Resp: 20   Temp: 98.4 °F (36.9 °C)   SpO2:      Pre-Weight = 117.8kg  Post-weight = Weight: 253 lb 1.4 oz (114.8 kg)  Total Liters Processed = Total Liters Processed (l/min): 80.5 l/min  Rinseback Volume (mL) = Rinseback Volume (ml): 300 ml  Net Removal (mL) = NET Removed (ml): 3000 ml  Patient's dry weight = remove 3kg  Type of access used = Left AVF  Length of treatment = 210 minutes      Patient tolerated treatment well. Venous site is very deep and required 3 attempts to cannulate.       Electronically signed by Liu Enriquez RN on 2/11/2021 at 1:35 PM

## 2021-02-11 NOTE — DISCHARGE SUMMARY
[facility]  Discharge Note                 Name:  Easton Marin  YOB: 1960  Social Security Number:  [id number]  Medical Record Number:  [id number]    Date of Admission:  2/9/2021  Date of Discharge:  2/11/2021    Admitting physician: Aminah Vuong MD    Discharge Attending: Amy Lombardi CNP  Primary Care Physician: Santos Blow  Consultants: Cardiology  Discharge to Home in stable condition    HOSPITAL ADMISSION PROBLEM LIST:  Patient Active Problem List   Diagnosis    CKD (chronic kidney disease) stage 4, GFR 15-29 ml/min ([facility])    Anemia of chronic renal failure    Type 2 diabetes mellitus with chronic kidney disease on chronic dialysis [facility]    Chest pain at rest    Acute congestive heart failure ([facility]    Pneumonia    Rising PSA level    Prostatism    Acute on chronic diastolic congestive heart failure ([address]    Anemia    Essential hypertension    Respiratory distress    AVF (arteriovenous fistula) ([facility]    S/P PTCA - TIM distal LAD - Dr. Lennox Gouge 4/1/19    NSTEMI (non-ST elevated myocardial infarction) ([facility] [facility]    S/P arteriovenous (AV) graft placement    CHF (congestive heart failure), NYHA class I, acute on chronic, combined ([facility] [facility]    ESRD (end stage renal disease) ([facility] [facility]    Coronary artery disease involving native coronary artery of native heart without angina pectoris    BATSHEVA (obstructive sleep apnea)         Procedures:cardiac catheterization    HOSPITAL COURSE :           The patient was admitted for: Cardiac cath following abnormal stress test   Hospital Procedures if any: Cardiac cath  Medications changes recommendation: see medication list  Follow Up Plan:  2 weeks      Discharge exam:   Vitals:    02/11/21 1330   BP: 108/65   Pulse: 80   Resp: 20   Temp: 98.4 °F (36.9 °C)   SpO2:      Neuro: normal  Chest: Crackles in bases  Cardiac: Regular rate. s1 and s2 auscultated. No murmur noted.   Abdomen/groin: soft, non-tender, without masses or organomegaly  Lower extremity edema: none     Follow up with primary care provider 1 week  Follow up with cardiology 4 weeks  Follow up with other consultant physicians at their directions. Discharge Medications:   Karissa Linton   Home Medication Instructions LAR:[id number]    Printed on:02/11/21 5855   Medication Information                      aspirin 325 MG tablet  Take 325 mg by mouth daily             atorvastatin (LIPITOR) 40 MG tablet  Take 1 tablet by mouth nightly             calcium carbonate (TUMS) 500 MG chewable tablet  Take 1 tablet by mouth daily as needed              carvedilol (COREG) 6.25 MG tablet  Take 1 tablet by mouth 2 times daily (with meals)             furosemide (LASIX) 40 MG tablet  Take 40 mg by mouth 2 times daily              glimepiride (AMARYL) 1 MG tablet  Take 1 mg by mouth every morning (before breakfast)             lidocaine-prilocaine (EMLA) 2.5-2.5 % cream               Multiple Vitamins-Minerals (RENAPLEX-D) TABS  Take 1 tablet by mouth every other day Pt takes occasionally             NIFEdipine (ADALAT CC) 30 MG extended release tablet  Take 30 mg by mouth 2 times daily             traMADol (ULTRAM) 50 MG tablet  take 1 tablet by mouth every 6 hours if needed                CATH 2/9/2021  Findings:      Cardiac Arteries and Lesion Findings       LMCA: Normal 0% stenosis. LAD: Patent distal stent with 30-40% stenosis     LCx: Normal 0% stenosis. RCA: Normal 0% stenosis. Small, non dominant      Coronary Tree        Dominance: Right      Estimated Blood Loss: 5 mL      Conclusions        Procedure Summary        Patent LAD stent        Recommendations        Medical treatments    Risk factor modification. STRESS  01/27/2021  1. NO ischemia (Reversible defect). 2. Moderate inferior transmural infarction (Fixed defect). 3. Overall reduced LV function with EF 40%.   4. Inferior wall hypokinesia.     ECHO 6/18/2019  Mildly dilated LV cavity with moderate concentric LVH  Mild-moderate reduction in LV systolic function, estimated EF 45-50% with  global hypokinesis  Moderate diastolic dysfunction due to hypertensive cardiomyopathy and  myocardial calcification from CKD  No significant valvular thickening or stenosis seen. Mild mitral regurgitation  No pericardial effusion or thickening    Coronary Discharge Core Measure: Please indicate the medication given by X, and if not the reasons not given:    Not Given Reason  Given      Beta Blockers X   Allergy   ACE-I       Statins X      ASA X      CATH without PCI Previous Stent 4/2019 OAP (Plavix/Effient/Brilinta)     SL Nitro   X     1.  CAD:  S/p cath with recs for medical management as above. Discussed in detail with patient post cath POC including but not limited to medications, diet, exercise, right femoral artery site care, and follow-up. Questions and concerns addressed. OK for discharge home today. F/U in office in 2 weeks as scheduled. 2.  Combined systolic and diastolic CHF improving with HD and diuretics. Fluid management per nephrology. Continue BB and Lasix. 3.  Pulmonary HTN and BATSHEVA followed by pulmonary with recs to continue home use of BiPAP and diuretics. Follow up with Dr. Yolie Negron in 4 weeks. 4.  ESRD on HD managed per nephrology. Discussed with patient and nursing. Medications and discharge instructions reviewed with patient and nursing.     Electronically signed by ROB Dominguez NP on 2/11/2021 at 3:45 PM  [facility]      [phone]

## 2021-02-11 NOTE — CARE COORDINATION
Discharge 1 [facility] Case Management Department  Written by: Zuleyka Damon RN    Patient Name: Jayda Perrin  Attending Provider: Ayaz Flores MD  Admit Date: 2021  3:16 PM  MRN: [id number]  Account: [de-identified]                     : 1960  Discharge Date: 2021      Disposition: home independently    Zuleyka Damon RN

## 2021-02-12 ENCOUNTER — TELEPHONE (OUTPATIENT)
Dept: PULMONOLOGY | Age: 61
End: 2021-02-12

## 2021-02-12 NOTE — TELEPHONE ENCOUNTER
----- Message from Feroz Boyce MD sent at 2/11/2021  7:00 PM EST -----  Please reschedule appointment with me in office in 3 weeks.   ----- Message -----  From: Cole Landa MD  Sent: 2/11/2021   6:38 PM EST  To: Feroz Boyce MD

## 2021-04-21 ENCOUNTER — OFFICE VISIT (OUTPATIENT)
Dept: PULMONOLOGY | Age: 61
End: 2021-04-21
Payer: COMMERCIAL

## 2021-04-21 VITALS
BODY MASS INDEX: 38.37 KG/M2 | WEIGHT: 268 LBS | HEART RATE: 102 BPM | OXYGEN SATURATION: 97 % | RESPIRATION RATE: 28 BRPM | HEIGHT: 70 IN | TEMPERATURE: 98.2 F | DIASTOLIC BLOOD PRESSURE: 81 MMHG | SYSTOLIC BLOOD PRESSURE: 137 MMHG

## 2021-04-21 DIAGNOSIS — G47.33 OSA (OBSTRUCTIVE SLEEP APNEA): Primary | ICD-10-CM

## 2021-04-21 DIAGNOSIS — N18.6 ESRD (END STAGE RENAL DISEASE) (HCC): ICD-10-CM

## 2021-04-21 DIAGNOSIS — R13.10 DYSPHAGIA, UNSPECIFIED TYPE: ICD-10-CM

## 2021-04-21 DIAGNOSIS — R06.09 EXERTIONAL DYSPNEA: ICD-10-CM

## 2021-04-21 DIAGNOSIS — I50.43 CHF (CONGESTIVE HEART FAILURE), NYHA CLASS I, ACUTE ON CHRONIC, COMBINED (HCC): ICD-10-CM

## 2021-04-21 PROCEDURE — G8417 CALC BMI ABV UP PARAM F/U: HCPCS | Performed by: INTERNAL MEDICINE

## 2021-04-21 PROCEDURE — 1036F TOBACCO NON-USER: CPT | Performed by: INTERNAL MEDICINE

## 2021-04-21 PROCEDURE — G8427 DOCREV CUR MEDS BY ELIG CLIN: HCPCS | Performed by: INTERNAL MEDICINE

## 2021-04-21 PROCEDURE — 99214 OFFICE O/P EST MOD 30 MIN: CPT | Performed by: INTERNAL MEDICINE

## 2021-04-21 PROCEDURE — 3017F COLORECTAL CA SCREEN DOC REV: CPT | Performed by: INTERNAL MEDICINE

## 2021-04-21 RX ORDER — LOSARTAN POTASSIUM 25 MG/1
TABLET ORAL
COMMUNITY
Start: 2021-03-21 | End: 2021-12-24 | Stop reason: HOSPADM

## 2021-04-21 RX ORDER — CODEINE PHOSPHATE AND GUAIFENESIN 10; 100 MG/5ML; MG/5ML
SOLUTION ORAL
COMMUNITY
Start: 2021-03-04

## 2021-04-21 RX ORDER — BENZONATATE 100 MG/1
CAPSULE ORAL
COMMUNITY
Start: 2021-03-03

## 2021-04-21 ASSESSMENT — SLEEP AND FATIGUE QUESTIONNAIRES: HOW LIKELY ARE YOU TO NOD OFF OR FALL ASLEEP IN A CAR, WHILE STOPPED FOR A FEW MINUTES IN TRAFFIC: 0

## 2021-04-21 NOTE — PROGRESS NOTES
Patient has history of end-stage renal disease, combined diastolic systolic CHF and pulmonary hypertension. He was recently hospitalized with acute exacerbation. He is known to have sleep apnea, quit using CPAP few years back. Order provided for a new AutoPap machine. He reports dysphagia and has trouble slowing pills and persistent dry cough. He is on [employer]. He is a non-smoker. Will order PFT, referral for GI provided.   RTC 2 months

## 2021-04-22 ENCOUNTER — TELEPHONE (OUTPATIENT)
Dept: GASTROENTEROLOGY | Age: 61
End: 2021-04-22

## 2021-04-22 NOTE — TELEPHONE ENCOUNTER
Writer called patient to schedule from referral.  No answer. LVM offering patient a NP appointment today at 12:45 if he is able. Informed patient to contact our office as soon as he can if this will work for him. Also advised patient to call if today doesn't work, so we can get him in to see Arturo Villegas for his initial consult on a day that would work for him.

## 2021-04-27 ENCOUNTER — TELEPHONE (OUTPATIENT)
Dept: PULMONOLOGY | Age: 61
End: 2021-04-27

## 2021-04-27 NOTE — TELEPHONE ENCOUNTER
Dr. Joe Fritz,  Patient is needing DME; if you can sign your office note, I will get it faxed:   [facility] [address],  56 ([phone]) -CPAP  Thanks much, Suzie Hanks Read 2021 1:59 PM 2021 2:00 PM [id number] Leighann Rehman

## 2021-05-01 ENCOUNTER — APPOINTMENT (OUTPATIENT)
Dept: DIALYSIS | Age: 61
DRG: 291 | End: 2021-05-01
Payer: COMMERCIAL

## 2021-05-01 ENCOUNTER — APPOINTMENT (OUTPATIENT)
Dept: GENERAL RADIOLOGY | Age: 61
DRG: 291 | End: 2021-05-01
Payer: COMMERCIAL

## 2021-05-01 ENCOUNTER — APPOINTMENT (OUTPATIENT)
Dept: CT IMAGING | Age: 61
DRG: 291 | End: 2021-05-01
Payer: COMMERCIAL

## 2021-05-01 ENCOUNTER — HOSPITAL ENCOUNTER (INPATIENT)
Age: 61
LOS: 2 days | Discharge: HOME | DRG: 291 | End: 2021-05-03
Attending: EMERGENCY MEDICINE | Admitting: INTERNAL MEDICINE
Payer: COMMERCIAL

## 2021-05-01 DIAGNOSIS — K56.609 SMALL BOWEL OBSTRUCTION (HCC): Primary | ICD-10-CM

## 2021-05-01 PROBLEM — I50.9 HEART FAILURE (HCC): Status: ACTIVE | Noted: 2021-05-01

## 2021-05-01 LAB
-: ABNORMAL
-: NORMAL
ABSOLUTE EOS #: 0.15 K/UL (ref 0–0.4)
ABSOLUTE IMMATURE GRANULOCYTE: 0.07 K/UL (ref 0–0.3)
ABSOLUTE LYMPH #: 0.8 K/UL (ref 1–4.8)
ABSOLUTE MONO #: 0.51 K/UL (ref 0.1–0.8)
ALBUMIN SERPL-MCNC: 4.1 G/DL (ref 3.5–5.2)
ALBUMIN SERPL-MCNC: 4.1 G/DL (ref 3.5–5.2)
ALBUMIN/GLOBULIN RATIO: 1.1 (ref 1–2.5)
ALBUMIN/GLOBULIN RATIO: 1.1 (ref 1–2.5)
ALP BLD-CCNC: 121 U/L (ref 40–129)
ALP BLD-CCNC: 149 U/L (ref 40–129)
ALT SERPL-CCNC: 20 U/L (ref 5–41)
ALT SERPL-CCNC: 21 U/L (ref 5–41)
AMORPHOUS: ABNORMAL
ANION GAP SERPL CALCULATED.3IONS-SCNC: 14 MMOL/L (ref 9–17)
AST SERPL-CCNC: 15 U/L
AST SERPL-CCNC: 17 U/L
BACTERIA: ABNORMAL
BASOPHILS # BLD: 1 % (ref 0–2)
BASOPHILS ABSOLUTE: 0.07 K/UL (ref 0–0.2)
BILIRUB SERPL-MCNC: 0.71 MG/DL (ref 0.3–1.2)
BILIRUB SERPL-MCNC: 0.87 MG/DL (ref 0.3–1.2)
BILIRUBIN DIRECT: 0.28 MG/DL
BILIRUBIN URINE: NEGATIVE
BILIRUBIN, INDIRECT: 0.59 MG/DL (ref 0–1)
BNP INTERPRETATION: ABNORMAL
BUN BLDV-MCNC: 51 MG/DL (ref 8–23)
BUN/CREAT BLD: ABNORMAL (ref 9–20)
CALCIUM SERPL-MCNC: 9.4 MG/DL (ref 8.6–10.4)
CASTS UA: ABNORMAL /LPF (ref 0–8)
CHLORIDE BLD-SCNC: 93 MMOL/L (ref 98–107)
CO2: 26 MMOL/L (ref 20–31)
COLOR: YELLOW
CREAT SERPL-MCNC: 5.1 MG/DL (ref 0.7–1.2)
CRYSTALS, UA: ABNORMAL /HPF
DIFFERENTIAL TYPE: ABNORMAL
EOSINOPHILS RELATIVE PERCENT: 2 % (ref 1–4)
EPITHELIAL CELLS UA: ABNORMAL /HPF (ref 0–5)
GFR AFRICAN AMERICAN: 14 ML/MIN
GFR NON-AFRICAN AMERICAN: 12 ML/MIN
GFR SERPL CREATININE-BSD FRML MDRD: ABNORMAL ML/MIN/{1.73_M2}
GFR SERPL CREATININE-BSD FRML MDRD: ABNORMAL ML/MIN/{1.73_M2}
GLOBULIN: NORMAL G/DL (ref 1.5–3.8)
GLUCOSE BLD-MCNC: 155 MG/DL (ref 75–110)
GLUCOSE BLD-MCNC: 325 MG/DL (ref 70–99)
GLUCOSE URINE: ABNORMAL
HCT VFR BLD CALC: 34 % (ref 40.7–50.3)
HEMOGLOBIN: 10.8 G/DL (ref 13–17)
IMMATURE GRANULOCYTES: 1 %
KETONES, URINE: ABNORMAL
LACTIC ACID, WHOLE BLOOD: 0.7 MMOL/L (ref 0.7–2.1)
LEUKOCYTE ESTERASE, URINE: NEGATIVE
LIPASE: 43 U/L (ref 13–60)
LYMPHOCYTES # BLD: 11 % (ref 24–44)
MCH RBC QN AUTO: 30.6 PG (ref 25.2–33.5)
MCHC RBC AUTO-ENTMCNC: 31.8 G/DL (ref 28.4–34.8)
MCV RBC AUTO: 96.3 FL (ref 82.6–102.9)
MONOCYTES # BLD: 7 % (ref 1–7)
MORPHOLOGY: ABNORMAL
MUCUS: ABNORMAL
NITRITE, URINE: NEGATIVE
NRBC AUTOMATED: 0 PER 100 WBC
OTHER OBSERVATIONS UA: ABNORMAL
PDW BLD-RTO: 14.6 % (ref 11.8–14.4)
PH UA: 6.5 (ref 5–8)
PLATELET # BLD: 150 K/UL (ref 138–453)
PLATELET ESTIMATE: ABNORMAL
PMV BLD AUTO: 10 FL (ref 8.1–13.5)
POTASSIUM SERPL-SCNC: 4.3 MMOL/L (ref 3.7–5.3)
PRO-BNP: ABNORMAL PG/ML
PROTEIN UA: ABNORMAL
RBC # BLD: 3.53 M/UL (ref 4.21–5.77)
RBC # BLD: ABNORMAL 10*6/UL
RBC UA: ABNORMAL /HPF (ref 0–4)
REASON FOR REJECTION: NORMAL
RENAL EPITHELIAL, UA: ABNORMAL /HPF
SARS-COV-2, RAPID: NOT DETECTED
SEG NEUTROPHILS: 78 % (ref 36–66)
SEGMENTED NEUTROPHILS ABSOLUTE COUNT: 5.7 K/UL (ref 1.8–7.7)
SODIUM BLD-SCNC: 133 MMOL/L (ref 135–144)
SPECIFIC GRAVITY UA: 1.02 (ref 1–1.03)
SPECIMEN DESCRIPTION: NORMAL
TOTAL PROTEIN: 7.8 G/DL (ref 6.4–8.3)
TOTAL PROTEIN: 7.9 G/DL (ref 6.4–8.3)
TRICHOMONAS: ABNORMAL
TROPONIN INTERP: ABNORMAL
TROPONIN INTERP: ABNORMAL
TROPONIN T: ABNORMAL NG/ML
TROPONIN T: ABNORMAL NG/ML
TROPONIN, HIGH SENSITIVITY: 105 NG/L (ref 0–22)
TROPONIN, HIGH SENSITIVITY: 115 NG/L (ref 0–22)
TURBIDITY: CLEAR
URINE HGB: ABNORMAL
UROBILINOGEN, URINE: NORMAL
WBC # BLD: 7.3 K/UL (ref 3.5–11.3)
WBC # BLD: ABNORMAL 10*3/UL
WBC UA: ABNORMAL /HPF (ref 0–5)
YEAST: ABNORMAL
ZZ NTE CLEAN UP: ORDERED TEST: NORMAL
ZZ NTE WITH NAME CLEAN UP: SPECIMEN SOURCE: NORMAL

## 2021-05-01 PROCEDURE — 71045 X-RAY EXAM CHEST 1 VIEW: CPT

## 2021-05-01 PROCEDURE — 74177 CT ABD & PELVIS W/CONTRAST: CPT

## 2021-05-01 PROCEDURE — 99222 1ST HOSP IP/OBS MODERATE 55: CPT | Performed by: INTERNAL MEDICINE

## 2021-05-01 PROCEDURE — 83690 ASSAY OF LIPASE: CPT

## 2021-05-01 PROCEDURE — 83880 ASSAY OF NATRIURETIC PEPTIDE: CPT

## 2021-05-01 PROCEDURE — 6370000000 HC RX 637 (ALT 250 FOR IP): Performed by: EMERGENCY MEDICINE

## 2021-05-01 PROCEDURE — 99285 EMERGENCY DEPT VISIT HI MDM: CPT

## 2021-05-01 PROCEDURE — 96374 THER/PROPH/DIAG INJ IV PUSH: CPT

## 2021-05-01 PROCEDURE — 85025 COMPLETE CBC W/AUTO DIFF WBC: CPT

## 2021-05-01 PROCEDURE — 5A1D70Z PERFORMANCE OF URINARY FILTRATION, INTERMITTENT, LESS THAN 6 HOURS PER DAY: ICD-10-PCS | Performed by: INTERNAL MEDICINE

## 2021-05-01 PROCEDURE — 1200000000 HC SEMI PRIVATE

## 2021-05-01 PROCEDURE — 87635 SARS-COV-2 COVID-19 AMP PRB: CPT

## 2021-05-01 PROCEDURE — 2580000003 HC RX 258: Performed by: STUDENT IN AN ORGANIZED HEALTH CARE EDUCATION/TRAINING PROGRAM

## 2021-05-01 PROCEDURE — 81001 URINALYSIS AUTO W/SCOPE: CPT

## 2021-05-01 PROCEDURE — 80076 HEPATIC FUNCTION PANEL: CPT

## 2021-05-01 PROCEDURE — 99233 SBSQ HOSP IP/OBS HIGH 50: CPT | Performed by: INTERNAL MEDICINE

## 2021-05-01 PROCEDURE — 90935 HEMODIALYSIS ONE EVALUATION: CPT

## 2021-05-01 PROCEDURE — 84484 ASSAY OF TROPONIN QUANT: CPT

## 2021-05-01 PROCEDURE — 83605 ASSAY OF LACTIC ACID: CPT

## 2021-05-01 PROCEDURE — 6360000002 HC RX W HCPCS: Performed by: EMERGENCY MEDICINE

## 2021-05-01 PROCEDURE — 80053 COMPREHEN METABOLIC PANEL: CPT

## 2021-05-01 PROCEDURE — 82947 ASSAY GLUCOSE BLOOD QUANT: CPT

## 2021-05-01 PROCEDURE — 6360000004 HC RX CONTRAST MEDICATION: Performed by: EMERGENCY MEDICINE

## 2021-05-01 PROCEDURE — 36415 COLL VENOUS BLD VENIPUNCTURE: CPT

## 2021-05-01 RX ORDER — SODIUM CHLORIDE 0.9 % (FLUSH) 0.9 %
5-40 SYRINGE (ML) INJECTION PRN
Status: DISCONTINUED | OUTPATIENT
Start: 2021-05-01 | End: 2021-05-03 | Stop reason: HOSPADM

## 2021-05-01 RX ORDER — HEPARIN SODIUM 5000 [USP'U]/ML
5000 INJECTION, SOLUTION INTRAVENOUS; SUBCUTANEOUS EVERY 8 HOURS SCHEDULED
Status: DISCONTINUED | OUTPATIENT
Start: 2021-05-01 | End: 2021-05-03 | Stop reason: HOSPADM

## 2021-05-01 RX ORDER — ASPIRIN 81 MG/1
81 TABLET ORAL DAILY
Status: DISCONTINUED | OUTPATIENT
Start: 2021-05-01 | End: 2021-05-03 | Stop reason: HOSPADM

## 2021-05-01 RX ORDER — NICOTINE POLACRILEX 4 MG
15 LOZENGE BUCCAL PRN
Status: DISCONTINUED | OUTPATIENT
Start: 2021-05-01 | End: 2021-05-03 | Stop reason: HOSPADM

## 2021-05-01 RX ORDER — ONDANSETRON 2 MG/ML
4 INJECTION INTRAMUSCULAR; INTRAVENOUS EVERY 6 HOURS PRN
Status: DISCONTINUED | OUTPATIENT
Start: 2021-05-01 | End: 2021-05-03 | Stop reason: HOSPADM

## 2021-05-01 RX ORDER — CARVEDILOL 12.5 MG/1
6.25 TABLET ORAL 2 TIMES DAILY WITH MEALS
Status: DISCONTINUED | OUTPATIENT
Start: 2021-05-01 | End: 2021-05-03 | Stop reason: HOSPADM

## 2021-05-01 RX ORDER — POLYETHYLENE GLYCOL 3350 17 G/17G
17 POWDER, FOR SOLUTION ORAL DAILY PRN
Status: DISCONTINUED | OUTPATIENT
Start: 2021-05-01 | End: 2021-05-03 | Stop reason: HOSPADM

## 2021-05-01 RX ORDER — ACETAMINOPHEN 325 MG/1
650 TABLET ORAL EVERY 6 HOURS PRN
Status: DISCONTINUED | OUTPATIENT
Start: 2021-05-01 | End: 2021-05-03 | Stop reason: HOSPADM

## 2021-05-01 RX ORDER — SODIUM CHLORIDE 9 MG/ML
25 INJECTION, SOLUTION INTRAVENOUS PRN
Status: DISCONTINUED | OUTPATIENT
Start: 2021-05-01 | End: 2021-05-03 | Stop reason: HOSPADM

## 2021-05-01 RX ORDER — FUROSEMIDE 40 MG/1
40 TABLET ORAL 2 TIMES DAILY
Status: DISCONTINUED | OUTPATIENT
Start: 2021-05-01 | End: 2021-05-03 | Stop reason: HOSPADM

## 2021-05-01 RX ORDER — FENTANYL CITRATE 50 UG/ML
50 INJECTION, SOLUTION INTRAMUSCULAR; INTRAVENOUS ONCE
Status: COMPLETED | OUTPATIENT
Start: 2021-05-01 | End: 2021-05-01

## 2021-05-01 RX ORDER — ACETAMINOPHEN 650 MG/1
650 SUPPOSITORY RECTAL EVERY 6 HOURS PRN
Status: DISCONTINUED | OUTPATIENT
Start: 2021-05-01 | End: 2021-05-03 | Stop reason: HOSPADM

## 2021-05-01 RX ORDER — DEXTROSE MONOHYDRATE 25 G/50ML
12.5 INJECTION, SOLUTION INTRAVENOUS PRN
Status: DISCONTINUED | OUTPATIENT
Start: 2021-05-01 | End: 2021-05-03 | Stop reason: HOSPADM

## 2021-05-01 RX ORDER — SODIUM CHLORIDE 0.9 % (FLUSH) 0.9 %
5-40 SYRINGE (ML) INJECTION EVERY 12 HOURS SCHEDULED
Status: DISCONTINUED | OUTPATIENT
Start: 2021-05-01 | End: 2021-05-03 | Stop reason: HOSPADM

## 2021-05-01 RX ORDER — PROMETHAZINE HYDROCHLORIDE 12.5 MG/1
12.5 TABLET ORAL EVERY 6 HOURS PRN
Status: DISCONTINUED | OUTPATIENT
Start: 2021-05-01 | End: 2021-05-03 | Stop reason: HOSPADM

## 2021-05-01 RX ORDER — FUROSEMIDE 10 MG/ML
40 INJECTION INTRAMUSCULAR; INTRAVENOUS ONCE
Status: CANCELLED | OUTPATIENT
Start: 2021-05-01

## 2021-05-01 RX ORDER — ATORVASTATIN CALCIUM 40 MG/1
40 TABLET, FILM COATED ORAL NIGHTLY
Status: DISCONTINUED | OUTPATIENT
Start: 2021-05-01 | End: 2021-05-03 | Stop reason: HOSPADM

## 2021-05-01 RX ORDER — DEXTROSE MONOHYDRATE 50 MG/ML
100 INJECTION, SOLUTION INTRAVENOUS PRN
Status: DISCONTINUED | OUTPATIENT
Start: 2021-05-01 | End: 2021-05-03 | Stop reason: HOSPADM

## 2021-05-01 RX ADMIN — BENZOCAINE AND MENTHOL 1 LOZENGE: 15; 3.6 LOZENGE ORAL at 08:27

## 2021-05-01 RX ADMIN — FENTANYL CITRATE 50 MCG: 50 INJECTION, SOLUTION INTRAMUSCULAR; INTRAVENOUS at 08:37

## 2021-05-01 RX ADMIN — SODIUM CHLORIDE, PRESERVATIVE FREE 10 ML: 5 INJECTION INTRAVENOUS at 23:29

## 2021-05-01 RX ADMIN — IOPAMIDOL 75 ML: 755 INJECTION, SOLUTION INTRAVENOUS at 09:52

## 2021-05-01 ASSESSMENT — ENCOUNTER SYMPTOMS
SORE THROAT: 0
SHORTNESS OF BREATH: 1
ABDOMINAL DISTENTION: 1
NAUSEA: 0
CONSTIPATION: 0
ABDOMINAL PAIN: 1
VOMITING: 1
DIARRHEA: 0

## 2021-05-01 ASSESSMENT — PAIN SCALES - GENERAL
PAINLEVEL_OUTOF10: 0
PAINLEVEL_OUTOF10: 10
PAINLEVEL_OUTOF10: 0

## 2021-05-01 NOTE — ED NOTES
Medicine at bedside     Alexy Foster, [facility]  05/01/21 2804

## 2021-05-01 NOTE — ED NOTES
Report given to Burn Rn per request of Jenn; [facility].      Dayanna Roman RN  05/01/21 91 Moises Nguyen RN  05/01/21 5046

## 2021-05-01 NOTE — ED PROVIDER NOTES
involving native coronary artery of native heart without angina pectoris, Diabetes mellitus ([facility] [address] Dialysis patient ([facility] ESRD (end stage renal disease) ([facility] [address] Essential hypertension, Groin swelling, Hemodialysis patient ([facility] [address] Hydrocele, Hyperlipidemia, Hypertension, MI, old, NSTEMI (non-ST elevated myocardial infarction) ([facility] BATSHEVA (obstructive sleep apnea), Patient in clinical research study, Pneumonia, Prostatism, Respiratory distress, Rising PSA level, S/P arteriovenous (AV) graft placement, S/P PTCA - TIM distal LAD - Dr. Kalina Carrillo 4/1/19, Sleep apnea, and Type 2 diabetes mellitus with chronic kidney disease on chronic dialysis ([facility] has a past surgical history that includes Foot surgery (Left, 2005); Colonoscopy; Cystoscopy (11/17/2017); Prostate biopsy (N/A, 11/17/2017); Abdomen surgery (2013); Prostate Biopsy (02/16/2018); pr genital surg proc,male unlisted (N/A, 2/16/2018); Tonsillectomy (1968); Vasectomy (1983); Middle ear surgery (1966); Nose surgery (1968); Mouth surgery (2007); AV fistula creation (Left, 02/20/2019); Dialysis fistula creation (Left, 2/20/2019); other surgical history (Left, 03/06/2019); AV fistula repair (07/17/2019); vascular surgery (09/20/2019); Coronary angioplasty with stent (03/31/2019); Dialysis fistula creation (Left, 10/23/2019); and Cardiac catheterization (02/09/2021).     Social History     Socioeconomic History    Marital status: Single     Spouse name: Not on file    Number of children: Not on file    Years of education: Not on file    Highest education level: Not on file   Occupational History    Not on file   Social Needs    Financial resource strain: Not on file    Food insecurity     Worry: Not on file     Inability: Not on file    Transportation needs     Medical: Not on file     Non-medical: Not on file   Tobacco Use    Smoking status: Never Smoker    Smokeless tobacco: Never Used   Substance and Sexual Activity    Alcohol use: No    Drug use: No    Sexual activity: Not on file   Lifestyle    Physical activity     Days per week: Not on file     Minutes per session: Not on file    Stress: Not on file   Relationships    Social connections     Talks on phone: Not on file     Gets together: Not on file     Attends [employer] service: Not on file     Active member of club or organization: Not on file     Attends meetings of clubs or organizations: Not on file     Relationship status: Not on file    Intimate partner violence     Fear of current or ex partner: Not on file     Emotionally abused: Not on file     Physically abused: Not on file     Forced sexual activity: Not on file   Other Topics Concern    Not on file   Social History Narrative    Not on file       Family History   Problem Relation Age of Onset    Heart Disease Mother         CHF    Early Death Mother     High Blood Pressure Brother     Diabetes Brother     Asthma Brother     High Blood Pressure Brother     Diabetes Brother     High Blood Pressure Brother     Diabetes Brother        Allergies:  Lisinopril    Home Medications:  Prior to Admission medications    Medication Sig Start Date End Date Taking?  Authorizing Provider   benzonatate (TESSALON) 100 MG capsule take 1 capsule by mouth three times a day if needed for 10 days 3/3/21   Historical Provider, MD   losartan (COZAAR) 25 MG tablet take 1 tablet by mouth once daily 3/21/21   Historical Provider, MD   guaiFENesin-codeine (GUAIFENESIN AC) 100-10 MG/5ML liquid give 10 milliliters by mouth every 4 hours if needed 3/4/21   Historical Provider, MD   glimepiride (AMARYL) 1 MG tablet Take 1 mg by mouth every morning (before breakfast)    Historical Provider, MD   atorvastatin (LIPITOR) 40 MG tablet Take 1 tablet by mouth nightly 2/9/21   Alethea Wei MD   traMADol Wayne Pipes) 50 MG tablet take 1 tablet by mouth every 6 hours if needed 10/21/20   Historical Provider, MD   aspirin 325 MG tablet Take 325 mg by mouth daily    Historical Provider, MD   calcium carbonate (TUMS) 500 MG chewable tablet Take 1 tablet by mouth daily as needed     Historical Provider, MD   carvedilol (COREG) 6.25 MG tablet Take 1 tablet by mouth 2 times daily (with meals) 6/19/19   Jeanine Heard MD   Multiple Vitamins-Minerals (RENAPLEX-D) TABS Take 1 tablet by mouth every other day Pt takes occasionally 5/14/19   Historical Provider, MD   lidocaine-prilocaine (EMLA) 2.5-2.5 % cream  3/18/19   Historical Provider, MD   NIFEdipine (ADALAT CC) 30 MG extended release tablet Take 30 mg by mouth 2 times daily    Historical Provider, MD   furosemide (LASIX) 40 MG tablet Take 40 mg by mouth 2 times daily     Historical Provider, MD       REVIEW OF SYSTEMS    (2-9 systems for level 4, 10 or more for level 5)      Review of Systems   Constitutional: Negative for chills and fever. HENT: Negative for ear pain, hearing loss and sore throat. Eyes: Negative for visual disturbance. Respiratory: Positive for shortness of breath. Cardiovascular: Positive for leg swelling. Negative for chest pain. Gastrointestinal: Positive for abdominal distention, abdominal pain and vomiting. Negative for constipation, diarrhea and nausea. Genitourinary: Negative for dysuria. Musculoskeletal: Negative for arthralgias and myalgias. Neurological: Negative for weakness and numbness. Psychiatric/Behavioral: Negative for agitation and confusion. PHYSICAL EXAM   (up to 7 for level 4, 8 or more for level 5)      INITIAL VITALS:   BP (!) 159/85   Pulse 95   Temp 98.1 °F (36.7 °C)   Resp 18   Wt 269 lb 10 oz (122.3 kg)   SpO2 97%   BMI 38.69 kg/m²     Physical Exam  Vitals signs and nursing note reviewed. Constitutional:       General: He is not in acute distress. Appearance: Normal appearance. He is well-developed. He is obese. He is not ill-appearing or diaphoretic. HENT:      Head: Normocephalic and atraumatic.       Right Ear: External ear normal.      Left Ear: External ear normal.      Nose: Nose normal.      Mouth/Throat:      Mouth: Mucous membranes are moist.   Eyes:      Extraocular Movements: Extraocular movements intact. Conjunctiva/sclera: Conjunctivae normal.   Neck:      Musculoskeletal: Normal range of motion and neck supple. Trachea: No tracheal deviation. Cardiovascular:      Rate and Rhythm: Regular rhythm. Tachycardia present. Heart sounds: Normal heart sounds. No murmur. No friction rub. No gallop. Pulmonary:      Effort: Pulmonary effort is normal. No respiratory distress. Breath sounds: Rales (Bibasilar) present. No wheezing or rhonchi. Abdominal:      General: There is distension. Tenderness: There is no right CVA tenderness or left CVA tenderness. Comments: High-pitched bowel sounds with diffuse tenderness to palpation without guarding   Musculoskeletal: Normal range of motion. General: No swelling, deformity or signs of injury. Right lower leg: Edema present. Left lower leg: Edema present. Comments: Left upper extremity AV fistula with palpable thrill and audible bruit. Right greater than left lower extremity edema   Skin:     General: Skin is warm and dry. Capillary Refill: Capillary refill takes less than 2 seconds. Coloration: Skin is not jaundiced. Findings: No bruising or lesion. Neurological:      General: No focal deficit present. Mental Status: He is alert and oriented to person, place, and time. Mental status is at baseline. Motor: No abnormal muscle tone.          DIFFERENTIAL  DIAGNOSIS     PLAN (LABS / IMAGING / EKG):  Orders Placed This Encounter   Procedures    COVID-19, Rapid    CT ABDOMEN PELVIS W IV CONTRAST Additional Contrast? None    XR ABDOMEN FOR NG/OG/NE TUBE PLACEMENT    CBC Auto Differential    Comprehensive Metabolic Panel w/ Reflex to MG    Lipase    Troponin    Brain Natriuretic Peptide    Urinalysis with microscopic    Lactic Acid, Whole Value Ref Range    Glucose 325 (H) 70 - 99 mg/dL    BUN 51 (H) 8 - 23 mg/dL    CREATININE 5.10 (HH) 0.70 - 1.20 mg/dL    Bun/Cre Ratio NOT REPORTED 9 - 20    Calcium 9.4 8.6 - 10.4 mg/dL    Sodium 133 (L) 135 - 144 mmol/L    Potassium 4.3 3.7 - 5.3 mmol/L    Chloride 93 (L) 98 - 107 mmol/L    CO2 26 20 - 31 mmol/L    Anion Gap 14 9 - 17 mmol/L    Alkaline Phosphatase 149 (H) 40 - 129 U/L    ALT 20 5 - 41 U/L    AST 15 <40 U/L    Total Bilirubin 0.71 0.3 - 1.2 mg/dL    Total Protein 7.8 6.4 - 8.3 g/dL    Albumin 4.1 3.5 - 5.2 g/dL    Albumin/Globulin Ratio 1.1 1.0 - 2.5    GFR Non- 12 (L) >60 mL/min    GFR  14 (L) >60 mL/min    GFR Comment          GFR Staging NOT REPORTED    Lipase   Result Value Ref Range    Lipase 43 13 - 60 U/L   Troponin   Result Value Ref Range    Troponin, High Sensitivity 105 (HH) 0 - 22 ng/L    Troponin T NOT REPORTED <0.03 ng/mL    Troponin Interp NOT REPORTED    Brain Natriuretic Peptide   Result Value Ref Range    Pro-BNP 16,898 (H) <300 pg/mL    BNP Interpretation Pro-BNP Reference Range:    Urinalysis with microscopic   Result Value Ref Range    Color, UA YELLOW YELLOW    Turbidity UA CLEAR CLEAR    Glucose, Ur 3+ (A) NEGATIVE    Bilirubin Urine NEGATIVE NEGATIVE    Ketones, Urine TRACE (A) NEGATIVE    Specific Gravity, UA 1.018 1.005 - 1.030    Urine Hgb TRACE (A) NEGATIVE    pH, UA 6.5 5.0 - 8.0    Protein, UA 3+ (A) NEGATIVE    Urobilinogen, Urine Normal Normal    Nitrite, Urine NEGATIVE NEGATIVE    Leukocyte Esterase, Urine NEGATIVE NEGATIVE    -          WBC, UA None 0 - 5 /HPF    RBC, UA 2 TO 5 0 - 4 /HPF    Casts UA  0 - 8 /LPF     0 TO 2 HYALINE Reference range defined for non-centrifuged specimen.     Crystals, UA NOT REPORTED None /HPF    Epithelial Cells UA None 0 - 5 /HPF    Renal Epithelial, UA NOT REPORTED 0 /HPF    Bacteria, UA NOT REPORTED None    Mucus, UA NOT REPORTED None    Trichomonas, UA NOT REPORTED None    Amorphous, UA NOT REPORTED None    Other Observations UA NOT REPORTED NOT REQ. Yeast, UA NOT REPORTED None   Lactic Acid, Whole Blood   Result Value Ref Range    Lactic Acid, Whole Blood 0.7 0.7 - 2.1 mmol/L   SPECIMEN REJECTION   Result Value Ref Range    Specimen Source . BLOOD     Ordered Test LACWB     Reason for Rejection       Unable to perform testing: Specimen not processed correctly.    - NOT REPORTED        IMPRESSION: Bridger Ayala is a 56 y.o. male who presents to the emergency department with diffuse abdominal pain with tenderness and high-pitched bowel sounds in the setting of a prior small bowel obstruction. On examination he is afebrile, vital signs demonstrate tachycardia and hypertension examination demonstrates an uncomfortable appearing male with diffuse abdominal tenderness to palpation, high-pitched bowel sounds, and distention of the abdomen. Strong suspicion at this point for small bowel obstruction given multiple prior risk factors and we will proceed with imaging at this time. We will also check basic cardiac and abdominal labs given the fact that the patient is also complaining of shortness of breath with leg swelling to exclude CHF exacerbation as a potential cause of some of his symptoms. The patient is also complaining of a scratchy throat which is resulting in a cough that worsens his pain, so we will give him a lozenge. N.p.o. otherwise. RADIOLOGY:  No results found. EKG  EKG Interpretation    Interpreted by emergency department physician    Rhythm: Sinus tachycardia  Rate: 101  Axis: Left  Ectopy: 1 PAC  Conduction: WA interval 240, , QTc 505 with right bundle branch block, first-degree AV block, left anterior fascicular block, all chronic, pattern unchanged from prior EKG  ST Segments: no acute change  T Waves: no acute change  Q Waves: none    Clinical Impression: no acute changes and abnormal EKG    Mike Burch MD    All EKG's are interpreted by the Emergency Department Physician who either signs or co-signs this chart in the absence of a cardiologist.    EMERGENCY DEPARTMENT COURSE:  ED Course as of May 01 1323   Sat May 01, 2021   6035 CT scan approved by Dr. Valentino Jaime.    [TS]   1024 Imaging diagnostic of a closed-loop small bowel obstruction. Surgery paged. [TS]   7611 Spoke with surgery who strongly recommended NG tube. Advised that since this is only a developing small bowel obstruction and not a true peritoneal closed-loop small bowel obstruction the patient likely would not benefit from surgery at this time and medical therapy should be attempted. Regardless the patient has signs and symptoms consistent with CHF exacerbation and will require admission for that reason. Plan for admission at this time. [TS]      ED Course User Index  [TS] Cordelia Sosa MD       PROCEDURES:  None    CONSULTS:  IP CONSULT TO NEPHROLOGY  IP CONSULT TO GENERAL SURGERY  IP CONSULT TO INTERNAL MEDICINE    CRITICAL CARE:  Please see attending note. FINAL IMPRESSION      1. Small bowel obstruction ([address]          DISPOSITION / PLAN     DISPOSITION Decision To Admit 05/01/2021 10:30:34 AM      PATIENT REFERRED TO:  No follow-up provider specified. DISCHARGE MEDICATIONS:  New Prescriptions    No medications on file       Cordelia Sosa MD  Emergency Medicine Resident    This patient was evaluated in the Emergency Department for symptoms described in the history of present illness. He/she was evaluated in the context of the global COVID-19 pandemic, which necessitated consideration that the patient might be at risk for infection with the SARS-CoV-2 virus that causes COVID-19. Institutional protocols and algorithms that pertain to the evaluation of patients at risk for COVID-19 are in a state of rapid change based on information released by regulatory bodies including the CDC and federal and state organizations.  These policies and algorithms were followed during the patient's care in the ED.    (Please note that portions of thisnote were completed with a voice recognition program.  Efforts were made to edit the dictations but occasionally words are mis-transcribed.)        Nina Blackburn MD  Resident  05/01/21 8056

## 2021-05-01 NOTE — CONSULTS
General Surgery:    Consult Note        PATIENT NAME: Janak Blair OF BIRTH: 1960    ADMISSION DATE: 5/1/2021  7:40 AM     Consulted Physician: Dr. Ina Naidu DATE: 5/1/2021    Chief Complaint:  Abdominal distention, shortness of breath  Consult Regarding:  SBO incisional hernias    HISTORY OF PRESENT ILLNESS:  The patient is a 56 y.o. male with medical history of ESRD on dialysis, DM, CHF, HTN, MI and surgical history of exploratory laparotomy with colostomy creation, colostomy reversal, incisional hernia repair with mesh who presents with concerns for abdominal distention for 6 weeks, cough for 2 months, and shortness of breath. Patient is seen and evaluated in the emergency department at [facility]. The patient states he noticed his abdomen getting larger for the last 6 weeks and had difficulty having a bowel movement yesterday. He states he has been having daily bowel movements, however, and his most recent was last night before he fell asleep and it was firm. He denies any abdominal pain but is concerned regarding the distention. He endorses several recent episodes of emesis (most recent last night) described as food like in nature. He believes his emesis is due to his frequent coughing. He continues to pass flatus. Denies fever, chills. On exam, he is afebrile and HD stable, no acute distress. Abdomen is softly distended and nontender to palpation in all quadrants. No leukocytosis. Creat elevation at 5.1 (ESRD) with potassium wnl. BNP elevated at 16,898. Trop 105. Glucose 325. CT abdomen pelvis shows an area of small bowel concerning for a transition point in the LUQ with decompressed bowel distally.      Past Medical History:        Diagnosis Date    Acute congestive heart failure ([address] 12/21/2016    Acute on chronic diastolic congestive heart failure ([address] 11/24/2018    Anemia 11/25/2018    Anemia of chronic renal failure 10/17/2016    AVF (arteriovenous fistula) ([address]     Bowel obstruction ([address] 12/26/2013    CAD (coronary artery disease) 2013    ?     Chest pain at rest 12/21/2016    CHF (congestive heart failure) ([address] 2013    last episode 11/2018    CHF (congestive heart failure), NYHA class I, acute on chronic, combined ([address] 2/9/2021    Chronic kidney disease     CKD (chronic kidney disease) stage 4, GFR 15-29 ml/min ([address] 10/17/2016    Coronary artery disease involving native coronary artery of native heart without angina pectoris     Diabetes mellitus ([address] 2012    NIDDM    Dialysis patient ([address]     Bonnie Haines  /  Jessica Thibodeaux  /  Alycia Lloyd    ESRD (end stage renal disease) ([address]     Essential hypertension 11/25/2018    Groin swelling 07/17/2019    Hemodialysis patient ([address] 11/28/2018    [facility]HE[employer] RIGHT DIALYSIS ACCESSTUES THURS AND AT [place]    Hydrocele 07/17/2019    Hyperlipidemia 2013    ON RX    Hypertension 2013    ON RX    MI, old 12/26/2013    NSTEMI (non-ST elevated myocardial infarction) ([address] 6/17/2019    BATSHEVA (obstructive sleep apnea)     Patient in clinical research study 04/01/2019    XIENCE SHORT DAPT    Pneumonia     Prostatism 11/17/2017    Respiratory distress 11/25/2018    Rising PSA level 11/17/2017    S/P arteriovenous (AV) graft placement     S/P PTCA - TIM distal LAD - Dr. Art Kolb 4/1/19 4/1/2019    Sleep apnea 2015    pt has machine and does not use it    Type 2 diabetes mellitus with chronic kidney disease on chronic dialysis ([address] 10/17/2016       Past Surgical History:        Procedure Laterality Date    ABDOMEN SURGERY  2013    BOWEL OBSTRUCTION    AV FISTULA CREATION Left 02/20/2019    AV FISTULA REPAIR  07/17/2019    AV fistula revision, branch ligation / Percutaneous angioplasty of proximal anastomosis and outflow tract of dialysis circuit with drug coated balloon  /  Dr Jose Guadalupe Pritchard  02/09/2021    Dr. Nader Stinson, [address] Not on file     Minutes per session: Not on file    Stress: Not on file   Relationships    Social connections     Talks on phone: Not on file     Gets together: Not on file     Attends Yazidi service: Not on file     Active member of club or organization: Not on file     Attends meetings of clubs or organizations: Not on file     Relationship status: Not on file    Intimate partner violence     Fear of current or ex partner: Not on file     Emotionally abused: Not on file     Physically abused: Not on file     Forced sexual activity: Not on file   Other Topics Concern    Not on file   Social History Narrative    Not on file       Family History:       Problem Relation Age of Onset    Heart Disease Mother         CHF    Early Death Mother     High Blood Pressure Brother     Diabetes Brother     Asthma Brother     High Blood Pressure Brother     Diabetes Brother     High Blood Pressure Brother     Diabetes Brother        REVIEW OF SYSTEMS:    CONSTITUTIONAL: No fevers, chills   HEENT:  No nasal congestion or drainage.    RESP: endorses frequent cough, shortness of breath   CARDIOVASCULAR:  No chest pain, no palpitations has treated CHF and hypertension and history of MI  GASTROINTESTINAL:  Denies abdominal pain, endorses abdominal distention   GENITOURINARY: No dysuria, no increased or decreased frequency of urination   HEMATOLOGIC/LYMPHATIC: No easy bruising or bleeding   ENDOCRINE: No polydipsia, no heat or cold intolerance     PHYSICAL EXAM:    VITALS:  BP (!) 152/86   Pulse 102   Temp 97 °F (36.1 °C) (Temporal)   Resp 18   SpO2 97%   INTAKE/OUTPUT:   No intake or output data in the 24 hours ending 05/01/21 1130    CONSTITUTIONAL:  awake, alert, not distressed and obese  ENT:  Normocephalic, atraumatic, EOMI   NECK:  supple, symmetrical, trachea midline   LUNGS: Slightly increased effort on RA, no respiratory distress  CARDIOVASCULAR:  Regular rate and rhythm   ABDOMEN: Softly distended, nontender in all quadrants, no guarding or peritoneal signs, prior ex lap midline incision and prior colostomy site well healed   SKIN: No drainage, induration, or erythema noted, dry skin changes to bilateral lower extremities . MUSCULOSKELETAL: Mild bilateral lower extremity swelling, deformity, or tenderness  NEUROLOGIC:  Mental Status Exam:  Level of Alertness:   alert  Orientation:   oriented to person, place, and time    CBC:   Lab Results   Component Value Date    WBC 7.3 05/01/2021    RBC 3.53 05/01/2021    RBC 3.62 09/08/2018    HGB 10.8 05/01/2021    HCT 34.0 05/01/2021    MCV 96.3 05/01/2021    MCH 30.6 05/01/2021    MCHC 31.8 05/01/2021    RDW 14.6 05/01/2021     05/01/2021    MPV 10.0 05/01/2021     BMP:    Lab Results   Component Value Date     05/01/2021    K 4.3 05/01/2021    CL 93 05/01/2021    CO2 26 05/01/2021    BUN 51 05/01/2021    LABALBU 4.1 05/01/2021    CREATININE 5.10 05/01/2021    CALCIUM 9.4 05/01/2021    GFRAA 14 05/01/2021    LABGLOM 12 05/01/2021    GLUCOSE 325 05/01/2021    GLUCOSE 147 09/08/2018       Pertinent Radiology:   Ct Abdomen Pelvis W Iv Contrast Additional Contrast? None    Result Date: 5/1/2021  EXAMINATION: CT OF THE ABDOMEN AND PELVIS WITH CONTRAST 5/1/2021 9:48 am TECHNIQUE: CT of the abdomen and pelvis was performed with the administration of intravenous contrast. Multiplanar reformatted images are provided for review. Dose modulation, iterative reconstruction, and/or weight based adjustment of the mA/kV was utilized to reduce the radiation dose to as low as reasonably achievable.  COMPARISON: 07/17/2019 HISTORY: ORDERING SYSTEM PROVIDED HISTORY: Abd pain/distention, h/o prior SBO, high pitched bowel sounds TECHNOLOGIST PROVIDED HISTORY: Abd pain/distention, h/o prior SBO, high pitched bowel sounds Decision Support Exception - unselect if not a suspected or confirmed emergency medical condition->Emergency Medical Condition (MA) Reason for Exam: abd pain least 2019. Findings were discussed with WING RUSSO at 10:22 am on 5/1/2021. ASSESSMENT:    56 y.o. male presents with abdominal distention and concern for SBO. Surgical history includes colostomy creation, colostomy reversal, ventral hernia repair. Endorses 6 weeks of abdominal distention, intermittent emesis. He continues to have bowel movements and pass flatus. CT a/p with concerns for transition point in the LUQ. Plan:  1. Diet: NPO   2. Recommend placement of NGT to LIWS  3. Serial abdominal exams-abdomen currently soft and nontender   4. Monitor bowel function   5. Lactic acid pending   6. Will attempt to treat nonoperatively at this time with NGT decompression. Will continue to monitor for any changes that may indicate the need for surgical intervention. Patient may benefit from further imaging such as SBFT if failure to progress. 7. Recommend admission to medicine for treatment of CHF exacerbation       Guera Chou, DO  General Surgery PGY-2 \  I attest that I was present with the resident in the emergency department [address] [place] [address] and agree with the description of findings and plan as dictated above. Patient medically without evidence of bowel obstruction. Will monitor closely. Branda Boxer.  Tru BAXTER

## 2021-05-01 NOTE — CONSULTS
Nephrology ESRD Consult Note    Reason for Consult:  Fluid and hypertension management for pt with ESRD     Requesting Physician:  Dr. Soledad Tsang    History Obtained From:  patient, electronic medical record    HD Unit:       [facility] Dr. Karlynn Hammans    Dry Weight:       114 kgs  Chief Complaint:  abd pain n/v    History of Present Illness: This is a 56 y.o. male with end stage renal disease on hemodialysis [facility] who presents to the hospital for evaluation of abdominal pain nausea and vomiting. Mentions having normal BM and passing gas recently. No hx of bowel obstruction. No other acute complaints. Abd was noted to be distended hypoactive bowel sounds with generalized tenderness. He was noticing his abdominal was getting more distended. Also noticing cough. Denies any diarrhea. Does have history of ventral hernias before 1 of which has been repaired. He admits that he has been retaining extra fluid. But does not feel that it is because of excessive fluid intake. General surgery consulted for SBO. CT abdomen pelvis shows an area of small bowel distention with air-fluid levels with decompressed distal and proximal small bowel. Transition point appears to be in the left upper quadrant. Concerning for small bowel obstruction. Early closed-loop obstruction being in the differential.  Recommended NGT to LIWS and NPO diet. Serial abdominal films. His outpatient history and dialysis orders, usual dry wt  and out pt dialysis run sheets were reviewed. Past Medical History:        Diagnosis Date    Acute congestive heart failure ([address] 12/21/2016    Acute on chronic diastolic congestive heart failure ([address] 11/24/2018    Anemia 11/25/2018    Anemia of chronic renal failure 10/17/2016    AVF (arteriovenous fistula) (HCC)     Bowel obstruction ([address] 12/26/2013    CAD (coronary artery disease) 2013    ?     Chest pain at rest 12/21/2016    CHF (congestive heart failure) ([facility] 2013    last episode 11/2018    CHF (congestive heart failure), NYHA class I, acute on chronic, combined ([facility] 2/9/2021    Chronic kidney disease     CKD (chronic kidney disease) stage 4, GFR 15-29 ml/min ([facility] 10/17/2016    Coronary artery disease involving native coronary artery of native heart without angina pectoris     Diabetes mellitus ([facility] 2012    NIDDM    [facility]     Mi Chau  /  Dragan Bañuelos  /  Brigid Jacome    ESRD (end stage renal disease) ([facility]     Essential hypertension 11/25/2018    Groin swelling 07/17/2019    Hemodialysis patient ([facility] 11/28/2018    TUNNELD CATHETER RIGHT DIALYSIS ACCESSTUES THURS AND AT [facility]    Hydrocele 07/17/2019    Hyperlipidemia 2013    ON RX    Hypertension 2013    ON RX    MI, old 12/26/2013    NSTEMI (non-ST elevated myocardial infarction) ([facility] 6/17/2019    BATSHEVA (obstructive sleep apnea)     Patient in clinical research study 04/01/2019    XIENCE SHORT DAPT    Pneumonia     Prostatism 11/17/2017    Respiratory distress 11/25/2018    Rising PSA level 11/17/2017    S/P arteriovenous (AV) graft placement     S/P PTCA - TIM distal LAD - Dr. Rebeca Hodgkins 4/1/19 4/1/2019    Sleep apnea 2015    pt has machine and does not use it    Type 2 diabetes mellitus with chronic kidney disease on chronic dialysis ([facility] 10/17/2016       Access:  previous  Left AV fistula    Past Surgical History:        Procedure Laterality Date    ABDOMEN SURGERY  2013    BOWEL OBSTRUCTION    AV FISTULA CREATION Left 02/20/2019    AV FISTULA REPAIR  07/17/2019    AV fistula revision, branch ligation / Percutaneous angioplasty of proximal anastomosis and outflow tract of dialysis circuit with drug coated balloon  /  Dr Ana Laura Ty  02/09/2021    Dr. Ronnie Garcia, [address]  03/31/2019    TIM LAD    CYSTOSCOPY  11/17/2017    DIALYSIS FISTULA CREATION Left 2/20/2019    AV FISTULA CREATION ARM performed by Aleks Miramontes MD at [facility] Left 10/23/2019    LEFT UPPER EXTREMITY AV GRAFT CREATION WITH [id number] ARTEGRAFT, LEFT UPPER EXTREMITY AV FISTULA LIGATION performed by Aleks Miramontes MD at [facility] 2005    [address]  2007    1 WISDOM TOOTH REMOVED    NOSE SURGERY  1968    CAUTERY TO STOP NOSE BEEDS    OTHER SURGICAL HISTORY Left 03/06/2019    fistulagram    AL GENITAL SURG PROC,MALE UNLISTED N/A 2/16/2018    US  PROSTATE BIOPSY WITH SATURATION performed by Rita Ruiz MD at [address] N/A 11/17/2017    CYSTOSCOPY PROSTATE US BIOPSY performed by Rita Ruiz MD at [address]  02/16/2018   [facility]    VASCULAR SURGERY  09/20/2019    LUE FISTULAGRAM  /  DR Faheem Yu  /  Findings: Severe stenosis of proximal cephalic vein. / Will plan for LUE AVG placement.  VASECTOMY  1983       Outpatient Medications:     Not in a hospital admission. Current Medications:    No current facility-administered medications for this encounter.        Allergies:  Lisinopril    Social History:    Social History     Socioeconomic History    Marital status: Single     Spouse name: Not on file    Number of children: Not on file    Years of education: Not on file    Highest education level: Not on file   Occupational History    Not on file   Social Needs    Financial resource strain: Not on file    Food insecurity     Worry: Not on file     Inability: Not on file    Transportation needs     Medical: Not on file     Non-medical: Not on file   Tobacco Use    Smoking status: Never Smoker    Smokeless tobacco: Never Used   Substance and Sexual Activity    Alcohol use: No    Drug use: No    Sexual activity: Not on file   Lifestyle    Physical activity     Days per week: Not on file     Minutes per session: Not on file    Stress: Not on file   Relationships    Social connections     Talks on phone: Not on file     Gets together: Not on file     Attends Anabaptist service: Not on file     Active member of club or organization: Not on file     Attends meetings of clubs or organizations: Not on file     Relationship status: Not on file    Intimate partner violence     Fear of current or ex partner: Not on file     Emotionally abused: Not on file     Physically abused: Not on file     Forced sexual activity: Not on file   Other Topics Concern    Not on file   Social History Narrative    Not on file       Family History:   Family History   Problem Relation Age of Onset    Heart Disease Mother         CHF    Early Death Mother     High Blood Pressure Brother     Diabetes Brother     Asthma Brother     High Blood Pressure Brother     Diabetes Brother     High Blood Pressure Brother     Diabetes Brother        Review of Systems:    Constitutional: No fever, no chills, no lethargy, no weakness. HEENT:  No headache, otalgia, itchy eyes, nasal discharge or sore throat. Cardiac:  No chest pain, dyspnea, orthopnea or PND. Chest:              No cough, phlegm or wheezing. Abdomen:  + abdominal pain, + nausea & vomiting. Neuro:  No focal weakness, abnormal movements orseizure like activity. Skin:   No rashes, no itching. :   No hematuria, no pyuria, no dysuria, no flank pain. Extremities:  + swelling or joint pains. ROS was otherwise negative except as mentioned in the [address]      Objective:  Constitutional:    CURRENT TEMPERATURE:  Temp: 98.1 °F (36.7 °C)  MAXIMUM TEMPERATURE OVER 24HRS:  Temp (24hrs), Av.6 °F (36.4 °C), Min:97 °F (36.1 °C), Max:98.1 °F (36.7 °C)    CURRENT RESPIRATORY RATE:  Resp: 18  CURRENT PULSE:  Pulse: 95  CURRENT BLOOD PRESSURE:  BP: (!) 159/85  24HR BLOOD PRESSURE RANGE:  Systolic (45XVW), AIC:175 , Min:146 , PVI:139   ; Diastolic (37UBY), PTT:56, Min:85, Max:87    24HR INTAKE/OUTPUT: No intake or output data in the 24 hours ending 05/01/21 1312      Physical Exam:  AAO x 3,          speaking in full sentences, no accessory muscle use. HEENT: Atraumatic, normocephalic, no throat congestion, moist mucosa. Eyes:   Pupils equal, round and reactive to light, EOMI. Neck:   Supple  Chest:              Bilateral vesicular breath sounds, no rales or wheezes. Cardiac:  S1 S2 RR, no murmurs, gallops or rubs . Abdomen: Soft, tender, mildly distended. no masses or organomegaly, BS hypoactive. :   No suprapubic or flank tenderness. Neuro:  AAO x 3, No FND. SKIN:  No rashes, good skin turgor. Extremities:  +1 edema, palpable peripheral pulses, no calf tenderness    Labs:  PTH:   Lab Results   Component Value Date    IPTH 144.1 11/29/2017     CBC:   Recent Labs     05/01/21  0833   WBC 7.3   RBC 3.53*   HGB 10.8*   HCT 34.0*   MCV 96.3   MCH 30.6   MCHC 31.8   RDW 14.6*      MPV 10.0      BMP:   Recent Labs     05/01/21  0833   *   K 4.3   CL 93*   CO2 26   BUN 51*   CREATININE 5.10*   GLUCOSE 325*   CALCIUM 9.4      IRON :IRON:    Lab Results   Component Value Date    IRON 13 11/25/2018     TIBC:    Lab Results   Component Value Date    TIBC 156 11/25/2018     FERRITIN:    Lab Results   Component Value Date    FERRITIN 363 11/25/2018     Albumin:   Recent Labs     05/01/21  0833   LABALBU 4.1     Assessment:  1. ESRD on Hemodialysis. His regular HD days are [place] [facility] using left AV fistula under Dr. Cherylene Redman. His dry weight is 114 kgs. 2. CHF exacerbation - cardiology consulted  3. SBO - general surgery managing. 4.  History of coronary artery disease and stent placement to distal LAD. 5.  Anemia of chronic disease. 6. Secondary hyperparathyroidism. 7. Hypertension. Plan:  1. HD as per schedule. Follow renal labs   2. Strict Input and Output, Daily weigh   3. Low Potassium, Low phosphorus and low salt diet.  Fluids  restricted to 1500ml/day. 4. IV Aranesp/Epogen for anemia of chronic disease with HD weekly. 5. IV Zemplar per protocol for secondary hyperparathyroidism   6. Labs in am  7. Following    Thank you for the consultation. Please do not hesitate to call with questions. Electronically signed by Margarita Stout CNP, APRN - CNP on 5/1/2021 at 1:12 PM    68-year-old  gentleman with known history of ESRD on hemodialysis Tuesday Thursday Saturday at the [facility] unit via upper extremity AV fistula on the left side under Dr. Duc Singh. Has been on dialysis for 2 years. Dry weight 114 kg. He comes into the hospital with a 3 to 4-day history of progressive worsening abdominal distention, denies any pain, he also admitted to lower extremity edema. He admits that he was gaining weight but feels this because of abdominal distention. He does not admit to obstipation or constipation as such. It does admit to dry cough. On presentation to the ER he was found to have dilated loop of small bowel with proximal and distal decompressed small bowel some concern for developing blind loop obstruction. NG tube has been recommended. Patient denies denies any abdominal pain. No nausea vomiting as such. Neurologically stable  Impression ESRD on dialysis  2. Small bowel obstruction  3. Hypertension  Plan  1. Hemodialysis today  2.  3 to 4 L of ultrafiltration  4. Agree with NG tube placement  5. Next dialysis on Tuesday  6.   We will follow

## 2021-05-01 NOTE — CARE COORDINATION
Case Management Initial Discharge Plan  Katrina Connor,             Met with:patient to discuss discharge plans. Information verified: address, contacts, phone number, , insurance Yes    Emergency Contact/Next of Kin name & number: Sola Farris (brother) [phone]    PCP: Samy Rubio  Date of last visit: 6 weeks    Insurance Provider: Medicare and [address]    Discharge Planning    Living Arrangements:  Alone   Support Systems:  Family Members    Home has 2 stories  3 stairs to climb to get into front door, 1 flight stairs to climb to reach second floor  Location of bedroom/bathroom in home 2nd    Patient able to perform ADL's:Independent    Current Services (outpatient & in home)   DME equipment:   DME provider:     Receiving oral anticoagulation therapy? No    If indicated:   Physician managing anticoagulation treatment:   Where does patient obtain lab work for ATC treatment? Potential Assistance Needed:  N/A    Patient agreeable to home care: No  [place] of choice provided:  n/a    Prior SNF/Rehab Placement and Facility:   Agreeable to SNF/Rehab: No  [place] of choice provided: n/a     Evaluation: no    Expected Discharge date:       Patient expects to be discharged to:  home  Follow Up Appointment: Best Day/ Time:      Transportation provider:   Transportation arrangements needed for discharge: No    Readmission Risk              Risk of Unplanned Readmission:        16             Does patient have a readmission risk score greater than 14?: Yes  If yes, follow-up appointment must be made within 7 days of discharge.      Goals of Care: breathe easier      Discharge Plan: home independently          Electronically signed by Amadou Javier RN on 21 at 6:27 PM EDT

## 2021-05-01 NOTE — PROGRESS NOTES
Dialysis Post Treatment Note  Vitals:    05/01/21 1655   BP: 131/83   Pulse: 88   Resp: 17   Temp: 98.8 °F (37.1 °C)   SpO2:      Pre-Weight = 122.3kg  Post-weight = Weight: 262 lb 9.1 oz (119.1 kg)  Total Liters Processed = Total Liters Processed (l/min): 96 l/min  Rinseback Volume (mL) = Rinseback Volume (ml): 300 ml  Net Removal (mL) = NET Removed (ml): 2900 ml  Patient's dry weight=119.0kg  Type of access used=avg  Length of treatment=210        Tx ended w/o diff.

## 2021-05-01 NOTE — Clinical Note
Patient Class: Inpatient [101]   REQUIRED: Diagnosis: Small bowel obstruction ([facility] [[id number]]   Estimated Length of Stay: Estimated stay of more than 2 midnights   Telemetry Bed Required?: Yes

## 2021-05-01 NOTE — ED NOTES
notified dialysis  That per v/o dr Hamzah Carlisle pt may go to dialysis for treatment. Pt updated.       Sebastián Walls RN  05/01/21 1148

## 2021-05-01 NOTE — PROGRESS NOTES
RAPID Covid 19 swab taken from right nare, labeled, placed in red dot bag, and handed off to second healthcare worker outside of room for transport to laboratory per hospital policy and procedure. Patient tolerated procedure well. Patient swabbed in Dialysis unit.     Swab sent to Lab via tube system

## 2021-05-01 NOTE — PROGRESS NOTES
Attending Physician Statement  I have discussed the case, including pertinent history and exam findings with the resident and the team.  I have seen and examined the patient and the key elements of the encounter have been performed by me. I agree with the assessment, plan and orders as documented by the resident. Please refer to resident note for details    Review of Systems:   In addition to the pertinent positives and negatives as stated within HPI and the review of systems as documented in their notes, all other systems were reviewed when able to and are reported negative. 64-year-old gentleman with history of end-stage renal disease on hemodialysis, came in because of sedation. He is also having coughing for the last more than 6 months. He is followed up with PCP. Abdominal distention has been going on for some time but then today he noticed that he could not have a bowel movement. Denies any chest pain. He is having some dry cough and that is chronic for the last more than 6 months. Found to be in fluid overload and bowel obstruction.   Currently undergoing hemodialysis    Admit the patient inpatient  Place NG tube  General surgery evaluation  Monitor bowel sounds and serial abdominal exam  Obtain chest x-ray after hemodialysis  We discussed nephrology, may be his dialysis weight needs to be adjusted  Troponins are at his baseline and we will monitor them        Rosa Villegas MD  Attending Physician, Internal Medicine Service    Internal Medicine Residency Program  5/1/2021, 11:26 PM

## 2021-05-02 ENCOUNTER — APPOINTMENT (OUTPATIENT)
Dept: GENERAL RADIOLOGY | Age: 61
DRG: 291 | End: 2021-05-02
Payer: COMMERCIAL

## 2021-05-02 LAB
ABSOLUTE EOS #: 0 K/UL (ref 0–0.4)
ABSOLUTE IMMATURE GRANULOCYTE: 0 K/UL (ref 0–0.3)
ABSOLUTE LYMPH #: 0.79 K/UL (ref 1–4.8)
ABSOLUTE MONO #: 0.73 K/UL (ref 0.1–0.8)
ANION GAP SERPL CALCULATED.3IONS-SCNC: 15 MMOL/L (ref 9–17)
BASOPHILS # BLD: 1 % (ref 0–2)
BASOPHILS ABSOLUTE: 0.06 K/UL (ref 0–0.2)
BUN BLDV-MCNC: 31 MG/DL (ref 8–23)
BUN/CREAT BLD: ABNORMAL (ref 9–20)
CALCIUM SERPL-MCNC: 9.2 MG/DL (ref 8.6–10.4)
CHLORIDE BLD-SCNC: 98 MMOL/L (ref 98–107)
CO2: 24 MMOL/L (ref 20–31)
CREAT SERPL-MCNC: 3.79 MG/DL (ref 0.7–1.2)
DIFFERENTIAL TYPE: ABNORMAL
EOSINOPHILS RELATIVE PERCENT: 0 % (ref 1–4)
ESTIMATED AVERAGE GLUCOSE: 235 MG/DL
GFR AFRICAN AMERICAN: 20 ML/MIN
GFR NON-AFRICAN AMERICAN: 16 ML/MIN
GFR SERPL CREATININE-BSD FRML MDRD: ABNORMAL ML/MIN/{1.73_M2}
GFR SERPL CREATININE-BSD FRML MDRD: ABNORMAL ML/MIN/{1.73_M2}
GLUCOSE BLD-MCNC: 126 MG/DL (ref 75–110)
GLUCOSE BLD-MCNC: 151 MG/DL (ref 75–110)
GLUCOSE BLD-MCNC: 181 MG/DL (ref 75–110)
GLUCOSE BLD-MCNC: 186 MG/DL (ref 70–99)
GLUCOSE BLD-MCNC: 247 MG/DL (ref 75–110)
HBA1C MFR BLD: 9.8 % (ref 4–6)
HCT VFR BLD CALC: 35 % (ref 40.7–50.3)
HEMOGLOBIN: 11.2 G/DL (ref 13–17)
IMMATURE GRANULOCYTES: 0 %
LACTIC ACID, WHOLE BLOOD: 1.5 MMOL/L (ref 0.7–2.1)
LYMPHOCYTES # BLD: 13 % (ref 24–44)
MAGNESIUM: 1.8 MG/DL (ref 1.6–2.6)
MAGNESIUM: 2.1 MG/DL (ref 1.6–2.6)
MCH RBC QN AUTO: 30.4 PG (ref 25.2–33.5)
MCHC RBC AUTO-ENTMCNC: 32 G/DL (ref 28.4–34.8)
MCV RBC AUTO: 95.1 FL (ref 82.6–102.9)
MONOCYTES # BLD: 12 % (ref 1–7)
MORPHOLOGY: NORMAL
NRBC AUTOMATED: 0 PER 100 WBC
PDW BLD-RTO: 14.4 % (ref 11.8–14.4)
PLATELET # BLD: 142 K/UL (ref 138–453)
PLATELET ESTIMATE: ABNORMAL
PMV BLD AUTO: 10.6 FL (ref 8.1–13.5)
POTASSIUM SERPL-SCNC: 4.1 MMOL/L (ref 3.7–5.3)
RBC # BLD: 3.68 M/UL (ref 4.21–5.77)
RBC # BLD: ABNORMAL 10*6/UL
SEG NEUTROPHILS: 74 % (ref 36–66)
SEGMENTED NEUTROPHILS ABSOLUTE COUNT: 4.52 K/UL (ref 1.8–7.7)
SODIUM BLD-SCNC: 137 MMOL/L (ref 135–144)
TROPONIN INTERP: ABNORMAL
TROPONIN INTERP: ABNORMAL
TROPONIN T: ABNORMAL NG/ML
TROPONIN T: ABNORMAL NG/ML
TROPONIN, HIGH SENSITIVITY: 107 NG/L (ref 0–22)
TROPONIN, HIGH SENSITIVITY: 119 NG/L (ref 0–22)
WBC # BLD: 6.1 K/UL (ref 3.5–11.3)
WBC # BLD: ABNORMAL 10*3/UL

## 2021-05-02 PROCEDURE — 36415 COLL VENOUS BLD VENIPUNCTURE: CPT

## 2021-05-02 PROCEDURE — 99232 SBSQ HOSP IP/OBS MODERATE 35: CPT | Performed by: INTERNAL MEDICINE

## 2021-05-02 PROCEDURE — 6360000002 HC RX W HCPCS: Performed by: STUDENT IN AN ORGANIZED HEALTH CARE EDUCATION/TRAINING PROGRAM

## 2021-05-02 PROCEDURE — 93005 ELECTROCARDIOGRAM TRACING: CPT | Performed by: STUDENT IN AN ORGANIZED HEALTH CARE EDUCATION/TRAINING PROGRAM

## 2021-05-02 PROCEDURE — 6370000000 HC RX 637 (ALT 250 FOR IP): Performed by: STUDENT IN AN ORGANIZED HEALTH CARE EDUCATION/TRAINING PROGRAM

## 2021-05-02 PROCEDURE — 2580000003 HC RX 258: Performed by: STUDENT IN AN ORGANIZED HEALTH CARE EDUCATION/TRAINING PROGRAM

## 2021-05-02 PROCEDURE — 84484 ASSAY OF TROPONIN QUANT: CPT

## 2021-05-02 PROCEDURE — 74250 X-RAY XM SM INT 1CNTRST STD: CPT

## 2021-05-02 PROCEDURE — 83605 ASSAY OF LACTIC ACID: CPT

## 2021-05-02 PROCEDURE — 83735 ASSAY OF MAGNESIUM: CPT

## 2021-05-02 PROCEDURE — 82947 ASSAY GLUCOSE BLOOD QUANT: CPT

## 2021-05-02 PROCEDURE — 6360000004 HC RX CONTRAST MEDICATION: Performed by: STUDENT IN AN ORGANIZED HEALTH CARE EDUCATION/TRAINING PROGRAM

## 2021-05-02 PROCEDURE — 94660 CPAP INITIATION&MGMT: CPT

## 2021-05-02 PROCEDURE — 80048 BASIC METABOLIC PNL TOTAL CA: CPT

## 2021-05-02 PROCEDURE — 85025 COMPLETE CBC W/AUTO DIFF WBC: CPT

## 2021-05-02 PROCEDURE — 1200000000 HC SEMI PRIVATE

## 2021-05-02 PROCEDURE — 83036 HEMOGLOBIN GLYCOSYLATED A1C: CPT

## 2021-05-02 RX ORDER — LOSARTAN POTASSIUM 25 MG/1
25 TABLET ORAL DAILY
Status: DISCONTINUED | OUTPATIENT
Start: 2021-05-02 | End: 2021-05-03 | Stop reason: HOSPADM

## 2021-05-02 RX ORDER — MAGNESIUM SULFATE 1 G/100ML
1000 INJECTION INTRAVENOUS ONCE
Status: COMPLETED | OUTPATIENT
Start: 2021-05-02 | End: 2021-05-02

## 2021-05-02 RX ADMIN — SODIUM CHLORIDE, PRESERVATIVE FREE 10 ML: 5 INJECTION INTRAVENOUS at 10:31

## 2021-05-02 RX ADMIN — FUROSEMIDE 40 MG: 40 TABLET ORAL at 16:34

## 2021-05-02 RX ADMIN — Medication 81 MG: at 10:30

## 2021-05-02 RX ADMIN — HEPARIN SODIUM 5000 UNITS: 5000 INJECTION INTRAVENOUS; SUBCUTANEOUS at 21:56

## 2021-05-02 RX ADMIN — MAGNESIUM SULFATE HEPTAHYDRATE 1000 MG: 1 INJECTION, SOLUTION INTRAVENOUS at 04:10

## 2021-05-02 RX ADMIN — IOHEXOL 300 ML: 240 INJECTION, SOLUTION INTRATHECAL; INTRAVASCULAR; INTRAVENOUS; ORAL at 08:32

## 2021-05-02 RX ADMIN — LOSARTAN POTASSIUM 25 MG: 25 TABLET, FILM COATED ORAL at 10:29

## 2021-05-02 RX ADMIN — FUROSEMIDE 40 MG: 40 TABLET ORAL at 10:29

## 2021-05-02 RX ADMIN — CARVEDILOL 6.25 MG: 12.5 TABLET, FILM COATED ORAL at 16:34

## 2021-05-02 RX ADMIN — INSULIN LISPRO 2 UNITS: 100 INJECTION, SOLUTION INTRAVENOUS; SUBCUTANEOUS at 12:34

## 2021-05-02 RX ADMIN — HEPARIN SODIUM 5000 UNITS: 5000 INJECTION INTRAVENOUS; SUBCUTANEOUS at 05:29

## 2021-05-02 RX ADMIN — INSULIN LISPRO 2 UNITS: 100 INJECTION, SOLUTION INTRAVENOUS; SUBCUTANEOUS at 21:56

## 2021-05-02 RX ADMIN — SODIUM CHLORIDE, PRESERVATIVE FREE 10 ML: 5 INJECTION INTRAVENOUS at 21:59

## 2021-05-02 RX ADMIN — DESMOPRESSIN ACETATE 40 MG: 0.2 TABLET ORAL at 21:56

## 2021-05-02 RX ADMIN — HEPARIN SODIUM 5000 UNITS: 5000 INJECTION INTRAVENOUS; SUBCUTANEOUS at 12:33

## 2021-05-02 RX ADMIN — CARVEDILOL 6.25 MG: 12.5 TABLET, FILM COATED ORAL at 10:29

## 2021-05-02 ASSESSMENT — ENCOUNTER SYMPTOMS
ABDOMINAL DISTENTION: 1
EYES NEGATIVE: 1
CONSTIPATION: 0
COUGH: 1
VOMITING: 0
ALLERGIC/IMMUNOLOGIC NEGATIVE: 1
NAUSEA: 0
ABDOMINAL PAIN: 0
DIARRHEA: 0

## 2021-05-02 ASSESSMENT — PAIN SCALES - GENERAL: PAINLEVEL_OUTOF10: 0

## 2021-05-02 NOTE — PROGRESS NOTES
Physical Therapy    DATE: 2021    NAME: Winter Gross  MRN: [id number]   : 1960      Patient not seen this date for Physical Therapy due to:    Testing: Xray       Electronically signed by Kelly Barber PT on 2021 at 9:31 AM

## 2021-05-02 NOTE — H&P
room air. Labs were remarkable for elevated creatinine 5.10, elevated BUN 51, hyponatremia with sodium 133, elevated proBNP 16,898 above baseline. CXR showed cardiomegaly with perihilar congestion progressed from previous study. CT abdomen pelvis with IV contrast showed isolated segment of small bowel distention concerning for closed-loop obstruction. General surgery was consulted, recommended NG tube placement and stomach decompression. Review of Systems:  Review of Systems   Constitutional: Positive for fatigue and unexpected weight change. HENT: Negative. Eyes: Negative. Respiratory: Positive for cough. Cardiovascular: Positive for leg swelling. Gastrointestinal: Positive for abdominal distention. Negative for abdominal pain, constipation, diarrhea, nausea and vomiting. Endocrine: Negative. Genitourinary: Positive for scrotal swelling. Negative for difficulty urinating. Skin: Negative. Allergic/Immunologic: Negative. Neurological: Negative. PAST MEDICAL HISTORY     Past Medical History:   Diagnosis Date    Acute congestive heart failure ([address] 12/21/2016    Acute on chronic diastolic congestive heart failure ([address] 11/24/2018    Anemia 11/25/2018    Anemia of chronic renal failure 10/17/2016    AVF (arteriovenous fistula) (HCC)     Bowel obstruction ([address] 12/26/2013    CAD (coronary artery disease) 2013    ?     Chest pain at rest 12/21/2016    CHF (congestive heart failure) ([address] 2013    last episode 11/2018    CHF (congestive heart failure), NYHA class I, acute on chronic, combined ([address] 2/9/2021    Chronic kidney disease     CKD (chronic kidney disease) stage 4, GFR 15-29 ml/min ([address] 10/17/2016    Coronary artery disease involving native coronary artery of native heart without angina pectoris     Diabetes mellitus ([address] 2012    NIDDM    Dialysis patient ([address]     Mickael Fabry  /  Maritza Sierra  /  Clarissa Lawrence    ESRD (end stage renal disease) ([address]     Essential hypertension 11/25/2018    Groin swelling 07/17/2019    Hemodialysis patient ([address] 11/28/2018    TUNNELD CATHETER RIGHT DIALYSIS ACCESSTUES THURS AND AT [facility]    Hydrocele 07/17/2019    Hyperlipidemia 2013    ON RX    Hypertension 2013    ON RX    MI, old 12/26/2013    NSTEMI (non-ST elevated myocardial infarction) ([address] 6/17/2019    BATSHEVA (obstructive sleep apnea)     Patient in clinical research study 04/01/2019    XIENCE SHORT DAPT    Pneumonia     Prostatism 11/17/2017    Respiratory distress 11/25/2018    Rising PSA level 11/17/2017    S/P arteriovenous (AV) graft placement     S/P PTCA - TIM distal LAD - Dr. Reece Garrido 4/1/19 4/1/2019    Sleep apnea 2015    pt has machine and does not use it    Type 2 diabetes mellitus with chronic kidney disease on chronic dialysis ([address] 10/17/2016       PAST SURGICAL HISTORY     Past Surgical History:   Procedure Laterality Date    ABDOMEN SURGERY  2013    BOWEL OBSTRUCTION    AV FISTULA CREATION Left 02/20/2019    AV FISTULA REPAIR  07/17/2019    AV fistula revision, branch ligation / Percutaneous angioplasty of proximal anastomosis and outflow tract of dialysis circuit with drug coated balloon  /  Dr Drew Szymanski  02/09/2021    Dr. Blaine Aceves, Caryle Munroe  03/31/2019    TIM LAD    CYSTOSCOPY  11/17/2017    DIALYSIS FISTULA CREATION Left 2/20/2019    AV FISTULA CREATION ARM performed by Johnny Mclaughlin MD at [address] [place] [address] 10/23/2019    LEFT UPPER EXTREMITY AV GRAFT CREATION WITH [id number] ARTEGRAFT, LEFT UPPER EXTREMITY AV FISTULA LIGATION performed by Johnny Mclaughlin MD at [facility] 2005    [address]  2007    1 WISDOM TOOTH REMOVED    NOSE SURGERY  1968    CAUTERY TO STOP NOSE BEEDS    OTHER SURGICAL HISTORY Left 03/06/2019 fistulagram    IA GENITAL SURG PROC,MALE UNLISTED N/A 2/16/2018    US  PROSTATE BIOPSY WITH SATURATION performed by Danitza Lyn MD at [facility] 11/17/2017    CYSTOSCOPY PROSTATE US BIOPSY performed by Danitza Lyn MD at [address]  02/16/2018   [facility] VASCULAR SURGERY  09/20/2019    LUE FISTULAGRAM  /  DR Archer  /  Findings: Severe stenosis of proximal cephalic vein. / Will plan for LUE AVG placement.  VASECTOMY  1983       ALLERGIES     Lisinopril    MEDICATIONS PRIOR TO ADMISSION     Prior to Admission medications    Medication Sig Start Date End Date Taking?  Authorizing Provider   losartan (COZAAR) 25 MG tablet take 1 tablet by mouth once daily 3/21/21  Yes Historical Provider, MD   glimepiride (AMARYL) 1 MG tablet Take 1 mg by mouth every morning (before breakfast)   Yes Historical Provider, MD   atorvastatin (LIPITOR) 40 MG tablet Take 1 tablet by mouth nightly 2/9/21  Yes Will Wilson MD   traMADol Oneita Ricardo) 50 MG tablet take 1 tablet by mouth every 6 hours if needed 10/21/20  Yes Historical Provider, MD   aspirin 325 MG tablet Take 325 mg by mouth daily   Yes Historical Provider, MD   calcium carbonate (TUMS) 500 MG chewable tablet Take 1 tablet by mouth daily as needed    Yes Historical Provider, MD   carvedilol (COREG) 6.25 MG tablet Take 1 tablet by mouth 2 times daily (with meals) 6/19/19  Yes Nuvia Chang MD   Multiple Vitamins-Minerals (RENAPLEX-D) TABS Take 1 tablet by mouth every other day Pt takes occasionally 5/14/19  Yes Historical Provider, MD   lidocaine-prilocaine (EMLA) 2.5-2.5 % cream  3/18/19  Yes Historical Provider, MD   NIFEdipine (ADALAT CC) 30 MG extended release tablet Take 30 mg by mouth 2 times daily   Yes Historical Provider, MD   furosemide (LASIX) 40 MG tablet Take 40 mg by mouth 2 times daily    Yes Historical Provider, MD   benzonatate (TESSALON) 100 MG capsule take 1 capsule by mouth three times a day if needed for 10 days 3/3/21   Historical Provider, MD   guaiFENesin-codeine (GUAIFENESIN AC) 100-10 MG/5ML liquid give 10 milliliters by mouth every 4 hours if needed 3/4/21   Historical Provider, MD       SOCIAL HISTORY     Tobacco: Denied  Alcohol: Denied  Illicits: Denied  Occupation:     FAMILY HISTORY     Family History   Problem Relation Age of Onset    Heart Disease Mother         CHF    Early Death Mother     High Blood Pressure Brother     Diabetes Brother     Asthma Brother     High Blood Pressure Brother     Diabetes Brother     High Blood Pressure Brother     Diabetes Brother        PHYSICAL EXAM     Vitals: BP (!) 134/92   Pulse 95   Temp 98 °F (36.7 °C) (Oral)   Resp 12   Ht 5' 10\" (1.778 m)   Wt 260 lb 9.3 oz (118.2 kg)   SpO2 100%   BMI 37.39 kg/m²   Tmax: Temp (24hrs), Av °F (36.7 °C), Min:97 °F (36.1 °C), Max:98.8 °F (37.1 °C)    Last Body weight:   Wt Readings from Last 3 Encounters:   21 260 lb 9.3 oz (118.2 kg)   21 268 lb (121.6 kg)   21 253 lb 1.4 oz (114.8 kg)     Body Mass Index : Body mass index is 37.39 kg/m². PHYSICAL EXAMINATION:  Constitutional: This is a well developed, well nourished, 35-39.9 - Obesity Grade II 56y.o. year old male who is alert, oriented, cooperative and in no apparent distress. Head:normocephalic and atraumatic. EENT:  PERRLA. No conjunctival injections. Septum was midline, mucosa was without erythema, exudates or cobblestoning. No thrush was noted. Neck: Supple without thyromegaly. No elevated JVP. Trachea was midline. Respiratory: Chest was symmetrical without dullness to percussion. Breath sounds bilaterally were clear to auscultation. There were no wheezes, rhonchi or rales. There is no intercostal retraction or use of accessory muscles. No egophony noted. Cardiovascular: Regular without murmur, clicks, gallops or rubs.    Abdomen: Abdominal distention, no tenderness to palpation, distant bowel sounds  Lymphatic: No lymphadenopathy. Musculoskeletal: Normal curvature of the spine. No gross muscle weakness. Extremities: 2+ pitting edema in bilateral lower extremities  Skin:  Warm and dry. Good color, turgor and pigmentation. No lesions or scars.   No cyanosis or clubbing  Neurological/Psychiatric: The patient's general behavior, level of consciousness, thought content and emotional status is normal.          INVESTIGATIONS     Laboratory Testing:     Recent Results (from the past 24 hour(s))   CBC Auto Differential    Collection Time: 05/01/21  8:33 AM   Result Value Ref Range    WBC 7.3 3.5 - 11.3 k/uL    RBC 3.53 (L) 4.21 - 5.77 m/uL    Hemoglobin 10.8 (L) 13.0 - 17.0 g/dL    Hematocrit 34.0 (L) 40.7 - 50.3 %    MCV 96.3 82.6 - 102.9 fL    MCH 30.6 25.2 - 33.5 pg    MCHC 31.8 28.4 - 34.8 g/dL    RDW 14.6 (H) 11.8 - 14.4 %    Platelets 901 404 - 335 k/uL    MPV 10.0 8.1 - 13.5 fL    NRBC Automated 0.0 0.0 per 100 WBC    Differential Type NOT REPORTED     WBC Morphology NOT REPORTED     RBC Morphology NOT REPORTED     Platelet Estimate NOT REPORTED     Immature Granulocytes 1 (H) 0 %    Seg Neutrophils 78 (H) 36 - 66 %    Lymphocytes 11 (L) 24 - 44 %    Monocytes 7 1 - 7 %    Eosinophils % 2 1 - 4 %    Basophils 1 0 - 2 %    Absolute Immature Granulocyte 0.07 0.00 - 0.30 k/uL    Segs Absolute 5.70 1.8 - 7.7 k/uL    Absolute Lymph # 0.80 (L) 1.0 - 4.8 k/uL    Absolute Mono # 0.51 0.1 - 0.8 k/uL    Absolute Eos # 0.15 0.0 - 0.4 k/uL    Basophils Absolute 0.07 0.0 - 0.2 k/uL    Morphology ANISOCYTOSIS PRESENT    Comprehensive Metabolic Panel w/ Reflex to MG    Collection Time: 05/01/21  8:33 AM   Result Value Ref Range    Glucose 325 (H) 70 - 99 mg/dL    BUN 51 (H) 8 - 23 mg/dL    CREATININE 5.10 (HH) 0.70 - 1.20 mg/dL    Bun/Cre Ratio NOT REPORTED 9 - 20    Calcium 9.4 8.6 - 10.4 mg/dL    Sodium 133 (L) 135 - 144 mmol/L    Potassium 4.3 3.7 - 5.3 mmol/L    Chloride 93 (L) 98 - 107 mmol/L    CO2 26 20 - 31 mmol/L    Anion Gap Specimen Source . BLOOD     Ordered Test LACWB     Reason for Rejection       Unable to perform testing: Specimen not processed correctly.    - NOT REPORTED    Lactic Acid, Whole Blood    Collection Time: 05/01/21  1:11 PM   Result Value Ref Range    Lactic Acid, Whole Blood 0.7 0.7 - 2.1 mmol/L   COVID-19, Rapid    Collection Time: 05/01/21  2:16 PM    Specimen: Nasopharyngeal Swab   Result Value Ref Range    Specimen Description . NASOPHARYNGEAL SWAB     SARS-CoV-2, Rapid Not Detected Not Detected   Troponin    Collection Time: 05/01/21  7:43 PM   Result Value Ref Range    Troponin, High Sensitivity 115 (HH) 0 - 22 ng/L    Troponin T NOT REPORTED <0.03 ng/mL    Troponin Interp NOT REPORTED    Hepatic Function Panel    Collection Time: 05/01/21  7:43 PM   Result Value Ref Range    Albumin 4.1 3.5 - 5.2 g/dL    Alkaline Phosphatase 121 40 - 129 U/L    ALT 21 5 - 41 U/L    AST 17 <40 U/L    Total Bilirubin 0.87 0.3 - 1.2 mg/dL    Bilirubin, Direct 0.28 <0.31 mg/dL    Bilirubin, Indirect 0.59 0.00 - 1.00 mg/dL    Total Protein 7.9 6.4 - 8.3 g/dL    Globulin NOT REPORTED 1.5 - 3.8 g/dL    Albumin/Globulin Ratio 1.1 1.0 - 2.5   POC Glucose Fingerstick    Collection Time: 05/01/21  9:23 PM   Result Value Ref Range    POC Glucose 155 (H) 75 - 110 mg/dL       Imaging:   Ct Abdomen Pelvis W Iv Contrast Additional Contrast? None    Result Date: 5/1/2021  1. Isolated segment of small bowel distension, concerning for developing closed loop obstruction. 2.  Ventral hernia containing a loop of small bowel without significant upstream dilatation or inflammatory change. There also appears to be an adjacent ventral hernia involving a short segment of small bowel, as described above. 3.  Status post left lower quadrant ventral hernia repair. 4.  Stable bilateral adrenal nodules, favoring a benign process given the stability since at least 2019. Findings were discussed with WING RUSSO at 10:22 am on 5/1/2021.      Xr Chest Portable    Result Date: 5/1/2021  Cardiomegaly with perihilar congestion/perihilar edema. Findings have progressed from prior exam through       [address] / PLAN:     Combined systolic and diastolic CHF exacerbation:  -Patient underwent dialysis this AM.  -Continue with home dose of oral Lasix 40 mg twice daily  -Continue with Coreg 6.25 mg daily, restart losartan 25 mg daily, continue with aspirin, Lipitor daily. ESRD on hemodialysis:  -Continue dialysis, nephrology following.  -Fluid restriction of 1500 mL. Small bowel obstruction:  -CT showed dilated loop of small bowel with proximal and distal decompressed small bowel  -General surgery following, patient refused NG tube.  -N.p.o. for now. Continue with serial abdominal exams, monitor lactic acid. BATSHEVA:  -BiPAP nightly    Hypertension:  -Restart losartan 25 mg daily tomorrow  -Continue with oral Lasix 40 mg twice daily, Coreg 6.25 mg twice daily        DVT ppx: Heparin  GI ppx: Pepcid    PT/OT/SW: Ordered  Discharge Planning: ongoing    Carlos Cleary MD  Internal Medicine Resident, PGY-1  [address], [place]  5/1/2021, 11:32 PM

## 2021-05-02 NOTE — PROGRESS NOTES
Renal Progress Note    Patient :  Vesta Alvarenga; 56 y.o. MRN# [id number]  Location:  5420/6479-44  Attending:  Jyoti Ryder MD  Admit Date:  5/1/2021   Hospital Day: 1      Subjective: [place]ang[place] [facility] [facility], admitted with abdominal pain and fluid overload. Had HD yesterday 2900 removed. Small bowel studies being completed today. Passing gas. Abdominal pain resolved. No small bowel obstruction found, contrast passed freely through. Diet being advanced. Still about 4 to 5 kg heavier than admission weight. Some of the abdominal distention is noticing his fluid buildup. Agreeable to extra treatment tomorrow. Significant lower extremity edema.   Hemodynamically stable    Outpatient Medications:     Medications Prior to Admission: losartan (COZAAR) 25 MG tablet, take 1 tablet by mouth once daily  glimepiride (AMARYL) 1 MG tablet, Take 1 mg by mouth every morning (before breakfast)  atorvastatin (LIPITOR) 40 MG tablet, Take 1 tablet by mouth nightly  traMADol (ULTRAM) 50 MG tablet, take 1 tablet by mouth every 6 hours if needed  aspirin 325 MG tablet, Take 325 mg by mouth daily  calcium carbonate (TUMS) 500 MG chewable tablet, Take 1 tablet by mouth daily as needed   carvedilol (COREG) 6.25 MG tablet, Take 1 tablet by mouth 2 times daily (with meals)  Multiple Vitamins-Minerals (RENAPLEX-D) TABS, Take 1 tablet by mouth every other day Pt takes occasionally  lidocaine-prilocaine (EMLA) 2.5-2.5 % cream,   NIFEdipine (ADALAT CC) 30 MG extended release tablet, Take 30 mg by mouth 2 times daily  furosemide (LASIX) 40 MG tablet, Take 40 mg by mouth 2 times daily   benzonatate (TESSALON) 100 MG capsule, take 1 capsule by mouth three times a day if needed for 10 days  guaiFENesin-codeine (GUAIFENESIN AC) 100-10 MG/5ML liquid, give 10 milliliters by mouth every 4 hours if needed    Current Medications:     Scheduled Meds:    losartan  25 mg Oral Daily    atorvastatin 40 mg Oral Nightly    sodium chloride flush  5-40 mL Intravenous 2 times per day    carvedilol  6.25 mg Oral BID WC    insulin lispro  0-12 Units Subcutaneous TID WC    insulin lispro  0-6 Units Subcutaneous Nightly    furosemide  40 mg Oral BID    heparin (porcine)  5,000 Units Subcutaneous 3 times per day    aspirin  81 mg Oral Daily     Continuous Infusions:    sodium chloride      dextrose       PRN Meds:  sodium chloride flush, sodium chloride, promethazine **OR** ondansetron, polyethylene glycol, acetaminophen **OR** acetaminophen, glucose, dextrose, glucagon (rDNA), dextrose    Input/Output:       I/O last 3 completed shifts: In: 300   Out: 3700 [Urine:500]. Patient Vitals for the past 96 hrs (Last 3 readings):   Weight   21 1810 260 lb 9.3 oz (118.2 kg)   21 1655 262 lb 9.1 oz (119.1 kg)   21 1247 269 lb 10 oz (122.3 kg)       Vital Signs:   Temperature:  Temp: 98.2 °F (36.8 °C)  TMax:   Temp (24hrs), Av.3 °F (36.8 °C), Min:97.9 °F (36.6 °C), Max:98.8 °F (37.1 °C)    Respirations:  Resp: 20  Pulse:   Pulse: 97  BP:    BP: (!) 150/100  BP Range: Systolic (64HAS), WNQ:200 , Min:119 , IG       Diastolic (00OMX), VV, Min:83, Max:101      Physical Examination:     General:  AAO x 3, speaking in full sentences, no accessory muscle use. HEENT: Atraumatic, normocephalic, no throat congestion, moist mucosa. Eyes:   Pupils equal, round and reactive to light, EOMI. Neck:   No JVD, no thyromegaly, no lymphadenopathy. Chest:              Bilateral vesicular breath sounds, no rales or wheezes. Cardiac:  S1 S2 RR, no murmurs, gallops or rubs, JVP not raised. Abdomen: Some distention. :   No suprapubic or flank tenderness. Neuro:  AAO x 3, No FND. SKIN:  No rashes, good skin turgor. Extremities:  2+ edema, palpable peripheral pulses, no calf tenderness.     Labs:       Recent Labs     21  0833 21  0623   WBC 7.3 6.1   RBC 3.53* 3.68*   HGB 10.8* 11.2* HCT 34.0* 35.0*   MCV 96.3 95.1   MCH 30.6 30.4   MCHC 31.8 32.0   RDW 14.6* 14.4    142   MPV 10.0 10.6      BMP:   Recent Labs     05/01/21  0833 05/02/21  0623   * 137   K 4.3 4.1   CL 93* 98   CO2 26 24   BUN 51* 31*   CREATININE 5.10* 3.79*   GLUCOSE 325* 186*   CALCIUM 9.4 9.2      Phosphorus:   No results for input(s): PHOS in the last 72 hours. Magnesium:    Recent Labs     05/01/21  2359   MG 1.8     Albumin:    Recent Labs     05/01/21  0833 05/01/21  1943   LABALBU 4.1 4.1     BNP:      Lab Results   Component Value Date     09/08/2018     TATIANA:    No results found for: TATIANA  SPEP:  Lab Results   Component Value Date    PROT 7.9 05/01/2021     MPO ANCA:     Lab Results   Component Value Date    MPO 25 09/14/2016     PR3 ANCA:     Lab Results   Component Value Date    PR3 51 09/14/2016     Anti-GBM:     Lab Results   Component Value Date    GBMABIGG 13 09/14/2016     Hep BsAg:         Lab Results   Component Value Date    HEPBSAG NONREACTIVE 11/27/2018     Hep C AB:        No results found for: HEPCAB    Urinalysis/Chemistries:      Lab Results   Component Value Date    NITRU NEGATIVE 05/01/2021    COLORU YELLOW 05/01/2021    PHUR 6.5 05/01/2021    WBCUA None 05/01/2021    RBCUA 2 TO 5 05/01/2021    MUCUS NOT REPORTED 05/01/2021    TRICHOMONAS NOT REPORTED 05/01/2021    YEAST NOT REPORTED 05/01/2021    BACTERIA NOT REPORTED 05/01/2021    SPECGRAV 1.018 05/01/2021    LEUKOCYTESUR NEGATIVE 05/01/2021    UROBILINOGEN Normal 05/01/2021    BILIRUBINUR NEGATIVE 05/01/2021    GLUCOSEU 3+ 05/01/2021    KETUA TRACE 05/01/2021    AMORPHOUS NOT REPORTED 05/01/2021     Urine Creatinine:     Lab Results   Component Value Date    LABCREA 73.8 11/25/2018       Radiology:     CXR:     Assessment:     1. ESRD on Hemodialysis. His regular HD days are TTS at [facility] hemod[facility] facility using left AV fistula under Dr. Indu Mitchell.  His dry weight is 114 kgs. Admitted at 119kg  2.  CHF exacerbation - cardiology consulted  3. SBO - general surgery managing. 4.  History of coronary artery disease and stent placement to distal LAD. 5.  Anemia of chronic disease. 6. Secondary hyperparathyroidism. 7. Hypertension. Plan:   1. HD per schedule  2. Strict Input and Output, Daily weigh   3. Low Potassium, Low phosphorus and low salt diet. Fluids  restricted to 1500ml/day. 4. IV Aranesp/Epogen for anemia of chronic disease with HD weekly. 5. IV Zemplar per protocol for secondary hyperparathyroidism   6. Labs in am  7. Following       Nutrition   Please ensure that patient is on a renal diet/TF. Avoid nephrotoxic drugs/contrast exposure. We will continue to follow along with you. Foster Homans CNP    Known ESRD on hemodialysis Tuesday Thursday Saturday at the [facility] unit via left AV fistula under Dr. Dylon Smith. Presented with abdominal distention mostly noticed on the anterior abdominal wall. CT scan was concerning for small bowel obstruction. Initially was placed n.p.o.  NG tube attempted which was unsuccessful. Small bowel follow-through this morning showed no obstruction. Diet being advanced. Still is about 4 kg heavier than admission weight. Plan  1.  UF only tomorrow for 2 hours aim for 2 to 3 L of fluid removal  2. Continue home dose of Cozaar  3. Fluid restriction 1.5 L a day  4. Patient stable for discharge from nephrology standpoint  5.   We will follow

## 2021-05-02 NOTE — PROGRESS NOTES
Writer attempted to place NG twice and patient was unable to tolerate it, states \"I want to be put out for this\"  Internal med intern and General surgery aware.

## 2021-05-02 NOTE — PROGRESS NOTES
05/01/2021    LABALBU 4.1 05/01/2021    CREATININE 5.10 05/01/2021    CALCIUM 9.4 05/01/2021    GFRAA 14 05/01/2021    LABGLOM 12 05/01/2021    GLUCOSE 325 05/01/2021    GLUCOSE 147 09/08/2018         ASSESSMENT  56 y.o. male presents with abdominal distention and concern for SBO. Surgical history includes colostomy creation, colostomy reversal, ventral hernia repair. Endorses 6 weeks of abdominal distention, intermittent emesis. He continues to have bowel movements and pass flatus. CT a/p with concerns for transition point in the LUQ    Plan:  1. Diet: NPO  2. Serial abdominal exams-remains soft, nontender  3. Will obtain SBFT today to evaluate if contrast reaches the colon  4.  Continue medical management per primary team     Labs pending, will review       Adelaida Green, DO  General Surgery PGY-2

## 2021-05-02 NOTE — PROGRESS NOTES
[facility]  Internal Medicine Teaching Residency Program  Inpatient Daily Progress Note  ______________________________________________________________________________    Patient: Juany Arambula  YOB: 1960   AAJ:[id number]    Acct: [de-identified]     Room: [address] [place] [address]  Admit date: 5/1/2021  Today's date: 05/02/21  Number of days in the hospital: 1    SUBJECTIVE   Admitting Diagnosis: Acute congestive heart failure ([address]  CC: Abdominal distention, shortness of breath    Pt examined at bedside. Chart & results reviewed. Vitals stable, patient hypertensive this a.m., restarted on home dose of losartan 25 mg twice daily  Patient had PVCs overnight, EKG showed PVCs. Was given 1 g of mag. Repeat mag levels and EKG ordered this AM.  Will follow. NG tube was unable to be placed last night. CXR showed perihilar vascular congestion. This a.m., patient is passing gas, bowel sounds normal.  As per general surgery, plan to start clear liquid diet.     ROS:  Constitutional:  negative for chills, fevers, sweats  Respiratory:  negative for cough, dyspnea on exertion, hemoptysis, shortness of breath, wheezing  Cardiovascular:  negative for chest pain, chest pressure/discomfort, lower extremity edema, palpitations  Gastrointestinal:  negative for abdominal pain, constipation, diarrhea, nausea, vomiting  Neurological:  negative for dizziness, headache  BRIEF HISTORY     The patient is a pleasant 56 y.o. male with PMH significant for combined systolic and diastolic CHF, ESRD on hemodialysis Tuesdays Thursdays Saturdays, hypertension, NSTEMI, bowel obstruction in 2013 s/p exploratory laparotomy with diverting colostomy, incisional hernia mesh repair, BATSHEVA, HLD, pulmonary hypertension presents with a chief complaint of shortness of breath ongoing for past 6 weeks which had worsened in the past few days as well as progressively increasing abdominal distention for the past 6 PERRLA. No conjunctival injections. Septum was midline, mucosa was without erythema, exudates or cobblestoning. No thrush was noted. Neck: Supple without thyromegaly. No elevated JVP. Trachea was midline. Respiratory: Chest was symmetrical without dullness to percussion. Breath sounds bilaterally were clear to auscultation. There were no wheezes, rhonchi or rales. There is no intercostal retraction or use of accessory muscles. No egophony noted. Cardiovascular: Regular without murmur, clicks, gallops or rubs. Abdomen: Distended, nontender, bowel sounds normal.  Lymphatic: No lymphadenopathy. Musculoskeletal: Normal curvature of the spine. No gross muscle weakness. Extremities: Bilateral pitting edema in lower extremities  Skin:  Warm and dry. Good color, turgor and pigmentation. No lesions or scars.   No cyanosis or clubbing  Neurological/Psychiatric: The patient's general behavior, level of consciousness, thought content and emotional status is normal.        Medications:  Scheduled Medications:    losartan  25 mg Oral Daily    atorvastatin  40 mg Oral Nightly    sodium chloride flush  5-40 mL Intravenous 2 times per day    carvedilol  6.25 mg Oral BID WC    insulin lispro  0-12 Units Subcutaneous TID WC    insulin lispro  0-6 Units Subcutaneous Nightly    furosemide  40 mg Oral BID    heparin (porcine)  5,000 Units Subcutaneous 3 times per day    aspirin  81 mg Oral Daily     Continuous Infusions:    sodium chloride      dextrose       PRN Medicationssodium chloride flush, 5-40 mL, PRN  sodium chloride, 25 mL, PRN  promethazine, 12.5 mg, Q6H PRN    Or  ondansetron, 4 mg, Q6H PRN  polyethylene glycol, 17 g, Daily PRN  acetaminophen, 650 mg, Q6H PRN    Or  acetaminophen, 650 mg, Q6H PRN  glucose, 15 g, PRN  dextrose, 12.5 g, PRN  glucagon (rDNA), 1 mg, PRN  dextrose, 100 mL/hr, PRN        Diagnostic Labs:  CBC:   Recent Labs     05/01/21  0833 05/02/21  0623   WBC 7.3 6.1   RBC 3.53* 3.68*   HGB 10.8* 11.2*   HCT 34.0* 35.0*   MCV 96.3 95.1   RDW 14.6* 14.4    142     BMP:   Recent Labs     05/01/21  0833 05/02/21  0623   * 137   K 4.3 4.1   CL 93* 98   CO2 26 24   BUN 51* 31*   CREATININE 5.10* 3.79*     BNP: No results for input(s): BNP in the last 72 hours. PT/INR: No results for input(s): PROTIME, INR in the last 72 hours. APTT: No results for input(s): APTT in the last 72 hours. CARDIAC ENZYMES: No results for input(s): CKMB, CKMBINDEX, TROPONINI in the last 72 hours. Invalid input(s): CKTOTAL;3  FASTING LIPID PANEL:  Lab Results   Component Value Date    CHOL 133 06/19/2019    HDL 30 (L) 06/19/2019    TRIG 230 (H) 06/19/2019     LIVER PROFILE:   Recent Labs     05/01/21  0833 05/01/21  1943   AST 15 17   ALT 20 21   BILIDIR  --  0.28   BILITOT 0.71 0.87   ALKPHOS 149* 121      MICROBIOLOGY:   Lab Results   Component Value Date/Time    CULTURE NO GROWTH 11/24/2018 07:44 PM       Imaging:    Ct Abdomen Pelvis W Iv Contrast Additional Contrast? None    Result Date: 5/1/2021  1. Isolated segment of small bowel distension, concerning for developing closed loop obstruction. 2.  Ventral hernia containing a loop of small bowel without significant upstream dilatation or inflammatory change. There also appears to be an adjacent ventral hernia involving a short segment of small bowel, as described above. 3.  Status post left lower quadrant ventral hernia repair. 4.  Stable bilateral adrenal nodules, favoring a benign process given the stability since at least 2019. Findings were discussed with WING RUSSO at 10:22 am on 5/1/2021. Xr Chest Portable    Result Date: 5/1/2021  Cardiomegaly with perihilar congestion/perihilar edema. Findings have progressed from prior exam through     [place] Small Bowel Follow Through Only    Result Date: 5/2/2021  No bowel obstruction. Patulous small bowel loop remains in the mid left abdomen.        ASSESSMENT & PLAN     ASSESSMENT / PLAN: Combined systolic and diastolic CHF exacerbation:  -Patient underwent dialysis yesterday-3.2L fluid removed. -Continue with home dose of oral Lasix 40 mg twice daily  -Continue with Coreg 6.25 mg daily, restart losartan 25 mg daily, continue with aspirin, Lipitor daily.     ESRD on hemodialysis:  -Continue dialysis Tuesday Thursday Saturdays, nephrology following.  -Fluid restriction of 1500 mL.     Small bowel obstruction:  -CT showed dilated loop of small bowel with proximal and distal decompressed small bowel  -SBFT completed, no obstruction noted. Patient started on clear liquid diet.     BATSHEVA:  -BiPAP nightly     Hypertension:  -Losartan 25 mg daily. Restart nifedipine home dose. -Continue with oral Lasix 40 mg twice daily, Coreg 6.25 mg twice daily           DVT ppx: Heparin  GI ppx: Pepcid     PT/OT/SW: Ordered  Discharge Planning: ongoing    Jose Guadalupe Love MD  Internal Medicine Resident, PGY-1  [facility];  [place], [place]  5/2/2021, 10:49 AM

## 2021-05-03 VITALS
HEART RATE: 81 BPM | BODY MASS INDEX: 36.83 KG/M2 | TEMPERATURE: 98.4 F | OXYGEN SATURATION: 97 % | SYSTOLIC BLOOD PRESSURE: 140 MMHG | RESPIRATION RATE: 15 BRPM | WEIGHT: 257.28 LBS | DIASTOLIC BLOOD PRESSURE: 76 MMHG | HEIGHT: 70 IN

## 2021-05-03 PROBLEM — I50.9 HEART FAILURE (HCC): Status: RESOLVED | Noted: 2021-05-01 | Resolved: 2021-05-03

## 2021-05-03 LAB
ABSOLUTE EOS #: 0.12 K/UL (ref 0–0.44)
ABSOLUTE IMMATURE GRANULOCYTE: 0.06 K/UL (ref 0–0.3)
ABSOLUTE LYMPH #: 0.61 K/UL (ref 1.1–3.7)
ABSOLUTE MONO #: 1.04 K/UL (ref 0.1–1.2)
ANION GAP SERPL CALCULATED.3IONS-SCNC: 15 MMOL/L (ref 9–17)
BASOPHILS # BLD: 1 % (ref 0–2)
BASOPHILS ABSOLUTE: 0.06 K/UL (ref 0–0.2)
BUN BLDV-MCNC: 45 MG/DL (ref 8–23)
BUN/CREAT BLD: ABNORMAL (ref 9–20)
CALCIUM SERPL-MCNC: 9 MG/DL (ref 8.6–10.4)
CHLORIDE BLD-SCNC: 102 MMOL/L (ref 98–107)
CO2: 22 MMOL/L (ref 20–31)
CREAT SERPL-MCNC: 4.53 MG/DL (ref 0.7–1.2)
DIFFERENTIAL TYPE: ABNORMAL
EOSINOPHILS RELATIVE PERCENT: 2 % (ref 1–4)
GFR AFRICAN AMERICAN: 16 ML/MIN
GFR NON-AFRICAN AMERICAN: 13 ML/MIN
GFR SERPL CREATININE-BSD FRML MDRD: ABNORMAL ML/MIN/{1.73_M2}
GFR SERPL CREATININE-BSD FRML MDRD: ABNORMAL ML/MIN/{1.73_M2}
GLUCOSE BLD-MCNC: 162 MG/DL (ref 70–99)
GLUCOSE BLD-MCNC: 171 MG/DL (ref 75–110)
GLUCOSE BLD-MCNC: 172 MG/DL (ref 75–110)
HCT VFR BLD CALC: 35.8 % (ref 40.7–50.3)
HEMOGLOBIN: 10.9 G/DL (ref 13–17)
IMMATURE GRANULOCYTES: 1 %
LYMPHOCYTES # BLD: 10 % (ref 24–43)
MCH RBC QN AUTO: 29.9 PG (ref 25.2–33.5)
MCHC RBC AUTO-ENTMCNC: 30.4 G/DL (ref 28.4–34.8)
MCV RBC AUTO: 98.1 FL (ref 82.6–102.9)
MONOCYTES # BLD: 17 % (ref 3–12)
MORPHOLOGY: ABNORMAL
NRBC AUTOMATED: 0 PER 100 WBC
PDW BLD-RTO: 14.5 % (ref 11.8–14.4)
PLATELET # BLD: 151 K/UL (ref 138–453)
PLATELET ESTIMATE: ABNORMAL
PMV BLD AUTO: 10.2 FL (ref 8.1–13.5)
POTASSIUM SERPL-SCNC: 4.2 MMOL/L (ref 3.7–5.3)
RBC # BLD: 3.65 M/UL (ref 4.21–5.77)
RBC # BLD: ABNORMAL 10*6/UL
SEG NEUTROPHILS: 69 % (ref 36–65)
SEGMENTED NEUTROPHILS ABSOLUTE COUNT: 4.21 K/UL (ref 1.5–8.1)
SODIUM BLD-SCNC: 139 MMOL/L (ref 135–144)
WBC # BLD: 6.1 K/UL (ref 3.5–11.3)
WBC # BLD: ABNORMAL 10*3/UL

## 2021-05-03 PROCEDURE — 99232 SBSQ HOSP IP/OBS MODERATE 35: CPT | Performed by: INTERNAL MEDICINE

## 2021-05-03 PROCEDURE — 6360000002 HC RX W HCPCS: Performed by: STUDENT IN AN ORGANIZED HEALTH CARE EDUCATION/TRAINING PROGRAM

## 2021-05-03 PROCEDURE — 94660 CPAP INITIATION&MGMT: CPT

## 2021-05-03 PROCEDURE — 2580000003 HC RX 258: Performed by: STUDENT IN AN ORGANIZED HEALTH CARE EDUCATION/TRAINING PROGRAM

## 2021-05-03 PROCEDURE — 80048 BASIC METABOLIC PNL TOTAL CA: CPT

## 2021-05-03 PROCEDURE — 90935 HEMODIALYSIS ONE EVALUATION: CPT

## 2021-05-03 PROCEDURE — 6370000000 HC RX 637 (ALT 250 FOR IP): Performed by: STUDENT IN AN ORGANIZED HEALTH CARE EDUCATION/TRAINING PROGRAM

## 2021-05-03 PROCEDURE — 36415 COLL VENOUS BLD VENIPUNCTURE: CPT

## 2021-05-03 PROCEDURE — 85025 COMPLETE CBC W/AUTO DIFF WBC: CPT

## 2021-05-03 PROCEDURE — 82947 ASSAY GLUCOSE BLOOD QUANT: CPT

## 2021-05-03 PROCEDURE — 90935 HEMODIALYSIS ONE EVALUATION: CPT | Performed by: INTERNAL MEDICINE

## 2021-05-03 RX ADMIN — CARVEDILOL 6.25 MG: 12.5 TABLET, FILM COATED ORAL at 08:18

## 2021-05-03 RX ADMIN — Medication 81 MG: at 08:18

## 2021-05-03 RX ADMIN — HEPARIN SODIUM 5000 UNITS: 5000 INJECTION INTRAVENOUS; SUBCUTANEOUS at 05:56

## 2021-05-03 RX ADMIN — LOSARTAN POTASSIUM 25 MG: 25 TABLET, FILM COATED ORAL at 08:18

## 2021-05-03 RX ADMIN — INSULIN LISPRO 2 UNITS: 100 INJECTION, SOLUTION INTRAVENOUS; SUBCUTANEOUS at 08:19

## 2021-05-03 RX ADMIN — FUROSEMIDE 40 MG: 40 TABLET ORAL at 08:18

## 2021-05-03 RX ADMIN — SODIUM CHLORIDE, PRESERVATIVE FREE 10 ML: 5 INJECTION INTRAVENOUS at 08:19

## 2021-05-03 ASSESSMENT — PAIN SCALES - GENERAL
PAINLEVEL_OUTOF10: 0
PAINLEVEL_OUTOF10: 0

## 2021-05-03 NOTE — PROGRESS NOTES
Physical Therapy    DATE: 5/3/2021    NAME: Winter Gross  MRN: [id number]   : 1960      Patient not seen this date for Physical Therapy due to:    Hemodialysis:      Electronically signed by Skye Gonzales PT on 5/3/2021 at 9:49 AM

## 2021-05-03 NOTE — PROGRESS NOTES
Dialysis Post Treatment Note  Vitals:    05/03/21 1255   BP: (!) 140/76   Pulse: 81   Resp: 15   Temp: 98.4 °F (36.9 °C)   SpO2:      Pre-Weight = 118.4kg  Post-weight = Weight: 257 lb 4.4 oz (116.7 kg)  Total Liters Processed = Total Liters Processed (l/min): 53.2 l/min  Rinseback Volume (mL) = Rinseback Volume (ml): 300 ml  Net Removal (mL) = NET Removed (ml): 1800 ml  Patient's dry weight=119kg  Type of access used=AVG  Length of treatment=120    Pt tolerated UF tx well with no complaints.

## 2021-05-03 NOTE — PROGRESS NOTES
General Surgery Progress Note            PATIENT NAME: Billie Daniel DATE: 5/3/2021, 5:28 AM    SUBJECTIVE:    Pt seen and examined. No acute events overnight. Hemodynamically stable and afebrile. Denies any abdominal pain. Reports having bowel movements. Denies any blood in stool. Denies any nausea or vomiting. ROS:    Gen: No fever or chills  Eyes: No blurry vision or conjunctivitis   ENT: No sinus congestion or sore throat  CV: No chest pain or dyspnea on exertion  Pulm: No cough or shortness of breath  GI: No abdominal pain, nausea or vomiting  Renal: No dysuria or hematuria  Endo: No excessive sweating or cold intolerance  Heme/Onc: No excessive bruising or swollen lymph nodes  Neuro: No difficulty with speech or acute extremity weakness  Skin: No rashes or ulcers      OBJECTIVE:   VITALS:  /65   Pulse 90   Temp 97.7 °F (36.5 °C) (Oral)   Resp 16   Ht 5' 10\" (1.778 m)   Wt 260 lb 9.3 oz (118.2 kg)   SpO2 96%   BMI 37.39 kg/m²      INTAKE/OUTPUT:      Intake/Output Summary (Last 24 hours) at 5/3/2021 0528  Last data filed at 5/2/2021 1452  Gross per 24 hour   Intake 20 ml   Output 200 ml   Net -180 ml       PHYSICAL EXAM  GEN: Alert, awake, oriented, NAD  HEENT: normocephalic, no scleral icterus, moist mucous membranes  CV: Regular rate   LUNG: No respiratory distress, no audible wheezing  ABDOMEN: Distended, soft, non-tender to palpation. No rebound, guarding, or rigidity.    EXTREMITIES: No edema, cyanosis or clubbing    Data:  CBC with Differential:    Lab Results   Component Value Date    WBC 6.1 05/03/2021    RBC 3.65 05/03/2021    RBC 3.62 09/08/2018    HGB 10.9 05/03/2021    HCT 35.8 05/03/2021     05/03/2021    MCV 98.1 05/03/2021    MCH 29.9 05/03/2021    MCHC 30.4 05/03/2021    RDW 14.5 05/03/2021    NRBC 0 09/08/2018    LYMPHOPCT PENDING 05/03/2021    LYMPHOPCT 5.0 09/08/2018    MONOPCT PENDING 05/03/2021    MONOPCT 13.7 09/08/2018    BASOPCT PENDING 05/03/2021 BASOPCT 0.2 09/08/2018    MONOSABS PENDING 05/03/2021    MONOSABS 1.5 09/08/2018    LYMPHSABS PENDING 05/03/2021    LYMPHSABS 0.5 09/08/2018    EOSABS PENDING 05/03/2021    EOSABS 0.0 09/08/2018    BASOSABS PENDING 05/03/2021    DIFFTYPE NOT REPORTED 05/03/2021     BMP:    Lab Results   Component Value Date     05/03/2021    K 4.2 05/03/2021     05/03/2021    CO2 22 05/03/2021    BUN 45 05/03/2021    LABALBU 4.1 05/01/2021    CREATININE 4.53 05/03/2021    CALCIUM 9.0 05/03/2021    GFRAA 16 05/03/2021    LABGLOM 13 05/03/2021    GLUCOSE 162 05/03/2021    GLUCOSE 147 09/08/2018       Radiology Review:    Ct Abdomen Pelvis W Iv Contrast Additional Contrast? None    Result Date: 5/1/2021  EXAMINATION: CT OF THE ABDOMEN AND PELVIS WITH CONTRAST 5/1/2021 9:48 am TECHNIQUE: CT of the abdomen and pelvis was performed with the administration of intravenous contrast. Multiplanar reformatted images are provided for review. Dose modulation, iterative reconstruction, and/or weight based adjustment of the mA/kV was utilized to reduce the radiation dose to as low as reasonably achievable. COMPARISON: 07/17/2019 HISTORY: ORDERING SYSTEM PROVIDED HISTORY: Abd pain/distention, h/o prior SBO, high pitched bowel sounds TECHNOLOGIST PROVIDED HISTORY: Abd pain/distention, h/o prior SBO, high pitched bowel sounds Decision Support Exception - unselect if not a suspected or confirmed emergency medical condition->Emergency Medical Condition (MA) Reason for Exam: abd pain Acuity: Unknown Type of Exam: Unknown FINDINGS: Lower Chest: No acute findings. Metallic artifact near the apical septum again demonstrated. Organs: The liver, gallbladder, pancreas, spleen, adrenals and kidneys reveal no acute findings. Bilateral adrenal nodules appear unchanged. GI/Bowel: Regional area of small bowel distension with air-fluid levels measuring up to 3 cm is noted with proximal and distal decompressed small bowel.   The transition points appear in the left upper quadrant on axial image 61 and midline abdomen on axial image 90. There is a small segment of small bowel extending into a parasagittal ventral hernia on axial image 89 without significant upstream dilatation, wall thickening or adjacent inflammatory change. Nearby loops of small bowel closely abut the abdominal wall near this location as well with questionable partial bowel wall herniation on axial image 98 noted. Mild stool burden throughout the colon. Pelvis: No acute findings. Fat containing inguinal hernias. Peritoneum/Retroperitoneum: Trace free fluid in the left pericolic gutter. The aorta is normal in caliber. The visceral branches are patent. Calcified atheromatous plaque is present. No lymphadenopathy. Bones/Soft Tissues: Status post left lower quadrant ventral hernia where pair with mesh. Periumbilical abdominal wall hernias are noted involving bowel, as above, as well as fat. Multilevel degenerative change in the spine and mild body wall edema. 1.   Isolated segment of small bowel distension, concerning for developing closed loop obstruction. 2.  Ventral hernia containing a loop of small bowel without significant upstream dilatation or inflammatory change. There also appears to be an adjacent ventral hernia involving a short segment of small bowel, as described above. 3.  Status post left lower quadrant ventral hernia repair. 4.  Stable bilateral adrenal nodules, favoring a benign process given the stability since at least 2019. Findings were discussed with WING RUSSO at 10:22 am on 5/1/2021. Xr Chest Portable    Result Date: 5/1/2021  EXAMINATION: ONE XRAY VIEW OF THE CHEST 5/1/2021 9:57 pm COMPARISON: 02/10/2021 HISTORY: ORDERING SYSTEM PROVIDED HISTORY: pulmonary edema TECHNOLOGIST PROVIDED HISTORY: pulmonary edema Reason for Exam: upr FINDINGS: The cardiomediastinal silhouette is enlarged. Perihilar vascular congestion.  Multifocal interstitial alveolar opacities both lungs likely representing interstitial alveolar edema. .  No pleural effusion or pneumothorax is present. Cardiomegaly with perihilar congestion/perihilar edema. Findings have progressed from prior exam through     [facility] Small Bowel Follow Through Only    Result Date: 5/2/2021  EXAMINATION: [address] 5/2/2021 TECHNIQUE: Small bowel follow through series was performed using water-soluble contrast and overhead images. FLUOROSCOPY DOSE AND TYPE OR TIME AND EXPOSURES: No fluoroscopy. 11 exposures. COMPARISON: CT abdomen and pelvis yesterday. HISTORY: ORDERING SYSTEM PROVIDED HISTORY: concern for closed loop obstruction TECHNOLOGIST PROVIDED HISTORY: concern for closed loop obstruction FINDINGS: The  image demonstrates a dilated loop of small bowel in the mid left abdomen. Small amount of gas present within the colon. Hernia mesh rib Vi along projects in the left lower quadrant. Prompt transit of contrast through the small bowel to the colon in less than 1 hour. Filling of the dilated small bowel loop in the left abdomen is noted, which remains relatively patulous. No bowel obstruction. Patulous small bowel loop remains in the mid left abdomen. ASSESSMENT   56 y. o. male presents with abdominal distention and concern for SBO. Surgical history includes colostomy creation, colostomy reversal, ventral hernia repair. Endorses 6 weeks of abdominal distention, intermittent emesis. He continues to have bowel movements and pass flatus. CT a/p with concerns for transition point in the LUQ    Plan  1. CLD, will consider advancement today  2. Monitor abdominal exams  3. SBFT reviewed - no evidence of obstruction  4.  Medical management per primary team       Naya Quick DO PGY-1  Electronically signed by Carole Argueta DO on 5/3/2021 at 5:28 AM

## 2021-05-03 NOTE — PROGRESS NOTES
Suspected SBO. Follow-up series did not reveal any obstruction. Being managed conservatively. #3 volume overload. Echo showing evidence of cardiomyopathy ejection vision 35 to 40% with moderate tricuspid regurgitation  #4 type 2 diabetes  #5 essential hypertension      The patient was seen and examined while on dialysis. Professional oversight of the patients dialysis care,access care and dialysis related co-morbidities were addressed as necessary with the patient and /or staff. Ultrafiltration. Okay to discharge from nephrology standpoint  This note is created with the assistance of a speech-recognition program. While intending to generate a document that actually reflects the content of the visit, no guarantees can be provided that every mistake has been identified and corrected by editing    Rishi Ramsey MD, MRCP [place]), [address]   5/3/2021 11:36 AM    [facility]

## 2021-05-03 NOTE — PLAN OF CARE
PROVIDE ADEQUATE OXYGENATION WITH ACCEPTABLE SP02/ABG'S    [x]  IDENTIFY APPROPRIATE OXYGEN THERAPY  [x]   MONITOR SP02/ABG'S AS NEEDED   [x]   PATIENT EDUCATION AS NEEDED   NONINVASIVE VENTILATION    PROVIDE OPTIMAL VENTILATION/ACCEPTABLE SPO2   IMPLEMENT NONINVASIVE VENTILATION PROTOCOL   MAINTAIN ACCEPTABLE SPO2   ASSESS SKIN INTEGRITY/BREAKDOWN SCORE   PATIENT EDUCATION AS NEEDED   BIPAP AS NEEDED

## 2021-05-03 NOTE — PROGRESS NOTES
[facility]  Internal Medicine Teaching Residency Program  Inpatient Daily Progress Note  ______________________________________________________________________________    Patient: Titi Lundberg  YOB: 1960   OIB:[id number]    Acct: [de-identified]     Room: [address] [place] [address]  Admit date: 5/1/2021  Today's date: 05/03/21  Number of days in the hospital: 2    SUBJECTIVE     Admitting Diagnosis: Acute congestive heart failure ([address]    CC: Abdominal distention, shortness of breath    Patient seen and examined bedside. Labs and chart reviewed. No acute overnight events. Hemodynamically stable. Maintaining saturations on room air. Patient is alert awake and oriented x3. Sitting comfortably in bed. Denies any complaints at this time. Tolerated clear liquid diet. Passing bowels. Started on general diet from this morning. Denies any nausea and vomiting. No acute interventions from general surgery. Plan for hemodialysis today and goal for ultrafiltration of 2 to 3 L per nephrology. Nephrology okay to discharge the patient after dialysis.     ROS:  Constitutional:  negative for chills, fevers, sweats  Respiratory:  negative for cough, dyspnea on exertion, hemoptysis, shortness of breath, wheezing  Cardiovascular:  negative for chest pain, chest pressure/discomfort, lower extremity edema, palpitations  Gastrointestinal:  negative for abdominal pain, constipation, diarrhea, nausea, vomiting  Neurological:  negative for dizziness, headache    BRIEF HISTORY     The patient is a pleasant 56 y.o. male with PMH significant for combined systolic and diastolic CHF, ESRD on hemodialysis Tuesdays Thursdays Saturdays, hypertension, NSTEMI, bowel obstruction in 2013 s/p exploratory laparotomy with diverting colostomy, incisional hernia mesh repair, BATSHEVA, HLD, pulmonary hypertension presents with a chief complaint of shortness of breath ongoing for past 6 weeks which had worsened in the past few days as well as progressively increasing abdominal distention for the past 6 weeks.     According to the patient, he has been experiencing shortness of breath for several weeks associated with cough without phlegm production. He has also been having bilateral lower extremity swelling, scrotal swelling. According to the patient, he has been having worsening abdominal distention but no pain, no nausea, no vomiting. Last BM was last night, patient was passing gas uptil last night. Patient denied any recent fever, chills, dysuria, chest pain. He denied excessive fluid intake, excessive salt diet, medication noncompliance, missed dialysis sessions.     Of note, patient underwent cardiac stress test in 2021 which showed no reversible defect, moderate inferior infarct, reduced LV function with EF 40%, inferior wall hypokinesia. He then underwent left cardiac catheterization in 2021 which showed patent LAD stent.      In the ER patient was afebrile, hypertensive, nontachycardic, saturating in 90s on room air. Labs were remarkable for elevated creatinine 5.10, elevated BUN 51, hyponatremia with sodium 133, elevated proBNP 16,898 above baseline. CXR showed cardiomegaly with perihilar congestion progressed from previous study. CT abdomen pelvis with IV contrast showed isolated segment of small bowel distention concerning for closed-loop obstruction.   General surgery was consulted, recommended NG tube placement and stomach decompression.       OBJECTIVE     Vital Signs:  BP (!) 157/77   Pulse 81   Temp 97.9 °F (36.6 °C)   Resp 17   Ht 5' 10\" (1.778 m)   Wt 261 lb 0.4 oz (118.4 kg)   SpO2 97%   BMI 37.45 kg/m²     Temp (24hrs), Av °F (36.7 °C), Min:97.5 °F (36.4 °C), Max:98.7 °F (37.1 °C)    In: 400   Out: -     Physical Exam:  Constitutional: This is a well developed, well nourished, 35-39.9 - Obesity Grade II 56y.o. year old male who is alert, oriented, cooperative and in no 05/01/21 0833 05/02/21 0623 05/03/21 0436   WBC 7.3 6.1 6.1   RBC 3.53* 3.68* 3.65*   HGB 10.8* 11.2* 10.9*   HCT 34.0* 35.0* 35.8*   MCV 96.3 95.1 98.1   RDW 14.6* 14.4 14.5*    142 151     BMP:   Recent Labs     05/01/21 0833 05/02/21 0623 05/03/21 0436   * 137 139   K 4.3 4.1 4.2   CL 93* 98 102   CO2 26 24 22   BUN 51* 31* 45*   CREATININE 5.10* 3.79* 4.53*     BNP: No results for input(s): BNP in the last 72 hours. PT/INR: No results for input(s): PROTIME, INR in the last 72 hours. APTT: No results for input(s): APTT in the last 72 hours. CARDIAC ENZYMES: No results for input(s): CKMB, CKMBINDEX, TROPONINI in the last 72 hours. Invalid input(s): CKTOTAL;3  FASTING LIPID PANEL:  Lab Results   Component Value Date    CHOL 133 06/19/2019    HDL 30 (L) 06/19/2019    TRIG 230 (H) 06/19/2019     LIVER PROFILE:   Recent Labs     05/01/21 0833 05/01/21 1943   AST 15 17   ALT 20 21   BILIDIR  --  0.28   BILITOT 0.71 0.87   ALKPHOS 149* 121      MICROBIOLOGY:   Lab Results   Component Value Date/Time    CULTURE NO GROWTH 11/24/2018 07:44 PM       Imaging:    Ct Abdomen Pelvis W Iv Contrast Additional Contrast? None    Result Date: 5/1/2021  1. Isolated segment of small bowel distension, concerning for developing closed loop obstruction. 2.  Ventral hernia containing a loop of small bowel without significant upstream dilatation or inflammatory change. There also appears to be an adjacent ventral hernia involving a short segment of small bowel, as described above. 3.  Status post left lower quadrant ventral hernia repair. 4.  Stable bilateral adrenal nodules, favoring a benign process given the stability since at least 2019. Findings were discussed with WING RUSSO at 10:22 am on 5/1/2021. Xr Chest Portable    Result Date: 5/1/2021  Cardiomegaly with perihilar congestion/perihilar edema.   Findings have progressed from prior exam through     [facility] Small Bowel Follow Through Only    Result Date: 5/2/2021  No bowel obstruction. Patulous small bowel loop remains in the mid left abdomen. ASSESSMENT & PLAN     ASSESSMENT / PLAN:     Acute on chronic combined systolic and diastolic CHF: Resolved  -S/p 2 sessions of hemodialysis with ultrafiltration.  -Continue home dose oral Lasix 40 mg twice daily.  -Continue Coreg, losartan, aspirin and Lipitor daily. ESRD on hemodialysis:  -S/p 2 sessions of hemodialysis. -Continue dialysis Tuesday Thursday Saturdays, nephrology following.  -Fluid restriction of 1500 mL. -Renal diet.     Small bowel obstruction: Resolved  -CT showed dilated loop of small bowel with proximal and distal decompressed small bowel  -SBFT completed, no obstruction noted. -Patient tolerating clear liquid diet. Advanced to solid diet.     BATSHEVA:  -BiPAP nightly     Hypertension: Controlled  -Continue home medications.        DVT ppx: Heparin subcutaneous  GI ppx: Pepcid     PT/OT/SW:  Patient able to ambulate without assistance. Discharge Planning:  Discharge today to home. Tina Espino MD  Internal Medicine Resident, PGY-2  [facility];  [place], [place]  5/3/2021, 1:04 PM

## 2021-05-04 LAB
EKG ATRIAL RATE: 86 BPM
EKG ATRIAL RATE: 90 BPM
EKG P AXIS: 57 DEGREES
EKG P AXIS: 67 DEGREES
EKG P-R INTERVAL: 232 MS
EKG P-R INTERVAL: 238 MS
EKG Q-T INTERVAL: 430 MS
EKG Q-T INTERVAL: 454 MS
EKG QRS DURATION: 154 MS
EKG QRS DURATION: 154 MS
EKG QTC CALCULATION (BAZETT): 526 MS
EKG QTC CALCULATION (BAZETT): 543 MS
EKG R AXIS: -75 DEGREES
EKG R AXIS: -79 DEGREES
EKG T AXIS: 68 DEGREES
EKG T AXIS: 78 DEGREES
EKG VENTRICULAR RATE: 86 BPM
EKG VENTRICULAR RATE: 90 BPM

## 2021-05-04 PROCEDURE — 93010 ELECTROCARDIOGRAM REPORT: CPT | Performed by: INTERNAL MEDICINE

## 2021-05-09 LAB
EKG ATRIAL RATE: 101 BPM
EKG P AXIS: 69 DEGREES
EKG P-R INTERVAL: 240 MS
EKG Q-T INTERVAL: 390 MS
EKG QRS DURATION: 156 MS
EKG QTC CALCULATION (BAZETT): 505 MS
EKG R AXIS: -75 DEGREES
EKG T AXIS: 76 DEGREES
EKG VENTRICULAR RATE: 101 BPM

## 2021-05-10 ENCOUNTER — HOSPITAL ENCOUNTER (OUTPATIENT)
Dept: LAB | Age: 61
Setting detail: SPECIMEN
Discharge: HOME | End: 2021-05-10
Payer: COMMERCIAL

## 2021-05-10 DIAGNOSIS — Z20.822 COVID-19 RULED OUT BY LABORATORY TESTING: Primary | ICD-10-CM

## 2021-05-10 PROCEDURE — U0003 INFECTIOUS AGENT DETECTION BY NUCLEIC ACID (DNA OR RNA); SEVERE ACUTE RESPIRATORY SYNDROME CORONAVIRUS 2 (SARS-COV-2) (CORONAVIRUS DISEASE [COVID-19]), AMPLIFIED PROBE TECHNIQUE, MAKING USE OF HIGH THROUGHPUT TECHNOLOGIES AS DESCRIBED BY CMS-2020-01-R: HCPCS

## 2021-05-10 PROCEDURE — U0005 INFEC AGEN DETEC AMPLI PROBE: HCPCS

## 2021-05-11 LAB
SARS-COV-2: NORMAL
SARS-COV-2: NOT DETECTED
SOURCE: NORMAL

## 2021-05-14 ENCOUNTER — HOSPITAL ENCOUNTER (OUTPATIENT)
Dept: PULMONOLOGY | Age: 61
Discharge: HOME | End: 2021-05-14
Payer: MEDICARE

## 2021-05-14 DIAGNOSIS — R06.09 EXERTIONAL DYSPNEA: ICD-10-CM

## 2021-05-14 PROCEDURE — 94640 AIRWAY INHALATION TREATMENT: CPT

## 2021-05-14 PROCEDURE — 94729 DIFFUSING CAPACITY: CPT

## 2021-05-14 PROCEDURE — 94060 EVALUATION OF WHEEZING: CPT

## 2021-05-14 PROCEDURE — 94726 PLETHYSMOGRAPHY LUNG VOLUMES: CPT

## 2021-05-14 PROCEDURE — 94664 DEMO&/EVAL PT USE INHALER: CPT

## 2021-05-15 NOTE — PROCEDURES
[address] [facility]                               PULMONARY FUNCTION    PATIENT NAME: Frannie Cheng                   :        1960  MED REC NO:   [id number]                             ROOM:  ACCOUNT NO:   [de-identified]                           ADMIT DATE: 2021  PROVIDER:     Asaf Zamora    DATE OF PROCEDURE:  2021    Spirometry shows FVC is 2.05, 53% predicted. FEV1 is 1.68, 56%  predicted. FEVI/FVC ratio is normal without evidence of obstruction. There is no significant response to post bronchodilator to suggest  bronchospasticity or airway reversibility. Lung volume shows residual  volume is 2.83, 123% predicted. Total lung capacity is 5.17, 73%  predicted, consistent with mild restrictive ventilatory impairment which  could be intraparenchymal.  Diffusion capacity is 21.76, 67% predicted,  consistent with mild reduction in diffusion capacity which could be  secondary to intraparenchymal lung disease, pulmonary vascular disease,  or emphysema. Clinical correlation is recommended. IMPRESSION:  This pulmonary function test is consistent with mild  restrictive ventilatory impairment which could be secondary to  intraparenchymal lung disease. There could be obstructive ventilatory  impairment without significant response to bronchodilator. There is  mild reduction in diffusion capacity, which could be secondary to  intraparenchymal lung disease, pulmonary vascular disease, or emphysema. Clinical correlation is recommended.         Amy Myers    D: 2021 19:06:26       T: 2021 19:16:01     [email or web address]  Job#: [id number]     Doc#: [id number]    CC:

## 2021-05-19 ENCOUNTER — OFFICE VISIT (OUTPATIENT)
Dept: GASTROENTEROLOGY | Age: 61
End: 2021-05-19
Payer: MEDICARE

## 2021-05-19 VITALS — DIASTOLIC BLOOD PRESSURE: 80 MMHG | BODY MASS INDEX: 37.74 KG/M2 | WEIGHT: 263 LBS | SYSTOLIC BLOOD PRESSURE: 148 MMHG

## 2021-05-19 DIAGNOSIS — R13.10 DYSPHAGIA, UNSPECIFIED TYPE: Primary | ICD-10-CM

## 2021-05-19 DIAGNOSIS — K21.9 GASTROESOPHAGEAL REFLUX DISEASE, UNSPECIFIED WHETHER ESOPHAGITIS PRESENT: ICD-10-CM

## 2021-05-19 DIAGNOSIS — K59.00 CONSTIPATION, UNSPECIFIED CONSTIPATION TYPE: ICD-10-CM

## 2021-05-19 PROCEDURE — 3017F COLORECTAL CA SCREEN DOC REV: CPT | Performed by: INTERNAL MEDICINE

## 2021-05-19 PROCEDURE — 99203 OFFICE O/P NEW LOW 30 MIN: CPT | Performed by: INTERNAL MEDICINE

## 2021-05-19 PROCEDURE — 1111F DSCHRG MED/CURRENT MED MERGE: CPT | Performed by: INTERNAL MEDICINE

## 2021-05-19 PROCEDURE — 1036F TOBACCO NON-USER: CPT | Performed by: INTERNAL MEDICINE

## 2021-05-19 PROCEDURE — G8417 CALC BMI ABV UP PARAM F/U: HCPCS | Performed by: INTERNAL MEDICINE

## 2021-05-19 PROCEDURE — G8427 DOCREV CUR MEDS BY ELIG CLIN: HCPCS | Performed by: INTERNAL MEDICINE

## 2021-05-19 RX ORDER — PANTOPRAZOLE SODIUM 40 MG/1
40 TABLET, DELAYED RELEASE ORAL
Qty: 30 TABLET | Refills: 2 | Status: ON HOLD | OUTPATIENT
Start: 2021-05-19 | End: 2022-06-15

## 2021-05-19 ASSESSMENT — ENCOUNTER SYMPTOMS
EYES NEGATIVE: 1
ALLERGIC/IMMUNOLOGIC NEGATIVE: 1
CHOKING: 1
TROUBLE SWALLOWING: 1
CONSTIPATION: 1
VOMITING: 1
COUGH: 1

## 2021-05-19 NOTE — PROGRESS NOTES
Reason for Referral: Suspected dysphagia, GERD and constipation      Mini Gilbert MD  [facility]  [address] [place] [address],  [address] [place] [address]    Chief Complaint   Patient presents with   Sailaja Smith New Patient     referred for dysphagia.  Constipation     Patient states his bowel movements changed when he started dialysis, has round hard bowel movements. Denies bleeding/ black stools.  Dysphagia     Patient states when he eats he has to have water or he can not swallow it. When he does eat, it goes down very slowly. States he feels a \"tickle\" in the back of his throat.  Emesis     Patient states vomiting about once a week. States he wakes up in the middle of the night. HISTORY OF PRESENT ILLNESS: Mr.Bryant Rad Castillo is a 56 y.o. male with a past history remarkable for CHF, large bowel obstruction s/p diverting colostomy and reversal in 2014, CAD s/p PCI on ASA, DM, ESRD on HD with AVF, BATSHEVA and HTN, SBO recently, referred for evaluation of dysphagia. Patient reports that he has upper esophageal throat irritation and cervical esophageal symptoms with positive globus sensation. Smoker: none  Drinking history: none  Illicit drugs: none  Abdominal surgeries: 2014, large bowel resection  Prior Colonoscopy: Recent colonoscopy [facility] ( Dec 2020--will need records)   Prior EGD: None per patient. FH of GI issues: none       Past Medical,Family, and Social History reviewed and does contribute to the patient presentingcondition. Patient's PMH/PSH,SH,PSYCH Hx, MEDs, ALLERGIES, and ROS were all reviewed and updated in the appropriate sections.     PAST MEDICAL HISTORY:  Past Medical History:   Diagnosis Date    Acute congestive heart failure ([address] 12/21/2016    Acute on chronic diastolic congestive heart failure ([address] 11/24/2018    Anemia 11/25/2018    Anemia of chronic renal failure 10/17/2016    AVF (arteriovenous fistula) (HCC)     Bowel obstruction ([address] 12/26/2013    CAD (coronary artery disease) 2013    ?     Chest pain at rest 12/21/2016    CHF (congestive heart failure) ([facility] 2013    last episode 11/2018    CHF (congestive heart failure), NYHA class I, acute on chronic, combined ([address] 2/9/2021    Chronic kidney disease     CKD (chronic kidney disease) stage 4, GFR 15-29 ml/min ([address] 10/17/2016    Coronary artery disease involving native coronary artery of native heart without angina pectoris     Diabetes mellitus ([facility] 2012    NIDDM    Dialysis patient ([facility]     Rhonda Henkerry  /  Blaynedante Shon  /  Jered Williamr    ESRD (end stage renal disease) ([facility]     Essential hypertension 11/25/2018    Groin swelling 07/17/2019    Hemodialysis patient ([facility] 11/28/2018    TUNNELD CATHETER RIGHT DIALYSIS ACCESSTUES THURS AND AT [place]    Hydrocele 07/17/2019    Hyperlipidemia 2013    ON RX    Hypertension 2013    ON RX    MI, old 12/26/2013    NSTEMI (non-ST elevated myocardial infarction) ([facility] 6/17/2019    BATSHEVA (obstructive sleep apnea)     Patient in clinical research study 04/01/2019    XIENCE SHORT DAPT    Pneumonia     Prostatism 11/17/2017    Respiratory distress 11/25/2018    Rising PSA level 11/17/2017    S/P arteriovenous (AV) graft placement     S/P PTCA - TIM distal LAD - Dr. Marielena Hahn 4/1/19 4/1/2019    Sleep apnea 2015    pt has machine and does not use it    Type 2 diabetes mellitus with chronic kidney disease on chronic dialysis ([facility] 10/17/2016       Past Surgical History:   Procedure Laterality Date    ABDOMEN SURGERY  2013    BOWEL OBSTRUCTION    AV FISTULA CREATION Left 02/20/2019    AV FISTULA REPAIR  07/17/2019    AV fistula revision, branch ligation / Percutaneous angioplasty of proximal anastomosis and outflow tract of dialysis circuit with drug coated balloon  /  Dr Roxy Novak  02/09/2021    Dr. Ramon Marcano, [address]  03/31/2019    TIM LAD    CYSTOSCOPY  11/17/2017    DIALYSIS FISTULA 6.25 MG tablet, Take 1 tablet by mouth 2 times daily (with meals), Disp: 60 tablet, Rfl: 3    Multiple Vitamins-Minerals (RENAPLEX-D) TABS, Take 1 tablet by mouth every other day Pt takes occasionally, Disp: , Rfl: 3    lidocaine-prilocaine (EMLA) 2.5-2.5 % cream, , Disp: , Rfl:     NIFEdipine (ADALAT CC) 30 MG extended release tablet, Take 30 mg by mouth 2 times daily, Disp: , Rfl:     furosemide (LASIX) 40 MG tablet, Take 40 mg by mouth 2 times daily , Disp: , Rfl:     ALLERGIES:   Allergies   Allergen Reactions    Lisinopril Swelling       FAMILY HISTORY:       Problem Relation Age of Onset    Heart Disease Mother         CHF   [place] Early Death Mother     High Blood Pressure Brother     Diabetes Brother     Asthma Brother     High Blood Pressure Brother     Diabetes Brother     High Blood Pressure Brother     Diabetes Brother          SOCIAL HISTORY:   Social History     Socioeconomic History    Marital status: Single     Spouse name: Not on file    Number of children: Not on file    Years of education: Not on file    Highest education level: Not on file   Occupational History    Not on file   Tobacco Use    Smoking status: Never Smoker    Smokeless tobacco: Never Used   Vaping Use    Vaping Use: Never used   Substance and Sexual Activity    Alcohol use: No    Drug use: No    Sexual activity: Not on file   Other Topics Concern    Not on file   Social History Narrative    Not on file     Social Determinants of Health     Financial Resource Strain:     Difficulty of Paying Living Expenses:    Food Insecurity:     Worried About Running Out of Food in the Last Year:     Ran Out of Food in the Last Year:    Transportation Needs:     Lack of Transportation (Medical):      Lack of Transportation (Non-Medical):    Physical Activity:     Days of Exercise per Week:     Minutes of Exercise per Session:    Stress:     Feeling of Stress :    Social Connections:     Frequency of Communication with Friends and Family:     Frequency of Social Gatherings with Friends and Family:     Attends [employer] Services:     Active Member of Clubs or Organizations:     Attends Club or Organization Meetings:     Marital Status:    Intimate Partner Violence:     Fear of Current or Ex-Partner:     Emotionally Abused:     Physically Abused:     Sexually Abused:          REVIEW OF SYSTEMS: A 12-point review of systems was obtained and pertinent positives and negatives were listed below. REVIEW OF SYSTEMS:     Constitutional: No fever, no chills, no lethargy, no weakness. HEENT:  No headache, otalgia, itchy eyes, nasal discharge or sore throat. Cardiac:  No chest pain, dyspnea, orthopnea or PND. Chest:   No cough, phlegm or wheezing. Abdomen:      Detailed by MA   Neuro:  No focal weakness, abnormal movements or seizure like activity. Skin:   No rashes, no itching. :   No hematuria, no pyuria, no dysuria, no flank pain. Extremities:  No swelling or joint pains. ROS was otherwise negative    Review of Systems   Constitutional: Positive for appetite change. HENT: Positive for trouble swallowing. Eyes: Negative. Respiratory: Positive for cough and choking. Cardiovascular: Negative. Gastrointestinal: Positive for constipation and vomiting. Endocrine: Negative. Genitourinary: Negative. Musculoskeletal: Negative. Skin: Negative. Allergic/Immunologic: Negative. Neurological: Negative. Hematological: Negative. Psychiatric/Behavioral: Positive for sleep disturbance. All other systems reviewed and are negative. PHYSICAL EXAMINATION: Vital signs reviewed per the nursing documentation. BP (!) 156/80   Wt 263 lb (119.3 kg)   BMI 37.74 kg/m²   Body mass index is 37.74 kg/m². Physical Exam    Physical Exam   Constitutional: Patient is oriented to person, place, and time. Patient appears well-developed and well-nourished.    HENT:   Head: Normocephalic and pelvis was performed with the administration of intravenous contrast. Multiplanar reformatted images are provided for review. Dose modulation, iterative reconstruction, and/or weight based adjustment of the mA/kV was utilized to reduce the radiation dose to as low as reasonably achievable. COMPARISON: 07/17/2019 HISTORY: ORDERING SYSTEM PROVIDED HISTORY: Abd pain/distention, h/o prior SBO, high pitched bowel sounds TECHNOLOGIST PROVIDED HISTORY: Abd pain/distention, h/o prior SBO, high pitched bowel sounds Decision Support Exception - unselect if not a suspected or confirmed emergency medical condition->Emergency Medical Condition (MA) Reason for Exam: abd pain Acuity: Unknown Type of Exam: Unknown FINDINGS: Lower Chest: No acute findings. Metallic artifact near the apical septum again demonstrated. Organs: The liver, gallbladder, pancreas, spleen, adrenals and kidneys reveal no acute findings. Bilateral adrenal nodules appear unchanged. GI/Bowel: Regional area of small bowel distension with air-fluid levels measuring up to 3 cm is noted with proximal and distal decompressed small bowel. The transition points appear in the left upper quadrant on axial image 61 and midline abdomen on axial image 90. There is a small segment of small bowel extending into a parasagittal ventral hernia on axial image 89 without significant upstream dilatation, wall thickening or adjacent inflammatory change. Nearby loops of small bowel closely abut the abdominal wall near this location as well with questionable partial bowel wall herniation on axial image 98 noted. Mild stool burden throughout the colon. Pelvis: No acute findings. Fat containing inguinal hernias. Peritoneum/Retroperitoneum: Trace free fluid in the left pericolic gutter. The aorta is normal in caliber. The visceral branches are patent. Calcified atheromatous plaque is present. No lymphadenopathy.  Bones/Soft Tissues: Status post left lower quadrant ventral hernia where pair with mesh. Periumbilical abdominal wall hernias are noted involving bowel, as above, as well as fat. Multilevel degenerative change in the spine and mild body wall edema. 1.   Isolated segment of small bowel distension, concerning for developing closed loop obstruction. 2.  Ventral hernia containing a loop of small bowel without significant upstream dilatation or inflammatory change. There also appears to be an adjacent ventral hernia involving a short segment of small bowel, as described above. 3.  Status post left lower quadrant ventral hernia repair. 4.  Stable bilateral adrenal nodules, favoring a benign process given the stability since at least 2019. Findings were discussed with WING RUSSO at 10:22 am on 5/1/2021. XR CHEST PORTABLE    Result Date: 5/1/2021  EXAMINATION: ONE XRAY VIEW OF THE CHEST 5/1/2021 9:57 pm COMPARISON: 02/10/2021 HISTORY: ORDERING SYSTEM PROVIDED HISTORY: pulmonary edema TECHNOLOGIST PROVIDED HISTORY: pulmonary edema Reason for Exam: upr FINDINGS: The cardiomediastinal silhouette is enlarged. Perihilar vascular congestion. Multifocal interstitial alveolar opacities both lungs likely representing interstitial alveolar edema. .  No pleural effusion or pneumothorax is present. Cardiomegaly with perihilar congestion/perihilar edema. Findings have progressed from prior exam through     [address]    Result Date: 5/2/2021  EXAMINATION: SMALL BOWEL FOLLOW THROUGH SERIES 5/2/2021 TECHNIQUE: Small bowel follow through series was performed using water-soluble contrast and overhead images. FLUOROSCOPY DOSE AND TYPE OR TIME AND EXPOSURES: No fluoroscopy. 11 exposures. COMPARISON: CT abdomen and pelvis yesterday.  HISTORY: ORDERING SYSTEM PROVIDED HISTORY: concern for closed loop obstruction TECHNOLOGIST PROVIDED HISTORY: concern for closed loop obstruction FINDINGS: The  image demonstrates a dilated loop of small bowel in the mid left abdomen. Small amount of gas present within the colon. Hernia mesh rib Vi along projects in the left lower quadrant. Prompt transit of contrast through the small bowel to the colon in less than 1 hour. Filling of the dilated small bowel loop in the left abdomen is noted, which remains relatively patulous. No bowel obstruction. Patulous small bowel loop remains in the mid left abdomen. IMPRESSION: Mr.Bryant Lexi Randhawa is a 56 y.o. male with a past history remarkable for CHF, large bowel obstruction s/p diverting colostomy and reversal in 2014, CAD s/p PCI on ASA, DM, ESRD on HD with AVF, BATSHEVA and HTN, SBO recently, referred for evaluation of dysphagia. Patient reports that he has upper esophageal throat irritation and cervical esophageal symptoms with positive globus sensation. Assessment  1. Dysphagia, unspecified type    2. Gastroesophageal reflux disease, unspecified whether esophagitis present    3. Constipation, unspecified constipation type        PLAN:    1) oropharyngeal versus esophageal dysphagiamild throat irritation with inconsistencies with regards to reported symptoms. Currently the patient is at elevated cardiopulmonary risk. Needs esophogram first.  Will place patient on  PPI x 4 weeks. If no improvement, will consider EGD at [facility] after obtaining clearance from cardiology    2) follow-up in 4 to 6 weeks regards to the patient's response to medications. Thank you for allowing me to participate in the care [facility]. For any further questions please do not hesitate to contact me. I have reviewed and agree with the MA/LPN ROS please refer to their documentation from today's encounter on a separate note.      Tray Jack MD, MPH   [facility]  Office #: [phone]          this note is created with the assistance of a speech recognition program.  While intending to generate a document that actually reflects the content of the

## 2021-05-22 NOTE — DISCHARGE SUMMARY
[address] [facility]     Department of Internal Medicine - Staff Internal Medicine Teaching Service    INPATIENT DISCHARGE SUMMARY      Patient Identification:  Juany Arambula is a 56 y.o. male. :  1960  MRN: [id number]     Acct: [de-identified]   PCP: Eileen Galindo  Admit Date:  2021  Discharge date and time: 5/3/2021  2:10 AM   Attending Provider: Anitha att. providers found                                     [address] Problem Lists:  Principal Problem:    Acute on chronic combined systolic (congestive) and diastolic (congestive) heart failure (HCC)  Active Problems:    Type 2 diabetes mellitus with chronic kidney disease on chronic dialysis ([facility]    Essential hypertension    ESRD (end stage renal disease) ([facility]    Coronary artery disease involving native coronary artery of native heart without angina pectoris    BATSHEVA (obstructive sleep apnea)    Acute congestive heart failure (HCC)    Small bowel obstruction ([address]  Resolved Problems:    * No resolved hospital problems. *      HOSPITAL STAY     Brief Inpatient course:   Juany Arambula is a 56 y.o. male with PMH significant for combined systolic and diastolic CHF, ESRD on hemodialysis , hypertension, NSTEMI, bowel obstruction in 2013 s/p exploratory laparotomy with diverting colostomy, incisional hernia mesh repair, BATSHEVA, HLD, pulmonary hypertension who was admitted for the management of Acute on chronic combined systolic (congestive) and diastolic (congestive) heart failure ([address] presented to the emergency department with a chief complaint of shortness of breath ongoing for past 6 weeks which had worsened in the past few days as well as progressively increasing abdominal distention for the past 6 weeks.  CT abdomen pelvis with IV contrast showed isolated segment of small bowel distention concerning for closed-loop obstruction.  General surgery was consulted, recommended NG tube placement and stomach decompression. Patient underwent 2 sessions of hemodialysis and started on home dose oral Lasix 40 mg twice daily, continued Coreg, losartan, aspirin and Lipitor daily. He was advanced to solid diet and discharged to home.       Procedures/ Significant Interventions:    hemodialysis    Consults:     Consults:     Final Specialist Recommendations/Findings:   IP CONSULT TO NEPHROLOGY  IP CONSULT TO GENERAL SURGERY  IP CONSULT TO INTERNAL MEDICINE  IP CONSULT TO CASE MANAGEMENT      Any Hospital Acquired Infections: none    Discharge Functional Status:  stable    DISCHARGE PLAN     Disposition: home    Patient Instructions:   Discharge Medication List as of 5/3/2021  2:22 PM      CONTINUE these medications which have NOT CHANGED    Details   benzonatate (TESSALON) 100 MG capsule take 1 capsule by mouth three times a day if needed for 10 daysHistorical Med      losartan (COZAAR) 25 MG tablet take 1 tablet by mouth once dailyHistorical Med      guaiFENesin-codeine (GUAIFENESIN AC) 100-10 MG/5ML liquid give 10 milliliters by mouth every 4 hours if neededHistorical Med      glimepiride (AMARYL) 1 MG tablet Take 1 mg by mouth every morning (before breakfast)Historical Med      atorvastatin (LIPITOR) 40 MG tablet Take 1 tablet by mouth nightly, Disp-30 tablet, R-3Normal      traMADol (ULTRAM) 50 MG tablet take 1 tablet by mouth every 6 hours if neededHistorical Med      aspirin 325 MG tablet Take 325 mg by mouth dailyHistorical Med      calcium carbonate (TUMS) 500 MG chewable tablet Take 1 tablet by mouth daily as needed Historical Med      carvedilol (COREG) 6.25 MG tablet Take 1 tablet by mouth 2 times daily (with meals), Disp-60 tablet, R-3Normal      Multiple Vitamins-Minerals (RENAPLEX-D) TABS Take 1 tablet by mouth every other day Pt takes occasionally, R-3Historical Med      lidocaine-prilocaine (EMLA) 2.5-2.5 % cream Historical Med      NIFEdipine (ADALAT CC) 30 MG extended release tablet Take

## 2021-05-26 ENCOUNTER — HOSPITAL ENCOUNTER (OUTPATIENT)
Dept: GENERAL RADIOLOGY | Age: 61
Discharge: HOME | End: 2021-05-28
Payer: MEDICARE

## 2021-05-26 DIAGNOSIS — K21.9 GASTROESOPHAGEAL REFLUX DISEASE, UNSPECIFIED WHETHER ESOPHAGITIS PRESENT: ICD-10-CM

## 2021-05-26 DIAGNOSIS — R13.10 DYSPHAGIA, UNSPECIFIED TYPE: ICD-10-CM

## 2021-05-26 PROCEDURE — 2500000003 HC RX 250 WO HCPCS: Performed by: INTERNAL MEDICINE

## 2021-05-26 PROCEDURE — 74220 X-RAY XM ESOPHAGUS 1CNTRST: CPT

## 2021-05-26 RX ADMIN — BARIUM SULFATE 120 ML: 960 POWDER, FOR SUSPENSION ORAL at 14:25

## 2021-05-26 RX ADMIN — BARIUM SULFATE 140 ML: 980 POWDER, FOR SUSPENSION ORAL at 14:26

## 2021-06-21 ENCOUNTER — APPOINTMENT (OUTPATIENT)
Dept: GENERAL RADIOLOGY | Age: 61
End: 2021-06-21
Payer: MEDICARE

## 2021-06-21 ENCOUNTER — HOSPITAL ENCOUNTER (EMERGENCY)
Age: 61
Discharge: HOME | End: 2021-06-21
Attending: EMERGENCY MEDICINE
Payer: MEDICARE

## 2021-06-21 VITALS
BODY MASS INDEX: 37.74 KG/M2 | WEIGHT: 263 LBS | OXYGEN SATURATION: 97 % | TEMPERATURE: 98.1 F | DIASTOLIC BLOOD PRESSURE: 104 MMHG | SYSTOLIC BLOOD PRESSURE: 159 MMHG | RESPIRATION RATE: 15 BRPM | HEART RATE: 98 BPM

## 2021-06-21 DIAGNOSIS — R73.9 HYPERGLYCEMIA: Primary | ICD-10-CM

## 2021-06-21 LAB
ABSOLUTE EOS #: 0.14 K/UL (ref 0–0.44)
ABSOLUTE IMMATURE GRANULOCYTE: 0.07 K/UL (ref 0–0.3)
ABSOLUTE LYMPH #: 0.61 K/UL (ref 1.1–3.7)
ABSOLUTE MONO #: 0.82 K/UL (ref 0.1–1.2)
ALLEN TEST: ABNORMAL
ANION GAP SERPL CALCULATED.3IONS-SCNC: 16 MMOL/L (ref 9–17)
BASOPHILS # BLD: 1 % (ref 0–2)
BASOPHILS ABSOLUTE: 0.07 K/UL (ref 0–0.2)
BETA-HYDROXYBUTYRATE: 0.12 MMOL/L (ref 0.02–0.27)
BNP INTERPRETATION: ABNORMAL
BUN BLDV-MCNC: 59 MG/DL (ref 8–23)
BUN/CREAT BLD: ABNORMAL (ref 9–20)
CALCIUM SERPL-MCNC: 9.2 MG/DL (ref 8.6–10.4)
CARBOXYHEMOGLOBIN: 2.6 % (ref 0–5)
CHLORIDE BLD-SCNC: 91 MMOL/L (ref 98–107)
CHP ED QC CHECK: NORMAL
CO2: 25 MMOL/L (ref 20–31)
CREAT SERPL-MCNC: 5.42 MG/DL (ref 0.7–1.2)
DIFFERENTIAL TYPE: ABNORMAL
EOSINOPHILS RELATIVE PERCENT: 2 % (ref 1–4)
FIO2: ABNORMAL
GFR AFRICAN AMERICAN: 13 ML/MIN
GFR NON-AFRICAN AMERICAN: 11 ML/MIN
GFR SERPL CREATININE-BSD FRML MDRD: ABNORMAL ML/MIN/{1.73_M2}
GFR SERPL CREATININE-BSD FRML MDRD: ABNORMAL ML/MIN/{1.73_M2}
GLUCOSE BLD-MCNC: 355 MG/DL
GLUCOSE BLD-MCNC: 355 MG/DL (ref 75–110)
GLUCOSE BLD-MCNC: 464 MG/DL (ref 70–99)
GLUCOSE BLD-MCNC: 469 MG/DL (ref 75–110)
HCO3 VENOUS: 26.2 MMOL/L (ref 24–30)
HCT VFR BLD CALC: 34.9 % (ref 40.7–50.3)
HEMOGLOBIN: 10.9 G/DL (ref 13–17)
IMMATURE GRANULOCYTES: 1 %
LYMPHOCYTES # BLD: 9 % (ref 24–43)
MCH RBC QN AUTO: 29.9 PG (ref 25.2–33.5)
MCHC RBC AUTO-ENTMCNC: 31.2 G/DL (ref 28.4–34.8)
MCV RBC AUTO: 95.9 FL (ref 82.6–102.9)
METHEMOGLOBIN: ABNORMAL % (ref 0–1.5)
MODE: ABNORMAL
MONOCYTES # BLD: 12 % (ref 3–12)
MORPHOLOGY: NORMAL
NEGATIVE BASE EXCESS, VEN: ABNORMAL MMOL/L (ref 0–2)
NOTIFICATION TIME: ABNORMAL
NOTIFICATION: ABNORMAL
NRBC AUTOMATED: 0 PER 100 WBC
O2 DEVICE/FLOW/%: ABNORMAL
O2 SAT, VEN: 93.2 % (ref 60–85)
OXYHEMOGLOBIN: ABNORMAL % (ref 95–98)
PATIENT TEMP: 37
PCO2, VEN, TEMP ADJ: ABNORMAL MMHG (ref 39–55)
PCO2, VEN: 48.3 (ref 39–55)
PDW BLD-RTO: 14.2 % (ref 11.8–14.4)
PEEP/CPAP: ABNORMAL
PH VENOUS: 7.35 (ref 7.32–7.42)
PH, VEN, TEMP ADJ: ABNORMAL (ref 7.32–7.42)
PLATELET # BLD: 141 K/UL (ref 138–453)
PLATELET ESTIMATE: ABNORMAL
PMV BLD AUTO: 10.5 FL (ref 8.1–13.5)
PO2, VEN, TEMP ADJ: ABNORMAL MMHG (ref 30–50)
PO2, VEN: 77.3 (ref 30–50)
POSITIVE BASE EXCESS, VEN: 0.8 MMOL/L (ref 0–2)
POTASSIUM SERPL-SCNC: 4.5 MMOL/L (ref 3.7–5.3)
PRO-BNP: ABNORMAL PG/ML
PSV: ABNORMAL
PT. POSITION: ABNORMAL
RBC # BLD: 3.64 M/UL (ref 4.21–5.77)
RBC # BLD: ABNORMAL 10*6/UL
RESPIRATORY RATE: ABNORMAL
SAMPLE SITE: ABNORMAL
SEG NEUTROPHILS: 75 % (ref 36–65)
SEGMENTED NEUTROPHILS ABSOLUTE COUNT: 5.09 K/UL (ref 1.5–8.1)
SET RATE: ABNORMAL
SODIUM BLD-SCNC: 132 MMOL/L (ref 135–144)
TEXT FOR RESPIRATORY: ABNORMAL
TOTAL HB: ABNORMAL G/DL (ref 12–16)
TOTAL RATE: ABNORMAL
TROPONIN INTERP: ABNORMAL
TROPONIN INTERP: ABNORMAL
TROPONIN T: ABNORMAL NG/ML
TROPONIN T: ABNORMAL NG/ML
TROPONIN, HIGH SENSITIVITY: 101 NG/L (ref 0–22)
TROPONIN, HIGH SENSITIVITY: 103 NG/L (ref 0–22)
VT: ABNORMAL
WBC # BLD: 6.8 K/UL (ref 3.5–11.3)
WBC # BLD: ABNORMAL 10*3/UL

## 2021-06-21 PROCEDURE — 36415 COLL VENOUS BLD VENIPUNCTURE: CPT

## 2021-06-21 PROCEDURE — 82805 BLOOD GASES W/O2 SATURATION: CPT

## 2021-06-21 PROCEDURE — 82010 KETONE BODYS QUAN: CPT

## 2021-06-21 PROCEDURE — 99284 EMERGENCY DEPT VISIT MOD MDM: CPT

## 2021-06-21 PROCEDURE — 84484 ASSAY OF TROPONIN QUANT: CPT

## 2021-06-21 PROCEDURE — 93005 ELECTROCARDIOGRAM TRACING: CPT

## 2021-06-21 PROCEDURE — 85025 COMPLETE CBC W/AUTO DIFF WBC: CPT

## 2021-06-21 PROCEDURE — 2580000003 HC RX 258: Performed by: STUDENT IN AN ORGANIZED HEALTH CARE EDUCATION/TRAINING PROGRAM

## 2021-06-21 PROCEDURE — 71046 X-RAY EXAM CHEST 2 VIEWS: CPT

## 2021-06-21 PROCEDURE — 82947 ASSAY GLUCOSE BLOOD QUANT: CPT

## 2021-06-21 PROCEDURE — 83880 ASSAY OF NATRIURETIC PEPTIDE: CPT

## 2021-06-21 PROCEDURE — 80048 BASIC METABOLIC PNL TOTAL CA: CPT

## 2021-06-21 RX ORDER — 0.9 % SODIUM CHLORIDE 0.9 %
1000 INTRAVENOUS SOLUTION INTRAVENOUS ONCE
Status: COMPLETED | OUTPATIENT
Start: 2021-06-21 | End: 2021-06-21

## 2021-06-21 RX ADMIN — SODIUM CHLORIDE 1000 ML: 9 INJECTION, SOLUTION INTRAVENOUS at 13:17

## 2021-06-21 ASSESSMENT — ENCOUNTER SYMPTOMS
SHORTNESS OF BREATH: 1
VOMITING: 0
RHINORRHEA: 0
BLOOD IN STOOL: 0
NAUSEA: 0
SORE THROAT: 0
COUGH: 0
DIARRHEA: 0
CONSTIPATION: 0
ABDOMINAL PAIN: 0
WHEEZING: 0

## 2021-06-21 NOTE — ED PROVIDER NOTES
[facility] ED  Emergency Department Encounter  EmergencyMedicine Resident     Pt Name:Sanjay Murphy  MRN: [id number]  [place] 1960  Date of evaluation: 6/21/21  PCP:  [facility]       Chief Complaint   Patient presents with    Hyperglycemia       HISTORY OF PRESENT ILLNESS  (Location/Symptom, Timing/Onset, Context/Setting, Quality, Duration, Modifying Factors, Severity.)      Flor Reich is a 56 y.o. male who presents with hyperglycemia. Patient presents with concern for hyperglycemia, reports that last night he had an elevated blood glucose in the 400s, took an extra dose of glimepiride prior to going to bed, had an episode of shortness of breath, worse with lying flat. Patient reports waking up with a blood glucose of 40, irritable cereal, repeat blood glucose was in the 400s again. Patient is asymptomatic this morning. Patient denies any recent illnesses, any chest pain, abdominal pain, nausea, vomiting, diarrhea. Patient has history of diabetes, is poorly compliant with his medications, reports that he takes 1 or 2 tablets of glimepiride daily depending on his blood glucose level.     PAST MEDICAL / SURGICAL / SOCIAL / FAMILY HISTORY      has a past medical history of Acute congestive heart failure (HCC), Acute on chronic diastolic congestive heart failure (HCC), Anemia, Anemia of chronic renal failure, AVF (arteriovenous fistula) ([address] Bowel obstruction (HCC), CAD (coronary artery disease), Chest pain at rest, CHF (congestive heart failure) ([address] CHF (congestive heart failure), NYHA class I, acute on chronic, combined ([address] Chronic kidney disease, CKD (chronic kidney disease) stage 4, GFR 15-29 ml/min ([address] Coronary artery disease involving native coronary artery of native heart without angina pectoris, Diabetes mellitus ([address] Dialysis patient ([address] ESRD (end stage renal disease) ([address] Essential hypertension, Groin swelling, Hemodialysis Transportation Needs:     Lack of Transportation (Medical):  Lack of Transportation (Non-Medical):    Physical Activity:     Days of Exercise per Week:     Minutes of Exercise per Session:    Stress:     Feeling of Stress :    Social Connections:     Frequency of Communication with Friends and Family:     Frequency of Social Gatherings with Friends and Family:     Attends [employer] Services:     Active Member of Clubs or Organizations:     Attends Club or Organization Meetings:     Marital Status:    Intimate Partner Violence:     Fear of Current or Ex-Partner:     Emotionally Abused:     Physically Abused:     Sexually Abused:        Family History   Problem Relation Age of Onset    Heart Disease Mother         CHF    Early Death Mother     High Blood Pressure Brother     Diabetes Brother     Asthma Brother     High Blood Pressure Brother     Diabetes Brother     High Blood Pressure Brother     Diabetes Brother        Allergies:  Lisinopril    Home Medications:  Prior to Admission medications    Medication Sig Start Date End Date Taking?  Authorizing Provider   pantoprazole (PROTONIX) 40 MG tablet Take 1 tablet by mouth every morning (before breakfast) 5/19/21   Gayathri Wu MD   benzonatate (TESSALON) 100 MG capsule take 1 capsule by mouth three times a day if needed for 10 days 3/3/21   Historical Provider, MD   losartan (COZAAR) 25 MG tablet take 1 tablet by mouth once daily 3/21/21   Historical Provider, MD   guaiFENesin-codeine (GUAIFENESIN AC) 100-10 MG/5ML liquid give 10 milliliters by mouth every 4 hours if needed 3/4/21   Historical Provider, MD   glimepiride (AMARYL) 1 MG tablet Take 1 mg by mouth every morning (before breakfast)    Historical Provider, MD   atorvastatin (LIPITOR) 40 MG tablet Take 1 tablet by mouth nightly 2/9/21   Leah Hawthorne MD   traMADol [place] Quant) 50 MG tablet take 1 tablet by mouth every 6 hours if needed 10/21/20   Historical Provider, MD   aspirin 325 MG tablet Take 325 mg by mouth daily    Historical Provider, MD   calcium carbonate (TUMS) 500 MG chewable tablet Take 1 tablet by mouth daily as needed     Historical Provider, MD   carvedilol (COREG) 6.25 MG tablet Take 1 tablet by mouth 2 times daily (with meals) 6/19/19   Mariajose Ledbetter MD   Multiple Vitamins-Minerals (RENAPLEX-D) TABS Take 1 tablet by mouth every other day Pt takes occasionally 5/14/19   Historical Provider, MD   lidocaine-prilocaine (EMLA) 2.5-2.5 % cream  3/18/19   Historical Provider, MD   NIFEdipine (ADALAT CC) 30 MG extended release tablet Take 30 mg by mouth 2 times daily    Historical Provider, MD   furosemide (LASIX) 40 MG tablet Take 40 mg by mouth 2 times daily     Historical Provider, MD       REVIEW OF SYSTEMS    (2-9 systems for level 4, 10 or more for level 5)      Review of Systems   Constitutional: Negative for chills, diaphoresis, fatigue and fever. HENT: Negative for congestion, rhinorrhea and sore throat. Eyes: Negative for visual disturbance. Respiratory: Positive for shortness of breath. Negative for cough and wheezing. Cardiovascular: Negative for chest pain, palpitations and leg swelling. Gastrointestinal: Negative for abdominal pain, blood in stool, constipation, diarrhea, nausea and vomiting. Genitourinary: Negative for dysuria and hematuria. Musculoskeletal: Negative for neck pain and neck stiffness. Skin: Negative for pallor and rash. Neurological: Negative for dizziness, syncope, weakness, light-headedness and headaches. Psychiatric/Behavioral: Negative for confusion. PHYSICAL EXAM   (up to 7 for level 4, 8 or more for level 5)      INITIAL VITALS:   BP (!) 159/104   Pulse 98   Temp 98.1 °F (36.7 °C) (Oral)   Resp 15   Wt 263 lb (119.3 kg)   SpO2 97%   BMI 37.74 kg/m²     Physical Exam  Constitutional:       General: He is not in acute distress. Appearance: Normal appearance. He is well-developed.  He is not ill-appearing, toxic-appearing or diaphoretic. HENT:      Head: Normocephalic and atraumatic. Right Ear: External ear normal.      Left Ear: External ear normal.      Nose: Nose normal. No congestion or rhinorrhea. Mouth/Throat:      Mouth: Mucous membranes are moist.      Pharynx: Oropharynx is clear. No oropharyngeal exudate or posterior oropharyngeal erythema. Eyes:      General:         Right eye: No discharge. Left eye: No discharge. Extraocular Movements: Extraocular movements intact. Pupils: Pupils are equal, round, and reactive to light. Neck:      Vascular: No JVD. Trachea: No tracheal deviation. Cardiovascular:      Rate and Rhythm: Normal rate and regular rhythm. Pulses: Normal pulses. Heart sounds: Normal heart sounds. No murmur heard. No friction rub. No gallop. Pulmonary:      Effort: Pulmonary effort is normal. No respiratory distress. Breath sounds: Normal breath sounds. No stridor. No wheezing, rhonchi or rales. Chest:      Chest wall: No tenderness. Abdominal:      General: There is no distension. Palpations: Abdomen is soft. There is no mass. Tenderness: There is no abdominal tenderness. There is no right CVA tenderness, left CVA tenderness or guarding. Musculoskeletal:         General: No tenderness. Normal range of motion. Cervical back: Normal range of motion and neck supple. Right lower leg: No edema. Left lower leg: No edema. Skin:     General: Skin is warm. Capillary Refill: Capillary refill takes less than 2 seconds. Findings: No rash. Neurological:      General: No focal deficit present. Mental Status: He is alert and oriented to person, place, and time. Cranial Nerves: No cranial nerve deficit. Sensory: No sensory deficit. Motor: No weakness.       Coordination: Coordination normal.      Gait: Gait normal.   Psychiatric:         Mood and Affect: Mood normal.         Behavior: Behavior normal.         DIFFERENTIAL  DIAGNOSIS     PLAN (LABS / IMAGING / EKG):  Orders Placed This Encounter   Procedures    XR CHEST (2 VW)    CBC Auto Differential    Basic Metabolic Panel w/ Reflex to MG    Troponin    Brain Natriuretic Peptide    Blood Gas, Venous    BETA-HYDROXYBUTYRATE    POC Glucose Fingerstick    POCT glucose    POC Glucose Fingerstick    EKG 12 Lead       MEDICATIONS ORDERED:  Orders Placed This Encounter   Medications    0.9 % sodium chloride bolus       DDX:     DIAGNOSTIC RESULTS / EMERGENCY DEPARTMENT COURSE / MDM   LAB RESULTS:  Results for orders placed or performed during the hospital encounter of 06/21/21   CBC Auto Differential   Result Value Ref Range    WBC 6.8 3.5 - 11.3 k/uL    RBC 3.64 (L) 4.21 - 5.77 m/uL    Hemoglobin 10.9 (L) 13.0 - 17.0 g/dL    Hematocrit 34.9 (L) 40.7 - 50.3 %    MCV 95.9 82.6 - 102.9 fL    MCH 29.9 25.2 - 33.5 pg    MCHC 31.2 28.4 - 34.8 g/dL    RDW 14.2 11.8 - 14.4 %    Platelets 373 386 - 964 k/uL    MPV 10.5 8.1 - 13.5 fL    NRBC Automated 0.0 0.0 per 100 WBC    Differential Type NOT REPORTED     WBC Morphology NOT REPORTED     RBC Morphology NOT REPORTED     Platelet Estimate NOT REPORTED     Immature Granulocytes 1 (H) 0 %    Seg Neutrophils 75 (H) 36 - 65 %    Lymphocytes 9 (L) 24 - 43 %    Monocytes 12 3 - 12 %    Eosinophils % 2 1 - 4 %    Basophils 1 0 - 2 %    Absolute Immature Granulocyte 0.07 0.00 - 0.30 k/uL    Segs Absolute 5.09 1.50 - 8.10 k/uL    Absolute Lymph # 0.61 (L) 1.10 - 3.70 k/uL    Absolute Mono # 0.82 0.10 - 1.20 k/uL    Absolute Eos # 0.14 0.00 - 0.44 k/uL    Basophils Absolute 0.07 0.00 - 0.20 k/uL    Morphology Normal    Basic Metabolic Panel w/ Reflex to MG   Result Value Ref Range    Glucose 464 (HH) 70 - 99 mg/dL    BUN 59 (H) 8 - 23 mg/dL    CREATININE 5.42 (HH) 0.70 - 1.20 mg/dL    Bun/Cre Ratio NOT REPORTED 9 - 20    Calcium 9.2 8.6 - 10.4 mg/dL    Sodium 132 (L) 135 - 144 mmol/L    Potassium 4.5 3.7 - 5.3 mmol/L    Chloride 91 (L) 98 - 107 mmol/L    CO2 25 20 - 31 mmol/L    Anion Gap 16 9 - 17 mmol/L    GFR Non-African American 11 (L) >60 mL/min    GFR  13 (L) >60 mL/min    GFR Comment          GFR Staging NOT REPORTED    Troponin   Result Value Ref Range    Troponin, High Sensitivity 101 (HH) 0 - 22 ng/L    Troponin T NOT REPORTED <0.03 ng/mL    Troponin Interp NOT REPORTED    Troponin   Result Value Ref Range    Troponin, High Sensitivity 103 (HH) 0 - 22 ng/L    Troponin T NOT REPORTED <0.03 ng/mL    Troponin Interp NOT REPORTED    Brain Natriuretic Peptide   Result Value Ref Range    Pro-BNP 27,038 (H) <300 pg/mL    BNP Interpretation Pro-BNP Reference Range:    Blood Gas, Venous   Result Value Ref Range    pH, Johnnie 7.353 7.320 - 7.420    pCO2, Johnnie 48.3 39 - 55    pO2, Johnnie 77.3 (H) 30 - 50    HCO3, Venous 26.2 24 - 30 mmol/L    Positive Base Excess, Johnnie 0.8 0.0 - 2.0 mmol/L    Negative Base Excess, Johnnie NOT REPORTED 0.0 - 2.0 mmol/L    O2 Sat, Johnnie 93.2 (H) 60.0 - 85.0 %    Total Hb NOT REPORTED 12.0 - 16.0 g/dl    Oxyhemoglobin NOT REPORTED 95.0 - 98.0 %    Carboxyhemoglobin 2.6 0 - 5 %    Methemoglobin NOT REPORTED 0.0 - 1.5 %    Pt Temp 37.0     pH, Johnnie, Temp Adj NOT REPORTED 7.320 - 7.420    pCO2, Johnnie, Temp Adj NOT REPORTED 39 - 55 mmHg    pO2, Johnnie, Temp Adj NOT REPORTED 30 - 50 mmHg    O2 Device/Flow/% NOT REPORTED     Respiratory Rate NOT REPORTED     Syd Test NOT REPORTED     Sample Site NOT REPORTED     Pt.  Position NOT REPORTED     Mode NOT REPORTED     Set Rate NOT REPORTED     Total Rate NOT REPORTED     VT NOT REPORTED     FIO2 UNKNOWN     Peep/Cpap NOT REPORTED     PSV NOT REPORTED     Text for Respiratory NOT REPORTED     NOTIFICATION NOT REPORTED     NOTIFICATION TIME NOT REPORTED    BETA-HYDROXYBUTYRATE   Result Value Ref Range    Beta-Hydroxybutyrate 0.12 0.02 - 0.27 mmol/L   POC Glucose Fingerstick   Result Value Ref Range    POC Glucose 469 (HH) 75 - 110 mg/dL   POCT glucose Result Value Ref Range    Glucose 355 mg/dL    QC OK? ok    POC Glucose Fingerstick   Result Value Ref Range    POC Glucose 355 (H) 75 - 110 mg/dL       IMPRESSION: 58-year-old male with some shortness of breath yesterday, resolved today, concern for hyperglycemia today, blood glucose is elevated on arrival.  With the episode of shortness of breath, will obtain cardiac work-up to include EKG and troponin to rule out ACS, chest x-ray and BNP to evaluate for worsening CHF. Will obtain basic labs to evaluate for electrolyte abnormality, anemia. Will treat elevated glucose with IV fluids, recheck. RADIOLOGY:  XR CHEST (2 VW)    Result Date: 6/21/2021  EXAMINATION: TWO XRAY VIEWS OF THE CHEST 6/21/2021 12:31 pm COMPARISON: 05/01/2021 HISTORY: ORDERING SYSTEM PROVIDED HISTORY: SOB TECHNOLOGIST PROVIDED HISTORY: SOB 58-year-old male with shortness of breath FINDINGS: Cardiac monitor leads overlie the chest. Stable cardiomegaly. Trachea midline. No pneumothorax or free air. Mild pulmonary vascular congestion. No acute focal airspace consolidation or pleural effusions. Mild diffuse degenerative changes throughout the spine. 1. Stable cardiomegaly. Mild pulmonary vascular congestion. 2. No acute focal airspace consolidation. EKG  EKG Interpretation    Interpreted by me    Rhythm: normal sinus   Rate: normal  Axis: normal  Ectopy: none  Conduction: normal  ST Segments: no acute change  T Waves: no acute change  Q Waves: none    Clinical Impression: no acute changes and normal EKG, similar to prior EKG    All EKG's are interpreted by the Emergency Department Physician who either signs or Co-signs this chart in the absence of a cardiologist.    EMERGENCY DEPARTMENT COURSE:  Patient came to emergency department, HPI and physical exam were conducted. All nursing notes were reviewed. EKG found no acute changes, similar to prior EKG, troponin stable for patient, no concern for ACS.   Patient has elevated BNP, but chest x-ray is reassuring, no worsening symptoms today, no worsening lower extremity swelling, low concern for CHF exacerbation at this time. Administered a small amount of fluids, reassessment blood glucose is improved. Patient continues to be asymptomatic in the emergency department. Patient remained stable emerge department with improving vital signs. Gave strict return precautions to the emergency department and discharge patient home. Recommended patient follow-up with PCP for further diabetic management. PROCEDURES:      CONSULTS:  None    CRITICAL CARE:      FINAL IMPRESSION      1.  Hyperglycemia          DISPOSITION / PLAN     DISPOSITION Decision To Discharge 06/21/2021 03:20:54 PM      PATIENT REFERRED TO:  [address] [facility],[address] [facility]  [address]  [address]  [phone]    Schedule an appointment as soon as possible for a visit in 3 days  For reassessment    [facility]  [address] [address]  [phone]  Go to   As needed, If symptoms worsen      DISCHARGE MEDICATIONS:  Discharge Medication List as of 6/21/2021  3:21 PM          Jose M Grant MD  Emergency Medicine Resident    (Please note that portions of thisnote were completed with a voice recognition program.  Efforts were made to edit the dictations but occasionally words are mis-transcribed.)       Jose M Grant MD  Resident  06/21/21 [id number]

## 2021-06-21 NOTE — ED PROVIDER NOTES
quadrants    Elevated glucose reading will rule out DKA. Patient does not appear to be in DKA he is nontachycardic. No acute ketones noted on his breath. And no respiratory distress. We will plan on labs, rule out ACS due to the worsening shortness of breath. Check BNP possible CHF exacerbation. EKG Interpretation    Interpreted by me    EKG shows normal sinus rhythm, prolonged [place] interval of 218 with first-degree AV block, occasional PVCs noted. There is a right bundle branch block noted, there is left axis deviation no ST elevations or depressions noted.     EKG appears unchanged from May 1, 2021        Epifanio Riley D.O, M.P.H  Attending Emergency Medicine Physician         Epifanio Riley,   06/21/21 [id number]

## 2021-06-28 LAB
EKG ATRIAL RATE: 100 BPM
EKG P AXIS: 65 DEGREES
EKG P-R INTERVAL: 218 MS
EKG Q-T INTERVAL: 402 MS
EKG QRS DURATION: 156 MS
EKG QTC CALCULATION (BAZETT): 518 MS
EKG R AXIS: -73 DEGREES
EKG T AXIS: 68 DEGREES
EKG VENTRICULAR RATE: 100 BPM

## 2021-06-29 ENCOUNTER — TELEPHONE (OUTPATIENT)
Dept: PULMONOLOGY | Age: 61
End: 2021-06-29

## 2021-09-15 ENCOUNTER — OFFICE VISIT (OUTPATIENT)
Dept: GASTROENTEROLOGY | Age: 61
End: 2021-09-15
Payer: MEDICARE

## 2021-09-15 VITALS — SYSTOLIC BLOOD PRESSURE: 149 MMHG | WEIGHT: 271 LBS | DIASTOLIC BLOOD PRESSURE: 83 MMHG | BODY MASS INDEX: 38.88 KG/M2

## 2021-09-15 DIAGNOSIS — R13.10 DYSPHAGIA, UNSPECIFIED TYPE: Primary | ICD-10-CM

## 2021-09-15 DIAGNOSIS — K21.9 GASTROESOPHAGEAL REFLUX DISEASE, UNSPECIFIED WHETHER ESOPHAGITIS PRESENT: ICD-10-CM

## 2021-09-15 PROCEDURE — G8427 DOCREV CUR MEDS BY ELIG CLIN: HCPCS | Performed by: INTERNAL MEDICINE

## 2021-09-15 PROCEDURE — 99213 OFFICE O/P EST LOW 20 MIN: CPT | Performed by: INTERNAL MEDICINE

## 2021-09-15 PROCEDURE — 1036F TOBACCO NON-USER: CPT | Performed by: INTERNAL MEDICINE

## 2021-09-15 PROCEDURE — 3017F COLORECTAL CA SCREEN DOC REV: CPT | Performed by: INTERNAL MEDICINE

## 2021-09-15 PROCEDURE — G8417 CALC BMI ABV UP PARAM F/U: HCPCS | Performed by: INTERNAL MEDICINE

## 2021-09-15 ASSESSMENT — ENCOUNTER SYMPTOMS
SHORTNESS OF BREATH: 1
COUGH: 1
ALLERGIC/IMMUNOLOGIC NEGATIVE: 1
EYES NEGATIVE: 1
CHOKING: 1
TROUBLE SWALLOWING: 1
VOMITING: 1

## 2021-09-15 NOTE — PROGRESS NOTES
GI FOLLOW UP    INTERVAL HISTORY:         Chief Complaint   Patient presents with    Follow-up     esophagram f/u    Dysphagia     Patient states taking protonix 40mg daily and did not notice any difference in symptoms. Patient states the only time he notices his coughing and tickle in his throat going away is when he takes Tramadol.  Constipation     Patient states he is having normal bowel movements daily. 1. Dysphagia, unspecified type    2. Gastroesophageal reflux disease, unspecified whether esophagitis present          HISTORY OF PRESENT ILLNESS: Mr.Bryant Wing Hook is a 56 y.o. male with a past history remarkable for CHF, large bowel obstruction s/p diverting colostomy and reversal in 2014, CAD s/p PCI on ASA, DM, ESRD on HD with AVF, BATSHEVA and HTN, SBO recently, referred for evaluation of dysphagia. Patient reports that he has upper esophageal throat irritation and cervical esophageal symptoms with positive globus sensation.         Smoker: none  Drinking history: none  Illicit drugs: none  Abdominal surgeries: 2014, large bowel resection  Prior Colonoscopy: Recent colonoscopy [facility] ( Dec 2020--will need records)   Prior EGD: None per patient. FH of GI issues: none   Past Medical,Family, and Social History reviewed and does contribute to the patient presenting condition. Patient's PMH/PSH,SH,PSYCH Hx, MEDs, ALLERGIES, and ROS were all reviewed and updated in the appropriate sections. PAST MEDICAL HISTORY:  Past Medical History:   Diagnosis Date    Acute congestive heart failure ([address] 12/21/2016    Acute on chronic diastolic congestive heart failure ([address] 11/24/2018    Anemia 11/25/2018    Anemia of chronic renal failure 10/17/2016    AVF (arteriovenous fistula) (HCC)     Bowel obstruction ([address] 12/26/2013    CAD (coronary artery disease) 2013    ?     Chest pain at rest 12/21/2016    CHF (congestive heart failure) ([address] 2013    last episode 11/2018    CHF (congestive heart failure), NYHA class I, acute on chronic, combined ([address] 2/9/2021    Chronic kidney disease     CKD (chronic kidney disease) stage 4, GFR 15-29 ml/min ([address] 10/17/2016    Coronary artery disease involving native coronary artery of native heart without angina pectoris     Diabetes mellitus ([address] 2012    NIDDM    Dialysis patient ([address]     Elaine Phillips  /  Chauncey Schaumann  /  Gucci Lubin    ESRD (end stage renal disease) ([address]     Essential hypertension 11/25/2018    Groin swelling 07/17/2019    Hemodialysis patient ([address] 11/28/2018    TUNNELD CATHETER RIGHT DIALYSIS ACCESSTUES THURS AND AT [facility]    Hydrocele 07/17/2019    Hyperlipidemia 2013    ON RX    Hypertension 2013    ON RX    MI, old 12/26/2013    NSTEMI (non-ST elevated myocardial infarction) ([address] 6/17/2019    BATSHEVA (obstructive sleep apnea)     Patient in clinical research study 04/01/2019    XIENCE SHORT DAPT    Pneumonia     Prostatism 11/17/2017    Respiratory distress 11/25/2018    Rising PSA level 11/17/2017    S/P arteriovenous (AV) graft placement     S/P PTCA - TIM distal LAD - Dr. Monica Jean 4/1/19 4/1/2019    Sleep apnea 2015    pt has machine and does not use it    Small bowel obstruction ([address] 5/1/2021    Type 2 diabetes mellitus with chronic kidney disease on chronic dialysis ([address] 10/17/2016       Past Surgical History:   Procedure Laterality Date    ABDOMEN SURGERY  2013    BOWEL OBSTRUCTION    AV FISTULA CREATION Left 02/20/2019    AV FISTULA REPAIR  07/17/2019    AV fistula revision, branch ligation / Percutaneous angioplasty of proximal anastomosis and outflow tract of dialysis circuit with drug coated balloon  /  Dr Erick Giles  02/09/2021    Dr. Marc Gonzalez, [address]  03/31/2019    TIM LAD    CYSTOSCOPY  11/17/2017    DIALYSIS FISTULA CREATION Left 2/20/2019    AV FISTULA CREATION ARM performed by Soniya Dailey MD at [address] [facility] Left 10/23/2019    LEFT UPPER EXTREMITY AV GRAFT CREATION WITH [id number] ARTEGRAFT, LEFT UPPER EXTREMITY AV FISTULA LIGATION performed by Soniya Dailey MD at [facility] Left 2005    [address]  2007    1 WISDOM TOOTH REMOVED    NOSE SURGERY  1968    CAUTERY TO STOP NOSE BEEDS    OTHER SURGICAL HISTORY Left 03/06/2019    fistulagram    GA GENITAL SURG PROC,MALE UNLISTED N/A 2/16/2018    US  PROSTATE BIOPSY WITH SATURATION performed by Federico Coleman MD at [address] N/A 11/17/2017    CYSTOSCOPY PROSTATE US BIOPSY performed by Federico Coleman MD at [address]  02/16/2018   [facility]    VASCULAR SURGERY  09/20/2019    LUE FISTULAGRAM  /  DR Torie Galvez  /  Findings: Severe stenosis of proximal cephalic vein. / Will plan for LUE AVG placement.     VASECTOMY  1983       CURRENT MEDICATIONS:    Current Outpatient Medications:     pantoprazole (PROTONIX) 40 MG tablet, Take 1 tablet by mouth every morning (before breakfast), Disp: 30 tablet, Rfl: 2    benzonatate (TESSALON) 100 MG capsule, take 1 capsule by mouth three times a day if needed for 10 days, Disp: , Rfl:     losartan (COZAAR) 25 MG tablet, take 1 tablet by mouth once daily, Disp: , Rfl:     guaiFENesin-codeine (GUAIFENESIN AC) 100-10 MG/5ML liquid, give 10 milliliters by mouth every 4 hours if needed, Disp: , Rfl:     glimepiride (AMARYL) 1 MG tablet, Take 1 mg by mouth every morning (before breakfast), Disp: , Rfl:     atorvastatin (LIPITOR) 40 MG tablet, Take 1 tablet by mouth nightly, Disp: 30 tablet, Rfl: 3    traMADol (ULTRAM) 50 MG tablet, take 1 tablet by mouth every 6 hours if needed, Disp: , Rfl:     aspirin 325 MG tablet, Take 325 mg by mouth daily, Disp: , Rfl:    calcium carbonate (TUMS) 500 MG chewable tablet, Take 1 tablet by mouth daily as needed , Disp: , Rfl:     carvedilol (COREG) 6.25 MG tablet, Take 1 tablet by mouth 2 times daily (with meals), Disp: 60 tablet, Rfl: 3    Multiple Vitamins-Minerals (RENAPLEX-D) TABS, Take 1 tablet by mouth every other day Pt takes occasionally, Disp: , Rfl: 3    lidocaine-prilocaine (EMLA) 2.5-2.5 % cream, , Disp: , Rfl:     NIFEdipine (ADALAT CC) 30 MG extended release tablet, Take 30 mg by mouth 2 times daily, Disp: , Rfl:     furosemide (LASIX) 40 MG tablet, Take 40 mg by mouth 2 times daily , Disp: , Rfl:     ALLERGIES:   Allergies   Allergen Reactions    Lisinopril Swelling       FAMILY HISTORY:       Problem Relation Age of Onset    Heart Disease Mother         CHF   Dossie Argenis Early Death Mother     High Blood Pressure Brother     Diabetes Brother     Asthma Brother     High Blood Pressure Brother     Diabetes Brother     High Blood Pressure Brother     Diabetes Brother          SOCIAL HISTORY:   Social History     Socioeconomic History    Marital status: Single     Spouse name: Not on file    Number of children: Not on file    Years of education: Not on file    Highest education level: Not on file   Occupational History    Not on file   Tobacco Use    Smoking status: Never Smoker    Smokeless tobacco: Never Used   Vaping Use    Vaping Use: Never used   Substance and Sexual Activity    Alcohol use: No    Drug use: No    Sexual activity: Not on file   Other Topics Concern    Not on file   Social History Narrative    Not on file     Social Determinants of Health     Financial Resource Strain:     Difficulty of Paying Living Expenses:    Food Insecurity:     Worried About Running Out of Food in the Last Year:     Ran Out of Food in the Last Year:    Transportation Needs:     Lack of Transportation (Medical):      Lack of Transportation (Non-Medical):    Physical Activity:     Days of Exercise per Week:     Minutes of Exercise per Session:    Stress:     Feeling of Stress :    Social Connections:     Frequency of Communication with Friends and Family:     Frequency of Social Gatherings with Friends and Family:     Attends [employer] Services:     Active Member of Clubs or Organizations:     Attends Club or Organization Meetings:     Marital Status:    Intimate Partner Violence:     Fear of Current or Ex-Partner:     Emotionally Abused:     Physically Abused:     Sexually Abused:        REVIEW OF SYSTEMS: A 12-point review of systems was obtained and pertinent positives and negatives were listed below. REVIEW OF SYSTEMS:     Constitutional: No fever, no chills, no lethargy, no weakness. HEENT:  No headache, otalgia, itchy eyes, nasal discharge or sore throat. Cardiac:  No chest pain, dyspnea, orthopnea or PND. Chest:   No cough, phlegm or wheezing. Abdomen:      Detailed by MA   Neuro:  No focal weakness, abnormal movements or seizure like activity. Skin:   No rashes, no itching. :   No hematuria, no pyuria, no dysuria, no flank pain. Extremities:  No swelling or joint pains. ROS was otherwise negative    Review of Systems   Constitutional: Negative. HENT: Positive for trouble swallowing. Eyes: Negative. Respiratory: Positive for cough, choking and shortness of breath. Cardiovascular: Negative. Gastrointestinal: Positive for vomiting. Endocrine: Negative. Genitourinary: Negative. Musculoskeletal: Negative. Skin: Negative. Allergic/Immunologic: Negative. Neurological: Negative. Hematological: Negative. Psychiatric/Behavioral: Negative. All other systems reviewed and are negative. PHYSICAL EXAMINATION: Vital signs reviewed per the nursing documentation. BP (!) 154/89   Wt 271 lb (122.9 kg)   BMI 38.88 kg/m²   Body mass index is 38.88 kg/m². Physical Exam    Physical Exam   Constitutional: Patient is oriented to person, place, and time.  Patient appears well-developed and well-nourished. HENT:   Head: Normocephalic and atraumatic. Eyes: Pupils are equal, round, and reactive to light. EOM are normal.   Neck: Normal range of motion. Neck supple. No JVD present. No tracheal deviation present. No thyromegaly present. Cardiovascular: Normal rate, regular rhythm, normal heart sounds and intact distal pulses. Pulmonary/Chest: Effort normal and breath sounds normal. No stridor. No respiratory distress. He has no wheezes. He has no rales. He exhibits no tenderness. Abdominal: Soft. Bowel sounds are normal. He exhibits no distension and no mass. There is no tenderness. There is no rebound and no guarding. No hernia. Musculoskeletal: Normal range of motion. Lymphadenopathy:    Patient has no cervical adenopathy. Neurological: Patient is alert and oriented to person, place, and time. Psychiatric: Patient has a normal mood and affect. Patient behavior is normal.       LABORATORY DATA: Reviewed  Lab Results   Component Value Date    WBC 6.8 06/21/2021    HGB 10.9 (L) 06/21/2021    HCT 34.9 (L) 06/21/2021    MCV 95.9 06/21/2021     06/21/2021     (L) 06/21/2021    K 4.5 06/21/2021    CL 91 (L) 06/21/2021    CO2 25 06/21/2021    BUN 59 (H) 06/21/2021    CREATININE 5.42 (HH) 06/21/2021    LABALBU 4.1 05/01/2021    BILITOT 0.87 05/01/2021    ALKPHOS 121 05/01/2021    AST 17 05/01/2021    ALT 21 05/01/2021    INR 1.0 11/24/2018         Lab Results   Component Value Date    RBC 3.64 (L) 06/21/2021    HGB 10.9 (L) 06/21/2021    MCV 95.9 06/21/2021    MCH 29.9 06/21/2021    MCHC 31.2 06/21/2021    RDW 14.2 06/21/2021    MPV 10.5 06/21/2021    BASOPCT 1 06/21/2021    LYMPHSABS 0.61 (L) 06/21/2021    MONOSABS 0.82 06/21/2021    NEUTROABS 5.09 06/21/2021    EOSABS 0.14 06/21/2021    BASOSABS 0.07 06/21/2021         DIAGNOSTIC TESTING:     No results found.            IMPRESSION: Mr.Bryant Nadja Blanco is a 59 y.o. male with a past history remarkable for CHF, large bowel obstruction s/p diverting colostomy and reversal in 2014, CAD s/p PCI on ASA, DM, ESRD on HD with AVF, BATSHEVA and HTN, SBO recently, referred for evaluation of dysphagia. Patient reports that he has upper esophageal throat irritation and cervical esophageal symptoms with positive globus sensation. Assessment  1. Dysphagia, unspecified type    2. Gastroesophageal reflux disease, unspecified whether esophagitis present        PLAN:    1) oropharyngeal versus esophageal dysphagia-esophagram performed on May 26, 2021 was negative for any structural causes to explain patient's symptoms. Plan for diagnostic EGD at [facility]. 2) patient continue with Protonix 40 mg daily. Avoid dietary triggers related to GERD. 3) denies any lower GI symptoms  Thank you for allowing me to participate in the care of Mr. Michelle Samayoa. For any further questions please do not hesitate to contact me. I have reviewed and agree with the ROS entered by the MA/LPN from today's encounter documented in a separate note. Josiah Armenta MD, MPH   [facility]  Office #: [phone]    this note is created with the assistance of a speech recognition program.  While intending to generate a document that actually reflects the content of the visit, the document can still have some errors including those of syntax and sound a like substitutions which may escape proof reading. It such instances, actual meaning can be extrapolated by contextual diversion.

## 2021-09-27 ENCOUNTER — ANESTHESIA (OUTPATIENT)
Dept: ENDOSCOPY | Age: 61
End: 2021-09-27
Payer: MEDICARE

## 2021-09-27 ENCOUNTER — APPOINTMENT (OUTPATIENT)
Dept: GENERAL RADIOLOGY | Age: 61
End: 2021-09-27
Attending: INTERNAL MEDICINE
Payer: MEDICARE

## 2021-09-27 ENCOUNTER — ANESTHESIA EVENT (OUTPATIENT)
Dept: ENDOSCOPY | Age: 61
End: 2021-09-27
Payer: MEDICARE

## 2021-09-27 ENCOUNTER — HOSPITAL ENCOUNTER (OUTPATIENT)
Age: 61
Setting detail: OUTPATIENT SURGERY
Discharge: HOME | End: 2021-09-27
Attending: INTERNAL MEDICINE | Admitting: INTERNAL MEDICINE
Payer: MEDICARE

## 2021-09-27 VITALS
TEMPERATURE: 97.4 F | HEART RATE: 84 BPM | SYSTOLIC BLOOD PRESSURE: 111 MMHG | BODY MASS INDEX: 38.65 KG/M2 | HEIGHT: 70 IN | RESPIRATION RATE: 37 BRPM | WEIGHT: 270 LBS | DIASTOLIC BLOOD PRESSURE: 77 MMHG | OXYGEN SATURATION: 97 %

## 2021-09-27 VITALS
OXYGEN SATURATION: 94 % | DIASTOLIC BLOOD PRESSURE: 85 MMHG | SYSTOLIC BLOOD PRESSURE: 125 MMHG | RESPIRATION RATE: 31 BRPM

## 2021-09-27 LAB — GLUCOSE BLD-MCNC: 189 MG/DL (ref 75–110)

## 2021-09-27 PROCEDURE — 88342 IMHCHEM/IMCYTCHM 1ST ANTB: CPT

## 2021-09-27 PROCEDURE — 2500000003 HC RX 250 WO HCPCS: Performed by: ANESTHESIOLOGY

## 2021-09-27 PROCEDURE — 7100000011 HC PHASE II RECOVERY - ADDTL 15 MIN: Performed by: INTERNAL MEDICINE

## 2021-09-27 PROCEDURE — 2709999900 HC NON-CHARGEABLE SUPPLY: Performed by: INTERNAL MEDICINE

## 2021-09-27 PROCEDURE — 88312 SPECIAL STAINS GROUP 1: CPT

## 2021-09-27 PROCEDURE — 76937 US GUIDE VASCULAR ACCESS: CPT

## 2021-09-27 PROCEDURE — 6360000002 HC RX W HCPCS: Performed by: ANESTHESIOLOGY

## 2021-09-27 PROCEDURE — 7100000010 HC PHASE II RECOVERY - FIRST 15 MIN: Performed by: INTERNAL MEDICINE

## 2021-09-27 PROCEDURE — 2580000003 HC RX 258: Performed by: INTERNAL MEDICINE

## 2021-09-27 PROCEDURE — 88305 TISSUE EXAM BY PATHOLOGIST: CPT

## 2021-09-27 PROCEDURE — 43239 EGD BIOPSY SINGLE/MULTIPLE: CPT | Performed by: INTERNAL MEDICINE

## 2021-09-27 PROCEDURE — 3700000001 HC ADD 15 MINUTES (ANESTHESIA): Performed by: INTERNAL MEDICINE

## 2021-09-27 PROCEDURE — 3700000000 HC ANESTHESIA ATTENDED CARE: Performed by: INTERNAL MEDICINE

## 2021-09-27 PROCEDURE — 71045 X-RAY EXAM CHEST 1 VIEW: CPT

## 2021-09-27 PROCEDURE — 82947 ASSAY GLUCOSE BLOOD QUANT: CPT

## 2021-09-27 PROCEDURE — 3609012400 HC EGD TRANSORAL BIOPSY SINGLE/MULTIPLE: Performed by: INTERNAL MEDICINE

## 2021-09-27 RX ORDER — GLYCOPYRROLATE 1 MG/5 ML
SYRINGE (ML) INTRAVENOUS PRN
Status: DISCONTINUED | OUTPATIENT
Start: 2021-09-27 | End: 2021-09-27 | Stop reason: SDUPTHER

## 2021-09-27 RX ORDER — KETAMINE HYDROCHLORIDE 10 MG/ML
INJECTION, SOLUTION INTRAMUSCULAR; INTRAVENOUS PRN
Status: DISCONTINUED | OUTPATIENT
Start: 2021-09-27 | End: 2021-09-27 | Stop reason: SDUPTHER

## 2021-09-27 RX ORDER — PROPOFOL 10 MG/ML
INJECTION, EMULSION INTRAVENOUS PRN
Status: DISCONTINUED | OUTPATIENT
Start: 2021-09-27 | End: 2021-09-27 | Stop reason: SDUPTHER

## 2021-09-27 RX ORDER — LIDOCAINE HYDROCHLORIDE 10 MG/ML
INJECTION, SOLUTION EPIDURAL; INFILTRATION; INTRACAUDAL; PERINEURAL PRN
Status: DISCONTINUED | OUTPATIENT
Start: 2021-09-27 | End: 2021-09-27 | Stop reason: SDUPTHER

## 2021-09-27 RX ORDER — SODIUM CHLORIDE 9 MG/ML
INJECTION, SOLUTION INTRAVENOUS CONTINUOUS
Status: DISCONTINUED | OUTPATIENT
Start: 2021-09-27 | End: 2021-09-27 | Stop reason: HOSPADM

## 2021-09-27 RX ADMIN — LIDOCAINE HYDROCHLORIDE 50 MG: 10 INJECTION, SOLUTION EPIDURAL; INFILTRATION; INTRACAUDAL; PERINEURAL at 11:10

## 2021-09-27 RX ADMIN — KETAMINE HYDROCHLORIDE 25 MG: 10 INJECTION INTRAMUSCULAR; INTRAVENOUS at 11:15

## 2021-09-27 RX ADMIN — Medication 0.2 MG: at 11:15

## 2021-09-27 RX ADMIN — SODIUM CHLORIDE: 9 INJECTION, SOLUTION INTRAVENOUS at 09:15

## 2021-09-27 RX ADMIN — PROPOFOL 150 MG: 10 INJECTION, EMULSION INTRAVENOUS at 11:15

## 2021-09-27 ASSESSMENT — PAIN - FUNCTIONAL ASSESSMENT: PAIN_FUNCTIONAL_ASSESSMENT: 0-10

## 2021-09-27 ASSESSMENT — PAIN SCALES - GENERAL
PAINLEVEL_OUTOF10: 0

## 2021-09-27 ASSESSMENT — PAIN SCALES - WONG BAKER: WONGBAKER_NUMERICALRESPONSE: 0

## 2021-09-27 NOTE — ANESTHESIA POSTPROCEDURE EVALUATION
Department of Anesthesiology  Postprocedure Note    Patient: Korina Mane  MRN: [id number]  YOB: 1960  Date of evaluation: 9/27/2021  Time:  12:46 PM     Procedure Summary     Date: 09/27/21 Room / Location: [address]    Anesthesia Start: 1107 Anesthesia Stop: 1129    Procedure: EGD ESOPHAGOGASTRODUODENOSCOPY (N/A ) Diagnosis: (POSSIBLE DYSPHASIA)    Surgeons: Candace Edward MD Responsible Provider: Maryann Recinos MD    Anesthesia Type: MAC ASA Status: 4          Anesthesia Type: MAC    [place] Phase I: [place] Score: 10    [place] Phase II: [place] Score: 10    Last vitals: Reviewed and per EMR flowsheets. Anesthesia Post Evaluation    Patient location during evaluation: bedside (Endoscopy Unit)  Patient participation: complete - patient participated  Level of consciousness: awake and alert  Pain score: 0  Airway patency: patent  Nausea & Vomiting: no nausea and no vomiting  Complications: no  Cardiovascular status: hemodynamically stable  Respiratory status: acceptable, spontaneous ventilation and room air  Hydration status: euvolemic  Comments: Patient tachypneic in recovery to the high 20s. Saturating normal on room air. Patient was tachypneic in pre-op to same degree. Lung sounds clear. CXR some mild pulmonary edema, appears stable compared to CXR from June. Discussed with patient and he feels much better and would like to be discharged. Discussed red flag respiratory distress signs and symptoms for which to go to the ER. Patient and daughter in agreement and understanding.

## 2021-09-27 NOTE — H&P
History and Physical    Pt Name: Emiliano Shepherd  MRN: [id number]  YOB: 1960  Date of evaluation: 9/27/2021  Primary Care Physician: Ulysses Sparks    SUBJECTIVE:   History of Chief Complaint:    Emiliano Shepherd is a 59 y.o. male who is scheduled today for EGD. Patient reports he has had a chronic cough for the last year. He reports vomiting unassociated with meals as well, and states the only relief he receives is when he takes tramadol. Patient has a history of dysphagia and states it feels like there is something stuck in his throat. Allergies  is allergic to lisinopril. Medications  Prior to Admission medications    Medication Sig Start Date End Date Taking?  Authorizing Provider   pantoprazole (PROTONIX) 40 MG tablet Take 1 tablet by mouth every morning (before breakfast) 5/19/21   Derick Galloway MD   benzonatate (TESSALON) 100 MG capsule take 1 capsule by mouth three times a day if needed for 10 days 3/3/21   Historical Provider, MD   losartan (COZAAR) 25 MG tablet take 1 tablet by mouth once daily 3/21/21   Historical Provider, MD   guaiFENesin-codeine (GUAIFENESIN AC) 100-10 MG/5ML liquid give 10 milliliters by mouth every 4 hours if needed 3/4/21   Historical Provider, MD   glimepiride (AMARYL) 1 MG tablet Take 1 mg by mouth every morning (before breakfast)    Historical Provider, MD   atorvastatin (LIPITOR) 40 MG tablet Take 1 tablet by mouth nightly 2/9/21   Arturo Diaz MD   traMADol Marion Reveal) 50 MG tablet take 1 tablet by mouth every 6 hours if needed 10/21/20   Historical Provider, MD   aspirin 325 MG tablet Take 325 mg by mouth daily    Historical Provider, MD   calcium carbonate (TUMS) 500 MG chewable tablet Take 1 tablet by mouth daily as needed     Historical Provider, MD   carvedilol (COREG) 6.25 MG tablet Take 1 tablet by mouth 2 times daily (with meals) 6/19/19   Umberto Strauss MD   Multiple Vitamins-Minerals (RENAPLEX-D) TABS Take 1 tablet by mouth every other day Pt takes occasionally 5/14/19   Historical Provider, MD   lidocaine-prilocaine (EMLA) 2.5-2.5 % cream  3/18/19   Historical Provider, MD   NIFEdipine (ADALAT CC) 30 MG extended release tablet Take 30 mg by mouth 2 times daily    Historical Provider, MD   furosemide (LASIX) 40 MG tablet Take 40 mg by mouth 2 times daily     Historical Provider, MD     Past Medical History    has a past medical history of Acute congestive heart failure (HCC), Acute on chronic diastolic congestive heart failure ([facility] Anemia, Anemia of chronic renal failure, AVF (arteriovenous fistula) ([facility] Bowel obstruction ([facility] CAD (coronary artery disease), Chest pain at rest, CHF (congestive heart failure) ([facility] CHF (congestive heart failure), NYHA class I, acute on chronic, combined ([facility] Chronic kidney disease, CKD (chronic kidney disease) stage 4, GFR 15-29 ml/min ([facility] Coronary artery disease involving native coronary artery of native heart without angina pectoris, Diabetes mellitus ([facility] Dialysis patient ([facility] ESRD (end stage renal disease) ([facility] Essential hypertension, Groin swelling, Hemodialysis patient ([facility] Hydrocele, Hyperlipidemia, Hypertension, MI, old, NSTEMI (non-ST elevated myocardial infarction) ([facility] BATSHEVA (obstructive sleep apnea), Patient in clinical research study, Pneumonia, Prostatism, Respiratory distress, Rising PSA level, S/P arteriovenous (AV) graft placement, S/P PTCA - TIM distal LAD - Dr. Gutierrez [place] 4/1/19, Sleep apnea, Small bowel obstruction ([facility] and Type 2 diabetes mellitus with chronic kidney disease on chronic dialysis ([facility] Past Surgical History   has a past surgical history that includes Foot surgery (Left, 2005); Colonoscopy; Cystoscopy (11/17/2017); Prostate biopsy (N/A, 11/17/2017); Abdomen surgery (2013); Prostate Biopsy (02/16/2018); pr genital surg proc,male unlisted (N/A, 2/16/2018); Tonsillectomy (1968); Vasectomy (1983); Middle ear surgery (1966); Nose surgery (1968); Mouth surgery (2007);  AV fistula creation (Left, 2019); Dialysis fistula creation (Left, 2019); other surgical history (Left, 2019); AV fistula repair (2019); vascular surgery (2019); Coronary angioplasty with stent (2019); Dialysis fistula creation (Left, 10/23/2019); and Cardiac catheterization (2021). Social History   reports that he has never smoked. He has never used smokeless tobacco.   reports no history of alcohol use. reports no history of drug use. Marital Status single  Children 3  Occupation retired  Family History  Family Status   Relation Name Status    Mother   at age 39    Father  Ayush Marcelo 29      MGF      [address]      PGF       family history includes Asthma in his brother; Diabetes in his brother, brother, and brother; Early Death in his mother; Heart Disease in his mother; High Blood Pressure in his brother, brother, and brother. Review of Systems:  CONSTITUTIONAL:   negative for fevers, chills, fatigue and malaise    EYES:   negative for double vision, blurred vision and photophobia    HEENT:   negative for tinnitus, epistaxis and sore throat     RESPIRATORY:   negative for cough, shortness of breath, wheezing     CARDIOVASCULAR:   negative for chest pain, palpitations, syncope, edema     GASTROINTESTINAL:   Reports nausea, vomiting, cough   GENITOURINARY:   negative for incontinence     MUSCULOSKELETAL:   negative for neck or back pain     NEUROLOGICAL:   Negative for weakness and tingling  negative for headaches and dizziness     PSYCHIATRIC:   negative for anxiety       OBJECTIVE:   VITALS:  vitals were not taken for this visit. CONSTITUTIONAL:alert & oriented x 3, no acute distress. Calm and pleasant. SKIN:  Warm and dry, no rashes to exposed areas of skin. HEAD:  Normocephalic, atraumatic. EYES: PERRL. EOMs intact.    EARS:  Intact and equal bilaterally. No edema or thickening, without lumps, lesions, or discharge. Hearing grossly WNL. NOSE:  Nares patent. No rhinorrhea. MOUTH/THROAT:  Mucous membranes pink and moist, uvula midline, teeth appear to be intact. NECK: Supple, no lymphadenopathy. LUNGS: Respirations even and non-labored. Clear to auscultation bilaterally, no wheezes, rales, or rhonchi. CARDIOVASCULAR: Regular rate and rhythm, no murmurs/rubs/gallops. ABDOMEN: soft, non-tender and non-distended, bowel sounds active x 4. EXTREMITIES: No edema to bilateral lower extremities. No varicosities to bilateral lower extremities. NEUROLOGIC: CN II-XII are grossly intact. Gait not assessed. IMPRESSIONS:   POSSIBLE DYSPHASIA  PLANS:   EGD.     ROB Wilson CNP   Electronically signed 9/27/2021 at 8:45 AM

## 2021-09-27 NOTE — OP NOTE
Operative Note      Patient: Tash Bansal  YOB: 1960  MRN: [id number]    Date of Procedure: 9/27/2021    Pre-Op Diagnosis: POSSIBLE DYSPHASIA    Post-Op Diagnosis: Same   · LA grade B erosive esophagitis at the GE junction  · Nodularity at the GE junction  · Nonobstructive Schatzki ring  · Mild gastritis with nodularity in the antrum       Procedure(s):  EGD ESOPHAGOGASTRODUODENOSCOPY    Surgeon(s):  Fidencio Lara MD    Assistant:   First Assistant: Debbie Dubois RN    Anesthesia: Monitor Anesthesia Care    Estimated Blood Loss (mL): Minimal    Complications: None    Specimens:   ID Type Source Tests Collected by Time Destination   A : stomach bx Tissue Stomach 194 Yemi De La Torre MD 9/27/2021 1118    B : GE junction BX Tissue Esophagus 194 Yemi De La Torre MD 9/27/2021 1121        Implants:  * No implants in log *      Drains: * No LDAs found *    Posterior pharynx and vocal cords      Upper esophagus                          Findings:  1. Examination of the posterior pharynx revealed no abnormality  2. The upper endoscope could easily traversed through the upper esophageal sphincter. There was no evidence of stenosis or any mucosal abnormality at the upper esophageal sphincter or the upper esophagus. 3. The esophagus appeared tortuous. 4. The Z-line was irregular and noted at 45 cm. 5. LA grade B erosive esophagitis at the GE junction. 6. The fundus and cardia appeared normal on direct and retroflexed views. 7. There was evidence of nodularity at the GE junction. Biopsies were performed for histology and to rule out Martinez's esophagus. 8. A nonobstructive Schatzki ring was found at the GE junction. 9. The mucosa in the distal gastric body, antrum and pylorus appeared congested, with patchy erythema, erosions and nodularity. Biopsies were performed for histology and to check for H. pylori.   10. The examined duodenum up to the third portion appeared normal.    Recommendations:  1. Await pathology results. 2. Continue pantoprazole 40 mg once daily. 3. Follow an antireflux lifestyle. 4. The above findings on today's exam do not explain patient's symptoms. 5. Consider ENT evaluation. 6. Follow-up in GI clinic and with PCP as scheduled. Detailed Description of Procedure:   Informed consent was obtained from the patient after explanation of the procedure including indications, description of the procedure,  benefits and possible risks and complications of the procedure, and alternatives. Questions were answered. The patient's history was reviewed and a directed physical examination was performed prior to the procedure. Patient was monitored throughout the procedure with pulse oximetry and periodic assessment of vital signs. Patient was sedated as noted above. With the patient in the left lateral decubitus position, the Olympus videoendoscope was placed in the patient's mouth and under direct visualization passed into the esophagus. Visualization of the esophagus, stomach, and duodenum was performed during both introduction and withdrawal of the endoscope and retroflexed view of the proximal stomach was obtained. The scope was passed to the 3rd portion of the duodenum. The patient tolerated the procedure well and was taken to the recovery area in good condition. The patient  was taken to the recovery area in good condition.     Electronically signed by Lanie Verduzco MD on 9/27/2021 at 11:28 AM

## 2021-09-27 NOTE — PROGRESS NOTES
Nasal trumpet removed. Coughing noted. Shortness of breath noted while talking. Respiratory rate 42/minute.

## 2021-09-29 LAB — SURGICAL PATHOLOGY REPORT: NORMAL

## 2021-10-26 ENCOUNTER — APPOINTMENT (OUTPATIENT)
Dept: GENERAL RADIOLOGY | Age: 61
End: 2021-10-26
Payer: MEDICARE

## 2021-10-26 ENCOUNTER — HOSPITAL ENCOUNTER (EMERGENCY)
Age: 61
Discharge: HOME | End: 2021-10-26
Attending: EMERGENCY MEDICINE
Payer: MEDICARE

## 2021-10-26 ENCOUNTER — APPOINTMENT (OUTPATIENT)
Dept: CT IMAGING | Age: 61
End: 2021-10-26
Payer: MEDICARE

## 2021-10-26 VITALS
HEART RATE: 78 BPM | WEIGHT: 270 LBS | BODY MASS INDEX: 38.65 KG/M2 | DIASTOLIC BLOOD PRESSURE: 63 MMHG | TEMPERATURE: 98.2 F | RESPIRATION RATE: 31 BRPM | SYSTOLIC BLOOD PRESSURE: 97 MMHG | HEIGHT: 70 IN | OXYGEN SATURATION: 94 %

## 2021-10-26 DIAGNOSIS — R10.9 FLANK PAIN: ICD-10-CM

## 2021-10-26 DIAGNOSIS — R10.9 LEFT FLANK PAIN: Primary | ICD-10-CM

## 2021-10-26 LAB
-: ABNORMAL
ABSOLUTE EOS #: 0.2 K/UL (ref 0–0.4)
ABSOLUTE IMMATURE GRANULOCYTE: 0 K/UL (ref 0–0.3)
ABSOLUTE LYMPH #: 0.52 K/UL (ref 1–4.8)
ABSOLUTE MONO #: 0.59 K/UL (ref 0.1–0.8)
AMORPHOUS: ABNORMAL
ANION GAP SERPL CALCULATED.3IONS-SCNC: 16 MMOL/L (ref 9–17)
BACTERIA: ABNORMAL
BASOPHILS # BLD: 0 % (ref 0–2)
BASOPHILS ABSOLUTE: 0 K/UL (ref 0–0.2)
BILIRUBIN URINE: NEGATIVE
BUN BLDV-MCNC: 31 MG/DL (ref 8–23)
BUN/CREAT BLD: ABNORMAL (ref 9–20)
CALCIUM SERPL-MCNC: 9.2 MG/DL (ref 8.6–10.4)
CASTS UA: ABNORMAL /LPF (ref 0–2)
CHLORIDE BLD-SCNC: 90 MMOL/L (ref 98–107)
CO2: 26 MMOL/L (ref 20–31)
COLOR: ABNORMAL
COMMENT UA: ABNORMAL
CREAT SERPL-MCNC: 4.58 MG/DL (ref 0.7–1.2)
CRYSTALS, UA: ABNORMAL /HPF
DIFFERENTIAL TYPE: ABNORMAL
EOSINOPHILS RELATIVE PERCENT: 3 % (ref 1–4)
EPITHELIAL CELLS UA: ABNORMAL /HPF (ref 0–5)
GFR AFRICAN AMERICAN: 16 ML/MIN
GFR NON-AFRICAN AMERICAN: 13 ML/MIN
GFR SERPL CREATININE-BSD FRML MDRD: ABNORMAL ML/MIN/{1.73_M2}
GFR SERPL CREATININE-BSD FRML MDRD: ABNORMAL ML/MIN/{1.73_M2}
GLUCOSE BLD-MCNC: 295 MG/DL (ref 70–99)
GLUCOSE URINE: ABNORMAL
HCT VFR BLD CALC: 34.7 % (ref 40.7–50.3)
HEMOGLOBIN: 10.7 G/DL (ref 13–17)
IMMATURE GRANULOCYTES: 0 %
KETONES, URINE: ABNORMAL
LEUKOCYTE ESTERASE, URINE: NEGATIVE
LYMPHOCYTES # BLD: 8 % (ref 24–44)
MCH RBC QN AUTO: 31 PG (ref 25.2–33.5)
MCHC RBC AUTO-ENTMCNC: 30.8 G/DL (ref 28.4–34.8)
MCV RBC AUTO: 100.6 FL (ref 82.6–102.9)
MONOCYTES # BLD: 9 % (ref 1–7)
MORPHOLOGY: ABNORMAL
MUCUS: ABNORMAL
NITRITE, URINE: NEGATIVE
NRBC AUTOMATED: 0 PER 100 WBC
OTHER OBSERVATIONS UA: ABNORMAL
PDW BLD-RTO: 15.6 % (ref 11.8–14.4)
PH UA: 6.5 (ref 5–8)
PLATELET # BLD: ABNORMAL K/UL (ref 138–453)
PLATELET ESTIMATE: ABNORMAL
PLATELET, FLUORESCENCE: 153 K/UL (ref 138–453)
PLATELET, IMMATURE FRACTION: 1.8 % (ref 1.1–10.3)
PMV BLD AUTO: ABNORMAL FL (ref 8.1–13.5)
POTASSIUM SERPL-SCNC: 4.5 MMOL/L (ref 3.7–5.3)
PROTEIN UA: ABNORMAL
RBC # BLD: 3.45 M/UL (ref 4.21–5.77)
RBC # BLD: ABNORMAL 10*6/UL
RBC UA: ABNORMAL /HPF (ref 0–2)
RENAL EPITHELIAL, UA: ABNORMAL /HPF
SEG NEUTROPHILS: 80 % (ref 36–66)
SEGMENTED NEUTROPHILS ABSOLUTE COUNT: 5.19 K/UL (ref 1.8–7.7)
SODIUM BLD-SCNC: 132 MMOL/L (ref 135–144)
SPECIFIC GRAVITY UA: 1.02 (ref 1–1.03)
TRICHOMONAS: ABNORMAL
TROPONIN INTERP: ABNORMAL
TROPONIN INTERP: ABNORMAL
TROPONIN T: ABNORMAL NG/ML
TROPONIN T: ABNORMAL NG/ML
TROPONIN, HIGH SENSITIVITY: 155 NG/L (ref 0–22)
TROPONIN, HIGH SENSITIVITY: 164 NG/L (ref 0–22)
TURBIDITY: CLEAR
URINE HGB: ABNORMAL
UROBILINOGEN, URINE: NORMAL
WBC # BLD: 6.5 K/UL (ref 3.5–11.3)
WBC # BLD: ABNORMAL 10*3/UL
WBC UA: ABNORMAL /HPF (ref 0–5)
YEAST: ABNORMAL

## 2021-10-26 PROCEDURE — 80048 BASIC METABOLIC PNL TOTAL CA: CPT

## 2021-10-26 PROCEDURE — 71046 X-RAY EXAM CHEST 2 VIEWS: CPT

## 2021-10-26 PROCEDURE — 93005 ELECTROCARDIOGRAM TRACING: CPT | Performed by: STUDENT IN AN ORGANIZED HEALTH CARE EDUCATION/TRAINING PROGRAM

## 2021-10-26 PROCEDURE — 84484 ASSAY OF TROPONIN QUANT: CPT

## 2021-10-26 PROCEDURE — 81001 URINALYSIS AUTO W/SCOPE: CPT

## 2021-10-26 PROCEDURE — 74176 CT ABD & PELVIS W/O CONTRAST: CPT

## 2021-10-26 PROCEDURE — 99284 EMERGENCY DEPT VISIT MOD MDM: CPT

## 2021-10-26 PROCEDURE — 6370000000 HC RX 637 (ALT 250 FOR IP): Performed by: STUDENT IN AN ORGANIZED HEALTH CARE EDUCATION/TRAINING PROGRAM

## 2021-10-26 PROCEDURE — 85025 COMPLETE CBC W/AUTO DIFF WBC: CPT

## 2021-10-26 PROCEDURE — 85055 RETICULATED PLATELET ASSAY: CPT

## 2021-10-26 RX ORDER — 0.9 % SODIUM CHLORIDE 0.9 %
250 INTRAVENOUS SOLUTION INTRAVENOUS ONCE
Status: DISCONTINUED | OUTPATIENT
Start: 2021-10-26 | End: 2021-10-26

## 2021-10-26 RX ORDER — ACETAMINOPHEN 500 MG
1000 TABLET ORAL ONCE
Status: COMPLETED | OUTPATIENT
Start: 2021-10-26 | End: 2021-10-26

## 2021-10-26 RX ADMIN — ACETAMINOPHEN 1000 MG: 500 TABLET ORAL at 19:32

## 2021-10-26 ASSESSMENT — PAIN DESCRIPTION - PROGRESSION: CLINICAL_PROGRESSION: GRADUALLY WORSENING

## 2021-10-26 ASSESSMENT — ENCOUNTER SYMPTOMS
DIARRHEA: 0
SHORTNESS OF BREATH: 0
NAUSEA: 0
BLOOD IN STOOL: 0
ABDOMINAL PAIN: 0
VOMITING: 0
CONSTIPATION: 0

## 2021-10-26 ASSESSMENT — PAIN DESCRIPTION - FREQUENCY: FREQUENCY: CONTINUOUS

## 2021-10-26 ASSESSMENT — PAIN SCALES - GENERAL
PAINLEVEL_OUTOF10: 8

## 2021-10-26 ASSESSMENT — PAIN DESCRIPTION - LOCATION
LOCATION: FLANK
LOCATION: FLANK

## 2021-10-26 ASSESSMENT — PAIN DESCRIPTION - DESCRIPTORS: DESCRIPTORS: SHARP

## 2021-10-26 ASSESSMENT — PAIN DESCRIPTION - PAIN TYPE: TYPE: ACUTE PAIN

## 2021-10-26 ASSESSMENT — PAIN DESCRIPTION - ORIENTATION
ORIENTATION: LEFT
ORIENTATION: LEFT

## 2021-10-26 ASSESSMENT — PAIN DESCRIPTION - ONSET: ONSET: ON-GOING

## 2021-10-26 NOTE — ED PROVIDER NOTES
Prostatism, Respiratory distress, Rising PSA level, S/P arteriovenous (AV) graft placement, S/P PTCA - TIM distal LAD - Dr. Lindsay Nicola 4/1/19, Sleep apnea, Small bowel obstruction ([facility] [address] and Type 2 diabetes mellitus with chronic kidney disease on chronic dialysis ([facility] [address] No other pertinent PMH on review with patient/guardian. has a past surgical history that includes Foot surgery (Left, 2005); Colonoscopy; Cystoscopy (11/17/2017); Prostate biopsy (N/A, 11/17/2017); Abdomen surgery (2013); Prostate Biopsy (02/16/2018); pr genital surg proc,male unlisted (N/A, 2/16/2018); Tonsillectomy (1968); Vasectomy (1983); Middle ear surgery (1966); Nose surgery (1968); Mouth surgery (2007); AV fistula creation (Left, 02/20/2019); Dialysis fistula creation (Left, 2/20/2019); other surgical history (Left, 03/06/2019); AV fistula repair (07/17/2019); vascular surgery (09/20/2019); Coronary angioplasty with stent (03/31/2019); Dialysis fistula creation (Left, 10/23/2019); Cardiac catheterization (02/09/2021); and Upper gastrointestinal endoscopy (N/A, 9/27/2021). No other pertinent PSH on review with patient/guardian.   Social History     Socioeconomic History    Marital status: Single     Spouse name: Not on file    Number of children: Not on file    Years of education: Not on file    Highest education level: Not on file   Occupational History    Not on file   Tobacco Use    Smoking status: Never Smoker    Smokeless tobacco: Never Used   Vaping Use    Vaping Use: Never used   Substance and Sexual Activity    Alcohol use: No    Drug use: No    Sexual activity: Not on file   Other Topics Concern    Not on file   Social History Narrative    Not on file     Social Determinants of Health     Financial Resource Strain:     Difficulty of Paying Living Expenses:    Food Insecurity:     Worried About Running Out of Food in the Last Year:     [address] in the Last Year:    Transportation Needs:     Lack of Transportation (Medical):  Lack of Transportation (Non-Medical):    Physical Activity:     Days of Exercise per Week:     Minutes of Exercise per Session:    Stress:     Feeling of Stress :    Social Connections:     Frequency of Communication with Friends and Family:     Frequency of Social Gatherings with Friends and Family:     Attends [employer] Services:     Active Member of Clubs or Organizations:     Attends Club or Organization Meetings:     Marital Status:    Intimate Partner Violence:     Fear of Current or Ex-Partner:     Emotionally Abused:     Physically Abused:     Sexually Abused:        I counseled the patient against using tobacco products. Family History   Problem Relation Age of Onset    Heart Disease Mother         CHF    Early Death Mother     High Blood Pressure Brother     Diabetes Brother     Asthma Brother     High Blood Pressure Brother     Diabetes Brother     High Blood Pressure Brother     Diabetes Brother      No other pertinent FamHx on review with patient/guardian. Allergies:  Lisinopril    Home Medications:  Prior to Admission medications    Medication Sig Start Date End Date Taking?  Authorizing Provider   pantoprazole (PROTONIX) 40 MG tablet Take 1 tablet by mouth every morning (before breakfast) 5/19/21   Georgie Mcardle, MD   benzonatate (TESSALON) 100 MG capsule take 1 capsule by mouth three times a day if needed for 10 days 3/3/21   Historical Provider, MD   losartan (COZAAR) 25 MG tablet take 1 tablet by mouth once daily 3/21/21   Historical Provider, MD   guaiFENesin-codeine (GUAIFENESIN AC) 100-10 MG/5ML liquid give 10 milliliters by mouth every 4 hours if needed 3/4/21   Historical Provider, MD   glimepiride (AMARYL) 1 MG tablet Take 1 mg by mouth every morning (before breakfast)    Historical Provider, MD   atorvastatin (LIPITOR) 40 MG tablet Take 1 tablet by mouth nightly 2/9/21   Radha Root MD   traMADol (ULTRAM) 50 MG tablet take 1 tablet by mouth every 6 hours if needed 10/21/20   Historical Provider, MD   aspirin 325 MG tablet Take 325 mg by mouth daily    Historical Provider, MD   calcium carbonate (TUMS) 500 MG chewable tablet Take 1 tablet by mouth daily as needed     Historical Provider, MD   carvedilol (COREG) 6.25 MG tablet Take 1 tablet by mouth 2 times daily (with meals) 6/19/19   Eric Rowley MD   Multiple Vitamins-Minerals (RENAPLEX-D) TABS Take 1 tablet by mouth every other day Pt takes occasionally 5/14/19   Historical Provider, MD   lidocaine-prilocaine (EMLA) 2.5-2.5 % cream  3/18/19   Historical Provider, MD   NIFEdipine (ADALAT CC) 30 MG extended release tablet Take 30 mg by mouth 2 times daily    Historical Provider, MD   furosemide (LASIX) 40 MG tablet Take 40 mg by mouth 2 times daily     Historical Provider, MD       REVIEW OF SYSTEMS    (2-9 systems for level 4, 10 ormore for level 5)      Review of Systems   Constitutional: Negative for fever. Eyes: Negative for visual disturbance. Respiratory: Negative for shortness of breath. Cardiovascular: Negative for chest pain. Gastrointestinal: Negative for abdominal pain, blood in stool, constipation, diarrhea, nausea and vomiting. Genitourinary: Positive for flank pain. Negative for dysuria, hematuria, testicular pain and urgency. Skin: Negative for rash. Allergic/Immunologic: Negative for immunocompromised state. Neurological: Negative for headaches. Hematological: Does not bruise/bleed easily. PHYSICAL EXAM   (up to 7 for level 4, 8 or more for level 5)      INITIAL VITALS:   BP 97/63   Pulse 78   Temp 98.2 °F (36.8 °C) (Oral)   Resp (!) 31   Ht 5' 10\" (1.778 m)   Wt 270 lb (122.5 kg)   SpO2 94%   BMI 38.74 kg/m²     Physical Exam  Constitutional:       General: He is not in acute distress. Appearance: Normal appearance. He is not ill-appearing, toxic-appearing or diaphoretic. HENT:      Head: Normocephalic and atraumatic.       Right Ear: External - 13.5 fL    NRBC Automated 0.0 0.0 per 100 WBC    Differential Type NOT REPORTED     WBC Morphology NOT REPORTED     RBC Morphology NOT REPORTED     Platelet Estimate NOT REPORTED     Immature Granulocytes 0 0 %    Seg Neutrophils 80 (H) 36 - 66 %    Lymphocytes 8 (L) 24 - 44 %    Monocytes 9 (H) 1 - 7 %    Eosinophils % 3 1 - 4 %    Basophils 0 0 - 2 %    Absolute Immature Granulocyte 0.00 0.00 - 0.30 k/uL    Segs Absolute 5.19 1.8 - 7.7 k/uL    Absolute Lymph # 0.52 (L) 1.0 - 4.8 k/uL    Absolute Mono # 0.59 0.1 - 0.8 k/uL    Absolute Eos # 0.20 0.0 - 0.4 k/uL    Basophils Absolute 0.00 0.0 - 0.2 k/uL    Morphology ANISOCYTOSIS PRESENT    Basic Metabolic Panel w/ Reflex to MG   Result Value Ref Range    Glucose 295 (H) 70 - 99 mg/dL    BUN 31 (H) 8 - 23 mg/dL    CREATININE 4.58 (H) 0.70 - 1.20 mg/dL    Bun/Cre Ratio NOT REPORTED 9 - 20    Calcium 9.2 8.6 - 10.4 mg/dL    Sodium 132 (L) 135 - 144 mmol/L    Potassium 4.5 3.7 - 5.3 mmol/L    Chloride 90 (L) 98 - 107 mmol/L    CO2 26 20 - 31 mmol/L    Anion Gap 16 9 - 17 mmol/L    GFR Non-African American 13 (L) >60 mL/min    GFR  16 (L) >60 mL/min    GFR Comment          GFR Staging NOT REPORTED    Urinalysis Reflex to Culture    Specimen: Urine, clean catch   Result Value Ref Range    Color, UA Dark Yellow (A) Yellow    Turbidity UA Clear Clear    Glucose, Ur TRACE (A) NEGATIVE    Bilirubin Urine NEGATIVE NEGATIVE    Ketones, Urine TRACE (A) NEGATIVE    Specific Gravity, UA 1.018 1.005 - 1.030    Urine Hgb TRACE (A) NEGATIVE    pH, UA 6.5 5.0 - 8.0    Protein, UA 3+ (A) NEGATIVE    Urobilinogen, Urine Normal Normal    Nitrite, Urine NEGATIVE NEGATIVE    Leukocyte Esterase, Urine NEGATIVE NEGATIVE    Urinalysis Comments NOT REPORTED    Immature Platelet Fraction   Result Value Ref Range    Platelet, Immature Fraction 1.8 1.1 - 10.3 %    Platelet, Fluorescence 153 138 - 453 k/uL   Troponin   Result Value Ref Range    Troponin, High Sensitivity 164 (HH) 0 - 22 ng/L    Troponin T NOT REPORTED <0.03 ng/mL    Troponin Interp NOT REPORTED    Microscopic Urinalysis   Result Value Ref Range    -          WBC, UA 0 TO 2 0 - 5 /HPF    RBC, UA 0 TO 2 0 - 2 /HPF    Casts UA NOT REPORTED 0 - 2 /LPF    Crystals, UA NOT REPORTED None /HPF    Epithelial Cells UA 0 TO 2 0 - 5 /HPF    Renal Epithelial, UA NOT REPORTED 0 /HPF    Bacteria, UA FEW (A) None    Mucus, UA 1+ (A) None    Trichomonas, UA NOT REPORTED None    Amorphous, UA NOT REPORTED None    Other Observations UA NOT REPORTED NOT REQ. Yeast, UA NOT REPORTED None       IMPRESSION/MDM/ED COURSE:  59 y.o. male presented with left flank pain x5 days. Patient afebrile. On exam patient resting comfortably in no acute distress. Abdomen soft and nontender. Left CVA tenderness. Chronic venous stasis changes of bilateral lower extremities. Shortness of breath and lower extremity edema at baseline per patient, will not work-up today. Will obtain basic labs, urine, and Noncon CT scan. Symptomatic treatment with Tylenol. ED Course as of Oct 26 2233   Tue Oct 26, 2021   1951 Patient did have dialysis today. Basic Metabolic Panel w/ Reflex to MG(!):    Glucose 295(!)   BUN 31(!)   Creatinine 4.58(!)   Bun/Cre Ratio NOT REPORTED   Calcium 9.2   Sodium 132(!)   Potassium 4.5   Chloride 90(!)   CO2 26   Anion Gap 16   GFR Non- 13(!)   GFR  16(!)   GFR Comment        GFR Staging NOT REPORTED [AF]   1951 At baseline   Hemoglobin Quant(!): 10.7 [AF]   2118 BP 89/60, was sitting up at time BP was taken. No dizziness/lightheadedness. SPO2 92. Will add on cardiac work-up. [AF]   2140 Patient returned from [email or web address]. . Patient reports that he took his antihypertensive medications following dialysis which he usually holds until bedtime.   We will continue to monitor blood pressure.    [AF]   2219 IMPRESSION:  Small periumbilical ventral abdominal wall hernia containing segments of  unobstructed small bowel. Moderate-sized bilateral inguinal hernias which  primarily contain fat. Small amount of abdominal free fluid. No acute  findings within the abdomen or pelvis allowing for the limitations of this  unenhanced CT study. [AF]   2226 Troponin, High Sensitivity(!!): 164 [AF]   2227 Patient signed out to Dr. Iris Mandujano pending repeat troponin and chest x-ray    [AF]      ED Course User Index  [AF] Gabriela Corona DO     Patient/Guardian requesting discharge. Patient/Guardian was given written and verbal instructions prior to discharge. Patient/Guardian understood and agreed. Patient/Guardian had no further questions. RADIOLOGY:  CT ABDOMEN PELVIS WO CONTRAST Additional Contrast? None   Final Result   Small periumbilical ventral abdominal wall hernia containing segments of   unobstructed small bowel. Moderate-sized bilateral inguinal hernias which   primarily contain fat. Small amount of abdominal free fluid. No acute   findings within the abdomen or pelvis allowing for the limitations of this   unenhanced CT study. XR CHEST (2 VW)    (Results Pending)     EKG  Sinus rhythm with RBBB, first-degree A-V block and PVCs. Left axis deviation. No ST elevation or depression. T waves unchanged from previous EKG. Impression: Unchanged EKG. All EKG's are interpreted by the Emergency Department Physician who either signs or Co-signs this chart in the absence of a cardiologist.    PROCEDURES:  None    CONSULTS:  None    FINAL IMPRESSION      1. Left flank pain          DISPOSITION / PLAN     DISPOSITION        PATIENT REFERREDTO:  No follow-up provider specified.     DISCHARGE MEDICATIONS:  New Prescriptions    No medications on file       Eva Hurt DO  PGY 2  Resident Physician Emergency Medicine  10/26/21 10:33 PM    (Please note that portions of this note were completed with a voice recognition program.Efforts were made to edit the dictations but occasionally words are mis-transcribed.)       Diallo Ordaz DO  Resident  10/26/21 7540

## 2021-10-27 LAB
EKG ATRIAL RATE: 80 BPM
EKG P AXIS: 62 DEGREES
EKG P-R INTERVAL: 244 MS
EKG Q-T INTERVAL: 456 MS
EKG QRS DURATION: 142 MS
EKG QTC CALCULATION (BAZETT): 525 MS
EKG R AXIS: -73 DEGREES
EKG T AXIS: 74 DEGREES
EKG VENTRICULAR RATE: 80 BPM

## 2021-10-27 PROCEDURE — 93010 ELECTROCARDIOGRAM REPORT: CPT | Performed by: INTERNAL MEDICINE

## 2021-10-27 NOTE — ED PROVIDER NOTES
[facility] ED  Emergency Department  Emergency Medicine Resident Sign-out     Care of Mitesh Ledesma was assumed from Dr. Mauro Myrick and is being seen for Flank Pain (left sided, x 1 week, patient is a dialysis patient and had his treatment today, they sent him to be seen)  . The patient's initial evaluation and plan have been discussed with the prior provider who initially evaluated the patient.      EMERGENCY DEPARTMENT COURSE / MEDICAL DECISION MAKING:       MEDICATIONS GIVEN:  Orders Placed This Encounter   Medications    acetaminophen (TYLENOL) tablet 1,000 mg    DISCONTD: 0.9 % sodium chloride bolus       LABS / RADIOLOGY:     Labs Reviewed   CBC WITH AUTO DIFFERENTIAL - Abnormal; Notable for the following components:       Result Value    RBC 3.45 (*)     Hemoglobin 10.7 (*)     Hematocrit 34.7 (*)     RDW 15.6 (*)     Seg Neutrophils 80 (*)     Lymphocytes 8 (*)     Monocytes 9 (*)     Absolute Lymph # 0.52 (*)     All other components within normal limits   BASIC METABOLIC PANEL W/ REFLEX TO MG FOR LOW K - Abnormal; Notable for the following components:    Glucose 295 (*)     BUN 31 (*)     CREATININE 4.58 (*)     Sodium 132 (*)     Chloride 90 (*)     GFR Non- 13 (*)     GFR  16 (*)     All other components within normal limits   URINE RT REFLEX TO CULTURE - Abnormal; Notable for the following components:    Color, UA Dark Yellow (*)     Glucose, Ur TRACE (*)     Ketones, Urine TRACE (*)     Urine Hgb TRACE (*)     Protein, UA 3+ (*)     All other components within normal limits   TROPONIN - Abnormal; Notable for the following components:    Troponin, High Sensitivity 164 (*)     All other components within normal limits   MICROSCOPIC URINALYSIS - Abnormal; Notable for the following components:    Bacteria, UA FEW (*)     Mucus, UA 1+ (*)     All other components within normal limits   TROPONIN - Abnormal; Notable for the following components: Troponin, High Sensitivity 155 (*)     All other components within normal limits   IMMATURE PLATELET FRACTION       CT ABDOMEN PELVIS WO CONTRAST Additional Contrast? None    Result Date: 10/26/2021  EXAMINATION: CT OF THE ABDOMEN AND PELVIS WITHOUT CONTRAST 10/26/2021 8:03 pm TECHNIQUE: CT of the abdomen and pelvis was performed without the administration of intravenous contrast. Multiplanar reformatted images are provided for review. Dose modulation, iterative reconstruction, and/or weight based adjustment of the mA/kV was utilized to reduce the radiation dose to as low as reasonably achievable. COMPARISON: CT study from May 1, 2021. HISTORY: ORDERING SYSTEM PROVIDED HISTORY: L flank pain TECHNOLOGIST PROVIDED HISTORY: L flank pain Decision Support Exception - unselect if not a suspected or confirmed emergency medical condition->Emergency Medical Condition (MA) Reason for Exam: L flank pain Acuity: Unknown Type of Exam: Unknown FINDINGS: Lower Chest: The heart is enlarged. The visualized lung bases are grossly clear. Organs: The liver, spleen, pancreas, and gallbladder have an unremarkable unenhanced CT appearance. Trace upper abdominal ascites fluid noted along with diffuse body wall edema. The kidneys have a somewhat atrophic appearance bilaterally. Stable small bilateral adrenal lesions, most likely benign adenomas. There are no renal, ureteral, or bladder calculi. No hydronephrosis or hydroureter. GI/Bowel: There are again seen post-surgical changes of the left lower quadrant related to a previous colostomy. A small periumbilical ventral abdominal wall hernia containing short segments of unobstructed small bowel is noted. The stomach and the small and large bowel loops are otherwise normal in caliber, contour, and morphology, without acute abnormality. No dilated loops or areas of bowel wall thickening.  The appendix is normal. Peritoneum/Retroperitoneum: There is no free air or abnormal fluid collection. No enlarged or suspicious mesenteric or retroperitoneal lymphadenopathy. The abdominal aorta and iliac arteries are normal in caliber. Pelvis: No enlarged or suspicious pelvic or inguinal lymphadenopathy. The urinary bladder is relatively decompressed. Moderate-sized bilateral inguinal hernias which primarily contain fat. The prostate gland is not enlarged. The pelvic bowel loops are unremarkable. Bones/Soft Tissues: Degenerative changes are present throughout the lumbar spine. No acute fracture. Small periumbilical ventral abdominal wall hernia containing segments of unobstructed small bowel. Moderate-sized bilateral inguinal hernias which primarily contain fat. Small amount of abdominal free fluid. No acute findings within the abdomen or pelvis allowing for the limitations of this unenhanced CT study. XR CHEST (SINGLE VIEW FRONTAL)    Result Date: 9/27/2021  EXAMINATION: ONE XRAY VIEW OF THE CHEST 9/27/2021 12:17 pm COMPARISON: June 21, 2021 chest examination HISTORY: ORDERING SYSTEM PROVIDED HISTORY: shortness of breath TECHNOLOGIST PROVIDED HISTORY: shortness of breath FINDINGS: Stable cardiomegaly Slight interval progression in mild-to-moderate diffuse prominence indistinctness of the pulmonary vascularity/interstitial markings No significant pleural process Degenerative changes of the thoracic spine/shoulders     Stable cardiomegaly Slight interval progression in now mild to moderate vascular congestion RECOMMENDATION: Significant examination results were called to the patient's licensed caregiver       RECENT VITALS:     Temp: 98.2 °F (36.8 °C),  Pulse: 78, Resp: (!) 31, BP: 97/63, SpO2: 94 %    This patient is a 59 y.o. Male with left-sided flank pain x1 week patient is likely history of end-stage renal disease on dialysis, NSTEMI and type 2 diabetes, heart failure, stent placement    ED Course as of Oct 26 2342   Tue Oct 26, 2021   1951 Patient did have dialysis today.    Basic Metabolic Panel w/ Reflex to MG(!):    Glucose 295(!)   BUN 31(!)   Creatinine 4.58(!)   Bun/Cre Ratio NOT REPORTED   Calcium 9.2   Sodium 132(!)   Potassium 4.5   Chloride 90(!)   CO2 26   Anion Gap 16   GFR Non- 13(!)   GFR  16(!)   GFR Comment        GFR Staging NOT REPORTED [AF]   1953 At baseline   Hemoglobin Quant(!): 10.7 [AF]   2118 BP 89/60, was sitting up at time BP was taken. No dizziness/lightheadedness. SPO2 92. Will add on cardiac work-up. [AF]   2140 Patient returned from [email or web address]. . Patient reports that he took his antihypertensive medications following dialysis which he usually holds until bedtime. We will continue to monitor blood pressure.    [AF]   2219 IMPRESSION:  Small periumbilical ventral abdominal wall hernia containing segments of  unobstructed small bowel. Moderate-sized bilateral inguinal hernias which  primarily contain fat. Small amount of abdominal free fluid. No acute  findings within the abdomen or pelvis allowing for the limitations of this  unenhanced CT study. [AF]   2226 Troponin, High Sensitivity(!!): 164 [AF]   2227 Patient signed out to Dr. Chiki Jaquez pending repeat troponin and chest x-ray    [AF]   2315 Chest x-ray unremarkable    [ES]      ED Course User Index  [AF] Rosie Belle DO  [ES] Cailin Miller MD       OUTSTANDING TASKS / RECOMMENDATIONS:    1. Follow-up delta Trop  2. Follow-up chest x-ray  3. Dispo     FINAL IMPRESSION:     1. Left flank pain    2.  Flank pain        DISPOSITION:         DISPOSITION:  [x]  Discharge   []  Transfer -    []  Admission -     []  Against Medical Advice   []  Eloped   FOLLOW-UP: Jose Daniel Gomes  [address] [place] [address]  [address] [facility]  [phone]    Call       [facility]  [address] [place] [address]  [phone]    If symptoms worsen, As needed     DISCHARGE MEDICATIONS: New Prescriptions    No medications on file           Cailin Miller MD  Emergency Medicine Resident  [id number] Roland Mijares MD  Resident  10/26/21 0334

## 2021-10-27 NOTE — ED PROVIDER NOTES
[address]  ED  eMERGENCY dEPARTMENT eNCOUnter   Attending Attestation     Pt Name: Berenice Johnson  MRN: [id number]  [place] 1960  Date of evaluation: 10/26/21       Berenice Johnson is a 59 y.o. male who presents with Flank Pain (left sided, x 1 week, patient is a dialysis patient and had his treatment today, they sent him to be seen)      History: Pt presents with flank pain over the left side/left ribs pt states he was at dialysis today and stated the pain was too bad, his nephrologist recommended he come to the ED if he was not better or worse. Exam: HRRR, lungs CTABL, abdomen soft and non tender. Abdomen is protuberant. Pt has no cva tenderness but does have left lower rib/upper abdomen pain on exam. Difficult to feel ribs at this area due to body habitus. Plan for CT abdomen due to age, comorbidities, in setting of abdominal pain. Will obtain labs. Will treat as needed. Will treat pain. I performed a history and physical examination of the patient and discussed management with the resident. I reviewed the residents note and agree with the documented findings and plan of care. Any areas of disagreement are noted on the chart. I was personally present for the key portions of any procedures. I have documented in the chart those procedures where I was not present during the key portions. I have personally reviewed all images and agree with the resident's interpretation. I have reviewed the emergency nurses triage note. I agree with the chief complaint, past medical history, past surgical history, allergies, medications, social and family history as documented unless otherwise noted below. Documentation of the HPI, Physical Exam and Medical Decision Making performed by medical students or scribes is based on my personal performance of the HPI, PE and MDM.  For Phys Assistant/ Nurse Practitioner cases/documentation I have had a face to face evaluation of this patient and have completed at least one if not all key elements of the E/M (history, physical exam, and MDM). Additional findings are as noted. For APC cases I have personally evaluated and examined the patient in conjunction with the APC and agree with the treatment plan and disposition of the patient as recorded by the APC.     Ky Rubalcava MD  Attending Emergency  Physician       Izabela Gallegos MD  10/26/21 9853

## 2021-11-11 ENCOUNTER — HOSPITAL ENCOUNTER (INPATIENT)
Age: 61
LOS: 3 days | Discharge: HOME | DRG: 291 | End: 2021-11-14
Attending: EMERGENCY MEDICINE | Admitting: INTERNAL MEDICINE
Payer: MEDICARE

## 2021-11-11 ENCOUNTER — APPOINTMENT (OUTPATIENT)
Dept: GENERAL RADIOLOGY | Age: 61
DRG: 291 | End: 2021-11-11
Payer: MEDICARE

## 2021-11-11 DIAGNOSIS — Z99.2 ESRD ON HEMODIALYSIS (HCC): ICD-10-CM

## 2021-11-11 DIAGNOSIS — E87.70 HYPERVOLEMIA, UNSPECIFIED HYPERVOLEMIA TYPE: ICD-10-CM

## 2021-11-11 DIAGNOSIS — R07.9 CHEST PAIN, UNSPECIFIED TYPE: Primary | ICD-10-CM

## 2021-11-11 DIAGNOSIS — N18.6 ESRD ON HEMODIALYSIS (HCC): ICD-10-CM

## 2021-11-11 LAB
ABSOLUTE EOS #: 0 K/UL (ref 0–0.4)
ABSOLUTE IMMATURE GRANULOCYTE: 0 K/UL (ref 0–0.3)
ABSOLUTE LYMPH #: 0.2 K/UL (ref 1–4.8)
ABSOLUTE MONO #: 0.3 K/UL (ref 0.1–0.8)
ALBUMIN SERPL-MCNC: 4.3 G/DL (ref 3.5–5.2)
ALBUMIN/GLOBULIN RATIO: 1 (ref 1–2.5)
ALP BLD-CCNC: 205 U/L (ref 40–129)
ALT SERPL-CCNC: 16 U/L (ref 5–41)
ANION GAP SERPL CALCULATED.3IONS-SCNC: 23 MMOL/L (ref 9–17)
AST SERPL-CCNC: 14 U/L
BASOPHILS # BLD: 0 % (ref 0–2)
BASOPHILS ABSOLUTE: 0 K/UL (ref 0–0.2)
BILIRUB SERPL-MCNC: 1.53 MG/DL (ref 0.3–1.2)
BNP INTERPRETATION: ABNORMAL
BUN BLDV-MCNC: 61 MG/DL (ref 8–23)
BUN/CREAT BLD: ABNORMAL (ref 9–20)
CALCIUM SERPL-MCNC: 9.5 MG/DL (ref 8.6–10.4)
CHLORIDE BLD-SCNC: 91 MMOL/L (ref 98–107)
CO2: 20 MMOL/L (ref 20–31)
CREAT SERPL-MCNC: 7.34 MG/DL (ref 0.7–1.2)
DIFFERENTIAL TYPE: ABNORMAL
EOSINOPHILS RELATIVE PERCENT: 0 % (ref 1–4)
GFR AFRICAN AMERICAN: 9 ML/MIN
GFR NON-AFRICAN AMERICAN: 8 ML/MIN
GFR SERPL CREATININE-BSD FRML MDRD: ABNORMAL ML/MIN/{1.73_M2}
GFR SERPL CREATININE-BSD FRML MDRD: ABNORMAL ML/MIN/{1.73_M2}
GLUCOSE BLD-MCNC: 238 MG/DL (ref 70–99)
HCT VFR BLD CALC: 36.1 % (ref 40.7–50.3)
HEMOGLOBIN: 11.4 G/DL (ref 13–17)
IMMATURE GRANULOCYTES: 0 %
LIPASE: 28 U/L (ref 13–60)
LYMPHOCYTES # BLD: 2 % (ref 24–44)
MCH RBC QN AUTO: 31.2 PG (ref 25.2–33.5)
MCHC RBC AUTO-ENTMCNC: 31.6 G/DL (ref 28.4–34.8)
MCV RBC AUTO: 98.9 FL (ref 82.6–102.9)
MONOCYTES # BLD: 3 % (ref 1–7)
MORPHOLOGY: ABNORMAL
NRBC AUTOMATED: 0 PER 100 WBC
PDW BLD-RTO: 15.2 % (ref 11.8–14.4)
PLATELET # BLD: 190 K/UL (ref 138–453)
PLATELET ESTIMATE: ABNORMAL
PMV BLD AUTO: 9.8 FL (ref 8.1–13.5)
POTASSIUM SERPL-SCNC: 5.5 MMOL/L (ref 3.7–5.3)
PRO-BNP: ABNORMAL PG/ML
RBC # BLD: 3.65 M/UL (ref 4.21–5.77)
RBC # BLD: ABNORMAL 10*6/UL
SARS-COV-2, RAPID: NOT DETECTED
SEG NEUTROPHILS: 95 % (ref 36–66)
SEGMENTED NEUTROPHILS ABSOLUTE COUNT: 9.5 K/UL (ref 1.8–7.7)
SODIUM BLD-SCNC: 134 MMOL/L (ref 135–144)
SPECIMEN DESCRIPTION: NORMAL
TOTAL PROTEIN: 8.4 G/DL (ref 6.4–8.3)
TROPONIN INTERP: ABNORMAL
TROPONIN INTERP: ABNORMAL
TROPONIN T: ABNORMAL NG/ML
TROPONIN T: ABNORMAL NG/ML
TROPONIN, HIGH SENSITIVITY: 130 NG/L (ref 0–22)
TROPONIN, HIGH SENSITIVITY: 131 NG/L (ref 0–22)
WBC # BLD: 10 K/UL (ref 3.5–11.3)
WBC # BLD: ABNORMAL 10*3/UL

## 2021-11-11 PROCEDURE — 83690 ASSAY OF LIPASE: CPT

## 2021-11-11 PROCEDURE — 1200000000 HC SEMI PRIVATE

## 2021-11-11 PROCEDURE — 6370000000 HC RX 637 (ALT 250 FOR IP): Performed by: HEALTH CARE PROVIDER

## 2021-11-11 PROCEDURE — 2580000003 HC RX 258: Performed by: NURSE PRACTITIONER

## 2021-11-11 PROCEDURE — 90935 HEMODIALYSIS ONE EVALUATION: CPT

## 2021-11-11 PROCEDURE — 80053 COMPREHEN METABOLIC PANEL: CPT

## 2021-11-11 PROCEDURE — 6370000000 HC RX 637 (ALT 250 FOR IP): Performed by: NURSE PRACTITIONER

## 2021-11-11 PROCEDURE — 84484 ASSAY OF TROPONIN QUANT: CPT

## 2021-11-11 PROCEDURE — 87635 SARS-COV-2 COVID-19 AMP PRB: CPT

## 2021-11-11 PROCEDURE — 85025 COMPLETE CBC W/AUTO DIFF WBC: CPT

## 2021-11-11 PROCEDURE — 93005 ELECTROCARDIOGRAM TRACING: CPT | Performed by: NURSE PRACTITIONER

## 2021-11-11 PROCEDURE — 71045 X-RAY EXAM CHEST 1 VIEW: CPT

## 2021-11-11 PROCEDURE — 83880 ASSAY OF NATRIURETIC PEPTIDE: CPT

## 2021-11-11 PROCEDURE — 99284 EMERGENCY DEPT VISIT MOD MDM: CPT

## 2021-11-11 RX ORDER — ACETAMINOPHEN 650 MG/1
650 SUPPOSITORY RECTAL EVERY 6 HOURS PRN
Status: DISCONTINUED | OUTPATIENT
Start: 2021-11-11 | End: 2021-11-14 | Stop reason: HOSPADM

## 2021-11-11 RX ORDER — NIFEDIPINE 30 MG/1
30 TABLET, EXTENDED RELEASE ORAL 2 TIMES DAILY
Status: DISCONTINUED | OUTPATIENT
Start: 2021-11-11 | End: 2021-11-14 | Stop reason: HOSPADM

## 2021-11-11 RX ORDER — NITROGLYCERIN 0.4 MG/1
0.4 TABLET SUBLINGUAL EVERY 5 MIN PRN
Status: DISCONTINUED | OUTPATIENT
Start: 2021-11-11 | End: 2021-11-14 | Stop reason: HOSPADM

## 2021-11-11 RX ORDER — CARVEDILOL 12.5 MG/1
6.25 TABLET ORAL 2 TIMES DAILY WITH MEALS
Status: DISCONTINUED | OUTPATIENT
Start: 2021-11-12 | End: 2021-11-14 | Stop reason: HOSPADM

## 2021-11-11 RX ORDER — LOSARTAN POTASSIUM 25 MG/1
25 TABLET ORAL DAILY
Status: DISCONTINUED | OUTPATIENT
Start: 2021-11-12 | End: 2021-11-14 | Stop reason: HOSPADM

## 2021-11-11 RX ORDER — SODIUM CHLORIDE 9 MG/ML
25 INJECTION, SOLUTION INTRAVENOUS PRN
Status: DISCONTINUED | OUTPATIENT
Start: 2021-11-11 | End: 2021-11-14 | Stop reason: HOSPADM

## 2021-11-11 RX ORDER — ONDANSETRON 4 MG/1
4 TABLET, ORALLY DISINTEGRATING ORAL EVERY 8 HOURS PRN
Status: DISCONTINUED | OUTPATIENT
Start: 2021-11-11 | End: 2021-11-14 | Stop reason: HOSPADM

## 2021-11-11 RX ORDER — ATORVASTATIN CALCIUM 80 MG/1
40 TABLET, FILM COATED ORAL NIGHTLY
Status: DISCONTINUED | OUTPATIENT
Start: 2021-11-11 | End: 2021-11-14 | Stop reason: HOSPADM

## 2021-11-11 RX ORDER — CALCIUM CARBONATE 200(500)MG
1 TABLET,CHEWABLE ORAL DAILY PRN
Status: DISCONTINUED | OUTPATIENT
Start: 2021-11-11 | End: 2021-11-14 | Stop reason: HOSPADM

## 2021-11-11 RX ORDER — CHOLECALCIFEROL (VITAMIN D3) 10 MCG
1 TABLET ORAL EVERY OTHER DAY
Status: DISCONTINUED | OUTPATIENT
Start: 2021-11-12 | End: 2021-11-14 | Stop reason: HOSPADM

## 2021-11-11 RX ORDER — ACETAMINOPHEN 325 MG/1
650 TABLET ORAL EVERY 6 HOURS PRN
Status: DISCONTINUED | OUTPATIENT
Start: 2021-11-11 | End: 2021-11-14 | Stop reason: HOSPADM

## 2021-11-11 RX ORDER — SODIUM CHLORIDE 0.9 % (FLUSH) 0.9 %
10 SYRINGE (ML) INJECTION PRN
Status: DISCONTINUED | OUTPATIENT
Start: 2021-11-11 | End: 2021-11-14 | Stop reason: HOSPADM

## 2021-11-11 RX ORDER — GLIPIZIDE 5 MG/1
2.5 TABLET ORAL
Status: DISCONTINUED | OUTPATIENT
Start: 2021-11-12 | End: 2021-11-14 | Stop reason: HOSPADM

## 2021-11-11 RX ORDER — PANTOPRAZOLE SODIUM 40 MG/1
40 TABLET, DELAYED RELEASE ORAL
Status: DISCONTINUED | OUTPATIENT
Start: 2021-11-12 | End: 2021-11-14 | Stop reason: HOSPADM

## 2021-11-11 RX ORDER — ONDANSETRON 2 MG/ML
4 INJECTION INTRAMUSCULAR; INTRAVENOUS EVERY 6 HOURS PRN
Status: DISCONTINUED | OUTPATIENT
Start: 2021-11-11 | End: 2021-11-14 | Stop reason: HOSPADM

## 2021-11-11 RX ORDER — FUROSEMIDE 20 MG/1
40 TABLET ORAL 2 TIMES DAILY
Status: DISCONTINUED | OUTPATIENT
Start: 2021-11-12 | End: 2021-11-12

## 2021-11-11 RX ORDER — ASPIRIN 81 MG/1
324 TABLET, CHEWABLE ORAL ONCE
Status: COMPLETED | OUTPATIENT
Start: 2021-11-11 | End: 2021-11-11

## 2021-11-11 RX ORDER — HEPARIN SODIUM 5000 [USP'U]/ML
5000 INJECTION, SOLUTION INTRAVENOUS; SUBCUTANEOUS EVERY 8 HOURS SCHEDULED
Status: DISCONTINUED | OUTPATIENT
Start: 2021-11-12 | End: 2021-11-14 | Stop reason: HOSPADM

## 2021-11-11 RX ORDER — SODIUM CHLORIDE 0.9 % (FLUSH) 0.9 %
5-40 SYRINGE (ML) INJECTION EVERY 12 HOURS SCHEDULED
Status: DISCONTINUED | OUTPATIENT
Start: 2021-11-11 | End: 2021-11-14 | Stop reason: HOSPADM

## 2021-11-11 RX ADMIN — ANTACID TABLETS 500 MG: 500 TABLET, CHEWABLE ORAL at 22:59

## 2021-11-11 RX ADMIN — NIFEDIPINE 30 MG: 30 TABLET, EXTENDED RELEASE ORAL at 23:25

## 2021-11-11 RX ADMIN — ASPIRIN 324 MG: 81 TABLET, CHEWABLE ORAL at 18:22

## 2021-11-11 RX ADMIN — SODIUM CHLORIDE, PRESERVATIVE FREE 10 ML: 5 INJECTION INTRAVENOUS at 23:25

## 2021-11-11 RX ADMIN — ATORVASTATIN CALCIUM 40 MG: 80 TABLET, FILM COATED ORAL at 23:25

## 2021-11-11 ASSESSMENT — ENCOUNTER SYMPTOMS
SHORTNESS OF BREATH: 1
VOMITING: 0
COUGH: 1
DIARRHEA: 0
BACK PAIN: 0
TROUBLE SWALLOWING: 0
NAUSEA: 0
ABDOMINAL PAIN: 1

## 2021-11-11 ASSESSMENT — PAIN SCALES - GENERAL
PAINLEVEL_OUTOF10: 3
PAINLEVEL_OUTOF10: 3

## 2021-11-11 NOTE — ED PROVIDER NOTES
[address] ED     Emergency Department     Faculty Attestation        I performed a history and physical examination of the patient and discussed management with the resident. I reviewed the residents note and agree with the documented findings and plan of care. Any areas of disagreement are noted on the chart. I was personally present for the key portions of any procedures. I have documented in the chart those procedures where I was not present during the key portions. I have reviewed the emergency nurses triage note. I agree with the chief complaint, past medical history, past surgical history, allergies, medications, social and family history as documented unless otherwise noted below. For Physician Assistant/ Nurse Practitioner cases/documentation I have personally evaluated this patient and have completed at least one if not all key elements of the E/M (history, physical exam, and MDM). Additional findings are as noted. Vital Signs: BP: 136/77  Pulse: 93  Resp: 20  Temp: 98 °F (36.7 °C) SpO2: 96 %  PCP:  DINORAH GONZALEZ    Pertinent Comments:     Patient is a 57-year-old male with history of end-stage renal disease on hemodialysis as well as patient of Dr. Carolyn Wang from cardiology with heart disease and history of MI. Patient for the last few days been having intermittent centralized chest pain without radiation associated with some shortness of breath. No vomiting associated or diarrhea or blood in the stool but has been having intermittent epigastric discomfort occasionally and sometimes not associated with chest discomfort either. Denies fevers or chills but occasionally productive cough. Patient is up-to-date on Covid vaccination as well as booster.     Of note, patient did miss one episode of dialysis as well  On exam there is no tenderness to palpation on the abdomen at this time at all and good strong pulses distally with heart regular rate and rhythm and clear to station bilaterally. Assessment/plan: We will plan cardiac work-up as well as abdominal laboratories. Reevaluate after but likely recommendation for admission for cardiology consultation./   Will likely need dialysis as well        EKG Interpretation    Interpreted by emergency department physician    Rhythm: normal sinus   Rate: normal and 93 bpm  Axis: Left axis deviation  Conduction: Right bundle branch block  ST Segments: Lateral ST depression  T Waves: no acute change  Q Waves: no acute change    Clinical Impression:  nonspecific EKG. Critical Care  None    This patient was evaluated in the Emergency Department for symptoms described in the history of present illness. He/she was evaluated in the context of the global COVID-19 pandemic, which necessitated consideration that the patient might be at risk for infection with the SARS-CoV-2 virus that causes COVID-19. Institutional protocols and algorithms that pertain to the evaluation of patients at risk for COVID-19 are in a state of rapid change based on information released by regulatory bodies including the CDC and federal and state organizations. These policies and algorithms were followed during the patient's care in the ED. (Please note that portions of this note were completed with a voice recognition program. Efforts were made to edit the dictations but occasionally words are mis-transcribed.  Whenever words are used in this note in any gender, they shall be construed as though they were used in the gender appropriate to the circumstances; and whenever words are used in this note in the singular or plural form, they shall be construed as though they were used in the form appropriate to the circumstances.)    MD Diane [facility] [place]  Attending Emergency Medicine Physician             Fior Fraga MD  11/11/21 8134

## 2021-11-11 NOTE — ED PROVIDER NOTES
[facility] ED  Emergency Department Encounter  Emergency Medicine Resident     Pt Name: Emiliano Shepherd  MRN: [id number]  [place] 1960  Date of evaluation: 11/11/21  PCP:  [address] [facility]       Chief Complaint   Patient presents with    Chest Pain     CP that began earlier today at rest; L sided CP; hx CHF     Abdominal Pain     c/o abd pain; denies NV    Shortness of Breath     c/o SOB all day       HISTORY OFPRESENT ILLNESS  (Location/Symptom, Timing/Onset, Context/Setting, Quality, Duration, Modifying Factors,Severity.)      Emiliano Shepherd is a 59 y.o. male with history of coronary disease and ESRD on HD who presents with abdominal pain and chest pain. Abdominal pain started yesterday. Pain is constant, dull and 3/10 in severity. Localized to the epigastric region without radiation. Denies any nausea, vomiting, diarrhea, constipation, or obstipation. Today, patient complains that he has had intermittent, midsternal chest pain since approximately 1 PM this afternoon. Pain is squeezing/crushing in nature. Exacerbated with activity. Also complains of associated shortness of breath. Additionally, patient has had a cough for the last 2 days, which has been productive of sputum. Patient reports that he has had higher than normal blood pressure, 150 mmHg at the highest.  Has taken total of 3 tablets of his home dosage of nifedipine and states that it is not helped. Has a history of CHF and bilateral lower extremity edema baseline, but states it has not worsened. Last cardiac cath was in February 2021, which was negative for any new lesions. Patient has a history of ESRD and is on hemodialysis, but states he missed today's session. Otherwise is fully compliant with his dialysis schedule.     PAST MEDICAL / SURGICAL / SOCIAL / FAMILY HISTORY      has a past medical history of Acute congestive heart failure ([facility] [address] Acute on chronic diastolic congestive heart failure (HCC), Anemia, Anemia of chronic renal failure, AVF (arteriovenous fistula) ([facility]), Bowel obstruction ([facility]), CAD (coronary artery disease), Chest pain at rest, CHF (congestive heart failure) ([facility] [address] CHF (congestive heart failure), NYHA class I, acute on chronic, combined ([facility] [address] Chronic kidney disease, CKD (chronic kidney disease) stage 4, GFR 15-29 ml/min ([facility]), Coronary artery disease involving native coronary artery of native heart without angina pectoris, Diabetes mellitus ([facility] [address] Dialysis patient ([address] ESRD (end stage renal disease) ([facility] [address] Essential hypertension, Groin swelling, Hemodialysis patient ([facility] [address] Hydrocele, Hyperlipidemia, Hypertension, MI, old, NSTEMI (non-ST elevated myocardial infarction) ([facility] [address] BATSHEVA (obstructive sleep apnea), Patient in clinical research study, Pneumonia, Prostatism, Respiratory distress, Rising PSA level, S/P arteriovenous (AV) graft placement, S/P PTCA - TIM distal LAD - Dr. Duard Favre 4/1/19, Sleep apnea, Small bowel obstruction ([facility] [address] and Type 2 diabetes mellitus with chronic kidney disease on chronic dialysis ([facility] [address] has a past surgical history that includes Foot surgery (Left, 2005); Colonoscopy; Cystoscopy (11/17/2017); Prostate biopsy (N/A, 11/17/2017); Abdomen surgery (2013); Prostate Biopsy (02/16/2018); pr genital surg proc,male unlisted (N/A, 2/16/2018); Tonsillectomy (1968); Vasectomy (1983); Middle ear surgery (1966); Nose surgery (1968); Mouth surgery (2007); AV fistula creation (Left, 02/20/2019); Dialysis fistula creation (Left, 2/20/2019); other surgical history (Left, 03/06/2019); AV fistula repair (07/17/2019); vascular surgery (09/20/2019); Coronary angioplasty with stent (03/31/2019); Dialysis fistula creation (Left, 10/23/2019); Cardiac catheterization (02/09/2021); and Upper gastrointestinal endoscopy (N/A, 9/27/2021).      Social History     Socioeconomic History    Marital status: Single     Spouse name: Not on file    Number of children: Not on file    Years of education: Not on file    Highest education level: Not on file   Occupational History    Not on file   Tobacco Use    Smoking status: Never Smoker    Smokeless tobacco: Never Used   Vaping Use    Vaping Use: Never used   Substance and Sexual Activity    Alcohol use: No    Drug use: No    Sexual activity: Not on file   Other Topics Concern    Not on file   Social History Narrative    Not on file     Social Determinants of Health     Financial Resource Strain:     Difficulty of Paying Living Expenses: Not on file   Food Insecurity:     Worried About Running Out of Food in the Last Year: Not on file    Frank of Food in the Last Year: Not on file   Transportation Needs:     Lack of Transportation (Medical): Not on file    Lack of Transportation (Non-Medical):  Not on file   Physical Activity:     Days of Exercise per Week: Not on file    Minutes of Exercise per Session: Not on file   Stress:     Feeling of Stress : Not on file   Social Connections:     Frequency of Communication with Friends and Family: Not on file    Frequency of Social Gatherings with Friends and Family: Not on file    Attends [employer] Services: Not on file    Active Member of [address] [place] [address] or Organizations: Not on file    Attends Club or Organization Meetings: Not on file    Marital Status: Not on file   Intimate Partner Violence:     Fear of Current or Ex-Partner: Not on file    Emotionally Abused: Not on file    Physically Abused: Not on file    Sexually Abused: Not on file   Housing Stability:     Unable to Pay for Housing in the Last Year: Not on file    Number of [place] in the Last Year: Not on file    Unstable Housing in the Last Year: Not on file       Family History   Problem Relation Age of Onset    Heart Disease Mother         CHF    Early Death Mother     High Blood Pressure Brother     Diabetes Brother     Asthma Brother     High Blood Pressure Brother     Diabetes Brother     High Blood Pressure Brother     Diabetes Brother         Allergies:  Lisinopril    Home Medications:  Prior to Admission medications    Medication Sig Start Date End Date Taking? Authorizing Provider   pantoprazole (PROTONIX) 40 MG tablet Take 1 tablet by mouth every morning (before breakfast) 5/19/21   Georgie Mcardle, MD   benzonatate (TESSALON) 100 MG capsule take 1 capsule by mouth three times a day if needed for 10 days 3/3/21   Historical Provider, MD   losartan (COZAAR) 25 MG tablet take 1 tablet by mouth once daily 3/21/21   Historical Provider, MD   guaiFENesin-codeine (GUAIFENESIN AC) 100-10 MG/5ML liquid give 10 milliliters by mouth every 4 hours if needed 3/4/21   Historical Provider, MD   glimepiride (AMARYL) 1 MG tablet Take 1 mg by mouth every morning (before breakfast)    Historical Provider, MD   atorvastatin (LIPITOR) 40 MG tablet Take 1 tablet by mouth nightly 2/9/21   Radha Root MD   traMADol Orlena Bowl) 50 MG tablet take 1 tablet by mouth every 6 hours if needed 10/21/20   Historical Provider, MD   aspirin 325 MG tablet Take 325 mg by mouth daily    Historical Provider, MD   calcium carbonate (TUMS) 500 MG chewable tablet Take 1 tablet by mouth daily as needed     Historical Provider, MD   carvedilol (COREG) 6.25 MG tablet Take 1 tablet by mouth 2 times daily (with meals) 6/19/19   Sindy Neff MD   Multiple Vitamins-Minerals (RENAPLEX-D) TABS Take 1 tablet by mouth every other day Pt takes occasionally 5/14/19   Historical Provider, MD   lidocaine-prilocaine (EMLA) 2.5-2.5 % cream  3/18/19   Historical Provider, MD   NIFEdipine (ADALAT CC) 30 MG extended release tablet Take 30 mg by mouth 2 times daily    Historical Provider, MD   furosemide (LASIX) 40 MG tablet Take 40 mg by mouth 2 times daily     Historical Provider, MD       REVIEW OFSYSTEMS    (2-9 systems for level 4, 10 or more for level 5)      Review of Systems   Constitutional: Negative for chills, diaphoresis and fatigue.    HENT: Negative for trouble swallowing. Eyes: Negative for visual disturbance. Respiratory: Positive for cough and shortness of breath. Cardiovascular: Positive for chest pain. Gastrointestinal: Positive for abdominal pain. Negative for diarrhea, nausea and vomiting. Genitourinary: Negative for dysuria. Musculoskeletal: Negative for back pain, neck pain and neck stiffness. Allergic/Immunologic: Negative for immunocompromised state. Hematological: Does not bruise/bleed easily. PHYSICAL EXAM   (up to 7 for level 4, 8 or more forlevel 5)      INITIAL VITALS:   ED Triage Vitals [11/11/21 1743]   BP Temp Temp Source Pulse Resp SpO2 Height Weight   136/77 98 °F (36.7 °C) Oral 93 20 96 % -- --       Physical Exam  Vitals reviewed. Constitutional:       Appearance: He is well-developed. HENT:      Head: Normocephalic and atraumatic. Mouth/Throat:      Mouth: Mucous membranes are moist.      Pharynx: Oropharynx is clear. Eyes:      Extraocular Movements: Extraocular movements intact. Pupils: Pupils are equal, round, and reactive to light. Cardiovascular:      Rate and Rhythm: Normal rate and regular rhythm. Heart sounds: Normal heart sounds. Pulmonary:      Effort: Pulmonary effort is normal.      Breath sounds: Normal breath sounds. Abdominal:      General: Abdomen is protuberant. There is no distension. Tenderness: There is abdominal tenderness in the epigastric area. Hernia: No hernia is present. Genitourinary:     Testes:         Right: Swelling present. Left: Swelling present. Skin:     General: Skin is warm and dry. Capillary Refill: Capillary refill takes less than 2 seconds. Neurological:      General: No focal deficit present. Mental Status: He is alert and oriented to person, place, and time.    Psychiatric:         Mood and Affect: Mood normal.         Behavior: Behavior normal.         DIFFERENTIAL  DIAGNOSIS     PLAN (LABS / IMAGING / EKG):  Orders Placed This Encounter   Procedures    COVID-19, Rapid    XR CHEST PORTABLE    CBC Auto Differential    Troponin    Brain Natriuretic Peptide    Comprehensive Metabolic Panel w/ Reflex to MG    Lipase    Vital signs per unit routine    Weigh patient    Inpatient consult to Nephrology    Inpatient consult to Hospitalist    EKG 12 Lead    Hemodialysis inpatient       MEDICATIONS ORDERED:  Orders Placed This Encounter   Medications    aspirin chewable tablet 324 mg       DDX: ACS, CHF, pneumonia, COVID-19, pancreatitis, gastritis, hyperkalemia    Initial MDM/Plan: 59 y.o. male who presents with abdominal pain followed by chest pain shortness of breath. Clinical picture is concerning for ischemic heart disease and therefore will give 324mg ASA and obtain cardiac work-up. Patient does have abdominal pain, but is nontender. Will obtain CMP and lipase to further evaluate. Will also assess patient's need for urgent dialysis.     DIAGNOSTIC RESULTS / EMERGENCYDEPARTMENT COURSE / MDM     LABS:  Labs Reviewed   CBC WITH AUTO DIFFERENTIAL - Abnormal; Notable for the following components:       Result Value    RBC 3.65 (*)     Hemoglobin 11.4 (*)     Hematocrit 36.1 (*)     RDW 15.2 (*)     Seg Neutrophils 95 (*)     Lymphocytes 2 (*)     Eosinophils % 0 (*)     Segs Absolute 9.50 (*)     Absolute Lymph # 0.20 (*)     All other components within normal limits   TROPONIN - Abnormal; Notable for the following components:    Troponin, High Sensitivity 131 (*)     All other components within normal limits   BRAIN NATRIURETIC PEPTIDE - Abnormal; Notable for the following components:    Pro-BNP 37,755 (*)     All other components within normal limits   COMPREHENSIVE METABOLIC PANEL W/ REFLEX TO MG FOR LOW K - Abnormal; Notable for the following components:    Glucose 238 (*)     BUN 61 (*)     CREATININE 7.34 (*)     Sodium 134 (*)     Potassium 5.5 (*)     Chloride 91 (*)     Anion Gap 23 (*)     Alkaline Phosphatase 205 (*)     Total Bilirubin 1.53 (*)     Total Protein 8.4 (*)     GFR Non- 8 (*)     GFR  9 (*)     All other components within normal limits   COVID-19, RAPID   LIPASE         RADIOLOGY:  XR CHEST PORTABLE    Result Date: 11/11/2021  EXAMINATION: ONE XRAY VIEW OF THE CHEST 11/11/2021 6:40 pm COMPARISON: 10/26/2021 HISTORY: ORDERING SYSTEM PROVIDED HISTORY: chest pain TECHNOLOGIST PROVIDED HISTORY: chest pain Reason for Exam: Upright port, CP FINDINGS: The lungs are without acute focal process. There is no effusion or pneumothorax. The cardiomediastinal silhouette is stable. Pulmonary vascular congestion again noted. The osseous structures are stable. No acute process. Cardiomegaly and pulmonary vascular congestion persist       EKG    EKG Interpretation    Interpreted by emergency department physician    Rhythm: normal sinus   Rate: normal  Axis: left  Ectopy: premature ventricular contractions (infrequent)  Conduction: 1st degree AV block and right bundle branch block  ST Segments: no acute change  T Waves: inversion in  v1 and v2  Q Waves: none    Clinical Impression: Normal sinus rhythm with first-degree AV block and occasional PVCs, as well as right bundle branch block. Andrez Pradhan MD      All EKG's are interpreted by the Emergency Department Physicianwho either signs or Co-signs this chart in the absence of a cardiologist.  No significant changes when compared to EKG dated 10/26/2021. EMERGENCY DEPARTMENT COURSE:  ED Course as of 11/11/21 2048   Thu Nov 11, 2021 2023 There were no EKG changes compared to his prior study. Troponin trending down from his baseline, but BNP is trending upward. Cardiomegaly and vascular congestion on chest x-ray. Potassium is elevated to 5.5. Clinical picture is more consistent with volume overload. Discussed the patient with Dr. Lorraine Jimenes from nephrology, who is recommends admission for dialysis.   Patient also likely needs cardiology evaluation due to his history and worsening chest pain or less several days. [GG]      ED Course User Index  [GG] Cynthia Macias MD          PROCEDURES:  None    CONSULTS:  IP CONSULT TO NEPHROLOGY  IP CONSULT TO HOSPITALIST    CRITICAL CARE:  Please see attending note    FINAL IMPRESSION      1. Chest pain, unspecified type    2. ESRD on hemodialysis ([facility] [address]    3. Hypervolemia, unspecified hypervolemia type          DISPOSITION / PLAN     DISPOSITION Decision To Admit 11/11/2021 08:19:32 PM      PATIENT REFERRED TO:  No follow-up provider specified.     DISCHARGE MEDICATIONS:  New Prescriptions    No medications on file       Cynthia Macias MD  Emergency Medicine Resident    (Please note that portions of this note were completed with a voice recognition program.Efforts were made to edit the dictations but occasionally words are mis-transcribed.)       Cynthia Macias MD  Resident  11/12/21 2572
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
## 2021-11-12 PROBLEM — I50.43 ACUTE ON CHRONIC COMBINED SYSTOLIC AND DIASTOLIC CHF (CONGESTIVE HEART FAILURE) (HCC): Status: ACTIVE | Noted: 2021-11-12

## 2021-11-12 LAB
ALBUMIN SERPL-MCNC: 4.1 G/DL (ref 3.5–5.2)
ALBUMIN/GLOBULIN RATIO: 1.1 (ref 1–2.5)
ALP BLD-CCNC: 181 U/L (ref 40–129)
ALT SERPL-CCNC: 15 U/L (ref 5–41)
ANION GAP SERPL CALCULATED.3IONS-SCNC: 17 MMOL/L (ref 9–17)
AST SERPL-CCNC: 14 U/L
BILIRUB SERPL-MCNC: 1.55 MG/DL (ref 0.3–1.2)
BNP INTERPRETATION: ABNORMAL
BUN BLDV-MCNC: 44 MG/DL (ref 8–23)
BUN/CREAT BLD: ABNORMAL (ref 9–20)
CALCIUM SERPL-MCNC: 9.2 MG/DL (ref 8.6–10.4)
CHLORIDE BLD-SCNC: 92 MMOL/L (ref 98–107)
CHOLESTEROL/HDL RATIO: 2.8
CHOLESTEROL: 127 MG/DL
CO2: 25 MMOL/L (ref 20–31)
CREAT SERPL-MCNC: 5.96 MG/DL (ref 0.7–1.2)
EKG ATRIAL RATE: 93 BPM
EKG P AXIS: 54 DEGREES
EKG P-R INTERVAL: 212 MS
EKG Q-T INTERVAL: 424 MS
EKG QRS DURATION: 156 MS
EKG QTC CALCULATION (BAZETT): 527 MS
EKG R AXIS: -77 DEGREES
EKG T AXIS: 91 DEGREES
EKG VENTRICULAR RATE: 93 BPM
GFR AFRICAN AMERICAN: 12 ML/MIN
GFR NON-AFRICAN AMERICAN: 10 ML/MIN
GFR SERPL CREATININE-BSD FRML MDRD: ABNORMAL ML/MIN/{1.73_M2}
GFR SERPL CREATININE-BSD FRML MDRD: ABNORMAL ML/MIN/{1.73_M2}
GLUCOSE BLD-MCNC: 186 MG/DL (ref 70–99)
GLUCOSE BLD-MCNC: 196 MG/DL (ref 75–110)
GLUCOSE BLD-MCNC: 229 MG/DL (ref 75–110)
HCT VFR BLD CALC: 34.5 % (ref 40.7–50.3)
HDLC SERPL-MCNC: 45 MG/DL
HEMOGLOBIN: 10.8 G/DL (ref 13–17)
LDL CHOLESTEROL: 61 MG/DL (ref 0–130)
MAGNESIUM: 2.2 MG/DL (ref 1.6–2.6)
MCH RBC QN AUTO: 30.8 PG (ref 25.2–33.5)
MCHC RBC AUTO-ENTMCNC: 31.3 G/DL (ref 28.4–34.8)
MCV RBC AUTO: 98.3 FL (ref 82.6–102.9)
NRBC AUTOMATED: 0 PER 100 WBC
PDW BLD-RTO: 15.4 % (ref 11.8–14.4)
PLATELET # BLD: 170 K/UL (ref 138–453)
PMV BLD AUTO: 9.7 FL (ref 8.1–13.5)
POTASSIUM SERPL-SCNC: 4.6 MMOL/L (ref 3.7–5.3)
PRO-BNP: ABNORMAL PG/ML
RBC # BLD: 3.51 M/UL (ref 4.21–5.77)
SODIUM BLD-SCNC: 134 MMOL/L (ref 135–144)
TOTAL PROTEIN: 8 G/DL (ref 6.4–8.3)
TRIGL SERPL-MCNC: 107 MG/DL
TROPONIN INTERP: ABNORMAL
TROPONIN T: ABNORMAL NG/ML
TROPONIN, HIGH SENSITIVITY: 131 NG/L (ref 0–22)
VLDLC SERPL CALC-MCNC: NORMAL MG/DL (ref 1–30)
WBC # BLD: 8 K/UL (ref 3.5–11.3)

## 2021-11-12 PROCEDURE — 90935 HEMODIALYSIS ONE EVALUATION: CPT

## 2021-11-12 PROCEDURE — 80053 COMPREHEN METABOLIC PANEL: CPT

## 2021-11-12 PROCEDURE — 1200000000 HC SEMI PRIVATE

## 2021-11-12 PROCEDURE — 6360000002 HC RX W HCPCS: Performed by: NURSE PRACTITIONER

## 2021-11-12 PROCEDURE — 84484 ASSAY OF TROPONIN QUANT: CPT

## 2021-11-12 PROCEDURE — 6370000000 HC RX 637 (ALT 250 FOR IP): Performed by: NURSE PRACTITIONER

## 2021-11-12 PROCEDURE — 83735 ASSAY OF MAGNESIUM: CPT

## 2021-11-12 PROCEDURE — APPSS180 APP SPLIT SHARED TIME > 60 MINUTES: Performed by: NURSE PRACTITIONER

## 2021-11-12 PROCEDURE — 85027 COMPLETE CBC AUTOMATED: CPT

## 2021-11-12 PROCEDURE — 80061 LIPID PANEL: CPT

## 2021-11-12 PROCEDURE — 83880 ASSAY OF NATRIURETIC PEPTIDE: CPT

## 2021-11-12 PROCEDURE — 93005 ELECTROCARDIOGRAM TRACING: CPT | Performed by: HEALTH CARE PROVIDER

## 2021-11-12 PROCEDURE — 82947 ASSAY GLUCOSE BLOOD QUANT: CPT

## 2021-11-12 PROCEDURE — 93010 ELECTROCARDIOGRAM REPORT: CPT | Performed by: INTERNAL MEDICINE

## 2021-11-12 PROCEDURE — 99233 SBSQ HOSP IP/OBS HIGH 50: CPT | Performed by: INTERNAL MEDICINE

## 2021-11-12 PROCEDURE — 99223 1ST HOSP IP/OBS HIGH 75: CPT | Performed by: INTERNAL MEDICINE

## 2021-11-12 RX ORDER — NICOTINE POLACRILEX 4 MG
15 LOZENGE BUCCAL PRN
Status: DISCONTINUED | OUTPATIENT
Start: 2021-11-12 | End: 2021-11-14 | Stop reason: HOSPADM

## 2021-11-12 RX ORDER — FUROSEMIDE 20 MG/1
40 TABLET ORAL 3 TIMES DAILY
Status: DISCONTINUED | OUTPATIENT
Start: 2021-11-12 | End: 2021-11-14

## 2021-11-12 RX ORDER — DEXTROSE MONOHYDRATE 50 MG/ML
100 INJECTION, SOLUTION INTRAVENOUS PRN
Status: DISCONTINUED | OUTPATIENT
Start: 2021-11-12 | End: 2021-11-14 | Stop reason: HOSPADM

## 2021-11-12 RX ORDER — DEXTROSE MONOHYDRATE 25 G/50ML
12.5 INJECTION, SOLUTION INTRAVENOUS PRN
Status: DISCONTINUED | OUTPATIENT
Start: 2021-11-12 | End: 2021-11-14 | Stop reason: HOSPADM

## 2021-11-12 RX ADMIN — HEPARIN SODIUM 5000 UNITS: 5000 INJECTION INTRAVENOUS; SUBCUTANEOUS at 11:15

## 2021-11-12 RX ADMIN — FUROSEMIDE 40 MG: 20 TABLET ORAL at 20:54

## 2021-11-12 RX ADMIN — PANTOPRAZOLE SODIUM 40 MG: 40 TABLET, DELAYED RELEASE ORAL at 11:14

## 2021-11-12 RX ADMIN — CARVEDILOL 6.25 MG: 12.5 TABLET, FILM COATED ORAL at 17:12

## 2021-11-12 RX ADMIN — ANTACID TABLETS 500 MG: 500 TABLET, CHEWABLE ORAL at 05:14

## 2021-11-12 RX ADMIN — GLIPIZIDE 2.5 MG: 5 TABLET ORAL at 11:15

## 2021-11-12 RX ADMIN — LIDOCAINE HYDROCHLORIDE: 20 SOLUTION ORAL; TOPICAL at 16:16

## 2021-11-12 RX ADMIN — INSULIN LISPRO 1 UNITS: 100 INJECTION, SOLUTION INTRAVENOUS; SUBCUTANEOUS at 20:54

## 2021-11-12 RX ADMIN — LOSARTAN POTASSIUM 25 MG: 25 TABLET, FILM COATED ORAL at 11:14

## 2021-11-12 RX ADMIN — ACETAMINOPHEN 650 MG: 325 TABLET ORAL at 17:12

## 2021-11-12 RX ADMIN — NIFEDIPINE 30 MG: 30 TABLET, EXTENDED RELEASE ORAL at 20:54

## 2021-11-12 RX ADMIN — ATORVASTATIN CALCIUM 40 MG: 80 TABLET, FILM COATED ORAL at 20:54

## 2021-11-12 RX ADMIN — FUROSEMIDE 40 MG: 20 TABLET ORAL at 11:14

## 2021-11-12 RX ADMIN — NIFEDIPINE 30 MG: 30 TABLET, EXTENDED RELEASE ORAL at 11:11

## 2021-11-12 RX ADMIN — ASCORBIC ACID, THIAMINE MONONITRATE,RIBOFLAVIN, NIACINAMIDE, PYRIDOXINE HYDROCHLORIDE, FOLIC ACID, CYANOCOBALAMIN, BIOTIN, CALCIUM PANTOTHENATE, 1 MG: 100; 1.5; 1.7; 20; 10; 1; 6000; 150000; 5 CAPSULE, LIQUID FILLED ORAL at 11:14

## 2021-11-12 RX ADMIN — HEPARIN SODIUM 5000 UNITS: 5000 INJECTION INTRAVENOUS; SUBCUTANEOUS at 22:16

## 2021-11-12 RX ADMIN — INSULIN LISPRO 2 UNITS: 100 INJECTION, SOLUTION INTRAVENOUS; SUBCUTANEOUS at 17:12

## 2021-11-12 ASSESSMENT — PAIN SCALES - GENERAL
PAINLEVEL_OUTOF10: 6
PAINLEVEL_OUTOF10: 0
PAINLEVEL_OUTOF10: 8
PAINLEVEL_OUTOF10: 4

## 2021-11-12 NOTE — ED NOTES
Pt remains in room with TV on and lights off. Pt remains connected to cardiac monitor. Pt denied any needs at this time.      Keo Yun RN  11/12/21 2270

## 2021-11-12 NOTE — ED NOTES
Bed: 19  Expected date:   Expected time:   Means of arrival:   Comments:  [facility] [facility]  11/11/21 2056

## 2021-11-12 NOTE — PROGRESS NOTES
Dialysis Time Out  To be done by RN and tech or 2 RNs  Staff Names Nirali Robertson RN and Ramón Xiao RN   [x]  Identity of the patient using 2 patient identifiers  [x]  Consent for treatment  [x]  Equipment-proper machine and dialyzer  [x]  B-Hep B status  [x]  Orders- to include bath, blood flow, dialyzer, time and fluid removal  [x]  Access-Correct site and in working order  [x]  Time for patient to ask questions.

## 2021-11-12 NOTE — FLOWSHEET NOTE
Assessment: Patient is a 59 y.o. male who arrived to the hospital due to \"chest pain, abdominal pain and shortness of breath. \" Patient was resting in hospital bed, in ED19, undergoing \"dialysis,\" when  visited. Intervention:  visited patient per initial rounding visits in the ED.  introduced herself to patient and learned about his well-being. Patient indicated that he is still struggling with symptoms tonight.  experienced patient as passive, as he responded in short phrases. Patient confirmed that his family is aware of his arrival to the hospital.  inquired about any needs and patient indicated that he was coping okay.  encouraged patient to reach out to spiritual care throughout his hospital stay. Outcome: Patient indicated no needs at time of visit. Plan: Chaplains can make follow-up visit, per request. Magdiel Regalado can be reached 24/7 via [employer].     Quincy Barrow     11/11/21 6848   Encounter Summary   Services provided to: Patient   Referral/Consult From: Rounding  (ED Rounding)   Support System Family members   Continue Visiting   (11/11/2021)   Complexity of Encounter Low   Length of Encounter 15 minutes   Routine   Type Initial   Spiritual/Episcopal   Type Spiritual support   Assessment Passive; Coping; Helplessness   Intervention Sustaining presence/ Ministry of presence   Outcome Coping

## 2021-11-12 NOTE — ED NOTES
Pt is resting with eyes closed, dialysis is in process, and pt remains connected to cardiac monitor. No needs at this time, dialysis nurse remains at bedside.      Ana Chance RN  11/12/21 2800

## 2021-11-12 NOTE — ED NOTES
Rec'd report from [employer] and Preston Ward. Pt resting on cot, NAD noted. Remains on monitor. Will cont to monitor, awaiting room assignment.       Viktoriya Marc RN  11/12/21 4472

## 2021-11-12 NOTE — ED NOTES
Pt remains in bed and connected to cardiac monitor. Dialysis nurse stated 2.7L of fluids were taken off pt. No distress at this time.      Nataly Padilla RN  11/12/21 [id number]

## 2021-11-12 NOTE — ED NOTES
Pt c/o upset stomach. Maalox given. Pt denies any further needs at this time.          Barney Mobley LPN  14/74/03 [id number]

## 2021-11-12 NOTE — ED NOTES
Pt resting in bed, NAD noted rr even and non labored. Bed locked in lowest position, environment free of clutter. Call light within reach, will continue to monitor.         Lorenza Marc RN  11/12/21 1846

## 2021-11-12 NOTE — H&P
emergency department with a 1 day history of abdominal pain and subsequent chest pain. He has a past medical history significant for end-stage renal disease on dialysis Tuesday/Thursday/Saturday via aVF, CAD status post PCTA TIM to LAD anemia of chronic disease, chronic systolic and diastolic CHF, and diabetes mellitus type 2. States that he feels the 2 pains are unrelated. He denies any other symptoms at this time    Diagnostics in the emergency department included chest x-ray showing pulmonary vascular congestion. Patient presents baseline troponin without EKG changes. States that he missed dialysis and the morning before he came to the emergency department and was run yesterday evening. Patient reevaluated later in the day still stating abdominal pain requiring 2 L low flow nasal cannula, will continue with admission. Increase diuresis    Past Medical History:     Past Medical History:   Diagnosis Date    Acute congestive heart failure ([address] 12/21/2016    Acute on chronic diastolic congestive heart failure ([address] 11/24/2018    Anemia 11/25/2018    Anemia of chronic renal failure 10/17/2016    AVF (arteriovenous fistula) (HCC)     Bowel obstruction ([address] 12/26/2013    CAD (coronary artery disease) 2013    ?     Chest pain at rest 12/21/2016    CHF (congestive heart failure) ([address] 2013    last episode 11/2018    CHF (congestive heart failure), NYHA class I, acute on chronic, combined ([address] 2/9/2021    Chronic kidney disease     CKD (chronic kidney disease) stage 4, GFR 15-29 ml/min ([address] 10/17/2016    Coronary artery disease involving native coronary artery of native heart without angina pectoris     Diabetes mellitus ([address] 2012    NIDDM    Dialysis patient ([address]     Shayy Nielson  /  Radha Elizabeth  /  Jed Ndiaye    ESRD (end stage renal disease) ([address]     Essential hypertension 11/25/2018    Groin swelling 07/17/2019    Hemodialysis patient ([address] 11/28/2018    [facility] G[facility]al[facility]    Hydrocele 07/17/2019    Hyperlipidemia 2013    ON RX    Hypertension 2013    ON RX    MI, old 12/26/2013    NSTEMI (non-ST elevated myocardial infarction) ([facility] 6/17/2019    BATSHEVA (obstructive sleep apnea)     Patient in clinical research study 04/01/2019    XIENCE SHORT DAPT    Pneumonia     Prostatism 11/17/2017    Respiratory distress 11/25/2018    Rising PSA level 11/17/2017    S/P arteriovenous (AV) graft placement     S/P PTCA - TIM distal LAD - Dr. Duard Favre 4/1/19 4/1/2019    Sleep apnea 2015    pt has machine and does not use it    Small bowel obstruction ([facility] 5/1/2021    Type 2 diabetes mellitus with chronic kidney disease on chronic dialysis ([address] 10/17/2016        Past Surgical History:     Past Surgical History:   Procedure Laterality Date    ABDOMEN SURGERY  2013    BOWEL OBSTRUCTION    AV FISTULA CREATION Left 02/20/2019    AV FISTULA REPAIR  07/17/2019    AV fistula revision, branch ligation / Percutaneous angioplasty of proximal anastomosis and outflow tract of dialysis circuit with drug coated balloon  /  Dr Zakiya Craig  02/09/2021    Maxine Flores  03/31/2019    TIM LAD    CYSTOSCOPY  11/17/2017    DIALYSIS FISTULA CREATION Left 2/20/2019    AV FISTULA CREATION ARM performed by Cody Carmona MD at [address] 10/23/2019    LEFT UPPER EXTREMITY AV GRAFT CREATION WITH [id number] ARTEGRAFT, LEFT UPPER EXTREMITY AV FISTULA LIGATION performed by Cody Carmona MD at [address] Left 2005    [address]  2007    1 WISDOM TOOTH REMOVED    NOSE SURGERY  1968    CAUTERY TO STOP NOSE BEEDS    OTHER SURGICAL HISTORY Left 03/06/2019    fistulagram    ND GENITAL SURG PROC,MALE UNLISTED N/A 2/16/2018    US  PROSTATE BIOPSY WITH SATURATION performed by Carrol Max MD at [facility] 11/17/2017    CYSTOSCOPY PROSTATE US BIOPSY performed by Carrol Max MD at [address]  02/16/2018    TONSILLECTOMY  1968    UPPER GASTROINTESTINAL ENDOSCOPY N/A 9/27/2021    EGD BIOPSY performed by Fidencio Lara MD at [address]  09/20/2019    LUE FISTULAGRAM  /  DR Josy Timmons  /  Findings: Severe stenosis of proximal cephalic vein. / Will plan for LUE AVG placement.  VASECTOMY  1983        Medications Prior to Admission:     Prior to Admission medications    Medication Sig Start Date End Date Taking?  Authorizing Provider   pantoprazole (PROTONIX) 40 MG tablet Take 1 tablet by mouth every morning (before breakfast) 5/19/21   Charlotte Markham MD   benzonatate (TESSALON) 100 MG capsule take 1 capsule by mouth three times a day if needed for 10 days 3/3/21   Historical Provider, MD   losartan (COZAAR) 25 MG tablet take 1 tablet by mouth once daily 3/21/21   Historical Provider, MD   guaiFENesin-codeine (GUAIFENESIN AC) 100-10 MG/5ML liquid give 10 milliliters by mouth every 4 hours if needed 3/4/21   Historical Provider, MD   glimepiride (AMARYL) 1 MG tablet Take 1 mg by mouth every morning (before breakfast)    Historical Provider, MD   atorvastatin (LIPITOR) 40 MG tablet Take 1 tablet by mouth nightly 2/9/21   Jani Ferrell MD   traMADol Irlanda Sox) 50 MG tablet take 1 tablet by mouth every 6 hours if needed 10/21/20   Historical Provider, MD   aspirin 325 MG tablet Take 325 mg by mouth daily    Historical Provider, MD   calcium carbonate (TUMS) 500 MG chewable tablet Take 1 tablet by mouth daily as needed     Historical Provider, MD   carvedilol (COREG) 6.25 MG tablet Take 1 tablet by mouth 2 times daily (with meals) 6/19/19   Genesis Thorpe MD   Multiple Vitamins-Minerals (RENAPLEX-D) TABS Take 1 tablet by mouth every other day Pt takes occasionally 5/14/19   Historical Provider, MD   lidocaine-prilocaine 22   SpO2 93%   Temp (24hrs), Av.2 °F (36.8 °C), Min:98 °F (36.7 °C), Max:98.3 °F (36.8 °C)    No results for input(s): POCGLU in the last 72 hours.     Intake/Output Summary (Last 24 hours) at 2021 1309  Last data filed at 2021 0446  Gross per 24 hour   Intake 600 ml   Output 3400 ml   Net -2800 ml       General Appearance: alert, well appearing, and in no acute distress  Mental status: oriented to person, place, and time  Head: normocephalic, atraumatic  Eye: no icterus, redness, pupils equal and reactive, extraocular eye movements intact, conjunctiva clear  Ear: normal external ear, no discharge, hearing intact  Nose: no drainage noted  Mouth: mucous membranes moist  Neck: supple, no carotid bruits, thyroid not palpable  Lungs: Bilateral equal air entry, clear to ausculation, no wheezing, rales or rhonchi, normal effort, 2 L low flow nasal cannula  Cardiovascular: normal rate, regular rhythm, no murmur, gallop, rub  Abdomen: Soft, nontender, deistended normal bowel sounds, no hepatomegaly or splenomegaly  Neurologic: There are no new focal motor or sensory deficits, normal muscle tone and bulk, no abnormal sensation, normal speech, cranial nerves II through XII grossly intact  Skin: No gross lesions, rashes, bruising or bleeding on exposed skin area  Extremities: peripheral pulses palpable, no pedal edema or calf pain with palpation  Psych: normal affect    Investigations:      Laboratory Testing:  Recent Results (from the past 24 hour(s))   EKG 12 lead    Collection Time: 21  5:42 PM   Result Value Ref Range    Ventricular Rate 93 BPM    Atrial Rate 93 BPM    P-R Interval 212 ms    QRS Duration 156 ms    Q-T Interval 424 ms    QTc Calculation (Bazett) 527 ms    P Axis 54 degrees    R Axis -77 degrees    T Axis 91 degrees   EKG 12 Lead    Collection Time: 21  6:15 PM   Result Value Ref Range    Ventricular Rate 93 BPM    Atrial Rate 93 BPM    P-R Interval 226 ms    QRS Duration 150 ms Q-T Interval 424 ms    QTc Calculation (Bazett) 527 ms    P Axis 61 degrees    R Axis -75 degrees    T Axis 67 degrees   CBC Auto Differential    Collection Time: 11/11/21  6:53 PM   Result Value Ref Range    WBC 10.0 3.5 - 11.3 k/uL    RBC 3.65 (L) 4.21 - 5.77 m/uL    Hemoglobin 11.4 (L) 13.0 - 17.0 g/dL    Hematocrit 36.1 (L) 40.7 - 50.3 %    MCV 98.9 82.6 - 102.9 fL    MCH 31.2 25.2 - 33.5 pg    MCHC 31.6 28.4 - 34.8 g/dL    RDW 15.2 (H) 11.8 - 14.4 %    Platelets 135 664 - 499 k/uL    MPV 9.8 8.1 - 13.5 fL    NRBC Automated 0.0 0.0 per 100 WBC    Differential Type NOT REPORTED     WBC Morphology NOT REPORTED     RBC Morphology NOT REPORTED     Platelet Estimate NOT REPORTED     Immature Granulocytes 0 0 %    Seg Neutrophils 95 (H) 36 - 66 %    Lymphocytes 2 (L) 24 - 44 %    Monocytes 3 1 - 7 %    Eosinophils % 0 (L) 1 - 4 %    Basophils 0 0 - 2 %    Absolute Immature Granulocyte 0.00 0.00 - 0.30 k/uL    Segs Absolute 9.50 (H) 1.8 - 7.7 k/uL    Absolute Lymph # 0.20 (L) 1.0 - 4.8 k/uL    Absolute Mono # 0.30 0.1 - 0.8 k/uL    Absolute Eos # 0.00 0.0 - 0.4 k/uL    Basophils Absolute 0.00 0.0 - 0.2 k/uL    Morphology ANISOCYTOSIS PRESENT    Troponin    Collection Time: 11/11/21  6:53 PM   Result Value Ref Range    Troponin, High Sensitivity 131 (HH) 0 - 22 ng/L    Troponin T NOT REPORTED <0.03 ng/mL    Troponin Interp NOT REPORTED    Brain Natriuretic Peptide    Collection Time: 11/11/21  6:53 PM   Result Value Ref Range    Pro-BNP 37,755 (H) <300 pg/mL    BNP Interpretation NOT REPORTED    Comprehensive Metabolic Panel w/ Reflex to MG    Collection Time: 11/11/21  6:53 PM   Result Value Ref Range    Glucose 238 (H) 70 - 99 mg/dL    BUN 61 (H) 8 - 23 mg/dL    CREATININE 7.34 (HH) 0.70 - 1.20 mg/dL    Bun/Cre Ratio NOT REPORTED 9 - 20    Calcium 9.5 8.6 - 10.4 mg/dL    Sodium 134 (L) 135 - 144 mmol/L    Potassium 5.5 (H) 3.7 - 5.3 mmol/L    Chloride 91 (L) 98 - 107 mmol/L    CO2 20 20 - 31 mmol/L    Anion Gap 23 (H) 9 - 17 mmol/L    Alkaline Phosphatase 205 (H) 40 - 129 U/L    ALT 16 5 - 41 U/L    AST 14 <40 U/L    Total Bilirubin 1.53 (H) 0.3 - 1.2 mg/dL    Total Protein 8.4 (H) 6.4 - 8.3 g/dL    Albumin 4.3 3.5 - 5.2 g/dL    Albumin/Globulin Ratio 1.0 1.0 - 2.5    GFR Non- 8 (L) >60 mL/min    GFR  9 (L) >60 mL/min    GFR Comment          GFR Staging NOT REPORTED    Lipase    Collection Time: 11/11/21  6:53 PM   Result Value Ref Range    Lipase 28 13 - 60 U/L   COVID-19, Rapid    Collection Time: 11/11/21  8:08 PM    Specimen: Nasopharyngeal Swab   Result Value Ref Range    Specimen Description . NASOPHARYNGEAL SWAB     SARS-CoV-2, Rapid Not Detected Not Detected   Troponin    Collection Time: 11/11/21 11:12 PM   Result Value Ref Range    Troponin, High Sensitivity 130 (HH) 0 - 22 ng/L    Troponin T NOT REPORTED <0.03 ng/mL    Troponin Interp NOT REPORTED    Troponin    Collection Time: 11/12/21  2:01 AM   Result Value Ref Range    Troponin, High Sensitivity 131 (HH) 0 - 22 ng/L    Troponin T NOT REPORTED <0.03 ng/mL    Troponin Interp NOT REPORTED    Comprehensive Metabolic Panel w/ Reflex to MG    Collection Time: 11/12/21  7:27 AM   Result Value Ref Range    Glucose 186 (H) 70 - 99 mg/dL    BUN 44 (H) 8 - 23 mg/dL    CREATININE 5.96 (HH) 0.70 - 1.20 mg/dL    Bun/Cre Ratio NOT REPORTED 9 - 20    Calcium 9.2 8.6 - 10.4 mg/dL    Sodium 134 (L) 135 - 144 mmol/L    Potassium 4.6 3.7 - 5.3 mmol/L    Chloride 92 (L) 98 - 107 mmol/L    CO2 25 20 - 31 mmol/L    Anion Gap 17 9 - 17 mmol/L    Alkaline Phosphatase 181 (H) 40 - 129 U/L    ALT 15 5 - 41 U/L    AST 14 <40 U/L    Total Bilirubin 1.55 (H) 0.3 - 1.2 mg/dL    Total Protein 8.0 6.4 - 8.3 g/dL    Albumin 4.1 3.5 - 5.2 g/dL    Albumin/Globulin Ratio 1.1 1.0 - 2.5    GFR Non-African American 10 (L) >60 mL/min    GFR  12 (L) >60 mL/min    GFR Comment          GFR Staging NOT REPORTED    Magnesium    Collection Time: 11/12/21  7:27 AM   Result Value Ref Range    Magnesium 2.2 1.6 - 2.6 mg/dL   CBC    Collection Time: 11/12/21  7:27 AM   Result Value Ref Range    WBC 8.0 3.5 - 11.3 k/uL    RBC 3.51 (L) 4.21 - 5.77 m/uL    Hemoglobin 10.8 (L) 13.0 - 17.0 g/dL    Hematocrit 34.5 (L) 40.7 - 50.3 %    MCV 98.3 82.6 - 102.9 fL    MCH 30.8 25.2 - 33.5 pg    MCHC 31.3 28.4 - 34.8 g/dL    RDW 15.4 (H) 11.8 - 14.4 %    Platelets 335 905 - 763 k/uL    MPV 9.7 8.1 - 13.5 fL    NRBC Automated 0.0 0.0 per 100 WBC   Lipid panel - fasting    Collection Time: 11/12/21  7:27 AM   Result Value Ref Range    Cholesterol 127 <200 mg/dL    HDL 45 >40 mg/dL    LDL Cholesterol 61 0 - 130 mg/dL    Chol/HDL Ratio 2.8 <5    Triglycerides 107 <150 mg/dL    VLDL NOT REPORTED 1 - 30 mg/dL   Brain Natriuretic Peptide    Collection Time: 11/12/21  7:27 AM   Result Value Ref Range    Pro-BNP 37,550 (H) <300 pg/mL    BNP Interpretation NOT REPORTED        Imaging/Diagnostics:  XR CHEST PORTABLE    Result Date: 11/11/2021  No acute process. Cardiomegaly and pulmonary vascular congestion persist       Assessment :      Hospital Problems           Last Modified POA    * (Principal) Acute on chronic combined systolic and diastolic CHF (congestive heart failure) ([facility] 11/12/2021 Yes    Anemia of chronic renal failure 11/12/2021 Yes    Type 2 diabetes mellitus with chronic kidney disease on chronic dialysis ([address] 11/12/2021 Yes    Acute on chronic diastolic congestive heart failure ([address] 11/12/2021 Yes    Essential hypertension 11/12/2021 Yes    AVF (arteriovenous fistula) ([address] 11/12/2021 Yes    ESRD (end stage renal disease) ([facility] 11/12/2021 Yes          Plan:     Patient status inpatient in the Med/Surge    Acute on chronic combined systolic and diastolic CHF:   - Chest x-ray reviewed, pulmonary edema noted.     - BNP elevated from normal baseline.    - Fluid balance per HD.    - Patient also states he takes Lasix at home, increased to 3 times daily    End-stage renal disease:   - Nephrology consulted for dialysis. - He is a Tuesday/Thursday/Saturday AV fistula dialysis patient    Abdominal pain: GI cocktail    Hypoxia: Wearing 2 L low flow nasal cannula, wean as appropriate    Essential hypertension: Stable, continue current medication    Type 2 diabetes mellitus:   - A.m. blood sugar stable. - Continue glipizide. - Start LI-ISS for monitoring    Anemia of chronic disease    GI/DVT prophylaxis: No GI prophylaxis warranted, continue heparin    Dispo: Likely home tomorrow after hemodialysis    Consultations:   IP CONSULT TO NEPHROLOGY  IP CONSULT TO HOSPITALIST  IP CONSULT TO NEPHROLOGY    Patient is admitted as inpatient status because of co-morbidities listed above, severity of signs and symptoms as outlined, requirement for current medical therapies and most importantly because of direct risk to patient if care not provided in a hospital setting. Expected length of stay > 48 hours.     ROB Terry NP  11/12/2021  1:09 PM    Copy sent to Dr. Cristina Mattson

## 2021-11-12 NOTE — CONSULTS
Renal Consult Note    Patient :  Laurent Fairchild; 59 y.o. MRN# [id number]  Location:    Attendin[facility] Tripp MD  Admit Date:  2021   Hospital Day: 1    Reason for Consult:     Asked by Dr Mesfin Tripp MD to see for JAGRUTI/Elevated Creatinine. History Obtained From:     patient    HD Access:     previous  Left AV fistula    HD Unit:     [facility]    Nephrologist:     Viviana    Dry Weight:     118.3 Kgs    Admission Weight:     122.5 Kgs    History of Present Illness:     Laurent Fairchild; 59 y.o. male with past medical history as mentioned below presented to the hospital with the chief complaint of abdominal pain going on for a few days. He describes the pain as dull aching centrally located on a scale of 0-10 rated as an 8. He had this episodes many times before. Last visit in October he had similar symptoms CT scan of the abdominal was done which showed presence of inguinal and umbilicus hernias with luminal fat herniation without any viscera. There was no other  visceral pathology. He also reported symptoms of chest pain which are improved. He had uncontrolled hypertension when he came in report systolic pressure of 266 and diastolic diastolic of 330. He was about 4 to 5 kg heavier than his dry weight. Received emergent hemodialysis yesterday to early morning and had 2.8 L removed. Nephrology consulted for renal placement therapy. proBNP was more than 37,000 troponin slightly elevated at 130-131 range. Does have a previous history of coronary artery disease has had stents placed in the past.  Past History/Allergies? Social History:     Past Medical History:   Diagnosis Date    Acute congestive heart failure ([address] 2016    Acute on chronic diastolic congestive heart failure ([address] 2018    Anemia 2018    Anemia of chronic renal failure 10/17/2016    AVF (arteriovenous fistula) (HCC)     Bowel obstruction ([address] 2013    CAD (coronary artery disease)     ?  Chest pain at rest 12/21/2016    CHF (congestive heart failure) ([address] 2013    last episode 11/2018    CHF (congestive heart failure), NYHA class I, acute on chronic, combined ([address] 2/9/2021    Chronic kidney disease     CKD (chronic kidney disease) stage 4, GFR 15-29 ml/min ([address] 10/17/2016    Coronary artery disease involving native coronary artery of native heart without angina pectoris     Diabetes mellitus ([address] 2012    NIDDM    Dialysis patient ([address]     Landon Werner  /  Shamar Colebenito  /  Aguilar Garcia    ESRD (end stage renal disease) ([address]     Essential hypertension 11/25/2018    Groin swelling 07/17/2019    Hemodialysis patient ([address] 11/28/2018    [facility]HE[facility] RIGHT DIALYSIS ACCESSTUES THURS AND AT [facility]    Hydrocele 07/17/2019    Hyperlipidemia 2013    ON RX    Hypertension 2013    ON RX    MI, old 12/26/2013    NSTEMI (non-ST elevated myocardial infarction) ([address] 6/17/2019    BATSHEVA (obstructive sleep apnea)     Patient in clinical research study 04/01/2019    XIENCE SHORT DAPT    Pneumonia     Prostatism 11/17/2017    Respiratory distress 11/25/2018    Rising PSA level 11/17/2017    S/P arteriovenous (AV) graft placement     S/P PTCA - TIM distal LAD - Dr. Berlin Fink 4/1/19 4/1/2019    Sleep apnea 2015    pt has machine and does not use it    Small bowel obstruction ([address] 5/1/2021    Type 2 diabetes mellitus with chronic kidney disease on chronic dialysis ([address] 10/17/2016       Allergies   Allergen Reactions    Lisinopril Swelling       Social History     Socioeconomic History    Marital status: Single     Spouse name: Not on file    Number of children: Not on file    Years of education: Not on file    Highest education level: Not on file   Occupational History    Not on file   Tobacco Use    Smoking status: Never Smoker    Smokeless tobacco: Never Used   Vaping Use    Vaping Use: Never used   Substance and Sexual Activity    Alcohol use: No    Drug use: No    Sexual activity: Not on file   Other Topics Concern    Not on file   Social History Narrative    Not on file     Social Determinants of Health     Financial Resource Strain:     Difficulty of Paying Living Expenses: Not on file   Food Insecurity:     Worried About [address] in the Last Year: Not on file    Frank of Food in the Last Year: Not on file   Transportation Needs:     Lack of Transportation (Medical): Not on file    Lack of Transportation (Non-Medical): Not on file   Physical Activity:     Days of Exercise per Week: Not on file    Minutes of Exercise per Session: Not on file   Stress:     Feeling of Stress : Not on file   Social Connections:     Frequency of Communication with Friends and Family: Not on file    Frequency of Social Gatherings with Friends and Family: Not on file    Attends Buddhist Services: Not on file    Active Member of Clubs or Organizations: Not on file    Attends Club or Organization Meetings: Not on file    Marital Status: Not on file   Intimate Partner Violence:     Fear of Current or Ex-Partner: Not on file    Emotionally Abused: Not on file    Physically Abused: Not on file    Sexually Abused: Not on file   Housing Stability:     Unable to Pay for Housing in the Last Year: Not on file    Number of [place] in the Last Year: Not on file    Unstable Housing in the Last Year: Not on file       Family History:        Family History   Problem Relation Age of Onset    Heart Disease Mother         CHF    Early Death Mother     High Blood Pressure Brother     Diabetes Brother     Asthma Brother     High Blood Pressure Brother     Diabetes Brother     High Blood Pressure Brother     Diabetes Brother        Outpatient Medications:     Not in a hospital admission.     Current Medications:     Scheduled Meds:    furosemide  40 mg Oral TID    insulin lispro  0-6 Units SubCUTAneous TID WC    insulin lispro  0-3 Units SubCUTAneous Nightly    GI cocktail   Oral Once    NIFEdipine  30 mg Oral BID    b complex-C-folic acid  1 mg Oral Every Other Day    carvedilol  6.25 mg Oral BID WC    glipiZIDE  2.5 mg Oral QAM AC    atorvastatin  40 mg Oral Nightly    pantoprazole  40 mg Oral QAM AC    losartan  25 mg Oral Daily    sodium chloride flush  5-40 mL IntraVENous 2 times per day    aspirin  325 mg Oral Daily    heparin (porcine)  5,000 Units SubCUTAneous 3 times per day     Continuous Infusions:    dextrose      sodium chloride       PRN Meds:  glucose, dextrose, glucagon (rDNA), dextrose, calcium carbonate, sodium chloride flush, sodium chloride, ondansetron **OR** ondansetron, acetaminophen **OR** acetaminophen, magnesium hydroxide, nitroGLYCERIN    Review of Systems:     Constitutional: No fever, no chills, no lethargy, no weakness. HEENT:  No headache, otalgia, itchy eyes, nasal discharge or sore throat. Cardiac:  No chest pain, dyspnea, orthopnea or PND. Chest:  No cough, phlegm or wheezing. Abdomen:  No abdominal pain, nausea or vomiting. Neuro:  No focal weakness, abnormal movements orseizure like activity. Skin:   No rashes, no itching. :   No hematuria, no pyuria, no dysuria, no flank pain. Extremities:  No swelling or joint pains. ROS was otherwise negative except as mentioned in the [address] Input/Output:     I/O last 3 completed shifts: In: 600   Out: 3400     No data found. Vital Signs:   Temperature:  Temp: 98.3 °F (36.8 °C)  TMax:   Temp (24hrs), Av.2 °F (36.8 °C), Min:98 °F (36.7 °C), Max:98.3 °F (36.8 °C)    Respirations:  Resp: 22  Pulse:   Pulse: 89  BP:    BP: 130/84  BP Range: Systolic (93SRX), XXZ:498 , Min:112 , DEN:040       Diastolic (42QAB), JOHN:91, Min:75, Max:102      Physical Examination:     General:  AAO x 3, speaking in full sentences, no accessory muscle use. HEENT: Atraumatic, normocephalic, no throat congestion, moist mucosa. Eyes:   Pupils equal, round and reactive to light, EOMI.   Neck:   No JVD, no thyromegaly, no lymphadenopathy. Chest:  Bilateral vesicular breath sounds, no rales or wheezes. Cardiac:  S1 S2 RR, no murmurs, gallops or rubs, JVP not raised. Abdomen: Soft, non-tender, no masses or organomegaly, BS audible. :   No suprapubic or flank tenderness. Neuro:  AAO x 3, No FND. SKIN:  No rashes, good skin turgor. Extremities:  + edema, palpable peripheral pulses, no calf tenderness. Labs:       Recent Labs     11/11/21 1853 11/12/21  0727   WBC 10.0 8.0   RBC 3.65* 3.51*   HGB 11.4* 10.8*   HCT 36.1* 34.5*   MCV 98.9 98.3   MCH 31.2 30.8   MCHC 31.6 31.3   RDW 15.2* 15.4*    170   MPV 9.8 9.7      BMP:   Recent Labs     11/11/21 1853 11/12/21  0727   * 134*   K 5.5* 4.6   CL 91* 92*   CO2 20 25   BUN 61* 44*   CREATININE 7.34* 5.96*   GLUCOSE 238* 186*   CALCIUM 9.5 9.2      Phosphorus:   No results for input(s): PHOS in the last 72 hours. Magnesium:    Recent Labs     11/12/21  0727   MG 2.2     Albumin:    Recent Labs     11/11/21 1853 11/12/21  0727   LABALBU 4.3 4.1     BNP:      Lab Results   Component Value Date     09/08/2018     PTH:     Lab Results   Component Value Date    IPTH 144.1 11/29/2017     Blood cultures:  No results found for: [facility]    Urinalysis/Chemistries:      Lab Results   Component Value Date    NITRU NEGATIVE 10/26/2021    COLORU Dark Yellow 10/26/2021    PHUR 6.5 10/26/2021    WBCUA 0 TO 2 10/26/2021    RBCUA 0 TO 2 10/26/2021    MUCUS 1+ 10/26/2021    TRICHOMONAS NOT REPORTED 10/26/2021    YEAST NOT REPORTED 10/26/2021    BACTERIA FEW 10/26/2021    SPECGRAV 1.018 10/26/2021    LEUKOCYTESUR NEGATIVE 10/26/2021    UROBILINOGEN Normal 10/26/2021    BILIRUBINUR NEGATIVE 10/26/2021    GLUCOSEU TRACE 10/26/2021    KETUA TRACE 10/26/2021    AMORPHOUS NOT REPORTED 10/26/2021       Radiology:     CXR:     Assessment:     1. ESRD on Hemodialysis.  His regular HD days are Tuesday Thursday Saturday at [facility] hemod[facility] facility using left AV fistula under fernandoar. His dry weight is 118 kg. Admitted with 122 kg  2. Anemia of chronic disease  3. Secondary hyperparathyroidism  4. Hypertension with poor control likely from abdominal pain  5. Fluid overload resolved after dialysis yesterday  6. Atypical chest pain work-up in progress    Plan:   1. HD tomorrow as per schedule. 2. Strict Input and Output, Daily weigh and document in the chart. 3. Low Potassium, Low phosphorus and low salt diet. Fluids to be restricted to 1500ml/day. 4. IV Aranesp/Epogen for anemia of chronic disease with HD weekly. 5. IV Zemplar per protocol for secondary hyperparathyroidism with HD thrice a week. 6.  Await GI recommendations  7. We will follow with you     Nutrition   Please ensure that patient is on a renal diet/TF. Thank you for the consultation. Please do not hesitate to contact us for any further questions/concerns. We will continue to follow along with you.

## 2021-11-12 NOTE — PROGRESS NOTES
Dialysis Post Treatment Note  Vitals:    11/12/21 0446   BP: 127/87   Pulse: 89   Resp: 22   Temp: 98.3 °F (36.8 °C)   SpO2: 93%     Post-weight = Weight:  (unk on stretcher)  Total Liters Processed = Total Liters Processed (l/min): 60.4 l/min  Rinseback Volume (mL) = Rinseback Volume (ml): 600 ml (Rinseback x 2)  Net Removal (mL) = NET Removed (ml): 2800 ml  Patient's dry weight=2800 net rmvd  Type of access used= R AVG  Length of treatment=210  Pt received two rinsebacks due to machine issues . AVG appears to have old infiltrations. Venous  Needle deep .    Tolerated HD w/o problem

## 2021-11-12 NOTE — CARE COORDINATION
Case Management Initial Discharge Plan  Jacqueline Tapia,             Met with:patient to discuss discharge plans. Information verified: address, contacts, phone number, , insurance Yes  Insurance Provider: medicare and [address]     Emergency Contact/Next of Kin name & number: as per face sheet brother marlen and child Franklin Jensen   Who are involved in patient's support system? As above     PCP: Martin Jin  Date of last visit: couple weeks       Discharge Planning    Living Arrangements:        Home has 1 stories  2 stairs to climb to get into front door, 0stairs to climb to reach second floor  Location of bedroom/bathroom in home main    Patient able to perform ADL's:Independent    Current Services (outpatient & in home) none  DME equipment: glucometer  DME provider:     Is patient receiving oral anticoagulation therapy? No    If indicated:   Physician managing anticoagulation treatment:   Where does patient obtain lab work for ATC treatment? Potential Assistance Needed:       Patient agreeable to home care: No  [place]ram[place] of choice provided:  no    Prior SNF/Rehab Placement and Facility:   Agreeable to SNF/Rehab: No  Braman of choice provided: no     Evaluation: no    Expected Discharge date:       Patient expects to be discharged to: If home: is the family and/or caregiver wiling & able to provide support at home? Who will be providing this support? Follow Up Appointment: Best Day/ Time:      Transportation provider: family  Transportation arrangements needed for discharge: No    Readmission Risk              Risk of Unplanned Readmission:  26             Does patient have a readmission risk score greater than 14?: Yes  If yes, follow-up appointment must be made within 7 days of discharge.      Goals of Care:       Educated pt on transitional options,will provide freedom of choice as needed  and are agreeable with plan      Discharge Plan: home independently follow for need has

## 2021-11-12 NOTE — ED NOTES
Pt sating at 88% on 2L NC, pt's O2 increased to 3L NC pt now sating at 91%.       Jerry Dailey RN  11/12/21 [id number]

## 2021-11-12 NOTE — ED NOTES
Pt presents to the ed via private auto c/o left side chest pain that started today. Pt rated his pain 8/10 stated that the pain is midstrenal and does not radiate pt has 1 stent placed. Pt c/o ABD pain but denies any episodes of emesis or diarrhea. Pt states he has been feeling SOB all day today pt does not wear home O2 pt placed on 2L NC for comfort. Pt states he goes to dialysis every Tues/Thurs/Sat reports that he has not missed any treatments other then today because he was in pain. Pt on monitor EKG is done resident is at the bedside will continue to monitor.      Michelle Phoenix RN  11/11/21 2045

## 2021-11-13 ENCOUNTER — APPOINTMENT (OUTPATIENT)
Dept: DIALYSIS | Age: 61
DRG: 291 | End: 2021-11-13
Payer: MEDICARE

## 2021-11-13 PROBLEM — G89.29 CHRONIC ABDOMINAL PAIN: Status: ACTIVE | Noted: 2021-11-13

## 2021-11-13 PROBLEM — R10.9 CHRONIC ABDOMINAL PAIN: Status: ACTIVE | Noted: 2021-11-13

## 2021-11-13 LAB
ANION GAP SERPL CALCULATED.3IONS-SCNC: 18 MMOL/L (ref 9–17)
BUN BLDV-MCNC: 66 MG/DL (ref 8–23)
BUN/CREAT BLD: ABNORMAL (ref 9–20)
CALCIUM SERPL-MCNC: 9.2 MG/DL (ref 8.6–10.4)
CHLORIDE BLD-SCNC: 92 MMOL/L (ref 98–107)
CO2: 24 MMOL/L (ref 20–31)
CREAT SERPL-MCNC: 6.94 MG/DL (ref 0.7–1.2)
EKG ATRIAL RATE: 89 BPM
EKG P AXIS: 62 DEGREES
EKG P-R INTERVAL: 208 MS
EKG Q-T INTERVAL: 452 MS
EKG QRS DURATION: 156 MS
EKG QTC CALCULATION (BAZETT): 549 MS
EKG R AXIS: -78 DEGREES
EKG T AXIS: 83 DEGREES
EKG VENTRICULAR RATE: 89 BPM
GFR AFRICAN AMERICAN: 10 ML/MIN
GFR NON-AFRICAN AMERICAN: 8 ML/MIN
GFR SERPL CREATININE-BSD FRML MDRD: ABNORMAL ML/MIN/{1.73_M2}
GFR SERPL CREATININE-BSD FRML MDRD: ABNORMAL ML/MIN/{1.73_M2}
GLUCOSE BLD-MCNC: 114 MG/DL (ref 75–110)
GLUCOSE BLD-MCNC: 121 MG/DL (ref 70–99)
GLUCOSE BLD-MCNC: 53 MG/DL (ref 75–110)
GLUCOSE BLD-MCNC: 58 MG/DL (ref 75–110)
GLUCOSE BLD-MCNC: 62 MG/DL (ref 75–110)
GLUCOSE BLD-MCNC: 73 MG/DL (ref 75–110)
GLUCOSE BLD-MCNC: 93 MG/DL (ref 75–110)
HBV SURFACE AB TITR SER: 57.53 MIU/ML
HEPATITIS B CORE TOTAL ANTIBODY: NONREACTIVE
HEPATITIS B SURFACE ANTIGEN: NONREACTIVE
HEPATITIS C ANTIBODY: NONREACTIVE
POTASSIUM SERPL-SCNC: 4.3 MMOL/L (ref 3.7–5.3)
SODIUM BLD-SCNC: 134 MMOL/L (ref 135–144)

## 2021-11-13 PROCEDURE — 2580000003 HC RX 258: Performed by: NURSE PRACTITIONER

## 2021-11-13 PROCEDURE — 2700000000 HC OXYGEN THERAPY PER DAY

## 2021-11-13 PROCEDURE — 86803 HEPATITIS C AB TEST: CPT

## 2021-11-13 PROCEDURE — 99232 SBSQ HOSP IP/OBS MODERATE 35: CPT | Performed by: INTERNAL MEDICINE

## 2021-11-13 PROCEDURE — 6360000002 HC RX W HCPCS: Performed by: NURSE PRACTITIONER

## 2021-11-13 PROCEDURE — 87340 HEPATITIS B SURFACE AG IA: CPT

## 2021-11-13 PROCEDURE — 6370000000 HC RX 637 (ALT 250 FOR IP): Performed by: NURSE PRACTITIONER

## 2021-11-13 PROCEDURE — 86704 HEP B CORE ANTIBODY TOTAL: CPT

## 2021-11-13 PROCEDURE — 86317 IMMUNOASSAY INFECTIOUS AGENT: CPT

## 2021-11-13 PROCEDURE — 90935 HEMODIALYSIS ONE EVALUATION: CPT

## 2021-11-13 PROCEDURE — 90935 HEMODIALYSIS ONE EVALUATION: CPT | Performed by: INTERNAL MEDICINE

## 2021-11-13 PROCEDURE — 80048 BASIC METABOLIC PNL TOTAL CA: CPT

## 2021-11-13 PROCEDURE — 1200000000 HC SEMI PRIVATE

## 2021-11-13 PROCEDURE — 36415 COLL VENOUS BLD VENIPUNCTURE: CPT

## 2021-11-13 PROCEDURE — 82947 ASSAY GLUCOSE BLOOD QUANT: CPT

## 2021-11-13 PROCEDURE — 5A1D70Z PERFORMANCE OF URINARY FILTRATION, INTERMITTENT, LESS THAN 6 HOURS PER DAY: ICD-10-PCS | Performed by: INTERNAL MEDICINE

## 2021-11-13 RX ORDER — FUROSEMIDE 40 MG/1
40 TABLET ORAL 3 TIMES DAILY
Qty: 60 TABLET | Refills: 3 | Status: SHIPPED | OUTPATIENT
Start: 2021-11-13 | End: 2021-11-14 | Stop reason: HOSPADM

## 2021-11-13 RX ADMIN — HEPARIN SODIUM 5000 UNITS: 5000 INJECTION INTRAVENOUS; SUBCUTANEOUS at 15:18

## 2021-11-13 RX ADMIN — PANTOPRAZOLE SODIUM 40 MG: 40 TABLET, DELAYED RELEASE ORAL at 08:18

## 2021-11-13 RX ADMIN — LOSARTAN POTASSIUM 25 MG: 25 TABLET, FILM COATED ORAL at 08:19

## 2021-11-13 RX ADMIN — ACETAMINOPHEN 650 MG: 325 TABLET ORAL at 22:00

## 2021-11-13 RX ADMIN — DEXTROSE 15 G: 15 GEL ORAL at 18:12

## 2021-11-13 RX ADMIN — FUROSEMIDE 40 MG: 20 TABLET ORAL at 08:19

## 2021-11-13 RX ADMIN — ASPIRIN 325 MG: 325 TABLET, COATED ORAL at 08:19

## 2021-11-13 RX ADMIN — FUROSEMIDE 40 MG: 20 TABLET ORAL at 15:18

## 2021-11-13 RX ADMIN — HEPARIN SODIUM 5000 UNITS: 5000 INJECTION INTRAVENOUS; SUBCUTANEOUS at 22:00

## 2021-11-13 RX ADMIN — FUROSEMIDE 40 MG: 20 TABLET ORAL at 22:00

## 2021-11-13 RX ADMIN — CARVEDILOL 6.25 MG: 12.5 TABLET, FILM COATED ORAL at 08:18

## 2021-11-13 RX ADMIN — SODIUM CHLORIDE, PRESERVATIVE FREE 10 ML: 5 INJECTION INTRAVENOUS at 23:09

## 2021-11-13 RX ADMIN — ATORVASTATIN CALCIUM 40 MG: 80 TABLET, FILM COATED ORAL at 22:00

## 2021-11-13 RX ADMIN — SODIUM CHLORIDE, PRESERVATIVE FREE 10 ML: 5 INJECTION INTRAVENOUS at 08:19

## 2021-11-13 RX ADMIN — HEPARIN SODIUM 5000 UNITS: 5000 INJECTION INTRAVENOUS; SUBCUTANEOUS at 06:39

## 2021-11-13 RX ADMIN — NIFEDIPINE 30 MG: 30 TABLET, EXTENDED RELEASE ORAL at 08:18

## 2021-11-13 RX ADMIN — GLIPIZIDE 2.5 MG: 5 TABLET ORAL at 08:18

## 2021-11-13 ASSESSMENT — PAIN SCALES - GENERAL
PAINLEVEL_OUTOF10: 0
PAINLEVEL_OUTOF10: 0
PAINLEVEL_OUTOF10: 2
PAINLEVEL_OUTOF10: 0
PAINLEVEL_OUTOF10: 0

## 2021-11-13 NOTE — PROGRESS NOTES
Dialysis Post Treatment Note  Vitals:    11/13/21 1402   BP: 109/73   Pulse: 80   Resp: 19   Temp: 97.5 °F (36.4 °C)   SpO2: 100%     Pre-Weight = 117.3 kg  Post-weight = 114.4 kg  Total Liters Processed = Total Liters Processed (l/min): 77.9 l/min  Rinseback Volume (mL) = Rinseback Volume (ml): 300 ml  Net Removal (mL) = NET Removed (ml): 3000 ml  Patient's dry weight=? 117.3 kg  Type of access used=  Lt Upper Arm Access  Length of treatment= 3.5 hours    Pt tolerated HD tx, pt seen by MDs

## 2021-11-13 NOTE — DISCHARGE SUMMARY
[facility]  Office: [address], DO, Dandy Hernandez, DO, Merry Clay, DO, Emma Gonzaleslluvia Blood, DO, Natacha Chatterjee MD, Ilya Ayoub MD, Olayinka Burgos MD, Woody Souza MD, Dev Morel MD, Yaron Levi MD, Shweta Dey MD, Michelle Araujo, DO, Arsalan Arevalo DO, Nanette Diaz MD,  Molly Singh DO, Corine Elder MD, Zander Suarez MD, Madhavi Myers MD, Yanni Mora MD, Mona Montes MD, Jenna Guerin MD, Sera Dooley MD, Fadi Elizalde, [facility], [facility], CNP, Gosia Reeves, CNP, Hailey Graves, CNS, Rebekah Fabian, CNP, Joann Olmos, CNP, Linda Clifford, CNP, Karen Espinoza, CNP, London Pereira, CNP, Andres Hernandez PA-C, Cleone Denver, [facility], Leisa Chaparro, CNP, Renay Suggs, CNP, Randy Kathleen, CNP, Kely Pham, CNP, Alise Washington, CNP, Mary Hilario, CNP, Chicorobert Kathleen, [address] [place] [address]    Discharge Summary     Patient ID: Cherisse Denver  :  1960   MRN: [id number]     ACCOUNT:  [de-identified]   Patient's PCP: Patricia Damon  Admit Date: 2021   Discharge Date: 2021     Length of Stay: 3  Code Status:  Full Code  Admitting Physician: Lisbeth Vargas MD  Discharge Physician: Lisbeth Vargas MD     Active Discharge Diagnoses:     Hospital Problem Lists:  Principal Problem:    Acute on chronic combined systolic and diastolic CHF (congestive heart failure) (HCC)  Active Problems:    Anemia of chronic renal failure    Type 2 diabetes mellitus with chronic kidney disease on chronic dialysis ([facility]    Acute on chronic diastolic congestive heart failure ([facility]    Essential hypertension    AVF (arteriovenous fistula) ([facility]    ESRD (end stage renal disease) ([facility]    Chronic abdominal pain    Hypoxemia-due to CHF/fluid overload  Resolved Problems:    * No resolved hospital problems.  *      Admission Condition:  poor     Discharged Condition: stable    Hospital Stay:   Admitting history:  Carl Willoughby Bassam is a 59 y.o. Non- / non  male who presents with Chest Pain (CP that began earlier today at rest; L sided CP; hx CHF ), Abdominal Pain (c/o abd pain; denies NV), and Shortness of Breath (c/o SOB all day)   and is admitted to the hospital for the management of Acute on chronic combined systolic and diastolic CHF (congestive heart failure) ([facility]    This is a 67-year-old male who presents to the emergency department with a 1 day history of abdominal pain and subsequent chest pain.  He has a past medical history significant for end-stage renal disease on dialysis Tuesday/Thursday/Saturday via aVF, CAD status post PCTA TIM to LAD anemia of chronic disease, chronic systolic and diastolic CHF, and diabetes mellitus type 2.  States that he feels the 2 pains are unrelated.  He denies any other symptoms at this time     Diagnostics in the emergency department included chest x-ray showing pulmonary vascular congestion. Patient presents baseline troponin without EKG changes. States that he missed dialysis and the morning before he came to the emergency department and was run yesterday evening.  Patient reevaluated later in the day still stating abdominal pain requiring 2 L low flow nasal cannula, will continue with admission.  Increase diuresis    Hospital Course:      Patient could not be discharged yesterday as his blood sugar dropped to 61 and blood pressure was 98/61.   Today morning earlier his blood sugars were 57 which have improved to 101 after eating food, blood pressure is improved to 115/76  He had qualified for 2 L oxygen at home  Patient had 2-3 bowel movements yesterday, no BM today so far  Still complains of off-and-on periumbilical pain, discussed again with patient to follow-up with surgery as outpatient, appointment will be scheduled by nurse at the time of discharge    Again complains of lower abdominal pain around umbilicus region, states it is going off and on for more than 1 year and he has seen the doctor probably surgeon for this  He had a good bowel movement today     CT abdomen done on 10/26/2021 was showing small periumbilical ventral abdominal wall hernia containing segments of unobstructed small bowel and moderate-sized bilateral inguinal hernias which primarily contain fat     Plan:         -Check blood sugars every morning at home, do not take Amaryl if blood sugar is less than 110.  -Take all blood pressure medicines 2 hours apart to avoid hypotension  -Qualified for home oxygen, DME order has been placed in epic  -Incentive spirometry at home    Significant therapeutic interventions: See above notes    Significant Diagnostic Studies:   Labs / Micro:  CBC:   Lab Results   Component Value Date    WBC 8.0 11/12/2021    RBC 3.51 11/12/2021    RBC 3.62 09/08/2018    HGB 10.8 11/12/2021    HCT 34.5 11/12/2021    MCV 98.3 11/12/2021    MCH 30.8 11/12/2021    MCHC 31.3 11/12/2021    RDW 15.4 11/12/2021     11/12/2021     BMP:    Lab Results   Component Value Date    GLUCOSE 121 11/13/2021    GLUCOSE 147 09/08/2018     11/13/2021    K 4.3 11/13/2021    CL 92 11/13/2021    CO2 24 11/13/2021    ANIONGAP 18 11/13/2021    BUN 66 11/13/2021    CREATININE 6.94 11/13/2021    BUNCRER NOT REPORTED 11/13/2021    CALCIUM 9.2 11/13/2021    LABGLOM 8 11/13/2021    GFRAA 10 11/13/2021    GFR      11/13/2021    GFR NOT REPORTED 11/13/2021     HFP:    Lab Results   Component Value Date    PROT 8.0 11/12/2021     CMP:    Lab Results   Component Value Date    GLUCOSE 121 11/13/2021    GLUCOSE 147 09/08/2018     11/13/2021    K 4.3 11/13/2021    CL 92 11/13/2021    CO2 24 11/13/2021    BUN 66 11/13/2021    CREATININE 6.94 11/13/2021    ANIONGAP 18 11/13/2021    ALKPHOS 181 11/12/2021    ALT 15 11/12/2021    AST 14 11/12/2021    BILITOT 1.55 11/12/2021    LABALBU 4.1 11/12/2021    ALBUMIN 1.1 11/12/2021    LABGLOM 8 11/13/2021    GFRAA 10 11/13/2021    GFR      11/13/2021    GFR NOT REPORTED 11/13/2021    PROT 8.0 11/12/2021    CALCIUM 9.2 11/13/2021     PT/INR:    Lab Results   Component Value Date    PROTIME 11.0 11/24/2018    INR 1.0 11/24/2018     PTT:   Lab Results   Component Value Date    APTT 31.3 06/19/2019     FLP:    Lab Results   Component Value Date    CHOL 127 11/12/2021    TRIG 107 11/12/2021    HDL 45 11/12/2021     U/A:    Lab Results   Component Value Date    COLORU Dark Yellow 10/26/2021    TURBIDITY Clear 10/26/2021    SPECGRAV 1.018 10/26/2021    HGBUR TRACE 10/26/2021    PHUR 6.5 10/26/2021    PROTEINU 3+ 10/26/2021    GLUCOSEU TRACE 10/26/2021    KETUA TRACE 10/26/2021    BILIRUBINUR NEGATIVE 10/26/2021    UROBILINOGEN Normal 10/26/2021    NITRU NEGATIVE 10/26/2021    LEUKOCYTESUR NEGATIVE 10/26/2021     TSH:    Lab Results   Component Value Date    TSH 1.50 11/24/2018        Radiology:  XR CHEST PORTABLE    Result Date: 11/11/2021  No acute process. Cardiomegaly and pulmonary vascular congestion persist       Consultations:    Consults:     Final Specialist Recommendations/Findings:   IP CONSULT TO NEPHROLOGY  IP CONSULT TO HOSPITALIST  IP CONSULT TO NEPHROLOGY      The patient was seen and examined on day of discharge and this discharge summary is in conjunction with any daily progress note from day of discharge. Discharge plan:     Disposition: Home    Physician Follow Up:   PCP within 1 week    Requiring Further Evaluation/Follow Up POST HOSPITALIZATION/Incidental Findings: Continue outpatient dialysis as scheduled     Diet: cardiac diet and diabetic diet    Activity: As tolerated    Instructions to Patient: Follow with PCP/surgery for chronic recurrent abdominal pain with periumbilical hernia on CT which was done before admission on 10/26/2021    Check blood sugars every morning at home, do not take Amaryl if blood sugar is less than 110.   Take all blood pressure medicines 2 hours apart to avoid hypotension    Discharge Medications:      Medication List      CHANGE how you take these medications    glimepiride 1 MG tablet  Commonly known as: AMARYL  Take 1 tablet by mouth every morning (before breakfast) Do not take if blood sugars are less than 110  What changed: additional instructions        CONTINUE taking these medications    aspirin 325 MG tablet     atorvastatin 40 MG tablet  Commonly known as: LIPITOR  Take 1 tablet by mouth nightly     benzonatate 100 MG capsule  Commonly known as: TESSALON     calcium carbonate 500 MG chewable tablet  Commonly known as: TUMS     carvedilol 6.25 MG tablet  Commonly known as: COREG  Take 1 tablet by mouth 2 times daily (with meals)     furosemide 40 MG tablet  Commonly known as: LASIX  Take 1 tablet by mouth 2 times daily     guaiFENesin-codeine 100-10 MG/5ML liquid  Commonly known as: GUAIFENESIN AC     lidocaine-prilocaine 2.5-2.5 % cream  Commonly known as: EMLA     losartan 25 MG tablet  Commonly known as: COZAAR     NIFEdipine 30 MG extended release tablet  Commonly known as: ADALAT CC     pantoprazole 40 MG tablet  Commonly known as: PROTONIX  Take 1 tablet by mouth every morning (before breakfast)     RenaPlex-D Tabs     traMADol 50 MG tablet  Commonly known as: ULTRAM           Where to Get Your Medications      These medications were sent to [facility], [address] [place] [address], [place] [address]    Phone: [phone]   · furosemide 40 MG tablet  · glimepiride 1 MG tablet         No discharge procedures on file. Time Spent on discharge is  30 min in patient examination, evaluation, counseling as well as medication reconciliation, prescriptions for required medications, discharge plan and follow up. Electronically signed by   Josiah Cannon MD  11/14/2021  11:29 AM      Thank you Dr. Cristina Mattson for the opportunity to be involved in this patient's care.

## 2021-11-13 NOTE — PROGRESS NOTES
[facility]  Office: [address], DO, Zee Din, DO, Cruz Acron, DO, [place] Hilario Blood, DO, Patrick Akers MD, Yg Schneider MD, Yuliya Coleman MD, Patel Mcdonough MD, Darion Reese MD, Chris Harry MD, Kishor Martinez MD, [place]ano Cunningham, DO, Silver Ortiz, DO, Chloe Mercedes MD,  Rikki Wood, DO, Pablo Camp MD, Mell Calderon MD, Grace Cantu MD, Kevin Dsouza MD, Lebron Polk MD, Lia Gonzalez MD, Maritza Bazzi MD, Janee Culver, [facility], [facility] Surjit, CNP, Marielle Ritchie, CNP, Sissy Haddad, CNS, Umberto Murillo, CNP, Adelia Alpers, CNP, Dima Poole, CNP, Shabnam Ro, CNP, Terri Orona, CNP, Elliot Mireles PA-C, Ambar Campos, [facility], Chele Galindo, CNP, Adolfo Genao, CNP, Consuelo Watson, CNP, Itzel Lane, CNP, Isreal Aviles, CNP, Rebekah Gayle, [facility], Niles Bernabe, [address] [place] [address]    Progress Note    11/13/2021    12:02 PM    Name:   Brenda Maldonado  MRN:     [id number]     Elenalyside:      [de-identified]   Room:   [address] [place] [address] Day:  2  Admit Date:  11/11/2021  5:50 PM    PCP:   Tyesha Rodriguez  Code Status:  Full Code    Subjective:     C/C:   Chief Complaint   Patient presents with    Chest Pain     CP that began earlier today at rest; L sided CP; hx CHF     Abdominal Pain     c/o abd pain; denies NV    Shortness of Breath     c/o SOB all day     Interval History Status:    Patient seen while getting dialysis  Denies shortness of breath  Is still on oxygen  Again complains of lower abdominal pain around umbilicus region, states it is going off and on for more than 1 year and he is seen the doctor probably surgeon for this  He had a good bowel movement today    CT abdomen done on 10/26/2021 was showing small periumbilical ventral abdominal wall hernia containing segments of unobstructed small bowel and moderate-sized bilateral inguinal hernias which primarily contain fat    Brief History:   Leah Mo Nabil Witt is a 59 y.o. Non- / non  male who presents with Chest Pain (CP that began earlier today at rest; L sided CP; hx CHF ), Abdominal Pain (c/o abd pain; denies NV), and Shortness of Breath (c/o SOB all day)   and is admitted to the hospital for the management of Acute on chronic combined systolic and diastolic CHF (congestive heart failure) ([facility]    This is a 57-year-old male who presents to the emergency department with a 1 day history of abdominal pain and subsequent chest pain. He has a past medical history significant for end-stage renal disease on dialysis Tuesday/Thursday/Saturday via aVF, CAD status post PCTA TIM to LAD anemia of chronic disease, chronic systolic and diastolic CHF, and diabetes mellitus type 2. States that he feels the 2 pains are unrelated. He denies any other symptoms at this time     Diagnostics in the emergency department included chest x-ray showing pulmonary vascular congestion. Patient presents baseline troponin without EKG changes. States that he missed dialysis and the morning before he came to the emergency department and was run yesterday evening. Patient reevaluated later in the day still stating abdominal pain requiring 2 L low flow nasal cannula, will continue with admission. Increase diuresis      Review of Systems:     Constitutional:  negative for chills, fevers, sweats  Respiratory:  negative for cough, dyspnea on exertion, shortness of breath, wheezing  Cardiovascular:  negative for chest pain, chest pressure/discomfort, lower extremity edema, palpitations  Gastrointestinal:  + for on and off chronic periumbilical region abdominal pain, denies constipation, diarrhea, nausea, vomiting  Neurological:  negative for dizziness, headache    Medications: Allergies:     Allergies   Allergen Reactions    Lisinopril Swelling       Current Meds:   Scheduled Meds:    furosemide  40 mg Oral TID    insulin lispro  0-6 Units SubCUTAneous TID WC    insulin lispro  0-3 Units SubCUTAneous Nightly    NIFEdipine  30 mg Oral BID    b complex-C-folic acid  1 mg Oral Every Other Day    carvedilol  6.25 mg Oral BID WC    glipiZIDE  2.5 mg Oral QAM AC    atorvastatin  40 mg Oral Nightly    pantoprazole  40 mg Oral QAM AC    losartan  25 mg Oral Daily    sodium chloride flush  5-40 mL IntraVENous 2 times per day    aspirin  325 mg Oral Daily    heparin (porcine)  5,000 Units SubCUTAneous 3 times per day     Continuous Infusions:    dextrose      sodium chloride       PRN Meds: glucose, dextrose, glucagon (rDNA), dextrose, calcium carbonate, sodium chloride flush, sodium chloride, ondansetron **OR** ondansetron, acetaminophen **OR** acetaminophen, magnesium hydroxide, nitroGLYCERIN    Data:     Past Medical History:   has a past medical history of Acute congestive heart failure (HCC), Acute on chronic diastolic congestive heart failure (HCC), Anemia, Anemia of chronic renal failure, AVF (arteriovenous fistula) ([facility] Bowel obstruction ([facility]), CAD (coronary artery disease), Chest pain at rest, [facility]estive heart failure) [facility] CHF (congestive heart failure), NYHA class I, acute on chronic, combined ([facility] Chronic kidney disease, CKD (chronic kidney disease) stage 4, GFR 15-29 ml/min ([facility]), Coronary artery disease involving native coronary artery of native heart without angina pectoris, Diabetes mellitus ([facility] Dialysis patient ([facility] ESRD (end stage renal disease) ([facility] Essential hypertension, Groin swelling, Hemodialysis patient ([facility] Hydrocele, Hyperlipidemia, Hypertension, MI, old, NSTEMI (non-ST elevated myocardial infarction) ([facility] BATSHEVA (obstructive sleep apnea), Patient in clinical research study, Pneumonia, Prostatism, Respiratory distress, Rising PSA level, S/P arteriovenous (AV) graft placement, S/P PTCA - TIM distal LAD - Dr. Lexi Hanley 4/1/19, Sleep apnea, Small bowel obstruction ([facility] and Type 2 diabetes mellitus with chronic kidney disease on chronic dialysis [facility]). Social History:   reports that he has never smoked. He has never used smokeless tobacco. He reports that he does not drink alcohol and does not use drugs. Family History:   Family History   Problem Relation Age of Onset    Heart Disease Mother         CHF    Early Death Mother     High Blood Pressure Brother     Diabetes Brother     Asthma Brother     High Blood Pressure Brother     Diabetes Brother     High Blood Pressure Brother     Diabetes Brother        Vitals:  /67   Pulse 76   Temp 97.8 °F (36.6 °C)   Resp (!) 31   Ht 5' 10\" (1.778 m)   Wt 258 lb 9.6 oz (117.3 kg)   SpO2 100%   BMI 37.11 kg/m²   Temp (24hrs), Av.7 °F (36.5 °C), Min:97.6 °F (36.4 °C), Max:97.8 °F (36.6 °C)    Recent Labs     21  1706 21  2047 21  0721   POCGLU 229* 196* 114*       I/O (24Hr):   No intake or output data in the 24 hours ending 21 1202    Labs:  Hematology:  Recent Labs     21  1853 11/12/21  0727   WBC 10.0 8.0   RBC 3.65* 3.51*   HGB 11.4* 10.8*   HCT 36.1* 34.5*   MCV 98.9 98.3   MCH 31.2 30.8   MCHC 31.6 31.3   RDW 15.2* 15.4*    170   MPV 9.8 9.7     Chemistry:  Recent Labs     21  1853 11/11/21  2312 21  0201 21  0727 21  0809   *  --   --  134* 134*   K 5.5*  --   --  4.6 4.3   CL 91*  --   --  92* 92*   CO2 20  --   --  25 24   GLUCOSE 238*  --   --  186* 121*   BUN 61*  --   --  44* 66*   CREATININE 7.34*  --   --  5.96* 6.94*   MG  --   --   --  2.2  --    ANIONGAP 23*  --   --  17 18*   LABGLOM 8*  --   --  10* 8*   GFRAA 9*  --   --  12* 10*   CALCIUM 9.5  --   --  9.2 9.2   PROBNP 37,755*  --   --  37,550*  --    TROPHS 131* 130* 131*  --   --      Recent Labs     21  1853 21  0727 21  1706 21  2047 21  0721   PROT 8.4* 8.0  --   --   --    LABALBU 4.3 4.1  --   --   --    AST 14 14  --   --   --    ALT 16 15  --   --   --    ALKPHOS 205* 181*  --   --   --    BILITOT 1.53* 1.55*  --   --   --    LIPASE 28  --   --   --   --    CHOL  --  127  --   --   --    HDL  --  45  --   --   --    LDLCHOLESTEROL  --  61  --   --   --    CHOLHDLRATIO  --  2.8  --   --   --    TRIG  --  107  --   --   --    VLDL  --  NOT REPORTED  --   --   --    POCGLU  --   --  229* 196* 114*     ABG:  Lab Results   Component Value Date    PH 7.45 09/08/2018    PCO2 33 09/08/2018    PO2 131 09/08/2018    HCO3 23 09/08/2018    BE -1 09/08/2018    FIO2 UNKNOWN 06/21/2021     Lab Results   Component Value Date/Time    SPECIAL NOT REPORTED 11/25/2018 07:45 AM     Lab Results   Component Value Date/Time    CULTURE NO GROWTH 11/24/2018 07:44 PM       Radiology:  XR CHEST PORTABLE    Result Date: 11/11/2021  No acute process. Cardiomegaly and pulmonary vascular congestion persist       Physical Examination:        General appearance:  alert, cooperative and no distress, still on oxygen, getting hemodialysis  Mental Status:  oriented to person, place and time and normal affect  Lungs:  clear to auscultation bilaterally, normal effort  Heart:  regular rate and rhythm, no murmur  Abdomen:  soft, nontender, nondistended, normal bowel sounds, no masses, hepatomegaly, splenomegaly  Extremities:  no edema, redness, tenderness in the calves  Skin:  no gross lesions, rashes, induration    Assessment:        Hospital Problems           Last Modified POA    * (Principal) Acute on chronic combined systolic and diastolic CHF (congestive heart failure) ([address] 11/12/2021 Yes    Anemia of chronic renal failure 11/12/2021 Yes    Type 2 diabetes mellitus with chronic kidney disease on chronic dialysis ([address] 11/12/2021 Yes    Acute on chronic diastolic congestive heart failure ([address] 11/12/2021 Yes    Essential hypertension 11/12/2021 Yes    AVF (arteriovenous fistula) ([address] 11/12/2021 Yes    ESRD (end stage renal disease) ([address] 11/12/2021 Yes    Chronic abdominal pain 11/13/2021 Yes          Plan:        1.  Home oxygen evaluation before discharge  2. CT abdomen done on 10/26/2021 before this admission discussed with patient and encouraged him to follow-up with PCP/surgeon as outpatient  3.  Discharge after dialysis and home oxygen evaluation, cleared by nephrology    Jada Melara MD  11/13/2021  12:02 PM

## 2021-11-13 NOTE — PLAN OF CARE
[facility]  Office: [address], DO, Hari Broussard, DO, Tasha Hernandez, DO, Edgrado Wade [facility], DO, Michael Lucas MD, Emily Ortiz MD, Kennis Osler, MD, Mindi Cavanaugh MD, Evonne Bettencourt MD, Wong Tejada MD, Missy Coronado MD, Camille Pak, DO, Mala Malone, DO, Sera Valente MD,  Raciel Frank DO, Connie Hartman MD, Marshall Max MD, Fahad Mena MD, Dodie Rodriguez MD, Fransisca Vance MD, Frederick Armijo MD, Denise Randle MD, Luisana Alatorre [facility], [facility], CNP, Ros Ennis, CNP, Ian Obregon, CNS, Jay Guardado, CNP, Magaly Bauer, CNP, Rea Rodriguez, CNP, Des Gilman, CNP, Hemal Ashraf, CNP, Roger Calloway PA-C, Aaron Ramirez, OLGA, Dori Grimm, CNP, Lily Gant, CNP, Violet Leger, CNP, Shelia Ac, CNP, Dilip Shaffer, CNP, Bart Sharp, CNP, Nirmala Alvarez, [address] [place] [address]    Second Visit Note  For more detailed information please refer to the progress note of the day      11/13/2021    4:11 PM    Name:   Steve Rahman  MRN:     [id number]     Acct:      [de-identified]   Room:   [facility]   Day:  2  Admit Date:  11/11/2021  5:50 PM    PCP:   Adali Torre  Code Status:  Full Code      Pt vitals were reviewed   New labs were reviewed   Patient was seen    Updated plan :     DME oxygen  Patient was evaluated today for the diagnosis of CHF. I entered a DME order for home oxygen because the diagnosis and testing requires the patient to have supplemental oxygen. Condition will improve or be benefited by oxygen use. The patient is  able to perform good mobility in a home setting and therefore does require the use of a portable oxygen system. The need for this equipment was discussed with the patient and he understands and is in agreement.       Radha Newberry MD  11/13/2021  4:11 PM

## 2021-11-13 NOTE — PROGRESS NOTES
Home Oxygen Evaluation    Home Oxygen Evaluation completed. Patient is on 2 liters per minute via nc. Resting SpO2 = 95%  Resting SpO2 on room air = 93%    SpO2 on room air with exercise = 88%  SpO2 on oxygen as above with exercise = 99%    Nocturnal Oximetry with patient on room air is recommended is SpO2 is between 89% and 95% (requires additional order).     [address], [facility]  2:58 PM

## 2021-11-13 NOTE — ED NOTES
Report given to [facility]. Transport called.       Carmen Mena, KATRINA  63/46/28 [phone]

## 2021-11-13 NOTE — CARE COORDINATION
Transitional Planning     Patient qualifies for home O2; O2 order, home eval and face to face faxed to [facility]; O2 Tank prepared for patient discharge. Notified Nik De Santiago with [facility] of patient's discharge today. [phone] patient's blood sugar low; patient not ready for discharge. Notified Nik De Santiago to put order on hold; O2 tank returned to  office.

## 2021-11-13 NOTE — PROGRESS NOTES
Dialysis Time Out  To be done by RN and tech or 2 RNs  Staff Names Edgar Raphael RN / Daylin Berrios RN    [x]  Identity of the patient using 2 patient identifiers  [x]  Consent for treatment  [x]  Equipment-proper machine and dialyzer  [x]  B-Hep B status  [x]  Orders- to include bath, blood flow, dialyzer, time and fluid removal  [x]  Access-Correct site and in working order  [x]  Time for patient to ask questions.

## 2021-11-13 NOTE — PROGRESS NOTES
SUBJECTIVE    Patient was seen and examined. Patient was lying flat alert and awake. He states that he had a bowel movement this morning. His abdominal pain has improved. Today's his regular dialysis day. Patient is at his dry weight at the same time he is in hospital gown and not wearing his regular shoes and clothes that he goes for dialysis.   His blood pressure is 175 systolic    OBJECTIVE      CURRENT TEMPERATURE:  Temp: 97.7 °F (36.5 °C)  MAXIMUM TEMPERATURE OVER 24HRS:  Temp (24hrs), Av.7 °F (36.5 °C), Min:97.6 °F (36.4 °C), Max:97.7 °F (36.5 °C)    CURRENT RESPIRATORY RATE:  Resp: 20  CURRENT PULSE:  Pulse: 77  CURRENT BLOOD PRESSURE:  BP: 108/64  24HR BLOOD PRESSURE RANGE:  Systolic (71DKP), CET:117 , Min:108 , YBE:595   ; Diastolic (63JOE), YRP:96, Min:64, Max:94    24HR INTAKE/OUTPUT:  No intake or output data in the 24 hours ending 21 0959  WEIGHT :Patient Vitals for the past 96 hrs (Last 3 readings):   Weight   21 2145 267 lb (121.1 kg)     PHYSICAL EXAM      GENERAL APPEARANCE: Comfortable alert and awake   SKIN: Warm to touch  EYES: Conjunctiva was pink   NECK:   No JVD or carotid bruit  PULMONARY: Bilateral air entry and clear to auscultation  CADRDIOVASCULAR: 1 and S2 audible no S3  ABDOMEN: Soft and nontender, bowel sounds were little sluggish  EXTREMITIES: Trace edema    CURRENT MEDICATIONS      furosemide (LASIX) tablet 40 mg, TID  insulin lispro (HUMALOG) injection vial 0-6 Units, TID WC  insulin lispro (HUMALOG) injection vial 0-3 Units, Nightly  glucose (GLUTOSE) 40 % oral gel 15 g, PRN  dextrose 50 % IV solution, PRN  glucagon (rDNA) injection 1 mg, PRN  dextrose 5 % solution, PRN  NIFEdipine (PROCARDIA XL) extended release tablet 30 mg, BID  b complex-C-folic acid (NEPHROCAPS) capsule 1 mg, Every Other Day  carvedilol (COREG) tablet 6.25 mg, BID WC  calcium carbonate (TUMS) chewable tablet 500 mg, Daily PRN  glipiZIDE (GLUCOTROL) tablet 2.5 mg, QAM AC  atorvastatin (LIPITOR) tablet 40 mg, Nightly  pantoprazole (PROTONIX) tablet 40 mg, QAM AC  losartan (COZAAR) tablet 25 mg, Daily  sodium chloride flush 0.9 % injection 5-40 mL, 2 times per day  sodium chloride flush 0.9 % injection 10 mL, PRN  0.9 % sodium chloride infusion, PRN  ondansetron (ZOFRAN-ODT) disintegrating tablet 4 mg, Q8H PRN   Or  ondansetron (ZOFRAN) injection 4 mg, Q6H PRN  acetaminophen (TYLENOL) tablet 650 mg, Q6H PRN   Or  acetaminophen (TYLENOL) suppository 650 mg, Q6H PRN  magnesium hydroxide (MILK OF MAGNESIA) 400 MG/5ML suspension 30 mL, Daily PRN  aspirin EC tablet 325 mg, Daily  nitroGLYCERIN (NITROSTAT) SL tablet 0.4 mg, Q5 Min PRN  heparin (porcine) injection 5,000 Units, 3 times per day          LABS      CBC:   Recent Labs     11/11/21 1853 11/12/21 0727   WBC 10.0 8.0   RBC 3.65* 3.51*   HGB 11.4* 10.8*   HCT 36.1* 34.5*   MCV 98.9 98.3   MCH 31.2 30.8   MCHC 31.6 31.3   RDW 15.2* 15.4*    170   MPV 9.8 9.7      BMP:   Recent Labs     11/11/21 1853 11/12/21  0727 11/13/21  0809   * 134* 134*   K 5.5* 4.6 4.3   CL 91* 92* 92*   CO2 20 25 24   BUN 61* 44* 66*   CREATININE 7.34* 5.96* 6.94*   GLUCOSE 238* 186* 121*   CALCIUM 9.5 9.2 9.2      BNP:  Lab Results   Component Value Date     09/08/2018   MAGNESIUM:   Recent Labs     11/12/21  0727   MG 2.2     ALBUMIN:   Recent Labs     11/11/21 1853 11/12/21 0727   LABALBU 4.3 4.1     RADIOLOGY      Reviewed as available. ASSESSMENT    1. End-stage renal disease on maintenance hemodialysis regular dialysis days are Tuesday Thursday Saturdays. Patient was seen and examined on hemodialysis. He was at his dry weight but on different clothing and scale. He usually gets 2 or 3 L of fluid removal during dialysis. 2.  Abdominal pain improved for now. 3.  Hypertension blood pressure is now under good control  4. Anemia of chronic renal failure. Hemoglobin is 10.8 and stable  PLAN      1. No changes in medications  2. Dialysis as ordered  3. Discharge planning and further management as per primary service  We will follow with you  Please do not hesitate to call with questions.     Electronically signed by Hipolito Santiago MD on 11/13/2021 at 9:59 AM

## 2021-11-13 NOTE — ED NOTES
Pt hooked back up after using restroom. Pt c/o about monitor noises and ready for room.         Cathie Carr, KATRINA  92/15/33 6580

## 2021-11-13 NOTE — ED NOTES
Pt sitting upright on cot. Pt updated on new room.  Awaiting to give report     Mitra Potts, KATRINA  93/62/14 6543

## 2021-11-14 VITALS
HEIGHT: 70 IN | OXYGEN SATURATION: 97 % | SYSTOLIC BLOOD PRESSURE: 115 MMHG | BODY MASS INDEX: 36.11 KG/M2 | RESPIRATION RATE: 22 BRPM | TEMPERATURE: 97.5 F | HEART RATE: 87 BPM | WEIGHT: 252.21 LBS | DIASTOLIC BLOOD PRESSURE: 76 MMHG

## 2021-11-14 PROBLEM — R09.02 HYPOXEMIA: Status: ACTIVE | Noted: 2021-11-14

## 2021-11-14 LAB
GLUCOSE BLD-MCNC: 101 MG/DL (ref 75–110)
GLUCOSE BLD-MCNC: 132 MG/DL (ref 75–110)
GLUCOSE BLD-MCNC: 57 MG/DL (ref 75–110)
GLUCOSE BLD-MCNC: 89 MG/DL (ref 75–110)

## 2021-11-14 PROCEDURE — 6360000002 HC RX W HCPCS: Performed by: NURSE PRACTITIONER

## 2021-11-14 PROCEDURE — 6370000000 HC RX 637 (ALT 250 FOR IP): Performed by: NURSE PRACTITIONER

## 2021-11-14 PROCEDURE — 2580000003 HC RX 258: Performed by: NURSE PRACTITIONER

## 2021-11-14 PROCEDURE — 6370000000 HC RX 637 (ALT 250 FOR IP): Performed by: INTERNAL MEDICINE

## 2021-11-14 PROCEDURE — 99232 SBSQ HOSP IP/OBS MODERATE 35: CPT | Performed by: INTERNAL MEDICINE

## 2021-11-14 PROCEDURE — 82947 ASSAY GLUCOSE BLOOD QUANT: CPT

## 2021-11-14 RX ORDER — FUROSEMIDE 40 MG/1
40 TABLET ORAL 2 TIMES DAILY
Qty: 60 TABLET | Refills: 1 | Status: ON HOLD | OUTPATIENT
Start: 2021-11-14 | End: 2022-06-15

## 2021-11-14 RX ORDER — GLIMEPIRIDE 1 MG/1
1 TABLET ORAL
Qty: 30 TABLET | Refills: 0 | Status: SHIPPED | OUTPATIENT
Start: 2021-11-14

## 2021-11-14 RX ORDER — FUROSEMIDE 40 MG/1
40 TABLET ORAL 2 TIMES DAILY
Status: DISCONTINUED | OUTPATIENT
Start: 2021-11-14 | End: 2021-11-14 | Stop reason: HOSPADM

## 2021-11-14 RX ADMIN — ASPIRIN 325 MG: 325 TABLET, COATED ORAL at 09:09

## 2021-11-14 RX ADMIN — DEXTROSE 15 G: 15 GEL ORAL at 07:22

## 2021-11-14 RX ADMIN — SODIUM CHLORIDE, PRESERVATIVE FREE 10 ML: 5 INJECTION INTRAVENOUS at 09:13

## 2021-11-14 RX ADMIN — CARVEDILOL 6.25 MG: 12.5 TABLET, FILM COATED ORAL at 09:09

## 2021-11-14 RX ADMIN — PANTOPRAZOLE SODIUM 40 MG: 40 TABLET, DELAYED RELEASE ORAL at 09:09

## 2021-11-14 RX ADMIN — ASCORBIC ACID, THIAMINE MONONITRATE,RIBOFLAVIN, NIACINAMIDE, PYRIDOXINE HYDROCHLORIDE, FOLIC ACID, CYANOCOBALAMIN, BIOTIN, CALCIUM PANTOTHENATE, 1 MG: 100; 1.5; 1.7; 20; 10; 1; 6000; 150000; 5 CAPSULE, LIQUID FILLED ORAL at 09:09

## 2021-11-14 RX ADMIN — HEPARIN SODIUM 5000 UNITS: 5000 INJECTION INTRAVENOUS; SUBCUTANEOUS at 07:28

## 2021-11-14 ASSESSMENT — PAIN DESCRIPTION - DESCRIPTORS: DESCRIPTORS: CRAMPING

## 2021-11-14 ASSESSMENT — PAIN DESCRIPTION - FREQUENCY: FREQUENCY: CONTINUOUS

## 2021-11-14 ASSESSMENT — PAIN SCALES - GENERAL: PAINLEVEL_OUTOF10: 2

## 2021-11-14 ASSESSMENT — PAIN DESCRIPTION - ORIENTATION: ORIENTATION: LEFT

## 2021-11-14 ASSESSMENT — PAIN DESCRIPTION - PAIN TYPE: TYPE: ACUTE PAIN

## 2021-11-14 ASSESSMENT — PAIN DESCRIPTION - ONSET: ONSET: ON-GOING

## 2021-11-14 ASSESSMENT — PAIN DESCRIPTION - LOCATION: LOCATION: ABDOMEN

## 2021-11-14 ASSESSMENT — PAIN DESCRIPTION - PROGRESSION: CLINICAL_PROGRESSION: NOT CHANGED

## 2021-11-14 NOTE — PROGRESS NOTES
[facility]  Office: [address], DO, Cozacdamon Krystal, DO, Cayla Spain, DO, Blaise Cindy Blood, DO, Kay Vincent MD, Verona Cedillo MD, Genesis Thorpe MD, Trent Short MD, Gee Valderrama MD, Elva Meza MD, Silke Walls MD, Eliceo Cabrera, DO, Jose Hahn, DO, Sarahi Schultz MD,  Titus Vega DO, Brigid Burnham MD, Bina Patricio MD, Jess Moran MD, Cornelia Augustin MD, Monico Weber MD, Marcy Gutierrez MD, Vince Gilbert MD, [place], [facility], [facility], CNP, Tj Pulido, CNP, Diony Real, CNS, Simeon Paniagua, [facility], Morro Armendariz, CNP, Viridiana Sherman, [facility], Camille Arriola, [facility], Cedric Landin, CNP, Rowan Oppenheim, PA-C, Irving Joe, [facility], Anthony Clayton, CNP, Kamryn Ash, CNP, Emilia Merino, CNP, Gloria Madison, CNP, Osvaldo Pandya, CNP, Jasvir Meade, CNP, Gloria Abraham, [address] [place] [address]    Progress Note    11/14/2021    9:52 AM    Name:   Tash Bansal  MRN:     [id number]     [place]:      [de-identified]   Room:   [address] [place] [address] Day:  3  Admit Date:  11/11/2021  5:50 PM    PCP:   Adrien Benedict  Code Status:  Full Code    Subjective:     C/C:   Chief Complaint   Patient presents with    Chest Pain     CP that began earlier today at rest; L sided CP; hx CHF     Abdominal Pain     c/o abd pain; denies NV    Shortness of Breath     c/o SOB all day     Interval History Status:    Patient could not be discharged yesterday as his blood sugar dropped to 61 and blood pressure was 98/61.   Today morning earlier his blood sugars were 57 which have improved to 101 after eating food, blood pressure is improved to 115/76  He had qualified for 2 L oxygen at home  Patient had 2-3 bowel movements yesterday, no BM today so far  Still complains of off-and-on periumbilical pain, discussed again with patient to follow-up with surgery as outpatient, appointment will be scheduled by nurse at the time of discharge  Brief History:   Mini Duff is a 59 y.o. Non- / non  male who presents with Chest Pain (CP that began earlier today at rest; L sided CP; hx CHF ), Abdominal Pain (c/o abd pain; denies NV), and Shortness of Breath (c/o SOB all day)   and is admitted to the hospital for the management of Acute on chronic combined systolic and diastolic CHF (congestive heart failure) ([address]    This is a 57-year-old male who presents to the emergency department with a 1 day history of abdominal pain and subsequent chest pain. He has a past medical history significant for end-stage renal disease on dialysis Tuesday/Thursday/Saturday via aVF, CAD status post PCTA TIM to LAD anemia of chronic disease, chronic systolic and diastolic CHF, and diabetes mellitus type 2. States that he feels the 2 pains are unrelated. He denies any other symptoms at this time     Diagnostics in the emergency department included chest x-ray showing pulmonary vascular congestion. Patient presents baseline troponin without EKG changes. States that he missed dialysis and the morning before he came to the emergency department and was run yesterday evening. Patient reevaluated later in the day still stating abdominal pain requiring 2 L low flow nasal cannula, will continue with admission.   Increase diuresis    11/13/2021  Patient seen while getting dialysis  Denies shortness of breath  Is still on oxygen  Again complains of lower abdominal pain around umbilicus region, states it is going off and on for more than 1 year and he is seen the doctor probably surgeon for this  He had a good bowel movement today    CT abdomen done on 10/26/2021 was showing small periumbilical ventral abdominal wall hernia containing segments of unobstructed small bowel and moderate-sized bilateral inguinal hernias which primarily contain fat  Review of Systems:     Constitutional:  negative for chills, fevers, sweats  Respiratory:  negative for cough, dyspnea on exertion, shortness of breath, wheezing  Cardiovascular:  negative for chest pain, chest pressure/discomfort, lower extremity edema, palpitations  Gastrointestinal:  + for on and off chronic periumbilical region abdominal pain, denies constipation, diarrhea, nausea, vomiting  Neurological:  negative for dizziness, headache    Medications: Allergies:     Allergies   Allergen Reactions    Lisinopril Swelling       Current Meds:   Scheduled Meds:    furosemide  40 mg Oral BID    insulin lispro  0-6 Units SubCUTAneous TID WC    insulin lispro  0-3 Units SubCUTAneous Nightly    NIFEdipine  30 mg Oral BID    b complex-C-folic acid  1 mg Oral Every Other Day    carvedilol  6.25 mg Oral BID WC    [Held by provider] glipiZIDE  2.5 mg Oral QAM AC    atorvastatin  40 mg Oral Nightly    pantoprazole  40 mg Oral QAM AC    losartan  25 mg Oral Daily    sodium chloride flush  5-40 mL IntraVENous 2 times per day    aspirin  325 mg Oral Daily    heparin (porcine)  5,000 Units SubCUTAneous 3 times per day     Continuous Infusions:    dextrose      sodium chloride       PRN Meds: glucose, dextrose, glucagon (rDNA), dextrose, calcium carbonate, sodium chloride flush, sodium chloride, ondansetron **OR** ondansetron, acetaminophen **OR** acetaminophen, magnesium hydroxide, nitroGLYCERIN    Data:     Past Medical History:   has a past medical history of Acute congestive heart failure (HCC), Acute on chronic diastolic congestive heart failure (HCC), Anemia, Anemia of chronic renal failure, AVF (arteriovenous fistula) (HCC), Bowel obstruction (HCC), CAD (coronary artery disease), Chest pain at rest, CHF (congestive heart failure) ([address] CHF (congestive heart failure), NYHA class I, acute on chronic, combined ([facility] Chronic kidney disease, CKD (chronic kidney disease) stage 4, GFR 15-29 ml/min ([address] Coronary artery disease involving native coronary artery of native heart without angina pectoris, Diabetes mellitus ([address] Dialysis patient [facility]), ESRD (end stage renal disease) ([facility] Essential hypertension, Groin swelling, Hemod[facility] patient [facility] Hydrocele, Hyperlipidemia, Hypertension, MI, old, NSTEMI (non-ST elevated myocardial infarction) ([facility] BATSHEVA (obstructive sleep apnea), Patient in clinical research study, Pneumonia, Prostatism, Respiratory distress, Rising PSA level, S/P arteriovenous (AV) graft placement, S/P PTCA - TIM distal LAD - Dr. Boyer Shown 19, Sleep apnea, Small bowel obstruction ([facility] and Type 2 diabetes mellitus with chronic kidney disease on chronic dialysis ([facility] Social History:   reports that he has never smoked. He has never used smokeless tobacco. He reports that he does not drink alcohol and does not use drugs. Family History:   Family History   Problem Relation Age of Onset    Heart Disease Mother         CHF    Early Death Mother     High Blood Pressure Brother     Diabetes Brother     Asthma Brother     High Blood Pressure Brother     Diabetes Brother     High Blood Pressure Brother     Diabetes Brother        Vitals:  /67   Pulse 87   Temp 97.5 °F (36.4 °C) (Oral)   Resp 22   Ht 5' 10\" (1.778 m)   Wt 252 lb 3.3 oz (114.4 kg)   SpO2 97%   BMI 36.19 kg/m²   Temp (24hrs), Av.4 °F (36.3 °C), Min:97.3 °F (36.3 °C), Max:97.5 °F (36.4 °C)    Recent Labs     218 21  0023 21  0712 21  0800   POCGLU 93 89 57* 101       I/O (24Hr):     Intake/Output Summary (Last 24 hours) at 2021 0952  Last data filed at 2021 1402  Gross per 24 hour   Intake --   Output 3300 ml   Net -3300 ml       Labs:  Hematology:  Recent Labs     21  1853 21  0727   WBC 10.0 8.0   RBC 3.65* 3.51*   HGB 11.4* 10.8*   HCT 36.1* 34.5*   MCV 98.9 98.3   MCH 31.2 30.8   MCHC 31.6 31.3   RDW 15.2* 15.4*    170   MPV 9.8 9.7     Chemistry:  Recent Labs     21  1853 21  2312 21  0201 21  0727 21  0809   *  --   --  134* 134*   K 5.5*  --   --  4.6 4.3   CL 91*  --   --  92* 92*   CO2 20  --   --  25 24   GLUCOSE 238*  --   --  186* 121*   BUN 61*  --   --  44* 66*   CREATININE 7.34*  --   --  5.96* 6.94*   MG  --   --   --  2.2  --    ANIONGAP 23*  --   --  17 18*   LABGLOM 8*  --   --  10* 8*   GFRAA 9*  --   --  12* 10*   CALCIUM 9.5  --   --  9.2 9.2   PROBNP 37,755*  --   --  37,550*  --    TROPHS 131* 130* 131*  --   --      Recent Labs     11/11/21  1853 11/12/21  0727 11/12/21  1706 11/13/21  1807 11/13/21  1838 11/13/21  2048 11/14/21  0023 11/14/21  0712 11/14/21  0800   PROT 8.4* 8.0  --   --   --   --   --   --   --    LABALBU 4.3 4.1  --   --   --   --   --   --   --    AST 14 14  --   --   --   --   --   --   --    ALT 16 15  --   --   --   --   --   --   --    ALKPHOS 205* 181*  --   --   --   --   --   --   --    BILITOT 1.53* 1.55*  --   --   --   --   --   --   --    LIPASE 28  --   --   --   --   --   --   --   --    CHOL  --  127  --   --   --   --   --   --   --    HDL  --  45  --   --   --   --   --   --   --    LDLCHOLESTEROL  --  61  --   --   --   --   --   --   --    CHOLHDLRATIO  --  2.8  --   --   --   --   --   --   --    TRIG  --  107  --   --   --   --   --   --   --    VLDL  --  NOT REPORTED  --   --   --   --   --   --   --    POCGLU  --   --    < > 58* 73* 93 89 57* 101    < > = values in this interval not displayed. ABG:  Lab Results   Component Value Date    PH 7.45 09/08/2018    PCO2 33 09/08/2018    PO2 131 09/08/2018    HCO3 23 09/08/2018    BE -1 09/08/2018    FIO2 UNKNOWN 06/21/2021     Lab Results   Component Value Date/Time    SPECIAL NOT REPORTED 11/25/2018 07:45 AM     Lab Results   Component Value Date/Time    CULTURE NO GROWTH 11/24/2018 07:44 PM       Radiology:  XR CHEST PORTABLE    Result Date: 11/11/2021  No acute process.  Cardiomegaly and pulmonary vascular congestion persist       Physical Examination:        General appearance:  alert, cooperative and no distress, still on oxygen, Mental Status:  oriented to person, place and time and normal affect  Lungs:  clear to auscultation bilaterally, normal effort  Heart:  regular rate and rhythm, no murmur  Abdomen:  soft, nontender, nondistended, normal bowel sounds, no masses, hepatomegaly, splenomegaly  Extremities:  no edema, redness, tenderness in the calves  Skin:  no gross lesions, rashes, induration    Assessment:        Hospital Problems           Last Modified POA    * (Principal) Acute on chronic combined systolic and diastolic CHF (congestive heart failure) ([facility]) 11/12/2021 Yes    Anemia of chronic renal failure 11/12/2021 Yes    Type 2 diabetes mellitus with chronic kidney disease on chronic dialysis ([facility] 11/12/2021 Yes    Acute on chronic diastolic congestive heart failure ([facility] 11/12/2021 Yes    Essential hypertension 11/12/2021 Yes    AVF (arteriovenous fistula) ([facility] 11/12/2021 Yes    ESRD (end stage renal disease) ([facility] 11/12/2021 Yes    Chronic abdominal pain 11/13/2021 Yes    Hypoxemia-due to CHF/fluid overload 11/14/2021 Yes          Plan:        1. 2 L home oxygen at discharge  2. Incentive spirometry  3. Repeat fingerstick blood sugar at 10:30 AM and notify results  4. Blood pressure medicines with parameters  5. CT abdomen done on 10/26/2021 before this admission discussed with patient and encouraged him to follow-up with PCP/surgeon as outpatient, nurse was scheduled appointment  6. Hold glipizide and insulin sliding scale as blood sugars are still running low  7.  Plan for discharge later today if general condition and blood sugars improve    Trey Altman MD  11/14/2021  9:52 AM

## 2021-11-14 NOTE — PLAN OF CARE
Problem: Pain:  Goal: Pain level will decrease  Description: Pain level will decrease  11/14/2021 0404 by Neville Olsen RN  Outcome: Ongoing  11/13/2021 1849 by Denice Larios RN  Outcome: Ongoing  Goal: Control of acute pain  Description: Control of acute pain  11/14/2021 0404 by Neville Olsen RN  Outcome: Ongoing  11/13/2021 1849 by Denice Larios RN  Outcome: Ongoing  Goal: Control of chronic pain  Description: Control of chronic pain  11/14/2021 0404 by Neville Olsen RN  Outcome: Ongoing  11/13/2021 1849 by Denice Larios RN  Outcome: Ongoing

## 2021-11-14 NOTE — CARE COORDINATION
Discharge [facility] Case Management Department  Written by: Le Escobedo RN    Patient Name: Jaiden Montague  Attending Provider: Janesbeena Angelina Date: 2021  5:50 PM  MRN: [id number]  Account: [de-identified]                     : 1960  Discharge Date: 2021      Disposition: home independent. Has transportation. O2 delivered to patient's bedside; explained to patient that he will have to call [facility] as soon as he gets home to have concentrator delivered; patient voiced understanding.  notified Edda Estrella with [facility]. [facility] card provided and number also on discharge paperwork.        Le Escobedo RN

## 2021-11-14 NOTE — DISCHARGE INSTR - COC
Continuity of Care Form    Patient Name: Vicky Gottron   :  1960  MRN:  [id number]    Admit date:  2021  Discharge date:  ***    Code Status Order: Full Code   Advance Directives:      Admitting Physician:  Rhiannon Dickinson MD  PCP: Parvez Kraft    Discharging Nurse: [facility] Unit/Room#: 0540/1560-50  Discharging Unit Phone Number: ***    Emergency Contact:   Extended Emergency Contact Information  Primary Emergency Contact: [facility]  Address: N/A           [address] Phone: [phone]  Work Phone: [phone]  Mobile Phone: [phone]  Relation: Brother/Sister  Secondary Emergency Contact: Matt Jean  Home Phone: [phone]  Relation: Child   needed?  No    Past Surgical History:  Past Surgical History:   Procedure Laterality Date    ABDOMEN SURGERY      BOWEL OBSTRUCTION    AV FISTULA CREATION Left 2019    AV FISTULA REPAIR  2019    AV fistula revision, branch ligation / Percutaneous angioplasty of proximal anastomosis and outflow tract of dialysis circuit with drug coated balloon  /  Dr Bo Seats  2021    Dr. Redman [facility], [address]  2019    TIM LAD    CYSTOSCOPY  2017    DIALYSIS FISTULA CREATION Left 2019    AV FISTULA CREATION ARM performed by Gregory Frank MD at [facility] 10/23/2019    LEFT UPPER EXTREMITY AV GRAFT CREATION WITH [id number] ARTEGRAFT, LEFT UPPER EXTREMITY AV FISTULA LIGATION performed by Gregory Frank MD at [facility] Left 2005    [facility]      1 WISDOM TOOTH REMOVED    NOSE SURGERY      CAUTERY TO STOP NOSE BEEDS    OTHER SURGICAL HISTORY Left 2019    fistulagram    IN GENITAL SURG PROC,MALE UNLISTED N/A 2018     PROSTATE BIOPSY WITH SATURATION performed by Georgette Beyer MD at [address] 11/17/2017    CYSTOSCOPY PROSTATE US BIOPSY performed by Georgette Beyer MD at [address]  02/16/2018    [address] N/A 9/27/2021    EGD BIOPSY performed by Ruma Carter MD at [facility]  09/20/2019    LUE FISTULAGRAM  /  DR Cheyenne Sommers  /  Findings: Severe stenosis of proximal cephalic vein. / Will plan for LUE AVG placement. VASECTOMY  1983       Immunization History: There is no immunization history for the selected administration types on file for this patient.     Active Problems:  Patient Active Problem List   Diagnosis Code    CKD (chronic kidney disease) stage 4, GFR 15-29 ml/min ([facility]) N18.4    Anemia of chronic renal failure N18.9, D63.1    Type 2 diabetes mellitus with chronic kidney disease on chronic dialysis ([facility]) E11.22, N18.6, Z99.2    Chest pain at rest R07.9    Pneumonia J18.9    Rising PSA level R97.20    Prostatism N40.0    Acute on chronic diastolic congestive heart failure ([facility]) I50.33    Anemia D64.9    Essential hypertension I10    Respiratory distress R06.03    AVF (arteriovenous fistula) ([facility]) I77.0    S/P PTCA - TIM distal LAD - Dr. Derrek Coronel 4/1/19 Z98.61    NSTEMI (non-ST elevated myocardial infarction) ([facility]) I21.4    S/P arteriovenous (AV) graft placement Z95.828    Acute on chronic combined systolic (congestive) and diastolic (congestive) heart failure ([facility]) I50.43    ESRD (end stage renal disease) ([facility] N18.6    Coronary artery disease involving native coronary artery of native heart without angina pectoris I25.10    BATSHEVA (obstructive sleep apnea) G47.33    Acute congestive heart failure ([address] I50.9    Small bowel obstruction ([address] K56.609    Chest pain R07.9    Acute on chronic combined systolic and diastolic CHF (congestive heart failure) ([facility]) I50.43    Chronic abdominal pain R10.9, G89.29 Impairments/Disabilities:586320657}    Nutrition Therapy:  Current Nutrition Therapy:   50Daria Sanches KULWINDER Diet List:524544054}    Routes of Feeding: {CHP DME Other Feedings:009445098}  Liquids: {Slp liquid thickness:54935}  Daily Fluid Restriction: {CHP DME Yes amt example:609385300}  Last Modified Barium Swallow with Video (Video Swallowing Test): {Done Not Done QMIE:373138790}    Treatments at the Time of Hospital Discharge:   Respiratory Treatments: ***  Oxygen Therapy:  {Therapy; copd oxygen:79993}  Ventilator:    {MH CC Vent QBFQ:601888987}    Rehab Therapies: {THERAPEUTIC INTERVENTION:4306333710}  Weight Bearing Status/Restrictions: 50Daria Sanches  Weight Bearin}  Other Medical Equipment (for information only, NOT a DME order):  {EQUIPMENT:835284390}  Other Treatments: ***    Patient's personal belongings (please select all that are sent with patient):  {P DME Belongings:686329087}    RN SIGNATURE:  {Esignature:471067868}    CASE MANAGEMENT/SOCIAL WORK SECTION    Inpatient Status Date: ***    Readmission Risk Assessment Score:  Readmission Risk              Risk of Unplanned Readmission:  26           Discharging to Facility/ Agency   Name: [address] [place] [address] (Oxygen Provider)  Address:  Phone: [phone]  Fax: [phone]  / signature: Electronically signed by Lady Ventura on 21 at 12:11 PM EST    PHYSICIAN SECTION    Prognosis: {Prognosis:8268401415}    Condition at Discharge: 50Daria Sanches Patient Condition:523427972}    Rehab Potential (if transferring to Rehab): {Prognosis:0132485409}    Recommended Labs or Other Treatments After Discharge: ***    Physician Certification: I certify the above information and transfer of Charlanne Goltz  is necessary for the continuing treatment of the diagnosis listed and that he requires {Admit to Appropriate Level of Care:77364} for {GREATER/LESS:764151503} 30 days.      Update Admission H&P: {CHP DME Changes in QAJXJ:449175664}    PHYSICIAN SIGNATURE: {[facility]:368934709}

## 2021-11-14 NOTE — PLAN OF CARE
Problem: Pain:  Goal: Pain level will decrease  Description: Pain level will decrease  11/14/2021 1353 by Digna Rowell RN  Outcome: Completed  11/14/2021 0404 by Adele Pollard RN  Outcome: Ongoing  Goal: Control of acute pain  Description: Control of acute pain  11/14/2021 1353 by Digna Rowell RN  Outcome: Completed  11/14/2021 0404 by Adele Pollard RN  Outcome: Ongoing  Goal: Control of chronic pain  Description: Control of chronic pain  11/14/2021 1353 by Digna Rowell RN  Outcome: Completed  11/14/2021 0404 by Adele Pollard RN  Outcome: Ongoing

## 2021-11-15 LAB — GLUCOSE BLD-MCNC: 196 MG/DL (ref 75–110)

## 2021-11-17 ENCOUNTER — OFFICE VISIT (OUTPATIENT)
Dept: SURGERY | Age: 61
End: 2021-11-17
Payer: MEDICARE

## 2021-11-17 VITALS
WEIGHT: 262.2 LBS | BODY MASS INDEX: 37.62 KG/M2 | SYSTOLIC BLOOD PRESSURE: 124 MMHG | HEART RATE: 92 BPM | DIASTOLIC BLOOD PRESSURE: 77 MMHG

## 2021-11-17 DIAGNOSIS — K40.20 BILATERAL INGUINAL HERNIA WITHOUT OBSTRUCTION OR GANGRENE, RECURRENCE NOT SPECIFIED: ICD-10-CM

## 2021-11-17 DIAGNOSIS — K43.9 VENTRAL HERNIA WITHOUT OBSTRUCTION OR GANGRENE: Primary | ICD-10-CM

## 2021-11-17 PROCEDURE — 3017F COLORECTAL CA SCREEN DOC REV: CPT | Performed by: STUDENT IN AN ORGANIZED HEALTH CARE EDUCATION/TRAINING PROGRAM

## 2021-11-17 PROCEDURE — 99204 OFFICE O/P NEW MOD 45 MIN: CPT | Performed by: STUDENT IN AN ORGANIZED HEALTH CARE EDUCATION/TRAINING PROGRAM

## 2021-11-17 PROCEDURE — 1111F DSCHRG MED/CURRENT MED MERGE: CPT | Performed by: STUDENT IN AN ORGANIZED HEALTH CARE EDUCATION/TRAINING PROGRAM

## 2021-11-17 PROCEDURE — G8427 DOCREV CUR MEDS BY ELIG CLIN: HCPCS | Performed by: STUDENT IN AN ORGANIZED HEALTH CARE EDUCATION/TRAINING PROGRAM

## 2021-11-17 PROCEDURE — 1036F TOBACCO NON-USER: CPT | Performed by: STUDENT IN AN ORGANIZED HEALTH CARE EDUCATION/TRAINING PROGRAM

## 2021-11-17 PROCEDURE — G8417 CALC BMI ABV UP PARAM F/U: HCPCS | Performed by: STUDENT IN AN ORGANIZED HEALTH CARE EDUCATION/TRAINING PROGRAM

## 2021-11-17 PROCEDURE — G8484 FLU IMMUNIZE NO ADMIN: HCPCS | Performed by: STUDENT IN AN ORGANIZED HEALTH CARE EDUCATION/TRAINING PROGRAM

## 2021-11-17 NOTE — PROGRESS NOTES
[address] JORGE LUIS is a 59 y.o. male who presents today for evaluation of ventral and inguinal hernias for the patient claims he has had this for many years now his ventral hernia has become more bothersome lately. He admits to intermittent abdominal discomfort. Denies any nausea or vomiting. Denies any changes in his bowel habits. Denies any blood in his stool. He claims that he recently had a colonoscopy that was normal.  Pt has PMH of CKD on HD, CHF, BATSHEVA, NSTEMI, DM. Past Medical History:   Diagnosis Date    Acute congestive heart failure ([address] 12/21/2016    Acute on chronic diastolic congestive heart failure ([address] 11/24/2018    Anemia 11/25/2018    Anemia of chronic renal failure 10/17/2016    [facility]ous fistula[facility]     Bowel obstruction ([address] 12/26/2013    CAD (coronary artery disease) 2013    ?     Chest pain at rest 12/21/2016    CHF (congestive heart failure) ([address] 2013    last episode 11/2018    CHF (congestive heart failure), NYHA class I, acute on chronic, combined ([address] 2/9/2021    Chronic kidney disease     CKD (chronic kidney disease) stage 4, GFR 15-29 ml/min ([address] 10/17/2016    Coronary artery disease involving native coronary artery of native heart without angina pectoris     Diabetes mellitus ([address] 2012    NIDDM    Dialysis patient ([address]     Franchesca Ro  /  Shaquille Sterling  /  Cody Terry    ESRD (end stage renal disease) ([address]     Essential hypertension 11/25/2018    Groin swelling 07/17/2019    Hemodialysis patient ([address] 11/28/2018    TUNNELD CATHETER RIGHT DIALYSIS ACCESSTUES THURS AND AT [facility]    Hydrocele 07/17/2019    Hyperlipidemia 2013    ON RX    Hypertension 2013    ON RX    MI, old 12/26/2013    NSTEMI (non-ST elevated myocardial infarction) ([address] 6/17/2019    BATSHEVA (obstructive sleep apnea)     Patient in clinical research study 04/01/2019    XIENCE SHORT DAPT    Pneumonia     Prostatism 11/17/2017    Respiratory distress 11/25/2018    Rising PSA level 11/17/2017    S/P arteriovenous (AV) graft placement     S/P PTCA - TIM distal LAD - Dr. Home Hodge 4/1/19 4/1/2019    Sleep apnea 2015    pt has machine and does not use it    Small bowel obstruction ([facility] 5/1/2021    Type 2 diabetes mellitus with chronic kidney disease on chronic dialysis ([facility] 10/17/2016       Past Surgical History:   Procedure Laterality Date    ABDOMEN SURGERY  2013    BOWEL OBSTRUCTION    AV FISTULA CREATION Left 02/20/2019    AV FISTULA REPAIR  07/17/2019    AV fistula revision, branch ligation / Percutaneous angioplasty of proximal anastomosis and outflow tract of dialysis circuit with drug coated balloon  /  Dr Claribel Payne  02/09/2021    Dr. La Maddox, [address]  03/31/2019    TIM LAD    CYSTOSCOPY  11/17/2017    DIALYSIS FISTULA CREATION Left 2/20/2019    AV FISTULA CREATION ARM performed by Shoaib Arambula MD at [facility] 10/23/2019    LEFT UPPER EXTREMITY AV GRAFT CREATION WITH [id number] ARTEGRAFT, LEFT UPPER EXTREMITY AV FISTULA LIGATION performed by Shoaib Arambula MD at [facility] 2005    [address]  2007    1 WISDOM TOOTH REMOVED    NOSE SURGERY  1968    CAUTERY TO STOP NOSE BEEDS    OTHER SURGICAL HISTORY Left 03/06/2019    fistulagram    ME GENITAL SURG PROC,MALE UNLISTED N/A 2/16/2018    US  PROSTATE BIOPSY WITH SATURATION performed by Nasreen Flood MD at [facility] 11/17/2017    CYSTOSCOPY PROSTATE US BIOPSY performed by Narseen Flood MD at [address]  02/16/2018    TONSILLECTOMY  1968    UPPER GASTROINTESTINAL ENDOSCOPY N/A 9/27/2021    EGD BIOPSY performed by Lanie Verduzco MD at [address]  09/20/2019    LUE FISTULAGRAM  /  DR Ru Martini /  Findings: Severe stenosis of proximal cephalic vein. / Will plan for LUE AVG placement.  VASECTOMY  1983       Current Outpatient Medications   Medication Sig Dispense Refill    glimepiride (AMARYL) 1 MG tablet Take 1 tablet by mouth every morning (before breakfast) Do not take if blood sugars are less than 110 30 tablet 0    furosemide (LASIX) 40 MG tablet Take 1 tablet by mouth 2 times daily 60 tablet 1    pantoprazole (PROTONIX) 40 MG tablet Take 1 tablet by mouth every morning (before breakfast) 30 tablet 2    losartan (COZAAR) 25 MG tablet take 1 tablet by mouth once daily      guaiFENesin-codeine (GUAIFENESIN AC) 100-10 MG/5ML liquid give 10 milliliters by mouth every 4 hours if needed      atorvastatin (LIPITOR) 40 MG tablet Take 1 tablet by mouth nightly 30 tablet 3    traMADol (ULTRAM) 50 MG tablet take 1 tablet by mouth every 6 hours if needed      aspirin 325 MG tablet Take 325 mg by mouth daily      carvedilol (COREG) 6.25 MG tablet Take 1 tablet by mouth 2 times daily (with meals) 60 tablet 3    Multiple Vitamins-Minerals (RENAPLEX-D) TABS Take 1 tablet by mouth every other day Pt takes occasionally  3    lidocaine-prilocaine (EMLA) 2.5-2.5 % cream       NIFEdipine (ADALAT CC) 30 MG extended release tablet Take 30 mg by mouth 2 times daily      benzonatate (TESSALON) 100 MG capsule take 1 capsule by mouth three times a day if needed for 10 days (Patient not taking: Reported on 11/17/2021)      calcium carbonate (TUMS) 500 MG chewable tablet Take 1 tablet by mouth daily as needed  (Patient not taking: Reported on 11/17/2021)       No current facility-administered medications for this visit.        Allergies   Allergen Reactions    Lisinopril Swelling       Family History   Problem Relation Age of Onset    Heart Disease Mother         CHF    Early Death Mother     High Blood Pressure Brother     Diabetes Brother     Asthma Brother     High Blood Pressure Brother     Diabetes Brother     High Blood Pressure Brother     Diabetes Brother        Social History     Socioeconomic History    Marital status: Single     Spouse name: Not on file    Number of children: Not on file    Years of education: Not on file    Highest education level: Not on file   Occupational History    Not on file   Tobacco Use    Smoking status: Never Smoker    Smokeless tobacco: Never Used   Vaping Use    Vaping Use: Never used   Substance and Sexual Activity    Alcohol use: No    Drug use: No    Sexual activity: Not on file   Other Topics Concern    Not on file   Social History Narrative    Not on file     Social Determinants of Health     Financial Resource Strain:     Difficulty of Paying Living Expenses: Not on file   Food Insecurity:     Worried About Running Out of Food in the Last Year: Not on file    Frank of Food in the Last Year: Not on file   Transportation Needs:     Lack of Transportation (Medical): Not on file    Lack of Transportation (Non-Medical):  Not on file   Physical Activity:     Days of Exercise per Week: Not on file    Minutes of Exercise per Session: Not on file   Stress:     Feeling of Stress : Not on file   Social Connections:     Frequency of Communication with Friends and Family: Not on file    Frequency of Social Gatherings with Friends and Family: Not on file    Attends Congregation Services: Not on file    Active Member of [address] [place] [address] [employer] or Organizations: Not on file    Attends Club or Organization Meetings: Not on file    Marital Status: Not on file   Intimate Partner Violence:     Fear of Current or Ex-Partner: Not on file    Emotionally Abused: Not on file    Physically Abused: Not on file    Sexually Abused: Not on file   Housing Stability:     Unable to Pay for Housing in the Last Year: Not on file    Number of [place] in the Last Year: Not on file    Unstable Housing in the Last Year: Not on file       ROS: Negative except stated in HPI  History

## 2021-11-17 NOTE — PROGRESS NOTES
Visit Information    Have you changed or started any medications since your last visit including any over-the-counter medicines, vitamins, or herbal medicines? no   Are you having any side effects from any of your medications? -  no  Have you stopped taking any of your medications? Is so, why? -  no    Have you seen any other physician or provider since your last visit? No  Have you had any other diagnostic tests since your last visit? No  Have you been seen in the emergency room and/or had an admission to a hospital since we last saw you? Yes - Records Requested  Have you had your routine dental cleaning in the past 6 months? no    Have you activated your Sequence account? If not, what are your barriers?  No:      Patient Care Team:  Pamela Black as PCP - Shane Aguirre MD as Consulting Physician (Gastroenterology)    Medical History Review  Past Medical, Family, and Social History reviewed and does not contribute to the patient presenting condition    Health Maintenance   Topic Date Due    Diabetic foot exam  Never done    Diabetic retinal exam  Never done    HIV screen  Never done    DTaP/Tdap/Td vaccine (1 - Tdap) Never done    Colon cancer screen colonoscopy  Never done    Shingles Vaccine (1 of 2) Never done    A1C test (Diabetic or Prediabetic)  08/02/2021    Flu vaccine (1) 09/01/2021    COVID-19 Vaccine (3 - Booster for Moderna series) 10/09/2021    Lipid screen  11/12/2022    Potassium monitoring  11/13/2022    Creatinine monitoring  11/13/2022    Pneumococcal 0-64 years Vaccine (2 of 4 - PPSV23) 02/26/2024    Hepatitis C screen  Completed    Hepatitis A vaccine  Aged Out    Hib vaccine  Aged Out    Meningococcal (ACWY) vaccine  Aged Out

## 2021-12-23 ENCOUNTER — APPOINTMENT (OUTPATIENT)
Dept: GENERAL RADIOLOGY | Age: 61
DRG: 308 | End: 2021-12-23
Payer: MEDICARE

## 2021-12-23 ENCOUNTER — HOSPITAL ENCOUNTER (INPATIENT)
Age: 61
LOS: 1 days | Discharge: LEFT BEFORE DISCHARGE | DRG: 308 | End: 2021-12-24
Attending: EMERGENCY MEDICINE | Admitting: INTERNAL MEDICINE
Payer: MEDICARE

## 2021-12-23 DIAGNOSIS — I48.91 NEW ONSET ATRIAL FIBRILLATION (HCC): ICD-10-CM

## 2021-12-23 DIAGNOSIS — R07.9 CHEST PAIN, UNSPECIFIED TYPE: Primary | ICD-10-CM

## 2021-12-23 LAB
ANION GAP SERPL CALCULATED.3IONS-SCNC: 16 MMOL/L (ref 9–17)
BNP INTERPRETATION: ABNORMAL
BUN BLDV-MCNC: 30 MG/DL (ref 8–23)
BUN/CREAT BLD: ABNORMAL (ref 9–20)
CALCIUM SERPL-MCNC: 9.4 MG/DL (ref 8.6–10.4)
CHLORIDE BLD-SCNC: 95 MMOL/L (ref 98–107)
CO2: 22 MMOL/L (ref 20–31)
CREAT SERPL-MCNC: 5.12 MG/DL (ref 0.7–1.2)
D-DIMER QUANTITATIVE: 6.4 MG/L FEU
GFR AFRICAN AMERICAN: 14 ML/MIN
GFR NON-AFRICAN AMERICAN: 11 ML/MIN
GFR SERPL CREATININE-BSD FRML MDRD: ABNORMAL ML/MIN/{1.73_M2}
GFR SERPL CREATININE-BSD FRML MDRD: ABNORMAL ML/MIN/{1.73_M2}
GLUCOSE BLD-MCNC: 240 MG/DL (ref 70–99)
POTASSIUM SERPL-SCNC: 4.6 MMOL/L (ref 3.7–5.3)
PRO-BNP: ABNORMAL PG/ML
SODIUM BLD-SCNC: 133 MMOL/L (ref 135–144)
TROPONIN INTERP: ABNORMAL
TROPONIN T: ABNORMAL NG/ML
TROPONIN, HIGH SENSITIVITY: 171 NG/L (ref 0–22)

## 2021-12-23 PROCEDURE — 6370000000 HC RX 637 (ALT 250 FOR IP): Performed by: STUDENT IN AN ORGANIZED HEALTH CARE EDUCATION/TRAINING PROGRAM

## 2021-12-23 PROCEDURE — 84484 ASSAY OF TROPONIN QUANT: CPT

## 2021-12-23 PROCEDURE — 80048 BASIC METABOLIC PNL TOTAL CA: CPT

## 2021-12-23 PROCEDURE — 85379 FIBRIN DEGRADATION QUANT: CPT

## 2021-12-23 PROCEDURE — 96374 THER/PROPH/DIAG INJ IV PUSH: CPT

## 2021-12-23 PROCEDURE — 85025 COMPLETE CBC W/AUTO DIFF WBC: CPT

## 2021-12-23 PROCEDURE — 99283 EMERGENCY DEPT VISIT LOW MDM: CPT

## 2021-12-23 PROCEDURE — 83880 ASSAY OF NATRIURETIC PEPTIDE: CPT

## 2021-12-23 PROCEDURE — 6360000002 HC RX W HCPCS: Performed by: STUDENT IN AN ORGANIZED HEALTH CARE EDUCATION/TRAINING PROGRAM

## 2021-12-23 PROCEDURE — 71046 X-RAY EXAM CHEST 2 VIEWS: CPT

## 2021-12-23 PROCEDURE — 93005 ELECTROCARDIOGRAM TRACING: CPT | Performed by: STUDENT IN AN ORGANIZED HEALTH CARE EDUCATION/TRAINING PROGRAM

## 2021-12-23 RX ORDER — NITROGLYCERIN 0.4 MG/1
0.4 TABLET SUBLINGUAL ONCE
Status: COMPLETED | OUTPATIENT
Start: 2021-12-23 | End: 2021-12-23

## 2021-12-23 RX ORDER — MORPHINE SULFATE 4 MG/ML
4 INJECTION, SOLUTION INTRAMUSCULAR; INTRAVENOUS ONCE
Status: COMPLETED | OUTPATIENT
Start: 2021-12-23 | End: 2021-12-23

## 2021-12-23 RX ADMIN — NITROGLYCERIN 0.4 MG: 0.4 TABLET SUBLINGUAL at 23:00

## 2021-12-23 RX ADMIN — MORPHINE SULFATE 4 MG: 4 INJECTION INTRAVENOUS at 22:59

## 2021-12-23 ASSESSMENT — PAIN SCALES - GENERAL: PAINLEVEL_OUTOF10: 7

## 2021-12-23 NOTE — Clinical Note
Patient Class: Inpatient [101]   REQUIRED: Diagnosis: Chest pain [965917]   Estimated Length of Stay: Estimated stay of more than 2 midnights   Admitting Provider: Jake Farris [[id number]]   Telemetry/Cardiac Monitoring Required?: Yes

## 2021-12-24 ENCOUNTER — APPOINTMENT (OUTPATIENT)
Dept: CT IMAGING | Age: 61
DRG: 308 | End: 2021-12-24
Payer: MEDICARE

## 2021-12-24 VITALS
RESPIRATION RATE: 16 BRPM | TEMPERATURE: 98.2 F | DIASTOLIC BLOOD PRESSURE: 84 MMHG | HEART RATE: 62 BPM | WEIGHT: 270 LBS | OXYGEN SATURATION: 98 % | SYSTOLIC BLOOD PRESSURE: 110 MMHG | BODY MASS INDEX: 38.74 KG/M2

## 2021-12-24 PROBLEM — R07.2 PRECORDIAL CHEST PAIN: Status: ACTIVE | Noted: 2019-03-15

## 2021-12-24 PROBLEM — E87.1 HYPONATREMIA: Status: ACTIVE | Noted: 2021-12-24

## 2021-12-24 PROBLEM — R07.89 NON-CARDIAC CHEST PAIN: Status: ACTIVE | Noted: 2021-11-11

## 2021-12-24 PROBLEM — I48.91 NEW ONSET ATRIAL FIBRILLATION (HCC): Status: ACTIVE | Noted: 2021-12-24

## 2021-12-24 LAB
ABSOLUTE EOS #: 0.15 K/UL (ref 0–0.44)
ABSOLUTE IMMATURE GRANULOCYTE: 0.08 K/UL (ref 0–0.3)
ABSOLUTE LYMPH #: 0.46 K/UL (ref 1.1–3.7)
ABSOLUTE MONO #: 1.39 K/UL (ref 0.1–1.2)
BASOPHILS # BLD: 1 % (ref 0–2)
BASOPHILS ABSOLUTE: 0.08 K/UL (ref 0–0.2)
DIFFERENTIAL TYPE: ABNORMAL
EOSINOPHILS RELATIVE PERCENT: 2 % (ref 1–4)
HCT VFR BLD CALC: 31.6 % (ref 40.7–50.3)
HEMOGLOBIN: 9.6 G/DL (ref 13–17)
IMMATURE GRANULOCYTES: 1 %
LYMPHOCYTES # BLD: 6 % (ref 24–43)
MCH RBC QN AUTO: 30.5 PG (ref 25.2–33.5)
MCHC RBC AUTO-ENTMCNC: 30.4 G/DL (ref 28.4–34.8)
MCV RBC AUTO: 100.3 FL (ref 82.6–102.9)
MONOCYTES # BLD: 18 % (ref 3–12)
MORPHOLOGY: ABNORMAL
NRBC AUTOMATED: 0.3 PER 100 WBC
PARTIAL THROMBOPLASTIN TIME: 31.6 SEC (ref 20.5–30.5)
PDW BLD-RTO: 15.8 % (ref 11.8–14.4)
PLATELET # BLD: 181 K/UL (ref 138–453)
PLATELET ESTIMATE: ABNORMAL
PMV BLD AUTO: 9.9 FL (ref 8.1–13.5)
RBC # BLD: 3.15 M/UL (ref 4.21–5.77)
RBC # BLD: ABNORMAL 10*6/UL
SEG NEUTROPHILS: 72 % (ref 36–65)
SEGMENTED NEUTROPHILS ABSOLUTE COUNT: 5.54 K/UL (ref 1.5–8.1)
TROPONIN INTERP: ABNORMAL
TROPONIN INTERP: ABNORMAL
TROPONIN T: ABNORMAL NG/ML
TROPONIN T: ABNORMAL NG/ML
TROPONIN, HIGH SENSITIVITY: 184 NG/L (ref 0–22)
TROPONIN, HIGH SENSITIVITY: 188 NG/L (ref 0–22)
TSH SERPL DL<=0.05 MIU/L-ACNC: 1.9 MIU/L (ref 0.3–5)
WBC # BLD: 7.7 K/UL (ref 3.5–11.3)
WBC # BLD: ABNORMAL 10*3/UL

## 2021-12-24 PROCEDURE — 71260 CT THORAX DX C+: CPT

## 2021-12-24 PROCEDURE — 6370000000 HC RX 637 (ALT 250 FOR IP): Performed by: NURSE PRACTITIONER

## 2021-12-24 PROCEDURE — 85730 THROMBOPLASTIN TIME PARTIAL: CPT

## 2021-12-24 PROCEDURE — 99233 SBSQ HOSP IP/OBS HIGH 50: CPT | Performed by: INTERNAL MEDICINE

## 2021-12-24 PROCEDURE — 84484 ASSAY OF TROPONIN QUANT: CPT

## 2021-12-24 PROCEDURE — 1200000000 HC SEMI PRIVATE

## 2021-12-24 PROCEDURE — 6360000004 HC RX CONTRAST MEDICATION: Performed by: STUDENT IN AN ORGANIZED HEALTH CARE EDUCATION/TRAINING PROGRAM

## 2021-12-24 PROCEDURE — 99223 1ST HOSP IP/OBS HIGH 75: CPT | Performed by: INTERNAL MEDICINE

## 2021-12-24 PROCEDURE — 6370000000 HC RX 637 (ALT 250 FOR IP): Performed by: INTERNAL MEDICINE

## 2021-12-24 PROCEDURE — 6360000002 HC RX W HCPCS: Performed by: NURSE PRACTITIONER

## 2021-12-24 PROCEDURE — 2580000003 HC RX 258: Performed by: NURSE PRACTITIONER

## 2021-12-24 PROCEDURE — 84443 ASSAY THYROID STIM HORMONE: CPT

## 2021-12-24 PROCEDURE — 93005 ELECTROCARDIOGRAM TRACING: CPT

## 2021-12-24 RX ORDER — LOSARTAN POTASSIUM 50 MG/1
50 TABLET ORAL DAILY
Qty: 30 TABLET | Refills: 3 | Status: SHIPPED | OUTPATIENT
Start: 2021-12-25

## 2021-12-24 RX ORDER — NIFEDIPINE 30 MG/1
30 TABLET, EXTENDED RELEASE ORAL 2 TIMES DAILY
Status: DISCONTINUED | OUTPATIENT
Start: 2021-12-24 | End: 2021-12-24

## 2021-12-24 RX ORDER — NITROGLYCERIN 0.4 MG/1
0.4 TABLET SUBLINGUAL EVERY 5 MIN PRN
Status: DISCONTINUED | OUTPATIENT
Start: 2021-12-24 | End: 2021-12-24 | Stop reason: HOSPADM

## 2021-12-24 RX ORDER — ASPIRIN 325 MG
325 TABLET ORAL DAILY
Status: DISCONTINUED | OUTPATIENT
Start: 2021-12-24 | End: 2021-12-24

## 2021-12-24 RX ORDER — ONDANSETRON 2 MG/ML
4 INJECTION INTRAMUSCULAR; INTRAVENOUS EVERY 6 HOURS PRN
Status: DISCONTINUED | OUTPATIENT
Start: 2021-12-24 | End: 2021-12-24 | Stop reason: HOSPADM

## 2021-12-24 RX ORDER — CHOLECALCIFEROL (VITAMIN D3) 10 MCG
1 TABLET ORAL EVERY OTHER DAY
Status: DISCONTINUED | OUTPATIENT
Start: 2021-12-24 | End: 2021-12-24 | Stop reason: HOSPADM

## 2021-12-24 RX ORDER — LOSARTAN POTASSIUM 50 MG/1
50 TABLET ORAL DAILY
Status: DISCONTINUED | OUTPATIENT
Start: 2021-12-25 | End: 2021-12-24 | Stop reason: HOSPADM

## 2021-12-24 RX ORDER — HEPARIN SODIUM 1000 [USP'U]/ML
4000 INJECTION, SOLUTION INTRAVENOUS; SUBCUTANEOUS PRN
Status: DISCONTINUED | OUTPATIENT
Start: 2021-12-24 | End: 2021-12-24 | Stop reason: HOSPADM

## 2021-12-24 RX ORDER — ASPIRIN 81 MG/1
81 TABLET, CHEWABLE ORAL DAILY
Status: DISCONTINUED | OUTPATIENT
Start: 2021-12-25 | End: 2021-12-24 | Stop reason: HOSPADM

## 2021-12-24 RX ORDER — PANTOPRAZOLE SODIUM 40 MG/1
40 TABLET, DELAYED RELEASE ORAL
Status: DISCONTINUED | OUTPATIENT
Start: 2021-12-24 | End: 2021-12-24 | Stop reason: HOSPADM

## 2021-12-24 RX ORDER — SODIUM CHLORIDE 9 MG/ML
25 INJECTION, SOLUTION INTRAVENOUS PRN
Status: DISCONTINUED | OUTPATIENT
Start: 2021-12-24 | End: 2021-12-24 | Stop reason: HOSPADM

## 2021-12-24 RX ORDER — SODIUM CHLORIDE 0.9 % (FLUSH) 0.9 %
5-40 SYRINGE (ML) INJECTION EVERY 12 HOURS SCHEDULED
Status: DISCONTINUED | OUTPATIENT
Start: 2021-12-24 | End: 2021-12-24 | Stop reason: HOSPADM

## 2021-12-24 RX ORDER — CARVEDILOL 12.5 MG/1
12.5 TABLET ORAL 2 TIMES DAILY WITH MEALS
Status: DISCONTINUED | OUTPATIENT
Start: 2021-12-24 | End: 2021-12-24 | Stop reason: HOSPADM

## 2021-12-24 RX ORDER — CARVEDILOL 12.5 MG/1
6.25 TABLET ORAL 2 TIMES DAILY WITH MEALS
Status: DISCONTINUED | OUTPATIENT
Start: 2021-12-24 | End: 2021-12-24

## 2021-12-24 RX ORDER — CARVEDILOL 12.5 MG/1
12.5 TABLET ORAL 2 TIMES DAILY WITH MEALS
Qty: 60 TABLET | Refills: 3 | Status: ON HOLD | OUTPATIENT
Start: 2021-12-24 | End: 2022-01-05 | Stop reason: HOSPADM

## 2021-12-24 RX ORDER — ACETAMINOPHEN 325 MG/1
650 TABLET ORAL EVERY 6 HOURS PRN
Status: DISCONTINUED | OUTPATIENT
Start: 2021-12-24 | End: 2021-12-24 | Stop reason: HOSPADM

## 2021-12-24 RX ORDER — POLYETHYLENE GLYCOL 3350 17 G/17G
17 POWDER, FOR SOLUTION ORAL DAILY PRN
Status: DISCONTINUED | OUTPATIENT
Start: 2021-12-24 | End: 2021-12-24 | Stop reason: HOSPADM

## 2021-12-24 RX ORDER — ATORVASTATIN CALCIUM 80 MG/1
40 TABLET, FILM COATED ORAL NIGHTLY
Status: DISCONTINUED | OUTPATIENT
Start: 2021-12-24 | End: 2021-12-24 | Stop reason: HOSPADM

## 2021-12-24 RX ORDER — ACETAMINOPHEN 650 MG/1
650 SUPPOSITORY RECTAL EVERY 6 HOURS PRN
Status: DISCONTINUED | OUTPATIENT
Start: 2021-12-24 | End: 2021-12-24 | Stop reason: HOSPADM

## 2021-12-24 RX ORDER — HEPARIN SODIUM 1000 [USP'U]/ML
4000 INJECTION, SOLUTION INTRAVENOUS; SUBCUTANEOUS ONCE
Status: COMPLETED | OUTPATIENT
Start: 2021-12-24 | End: 2021-12-24

## 2021-12-24 RX ORDER — HEPARIN SODIUM 1000 [USP'U]/ML
2000 INJECTION, SOLUTION INTRAVENOUS; SUBCUTANEOUS PRN
Status: DISCONTINUED | OUTPATIENT
Start: 2021-12-24 | End: 2021-12-24 | Stop reason: HOSPADM

## 2021-12-24 RX ORDER — LOSARTAN POTASSIUM 25 MG/1
25 TABLET ORAL DAILY
Status: DISCONTINUED | OUTPATIENT
Start: 2021-12-24 | End: 2021-12-24

## 2021-12-24 RX ORDER — SODIUM CHLORIDE 0.9 % (FLUSH) 0.9 %
10 SYRINGE (ML) INJECTION PRN
Status: DISCONTINUED | OUTPATIENT
Start: 2021-12-24 | End: 2021-12-24 | Stop reason: HOSPADM

## 2021-12-24 RX ORDER — FUROSEMIDE 20 MG/1
40 TABLET ORAL 2 TIMES DAILY
Status: DISCONTINUED | OUTPATIENT
Start: 2021-12-24 | End: 2021-12-24 | Stop reason: HOSPADM

## 2021-12-24 RX ORDER — ONDANSETRON 4 MG/1
4 TABLET, ORALLY DISINTEGRATING ORAL EVERY 8 HOURS PRN
Status: DISCONTINUED | OUTPATIENT
Start: 2021-12-24 | End: 2021-12-24 | Stop reason: HOSPADM

## 2021-12-24 RX ORDER — HEPARIN SODIUM 10000 [USP'U]/100ML
5-30 INJECTION, SOLUTION INTRAVENOUS CONTINUOUS
Status: DISCONTINUED | OUTPATIENT
Start: 2021-12-24 | End: 2021-12-24

## 2021-12-24 RX ORDER — ASPIRIN 81 MG/1
81 TABLET ORAL DAILY
Qty: 90 TABLET | Refills: 1 | Status: SHIPPED | OUTPATIENT
Start: 2021-12-24

## 2021-12-24 RX ADMIN — FUROSEMIDE 40 MG: 20 TABLET ORAL at 08:21

## 2021-12-24 RX ADMIN — PANTOPRAZOLE SODIUM 40 MG: 40 TABLET, DELAYED RELEASE ORAL at 08:20

## 2021-12-24 RX ADMIN — HEPARIN SODIUM 8.17 UNITS/KG/HR: 5000 INJECTION, SOLUTION INTRAVENOUS; SUBCUTANEOUS at 02:38

## 2021-12-24 RX ADMIN — ASCORBIC ACID, THIAMINE MONONITRATE,RIBOFLAVIN, NIACINAMIDE, PYRIDOXINE HYDROCHLORIDE, FOLIC ACID, CYANOCOBALAMIN, BIOTIN, CALCIUM PANTOTHENATE, 1 MG: 100; 1.5; 1.7; 20; 10; 1; 6000; 150000; 5 CAPSULE, LIQUID FILLED ORAL at 08:20

## 2021-12-24 RX ADMIN — CARVEDILOL 6.25 MG: 12.5 TABLET, FILM COATED ORAL at 08:20

## 2021-12-24 RX ADMIN — APIXABAN 5 MG: 5 TABLET, FILM COATED ORAL at 15:00

## 2021-12-24 RX ADMIN — LOSARTAN POTASSIUM 25 MG: 25 TABLET, FILM COATED ORAL at 08:20

## 2021-12-24 RX ADMIN — HEPARIN SODIUM 4000 UNITS: 1000 INJECTION INTRAVENOUS; SUBCUTANEOUS at 02:38

## 2021-12-24 RX ADMIN — HEPARIN SODIUM 2000 UNITS: 1000 INJECTION INTRAVENOUS; SUBCUTANEOUS at 09:03

## 2021-12-24 RX ADMIN — NIFEDIPINE 30 MG: 30 TABLET, EXTENDED RELEASE ORAL at 08:20

## 2021-12-24 RX ADMIN — NIFEDIPINE 30 MG: 30 TABLET, EXTENDED RELEASE ORAL at 03:30

## 2021-12-24 RX ADMIN — ATORVASTATIN CALCIUM 40 MG: 80 TABLET, FILM COATED ORAL at 03:30

## 2021-12-24 RX ADMIN — IOPAMIDOL 75 ML: 755 INJECTION, SOLUTION INTRAVENOUS at 01:11

## 2021-12-24 RX ADMIN — ASPIRIN 325 MG: 325 TABLET ORAL at 08:22

## 2021-12-24 NOTE — DISCHARGE SUMMARY
[facility]  Office: [address], DO, Dejuan Judge, DO, Ricardo Estrella, DO, Emmie Guerrero, DO, Tg Ding MD, Mohini Lazar MD, Leonela Smith MD, Loc Kraus MD, Michaela Angel MD, Diane Tran MD, Teresa Mcallister MD, Vin Lambert, DO, Litzy Owen, DO, Jo Diaz MD,  Jorge A Northern, DO, Aryan Still MD, Elvia Benitez MD, Isiah Mclaughlin MD, Adriane Hernandez MD, Maria A Ness MD, Panchito Palomo MD, Jina Chaudhry MD, Karlos Whittington, [facility], [facility], CNP, Perla Willoughby, CNP, Jah Carrasco, CNS, Juanito Quarles, CNP, Diane Lynn, CNP, Lyudmila Minor, CNP, Karissa Best, CNP, Magalis Holt, CNP, Celso Jack PA-C, Fitz Wan, [facility], Rony Mancilla, [facility], Mireya Bush, CNP, Zeus Jennings, CNP, Fred Tee, CNP, Davida Yanez, CNP, Libia Henry, CNP, Jolly Dakin, [facility]    Discharge Summary     Patient ID: Mi Way  :  1960   MRN: [id number]     ACCOUNT:  [de-identified]   Patient's PCP: Lroeto Bonds  Admit Date: 2021   Discharge Date: 2021     Length of Stay: 0  Code Status:  Full Code  Admitting Physician: Leny Melendez DO  Discharge Physician: Leny Melendez DO     Active Discharge Diagnoses:     Hospital Problem Lists:  Principal Problem:    New onset atrial fibrillation [facility])  Active Problems:    Elevated troponin    Precordial chest pain    S/P PTCA - TIM distal LAD - Dr. Homer Levi 19    Non-cardiac chest pain    Type 2 diabetes mellitus with chronic kidney disease on chronic dialysis ([facility]    Anemia    Hyponatremia  Resolved Problems:    * No resolved hospital problems. *      Admission Condition:  stable     Discharged Condition: stable    Hospital Stay:     Hospital Course:  Mi Way is a 59 y.o. male who initially presented to our hospital with complaints of palpitations and shortness of breath.   Patient also had associated chest wall pain. EKG on admission showed atrial fibrillation. Patient denies any history of A. fib in the past.  [id number] score 4. Patient was started on Eliquis and his home dose of Coreg was increased to 12.5 mg. Patient was taken off of his nifedipine per cardiology recommendations and his Coreg/losartan was increased. Patient remained hemodynamically stable on his home dose of oxygen. Patient's troponin were flat, and history was more consistent with noncardiac chest pain. Patient recently had a cardiac cath in February 2021 that showed patent LAD stent with minimal CAD and other coronary arteries. Ischemic work-up was deferred. Patient was seen by nephrology given history of ESRD, however dialysis was not recommended since patient previously had 3 days of dialysis and had no evidence of volume overload. Currently, patient medically stable for discharge. He will need to follow-up with [place] [facility]ology consultants for outpatient JERONIMO/cardioversion for new onset atrial fibrillation. Patient will need to start taking his Eliquis. Will need to follow-up with his primary care physician for continued titration of his blood pressure medication and for management of his heart failure/diabetes. Discussed with patient expressed understanding was agreeable to plan. Significant therapeutic interventions:   - treated for new onset atrial fibrillation: Started on Eliquis and Coreg was increased. Was seen by cardiology, recommended outpatient JERONIMO cardioversion  -Seen by nephrology for ESRD-continue outpatient follow-up and regular scheduled dialysis  -Evaluated for chest pain, thought to be noncardiac.   No ischemic work-up was pursued    Significant Diagnostic Studies:   Labs / Micro:  CBC:   Lab Results   Component Value Date    WBC 7.7 12/23/2021    RBC 3.15 12/23/2021    RBC 3.62 09/08/2018    HGB 9.6 12/23/2021    HCT 31.6 12/23/2021    .3 12/23/2021    MCH 30.5 12/23/2021    MCHC 30.4 12/23/2021 RDW 15.8 12/23/2021     12/23/2021     BMP:    Lab Results   Component Value Date    GLUCOSE 240 12/23/2021    GLUCOSE 147 09/08/2018     12/23/2021    K 4.6 12/23/2021    CL 95 12/23/2021    CO2 22 12/23/2021    ANIONGAP 16 12/23/2021    BUN 30 12/23/2021    CREATININE 5.12 12/23/2021    BUNCRER NOT REPORTED 12/23/2021    CALCIUM 9.4 12/23/2021    LABGLOM 11 12/23/2021    GFRAA 14 12/23/2021    GFR      12/23/2021    GFR NOT REPORTED 12/23/2021     HFP:    Lab Results   Component Value Date    PROT 8.0 11/12/2021        Radiology:  XR CHEST (2 VW)    Result Date: 12/23/2021  Stable cardiomegaly. Clear lungs. CT CHEST PULMONARY EMBOLISM W CONTRAST    Result Date: 12/24/2021  Prominent motion artifact throughout the exam which is limiting the study. Within the limitations of the exam, no obvious acute pulmonary embolus can be seen. Mildly dilated and atherosclerotic thoracic aorta which is unchanged with no obvious aneurysm Mild cardiomegaly which is unchanged. No obvious acute infiltrate or effusion Mild ascites which is more prominent. Probable small adrenal adenomas bilaterally which are unchanged. Consultations:    Consults:     Final Specialist Recommendations/Findings:   IP CONSULT TO NEPHROLOGY  IP CONSULT TO HOSPITALIST  IP CONSULT TO CARDIOLOGY  IP CONSULT TO CARDIOLOGY      The patient was seen and examined on day of discharge and this discharge summary is in conjunction with any daily progress note from day of discharge. Discharge plan:     Disposition: To a non-[facility] facility    Physician Follow Up:      Dina Mendez MD  [facility] [address] [facility] [phone]    Schedule an appointment as soon as possible for a visit  JERONIMO cardioversion for new onset atrial fibrillation    [address]  [facility]  [phone]    Schedule an appointment as soon as possible for a visit  Hospital follow up for Afib, HTN, Diabetes, ESRD    [facility]  [address] [phone]      Regularly scheduled dialysis     Tenisha Callahan MD  [address] [place] [address]  [phone]             Requiring Further Evaluation/Follow Up POST HOSPITALIZATION/Incidental Findings:   -Please follow-up with cardiology Dr. Geronimo [facility]ology consultants for evaluation of New onset atrial fibrillation.  Continue taking Eliquis and Coreg as scheduled   -Continue follow-up with your nephrologist/[facility] dialysis center for dialysis  -Stop Taking Nifedipine   -Return to the hospital for any chest pain, shortness of breath, difficulty breathing, nausea, vomiting, diarrhea  -Please follow-up with your primary care physician within 1 week of discharge    Diet: cardiac diet    Activity: As tolerated    Instructions to Patient: see above    Discharge Medications:      Medication List      START taking these medications    apixaban 5 MG Tabs tablet  Commonly known as: Eliquis  Take 1 tablet by mouth 2 times daily        ASK your doctor about these medications    aspirin 325 MG tablet     atorvastatin 40 MG tablet  Commonly known as: LIPITOR  Take 1 tablet by mouth nightly     benzonatate 100 MG capsule  Commonly known as: TESSALON     calcium carbonate 500 MG chewable tablet  Commonly known as: TUMS     carvedilol 6.25 MG tablet  Commonly known as: COREG  Take 1 tablet by mouth 2 times daily (with meals)     furosemide 40 MG tablet  Commonly known as: LASIX  Take 1 tablet by mouth 2 times daily     glimepiride 1 MG tablet  Commonly known as: AMARYL  Take 1 tablet by mouth every morning (before breakfast) Do not take if blood sugars are less than 110     guaiFENesin-codeine 100-10 MG/5ML liquid  Commonly known as: GUAIFENESIN AC     lidocaine-prilocaine 2.5-2.5 % cream  Commonly known as: EMLA     losartan 25 MG tablet  Commonly known as: COZAAR     NIFEdipine 30 MG extended release tablet  Commonly known as: ADALAT CC     pantoprazole 40 MG tablet  Commonly known as: PROTONIX  Take 1 tablet by mouth every morning (before breakfast)     [facility]-D Tabs     traMADol 50 MG tablet  Commonly known as: ULTRAM           Where to Get Your Medications      These medications were sent to [address] [place] [id number]    Phone: [phone]   · apixaban 5 MG Tabs tablet         Discharge Procedure Orders   Manuel Gibson MD, Cardiology, [place]   Referral Priority: Routine Referral Type: Eval and Treat   Referral Reason: Specialty Services Required   Referred to Provider: Lucy Doshi Requested Specialty: Cardiology   Number of Visits Requested: 1       Time Spent on discharge is  32 mins in patient examination, evaluation, counseling as well as medication reconciliation, prescriptions for required medications, discharge plan and follow up. Electronically signed by   Wendy Alexander DO  12/24/2021  1:18 PM      Thank you Dr. Pura Chao for the opportunity to be involved in this patient's care.

## 2021-12-24 NOTE — ED NOTES
Pt is alert and oriented x 4. Pt is able to make her own decisions. Pt is aware of consequences of leaving. Pt understands that by   leaving her symptoms may worsen if she leaves. Pt wants to leave AMA.      Pam Pugh RN  12/24/21 [address] [place] [address] PEGGY Zuniga  12/24/21 1038

## 2021-12-24 NOTE — H&P
[facility]  Office: [address], DO, Brittny Forbes, DO, Karmen Mccall, DO, Margoth Guerrero, DO, Brain Dancer, MD, Ammon Edge MD, Racheal Kay MD, Eliud Joiner MD, Rodger Valerio MD, Burgess Deni MD, Meghna Melchor MD, Tristin Gallo, DO, Devin Johnson DO, Malou Dempsey MD,  Daryl Ruiz DO, Yamilex Dodge MD, Nikolai Contreras MD, Evelia Rollins MD, Rupa Burrell MD, Héctor Bauer MD, Sydney Mittal MD, Breonna Greenwood MD, Conrado Whipple [facility], [facility], CNP, Kirby Ramos, CNP, Reynaldo Titus, CNS, Melba Silva, CNP, Michelle Massey, CNP, Matt Chaudhary, CNP, Naa Sharma, CNP, Author Tato, HEIDY, Genia Aguilar PA-C, Francesca Sanchez, DNP, Virgil Genao, DNP, Lourdes Buchanan, CNP, Jess Wallace, CNP, Joanne Leyva, CNP, Kristine Ochoa, CNP, Danitza Serrano, CNP, Esther Hicks, CNP         [facility]   [address] [facility]    HISTORY AND PHYSICAL EXAMINATION            Date:   12/24/2021  Patient name:  Anice Phoenix  Date of admission:  12/23/2021  9:56 PM  MRN:   [id number]  Account:  [de-identified]  YOB: 1960  PCP:    Mala Garcia  Room:   22/22  Code Status:    Full Code    Chief Complaint:     Chief Complaint   Patient presents with    Chest Pain     x 12 hours       History Obtained From:     patient, electronic medical record    History of Present Illness:     Anice Phoenix is a 59 y.o. male who initially presented to the hospital for evaluation of chest pain/palpitations. Patient describes intermittent pain that is made worse with palpation of his chest wall. Pain is not worse with exertion. He does have some shortness of breath associated with the symptom onset but this is completely resolved. On admission, patient was found to be in A. fib with RVR. He denies any previous history of atrial fibrillation. Denies any history of hyperthyroidism. His breathing feels back to baseline.   He does have a history of ESRD and received dialysis with last 3 days consecutively. He is chronically on 2 L via nasal cannula. Denies any fevers, chills, cough. Denies any difficulty with ambulation. Denies any wheezing, crackles. Patiently, there was concern for ACS. However, troponins were only mildly elevated and flat. EKG did show new onset atrial fibrillation. Cardiology did see patient at bedside who recommended starting Eliquis and increasing dose of Coreg since pt also noted to have PVC    Past Medical History:     Past Medical History:   Diagnosis Date    Acute congestive heart failure ([address] 12/21/2016    Acute on chronic diastolic congestive heart failure ([address] 11/24/2018    Anemia 11/25/2018    Anemia of chronic renal failure 10/17/2016    AVF [facility]arteriovenous fistula) ([facility])     Bowel obstruction ([address] 12/26/2013    CAD (coronary artery disease) 2013    ?     Chest pain at rest 12/21/2016    CHF (congestive heart failure) ([address] 2013    last episode 11/2018    CHF (congestive heart failure), NYHA class I, acute on chronic, combined ([address] 2/9/2021    Chronic kidney disease     CKD (chronic kidney disease) stage 4, GFR 15-29 ml/min ([address] 10/17/2016    Coronary artery disease involving native coronary artery of native heart without angina pectoris     Diabetes mellitus ([address] 2012    NIDDM    Dialysis patient ([address]     Omayra Montoya  /  Alejandro Fabian  /  Yessica Davis    ESRD (end stage renal disease) ([address]     Essential hypertension 11/25/2018    Groin swelling 07/17/2019    Hemodialysis patient ([address] 11/28/2018    TUNNELD CATHETER RIGHT DIALYSIS ACCESSTUES THURS AND AT ARROWHEAD    Hydrocele 07/17/2019    Hyperlipidemia 2013    ON RX    Hypertension 2013    ON RX    MI, old 12/26/2013    NSTEMI (non-ST elevated myocardial infarction) ([address] 6/17/2019    BATSHEVA (obstructive sleep apnea)     Patient in clinical research study 04/01/2019    XIENCE SHORT DAPT    Pneumonia     Prostatism 11/17/2017    Respiratory distress 11/25/2018    Rising PSA level 11/17/2017    S/P arteriovenous (AV) graft placement     S/P PTCA - TIM distal LAD - Dr. Raissa Guardado 4/1/19 4/1/2019    Sleep apnea 2015    pt has machine and does not use it    Small bowel obstruction ([facility] 5/1/2021    Type 2 diabetes mellitus with chronic kidney disease on chronic dialysis ([address] 10/17/2016        Past Surgical History:     Past Surgical History:   Procedure Laterality Date    ABDOMEN SURGERY  2013    BOWEL OBSTRUCTION    AV FISTULA CREATION Left 02/20/2019    AV FISTULA REPAIR  07/17/2019    AV fistula revision, branch ligation / Percutaneous angioplasty of proximal anastomosis and outflow tract of dialysis circuit with drug coated balloon  /  Dr Chou Ted  02/09/2021    Dr. Frank Lindquist, [address]  03/31/2019    TIM LAD    CYSTOSCOPY  11/17/2017    DIALYSIS FISTULA CREATION Left 2/20/2019    AV FISTULA CREATION ARM performed by Kai Herrera MD at [facility] 10/23/2019    LEFT UPPER EXTREMITY AV GRAFT CREATION WITH [id number] ARTEGRAFT, LEFT UPPER EXTREMITY AV FISTULA LIGATION performed by Kai eHrrera MD at [facility] 2005    [address]  2007    1 WISDOM TOOTH REMOVED    NOSE SURGERY  1968    CAUTERY TO STOP NOSE BEEDS    OTHER SURGICAL HISTORY Left 03/06/2019    fistulagram    OK GENITAL SURG PROC,MALE UNLISTED N/A 2/16/2018    US  PROSTATE BIOPSY WITH SATURATION performed by Buck Breaux MD at [facility] 11/17/2017    CYSTOSCOPY PROSTATE US BIOPSY performed by Buck Breaux MD at [address]  02/16/2018    TONSILLECTOMY  1968    UPPER GASTROINTESTINAL ENDOSCOPY N/A 9/27/2021    EGD BIOPSY performed by Kalyn Celaya MD at [address] [facility]  09/20/2019    RUBI FISTULAGRAM  /  DR Shamar Sommers  /  Findings: Severe stenosis of proximal cephalic vein. / Will plan for LUE AVG placement.  VASECTOMY  1983        Medications Prior to Admission:     Prior to Admission medications    Medication Sig Start Date End Date Taking?  Authorizing Provider   apixaban (ELIQUIS) 5 MG TABS tablet Take 1 tablet by mouth 2 times daily 12/24/21  Yes Donis Chao,    glimepiride (AMARYL) 1 MG tablet Take 1 tablet by mouth every morning (before breakfast) Do not take if blood sugars are less than 110 11/14/21   Gerardo Miller MD   furosemide (LASIX) 40 MG tablet Take 1 tablet by mouth 2 times daily 11/14/21   Gerardo Miller MD   pantoprazole (PROTONIX) 40 MG tablet Take 1 tablet by mouth every morning (before breakfast) 5/19/21   Anil Tenorio MD   benzonatate (TESSALON) 100 MG capsule take 1 capsule by mouth three times a day if needed for 10 days  Patient not taking: Reported on 11/17/2021 3/3/21   Historical Provider, MD   losartan (COZAAR) 25 MG tablet take 1 tablet by mouth once daily 3/21/21   Historical Provider, MD   guaiFENesin-codeine (GUAIFENESIN AC) 100-10 MG/5ML liquid give 10 milliliters by mouth every 4 hours if needed 3/4/21   Historical Provider, MD   atorvastatin (LIPITOR) 40 MG tablet Take 1 tablet by mouth nightly 2/9/21   Johnathan Oconnor MD   traMADol Lubna Mireles) 50 MG tablet take 1 tablet by mouth every 6 hours if needed 10/21/20   Historical Provider, MD   aspirin 325 MG tablet Take 325 mg by mouth daily    Historical Provider, MD   calcium carbonate (TUMS) 500 MG chewable tablet Take 1 tablet by mouth daily as needed   Patient not taking: Reported on 11/17/2021    Historical Provider, MD   carvedilol (COREG) 6.25 MG tablet Take 1 tablet by mouth 2 times daily (with meals) 6/19/19   Camilla Barreto MD   Multiple Vitamins-Minerals (RENAPLEX-D) TABS Take 1 tablet by mouth every other day Pt takes occasionally 5/14/19   Historical Provider, MD   lidocaine-prilocaine (EMLA) 2.5-2.5 % cream  3/18/19   Historical Provider, MD   NIFEdipine (ADALAT CC) 30 MG extended release tablet Take 30 mg by mouth 2 times daily    Historical Provider, MD        Allergies:     Lisinopril    Social History:     Tobacco:    reports that he has never smoked. He has never used smokeless tobacco.  Alcohol:      reports no history of alcohol use. Drug Use:  reports no history of drug use. Family History:     Family History   Problem Relation Age of Onset    Heart Disease Mother         CHF    Early Death Mother     High Blood Pressure Brother     Diabetes Brother     Asthma Brother     High Blood Pressure Brother     Diabetes Brother     High Blood Pressure Brother     Diabetes Brother        Review of Systems:     Positive and Negative as described in HPI.     CONSTITUTIONAL:  negative for fevers, chills, sweats, fatigue, weight loss  HEENT:  negative for vision, hearing changes, runny nose, throat pain  RESPIRATORY:  negative for worsening shortness of breath, cough, congestion, wheezing  CARDIOVASCULAR:  negative for chest pain (its resolved), admits to occasional palpitations  GASTROINTESTINAL:  negative for nausea, vomiting, diarrhea, constipation, change in bowel habits, abdominal pain   GENITOURINARY:  negative for difficulty of urination, burning with urination, frequency   INTEGUMENT:  negative for rash, skin lesions, easy bruising   HEMATOLOGIC/LYMPHATIC:  negative for swelling/edema   ALLERGIC/IMMUNOLOGIC:  negative for urticaria , itching  ENDOCRINE:  negative increase in drinking, increase in urination, hot or cold intolerance  MUSCULOSKELETAL:  negative joint pains, muscle aches, swelling of joints  NEUROLOGICAL:  negative for headaches, dizziness, lightheadedness, numbness, pain, tingling extremities  BEHAVIOR/PSYCH:  negative for depression, anxiety    Physical Exam:   /84   Pulse 62   Temp 98.2 °F (36.8 °C) (Oral)   Resp 16   Wt 270 lb (122.5 kg)   SpO2 98%   BMI 38.74 kg/m²   Temp (24hrs), Av.2 °F (36.8 °C), Min:98.2 °F (36.8 °C), Max:98.2 °F (36.8 °C)    No results for input(s): POCGLU in the last 72 hours.   No intake or output data in the 24 hours ending 21 1307    General Appearance: alert, well appearing, and in no acute distress  Mental status: oriented to person, place, and time  Head: normocephalic, atraumatic  Eye: no icterus, redness, pupils equal and reactive, extraocular eye movements intact, conjunctiva clear  Ear: normal external ear, no discharge, hearing intact  Nose: no drainage noted  Mouth: mucous membranes moist  Neck: supple, no carotid bruits, thyroid not palpable  Lungs: Bilateral equal air entry, clear to ausculation, no wheezing, rales or rhonchi, normal effort  Cardiovascular: normal rate, irregularly irregular rhythm, no murmur, gallop, rub  Abdomen: Soft, nontender, nondistended, normal bowel sounds, no hepatomegaly or splenomegaly  Neurologic: There are no new focal motor or sensory deficits, normal muscle tone and bulk, no abnormal sensation, normal speech, cranial nerves II through XII grossly intact  Skin: No gross lesions, rashes, bruising or bleeding on exposed skin area  Extremities: peripheral pulses palpable, no pedal edema or calf pain with palpation  Psych: normal affect    Investigations:      Laboratory Testing:  Recent Results (from the past 24 hour(s))   CBC Auto Differential    Collection Time: 21 10:52 PM   Result Value Ref Range    WBC 7.7 3.5 - 11.3 k/uL    RBC 3.15 (L) 4.21 - 5.77 m/uL    Hemoglobin 9.6 (L) 13.0 - 17.0 g/dL    Hematocrit 31.6 (L) 40.7 - 50.3 %    .3 82.6 - 102.9 fL    MCH 30.5 25.2 - 33.5 pg    MCHC 30.4 28.4 - 34.8 g/dL    RDW 15.8 (H) 11.8 - 14.4 %    Platelets 894 495 - 210 k/uL    MPV 9.9 8.1 - 13.5 fL    NRBC Automated 0.3 (H) 0.0 per 100 WBC    Differential Type NOT REPORTED     WBC Morphology NOT REPORTED     RBC Morphology NOT REPORTED     Platelet Estimate NOT REPORTED     Immature Granulocytes 1 (H) 0 %    Seg Neutrophils 72 (H) 36 - 65 %    Lymphocytes 6 (L) 24 - 43 %    Monocytes 18 (H) 3 - 12 %    Eosinophils % 2 1 - 4 %    Basophils 1 0 - 2 %    Absolute Immature Granulocyte 0.08 0.00 - 0.30 k/uL    Segs Absolute 5.54 1.50 - 8.10 k/uL    Absolute Lymph # 0.46 (L) 1.10 - 3.70 k/uL    Absolute Mono # 1.39 (H) 0.10 - 1.20 k/uL    Absolute Eos # 0.15 0.00 - 0.44 k/uL    Basophils Absolute 0.08 0.00 - 0.20 k/uL    Morphology ANISOCYTOSIS PRESENT    Basic Metabolic Panel w/ Reflex to MG    Collection Time: 12/23/21 10:52 PM   Result Value Ref Range    Glucose 240 (H) 70 - 99 mg/dL    BUN 30 (H) 8 - 23 mg/dL    CREATININE 5.12 (HH) 0.70 - 1.20 mg/dL    Bun/Cre Ratio NOT REPORTED 9 - 20    Calcium 9.4 8.6 - 10.4 mg/dL    Sodium 133 (L) 135 - 144 mmol/L    Potassium 4.6 3.7 - 5.3 mmol/L    Chloride 95 (L) 98 - 107 mmol/L    CO2 22 20 - 31 mmol/L    Anion Gap 16 9 - 17 mmol/L    GFR Non-African American 11 (L) >60 mL/min    GFR  14 (L) >60 mL/min    GFR Comment          GFR Staging NOT REPORTED    Troponin    Collection Time: 12/23/21 10:52 PM   Result Value Ref Range    Troponin, High Sensitivity 171 (HH) 0 - 22 ng/L    Troponin T NOT REPORTED <0.03 ng/mL    Troponin Interp NOT REPORTED    Brain Natriuretic Peptide    Collection Time: 12/23/21 10:52 PM   Result Value Ref Range    Pro-BNP 27,610 (H) <300 pg/mL    BNP Interpretation NOT REPORTED    D-DIMER, QUANTITATIVE    Collection Time: 12/23/21 10:52 PM   Result Value Ref Range    D-Dimer, Quant 6.40 mg/L FEU   Troponin    Collection Time: 12/24/21 12:14 AM   Result Value Ref Range    Troponin, High Sensitivity 184 (HH) 0 - 22 ng/L    Troponin T NOT REPORTED <0.03 ng/mL    Troponin Interp NOT REPORTED    Troponin    Collection Time: 12/24/21  5:58 AM   Result Value Ref Range    Troponin, High Sensitivity 188 (HH) 0 - 22 ng/L    Troponin T NOT REPORTED <0.03 ng/mL    Troponin Interp NOT REPORTED    APTT    Collection Time: 12/24/21  8:31 AM   Result Value Ref Range    PTT 31.6 (H) 20.5 - 30.5 sec   EKG 12 Lead    Collection Time: 12/24/21 12:22 PM   Result Value Ref Range    Ventricular Rate 76 BPM    Atrial Rate 234 BPM    QRS Duration 150 ms    Q-T Interval 480 ms    QTc Calculation (Bazett) 540 ms    P Axis -96 degrees    R Axis -72 degrees    T Axis 73 degrees       Imaging/Diagnostics:  XR CHEST (2 VW)    Result Date: 12/23/2021  Stable cardiomegaly. Clear lungs. CT CHEST PULMONARY EMBOLISM W CONTRAST    Result Date: 12/24/2021  Prominent motion artifact throughout the exam which is limiting the study. Within the limitations of the exam, no obvious acute pulmonary embolus can be seen. Mildly dilated and atherosclerotic thoracic aorta which is unchanged with no obvious aneurysm Mild cardiomegaly which is unchanged. No obvious acute infiltrate or effusion Mild ascites which is more prominent. Probable small adrenal adenomas bilaterally which are unchanged. Assessment :      Hospital Problems           Last Modified POA    * (Principal) New onset atrial fibrillation ([address] 12/24/2021 Yes    Elevated troponin 12/24/2021 Yes    Precordial chest pain 12/24/2021 Yes    S/P PTCA - TIM distal LAD - Dr. Homer Levi 4/1/19 12/24/2021 Yes    Non-cardiac chest pain 12/24/2021 Yes    Type 2 diabetes mellitus with chronic kidney disease on chronic dialysis ([address] 12/24/2021 Yes    Anemia 12/24/2021 Yes    Hyponatremia 12/24/2021 Yes          Plan:     Patient status inpatient in the Medical ICU    1. Non cardiac Chest pain: Cardiac cath 2/9/21 showed patent LAD stent with 0%s stenosis in other arteries (see cath report). Troponin mildly elevated but stable. Cardiology evaluated patient, okay for discharge. No ischemic workup needed. 2. New onset Afib with RVR: Coreg 6.25 mg BID. Start Eliquis 5 mg BID. 3. Acute on chronic HFrEF: currently appears compensated. On baseline oxygen requirement. Continue Coreg, will increase losartan.  Stop Nifedapine. Check TSH. Discussed with Cardiology, he can follow up outpatient for JERONIMO/Cardioversion. 4. ESRD on HD TTH: Discussed with Nephrology, okay for discharge- no plans for dialysis today. 5. Diabetes mellitus type II with hyperglycemia: Restart home regimen  6. Hyponatremia: Likely 2/2 increased TBW. Dialysis/diureses  7. NCNC anemia: Anemia of chronic inflammation. Will need iron studies/workup outpatient  8. CAD s/p stent to distal LAD in 2019: Continue ASA 81 mg daily, BB, Statin, ARB. Outpt follow up with cardiology  9. Primary HTN: See above  10. Labs, imaging, ECG reviewed  11. Consultations:   IP CONSULT TO NEPHROLOGY  IP CONSULT TO HOSPITALIST  IP CONSULT TO CARDIOLOGY  IP CONSULT TO CARDIOLOGY     Patient is admitted as inpatient status because of co-morbidities listed above, severity of signs and symptoms as outlined, requirement for current medical therapies and most importantly because of direct risk to patient if care not provided in a hospital setting. Expected length of stay > 48 hours.     Betty Wilson DO  12/24/2021  1:07 PM    Copy sent to Dr. Latosha Lopez

## 2021-12-24 NOTE — ED NOTES
Pt resting comfortably in no acute distress. Respirations even and unlabored. Call light remains within reach. No needs at this time.        Shelby Pope RN  12/24/21 [id number]

## 2021-12-24 NOTE — ED NOTES
The following labs labeled with pt sticker and tubed to lab:     [x] Blue     [x] Lavender   [] on ice  [x] Green/yellow  [] Green/black [] on ice  [] Yellow  [] Red  [] Pink      [] COVID-19 swab    [] Rapid  [] PCR  [] Peds Viral Panel     [] Urine Sample  [] Pelvic Cultures  [] Blood Cultures            Maia Chung RN  12/23/21 [id number]

## 2021-12-24 NOTE — ED PROVIDER NOTES
[facility]     Emergency Department     Faculty Attestation    I performed a history and physical examination of the patient and discussed management with the resident. I have reviewed and agree with the residents findings including all diagnostic interpretations, and treatment plans as written. Any areas of disagreement are noted on the chart. I was personally present for the key portions of any procedures. I have documented in the chart those procedures where I was not present during the key portions. I have reviewed the emergency nurses triage note. I agree with the chief complaint, past medical history, past surgical history, allergies, medications, social and family history as documented unless otherwise noted below. Documentation of the HPI, Physical Exam and Medical Decision Making performed by becka is based on my personal performance of the HPI, PE and MDM. For Physician Assistant/ Nurse Practitioner cases/documentation I have personally evaluated this patient and have completed at least one if not all key elements of the E/M (history, physical exam, and MDM). Additional findings are as noted. Patient with left-sided chest pain nonradiating that started yesterday. Woke him up from sleep tonight. He is chronically short of breath. He is end-stage renal disease dialyzed on Tuesday Thursdays and Saturdays was dialyzed earlier today had a full treatment. States has a history of CAD. Does have stents in place. Denies any nausea, no vomiting, no dizziness or lightheadedness. Pain is not associated with any specific timing. And his shortness of breath is chronic and unchanged.     Patient on exam is well-appearing nontoxic he has an irregularly irregular heart rate, lungs are clear to auscultation bilaterally without any rales wheezes or rhonchi, abdomen is soft nondistended nontender palpation all quadrants no rebound or guarding noted  Patient does have edema noted to his lower extremities, which are chronic and patient states are not worse than normal.      Patient with A. fib RVR, chest pain patient does not have a history of this. We will plan on labs check cardiac labs, rate control, patient will need anticoagulation. EKG Interpretation    Interpreted by me    EKG shows A. fib with RVR with a ventricular rate of 115, left axis deviation, right bundle branch block is noted, Q waves in the inferior leads with inferior infarct of age undetermined.         Nico Dceker D.O, M.P.H  Attending Emergency Medicine Physician         Nico Decker,   12/23/21 [id number]

## 2021-12-24 NOTE — ED NOTES
Pt to ED for chest pain x2 days that radiates up to the left side of his neck. Pt states the pain went away for a short period of time, but came back. Pt was at dialysis today, received the full treatment and was told he was tachycardic. Upon arrival to ED he was 125 bpm, all other VSS. Pt is alert and oriented x4, RR even and nonlabored. He denies nay further needs at this time.       Jocelyn Dobbins RN  12/23/21 [id number]

## 2021-12-24 NOTE — ED PROVIDER NOTES
[address] [facility]  Emergency Department  Emergency Medicine Resident Sign-out     Care of Hiam Person was assumed from Dr. Burton Katz and is being seen for Chest Pain (x 12 hours)  . The patient's initial evaluation and plan have been discussed with the prior provider who initially evaluated the patient.      EMERGENCY DEPARTMENT COURSE / MEDICAL DECISION MAKING:       MEDICATIONS GIVEN:  Orders Placed This Encounter   Medications    nitroGLYCERIN (NITROSTAT) SL tablet 0.4 mg    morphine injection 4 mg    aspirin tablet 325 mg    atorvastatin (LIPITOR) tablet 40 mg    carvedilol (COREG) tablet 6.25 mg    furosemide (LASIX) tablet 40 mg    losartan (COZAAR) tablet 25 mg    NIFEdipine (PROCARDIA XL) extended release tablet 30 mg    pantoprazole (PROTONIX) tablet 40 mg    b complex-C-folic acid (NEPHROCAPS) capsule 1 mg    sodium chloride flush 0.9 % injection 5-40 mL    sodium chloride flush 0.9 % injection 10 mL    0.9 % sodium chloride infusion    OR Linked Order Group     ondansetron (ZOFRAN-ODT) disintegrating tablet 4 mg     ondansetron (ZOFRAN) injection 4 mg    OR Linked Order Group     acetaminophen (TYLENOL) tablet 650 mg     acetaminophen (TYLENOL) suppository 650 mg    nitroGLYCERIN (NITROSTAT) SL tablet 0.4 mg    polyethylene glycol (GLYCOLAX) packet 17 g    heparin (porcine) injection 4,000 Units    heparin (porcine) injection 4,000 Units    heparin (porcine) injection 2,000 Units    heparin 25,000 units in dextrose 5% 250 mL (premix) infusion    iopamidol (ISOVUE-370) 76 % injection 75 mL       LABS / RADIOLOGY:     Labs Reviewed   CBC WITH AUTO DIFFERENTIAL - Abnormal; Notable for the following components:       Result Value    RBC 3.15 (*)     Hemoglobin 9.6 (*)     Hematocrit 31.6 (*)     RDW 15.8 (*)     NRBC Automated 0.3 (*)     Immature Granulocytes 1 (*)     Seg Neutrophils 72 (*)     Lymphocytes 6 (*)     Monocytes 18 (*)     Absolute Lymph # 0.46 (*)     Absolute Mono # 1.39 (*)     All other components within normal limits   BASIC METABOLIC PANEL W/ REFLEX TO MG FOR LOW K - Abnormal; Notable for the following components:    Glucose 240 (*)     BUN 30 (*)     CREATININE 5.12 (*)     Sodium 133 (*)     Chloride 95 (*)     GFR Non- 11 (*)     GFR  14 (*)     All other components within normal limits   TROPONIN - Abnormal; Notable for the following components:    Troponin, High Sensitivity 171 (*)     All other components within normal limits   BRAIN NATRIURETIC PEPTIDE - Abnormal; Notable for the following components:    Pro-BNP 27,610 (*)     All other components within normal limits   TROPONIN - Abnormal; Notable for the following components:    Troponin, High Sensitivity 184 (*)     All other components within normal limits   D-DIMER, QUANTITATIVE   TROPONIN   TROPONIN   APTT   APTT   APTT       CT CHEST PULMONARY EMBOLISM W CONTRAST   Final Result   Prominent motion artifact throughout the exam which is limiting the study. Within the limitations of the exam, no obvious acute pulmonary embolus can be   seen. Mildly dilated and atherosclerotic thoracic aorta which is unchanged with no   obvious aneurysm      Mild cardiomegaly which is unchanged. No obvious acute infiltrate or effusion      Mild ascites which is more prominent. Probable small adrenal adenomas bilaterally which are unchanged. XR CHEST (2 VW)   Final Result   Stable cardiomegaly. Clear lungs. RECENT VITALS:     Temp: 98.2 °F (36.8 °C),  Pulse: 90, Resp: (!) 33, BP: (!) 130/107, SpO2: 93 %    This patient is a 59 y.o. Male with chest pain and chronic shortness of breath, who presented for the symptoms. Patient has a history of end-stage renal disease and NSTEMI 1 year ago that was stented at that time. On arrival, EKG was questionable for A. fib with RVR, but P waves seem to appear when heart rate slows down.   Patient did have dialysis today. Patient currently undergoing chest pain work-up. Initial troponin in the 170's, baseline appears to be one thirties. Creatinine 5.12 today. 2nd troponin I 84. Concern for NSTEMI. Heparin was started and patient is admitted to [facility]. They did request cardiology consult as well for the uptrending tropes. CT PE study was also ordered to rule this out as cause of shortness of breath and chest pain. CT PE was performed, but somewhat limited by motion artifact. OUTSTANDING TASKS / RECOMMENDATIONS:      1. Await Bed placement  2. CT PE, okay'd by nephro     FINAL IMPRESSION:     1. Chest pain, unspecified type        DISPOSITION:       DISPOSITION:  []  Discharge   []  Transfer -    [x]  Admission -  [facility]   []  Against Medical Advice   []  Eloped   FOLLOW-UP: No follow-up provider specified.    DISCHARGE MEDICATIONS: New Prescriptions    No medications on file          Jb Vance DO  Emergency Medicine Resident  [facility]       [place]  Resident  12/24/21 [id number]

## 2021-12-24 NOTE — CONSULTS
Attestation signed by      Attending Physician Statement:    I have discussed the care of  Leann Higginbotham , including pertinent history and exam findings, with the Cardiology fellow/resident. I have seen and examined the patient and the key elements of all parts of the encounter have been performed by me. I agree with the assessment, plan and orders as documented by the fellow/resident, after I modified exam findings and plan of treatments, and the final version is my approved version of the assessment. Additional Comments: Patient presented with chest tightness and SOB, Feels better now. H/O CAD and PCI, Chronically elevated troponin from ESRD. ECG shows new onset Afib, rate controlled. Cath on 2/2021 showed patent stents, continue current medications, add Eliquis 2.5 mg po BID. [facility]    Consult / H&P               Today's Date: 12/24/2021  Patient Name: Leann Higginbotham  Date of admission: 12/23/2021  9:56 PM  Patient's age: 59 y.o., 1960  Admission Dx: Chest pain [R07.9]    Reason for Consult:  Cardiac evaluation    Requesting Physician: Dena Buckley DO    CHIEF COMPLAINT:  Chest pain     History Obtained From:  patient, electronic medical record    HISTORY OF PRESENT ILLNESS:      The patient is a 59 y.o. male who is initially admitted for chest pain and shortness of breath. Cardiology consulted for NSTEMI. Patient does have a significant history of CAD and last cardiac cath was done in February of this year with patent distal LAD stent, some nonobstructive CAD.         Past Medical History:   has a past medical history of Acute congestive heart failure ([address] Acute on chronic diastolic congestive heart failure ([address] Anemia, Anemia of chronic renal failure, AVF (arteriovenous fistula) ([address] Bowel obstruction (HCC), CAD (coronary artery disease), Chest pain at rest, CHF (congestive heart failure) ([address] CHF (congestive heart failure), NYHA class I, acute on chronic, combined ([facility] Chronic kidney disease, CKD (chronic kidney disease) stage 4, GFR 15-29 ml/min ([facility]), Coronary artery disease involving native coronary artery of native heart without angina pectoris, Diabetes mellitus ([facility] Dialysis patient ([facility] ESRD (end stage renal disease) ([facility] Essential hypertension, Groin swelling, Hemodialysis patient ([facility] Hydrocele, Hyperlipidemia, Hypertension, MI, old, NSTEMI (non-ST elevated myocardial infarction) ([facility] BATSHEVA (obstructive sleep apnea), Patient in clinical research study, Pneumonia, Prostatism, Respiratory distress, Rising PSA level, S/P arteriovenous (AV) graft placement, S/P PTCA - TIM distal LAD - Dr. Sisi Poe 4/1/19, Sleep apnea, Small bowel obstruction ([facility] and Type 2 diabetes mellitus with chronic kidney disease on chronic dialysis ([facility] Past Surgical History:   has a past surgical history that includes Foot surgery (Left, 2005); Colonoscopy; Cystoscopy (11/17/2017); Prostate biopsy (N/A, 11/17/2017); Abdomen surgery (2013); Prostate Biopsy (02/16/2018); pr genital surg proc,male unlisted (N/A, 2/16/2018); Tonsillectomy (1968); Vasectomy (1983); Middle ear surgery (1966); Nose surgery (1968); Mouth surgery (2007); AV fistula creation (Left, 02/20/2019); Dialysis fistula creation (Left, 2/20/2019); other surgical history (Left, 03/06/2019); AV fistula repair (07/17/2019); vascular surgery (09/20/2019); Coronary angioplasty with stent (03/31/2019); Dialysis fistula creation (Left, 10/23/2019); Cardiac catheterization (02/09/2021); and Upper gastrointestinal endoscopy (N/A, 9/27/2021). Home Medications:    Prior to Admission medications    Medication Sig Start Date End Date Taking?  Authorizing Provider   glimepiride (AMARYL) 1 MG tablet Take 1 tablet by mouth every morning (before breakfast) Do not take if blood sugars are less than 110 11/14/21   Danielle Bustillo MD   furosemide (LASIX) 40 MG tablet Take 1 tablet by mouth 2 times daily 11/14/21 Tito Jama MD   pantoprazole (PROTONIX) 40 MG tablet Take 1 tablet by mouth every morning (before breakfast) 5/19/21   Ty Hilliard MD   benzonatate (TESSALON) 100 MG capsule take 1 capsule by mouth three times a day if needed for 10 days  Patient not taking: Reported on 11/17/2021 3/3/21   Historical Provider, MD   losartan (COZAAR) 25 MG tablet take 1 tablet by mouth once daily 3/21/21   Historical Provider, MD   guaiFENesin-codeine (GUAIFENESIN AC) 100-10 MG/5ML liquid give 10 milliliters by mouth every 4 hours if needed 3/4/21   Historical Provider, MD   atorvastatin (LIPITOR) 40 MG tablet Take 1 tablet by mouth nightly 2/9/21   Fransisca Sullivan MD   traMADol Wallene Naegeli) 50 MG tablet take 1 tablet by mouth every 6 hours if needed 10/21/20   Historical Provider, MD   aspirin 325 MG tablet Take 325 mg by mouth daily    Historical Provider, MD   calcium carbonate (TUMS) 500 MG chewable tablet Take 1 tablet by mouth daily as needed   Patient not taking: Reported on 11/17/2021    Historical Provider, MD   carvedilol (COREG) 6.25 MG tablet Take 1 tablet by mouth 2 times daily (with meals) 6/19/19   Kaycee Belcher MD   Multiple Vitamins-Minerals (RENAPLEX-D) TABS Take 1 tablet by mouth every other day Pt takes occasionally 5/14/19   Historical Provider, MD   lidocaine-prilocaine (EMLA) 2.5-2.5 % cream  3/18/19   Historical Provider, MD   NIFEdipine (ADALAT CC) 30 MG extended release tablet Take 30 mg by mouth 2 times daily    Historical Provider, MD      Current Facility-Administered Medications: atorvastatin (LIPITOR) tablet 40 mg, 40 mg, Oral, Nightly  carvedilol (COREG) tablet 6.25 mg, 6.25 mg, Oral, BID WC  furosemide (LASIX) tablet 40 mg, 40 mg, Oral, BID  losartan (COZAAR) tablet 25 mg, 25 mg, Oral, Daily  NIFEdipine (PROCARDIA XL) extended release tablet 30 mg, 30 mg, Oral, BID  pantoprazole (PROTONIX) tablet 40 mg, 40 mg, Oral, QAM AC  b complex-C-folic acid (NEPHROCAPS) capsule 1 mg, 1 mg, Oral, Every Other Day  sodium chloride flush 0.9 % injection 5-40 mL, 5-40 mL, IntraVENous, 2 times per day  sodium chloride flush 0.9 % injection 10 mL, 10 mL, IntraVENous, PRN  0.9 % sodium chloride infusion, 25 mL, IntraVENous, PRN  ondansetron (ZOFRAN-ODT) disintegrating tablet 4 mg, 4 mg, Oral, Q8H PRN **OR** ondansetron (ZOFRAN) injection 4 mg, 4 mg, IntraVENous, Q6H PRN  acetaminophen (TYLENOL) tablet 650 mg, 650 mg, Oral, Q6H PRN **OR** acetaminophen (TYLENOL) suppository 650 mg, 650 mg, Rectal, Q6H PRN  nitroGLYCERIN (NITROSTAT) SL tablet 0.4 mg, 0.4 mg, SubLINGual, Q5 Min PRN  polyethylene glycol (GLYCOLAX) packet 17 g, 17 g, Oral, Daily PRN  heparin (porcine) injection 4,000 Units, 4,000 Units, IntraVENous, PRN  heparin (porcine) injection 2,000 Units, 2,000 Units, IntraVENous, PRN  heparin 25,000 units in dextrose 5% 250 mL (premix) infusion, 5-30 Units/kg/hr, IntraVENous, Continuous  [START ON 12/25/2021] aspirin chewable tablet 81 mg, 81 mg, Oral, Daily    Allergies:  Lisinopril    Social History:   reports that he has never smoked. He has never used smokeless tobacco. He reports that he does not drink alcohol and does not use drugs. Family History: family history includes Asthma in his brother; Diabetes in his brother, brother, and brother; Early Death in his mother; Heart Disease in his mother; High Blood Pressure in his brother, brother, and brother. REVIEW OF SYSTEMS:    · Constitutional: there has been no unanticipated weight loss. There's been No change in energy level, No change in activity level. · Eyes: No visual changes or diplopia. No scleral icterus. · ENT: No Headaches  · Cardiovascular: As above, chest pain and shortness of breath  · Respiratory: No previous pulmonary problems, No cough  · Gastrointestinal: No abdominal pain. No change in bowel or bladder habits. · Genitourinary: No dysuria, trouble voiding, or hematuria.   · Musculoskeletal:  No gait disturbance, No weakness or joint complaints. · Integumentary: No rash or pruritis. · Neurological: No headache, diplopia, change in muscle strength, numbness or tingling. No change in gait, balance, coordination, mood, affect, memory, mentation, behavior. · Psychiatric: No anxiety, or depression. · Endocrine: No temperature intolerance. No excessive thirst, fluid intake, or urination. No tremor. · Hematologic/Lymphatic: No abnormal bruising or bleeding, blood clots or swollen lymph nodes. · Allergic/Immunologic: No nasal congestion or hives. PHYSICAL EXAM:      /76   Pulse 88   Temp 98.2 °F (36.8 °C) (Oral)   Resp 28   Wt 270 lb (122.5 kg)   SpO2 95%   BMI 38.74 kg/m²    Constitutional and General Appearance: alert, cooperative, no distress and appears stated age  HEENT: PERRL, no cervical lymphadenopathy. No masses palpable. Normal oral mucosa  Respiratory:  · Normal excursion and expansion without use of accessory muscles  · Resp Auscultation: Good respiratory effort. No for increased work of breathing. On auscultation: clear to auscultation bilaterally  Cardiovascular:  · Irregular. Abdomen:   · No masses or tenderness  · Bowel sounds present  Extremities:  ·  No Cyanosis or Clubbing  ·  Lower extremity edema: No  ·  Skin: Warm and dry  Neurological:  · Alert and oriented. · Moves all extremities well  · No abnormalities of mood, affect, memory, mentation, or behavior are noted      Labs:     CBC:   Recent Labs     12/23/21  2252   WBC 7.7   HGB 9.6*   HCT 31.6*        BMP:   Recent Labs     12/23/21  2252   *   K 4.6   CO2 22   BUN 30*   CREATININE 5.12*   LABGLOM 11*   GLUCOSE 240*     BNP: No results for input(s): BNP in the last 72 hours. PT/INR: No results for input(s): PROTIME, INR in the last 72 hours. APTT:  Recent Labs     12/24/21  0831   APTT 31.6*     CARDIAC ENZYMES:No results for input(s): CKTOTAL, CKMB, CKMBINDEX, TROPONINI in the last 72 hours.   FASTING LIPID PANEL:  Lab Results   Component Value Date    HDL 45 11/12/2021    TRIG 107 11/12/2021     LIVER PROFILE:No results for input(s): AST, ALT, LABALBU in the last 72 hours. DATA:    Diagnostics:    EKG: Right bundle branch block which is old. New A. fib. Echocardiogram:  Mildly dilated left ventricle with moderately reduced systolic function with  EF 35-40%. Evidence of diastolic dysfunction. Left atrium is moderately dilated. Right atrial enlargement. Mildly dilated right ventricle with reduced function. Aortic sclerosis without stenosis. Mild mitral regurgitation. Moderate tricuspid regurgitation. Estimated right ventricular systolic  pressure is 38 mmHg. Mild pulmonic regurgitation.     Cardiac cath 02/09/2021:   Angiographic Findings      Cardiac Arteries and Lesion Findings     LMCA: Normal 0% stenosis.     LAD: Patent distal stent with 30-40% stenosis     LCx: Normal 0% stenosis.     RCA: Normal 0% stenosis. Small, non dominant      Coronary Tree      Dominance: Right     Procedure Summary      Patent LAD stent      Recommendations      Medical treatments   Risk factor modification. IMPRESSION:    1.  NSTEMI likely type II with EKG showing no acute ischemic changes. 2.  New onset A. fib on EKG. [address] of 4 on heparin drip. 3. Chronic systolic and diastolic CHF with LVEF of 35 to 40% on last echocardiogram February 2021. 4.  CAD with previous TIM to distal LAD in 2019 April. 5. ESRD on hemodialysis. 6.  Hypertension. RECOMMENDATIONS:  1. Recommend diuresis as needed. Agree with Lasix 40 twice daily. 2. DC heparin and start Eliquis 2.5 mg po BID. 3. DC nifedipine and uptitrate Coreg. 4. Discharge per primary discretion when acute CHF resolves. Discussed with patient and Nurse.     Maricarmen Orosco MD       Cardiovascular Fellow PGY-4  12/24/2021, 10:20 AM

## 2021-12-24 NOTE — FLOWSHEET NOTE
SPIRITUAL CARE DEPARTMENT - [address]  PROGRESS NOTE    Shift date: 12/24/21  Shift day: Friday   Shift # 1    Room # 22/22   Name: Genesis Ramey            Age: 59 y.o. Gender: male          [employer]:    [facility] of [facility]:     Referral: Routine Visit    Admit Date & Time: 12/23/2021  9:56 PM    PATIENT/EVENT DESCRIPTION:  Genesis Ramey is a 59 y.o. male         SPIRITUAL ASSESSMENT/INTERVENTION:  Patient did not appear to mind  presence. Patient did not elaborate on poonam/spirituality. Patient declined prayer but thanked  for visit. SPIRITUAL CARE FOLLOW-UP PLAN:  Chaplains will remain available to offer spiritual and emotional support as needed.       Electronically signed by Chava Edmondson, on 12/24/2021 at 1:34 PM.  [address] [facility]  [phone]       12/24/21 1333   Encounter Summary   Services provided to: Patient   Referral/Consult From: Rounding   Continue Visiting   (12/24/21)   Complexity of Encounter Low   Length of Encounter 15 minutes   Spiritual Assessment Completed Yes   Spiritual/Muslim   Type Spiritual support   Assessment Passive;Coping   Intervention Sustaining presence/ Ministry of presence   Outcome Receptive

## 2021-12-24 NOTE — ED NOTES
Pt resting comfortably in no acute distress. Respirations even and unlabored. Call light remains within reach. No needs at this time.        Roland Padilla RN  12/24/21 [id number]

## 2021-12-24 NOTE — ED NOTES
Pt resting comfortably in no acute distress. Respirations even and unlabored. Call light remains within reach. No needs at this time.   Pt denies pain      Papo Carroll RN  12/24/21 2349

## 2021-12-24 NOTE — ED PROVIDER NOTES
[address] ED  Emergency Department Encounter  EmergencyMedicine Resident     Pt Name:Sanjay Fields  MRN: [id number]  [place] 1960  Date of evaluation: 12/23/21  PCP:  Rosales Vizcaino [facility]       Chief Complaint   Patient presents with    Chest Pain     x 12 hours       HISTORY OF PRESENT ILLNESS  (Location/Symptom, Timing/Onset, Context/Setting, Quality, Duration, Modifying Factors, Severity.)      Elizabeth Summers is a 59 y.o. male who presents with sudden onset 7/10 left-sided chest pain/heaviness nonradiating starting from rest.  States that it feels like prior episodes however has been lasting a lot longer than normal, still ongoing. Associate with shortness of breath however he states that this is chronic. Denies fever body aches nausea vomiting diarrhea. Received 2 doses Covid vaccine. History of heart failure, non-STEMI, TIM. Does not know which medications he takes but reports that he is taking his home meds as prescribed.   ESRD dialyzed Tuesday Thursday Saturday, reports being dialyzed today with no remarkable events    PAST MEDICAL / SURGICAL / SOCIAL / FAMILY HISTORY      has a past medical history of Acute congestive heart failure (HCC), Acute on chronic diastolic congestive heart failure (HCC), Anemia, Anemia of chronic renal failure, AVF (arteriovenous fistula) ([address] Bowel obstruction (HCC), CAD (coronary artery disease), Chest pain at rest, CHF (congestive heart failure) ([address] CHF (congestive heart failure), NYHA class I, acute on chronic, combined ([address] Chronic kidney disease, CKD (chronic kidney disease) stage 4, GFR 15-29 ml/min ([address] Coronary artery disease involving native coronary artery of native heart without angina pectoris, Diabetes mellitus ([address] Dialysis patient ([address] ESRD (end stage renal disease) ([address] Essential hypertension, Groin swelling, Hemodialysis patient ([address] Hydrocele, Hyperlipidemia, Hypertension, MI, old, NSTEMI (non-ST elevated myocardial infarction) ([facility] BATSHEVA (obstructive sleep apnea), Patient in clinical research study, Pneumonia, Prostatism, Respiratory distress, Rising PSA level, S/P arteriovenous (AV) graft placement, S/P PTCA - TIM distal LAD - Dr. Janeth Butcher 4/1/19, Sleep apnea, Small bowel obstruction ([facility] and Type 2 diabetes mellitus with chronic kidney disease on chronic dialysis ([facility] has a past surgical history that includes Foot surgery (Left, 2005); Colonoscopy; Cystoscopy (11/17/2017); Prostate biopsy (N/A, 11/17/2017); Abdomen surgery (2013); Prostate Biopsy (02/16/2018); pr genital surg proc,male unlisted (N/A, 2/16/2018); Tonsillectomy (1968); Vasectomy (1983); Middle ear surgery (1966); Nose surgery (1968); Mouth surgery (2007); AV fistula creation (Left, 02/20/2019); Dialysis fistula creation (Left, 2/20/2019); other surgical history (Left, 03/06/2019); AV fistula repair (07/17/2019); vascular surgery (09/20/2019); Coronary angioplasty with stent (03/31/2019); Dialysis fistula creation (Left, 10/23/2019); Cardiac catheterization (02/09/2021); and Upper gastrointestinal endoscopy (N/A, 9/27/2021).     Social History     Socioeconomic History    Marital status: Single     Spouse name: Not on file    Number of children: Not on file    Years of education: Not on file    Highest education level: Not on file   Occupational History    Not on file   Tobacco Use    Smoking status: Never Smoker    Smokeless tobacco: Never Used   Vaping Use    Vaping Use: Never used   Substance and Sexual Activity    Alcohol use: No    Drug use: No    Sexual activity: Not on file   Other Topics Concern    Not on file   Social History Narrative    Not on file     Social Determinants of Health     Financial Resource Strain:     Difficulty of Paying Living Expenses: Not on file   Food Insecurity:     Worried About Running Out of Food in the Last Year: Not on file    Frank of Food in the Last Year: Not on file Transportation Needs:     Lack of Transportation (Medical): Not on file    Lack of Transportation (Non-Medical): Not on file   Physical Activity:     Days of Exercise per Week: Not on file    Minutes of Exercise per Session: Not on file   Stress:     Feeling of Stress : Not on file   Social Connections:     Frequency of Communication with Friends and Family: Not on file    Frequency of Social Gatherings with Friends and Family: Not on file    Attends Bahai Services: Not on file    Active Member of Clubs or Organizations: Not on file    Attends Club or Organization Meetings: Not on file    Marital Status: Not on file   Intimate Partner Violence:     Fear of Current or Ex-Partner: Not on file    Emotionally Abused: Not on file    Physically Abused: Not on file    Sexually Abused: Not on file   Housing Stability:     Unable to Pay for Housing in the Last Year: Not on file    Number of [place] in the Last Year: Not on file    Unstable Housing in the Last Year: Not on file       Family History   Problem Relation Age of Onset    Heart Disease Mother         CHF    Early Death Mother     High Blood Pressure Brother     Diabetes Brother     Asthma Brother     High Blood Pressure Brother     Diabetes Brother     High Blood Pressure Brother     Diabetes Brother        Allergies:  Lisinopril    Home Medications:  Prior to Admission medications    Medication Sig Start Date End Date Taking?  Authorizing Provider   apixaban (ELIQUIS) 5 MG TABS tablet Take 1 tablet by mouth 2 times daily 12/24/21  Yes Donis Chao DO   aspirin EC 81 MG EC tablet Take 1 tablet by mouth daily 12/24/21  Yes Donis Chao DO   carvedilol (COREG) 12.5 MG tablet Take 1 tablet by mouth 2 times daily (with meals) 12/24/21  Yes Donis Chao DO   losartan (COZAAR) 50 MG tablet Take 1 tablet by mouth daily 12/25/21  Yes Donis Chao DO   glimepiride (AMARYL) 1 MG tablet Take 1 tablet by mouth every morning (before breakfast) Do not take if blood sugars are less than 110 11/14/21   Gerardo Miller MD   furosemide (LASIX) 40 MG tablet Take 1 tablet by mouth 2 times daily 11/14/21   Gerardo Miller MD   pantoprazole (PROTONIX) 40 MG tablet Take 1 tablet by mouth every morning (before breakfast) 5/19/21   Anil Tenorio MD   benzonatate (TESSALON) 100 MG capsule take 1 capsule by mouth three times a day if needed for 10 days  Patient not taking: Reported on 11/17/2021 3/3/21   Historical Provider, MD   guaiFENesin-codeine (GUAIFENESIN AC) 100-10 MG/5ML liquid give 10 milliliters by mouth every 4 hours if needed 3/4/21   Historical Provider, MD   atorvastatin (LIPITOR) 40 MG tablet Take 1 tablet by mouth nightly 2/9/21   Johnathan cOonnor MD   traMADol Lubna Mireles) 50 MG tablet take 1 tablet by mouth every 6 hours if needed 10/21/20   Historical Provider, MD   calcium carbonate (TUMS) 500 MG chewable tablet Take 1 tablet by mouth daily as needed   Patient not taking: Reported on 11/17/2021    Historical Provider, MD   Multiple Vitamins-Minerals (RENAPLEX-D) TABS Take 1 tablet by mouth every other day Pt takes occasionally 5/14/19   Historical Provider, MD   lidocaine-prilocaine (EMLA) 2.5-2.5 % cream  3/18/19   Historical Provider, MD       REVIEW OF SYSTEMS    (2-9 systems for level 4, 10 or more for level 5)      Review of Systems   Constitutional: Negative for fever. HENT: Negative for congestion. Eyes: Negative for photophobia. Respiratory: Positive for shortness of breath. Cardiovascular: Positive for chest pain. Gastrointestinal: Negative for abdominal pain and vomiting. Endocrine: Negative for polyuria. Genitourinary: Negative for dysuria. Musculoskeletal: Negative for arthralgias. Skin: Negative for color change. Allergic/Immunologic: Negative for immunocompromised state. Neurological: Negative for dizziness. Hematological: Does not bruise/bleed easily.    Psychiatric/Behavioral: Negative for agitation. PHYSICAL EXAM   (up to 7 for level 4, 8 or more for level 5)      INITIAL VITALS:   /84   Pulse 62   Temp 98.2 °F (36.8 °C) (Oral)   Resp 16   Wt 270 lb (122.5 kg)   SpO2 98%   BMI 38.74 kg/m²     Physical Exam  Constitutional:       General: Not in acute distress. Appearance: Normal appearance. Obese weight. Not toxic-appearing. HENT:      Head: Normocephalic and atraumatic. Nose: Nose normal.      Mouth/Throat: Mucous membranes are moist.  Uvula midline no oropharyngeal edema. Pharynx: Oropharynx is clear. Eyes:      Extraocular Movements: Extraocular movements intact. Conjunctiva/sclera: Conjunctivae normal.      Pupils: Pupils are equal, round, and reactive to light. Neck:      Musculoskeletal: Normal range of motion and neck supple. No neck rigidity. Cardiovascular:      Rate and Rhythm: Normal rate and regular rhythm. Pulses: Normal pulses. Heart sounds: Normal heart sounds. No murmur. Pulmonary:      Effort: Pulmonary effort is normal.      Breath sounds: Normal breath sounds. No wheezing. Abdominal:      General: Abdomen is flat. Bowel sounds are normal.      Tenderness: There is no abdominal tenderness. Musculoskeletal:     Normal range of motion. General: No swelling or tenderness. No LE edema    Skin:     General: Skin is warm. Capillary Refill: Capillary refill takes less than 2 seconds. Coloration: Skin is not jaundiced. Neurological:      General: No focal deficit present. Mental Status: Alert and oriented to person, place, and time. Mental status is at baseline. Motor: No weakness.        DIFFERENTIAL  DIAGNOSIS     PLAN (LABS / IMAGING / EKG):  Orders Placed This Encounter   Procedures    XR CHEST (2 VW)    CT CHEST PULMONARY EMBOLISM W CONTRAST    CBC Auto Differential    Basic Metabolic Panel w/ Reflex to MG    Troponin    Brain Natriuretic Peptide    D-DIMER, QUANTITATIVE    Troponin    TSH with Reflex    AFL - Arturo Palacio MD, Cardiology, [facility], THE ADULT DIET;  Clear Liquid; 1500 ml; No Caffeine    Vital signs per unit routine    Notify physician    Notify Cardiologist    Bedrest    Daily weights    Intake and output    Place intermittent pneumatic compression device    Telemetry monitoring - continuous duration    Height and weight    Full Code    Inpatient consult to Nephrology    Inpatient consult to Hospitalist    Inpatient consult to Cardiology    Consult to Cardiology    Initiate Oxygen Therapy Protocol    Pulse oximetry, continuous    EKG 12 Lead    EKG 12 lead    EKG 12 lead    PATIENT STATUS (FROM ED OR OR/PROCEDURAL) Inpatient    Discharge patient       MEDICATIONS ORDERED:  Orders Placed This Encounter   Medications    nitroGLYCERIN (NITROSTAT) SL tablet 0.4 mg    morphine injection 4 mg    DISCONTD: aspirin tablet 325 mg    atorvastatin (LIPITOR) tablet 40 mg    DISCONTD: carvedilol (COREG) tablet 6.25 mg    furosemide (LASIX) tablet 40 mg    DISCONTD: losartan (COZAAR) tablet 25 mg    DISCONTD: NIFEdipine (PROCARDIA XL) extended release tablet 30 mg    pantoprazole (PROTONIX) tablet 40 mg    b complex-C-folic acid (NEPHROCAPS) capsule 1 mg    sodium chloride flush 0.9 % injection 5-40 mL    sodium chloride flush 0.9 % injection 10 mL    0.9 % sodium chloride infusion    OR Linked Order Group     ondansetron (ZOFRAN-ODT) disintegrating tablet 4 mg     ondansetron (ZOFRAN) injection 4 mg    OR Linked Order Group     acetaminophen (TYLENOL) tablet 650 mg     acetaminophen (TYLENOL) suppository 650 mg    nitroGLYCERIN (NITROSTAT) SL tablet 0.4 mg    polyethylene glycol (GLYCOLAX) packet 17 g    heparin (porcine) injection 4,000 Units    heparin (porcine) injection 4,000 Units    heparin (porcine) injection 2,000 Units    DISCONTD: heparin 25,000 units in dextrose 5% 250 mL (premix) infusion    iopamidol (ISOVUE-370) 76 % injection 75 mL    aspirin chewable tablet 81 mg    losartan (COZAAR) tablet 50 mg    apixaban (ELIQUIS) tablet 5 mg    apixaban (ELIQUIS) 5 MG TABS tablet     Sig: Take 1 tablet by mouth 2 times daily     Dispense:  60 tablet     Refill:  5    carvedilol (COREG) tablet 12.5 mg    aspirin EC 81 MG EC tablet     Sig: Take 1 tablet by mouth daily     Dispense:  90 tablet     Refill:  1    carvedilol (COREG) 12.5 MG tablet     Sig: Take 1 tablet by mouth 2 times daily (with meals)     Dispense:  60 tablet     Refill:  3    losartan (COZAAR) 50 MG tablet     Sig: Take 1 tablet by mouth daily     Dispense:  30 tablet     Refill:  3           DIAGNOSTIC RESULTS / EMERGENCY DEPARTMENT COURSE / MDM     LABS:  Results for orders placed or performed during the hospital encounter of 12/23/21   CBC Auto Differential   Result Value Ref Range    WBC 7.7 3.5 - 11.3 k/uL    RBC 3.15 (L) 4.21 - 5.77 m/uL    Hemoglobin 9.6 (L) 13.0 - 17.0 g/dL    Hematocrit 31.6 (L) 40.7 - 50.3 %    .3 82.6 - 102.9 fL    MCH 30.5 25.2 - 33.5 pg    MCHC 30.4 28.4 - 34.8 g/dL    RDW 15.8 (H) 11.8 - 14.4 %    Platelets 561 412 - 374 k/uL    MPV 9.9 8.1 - 13.5 fL    NRBC Automated 0.3 (H) 0.0 per 100 WBC    Differential Type NOT REPORTED     WBC Morphology NOT REPORTED     RBC Morphology NOT REPORTED     Platelet Estimate NOT REPORTED     Immature Granulocytes 1 (H) 0 %    Seg Neutrophils 72 (H) 36 - 65 %    Lymphocytes 6 (L) 24 - 43 %    Monocytes 18 (H) 3 - 12 %    Eosinophils % 2 1 - 4 %    Basophils 1 0 - 2 %    Absolute Immature Granulocyte 0.08 0.00 - 0.30 k/uL    Segs Absolute 5.54 1.50 - 8.10 k/uL    Absolute Lymph # 0.46 (L) 1.10 - 3.70 k/uL    Absolute Mono # 1.39 (H) 0.10 - 1.20 k/uL    Absolute Eos # 0.15 0.00 - 0.44 k/uL    Basophils Absolute 0.08 0.00 - 0.20 k/uL    Morphology ANISOCYTOSIS PRESENT    Basic Metabolic Panel w/ Reflex to MG   Result Value Ref Range    Glucose 240 (H) 70 - 99 mg/dL    BUN 30 (H) 8 - 23 mg/dL    CREATININE 5.12 (HH) 0.70 - 1.20 mg/dL    Bun/Cre Ratio NOT REPORTED 9 - 20    Calcium 9.4 8.6 - 10.4 mg/dL    Sodium 133 (L) 135 - 144 mmol/L    Potassium 4.6 3.7 - 5.3 mmol/L    Chloride 95 (L) 98 - 107 mmol/L    CO2 22 20 - 31 mmol/L    Anion Gap 16 9 - 17 mmol/L    GFR Non-African American 11 (L) >60 mL/min    GFR  14 (L) >60 mL/min    GFR Comment          GFR Staging NOT REPORTED    Troponin   Result Value Ref Range    Troponin, High Sensitivity 171 (HH) 0 - 22 ng/L    Troponin T NOT REPORTED <0.03 ng/mL    Troponin Interp NOT REPORTED    Brain Natriuretic Peptide   Result Value Ref Range    Pro-BNP 27,610 (H) <300 pg/mL    BNP Interpretation NOT REPORTED    D-DIMER, QUANTITATIVE   Result Value Ref Range    D-Dimer, Quant 6.40 mg/L FEU   Troponin   Result Value Ref Range    Troponin, High Sensitivity 184 (HH) 0 - 22 ng/L    Troponin T NOT REPORTED <0.03 ng/mL    Troponin Interp NOT REPORTED    Troponin   Result Value Ref Range    Troponin, High Sensitivity 188 (HH) 0 - 22 ng/L    Troponin T NOT REPORTED <0.03 ng/mL    Troponin Interp NOT REPORTED    APTT   Result Value Ref Range    PTT 31.6 (H) 20.5 - 30.5 sec   TSH with Reflex   Result Value Ref Range    TSH 1.90 0.30 - 5.00 mIU/L   EKG 12 Lead   Result Value Ref Range    Ventricular Rate 76 BPM    Atrial Rate 234 BPM    QRS Duration 150 ms    Q-T Interval 480 ms    QTc Calculation (Bazett) 540 ms    P Axis -96 degrees    R Axis -72 degrees    T Axis 73 degrees         RADIOLOGY:  XR CHEST (2 VW)    Result Date: 12/23/2021  EXAMINATION: TWO XRAY VIEWS OF THE CHEST 12/23/2021 10:16 pm COMPARISON: 11/11/2021 HISTORY: ORDERING SYSTEM PROVIDED HISTORY: chest pain sob TECHNOLOGIST PROVIDED HISTORY: chest pain sob Reason for Exam: CP, SOB FINDINGS: The heart is enlarged and unchanged. The lungs are clear. No pneumothorax or pleural fluid. No acute bone finding. Stable cardiomegaly. Clear lungs.        EKG  EKG Interpretation    Interpreted by me    Rhythm: Atrial fibrillation  Rate: 115  Axis: Left  Ectopy: none  Conduction: Right bundle branch block  ST Segments: no acute change  T Waves: no acute change  Q Waves: none    A. fib RVR    All EKG's are interpreted by the Emergency Department Physician who either signs or Co-signs this chart in the absence of a cardiologist.    EMERGENCY DEPARTMENT COURSE:  Patient breathing quietly and unlabored on room air. Speech is normal and speaking in full sentences without requiring to pause to take a breath. No adventitious or symptoms  Initially tachycardic rate 115. No discernible P waves concerning for atrial RVR, there is right bundle branch block left axis deviation EKG appears similar to prior done in November however there are discernible P waves in the EKG. Concern for new onset A. fib RVR, patient heart rate irregular, does consistently go into the 80s and 90s with occasional 110s. Will not treat at this time. Will do MI ACS work-up with a D-dimer. Likely admission. Last February patient had echo EF about 35% with some pulmonary regurgitation aortic stenosis, cath also was done showed about 30% in-stent restenosis. Morphine and SL nitro for pain control    Creatinine over 5, troponin elevated at 170 however patient does have elevated troponins in the 100s. D-dimer elevated. Will do CT PE rule out, consult nephrology due to pending contrast load. PE scan ok per DR Vesta nava    Signed out to dr Bre Redd:  None    CONSULTS:  IP CONSULT TO NEPHROLOGY  IP CONSULT TO HOSPITALIST  IP CONSULT TO CARDIOLOGY  IP CONSULT TO CARDIOLOGY    CRITICAL CARE:  None    FINAL IMPRESSION      1. Chest pain, unspecified type    2.  New onset atrial fibrillation [facility])          DISPOSITION / PLAN     DISPOSITION Decision To Discharge 12/24/2021 02:59:02 PM      PATIENT REFERRED TO:  Pranav Leblanc MD  [facility] [address] [facility] [address]  [phone]    Schedule an appointment as soon as possible for a visit  JERONIMO cardioversion for new onset atrial fibrillation    [address]  [address]  [phone]    Schedule an appointment as soon as possible for a visit  Hospital follow up for Afib, HTN, Diabetes, ESRD    [facility]  [address] [phone]      Regularly scheduled dialysis     Andreina Barfield MD  [address] [phone]            DISCHARGE MEDICATIONS:  Discharge Medication List as of 12/24/2021  2:58 PM      START taking these medications    Details   apixaban (ELIQUIS) 5 MG TABS tablet Take 1 tablet by mouth 2 times daily, Disp-60 tablet, R-5Normal      aspirin EC 81 MG EC tablet Take 1 tablet by mouth daily, Disp-90 tablet, R-1Normal             Danny Baeza MD  Emergency Medicine Resident    (Please note that portions of thisnote were completed with a voice recognition program.  Efforts were made to edit the dictations but occasionally words are mis-transcribed.)       Danny Baeza MD  Resident  12/24/21 [id number]

## 2021-12-24 NOTE — ED NOTES
The following labs labeled with pt sticker and tubed to lab:     [] Blue     [] Lavender   [] on ice  [x] Green/yellow  [] Green/black [] on ice  [] Yellow  [] Red  [] Pink      [] COVID-19 swab    [] Rapid  [] PCR  [] Peds Viral Panel     [] Urine Sample  [] Pelvic Cultures  [] Blood Cultures            Mike Estes RN  12/24/21 0013

## 2021-12-24 NOTE — ED NOTES
Pt resting comfortably in no acute distress. Respirations even and unlabored. Call light remains within reach. No needs at this time. mealtray provided.      Lexie Danielle RN  12/24/21 [id number]

## 2021-12-24 NOTE — ED NOTES
The following labs labeled with pt sticker and tubed to lab:     [x] Blue     [] Lavender   [] on ice  [] Green/yellow  [] Green/black [] on ice  [] Yellow  [] Red  [] Pink      [] COVID-19 swab    [] Rapid  [] PCR  [] Peds Viral Panel     [] Urine Sample  [] Pelvic Cultures  [] Blood Cultures          Marlo Edmond RN  12/24/21 7551

## 2021-12-24 NOTE — CONSULTS
Renal Consult Note    Patient :  Yamel Dunne; 59 y.o. MRN# [id number]  Location:  22/22  Attending:  Lissy Wood DO  Admit Date:  12/23/2021   Hospital Day: 0    Reason for Consult:     Asked by Dr Lissy Wood DO to see for JAGRUTI/Elevated Creatinine. History Obtained From:     patient    History of Present Illness:     Yamel Dunne; 59 y.o. male with past medical history of ESRD on TTS schedule, anemia of chronic disease, secondary hyperparathyroidism, hypertension, CAD s/p stent to LAD on 04/2019 who is presenting to the hospital with chest pain on the left side ongoing for the last 12 hours. The pain is 5/10 in intensity, feels like pressure sensations on the chest, nonradiating, no associated nausea, vomiting and or cold sweats on the forehead. Is per the patient there are no obvious aggravating and alleviating factors. Patient had similar chest pain in October 2021 and heart cath was done which showed patent stent to LAD and minimal nonobstructive CAD and patient was continued on the medical treatment. CT pulmonary angiogram was done to rule out pulmonary embolism which did not showed pulmonary embolism but nephrology was consulted to do hemodialysis after the CT pulmonary angiogram for contrast removal as patient is also making 1-1/2 cup of urine every day. Past History/Allergies? Social History:     Past Medical History:   Diagnosis Date    Acute congestive heart failure ([address] 12/21/2016    Acute on chronic diastolic congestive heart failure ([address] 11/24/2018    Anemia 11/25/2018    Anemia of chronic renal failure 10/17/2016    [facility]arteriovenous fistula) [facility]     Bowel obstruction ([address] 12/26/2013    CAD (coronary artery disease) 2013    ?     Chest pain at rest 12/21/2016    CHF (congestive heart failure) ([address] 2013    last episode 11/2018    CHF (congestive heart failure), NYHA class I, acute on chronic, combined ([address] 2/9/2021    Chronic kidney disease     CKD (chronic kidney disease) stage 4, GFR 15-29 ml/min ([facility]) 10/17/2016    Coronary artery disease involving native coronary artery of native heart without angina pectoris     Diabetes mellitus ([facility] 2012    NIDDM    Dialysis patient ([facility]     Bonnie Haines  /  Jessica Thibodeaux  /  Alycia Lloyd    ESRD (end stage renal disease) ([facility]     Essential hypertension 11/25/2018    Groin swelling 07/17/2019    Hemodialysis patient ([facility] 11/28/2018    TUNNELD CATHETER RIGHT DIALYSIS ACCESSTUES THURS AND AT [facility]    Hydrocele 07/17/2019    Hyperlipidemia 2013    ON RX    Hypertension 2013    ON RX    MI, old 12/26/2013    NSTEMI (non-ST elevated myocardial infarction) ([facility] 6/17/2019    BATSHEVA (obstructive sleep apnea)     Patient in clinical research study 04/01/2019    XIENCE SHORT DAPT    Pneumonia     Prostatism 11/17/2017    Respiratory distress 11/25/2018    Rising PSA level 11/17/2017    S/P arteriovenous (AV) graft placement     S/P PTCA - TIM distal LAD - Dr. Art Kolb 4/1/19 4/1/2019    Sleep apnea 2015    pt has machine and does not use it    Small bowel obstruction ([facility] 5/1/2021    Type 2 diabetes mellitus with chronic kidney disease on chronic dialysis ([facility] 10/17/2016       Past Surgical History:   Procedure Laterality Date    ABDOMEN SURGERY  2013    BOWEL OBSTRUCTION    AV FISTULA CREATION Left 02/20/2019    AV FISTULA REPAIR  07/17/2019    AV fistula revision, branch ligation / Percutaneous angioplasty of proximal anastomosis and outflow tract of dialysis circuit with drug coated balloon  /  Dr Jose Guadalupe Pritchard  02/09/2021    Dr. Nader Stinson, [address]  03/31/2019    TIM LAD    CYSTOSCOPY  11/17/2017    DIALYSIS FISTULA CREATION Left 2/20/2019    AV FISTULA CREATION ARM performed by Cameron Alicia MD at [address] [place] [address] Left 10/23/2019    LEFT UPPER EXTREMITY AV GRAFT CREATION WITH [id number] ARTEGRAFT, LEFT UPPER EXTREMITY AV FISTULA LIGATION performed by Tomasz Spangler MD at [facility] Left 2005 [facility]    EAR DRUM REPAIRED    MOUTH SURGERY  2007    1 WISDOM TOOTH REMOVED    NOSE SURGERY  1968    CAUTERY TO STOP NOSE BEEDS    OTHER SURGICAL HISTORY Left 03/06/2019    fistulagram    MN GENITAL SURG PROC,MALE UNLISTED N/A 2/16/2018    US  PROSTATE BIOPSY WITH SATURATION performed by Ashtyn Fraga MD at [address] [place] [address] N/A 11/17/2017    CYSTOSCOPY PROSTATE US BIOPSY performed by Ashtyn Fraga MD at [address] [place] [address]  02/16/2018    TONSILLECTOMY  1968    UPPER GASTROINTESTINAL ENDOSCOPY N/A 9/27/2021    EGD BIOPSY performed by Stacie Torrez MD at [address] [place] [address]  09/20/2019    LUE FISTULAGRAM  /  DR Minus Floor  /  Findings: Severe stenosis of proximal cephalic vein. / Will plan for LUE AVG placement.  VASECTOMY  1983        Allergies   Allergen Reactions    Lisinopril Swelling       Social History     Socioeconomic History    Marital status: Single     Spouse name: Not on file    Number of children: Not on file    Years of education: Not on file    Highest education level: Not on file   Occupational History    Not on file   Tobacco Use    Smoking status: Never Smoker    Smokeless tobacco: Never Used   Vaping Use    Vaping Use: Never used   Substance and Sexual Activity    Alcohol use: No    Drug use: No    Sexual activity: Not on file   Other Topics Concern    Not on file   Social History Narrative    Not on file     Social Determinants of Health     Financial Resource Strain:     Difficulty of Paying Living Expenses: Not on file   Food Insecurity:     Worried About Running Out of Food in the Last Year: Not on file    Frank of Food in the Last Year: Not on file   Transportation Needs:     Lack of Transportation (Medical):  Not on file    Lack of Transportation (Non-Medical): Not on file   Physical Activity:     Days of Exercise per Week: Not on file    Minutes of Exercise per Session: Not on file   Stress:     Feeling of Stress : Not on file   Social Connections:     Frequency of Communication with Friends and Family: Not on file    Frequency of Social Gatherings with Friends and Family: Not on file    Attends Mandaeism Services: Not on file    Active Member of Clubs or Organizations: Not on file    Attends Club or Organization Meetings: Not on file    Marital Status: Not on file   Intimate Partner Violence:     Fear of Current or Ex-Partner: Not on file    Emotionally Abused: Not on file    Physically Abused: Not on file    Sexually Abused: Not on file   Housing Stability:     Unable to Pay for Housing in the Last Year: Not on file    Number of [place] in the Last Year: Not on file    Unstable Housing in the Last Year: Not on file       Family History:        Family History   Problem Relation Age of Onset    Heart Disease Mother         CHF    Early Death Mother     High Blood Pressure Brother     Diabetes Brother     Asthma Brother     High Blood Pressure Brother     Diabetes Brother     High Blood Pressure Brother     Diabetes Brother        Outpatient Medications:     Not in a hospital admission.     Current Medications:     Scheduled Meds:    aspirin  325 mg Oral Daily    atorvastatin  40 mg Oral Nightly    carvedilol  6.25 mg Oral BID WC    furosemide  40 mg Oral BID    losartan  25 mg Oral Daily    NIFEdipine  30 mg Oral BID    pantoprazole  40 mg Oral QAM AC    b complex-C-folic acid  1 mg Oral Every Other Day    sodium chloride flush  5-40 mL IntraVENous 2 times per day     Continuous Infusions:    sodium chloride      heparin (PORCINE) Infusion 8.17 Units/kg/hr (12/24/21 0798)     PRN Meds:  sodium chloride flush, sodium chloride, ondansetron **OR** ondansetron, acetaminophen **OR** acetaminophen, nitroGLYCERIN, polyethylene glycol, heparin (porcine), heparin (porcine)    Review of Systems:     Constitutional: No fever, no chills, no lethargy, no weakness. HEENT:  No headache, otalgia, itchy eyes, nasal discharge or sore throat. Cardiac:  Positive for chest pain and shortness of breath  Chest:   No cough, phlegm or wheezing. Abdomen:  No abdominal pain, nausea or vomiting. Neuro:  No focal weakness, abnormal movements or seizure like activity. Skin:   No rashes, no itching. :   No hematuria, no pyuria, no dysuria, no flank pain. Extremities:  No swelling or joint pains. ROS was otherwise negative except as mentioned in the [address] Input/Output:       No intake/output data recorded. Vital Signs:   Temperature:  Temp: 98.2 °F (36.8 °C)  TMax:   Temp (24hrs), Av.2 °F (36.8 °C), Min:98.2 °F (36.8 °C), Max:98.2 °F (36.8 °C)    Respirations:  Resp: (!) 33  Pulse:   Pulse: 98  BP:    BP: 131/87  BP Range: Systolic (05UMR), IT , Min:118 , CGW:485       Diastolic (41BXI), TVO:87, Min:69, Max:107      Physical Examination:     General:  AAO x 3, speaking in full sentences, no accessory muscle use. HEENT: Atraumatic, normocephalic, no throat congestion, moist mucosa. Eyes:   Pupils equal, round and reactive to light, EOMI. Neck:   Supple  Chest:   Crepitations in both lower bases more on the left side. Cardiac:  S1 S2 RR, no murmurs, gallops or rubs. Abdomen: Soft, non-tender, no masses or organomegaly, BS audible. :   No suprapubic or flank tenderness. Neuro:  AAO x 3, No FND. SKIN:  No rashes, good skin turgor. Extremities:  No edema.     Labs:       Recent Labs     21  2252   WBC 7.7   RBC 3.15*   HGB 9.6*   HCT 31.6*   .3   MCH 30.5   MCHC 30.4   RDW 15.8*      MPV 9.9      BMP:   Recent Labs     21  2252   *   K 4.6   CL 95*   CO2 22   BUN 30*   CREATININE 5.12*   GLUCOSE 240*   CALCIUM 9.4      Phosphorus:   No results for input(s): PHOS in the last 72 hours.  Magnesium:  No results for input(s): MG in the last 72 hours. Albumin:  No results for input(s): LABALBU in the last 72 hours. BNP:      Lab Results   Component Value Date     09/08/2018     TATIANA:    No results found for: TATIANA  SPEP:  Lab Results   Component Value Date    PROT 8.0 11/12/2021     UPEP:   No results found for: LABPE  C3:   No results found for: C3  C4:   No results found for: C4  MPO ANCA:     Lab Results   Component Value Date    MPO 25 09/14/2016     PR3 ANCA:     Lab Results   Component Value Date    PR3 51 09/14/2016     Anti-GBM:     Lab Results   Component Value Date    GBMABIGG 13 09/14/2016     Hep BsAg:         Lab Results   Component Value Date    HEPBSAG NONREACTIVE 11/13/2021     Hep C AB:          Lab Results   Component Value Date    HEPCAB NONREACTIVE 11/13/2021       Urinalysis/Chemistries:      Lab Results   Component Value Date    NITRU NEGATIVE 10/26/2021    COLORU Dark Yellow 10/26/2021    PHUR 6.5 10/26/2021    WBCUA 0 TO 2 10/26/2021    RBCUA 0 TO 2 10/26/2021    MUCUS 1+ 10/26/2021    TRICHOMONAS NOT REPORTED 10/26/2021    YEAST NOT REPORTED 10/26/2021    BACTERIA FEW 10/26/2021    SPECGRAV 1.018 10/26/2021    LEUKOCYTESUR NEGATIVE 10/26/2021    UROBILINOGEN Normal 10/26/2021    BILIRUBINUR NEGATIVE 10/26/2021    GLUCOSEU TRACE 10/26/2021    KETUA TRACE 10/26/2021    AMORPHOUS NOT REPORTED 10/26/2021     Urine Sodium:   No results found for: JEFFERSON  Urine Potassium:  No results found for: KUR  Urine Chloride:  No results found for: CLUR  Urine Osmolarity: No results found for: OSMOU  Urine Protein:   No results found for: TPU  Urine Creatinine:     Lab Results   Component Value Date    LABCREA 73.8 11/25/2018     UPC:     Urine Eosinophils:  No components found for: UEOS    Radiology:     Reviewed. Assessment:     1. ESRD on hemodialysis on TTS schedule at [facility] hemodialysis facility using left AV fistula under the care of Dr. Shelia Patino.   Dry weight is 118 kg admitted with 122.5 kg.  2. Anemia of chronic disease  3. Secondary hyperparathyroidism  4. Hypertension  5. CAD s/p stent to LAD on 04/2019  6. Hyponatremia  7. New onset atrial fibrillation with rapid ventricular response  8. Bilateral adrenal adenomas on CT pulmonary angiogram  9. NSTEMI  Plan:   1. Next hemodialysis on Sunday. Running a different schedule because of holidays  2. Maintain intake output record  3. Avoid nephrotoxic drugs as patient is making some urine  4. Renally dose the medications  5. Further recommendations to follow    Nutrition   Please ensure that patient is on a renal diet/TF. Avoid nephrotoxic drugs/contrast exposure. Thank you for the consultation. Please do not hesitate to contact us for any further questions/concerns. We will continue to follow along with you. Zuleyka Dobson MD  Resident internal medicine, PGY [facility].  9:00 AM 12/24/21    This note is created with the assistance of a speech-recognition program. While intending to generate a document that actually reflects the content of the visit, no guarantees can be provided that every mistake has been identified and corrected by editing. Attending Physician Statement  I have discussed the care of Nadine Newell, including pertinent history and exam findings,  with the Fellow/Residentt. I have reviewed the key elements of all parts of the encounter with the Fellow/ Resident. I agree with the assessment, plan and orders as documented by the resident.     Kacie Ravi MD MD, [facility], [facility] Center[facility] Patricia Galindo, FACP   12/24/2021 2:43 PM    Nephrology [address] [place] [address]

## 2021-12-24 NOTE — ED NOTES
Pt resting comfortably in no acute distress. Respirations even and unlabored. Call light remains within reach. No needs at this time.        Venita Gosselin, RN  12/24/21 [id number]

## 2021-12-27 LAB
EKG ATRIAL RATE: 234 BPM
EKG ATRIAL RATE: 93 BPM
EKG P AXIS: -96 DEGREES
EKG Q-T INTERVAL: 398 MS
EKG Q-T INTERVAL: 480 MS
EKG QRS DURATION: 150 MS
EKG QRS DURATION: 154 MS
EKG QTC CALCULATION (BAZETT): 540 MS
EKG QTC CALCULATION (BAZETT): 550 MS
EKG R AXIS: -72 DEGREES
EKG R AXIS: -74 DEGREES
EKG T AXIS: 73 DEGREES
EKG T AXIS: 82 DEGREES
EKG VENTRICULAR RATE: 115 BPM
EKG VENTRICULAR RATE: 76 BPM

## 2021-12-27 PROCEDURE — 93010 ELECTROCARDIOGRAM REPORT: CPT | Performed by: INTERNAL MEDICINE

## 2021-12-31 ENCOUNTER — HOSPITAL ENCOUNTER (INPATIENT)
Age: 61
LOS: 5 days | Discharge: HOME | DRG: 252 | End: 2022-01-05
Attending: EMERGENCY MEDICINE | Admitting: INTERNAL MEDICINE
Payer: MEDICARE

## 2021-12-31 ENCOUNTER — APPOINTMENT (OUTPATIENT)
Dept: GENERAL RADIOLOGY | Age: 61
DRG: 252 | End: 2021-12-31
Payer: MEDICARE

## 2021-12-31 DIAGNOSIS — T82.898A ARTERIOVENOUS FISTULA OCCLUSION, INITIAL ENCOUNTER (HCC): Primary | ICD-10-CM

## 2021-12-31 DIAGNOSIS — Z99.2 CKD (CHRONIC KIDNEY DISEASE) REQUIRING CHRONIC DIALYSIS (HCC): ICD-10-CM

## 2021-12-31 DIAGNOSIS — N18.6 CKD (CHRONIC KIDNEY DISEASE) REQUIRING CHRONIC DIALYSIS (HCC): ICD-10-CM

## 2021-12-31 LAB
-: ABNORMAL
AMORPHOUS: ABNORMAL
ANION GAP SERPL CALCULATED.3IONS-SCNC: 18 MMOL/L (ref 9–17)
BACTERIA: ABNORMAL
BILIRUBIN URINE: ABNORMAL
BNP INTERPRETATION: ABNORMAL
BUN BLDV-MCNC: 36 MG/DL (ref 8–23)
BUN/CREAT BLD: ABNORMAL (ref 9–20)
CALCIUM SERPL-MCNC: 9.4 MG/DL (ref 8.6–10.4)
CASTS UA: ABNORMAL /LPF (ref 0–2)
CASTS UA: ABNORMAL /LPF (ref 0–2)
CHLORIDE BLD-SCNC: 95 MMOL/L (ref 98–107)
CO2: 22 MMOL/L (ref 20–31)
COLOR: ABNORMAL
CREAT SERPL-MCNC: 6.44 MG/DL (ref 0.7–1.2)
CRYSTALS, UA: ABNORMAL /HPF
EPITHELIAL CELLS UA: ABNORMAL /HPF (ref 0–5)
GFR AFRICAN AMERICAN: 11 ML/MIN
GFR NON-AFRICAN AMERICAN: 9 ML/MIN
GFR SERPL CREATININE-BSD FRML MDRD: ABNORMAL ML/MIN/{1.73_M2}
GFR SERPL CREATININE-BSD FRML MDRD: ABNORMAL ML/MIN/{1.73_M2}
GLUCOSE BLD-MCNC: 112 MG/DL (ref 75–110)
GLUCOSE BLD-MCNC: 349 MG/DL (ref 70–99)
GLUCOSE URINE: ABNORMAL
HCT VFR BLD CALC: 31.4 % (ref 40.7–50.3)
HEMOGLOBIN: 9.7 G/DL (ref 13–17)
INR BLD: 1.3
KETONES, URINE: NEGATIVE
LEUKOCYTE ESTERASE, URINE: ABNORMAL
MCH RBC QN AUTO: 30.9 PG (ref 25.2–33.5)
MCHC RBC AUTO-ENTMCNC: 30.9 G/DL (ref 28.4–34.8)
MCV RBC AUTO: 100 FL (ref 82.6–102.9)
MUCUS: ABNORMAL
NITRITE, URINE: POSITIVE
NRBC AUTOMATED: 0 PER 100 WBC
OTHER OBSERVATIONS UA: ABNORMAL
PARTIAL THROMBOPLASTIN TIME: 26.2 SEC (ref 20.5–30.5)
PDW BLD-RTO: 16.6 % (ref 11.8–14.4)
PH UA: 5 (ref 5–8)
PLATELET # BLD: 164 K/UL (ref 138–453)
PMV BLD AUTO: 9.9 FL (ref 8.1–13.5)
POTASSIUM SERPL-SCNC: 5.1 MMOL/L (ref 3.7–5.3)
PRO-BNP: ABNORMAL PG/ML
PROTEIN UA: ABNORMAL
PROTHROMBIN TIME: 13.4 SEC (ref 9.1–12.3)
RBC # BLD: 3.14 M/UL (ref 4.21–5.77)
RBC UA: ABNORMAL /HPF (ref 0–2)
RENAL EPITHELIAL, UA: ABNORMAL /HPF
SARS-COV-2, RAPID: NOT DETECTED
SODIUM BLD-SCNC: 135 MMOL/L (ref 135–144)
SPECIFIC GRAVITY UA: 1.02 (ref 1–1.03)
SPECIMEN DESCRIPTION: NORMAL
TRICHOMONAS: ABNORMAL
TROPONIN INTERP: ABNORMAL
TROPONIN INTERP: ABNORMAL
TROPONIN T: ABNORMAL NG/ML
TROPONIN T: ABNORMAL NG/ML
TROPONIN, HIGH SENSITIVITY: 176 NG/L (ref 0–22)
TROPONIN, HIGH SENSITIVITY: 177 NG/L (ref 0–22)
TURBIDITY: ABNORMAL
URINE HGB: ABNORMAL
UROBILINOGEN, URINE: NORMAL
WBC # BLD: 6.5 K/UL (ref 3.5–11.3)
WBC UA: ABNORMAL /HPF (ref 0–5)
YEAST: ABNORMAL

## 2021-12-31 PROCEDURE — 87086 URINE CULTURE/COLONY COUNT: CPT

## 2021-12-31 PROCEDURE — 02H633Z INSERTION OF INFUSION DEVICE INTO RIGHT ATRIUM, PERCUTANEOUS APPROACH: ICD-10-PCS | Performed by: EMERGENCY MEDICINE

## 2021-12-31 PROCEDURE — 85610 PROTHROMBIN TIME: CPT

## 2021-12-31 PROCEDURE — 90935 HEMODIALYSIS ONE EVALUATION: CPT

## 2021-12-31 PROCEDURE — 1200000000 HC SEMI PRIVATE

## 2021-12-31 PROCEDURE — 85027 COMPLETE CBC AUTOMATED: CPT

## 2021-12-31 PROCEDURE — 82947 ASSAY GLUCOSE BLOOD QUANT: CPT

## 2021-12-31 PROCEDURE — 99284 EMERGENCY DEPT VISIT MOD MDM: CPT

## 2021-12-31 PROCEDURE — 87635 SARS-COV-2 COVID-19 AMP PRB: CPT

## 2021-12-31 PROCEDURE — 6370000000 HC RX 637 (ALT 250 FOR IP): Performed by: STUDENT IN AN ORGANIZED HEALTH CARE EDUCATION/TRAINING PROGRAM

## 2021-12-31 PROCEDURE — 71045 X-RAY EXAM CHEST 1 VIEW: CPT

## 2021-12-31 PROCEDURE — 5A1D70Z PERFORMANCE OF URINARY FILTRATION, INTERMITTENT, LESS THAN 6 HOURS PER DAY: ICD-10-PCS | Performed by: INTERNAL MEDICINE

## 2021-12-31 PROCEDURE — 81001 URINALYSIS AUTO W/SCOPE: CPT

## 2021-12-31 PROCEDURE — 93005 ELECTROCARDIOGRAM TRACING: CPT | Performed by: STUDENT IN AN ORGANIZED HEALTH CARE EDUCATION/TRAINING PROGRAM

## 2021-12-31 PROCEDURE — 80048 BASIC METABOLIC PNL TOTAL CA: CPT

## 2021-12-31 PROCEDURE — 83880 ASSAY OF NATRIURETIC PEPTIDE: CPT

## 2021-12-31 PROCEDURE — 85730 THROMBOPLASTIN TIME PARTIAL: CPT

## 2021-12-31 PROCEDURE — 6360000002 HC RX W HCPCS: Performed by: INTERNAL MEDICINE

## 2021-12-31 PROCEDURE — 2580000003 HC RX 258

## 2021-12-31 PROCEDURE — 84484 ASSAY OF TROPONIN QUANT: CPT

## 2021-12-31 RX ORDER — HEPARIN SODIUM 1000 [USP'U]/ML
1900 INJECTION, SOLUTION INTRAVENOUS; SUBCUTANEOUS PRN
Status: DISCONTINUED | OUTPATIENT
Start: 2021-12-31 | End: 2022-01-05 | Stop reason: HOSPADM

## 2021-12-31 RX ORDER — SODIUM CHLORIDE 0.9 % (FLUSH) 0.9 %
5-40 SYRINGE (ML) INJECTION EVERY 12 HOURS SCHEDULED
Status: DISCONTINUED | OUTPATIENT
Start: 2021-12-31 | End: 2022-01-05 | Stop reason: HOSPADM

## 2021-12-31 RX ORDER — SODIUM CHLORIDE 0.9 % (FLUSH) 0.9 %
5-40 SYRINGE (ML) INJECTION PRN
Status: DISCONTINUED | OUTPATIENT
Start: 2021-12-31 | End: 2022-01-05 | Stop reason: HOSPADM

## 2021-12-31 RX ORDER — FUROSEMIDE 40 MG/1
40 TABLET ORAL 2 TIMES DAILY
Status: DISCONTINUED | OUTPATIENT
Start: 2022-01-01 | End: 2022-01-05 | Stop reason: HOSPADM

## 2021-12-31 RX ORDER — ATORVASTATIN CALCIUM 40 MG/1
40 TABLET, FILM COATED ORAL NIGHTLY
Status: DISCONTINUED | OUTPATIENT
Start: 2021-12-31 | End: 2022-01-05 | Stop reason: HOSPADM

## 2021-12-31 RX ORDER — HEPARIN SODIUM 5000 [USP'U]/ML
5000 INJECTION, SOLUTION INTRAVENOUS; SUBCUTANEOUS EVERY 8 HOURS SCHEDULED
Status: CANCELLED | OUTPATIENT
Start: 2021-12-31

## 2021-12-31 RX ORDER — ONDANSETRON 4 MG/1
4 TABLET, ORALLY DISINTEGRATING ORAL EVERY 8 HOURS PRN
Status: DISCONTINUED | OUTPATIENT
Start: 2021-12-31 | End: 2022-01-05 | Stop reason: HOSPADM

## 2021-12-31 RX ORDER — SODIUM CHLORIDE 9 MG/ML
25 INJECTION, SOLUTION INTRAVENOUS PRN
Status: DISCONTINUED | OUTPATIENT
Start: 2021-12-31 | End: 2022-01-05 | Stop reason: HOSPADM

## 2021-12-31 RX ORDER — NICOTINE POLACRILEX 4 MG
15 LOZENGE BUCCAL PRN
Status: DISCONTINUED | OUTPATIENT
Start: 2021-12-31 | End: 2022-01-05 | Stop reason: HOSPADM

## 2021-12-31 RX ORDER — LOSARTAN POTASSIUM 50 MG/1
50 TABLET ORAL DAILY
Status: DISCONTINUED | OUTPATIENT
Start: 2022-01-01 | End: 2022-01-05 | Stop reason: HOSPADM

## 2021-12-31 RX ORDER — HEPARIN SODIUM (PORCINE) LOCK FLUSH IV SOLN 100 UNIT/ML 100 UNIT/ML
300 SOLUTION INTRAVENOUS ONCE
Status: DISCONTINUED | OUTPATIENT
Start: 2021-12-31 | End: 2022-01-05 | Stop reason: HOSPADM

## 2021-12-31 RX ORDER — DEXTROSE MONOHYDRATE 25 G/50ML
12.5 INJECTION, SOLUTION INTRAVENOUS PRN
Status: DISCONTINUED | OUTPATIENT
Start: 2021-12-31 | End: 2022-01-05 | Stop reason: HOSPADM

## 2021-12-31 RX ORDER — ACETAMINOPHEN 650 MG/1
650 SUPPOSITORY RECTAL EVERY 6 HOURS PRN
Status: DISCONTINUED | OUTPATIENT
Start: 2021-12-31 | End: 2022-01-05 | Stop reason: HOSPADM

## 2021-12-31 RX ORDER — ACETAMINOPHEN 325 MG/1
650 TABLET ORAL EVERY 6 HOURS PRN
Status: DISCONTINUED | OUTPATIENT
Start: 2021-12-31 | End: 2022-01-05 | Stop reason: HOSPADM

## 2021-12-31 RX ORDER — ONDANSETRON 2 MG/ML
4 INJECTION INTRAMUSCULAR; INTRAVENOUS EVERY 6 HOURS PRN
Status: DISCONTINUED | OUTPATIENT
Start: 2021-12-31 | End: 2022-01-05 | Stop reason: HOSPADM

## 2021-12-31 RX ORDER — DEXTROSE MONOHYDRATE 50 MG/ML
100 INJECTION, SOLUTION INTRAVENOUS PRN
Status: DISCONTINUED | OUTPATIENT
Start: 2021-12-31 | End: 2022-01-05 | Stop reason: HOSPADM

## 2021-12-31 RX ORDER — POLYETHYLENE GLYCOL 3350 17 G/17G
17 POWDER, FOR SOLUTION ORAL DAILY PRN
Status: DISCONTINUED | OUTPATIENT
Start: 2021-12-31 | End: 2022-01-05 | Stop reason: HOSPADM

## 2021-12-31 RX ORDER — CARVEDILOL 12.5 MG/1
12.5 TABLET ORAL 2 TIMES DAILY WITH MEALS
Status: DISCONTINUED | OUTPATIENT
Start: 2022-01-01 | End: 2022-01-04

## 2021-12-31 RX ORDER — PANTOPRAZOLE SODIUM 40 MG/1
40 TABLET, DELAYED RELEASE ORAL
Status: DISCONTINUED | OUTPATIENT
Start: 2022-01-01 | End: 2022-01-05 | Stop reason: HOSPADM

## 2021-12-31 RX ORDER — HEPARIN SODIUM 1000 [USP'U]/ML
1600 INJECTION, SOLUTION INTRAVENOUS; SUBCUTANEOUS PRN
Status: DISCONTINUED | OUTPATIENT
Start: 2021-12-31 | End: 2022-01-05 | Stop reason: HOSPADM

## 2021-12-31 RX ADMIN — DESMOPRESSIN ACETATE 40 MG: 0.2 TABLET ORAL at 22:16

## 2021-12-31 RX ADMIN — SODIUM CHLORIDE, PRESERVATIVE FREE 10 ML: 5 INJECTION INTRAVENOUS at 22:15

## 2021-12-31 RX ADMIN — HEPARIN SODIUM 1600 UNITS: 1000 INJECTION INTRAVENOUS; SUBCUTANEOUS at 20:50

## 2021-12-31 RX ADMIN — HEPARIN SODIUM 1900 UNITS: 1000 INJECTION INTRAVENOUS; SUBCUTANEOUS at 20:51

## 2021-12-31 RX ADMIN — APIXABAN 5 MG: 5 TABLET, FILM COATED ORAL at 22:16

## 2021-12-31 ASSESSMENT — PAIN SCALES - GENERAL
PAINLEVEL_OUTOF10: 0
PAINLEVEL_OUTOF10: 0

## 2021-12-31 NOTE — PROGRESS NOTES
Dialysis Time Out  To be done by RN and tech or 2 RNs  Staff Names Bryant Banerjee RN, Reddy Saravia RN    [x]  Identity of the patient using 2 patient identifiers  [x]  Consent for treatment  [x]  Equipment-proper machine and dialyzer  [x]  B-Hep B status  [x]  Orders- to include bath, blood flow, dialyzer, time and fluid removal  [x]  Access-Correct site and in working order  [x]  Time for patient to ask questions.

## 2021-12-31 NOTE — ED NOTES
Pt to room 18 with c/o dark urine and fistula complications. Pt reports that he was at dialysis today and that his fistula wasn't working. Pt reports that he goes Tuesday/ Thursday for dialysis, but went today for an additional treatment,. Pt states that he is short of breath, but its his normal short of breath. Pt states that he wears 2 L NC at home continuously. Pt placed on continuous cardiac monitor, bp and pulse ox. EKG completed, IV established, blood work drawn. Pt alert and oriented x4, talking in complete sentences, respirations even and unlabored. Pt acting age appropriate.  White board updated, will continue to plan of care       Carie Peacock RN  12/31/21 3505

## 2021-12-31 NOTE — CONSULTS
[address]    Patient's Name/ Date of Birth/ Gender: Hernandez Humphreys / 1960 (82 y.o.) / male     Referring Physician: Aidan Ortiz MD    Consulting Physician: Dr. Cora Perez    History of present Illness: Pt is a 59 y.o. male with past medical history of congestive heart failure, ESRD on dialysis, hypertension, BATSHEVA, DM 2. He presents due to dialysis access issues. Patient states that he went for a dialysis session today however they were unable to cannulate his left upper extremity AV graft. Patient had a left brachial artery to axillary vein AV graft placed with left radiocephalic fistula ligation in November of 2018 with Dr. Jack Reeves. Patient states that the graft is functioning well except for he did have to have 1 thrombectomy approximately 1 year ago. He typically has dialysis Tuesday Thursday and Saturday however he states he has been fluid overloaded which prompted him to go for another dialysis session on today Friday. Also surgery was consulted for evaluation due to concern for malfunction of left AV graft. The patient does endorse feeling heavy, however, he states that his shortness of breath is at baseline. On exam, the patient resting comfortably on 2 L nasal cannula which is his baseline. Left AV graft does not have a palpable thrill. Bedside ultrasound reveals clot in the graft.     Past Medical History:  has a past medical history of Acute congestive heart failure ([address] Acute on chronic diastolic congestive heart failure ([address] Anemia, Anemia of chronic renal failure, AVF (arteriovenous fistula) ([address] Bowel obstruction (HCC), CAD (coronary artery disease), Chest pain at rest, CHF (congestive heart failure) ([address] CHF (congestive heart failure), NYHA class I, acute on chronic, combined ([address] Chronic kidney disease, CKD (chronic kidney disease) stage 4, GFR 15-29 ml/min ([address] Coronary artery disease involving native coronary artery of native heart without angina pectoris, Diabetes mellitus ([facility] Dialysis patient ([facility] ESRD (end stage renal disease) ([facility] Essential hypertension, Groin swelling, Hemodialysis patient ([facility] Hydrocele, Hyperlipidemia, Hypertension, MI, old, NSTEMI (non-ST elevated myocardial infarction) ([facility] BATSHEVA (obstructive sleep apnea), Patient in clinical research study, Pneumonia, Prostatism, Respiratory distress, Rising PSA level, S/P arteriovenous (AV) graft placement, S/P PTCA - TIM distal LAD - Dr. John Paul Vallejo 4/1/19, Sleep apnea, Small bowel obstruction ([facility] and Type 2 diabetes mellitus with chronic kidney disease on chronic dialysis ([facility]     Past Surgical History:   Past Surgical History:   Procedure Laterality Date    ABDOMEN SURGERY  2013    BOWEL OBSTRUCTION    AV FISTULA CREATION Left 02/20/2019    AV FISTULA REPAIR  07/17/2019    AV fistula revision, branch ligation / Percutaneous angioplasty of proximal anastomosis and outflow tract of dialysis circuit with drug coated balloon  /  Dr Reece Lira  02/09/2021    Dr. Dominick Ohara, [address]  03/31/2019    TIM LAD    CYSTOSCOPY  11/17/2017    DIALYSIS FISTULA CREATION Left 2/20/2019    AV FISTULA CREATION ARM performed by Jacki Ansari MD at [address] [place] [address] 10/23/2019    LEFT UPPER EXTREMITY AV GRAFT CREATION WITH [id number] ARTEGRAFT, LEFT UPPER EXTREMITY AV FISTULA LIGATION performed by Jacki Ansari MD at [facility] 2005    [address]  2007    1 WISDOM TOOTH REMOVED    NOSE SURGERY  1968    CAUTERY TO STOP NOSE BEEDS    OTHER SURGICAL HISTORY Left 03/06/2019    fistulagram    MI GENITAL SURG PROC,MALE UNLISTED N/A 2/16/2018      PROSTATE BIOPSY WITH SATURATION performed by Odalis Bright MD at [facility] 11/17/2017    CYSTOSCOPY PROSTATE US BIOPSY performed by Mario Tran MD at [address]  02/16/2018    TONSILLECTOMY  1968    UPPER GASTROINTESTINAL ENDOSCOPY N/A 9/27/2021    EGD BIOPSY performed by Arlene Quinn MD at [address]  09/20/2019    LUE FISTULAGRAM  /  DR Munguia Beat  /  Findings: Severe stenosis of proximal cephalic vein. / Will plan for LUE AVG placement. [address]       Social History:  reports that he has never smoked. He has never used smokeless tobacco. He reports that he does not drink alcohol and does not use drugs. Family History: family history includes Asthma in his brother; Diabetes in his brother, brother, and brother; Early Death in his mother; Heart Disease in his mother; High Blood Pressure in his brother, brother, and brother. Review of Systems:   General: Denies fever, chills, weight loss  HEENT: Denies sore throat, sinus problems  Card: Denies chest pain, palpitations  Pulm: Denies cough, endorses baseline level shortness of breath  GI: Endorses feeling bloated, denies abdominal pain, nausea, vomiting  : Denies polyuria, dysuria  Endo: Denies polydipsia, heat or cold intolerance  Heme: Denies easy bruising or bleeding  Neuro: Denies weakness, headache, dizziness  Skin: Denies rashes, ulcers  Musculoskeletal: Denies acute muscle, joint, back pain. Allergies: Lisinopril    Current Meds:No current facility-administered medications for this encounter.     Current Outpatient Medications:     apixaban (ELIQUIS) 5 MG TABS tablet, Take 1 tablet by mouth 2 times daily, Disp: 60 tablet, Rfl: 5    aspirin EC 81 MG EC tablet, Take 1 tablet by mouth daily, Disp: 90 tablet, Rfl: 1    carvedilol (COREG) 12.5 MG tablet, Take 1 tablet by mouth 2 times daily (with meals), Disp: 60 tablet, Rfl: 3    losartan (COZAAR) 50 MG tablet, Take 1 tablet by mouth daily, Disp: 30 tablet, Rfl: 3    glimepiride (AMARYL) 1 MG tablet, Take 1 tablet by mouth every morning (before breakfast) Do not take if blood sugars are less than 110, Disp: 30 tablet, Rfl: 0    furosemide (LASIX) 40 MG tablet, Take 1 tablet by mouth 2 times daily, Disp: 60 tablet, Rfl: 1    pantoprazole (PROTONIX) 40 MG tablet, Take 1 tablet by mouth every morning (before breakfast), Disp: 30 tablet, Rfl: 2    benzonatate (TESSALON) 100 MG capsule, take 1 capsule by mouth three times a day if needed for 10 days (Patient not taking: Reported on 11/17/2021), Disp: , Rfl:     guaiFENesin-codeine (GUAIFENESIN AC) 100-10 MG/5ML liquid, give 10 milliliters by mouth every 4 hours if needed, Disp: , Rfl:     atorvastatin (LIPITOR) 40 MG tablet, Take 1 tablet by mouth nightly, Disp: 30 tablet, Rfl: 3    traMADol (ULTRAM) 50 MG tablet, take 1 tablet by mouth every 6 hours if needed, Disp: , Rfl:     calcium carbonate (TUMS) 500 MG chewable tablet, Take 1 tablet by mouth daily as needed  (Patient not taking: Reported on 11/17/2021), Disp: , Rfl:     Multiple Vitamins-Minerals (RENAPLEX-D) TABS, Take 1 tablet by mouth every other day Pt takes occasionally, Disp: , Rfl: 3    lidocaine-prilocaine (EMLA) 2.5-2.5 % cream, , Disp: , Rfl:     Vital Signs:  Vitals:    12/31/21 1203   BP: 127/85   Pulse:    Resp:    Temp:    SpO2:        Physical Exam:  Gen:  A&Ox3, NAD  HEENT:  EOMI, no scleral icterus, oral mucosa moist  Neck: Supple, trachea midline  Chest: Symmetric rise with inhalation, no evidence of trauma  CVS: Regular rate and rhythm  Resp: Good bilateral air entry, normal effort on nasal cannula, no accessory muscle usage  Abd: softly distended, nontender to palpation, no guarding.   Ext: Bilateral lower extremity edema, motor and sensation intact extremities  CNS: Moves all extremities, no gross focal motor deficits  Skin: No erythema or ulcerations     Labs:   Lab Results   Component Value Date    WBC 6.5 12/31/2021    HGB 9.7 12/31/2021    HCT 31.4 12/31/2021    .0 12/31/2021    PLT 164 12/31/2021     Lab Results   Component Value Date     12/31/2021    K 5.1 12/31/2021    CL 95 12/31/2021    CO2 22 12/31/2021    BUN 36 12/31/2021    CREATININE 6.44 12/31/2021    GLUCOSE 349 12/31/2021    GLUCOSE 147 09/08/2018    CALCIUM 9.4 12/31/2021     Lab Results   Component Value Date    INR 1.3 12/31/2021       Impression:  Patient Active Problem List   Diagnosis    CKD (chronic kidney disease) stage 4, GFR 15-29 ml/min ([facility])    Anemia of chronic renal failure    Type 2 diabetes mellitus with chronic kidney disease on chronic dialysis ([facility])    Chest pain at rest    Pneumonia    Rising PSA level    Prostatism    Elevated troponin    Acute on chronic diastolic congestive heart failure ([facility])    Anemia    Essential hypertension    Respiratory distress    AVF (arteriovenous fistula) ([facility])    Precordial chest pain    S/P PTCA - TIM distal LAD - Dr. Janeth Butcher 4/1/19    NSTEMI (non-ST elevated myocardial infarction) ([address]    S/P arteriovenous (AV) graft placement    Acute on chronic combined systolic (congestive) and diastolic (congestive) heart failure ([address]    ESRD (end stage renal disease) ([address]    Coronary artery disease involving native coronary artery of native heart without angina pectoris    BATSHEVA (obstructive sleep apnea)    Acute congestive heart failure ([address]    Small bowel obstruction ([address]    Non-cardiac chest pain    Acute on chronic combined systolic and diastolic CHF (congestive heart failure) ([facility])    Chronic abdominal pain    Hypoxemia-due to CHF/fluid overload    New onset atrial fibrillation ([facility])    Hyponatremia     Recommendation:    1. Patient will benefit from fistulogram with possible thrombectomy of his left upper extremity AV graft. Will plan for fistulogram this coming week. 2. Nephrology consulted by the ED to determine timing of when patient may require his next dialysis.   Recommend temporary dialysis catheter placement so patient can move forward with his dialysis sessions until a fistulogram can be done. Vascular surgery will follow.       Rebecca Araujo, DO  General Surgery PGY-3

## 2021-12-31 NOTE — H&P
[address]     Department of Internal Medicine - Staff Internal Medicine Teaching Service          ADMISSION NOTE/HISTORY AND PHYSICAL EXAMINATION   Date: 12/31/2021  Patient Name: Vesta Alvarenga  Date of admission: 12/31/2021 11:28 AM  YOB: 1960  PCP: Chris Bowman  History Obtained From:  patient, electronic medical record    CHIEF COMPLAINT     Chief complaint: Vascular access problem    HISTORY OF PRESENTING ILLNESS     The patient is a pleasant 59 y.o. male with a PMHx significant for anemia, a-fib, ESRD on hemodialysis, coronary artery disease, hypertension, obstructive sleep apnea type 2 diabetes who presents with a chief complaint of vascular access problem. Patient states that he was at dialysis on Tuesday Thursday Saturday was in dialysis today due to holiday schedule. During dialysis there were not able to to access his AV fistula which is located in the left upper arm. On examination of the AV fistula there was no bruit auscultated or thrill palpated. Patient denied any pain in the left upper extremity. Patient endorses abdominal distention as well as increased shortness of breath. He chronically wears 2 L of oxygen at home due to CHF. Patient states he does not make much urine anymore and has become much more difficult to urinate. Patient denies any fevers chills, cough, nausea vomiting, abdominal pain or changes in bowel habits. Javon catheter placed in the right IJ for temporary dialysis. Patient is vaccinated against COVID.       Review of Systems:  General ROS: Completed and except as mentioned above were negative   HEENT ROS: Completed and except as mentioned above were negative   Allergy and Immunology ROS:  Completed and except as mentioned above were negative  Hematological and Lymphatic ROS:  Completed and except as mentioned above were negative  Respiratory ROS:  Completed and except as mentioned above were negative  Cardiovascular ROS:  Completed and except as mentioned above were negative  Gastrointestinal ROS: Completed and except as mentioned above were negative  Genito-Urinary ROS:  Completed and except as mentioned above were negative  Musculoskeletal ROS:  Completed and except as mentioned above were negative  Neurological ROS:  Completed and except as mentioned above were negative  Skin & Dermatological ROS:  Completed and except as mentioned above were negative  Psychological ROS:  Completed and except as mentioned above were negative    PAST MEDICAL HISTORY     Past Medical History:   Diagnosis Date    Acute congestive heart failure ([address] 12/21/2016    Acute on chronic diastolic congestive heart failure ([address] 11/24/2018    Anemia 11/25/2018    Anemia of chronic renal failure 10/17/2016    [facility]oven[facility] fistula[facility]     Bowel obstruction ([address] 12/26/2013    CAD (coronary artery disease) 2013    ?     Chest pain at rest 12/21/2016    CHF (congestive heart failure) ([address] 2013    last episode 11/2018    CHF (congestive heart failure), NYHA class I, acute on chronic, combined ([address] 2/9/2021    Chronic kidney disease     CKD (chronic kidney disease) stage 4, GFR 15-29 ml/min ([address] 10/17/2016    Coronary artery disease involving native coronary artery of native heart without angina pectoris     Diabetes mellitus ([address] 2012    NIDDM    Dialysis patient ([address]     Schuyler Adkins  /  Chino Fulton  /  Veronika Mclaughlin    ESRD (end stage renal disease) ([address]     Essential hypertension 11/25/2018    Groin swelling 07/17/2019    Hemodialysis patient ([address] 11/28/2018    TUNNELD CATHETER RIGHT DIALYSIS ACCESSTUES THURS AND AT [place]    Hydrocele 07/17/2019    Hyperlipidemia 2013    ON RX    Hypertension 2013    ON RX    MI, old 12/26/2013    NSTEMI (non-ST elevated myocardial infarction) ([address] 6/17/2019    BATSHEVA (obstructive sleep apnea)     Patient in clinical research study 04/01/2019    XIENCE SHORT DAPT    Pneumonia     Prostatism 11/17/2017    Respiratory distress 11/25/2018    Rising PSA level 11/17/2017    S/P arteriovenous (AV) graft placement     S/P PTCA - TIM distal LAD - Dr. Chava Lee 4/1/19 4/1/2019    Sleep apnea 2015    pt has machine and does not use it    Small bowel obstruction ([facility] 5/1/2021    Type 2 diabetes mellitus with chronic kidney disease on chronic dialysis ([address] 10/17/2016       PAST SURGICAL HISTORY     Past Surgical History:   Procedure Laterality Date    ABDOMEN SURGERY  2013    BOWEL OBSTRUCTION    AV FISTULA CREATION Left 02/20/2019    AV FISTULA REPAIR  07/17/2019    AV fistula revision, branch ligation / Percutaneous angioplasty of proximal anastomosis and outflow tract of dialysis circuit with drug coated balloon  /  Dr Casey Bustillo  02/09/2021    Dr. Alejandro Ovalles, [address]  03/31/2019    TIM LAD    CYSTOSCOPY  11/17/2017    DIALYSIS FISTULA CREATION Left 2/20/2019    AV FISTULA CREATION ARM performed by Yannick Randhawa MD at [address] [place] [address] 10/23/2019    LEFT UPPER EXTREMITY AV GRAFT CREATION WITH [id number] ARTEGRAFT, LEFT UPPER EXTREMITY AV FISTULA LIGATION performed by Yannick Randhawa MD at [facility] 2005    [email or web address]  2007    1 WISDOM TOOTH REMOVED    NOSE SURGERY  1968    CAUTERY TO STOP NOSE BEEDS    OTHER SURGICAL HISTORY Left 03/06/2019    fistulagram    MA GENITAL SURG PROC,MALE UNLISTED N/A 2/16/2018    US  PROSTATE [facility]OPSY WITH SATURATION [facility]/17[facility]2017    CYSTOSCOPY PROSTATE US BIOPSY performed by Miki Garcia MD at [address]  02/16/2018    TONSILLECTOMY  1968    UPPER GASTROINTESTINAL ENDOSCOPY N/A 9/27/2021    EGD BIOPSY performed by Dorita Cano MD at [address] SURGERY  09/20/2019    LUE FISTULAGRAM  /  DR Ines Velazquez  /  Findings: Severe stenosis of proximal cephalic vein. / Will plan for LUE AVG placement.  VASECTOMY  1983       ALLERGIES     Lisinopril    MEDICATIONS PRIOR TO ADMISSION     Prior to Admission medications    Medication Sig Start Date End Date Taking?  Authorizing Provider   apixaban (ELIQUIS) 5 MG TABS tablet Take 1 tablet by mouth 2 times daily 12/24/21   Garrel Hopper, DO   aspirin EC 81 MG EC tablet Take 1 tablet by mouth daily 12/24/21   Garrel Hopper, DO   carvedilol (COREG) 12.5 MG tablet Take 1 tablet by mouth 2 times daily (with meals) 12/24/21   Garrel Hopper, DO   losartan (COZAAR) 50 MG tablet Take 1 tablet by mouth daily 12/25/21   Garrel Hopper, DO   glimepiride (AMARYL) 1 MG tablet Take 1 tablet by mouth every morning (before breakfast) Do not take if blood sugars are less than 110 11/14/21   Marilyn Ferro MD   furosemide (LASIX) 40 MG tablet Take 1 tablet by mouth 2 times daily 11/14/21   Marilyn Ferro MD   pantoprazole (PROTONIX) 40 MG tablet Take 1 tablet by mouth every morning (before breakfast) 5/19/21   Victorino Mendoza MD   benzonatate (TESSALON) 100 MG capsule take 1 capsule by mouth three times a day if needed for 10 days  Patient not taking: Reported on 11/17/2021 3/3/21   Historical Provider, MD   guaiFENesin-codeine (GUAIFENESIN AC) 100-10 MG/5ML liquid give 10 milliliters by mouth every 4 hours if needed 3/4/21   Historical Provider, MD   atorvastatin (LIPITOR) 40 MG tablet Take 1 tablet by mouth nightly 2/9/21   Deidra Arita MD   traMADol Vernona Coffin) 50 MG tablet take 1 tablet by mouth every 6 hours if needed 10/21/20   Historical Provider, MD   calcium carbonate (TUMS) 500 MG chewable tablet Take 1 tablet by mouth daily as needed   Patient not taking: Reported on 11/17/2021    Historical Provider, MD   Multiple Vitamins-Minerals (RENAPLEX-D) TABS Take 1 tablet by mouth every other day Pt takes occasionally 19   Historical Provider, MD   lidocaine-prilocaine (EMLA) 2.5-2.5 % cream  3/18/19   Historical Provider, MD       SOCIAL HISTORY     Tobacco: Denies  Alcohol: Denies  Illicits: Denies  Occupation: Unemployed    FAMILY HISTORY     Family History   Problem Relation Age of Onset    Heart Disease Mother         CHF    Early Death Mother     High Blood Pressure Brother     Diabetes Brother     Asthma Brother     High Blood Pressure Brother     Diabetes Brother     High Blood Pressure Brother     Diabetes Brother        PHYSICAL EXAM     Vitals: BP (!) 150/89   Pulse 116   Temp 97.1 °F (36.2 °C) (Oral)   Resp 18   Wt 270 lb (122.5 kg)   SpO2 98%   BMI 38.74 kg/m²   Tmax: Temp (24hrs), Av.1 °F (36.2 °C), Min:97.1 °F (36.2 °C), Max:97.1 °F (36.2 °C)    Last Body weight:   Wt Readings from Last 3 Encounters:   21 270 lb (122.5 kg)   21 270 lb (122.5 kg)   21 262 lb 3.2 oz (118.9 kg)     Body Mass Index : Body mass index is 38.74 kg/m². PHYSICAL EXAMINATION:  Physical Exam  Vitals and nursing note reviewed. Constitutional:       Appearance: Normal appearance. HENT:      Head: Normocephalic and atraumatic. Nose: Nose normal.      Mouth/Throat:      Mouth: Mucous membranes are moist.      Pharynx: Oropharynx is clear. Eyes:      Extraocular Movements: Extraocular movements intact. Conjunctiva/sclera: Conjunctivae normal.      Pupils: Pupils are equal, round, and reactive to light. Cardiovascular:      Rate and Rhythm: Normal rate and regular rhythm. Pulses: Normal pulses. Heart sounds: Normal heart sounds. No murmur heard. No friction rub. No gallop. Pulmonary:      Effort: Pulmonary effort is normal. No respiratory distress. Breath sounds: Normal breath sounds. No stridor. No wheezing, rhonchi or rales. Chest:      Chest wall: No tenderness. Abdominal:      General: Abdomen is flat. Bowel sounds are normal. There is distension. Palpations: Abdomen is soft. There is no mass. Tenderness: There is no abdominal tenderness. There is no guarding or rebound. Hernia: No hernia is present. Musculoskeletal:         General: No swelling, tenderness, deformity or signs of injury. Normal range of motion. Cervical back: Normal range of motion. Right lower leg: No edema. Left lower leg: No edema. Skin:     General: Skin is warm. Neurological:      General: No focal deficit present. Mental Status: He is alert and oriented to person, place, and time. Mental status is at baseline. Psychiatric:         Mood and Affect: Mood normal.         Behavior: Behavior normal.         Thought Content:  Thought content normal.         Judgment: Judgment normal.         INVESTIGATIONS     Laboratory Testing:     Recent Results (from the past 24 hour(s))   APTT    Collection Time: 12/31/21 12:04 PM   Result Value Ref Range    PTT 26.2 20.5 - 30.5 sec   PROTIME-INR    Collection Time: 12/31/21 12:04 PM   Result Value Ref Range    Protime 13.4 (H) 9.1 - 12.3 sec    INR 1.3    BASIC METABOLIC PANEL    Collection Time: 12/31/21 12:04 PM   Result Value Ref Range    Glucose 349 (H) 70 - 99 mg/dL    BUN 36 (H) 8 - 23 mg/dL    CREATININE 6.44 (HH) 0.70 - 1.20 mg/dL    Bun/Cre Ratio NOT REPORTED 9 - 20    Calcium 9.4 8.6 - 10.4 mg/dL    Sodium 135 135 - 144 mmol/L    Potassium 5.1 3.7 - 5.3 mmol/L    Chloride 95 (L) 98 - 107 mmol/L    CO2 22 20 - 31 mmol/L    Anion Gap 18 (H) 9 - 17 mmol/L    GFR Non-African American 9 (L) >60 mL/min    GFR  11 (L) >60 mL/min    GFR Comment          GFR Staging NOT REPORTED    Brain Natriuretic Peptide    Collection Time: 12/31/21 12:04 PM   Result Value Ref Range    Pro-BNP 31,731 (H) <300 pg/mL    BNP Interpretation NOT REPORTED    CBC    Collection Time: 12/31/21 12:04 PM   Result Value Ref Range    WBC 6.5 3.5 - 11.3 k/uL    RBC 3.14 (L) 4.21 - 5.77 m/uL    Hemoglobin 9.7 (L) 13.0 - 17.0 g/dL    Hematocrit 31.4 (L) 40.7 - 50.3 %    .0 82.6 - 102.9 fL    MCH 30.9 25.2 - 33.5 pg    MCHC 30.9 28.4 - 34.8 g/dL    RDW 16.6 (H) 11.8 - 14.4 %    Platelets 395 764 - 597 k/uL    MPV 9.9 8.1 - 13.5 fL    NRBC Automated 0.0 0.0 per 100 WBC   Troponin    Collection Time: 12/31/21 12:04 PM   Result Value Ref Range    Troponin, High Sensitivity 176 (HH) 0 - 22 ng/L    Troponin T NOT REPORTED <0.03 ng/mL    Troponin Interp NOT REPORTED    Troponin    Collection Time: 12/31/21  1:00 PM   Result Value Ref Range    Troponin, High Sensitivity 177 (HH) 0 - 22 ng/L    Troponin T NOT REPORTED <0.03 ng/mL    Troponin Interp NOT REPORTED        Imaging:   XR CHEST PORTABLE    Result Date: 12/31/2021  1. Right IJ hemodialysis catheter has been placed in the interval, tip overlying the right atrium. 2. No pneumothorax. 3. Mild congestive heart failure is most likely given the radiographic findings; pneumonia is also a consideration in areas of consolidation with pleural effusion. XR CHEST PORTABLE    Result Date: 12/31/2021  Cardiomegaly with venous hypertension and perhaps mild CHF. No pulmonary edema. ASSESSMENT & PLAN     ASSESSMENT / PLAN:     IMPRESSION  This is a 59 y.o. male who presented with from dialysis center with nonfunctional AV fistula in the left upper extremity. Patient admitted to inpatient status for continued hemodialysis and evaluation of left upper extremity AV fistula.     Active Problems:  Non-functional AV fistula   - Fistulogram later this week  - Vascular surgery following    ESRD requiring chronic dialysis   - Javon catheter placed in the right IJ for continued dialysis while awaiting fistulogram by vascular surgery  - Patient states he is not making much urine and it is becoming harder for him to urinate  - Hemodialysis Tuesday, Thursday and Saturday's  - Monitor electrolytes and replace when indicated  - Troponins 177 > near baseline  - Nephrology following    Atrial fibrillation   - Currently, rate & rhythm controlled  - Eliquis  - Coreg  - Continue to monitor    Acute on chronic combined systolic and diastolic CHF  - Shortness of breath and abdominal distention on exam  - Patient wears 2L of oxygen at home and is currently on 2L  - Pro-BNP 31,731 baseline roughly 30,000  - Coreg  - Lasix  - I's and O's  - Daily weights    Hypertension   - Losartan    Coronary artery disease -stable  - Coreg  - Lipitor    Obstructive sleep apnea  - BiPAP at night and during daytime naps    Type 2 diabetes  - MDSS  - Re-check glucose control in the morning and adjust insulin accordingly        Diet: Carb controlled diet  DVT ppx: Heparin and Eliquis  GI ppx: Protonix    PT/OT/SW: Consulted  Discharge Planning: In process        Rickard Leventhal, MD  Internal Medicine Resident, PGY-1  [facility];  [place], [place]  12/31/2021, 4:55 PM

## 2021-12-31 NOTE — ED PROVIDER NOTES
[facility]  ED  Emergency Department Encounter  EmergencyMedicine Resident     Pt Name:Sanjay Montoya  MRN: [id number]  [place] 1960  Date of evaluation: 12/31/21  PCP:  Samy Rubio    This patient was evaluated in the Emergency Department for symptoms described in the history of present illness. The patient was evaluated in the context of the global COVID-19 pandemic, which necessitated consideration that the patient might be at risk for infection with the SARS-CoV-2 virus that causes COVID-19. Institutional protocols and algorithms that pertain to the evaluation of patients at risk for COVID-19 are in a state of rapid change based on information released by regulatory bodies including the CDC and federal and state organizations. These policies and algorithms were followed during the patient's care in the ED. CHIEF COMPLAINT       Chief Complaint   Patient presents with    Vascular Access Problem     poert not working, clogged    Hematuria       HISTORY OF PRESENT ILLNESS  (Location/Symptom, Timing/Onset, Context/Setting, Quality, Duration, Modifying Factors, Severity.)      Katrina Connor is a 59 y.o. male with CHF, CKD on dialysis, CAD who presents with problems with left AV fistula. Patient states that he was at dialysis earlier this morning and they were not able to access his AV fistula. Therefore he was unable to get dialysis today. Denies pain in left arm. Endorses abdominal distention and increased shortness of breath. Patient is on 2 L of oxygen chronically at home due to CHF. Also endorses dark brown urine, concern for blood in urine. States he does not make much urine anymore. Covid vaccinated. Denies fever/chills, cough, nausea/vomiting, abdominal pain, change in bowel function, rash.     PAST MEDICAL / SURGICAL / SOCIAL / FAMILY HISTORY      has a past medical history of Acute congestive heart failure ([facility] Acute on chronic diastolic congestive heart failure ([address] Anemia, Anemia of chronic renal failure, AVF (arteriovenous fistula) ([facility]), Bowel obstruction ([facility]), CAD (coronary artery disease), Chest pain at rest, CHF (congestive heart failure) ([address] CHF (congestive heart failure), NYHA class I, acute on chronic, combined ([facility] [address] Chronic kidney disease, CKD (chronic kidney disease) stage 4, GFR 15-29 ml/min ([facility]), Coronary artery disease involving native coronary artery of native heart without angina pectoris, Diabetes mellitus ([address] Dialysis patient ([address] ESRD (end stage renal disease) ([facility] [address] Essential hypertension, Groin swelling, Hemodialysis patient ([address] Hydrocele, Hyperlipidemia, Hypertension, MI, old, NSTEMI (non-ST elevated myocardial infarction) ([facility] [address] BATSHEVA (obstructive sleep apnea), Patient in clinical research study, Pneumonia, Prostatism, Respiratory distress, Rising PSA level, S/P arteriovenous (AV) graft placement, S/P PTCA - TIM distal LAD - Dr. Eri Brannon 4/1/19, Sleep apnea, Small bowel obstruction ([facility] [address] and Type 2 diabetes mellitus with chronic kidney disease on chronic dialysis ([facility] [address] has a past surgical history that includes Foot surgery (Left, 2005); Colonoscopy; Cystoscopy (11/17/2017); Prostate biopsy (N/A, 11/17/2017); Abdomen surgery (2013); Prostate Biopsy (02/16/2018); pr genital surg proc,male unlisted (N/A, 2/16/2018); Tonsillectomy (1968); Vasectomy (1983); Middle ear surgery (1966); Nose surgery (1968); Mouth surgery (2007); AV fistula creation (Left, 02/20/2019); Dialysis fistula creation (Left, 2/20/2019); other surgical history (Left, 03/06/2019); AV fistula repair (07/17/2019); vascular surgery (09/20/2019); Coronary angioplasty with stent (03/31/2019); Dialysis fistula creation (Left, 10/23/2019); Cardiac catheterization (02/09/2021); and Upper gastrointestinal endoscopy (N/A, 9/27/2021).     Social History     Socioeconomic History    Marital status: Single     Spouse name: Not on file    Number of children: Not on file  Years of education: Not on file    Highest education level: Not on file   Occupational History    Not on file   Tobacco Use    Smoking status: Never Smoker    Smokeless tobacco: Never Used   Vaping Use    Vaping Use: Never used   Substance and Sexual Activity    Alcohol use: No    Drug use: No    Sexual activity: Not on file   Other Topics Concern    Not on file   Social History Narrative    Not on file     Social Determinants of Health     Financial Resource Strain:     Difficulty of Paying Living Expenses: Not on file   Food Insecurity:     Worried About Running Out of Food in the Last Year: Not on file    Frank of Food in the Last Year: Not on file   Transportation Needs:     Lack of Transportation (Medical): Not on file    Lack of Transportation (Non-Medical):  Not on file   Physical Activity:     Days of Exercise per Week: Not on file    Minutes of Exercise per Session: Not on file   Stress:     Feeling of Stress : Not on file   Social Connections:     Frequency of Communication with Friends and Family: Not on file    Frequency of Social Gatherings with Friends and Family: Not on file    Attends Anabaptist Services: Not on file    Active Member of [address] [place] [address] or Organizations: Not on file    Attends Club or Organization Meetings: Not on file    Marital Status: Not on file   Intimate Partner Violence:     Fear of Current or Ex-Partner: Not on file    Emotionally Abused: Not on file    Physically Abused: Not on file    Sexually Abused: Not on file   Housing Stability:     Unable to Pay for Housing in the Last Year: Not on file    Number of [place] in the Last Year: Not on file    Unstable Housing in the Last Year: Not on file       Family History   Problem Relation Age of Onset    Heart Disease Mother         CHF    Early Death Mother     High Blood Pressure Brother     Diabetes Brother     Asthma Brother     High Blood Pressure Brother     Diabetes Brother     High Blood Pressure Brother     Diabetes Brother        Allergies:    Lisinopril    Home Medications:  Prior to Admission medications    Medication Sig Start Date End Date Taking?  Authorizing Provider   apixaban (ELIQUIS) 5 MG TABS tablet Take 1 tablet by mouth 2 times daily 12/24/21   Eugenia Dinning, DO   aspirin EC 81 MG EC tablet Take 1 tablet by mouth daily 12/24/21   Eugenia Dinning, DO   carvedilol (COREG) 12.5 MG tablet Take 1 tablet by mouth 2 times daily (with meals) 12/24/21   Eugenia Dinning, DO   losartan (COZAAR) 50 MG tablet Take 1 tablet by mouth daily 12/25/21   Eugenia Dinning, DO   glimepiride (AMARYL) 1 MG tablet Take 1 tablet by mouth every morning (before breakfast) Do not take if blood sugars are less than 110 11/14/21   Nathalie Myrick MD   furosemide (LASIX) 40 MG tablet Take 1 tablet by mouth 2 times daily 11/14/21   Nathalie Mryick MD   pantoprazole (PROTONIX) 40 MG tablet Take 1 tablet by mouth every morning (before breakfast) 5/19/21   Gabe BleMD mark   benzonatate (TESSALON) 100 MG capsule take 1 capsule by mouth three times a day if needed for 10 days  Patient not taking: Reported on 11/17/2021 3/3/21   Historical Provider, MD   guaiFENesin-codeine (GUAIFENESIN AC) 100-10 MG/5ML liquid give 10 milliliters by mouth every 4 hours if needed 3/4/21   Historical Provider, MD   atorvastatin (LIPITOR) 40 MG tablet Take 1 tablet by mouth nightly 2/9/21   Quirino Meigs, MD   traMADol Mabeline [place]) 50 MG tablet take 1 tablet by mouth every 6 hours if needed 10/21/20   Historical Provider, MD   calcium carbonate (TUMS) 500 MG chewable tablet Take 1 tablet by mouth daily as needed   Patient not taking: Reported on 11/17/2021    Historical Provider, MD   Multiple Vitamins-Minerals (RENAPLEX-D) TABS Take 1 tablet by mouth every other day Pt takes occasionally 5/14/19   Historical Provider, MD   lidocaine-prilocaine (EMLA) 2.5-2.5 % cream  3/18/19   Historical Provider, MD       REVIEW OF SYSTEMS (2-9 systems for level 4, 10 or more for level 5)    Review of Systems   Constitutional: Negative for chills and fever. HENT: Negative for congestion. Eyes: Negative for visual disturbance. Respiratory: Positive for shortness of breath. Cardiovascular: Negative for chest pain. Gastrointestinal: Positive for abdominal distention. Negative for abdominal pain, constipation, diarrhea, nausea and vomiting. Genitourinary: Negative for difficulty urinating and dysuria. Musculoskeletal: Negative for back pain. Skin: Negative for wound. Neurological: Negative for weakness, numbness and headaches. PHYSICAL EXAM   (up to 7 for level 4, 8 or more for level 5)    INITIAL VITALS:   BP (!) 150/89   Pulse 116   Temp 97.1 °F (36.2 °C) (Oral)   Resp 18   Wt 270 lb (122.5 kg)   SpO2 98%   BMI 38.74 kg/m²   I have reviewed the triage vital signs. Const: Well nourished, well developed, appears stated age  Eyes: PERRL, no conjunctival injection  HENT: NCAT, Neck supple without meningismus   CV: No palpable thrill or flow through AV fistula in left arm. RRR, Warm, well-perfused extremities  RESP: CTAB, Unlabored respiratory effort  GI: Hard and distended abdomen, nontender. no masses  MSK: No gross deformities appreciated  Skin: Warm, dry. No rashes  Neuro: Alert, CNs II-XII grossly intact. Sensation and motor function of extremities grossly intact. Psych: Appropriate mood and affect. DIFFERENTIAL  DIAGNOSIS   DDX:  AV fistula problem, CKD, fluid overload    Initial MDM:  HPI: 77-year-old male with CKD on dialysis presents to the emergency department for inability to get AV fistula access for dialysis this morning. Patient states that he feels very swollen and needs his dialysis. Vitals: Tachycardic to 116. Rest of vitals within normal limits  PE: See above  Plan: Urinalysis, urine culture, chest x-ray, EKG, BMP, BNP, CBC, troponin x2, EKG, PT, PTT, consult vascular surgery.   Eventual consult to nephrology for dialysis. Will reassess.     PLAN (LABS / IMAGING / EKG):  Orders Placed This Encounter   Procedures    Culture, Urine    XR CHEST PORTABLE    XR CHEST PORTABLE    URINALYSIS WITH MICROSCOPIC    APTT    PROTIME-INR    BASIC METABOLIC PANEL    Brain Natriuretic Peptide    CBC    Troponin    Vital signs per unit routine    Weigh patient    Inpatient consult to Vascular Surgery    Inpatient consult to Nephrology    Inpatient consult to Internal Medicine    EKG 12 Lead    Hemodialysis inpatient    PATIENT STATUS (FROM ED OR OR/PROCEDURAL) Inpatient       MEDICATIONS ORDERED:  Orders Placed This Encounter   Medications    heparin flush 100 UNIT/ML injection 300 Units     Placing Javon catheter         DIAGNOSTIC RESULTS / EMERGENCY DEPARTMENT COURSE / MDM   LAB RESULTS:  Results for orders placed or performed during the hospital encounter of 12/31/21   APTT   Result Value Ref Range    PTT 26.2 20.5 - 30.5 sec   PROTIME-INR   Result Value Ref Range    Protime 13.4 (H) 9.1 - 12.3 sec    INR 1.3    BASIC METABOLIC PANEL   Result Value Ref Range    Glucose 349 (H) 70 - 99 mg/dL    BUN 36 (H) 8 - 23 mg/dL    CREATININE 6.44 (HH) 0.70 - 1.20 mg/dL    Bun/Cre Ratio NOT REPORTED 9 - 20    Calcium 9.4 8.6 - 10.4 mg/dL    Sodium 135 135 - 144 mmol/L    Potassium 5.1 3.7 - 5.3 mmol/L    Chloride 95 (L) 98 - 107 mmol/L    CO2 22 20 - 31 mmol/L    Anion Gap 18 (H) 9 - 17 mmol/L    GFR Non-African American 9 (L) >60 mL/min    GFR  11 (L) >60 mL/min    GFR Comment          GFR Staging NOT REPORTED    Brain Natriuretic Peptide   Result Value Ref Range    Pro-BNP 31,731 (H) <300 pg/mL    BNP Interpretation NOT REPORTED    CBC   Result Value Ref Range    WBC 6.5 3.5 - 11.3 k/uL    RBC 3.14 (L) 4.21 - 5.77 m/uL    Hemoglobin 9.7 (L) 13.0 - 17.0 g/dL    Hematocrit 31.4 (L) 40.7 - 50.3 %    .0 82.6 - 102.9 fL    MCH 30.9 25.2 - 33.5 pg    MCHC 30.9 28.4 - 34.8 g/dL    RDW 16.6 (H) 11.8 - 14.4 %    Platelets 081 648 - 420 k/uL    MPV 9.9 8.1 - 13.5 fL    NRBC Automated 0.0 0.0 per 100 WBC   Troponin   Result Value Ref Range    Troponin, High Sensitivity 176 (HH) 0 - 22 ng/L    Troponin T NOT REPORTED <0.03 ng/mL    Troponin Interp NOT REPORTED    Troponin   Result Value Ref Range    Troponin, High Sensitivity 177 (HH) 0 - 22 ng/L    Troponin T NOT REPORTED <0.03 ng/mL    Troponin Interp NOT REPORTED        PT/INR elevated. BUN and creatinine elevated expected in a patient with CKD on dialysis. Normal potassium. BNP of 32,000 in a patient with increasing shortness of breath and missed dialysis appointment. Troponin x2 of 177 and 176. Denies chest pain, nausea/vomiting, diaphoresis, back pain, upper extremity numbness/tingling/pain. Rest of labs are unremarkable. RADIOLOGY:  XR CHEST PORTABLE    Result Date: 12/31/2021  EXAMINATION: ONE XRAY VIEW OF THE CHEST 12/31/2021 4:03 pm COMPARISON: Chest 12/31/2021 at 12:28 p.m. HISTORY: ORDERING SYSTEM PROVIDED HISTORY: Javon catheter placement FINDINGS: The cardiac silhouette is enlarged. The mediastinal and hilar silhouettes appear unremarkable. Vascular engorgement and cephalization is demonstrated with bilateral peribronchial cuffing and perivascular haziness. No pneumothorax is seen. No acute osseous abnormality is identified. Right IJ hemodialysis catheter has been placed in the interval, tip overlying the right atrium. 1. Right IJ hemodialysis catheter has been placed in the interval, tip overlying the right atrium. 2. No pneumothorax. 3. Mild congestive heart failure is most likely given the radiographic findings; pneumonia is also a consideration in areas of consolidation with pleural effusion.      XR CHEST PORTABLE    Result Date: 12/31/2021  EXAMINATION: ONE XRAY VIEW OF THE CHEST 12/31/2021 12:47 pm COMPARISON: Two-view chest from 12/23/2021 HISTORY: ORDERING SYSTEM PROVIDED HISTORY: shortness of breath TECHNOLOGIST PROVIDED HISTORY: shortness of breath History of congestive heart failure, chronic kidney disease, MI, diabetes, and hypertension. FINDINGS: Stable four-chamber cardiac enlargement. Mediastinal structures midline unchanged. Cephalization of blood flow with possible mild central vascular congestion; no Kerley lines, or sizable pleural effusion. Slight basilar atelectasis suspected. No Kerley lines identified. Bones unchanged, again with moderate-severe DJD spine and some degenerative change shoulders. Cardiomegaly with venous hypertension and perhaps mild CHF. No pulmonary edema. EKG  Rhythm: normal sinus   Rate: tachycardia  Axis: left  Ectopy: none  Conduction: right bundle branch block (complete) and left anterior fasciclar block  ST Segments: no acute change  T Waves: no acute change  Q Waves: none    EKG  Impression: non-specific EKG     All EKG's are interpreted by the Emergency Department Physician who either signs or Co-signs this chart in the absence of a cardiologist.      PROCEDURES:  PROCEDURE NOTE - CENTRAL VENOUS LINE PLACEMENT    PATIENT NAME: [address] [place] [address] NO. [id number]  DATE: 12/31/2021  ATTENDING PHYSICIAN: Dr. Nahed Nolasco DIAGNOSIS: Hemodialysis  POSTOPERATIVE DIAGNOSIS:  Same  PROCEDURE PERFORMED:  Right Internal Jugular Vein Central Line Insertion  PERFORMING PHYSICIAN: Yanelis Birmingham DO  ANESTHESIA:  Local utilizing 1% lidocaine  ESTIMATED BLOOD LOSS:  Less than 25 ml  COMPLICATIONS:  None immediately appreciated. DISCUSSION:  Hernandez Humphreys is a 59y.o.-year-old male who requires central IV access hemodialysis. The history and physical examination were reviewed and confirmed. CONSENT: The patient provided verbal consent for this procedure. PROCEDURE:  A timeout was initiated by the bedside nurse and was confirmed by those present. The patient was placed in a supine position.  The skin overlying the Right Internal Jugular Vein was prepped with numbness/tingling/pain. Rest of labs are unremarkable. ED Course as of 12/31/21 1704   Fri Dec 31, 2021   151 68-year-old male with CKD on dialysis presents to emergency department due to inability to get access this morning at dialysis through left AV fistula. AV fistula placed by vascular surgery [facility] at [facility] year ago. Has increasing shortness of breath and abdominal distention. [AR]   1230 Ultrasound resident did an ultrasound of patient's left AV fistula. States that there was little to no flow within obvious clot. Vascular surgery in the room evaluating patient. [AR]   [phone] Patient assessed by vascular surgery. States that they will be unable to do AV fistulogram at this time. Suggested possible contact to IR for fistulogram. Will contact nephrology to discuss options. [AR]   1248 Creatinine(!!): 6.44  CKD on dialysis. [AR]   1248 Pro-BNP(!): 31,731  Evidence for fluid overload, needs dialysis [AR]   1248 Troponin, High Sensitivity(!!): 176  Most likely related to CKD and dialysis. No chest pain, nausea/vomiting, diaphoresis, back pain, upper extremity pain/numbness/tingling [AR]   1613 Javon catheter placed. Awaiting final radiology read for chest x-ray before sending patient down to dialysis. [AR]   [id number] Internal medicine to admit patient. Patient to go to dialysis. [AR]      ED Course User Index  [AR] Jovana Guo DO     Patient understands and agrees with plan. CRITICAL CARE:  Please see Attending Note    FINAL IMPRESSION      1. Arteriovenous fistula occlusion, initial encounter ([facility] [address]    2. CKD (chronic kidney disease) requiring chronic dialysis ([facility] [address]          DISPOSITION / PLAN     DISPOSITION Admitted 12/31/2021 04:48:45 PM    PATIENT REFERRED TO:  No follow-up provider specified.     DISCHARGE MEDICATIONS:  New Prescriptions    No medications on file       Jovana Guo DO  Emergency Medicine Resident    (Please note that portions of this note were completed with a voice recognition program.  Efforts were made to edit the dictations but occasionally words are mis-transcribed.)       Yanelis Birmingham DO  Resident  12/31/21 5737

## 2021-12-31 NOTE — ED NOTES
Labeled blood specimens sent to lab via tube system.     [x] Lavender   [] on ice   [x] Blue   [x] Green/yellow  [] Green/black [] on ice  [] Pink  [] Red  [] Yellow  [] Blood Cultures      Bruce Dubois RN  12/31/21 [id number]

## 2021-12-31 NOTE — ED PROVIDER NOTES
[address] HANDOFF       Handoff taken on the following patient from prior Attending Physician:  Pt Name: Katrina Connor  PCP:  [id number]ig[address] Joyner Final  I was available and discussed any additional care issues that arose and coordinated the management plans with the resident(s) caring for the patient during my duty period. Any areas of disagreement with resident's documentation of care or procedures are noted on the chart. I was personally present for the key portions of any/all procedures during my duty period. I have documented in the chart those procedures where I was not present during the key portions.          CHIEF COMPLAINT       Chief Complaint   Patient presents with    Vascular Access Problem     poert not working, clogged    Hematuria         CURRENT MEDICATIONS     Previous Medications  Previous Medications    APIXABAN (ELIQUIS) 5 MG TABS TABLET    Take 1 tablet by mouth 2 times daily    ASPIRIN EC 81 MG EC TABLET    Take 1 tablet by mouth daily    ATORVASTATIN (LIPITOR) 40 MG TABLET    Take 1 tablet by mouth nightly    BENZONATATE (TESSALON) 100 MG CAPSULE    take 1 capsule by mouth three times a day if needed for 10 days    CALCIUM CARBONATE (TUMS) 500 MG CHEWABLE TABLET    Take 1 tablet by mouth daily as needed     CARVEDILOL (COREG) 12.5 MG TABLET    Take 1 tablet by mouth 2 times daily (with meals)    FUROSEMIDE (LASIX) 40 MG TABLET    Take 1 tablet by mouth 2 times daily    GLIMEPIRIDE (AMARYL) 1 MG TABLET    Take 1 tablet by mouth every morning (before breakfast) Do not take if blood sugars are less than 110    GUAIFENESIN-CODEINE (GUAIFENESIN AC) 100-10 MG/5ML LIQUID    give 10 milliliters by mouth every 4 hours if needed    LIDOCAINE-PRILOCAINE (EMLA) 2.5-2.5 % CREAM        LOSARTAN (COZAAR) 50 MG TABLET    Take 1 tablet by mouth daily    MULTIPLE VITAMINS-MINERALS (RENAPLEX-D) TABS    Take 1 tablet by mouth every other day Pt takes occasionally PANTOPRAZOLE (PROTONIX) 40 MG TABLET    Take 1 tablet by mouth every morning (before breakfast)    TRAMADOL (ULTRAM) 50 MG TABLET    take 1 tablet by mouth every 6 hours if needed       Encounter Medications  Orders Placed This Encounter   Medications    heparin flush 100 UNIT/ML injection 300 Units     Placing Javon catheter       ALLERGIES     is allergic to lisinopril. RECENT VITALS:   Temp: 97.1 °F (36.2 °C),  Pulse: 116, Resp: 18, BP: (!) 150/89    RADIOLOGY:   XR CHEST PORTABLE   Final Result   Cardiomegaly with venous hypertension and perhaps mild CHF. No pulmonary   edema. XR CHEST PORTABLE    (Results Pending)       LABS:  Labs Reviewed   PROTIME-INR - Abnormal; Notable for the following components:       Result Value    Protime 13.4 (*)     All other components within normal limits   BASIC METABOLIC PANEL - Abnormal; Notable for the following components:    Glucose 349 (*)     BUN 36 (*)     CREATININE 6.44 (*)     Chloride 95 (*)     Anion Gap 18 (*)     GFR Non- 9 (*)     GFR  11 (*)     All other components within normal limits   BRAIN NATRIURETIC PEPTIDE - Abnormal; Notable for the following components:    Pro-BNP 31,731 (*)     All other components within normal limits   CBC - Abnormal; Notable for the following components:    RBC 3.14 (*)     Hemoglobin 9.7 (*)     Hematocrit 31.4 (*)     RDW 16.6 (*)     All other components within normal limits   TROPONIN - Abnormal; Notable for the following components:    Troponin, High Sensitivity 176 (*)     All other components within normal limits   TROPONIN - Abnormal; Notable for the following components:    Troponin, High Sensitivity 177 (*)     All other components within normal limits   CULTURE, URINE   APTT   URINALYSIS WITH MICROSCOPIC     Nonfunctional AV fistula. New dialysis catheter inserted. No hyperkalemia. Labs at baseline.   Will discuss with nephrology regarding next hemodialysis treatment      PLAN/ TASKS OUTSTANDING     Labs, imaging, reassess      (Please note that portions of this note were completed with a voice recognition program.  Efforts were made to edit the dictations but occasionally words are mis-transcribed. )    Gianni Walker MD,, MD, F.A.C.E.P.   Attending Emergency Physician       Gianni Walker MD  12/31/21 0217

## 2021-12-31 NOTE — ED TRIAGE NOTES
Patient presents to ED via triage with c/o dialysis port clogged and not working. Pt was a dialysis appointment this morning and port was not working. Pt also c/o blood in urnie for 3 days.

## 2022-01-01 PROBLEM — T82.590A DIALYSIS AV FISTULA MALFUNCTION (HCC): Status: ACTIVE | Noted: 2022-01-01

## 2022-01-01 LAB
ABSOLUTE EOS #: 0 K/UL (ref 0–0.4)
ABSOLUTE IMMATURE GRANULOCYTE: 0.08 K/UL (ref 0–0.3)
ABSOLUTE LYMPH #: 0.76 K/UL (ref 1–4.8)
ABSOLUTE MONO #: 1.29 K/UL (ref 0.1–0.8)
ANION GAP SERPL CALCULATED.3IONS-SCNC: 14 MMOL/L (ref 9–17)
BASOPHILS # BLD: 1 % (ref 0–2)
BASOPHILS ABSOLUTE: 0.08 K/UL (ref 0–0.2)
BUN BLDV-MCNC: 28 MG/DL (ref 8–23)
BUN/CREAT BLD: ABNORMAL (ref 9–20)
CALCIUM SERPL-MCNC: 9.3 MG/DL (ref 8.6–10.4)
CHLORIDE BLD-SCNC: 99 MMOL/L (ref 98–107)
CO2: 24 MMOL/L (ref 20–31)
CREAT SERPL-MCNC: 5.86 MG/DL (ref 0.7–1.2)
CULTURE: NORMAL
DIFFERENTIAL TYPE: ABNORMAL
EOSINOPHILS RELATIVE PERCENT: 0 % (ref 1–4)
GFR AFRICAN AMERICAN: 12 ML/MIN
GFR NON-AFRICAN AMERICAN: 10 ML/MIN
GFR SERPL CREATININE-BSD FRML MDRD: ABNORMAL ML/MIN/{1.73_M2}
GFR SERPL CREATININE-BSD FRML MDRD: ABNORMAL ML/MIN/{1.73_M2}
GLUCOSE BLD-MCNC: 103 MG/DL (ref 75–110)
GLUCOSE BLD-MCNC: 110 MG/DL (ref 70–99)
GLUCOSE BLD-MCNC: 160 MG/DL (ref 75–110)
GLUCOSE BLD-MCNC: 171 MG/DL (ref 75–110)
GLUCOSE BLD-MCNC: 222 MG/DL (ref 75–110)
HCT VFR BLD CALC: 33.2 % (ref 40.7–50.3)
HEMOGLOBIN: 10 G/DL (ref 13–17)
IMMATURE GRANULOCYTES: 1 %
LYMPHOCYTES # BLD: 10 % (ref 24–44)
Lab: NORMAL
MCH RBC QN AUTO: 30.3 PG (ref 25.2–33.5)
MCHC RBC AUTO-ENTMCNC: 30.1 G/DL (ref 28.4–34.8)
MCV RBC AUTO: 100.6 FL (ref 82.6–102.9)
MONOCYTES # BLD: 17 % (ref 1–7)
MORPHOLOGY: ABNORMAL
NRBC AUTOMATED: 0 PER 100 WBC
PDW BLD-RTO: 16.3 % (ref 11.8–14.4)
PLATELET # BLD: 190 K/UL (ref 138–453)
PLATELET ESTIMATE: ABNORMAL
PMV BLD AUTO: 9.8 FL (ref 8.1–13.5)
POTASSIUM SERPL-SCNC: 4.8 MMOL/L (ref 3.7–5.3)
RBC # BLD: 3.3 M/UL (ref 4.21–5.77)
RBC # BLD: ABNORMAL 10*6/UL
SEG NEUTROPHILS: 71 % (ref 36–66)
SEGMENTED NEUTROPHILS ABSOLUTE COUNT: 5.39 K/UL (ref 1.8–7.7)
SODIUM BLD-SCNC: 137 MMOL/L (ref 135–144)
SPECIMEN DESCRIPTION: NORMAL
WBC # BLD: 7.6 K/UL (ref 3.5–11.3)
WBC # BLD: ABNORMAL 10*3/UL

## 2022-01-01 PROCEDURE — 99223 1ST HOSP IP/OBS HIGH 75: CPT | Performed by: INTERNAL MEDICINE

## 2022-01-01 PROCEDURE — 80048 BASIC METABOLIC PNL TOTAL CA: CPT

## 2022-01-01 PROCEDURE — 6370000000 HC RX 637 (ALT 250 FOR IP)

## 2022-01-01 PROCEDURE — 82947 ASSAY GLUCOSE BLOOD QUANT: CPT

## 2022-01-01 PROCEDURE — 85025 COMPLETE CBC W/AUTO DIFF WBC: CPT

## 2022-01-01 PROCEDURE — 2580000003 HC RX 258

## 2022-01-01 PROCEDURE — 1200000000 HC SEMI PRIVATE

## 2022-01-01 PROCEDURE — 6360000002 HC RX W HCPCS

## 2022-01-01 PROCEDURE — 6370000000 HC RX 637 (ALT 250 FOR IP): Performed by: STUDENT IN AN ORGANIZED HEALTH CARE EDUCATION/TRAINING PROGRAM

## 2022-01-01 PROCEDURE — 36415 COLL VENOUS BLD VENIPUNCTURE: CPT

## 2022-01-01 RX ADMIN — FUROSEMIDE 40 MG: 40 TABLET ORAL at 17:14

## 2022-01-01 RX ADMIN — CARVEDILOL 12.5 MG: 12.5 TABLET, FILM COATED ORAL at 17:14

## 2022-01-01 RX ADMIN — SODIUM CHLORIDE, PRESERVATIVE FREE 10 ML: 5 INJECTION INTRAVENOUS at 07:48

## 2022-01-01 RX ADMIN — CARVEDILOL 12.5 MG: 12.5 TABLET, FILM COATED ORAL at 07:48

## 2022-01-01 RX ADMIN — DESMOPRESSIN ACETATE 40 MG: 0.2 TABLET ORAL at 19:49

## 2022-01-01 RX ADMIN — SODIUM CHLORIDE, PRESERVATIVE FREE 10 ML: 5 INJECTION INTRAVENOUS at 19:49

## 2022-01-01 RX ADMIN — APIXABAN 5 MG: 5 TABLET, FILM COATED ORAL at 07:48

## 2022-01-01 RX ADMIN — CEFTRIAXONE SODIUM 1000 MG: 1 INJECTION, POWDER, FOR SOLUTION INTRAMUSCULAR; INTRAVENOUS at 11:07

## 2022-01-01 RX ADMIN — INSULIN LISPRO 4 UNITS: 100 INJECTION, SOLUTION INTRAVENOUS; SUBCUTANEOUS at 12:35

## 2022-01-01 RX ADMIN — PANTOPRAZOLE SODIUM 40 MG: 40 TABLET, DELAYED RELEASE ORAL at 07:48

## 2022-01-01 RX ADMIN — LOSARTAN POTASSIUM 50 MG: 50 TABLET, FILM COATED ORAL at 07:48

## 2022-01-01 RX ADMIN — FUROSEMIDE 40 MG: 40 TABLET ORAL at 07:48

## 2022-01-01 RX ADMIN — APIXABAN 5 MG: 5 TABLET, FILM COATED ORAL at 19:49

## 2022-01-01 ASSESSMENT — PAIN SCALES - GENERAL
PAINLEVEL_OUTOF10: 0
PAINLEVEL_OUTOF10: 0

## 2022-01-01 NOTE — PROGRESS NOTES
Physical Therapy        Physical Therapy Cancel Note      DATE: 2022    NAME: Carlo Harris  MRN: [id number]   : 1960      Patient not seen this date for Physical Therapy due to:    Patient independent with functional mobility. Will defer PT evaluation at this time. Please reorder PT if future needs arise. Pt reports baseline mobility, denies any safety mobility concerns, requests to defer PT.        Electronically signed by Sera Mcmanus PT on 3/0/6395 at 1:30 PM

## 2022-01-01 NOTE — PROGRESS NOTES
[facility])  Occupational Therapy Not Seen Note    DATE: 2022    NAME: Mi Way  MRN: [id number]   : 1960      Patient not seen this date for Occupational Therapy due to:    Per pt, patient independent with ADLs and functional tasks with no acute OT needs. Will defer OT evaluation at this time. Please reorder OT if future needs arise.        Electronically signed by CA Sinclair on 2022 at 10:32 AM

## 2022-01-01 NOTE — CARE COORDINATION
Case Management Initial Discharge Plan  Adrian Estrella,             Met with:patient to discuss discharge plans. Information verified: address, contacts, phone number, , insurance Yes  Insurance Provider: medicare/bcbs    Emergency Contact/Next of Kin name & number: brother marlen   Who are involved in patient's support system? S.o. PCP: Alba Reeves  Date of last visit: 2 1/2 months      Discharge Planning    Living Arrangements:    lives alone    Home is an apartment  2 stairs to climb to get into front door     Patient able to perform ADL's:Assisted    Current Services (outpatient & in home) privately pays for hha, hd [facility] tts @ 8am  DME equipment: o2, cpap, glucometer   DME provider: hha     Is patient receiving oral anticoagulation therapy?  eliquis          Potential Assistance Needed:   f/u pcp         Evaluation: no    Expected Discharge date:       Patient expects to be discharged to:   home    If home: is the family and/or caregiver wiling & able to provide support at home? yes  Who will be providing this support? S.o. Transportation provider: s.o. Transportation arrangements needed for discharge: No    Readmission Risk              Risk of Unplanned Readmission:  31             Does patient have a readmission risk score greater than 14?: Yes  If yes, follow-up appointment must be made within 7 days of discharge.      Goals of Care: safe transition plan       Educated yes on transitional options, provided freedom of choice and are agreeable with plan      Discharge Plan: return  Home independently           Electronically signed by Rosanne Allen RN on 22 at 11:32 AM EST

## 2022-01-01 NOTE — PROGRESS NOTES
Skin:     General: Skin is warm. Neurological:      General: No focal deficit present. Mental Status: He is alert and oriented to person, place, and time. Mental status is at baseline. Psychiatric:         Mood and Affect: Mood normal.         Behavior: Behavior normal.         Thought Content: Thought content normal.         Judgment: Judgment normal.           Medications:  Scheduled Medications:    heparin flush  300 Units Intercatheter Once    apixaban  5 mg Oral BID    atorvastatin  40 mg Oral Nightly    carvedilol  12.5 mg Oral BID WC    furosemide  40 mg Oral BID    losartan  50 mg Oral Daily    pantoprazole  40 mg Oral QAM AC    sodium chloride flush  5-40 mL IntraVENous 2 times per day    insulin lispro  0-12 Units SubCUTAneous TID WC    insulin lispro  0-6 Units SubCUTAneous Nightly     Continuous Infusions:    sodium chloride      dextrose       PRN Medicationssodium chloride flush, 5-40 mL, PRN  sodium chloride, 25 mL, PRN  ondansetron, 4 mg, Q8H PRN   Or  ondansetron, 4 mg, Q6H PRN  polyethylene glycol, 17 g, Daily PRN  acetaminophen, 650 mg, Q6H PRN   Or  acetaminophen, 650 mg, Q6H PRN  heparin (porcine), 1,900 Units, PRN  heparin (porcine), 1,600 Units, PRN  glucose, 15 g, PRN  dextrose, 12.5 g, PRN  glucagon (rDNA), 1 mg, PRN  dextrose, 100 mL/hr, PRN        Diagnostic Labs:  CBC:   Recent Labs     12/31/21  1204   WBC 6.5   RBC 3.14*   HGB 9.7*   HCT 31.4*   .0   RDW 16.6*        BMP:   Recent Labs     12/31/21  1204      K 5.1   CL 95*   CO2 22   BUN 36*   CREATININE 6.44*     BNP: No results for input(s): BNP in the last 72 hours. PT/INR:   Recent Labs     12/31/21  1204   PROTIME 13.4*   INR 1.3     APTT:   Recent Labs     12/31/21  1204   APTT 26.2     CARDIAC ENZYMES: No results for input(s): CKMB, CKMBINDEX, TROPONINI in the last 72 hours.     Invalid input(s): CKTOTAL;3  FASTING LIPID PANEL:  Lab Results   Component Value Date    CHOL 127 11/12/2021 HDL 45 11/12/2021    TRIG 107 11/12/2021     LIVER PROFILE: No results for input(s): AST, ALT, ALB, BILIDIR, BILITOT, ALKPHOS in the last 72 hours. MICROBIOLOGY:   Lab Results   Component Value Date/Time    CULTURE NO GROWTH 11/24/2018 07:44 PM       Imaging:    XR CHEST PORTABLE    Result Date: 12/31/2021  1. Right IJ hemodialysis catheter has been placed in the interval, tip overlying the right atrium. 2. No pneumothorax. 3. Mild congestive heart failure is most likely given the radiographic findings; pneumonia is also a consideration in areas of consolidation with pleural effusion. XR CHEST PORTABLE    Result Date: 12/31/2021  Cardiomegaly with venous hypertension and perhaps mild CHF. No pulmonary edema. ASSESSMENT & PLAN     Assessment and Plan:    Principal Problem:    CKD (chronic kidney disease) requiring chronic dialysis ([facility])  Active Problems:    Acute on chronic combined systolic (congestive) and diastolic (congestive) heart failure ([facility])    Type 2 diabetes mellitus with chronic kidney disease on chronic dialysis ([facility])    Anemia    Essential hypertension    AVF (arteriovenous fistula) ([facility])    ESRD (end stage renal disease) ([address]    Coronary artery disease involving native coronary artery of native heart without angina pectoris    BATSHEVA (obstructive sleep apnea)    Hypoxemia-due to CHF/fluid overload    Atrial fibrillation ([address]  Resolved Problems:    * No resolved hospital problems. *      IMPRESSION  This is a 59 y.o. male who presented with from dialysis center with nonfunctional AV fistula in the left upper extremity.  Patient admitted to inpatient status for continued hemodialysis and evaluation of left upper extremity AV fistula.     Active Problems:  Non-functional AV fistula   - Fistulogram later this week  - Vascular surgery following     Possible acute cystitis  - Start on Rocephin   - Follow up on urine cultures    - UA showing WBC 20 to 50, Nitrite +, Leukocyte esterase +     ESRD requiring chronic dialysis   - Javon catheter placed in the right IJ for continued dialysis while awaiting fistulogram by vascular surgery  - Patient states he is not making much urine and it is becoming harder for him to urinate  - Hemodialysis Tuesday, Thursday and Saturday's  - Monitor electrolytes and replace when indicated  - Troponins 177 > near baseline  - Nephrology following     Atrial fibrillation   - Currently, rate & rhythm controlled  - Eliquis  - Coreg  - Continue to monitor     Acute on chronic combined systolic and diastolic CHF  - Shortness of breath and abdominal distention on exam  - Patient wears 2L of oxygen at home and is currently on 2L  - Pro-BNP 31,731 baseline roughly 30,000  - Coreg  - Lasix  - I's and O's  - Daily weights     Hypertension   - Losartan     Coronary artery disease -stable  - Coreg  - Lipitor     Obstructive sleep apnea  - BiPAP at night and during daytime naps     Type 2 diabetes  - MDSS  - Re-check glucose control in the morning and adjust insulin accordingly           Diet: Carb controlled diet  DVT ppx: Heparin and Eliquis  GI ppx: Protonix     PT/OT/SW: Consulted  Discharge Planning: In process        Julian Hicks MD  Internal Medicine Resident, PGY-1  [address], [place]   12:18 AM 1/1/2022

## 2022-01-01 NOTE — PROGRESS NOTES
injection 5-40 mL, 2 times per day  sodium chloride flush 0.9 % injection 5-40 mL, PRN  0.9 % sodium chloride infusion, PRN  ondansetron (ZOFRAN-ODT) disintegrating tablet 4 mg, Q8H PRN   Or  ondansetron (ZOFRAN) injection 4 mg, Q6H PRN  polyethylene glycol (GLYCOLAX) packet 17 g, Daily PRN  acetaminophen (TYLENOL) tablet 650 mg, Q6H PRN   Or  acetaminophen (TYLENOL) suppository 650 mg, Q6H PRN  heparin (porcine) injection 1,900 Units, PRN  heparin (porcine) injection 1,600 Units, PRN  insulin lispro (HUMALOG) injection vial 0-12 Units, TID WC  insulin lispro (HUMALOG) injection vial 0-6 Units, Nightly  glucose (GLUTOSE) 40 % oral gel 15 g, PRN  dextrose 50 % IV solution, PRN  glucagon (rDNA) injection 1 mg, PRN  dextrose 5 % solution, PRN          LABS      CBC:   Recent Labs     12/31/21  1204 01/01/22  0553   WBC 6.5 7.6   RBC 3.14* 3.30*   HGB 9.7* 10.0*   HCT 31.4* 33.2*   .0 100.6   MCH 30.9 30.3   MCHC 30.9 30.1   RDW 16.6* 16.3*    190   MPV 9.9 9.8      BMP:   Recent Labs     12/31/21  1204 01/01/22  0553    137   K 5.1 4.8   CL 95* 99   CO2 22 24   BUN 36* 28*   CREATININE 6.44* 5.86*   GLUCOSE 349* 110*   CALCIUM 9.4 9.3      BNP:  Lab Results   Component Value Date     09/08/2018   IRON:    Lab Results   Component Value Date    IRON 13 11/25/2018     IRON SATURATION:    Lab Results   Component Value Date    LABIRON 8 11/25/2018     TIBC:    Lab Results   Component Value Date    TIBC 156 11/25/2018     FERRITIN:    Lab Results   Component Value Date    FERRITIN 363 11/25/2018     TATIANA: No results found for: TATIANA    SPEP:   Lab Results   Component Value Date    PROT 8.0 11/12/2021     UPEP: No results found for: TPU   HEPBSAG:  Lab Results   Component Value Date    HEPBSAG NONREACTIVE 11/13/2021     HEPCAB:  Lab Results   Component Value Date    HEPCAB NONREACTIVE 11/13/2021     C3: No results found for: C3  C4: No results found for: C4  MPO ANCA:   Lab Results   Component Value Date    MPO 25 09/14/2016    . PR3 ANCA:    Lab Results   Component Value Date    PR3 51 09/14/2016     RADIOLOGY      Reviewed as available. ASSESSMENT    1. End-stage renal disease: Patient presented with thrombosed AV fistula. He was dialyzed via temporary catheter. Patient tolerated procedure fairly well  2. Volume overload  3. Secondary hyperparathyroidism. PLAN      1. Dialysis in a.m.  2.  On Monday possible thrombectomy versus tunnel catheter placement  3. We will follow with  Please do not hesitate to call with questions.     Electronically signed by Neeraj Harris MD on 1/1/2022 at 11:54 AM

## 2022-01-01 NOTE — PROGRESS NOTES
Dialysis Post Treatment Note  Vitals:    12/31/21 2110   BP: (!) 151/83   Pulse: 52   Resp: 16   Temp: 98 °F (36.7 °C)   SpO2:      Pre-Weight = 124.1  Post-weight = Weight: 266 lb 5.1 oz (120.8 kg)  Total Liters Processed = Total Liters Processed (l/min): 57.3 l/min  Rinseback Volume (mL) = Rinseback Volume (ml): 300 ml  Net Removal (mL) = NET Removed (ml): 3100 ml  Patient's dry weight= unknown  Type of access used= RIJ  Length of treatment= 3 hrs    Pt tolerated HD well. Cath ports flushed well w NS and heparin locked. Arterial pressures ran slightly high but stable. Will benefit from tunneled cath. Report called to floor RN.

## 2022-01-02 ENCOUNTER — APPOINTMENT (OUTPATIENT)
Dept: DIALYSIS | Age: 62
DRG: 252 | End: 2022-01-02
Payer: MEDICARE

## 2022-01-02 LAB
ABSOLUTE EOS #: 0.14 K/UL (ref 0–0.44)
ABSOLUTE IMMATURE GRANULOCYTE: 0.07 K/UL (ref 0–0.3)
ABSOLUTE LYMPH #: 0.41 K/UL (ref 1.1–3.7)
ABSOLUTE MONO #: 1.1 K/UL (ref 0.1–1.2)
ANION GAP SERPL CALCULATED.3IONS-SCNC: 17 MMOL/L (ref 9–17)
BASOPHILS # BLD: 1 % (ref 0–2)
BASOPHILS ABSOLUTE: 0.07 K/UL (ref 0–0.2)
BUN BLDV-MCNC: 44 MG/DL (ref 8–23)
BUN/CREAT BLD: ABNORMAL (ref 9–20)
CALCIUM SERPL-MCNC: 9 MG/DL (ref 8.6–10.4)
CHLORIDE BLD-SCNC: 96 MMOL/L (ref 98–107)
CO2: 18 MMOL/L (ref 20–31)
CREAT SERPL-MCNC: 6.95 MG/DL (ref 0.7–1.2)
DIFFERENTIAL TYPE: ABNORMAL
EOSINOPHILS RELATIVE PERCENT: 2 % (ref 1–4)
GFR AFRICAN AMERICAN: 10 ML/MIN
GFR NON-AFRICAN AMERICAN: 8 ML/MIN
GFR SERPL CREATININE-BSD FRML MDRD: ABNORMAL ML/MIN/{1.73_M2}
GFR SERPL CREATININE-BSD FRML MDRD: ABNORMAL ML/MIN/{1.73_M2}
GLUCOSE BLD-MCNC: 108 MG/DL (ref 75–110)
GLUCOSE BLD-MCNC: 129 MG/DL (ref 75–110)
GLUCOSE BLD-MCNC: 137 MG/DL (ref 70–99)
GLUCOSE BLD-MCNC: 137 MG/DL (ref 75–110)
HCT VFR BLD CALC: 32.5 % (ref 40.7–50.3)
HEMOGLOBIN: 9.8 G/DL (ref 13–17)
IMMATURE GRANULOCYTES: 1 %
LYMPHOCYTES # BLD: 6 % (ref 24–43)
MCH RBC QN AUTO: 30.4 PG (ref 25.2–33.5)
MCHC RBC AUTO-ENTMCNC: 30.2 G/DL (ref 28.4–34.8)
MCV RBC AUTO: 100.9 FL (ref 82.6–102.9)
MONOCYTES # BLD: 16 % (ref 3–12)
MORPHOLOGY: ABNORMAL
NRBC AUTOMATED: 0 PER 100 WBC
PDW BLD-RTO: 16 % (ref 11.8–14.4)
PLATELET # BLD: 162 K/UL (ref 138–453)
PLATELET ESTIMATE: ABNORMAL
PMV BLD AUTO: 10 FL (ref 8.1–13.5)
POTASSIUM SERPL-SCNC: 6 MMOL/L (ref 3.7–5.3)
RBC # BLD: 3.22 M/UL (ref 4.21–5.77)
RBC # BLD: ABNORMAL 10*6/UL
SEG NEUTROPHILS: 74 % (ref 36–65)
SEGMENTED NEUTROPHILS ABSOLUTE COUNT: 5.11 K/UL (ref 1.5–8.1)
SODIUM BLD-SCNC: 131 MMOL/L (ref 135–144)
WBC # BLD: 6.9 K/UL (ref 3.5–11.3)
WBC # BLD: ABNORMAL 10*3/UL

## 2022-01-02 PROCEDURE — 6360000002 HC RX W HCPCS

## 2022-01-02 PROCEDURE — 36415 COLL VENOUS BLD VENIPUNCTURE: CPT

## 2022-01-02 PROCEDURE — 2580000003 HC RX 258

## 2022-01-02 PROCEDURE — 99232 SBSQ HOSP IP/OBS MODERATE 35: CPT | Performed by: INTERNAL MEDICINE

## 2022-01-02 PROCEDURE — 90935 HEMODIALYSIS ONE EVALUATION: CPT

## 2022-01-02 PROCEDURE — 85025 COMPLETE CBC W/AUTO DIFF WBC: CPT

## 2022-01-02 PROCEDURE — 80048 BASIC METABOLIC PNL TOTAL CA: CPT

## 2022-01-02 PROCEDURE — 82947 ASSAY GLUCOSE BLOOD QUANT: CPT

## 2022-01-02 PROCEDURE — 6370000000 HC RX 637 (ALT 250 FOR IP)

## 2022-01-02 PROCEDURE — 1200000000 HC SEMI PRIVATE

## 2022-01-02 PROCEDURE — 6370000000 HC RX 637 (ALT 250 FOR IP): Performed by: STUDENT IN AN ORGANIZED HEALTH CARE EDUCATION/TRAINING PROGRAM

## 2022-01-02 PROCEDURE — 6360000002 HC RX W HCPCS: Performed by: INTERNAL MEDICINE

## 2022-01-02 RX ORDER — SODIUM POLYSTYRENE SULFONATE 15 G/60ML
15 SUSPENSION ORAL; RECTAL ONCE
Status: COMPLETED | OUTPATIENT
Start: 2022-01-02 | End: 2022-01-02

## 2022-01-02 RX ADMIN — PANTOPRAZOLE SODIUM 40 MG: 40 TABLET, DELAYED RELEASE ORAL at 14:24

## 2022-01-02 RX ADMIN — APIXABAN 5 MG: 5 TABLET, FILM COATED ORAL at 19:37

## 2022-01-02 RX ADMIN — SODIUM POLYSTYRENE SULFONATE 15 G: 15 SUSPENSION ORAL; RECTAL at 14:26

## 2022-01-02 RX ADMIN — CARVEDILOL 12.5 MG: 12.5 TABLET, FILM COATED ORAL at 18:05

## 2022-01-02 RX ADMIN — SODIUM CHLORIDE, PRESERVATIVE FREE 10 ML: 5 INJECTION INTRAVENOUS at 19:37

## 2022-01-02 RX ADMIN — FUROSEMIDE 40 MG: 40 TABLET ORAL at 18:05

## 2022-01-02 RX ADMIN — CEFTRIAXONE SODIUM 1000 MG: 1 INJECTION, POWDER, FOR SOLUTION INTRAMUSCULAR; INTRAVENOUS at 14:25

## 2022-01-02 RX ADMIN — HEPARIN SODIUM 1900 UNITS: 1000 INJECTION INTRAVENOUS; SUBCUTANEOUS at 12:23

## 2022-01-02 RX ADMIN — DESMOPRESSIN ACETATE 40 MG: 0.2 TABLET ORAL at 19:37

## 2022-01-02 RX ADMIN — LOSARTAN POTASSIUM 50 MG: 50 TABLET, FILM COATED ORAL at 14:24

## 2022-01-02 RX ADMIN — HEPARIN SODIUM 1600 UNITS: 1000 INJECTION INTRAVENOUS; SUBCUTANEOUS at 12:23

## 2022-01-02 ASSESSMENT — PAIN SCALES - GENERAL
PAINLEVEL_OUTOF10: 0

## 2022-01-02 NOTE — PROGRESS NOTES
Dialysis progress note  Subjective: Patient was seen and examined on hemodialysis. He was receiving dialysis through [facility] catheter. Flow was good. Dialysis orders were reviewed with dialysis nurses. We will try to remove 2.5-3 during dialysis today next  Objective:  Vitals:    01/02/22 0802   BP: (!) 132/92   Pulse: 89   Resp: 18   Temp: 97.3 °F (36.3 °C)   SpO2: 96%   Neck: No JVD or carotid bruit  Chest bilateral air entry patient has a scattered wheezes today  Abdomen: Soft nontender  Extremities 1+ edema  LABS:  Results for Adolph Leo (MRN [id number]) as of 1/2/2022 10:38   Ref. Range 1/2/2022 05:20   Sodium Latest Ref Range: 135 - 144 mmol/L 131 (L)   Potassium Latest Ref Range: 3.7 - 5.3 mmol/L 6.0 (HH)   Chloride Latest Ref Range: 98 - 107 mmol/L 96 (L)   CO2 Latest Ref Range: 20 - 31 mmol/L 18 (L)   BUN Latest Ref Range: 8 - 23 mg/dL 44 (H)   Creatinine Latest Ref Range: 0.70 - 1.20 mg/dL 6.95 (HH)   Bun/Cre Ratio Latest Ref Range: 9 - 20  NOT REPORTED   Anion Gap Latest Ref Range: 9 - 17 mmol/L 17   GFR Non- Latest Ref Range: >60 mL/min 8 (L)   GFR  Latest Ref Range: >60 mL/min 10 (L)   Glucose Latest Ref Range: 70 - 99 mg/dL 137 (H)   CALCIUM, SERUM, [id number] Latest Ref Range: 8.6 - 10.4 mg/dL 9.0   GFR Comment Unknown Pend   GFR Staging Unknown NOT REPORTED   WBC Latest Ref Range: 3.5 - 11.3 k/uL 6.9   RBC Latest Ref Range: 4.21 - 5.77 m/uL 3.22 (L)   Hemoglobin Quant Latest Ref Range: 13.0 - 17.0 g/dL 9.8 (L)   Hematocrit Latest Ref Range: 40.7 - 50.3 % 32.5 (L)   MCV Latest Ref Range: 82.6 - 102.9 fL 100.9   MCH Latest Ref Range: 25.2 - 33.5 pg 30.4   MCHC Latest Ref Range: 28.4 - 34.8 g/dL 30.2   MPV Latest Ref Range: 8.1 - 13.5 fL 10.0   RDW Latest Ref Range: 11.8 - 14.4 % 16.0 (H)   Platelet Count Latest Ref Range: 138 - 453 k/uL 162   Assessment:  End-stage renal disease: Dialysis orders were reviewed with dialysis nurses.   Will remove 3 to 3.5 kg if he tolerates. Patient is congested based on physical examination  2. Volume overload and CHF  3.   Need for declot of AV fistula or tunnel catheter in a.m. next      Waqar Perera MD

## 2022-01-02 NOTE — PROGRESS NOTES
[facility]  Internal Medicine Teaching Residency Program  Inpatient Daily Progress Note  ______________________________________________________________________________    Patient: Alpesh Jimenez  YOB: 1960   CNA:[id number]    Acct: [de-identified]     Room: 0339/0339-02  Admit date: 12/31/2021  Today's date: 01/02/22  Number of days in the hospital: 2    SUBJECTIVE   Admitting Diagnosis: Dialysis AV fistula malfunction ([facility]  CC: Vascular access problem  Patient seen and examined at bedside. Chart and results reviewed. Vitals are stable. No acute event overnight. Labs reviewed and evaluated. Plan  Getting HD today. Follow-up on vascular recommendations regarding fistulogram and possible intervention. ROS:  Constitutional:  negative for chills, fevers, sweats  Respiratory:  negative for cough, dyspnea on exertion, hemoptysis, shortness of breath, wheezing  Cardiovascular:  negative for chest pain, chest pressure/discomfort, lower extremity edema, palpitations  Gastrointestinal:  negative for abdominal pain, constipation, diarrhea, nausea, vomiting  Neurological:  negative for dizziness, headache    BRIEF HISTORY     The patient is a pleasant 59 y.o. male with a PMHx significant for anemia, a-fib, ESRD on hemodialysis, coronary artery disease, hypertension, obstructive sleep apnea type 2 diabetes who presents with a chief complaint of vascular access problem. Patient states that he was at dialysis on Tuesday Thursday Saturday was in dialysis today due to holiday schedule. During dialysis there were not able to to access his AV fistula which is located in the left upper arm. On examination of the AV fistula there was no bruit auscultated or thrill palpated. Patient denied any pain in the left upper extremity. Patient endorses abdominal distention as well as increased shortness of breath. He chronically wears 2 L of oxygen at home due to CHF.   Patient states he does not make much urine anymore and has become much more difficult to urinate. Patient denies any fevers chills, cough, nausea vomiting, abdominal pain or changes in bowel habits. Javon catheter placed in the right IJ for temporary dialysis. Patient is vaccinated against COVID. OBJECTIVE     Vital Signs:  /67   Pulse 56   Temp 97.6 °F (36.4 °C)   Resp 16   Wt 267 lb 10.2 oz (121.4 kg)   SpO2 96%   BMI 38.40 kg/m²     Temp (24hrs), Av.5 °F (36.4 °C), Min:97.3 °F (36.3 °C), Max:97.6 °F (36.4 °C)    No intake/output data recorded. Physical Exam:  Physical Exam  Vitals and nursing note reviewed. Constitutional:       Appearance: Normal appearance. HENT:      Head: Normocephalic and atraumatic. Nose: Nose normal.      Mouth/Throat:      Mouth: Mucous membranes are moist.      Pharynx: Oropharynx is clear. Eyes:      Extraocular Movements: Extraocular movements intact. Conjunctiva/sclera: Conjunctivae normal.      Pupils: Pupils are equal, round, and reactive to light. Cardiovascular:      Rate and Rhythm: Normal rate and regular rhythm. Pulses: Normal pulses. Heart sounds: Normal heart sounds. No murmur heard. No friction rub. No gallop. Pulmonary:      Effort: Pulmonary effort is normal. No respiratory distress. Breath sounds: Normal breath sounds. No stridor. No wheezing, rhonchi or rales. Chest:      Chest wall: No tenderness. Abdominal:      General: Abdomen is flat. Bowel sounds are normal. There is distension. Palpations: Abdomen is soft. There is no mass. Tenderness: There is no abdominal tenderness. There is no guarding or rebound. Hernia: No hernia is present. Musculoskeletal:         General: No swelling, tenderness, deformity or signs of injury. Normal range of motion. Cervical back: Normal range of motion. Right lower leg: No edema. Left lower leg: No edema. Skin:     General: Skin is warm. Neurological:      General: No focal deficit present. Mental Status: He is alert and oriented to person, place, and time. Mental status is at baseline. Psychiatric:         Mood and Affect: Mood normal.         Behavior: Behavior normal.         Thought Content: Thought content normal.         Judgment: Judgment normal.           Medications:  Scheduled Medications:    sodium polystyrene  15 g Oral Once    cefTRIAXone (ROCEPHIN) IV  1,000 mg IntraVENous Q24H    heparin flush  300 Units Intercatheter Once    apixaban  5 mg Oral BID    atorvastatin  40 mg Oral Nightly    carvedilol  12.5 mg Oral BID WC    furosemide  40 mg Oral BID    losartan  50 mg Oral Daily    pantoprazole  40 mg Oral QAM AC    sodium chloride flush  5-40 mL IntraVENous 2 times per day    insulin lispro  0-12 Units SubCUTAneous TID WC    insulin lispro  0-6 Units SubCUTAneous Nightly     Continuous Infusions:    sodium chloride      dextrose       PRN Medicationssodium chloride flush, 5-40 mL, PRN  sodium chloride, 25 mL, PRN  ondansetron, 4 mg, Q8H PRN   Or  ondansetron, 4 mg, Q6H PRN  polyethylene glycol, 17 g, Daily PRN  acetaminophen, 650 mg, Q6H PRN   Or  acetaminophen, 650 mg, Q6H PRN  heparin (porcine), 1,900 Units, PRN  heparin (porcine), 1,600 Units, PRN  glucose, 15 g, PRN  dextrose, 12.5 g, PRN  glucagon (rDNA), 1 mg, PRN  dextrose, 100 mL/hr, PRN        Diagnostic Labs:  CBC:   Recent Labs     12/31/21  1204 01/01/22  0553 01/02/22  0520   WBC 6.5 7.6 6.9   RBC 3.14* 3.30* 3.22*   HGB 9.7* 10.0* 9.8*   HCT 31.4* 33.2* 32.5*   .0 100.6 100.9   RDW 16.6* 16.3* 16.0*    190 162     BMP:   Recent Labs     12/31/21  1204 01/01/22  0553 01/02/22  0520    137 131*   K 5.1 4.8 6.0*   CL 95* 99 96*   CO2 22 24 18*   BUN 36* 28* 44*   CREATININE 6.44* 5.86* 6.95*     BNP: No results for input(s): BNP in the last 72 hours.   PT/INR:   Recent Labs     12/31/21  1204   PROTIME 13.4*   INR 1.3     APTT: Recent Labs     12/31/21  1204   APTT 26.2     CARDIAC ENZYMES: No results for input(s): CKMB, CKMBINDEX, TROPONINI in the last 72 hours. Invalid input(s): CKTOTAL;3  FASTING LIPID PANEL:  Lab Results   Component Value Date    CHOL 127 11/12/2021    HDL 45 11/12/2021    TRIG 107 11/12/2021     LIVER PROFILE: No results for input(s): AST, ALT, ALB, BILIDIR, BILITOT, ALKPHOS in the last 72 hours. MICROBIOLOGY:   Lab Results   Component Value Date/Time    CULTURE NO SIGNIFICANT GROWTH 12/31/2021 04:23 PM       Imaging:    XR CHEST PORTABLE    Result Date: 12/31/2021  1. Right IJ hemodialysis catheter has been placed in the interval, tip overlying the right atrium. 2. No pneumothorax. 3. Mild congestive heart failure is most likely given the radiographic findings; pneumonia is also a consideration in areas of consolidation with pleural effusion. XR CHEST PORTABLE    Result Date: 12/31/2021  Cardiomegaly with venous hypertension and perhaps mild CHF. No pulmonary edema. ASSESSMENT & PLAN     Assessment and Plan:    Principal Problem:    Dialysis AV fistula malfunction ([facility])  Active Problems:    Acute on chronic combined systolic (congestive) and diastolic (congestive) heart failure ([facility])    Type 2 diabetes mellitus with chronic kidney disease on chronic dialysis ([facility])    Anemia    Essential hypertension    AVF (arteriovenous fistula) ([facility])    ESRD (end stage renal disease) ([address]    Coronary artery disease involving native coronary artery of native heart without angina pectoris    BATSHEVA (obstructive sleep apnea)    Hypoxemia-due to CHF/fluid overload    Atrial fibrillation (HCC)    CKD (chronic kidney disease) requiring chronic dialysis ([address]    Arteriovenous fistula occlusion ([address]  Resolved Problems:    * No resolved hospital problems. *      IMPRESSION  This is a 59 y.o. male who presented with from dialysis center with nonfunctional AV fistula in the left upper extremity.  Patient admitted to inpatient status for continued hemodialysis and evaluation of left upper extremity AV fistula.     Active Problems:  Non-functional AV fistula   - Fistulogram later this Monday? - Vascular surgery following     Possible acute cystitis  - Start on Rocephin   - Follow up on urine cultures    - UA showing WBC 20 to 50, Nitrite +, Leukocyte esterase +     ESRD requiring chronic dialysis   - Javon catheter placed in the right IJ for continued dialysis while awaiting fistulogram by vascular surgery  - Patient states he is not making much urine and it is becoming harder for him to urinate  -He is getting hemodialysis today. - Hemodialysis Tuesday, Thursday and Saturday's  - Monitor electrolytes and replace when indicated  - Troponins 177 > near baseline  - Nephrology following     Atrial fibrillation  [id number] score 3/9  - Currently, rate & rhythm controlled  - Eliquis  - Coreg  - Continue to monitor     Acute on chronic combined systolic and diastolic CHF  - Shortness of breath and abdominal distention on exam  - Patient wears 2L of oxygen at home and is currently on 2L  - Pro-BNP 31,731 baseline roughly 30,000  - Coreg  - Lasix  - I's and O's  - Daily weights     Hypertension   - Losartan     Coronary artery disease -stable  - Coreg  - Lipitor     Obstructive sleep apnea  - BiPAP at night and during daytime naps     Type 2 diabetes  - MDSS  - POCG [phone]  -Hypoglycemia protocol in place.     Diet: Carb controlled diet  DVT ppx: Heparin and Eliquis  GI ppx: Protonix     PT/OT/SW: Consulted  Discharge Planning:  In process      Hari Rey MD  Internal Medicine Resident, PGY-1  [address]  0/2/1105,03:44 PM

## 2022-01-02 NOTE — PROGRESS NOTES
Dialysis Post Treatment Note  Vitals:    01/02/22 1218   BP: 119/81   Pulse: 60   Resp: 16   Temp: 97.8 °F (36.6 °C)   SpO2:      Pre-Weight = 121.4kg  Post-weight = Weight: 260 lb 12.9 oz (118.3 kg)  Total Liters Processed = Total Liters Processed (l/min): 65.2 l/min  Rinseback Volume (mL) = Rinseback Volume (ml): 300 ml  Net Removal (mL) = NET Removed (ml): 2900 ml  Type of access used=right IJ  Length of treatment=180minutes    All blood returned, sterile caps placed and lines heparinized. Patient tolerated treatment well, leaving via stretcher.  Post /81

## 2022-01-03 ENCOUNTER — APPOINTMENT (OUTPATIENT)
Dept: INTERVENTIONAL RADIOLOGY/VASCULAR | Age: 62
DRG: 252 | End: 2022-01-03
Payer: MEDICARE

## 2022-01-03 LAB
ABSOLUTE EOS #: 0.17 K/UL (ref 0–0.44)
ABSOLUTE IMMATURE GRANULOCYTE: 0.06 K/UL (ref 0–0.3)
ABSOLUTE LYMPH #: 0.4 K/UL (ref 1.1–3.7)
ABSOLUTE MONO #: 0.97 K/UL (ref 0.1–1.2)
ANION GAP SERPL CALCULATED.3IONS-SCNC: 18 MMOL/L (ref 9–17)
BASOPHILS # BLD: 0 % (ref 0–2)
BASOPHILS ABSOLUTE: 0 K/UL (ref 0–0.2)
BUN BLDV-MCNC: 40 MG/DL (ref 8–23)
BUN/CREAT BLD: ABNORMAL (ref 9–20)
CALCIUM SERPL-MCNC: 8.8 MG/DL (ref 8.6–10.4)
CHLORIDE BLD-SCNC: 95 MMOL/L (ref 98–107)
CO2: 21 MMOL/L (ref 20–31)
CREAT SERPL-MCNC: 6.73 MG/DL (ref 0.7–1.2)
DIFFERENTIAL TYPE: ABNORMAL
EKG ATRIAL RATE: 110 BPM
EKG Q-T INTERVAL: 382 MS
EKG QRS DURATION: 144 MS
EKG QTC CALCULATION (BAZETT): 523 MS
EKG R AXIS: -74 DEGREES
EKG T AXIS: 81 DEGREES
EKG VENTRICULAR RATE: 113 BPM
EOSINOPHILS RELATIVE PERCENT: 3 % (ref 1–4)
GFR AFRICAN AMERICAN: 10 ML/MIN
GFR NON-AFRICAN AMERICAN: 8 ML/MIN
GFR SERPL CREATININE-BSD FRML MDRD: ABNORMAL ML/MIN/{1.73_M2}
GFR SERPL CREATININE-BSD FRML MDRD: ABNORMAL ML/MIN/{1.73_M2}
GLUCOSE BLD-MCNC: 124 MG/DL (ref 75–110)
GLUCOSE BLD-MCNC: 143 MG/DL (ref 70–99)
GLUCOSE BLD-MCNC: 153 MG/DL (ref 75–110)
GLUCOSE BLD-MCNC: 189 MG/DL (ref 75–110)
HCT VFR BLD CALC: 32.7 % (ref 40.7–50.3)
HEMOGLOBIN: 9.9 G/DL (ref 13–17)
IMMATURE GRANULOCYTES: 1 %
INR BLD: 1.4
LYMPHOCYTES # BLD: 7 % (ref 24–43)
MCH RBC QN AUTO: 30.3 PG (ref 25.2–33.5)
MCHC RBC AUTO-ENTMCNC: 30.3 G/DL (ref 28.4–34.8)
MCV RBC AUTO: 100 FL (ref 82.6–102.9)
MONOCYTES # BLD: 17 % (ref 3–12)
MORPHOLOGY: ABNORMAL
NRBC AUTOMATED: 0 PER 100 WBC
PDW BLD-RTO: 15.9 % (ref 11.8–14.4)
PLATELET # BLD: 152 K/UL (ref 138–453)
PLATELET ESTIMATE: ABNORMAL
PMV BLD AUTO: 10.3 FL (ref 8.1–13.5)
POTASSIUM SERPL-SCNC: 4.7 MMOL/L (ref 3.7–5.3)
PROTHROMBIN TIME: 14.2 SEC (ref 9.1–12.3)
RBC # BLD: 3.27 M/UL (ref 4.21–5.77)
RBC # BLD: ABNORMAL 10*6/UL
SEG NEUTROPHILS: 72 % (ref 36–65)
SEGMENTED NEUTROPHILS ABSOLUTE COUNT: 4.1 K/UL (ref 1.5–8.1)
SODIUM BLD-SCNC: 134 MMOL/L (ref 135–144)
WBC # BLD: 5.7 K/UL (ref 3.5–11.3)
WBC # BLD: ABNORMAL 10*3/UL

## 2022-01-03 PROCEDURE — 85610 PROTHROMBIN TIME: CPT

## 2022-01-03 PROCEDURE — 2709999900 HC NON-CHARGEABLE SUPPLY

## 2022-01-03 PROCEDURE — 6370000000 HC RX 637 (ALT 250 FOR IP): Performed by: STUDENT IN AN ORGANIZED HEALTH CARE EDUCATION/TRAINING PROGRAM

## 2022-01-03 PROCEDURE — 93010 ELECTROCARDIOGRAM REPORT: CPT | Performed by: INTERNAL MEDICINE

## 2022-01-03 PROCEDURE — 1200000000 HC SEMI PRIVATE

## 2022-01-03 PROCEDURE — 36415 COLL VENOUS BLD VENIPUNCTURE: CPT

## 2022-01-03 PROCEDURE — B51W1ZZ FLUOROSCOPY OF DIALYSIS SHUNT/FISTULA USING LOW OSMOLAR CONTRAST: ICD-10-PCS | Performed by: RADIOLOGY

## 2022-01-03 PROCEDURE — 6360000004 HC RX CONTRAST MEDICATION: Performed by: INTERNAL MEDICINE

## 2022-01-03 PROCEDURE — 93005 ELECTROCARDIOGRAM TRACING: CPT | Performed by: STUDENT IN AN ORGANIZED HEALTH CARE EDUCATION/TRAINING PROGRAM

## 2022-01-03 PROCEDURE — 6360000002 HC RX W HCPCS: Performed by: RADIOLOGY

## 2022-01-03 PROCEDURE — 99232 SBSQ HOSP IP/OBS MODERATE 35: CPT | Performed by: INTERNAL MEDICINE

## 2022-01-03 PROCEDURE — 05CA3ZZ EXTIRPATION OF MATTER FROM LEFT BRACHIAL VEIN, PERCUTANEOUS APPROACH: ICD-10-PCS | Performed by: RADIOLOGY

## 2022-01-03 PROCEDURE — 82947 ASSAY GLUCOSE BLOOD QUANT: CPT

## 2022-01-03 PROCEDURE — 2580000003 HC RX 258

## 2022-01-03 PROCEDURE — 36905 THRMBC/NFS DIALYSIS CIRCUIT: CPT

## 2022-01-03 PROCEDURE — C1894 INTRO/SHEATH, NON-LASER: HCPCS

## 2022-01-03 PROCEDURE — 80048 BASIC METABOLIC PNL TOTAL CA: CPT

## 2022-01-03 PROCEDURE — 3E03317 INTRODUCTION OF OTHER THROMBOLYTIC INTO PERIPHERAL VEIN, PERCUTANEOUS APPROACH: ICD-10-PCS | Performed by: RADIOLOGY

## 2022-01-03 PROCEDURE — C1725 CATH, TRANSLUMIN NON-LASER: HCPCS

## 2022-01-03 PROCEDURE — 85025 COMPLETE CBC W/AUTO DIFF WBC: CPT

## 2022-01-03 PROCEDURE — 05WY3JZ REVISION OF SYNTHETIC SUBSTITUTE IN UPPER VEIN, PERCUTANEOUS APPROACH: ICD-10-PCS | Performed by: RADIOLOGY

## 2022-01-03 PROCEDURE — C1769 GUIDE WIRE: HCPCS

## 2022-01-03 RX ORDER — ASPIRIN 81 MG/1
81 TABLET ORAL DAILY
Status: DISCONTINUED | OUTPATIENT
Start: 2022-01-03 | End: 2022-01-05 | Stop reason: HOSPADM

## 2022-01-03 RX ORDER — FENTANYL CITRATE 50 UG/ML
INJECTION, SOLUTION INTRAMUSCULAR; INTRAVENOUS
Status: COMPLETED | OUTPATIENT
Start: 2022-01-03 | End: 2022-01-03

## 2022-01-03 RX ORDER — HEPARIN SODIUM (PORCINE) LOCK FLUSH IV SOLN 100 UNIT/ML 100 UNIT/ML
SOLUTION INTRAVENOUS
Status: COMPLETED | OUTPATIENT
Start: 2022-01-03 | End: 2022-01-03

## 2022-01-03 RX ORDER — MIDAZOLAM HYDROCHLORIDE 2 MG/2ML
INJECTION, SOLUTION INTRAMUSCULAR; INTRAVENOUS
Status: COMPLETED | OUTPATIENT
Start: 2022-01-03 | End: 2022-01-03

## 2022-01-03 RX ORDER — SODIUM CHLORIDE 9 MG/ML
INJECTION, SOLUTION INTRAVENOUS CONTINUOUS
Status: DISCONTINUED | OUTPATIENT
Start: 2022-01-03 | End: 2022-01-05 | Stop reason: HOSPADM

## 2022-01-03 RX ADMIN — DESMOPRESSIN ACETATE 40 MG: 0.2 TABLET ORAL at 21:02

## 2022-01-03 RX ADMIN — LOSARTAN POTASSIUM 50 MG: 50 TABLET, FILM COATED ORAL at 10:23

## 2022-01-03 RX ADMIN — CARVEDILOL 12.5 MG: 12.5 TABLET, FILM COATED ORAL at 10:24

## 2022-01-03 RX ADMIN — FUROSEMIDE 40 MG: 40 TABLET ORAL at 17:09

## 2022-01-03 RX ADMIN — APIXABAN 5 MG: 5 TABLET, FILM COATED ORAL at 21:02

## 2022-01-03 RX ADMIN — IOPAMIDOL 70 ML: 755 INJECTION, SOLUTION INTRAVENOUS at 16:02

## 2022-01-03 RX ADMIN — FENTANYL CITRATE 50 MCG: 50 INJECTION, SOLUTION INTRAMUSCULAR; INTRAVENOUS at 15:20

## 2022-01-03 RX ADMIN — FUROSEMIDE 40 MG: 40 TABLET ORAL at 10:24

## 2022-01-03 RX ADMIN — PANTOPRAZOLE SODIUM 40 MG: 40 TABLET, DELAYED RELEASE ORAL at 10:23

## 2022-01-03 RX ADMIN — HEPARIN 2 ML: 100 SYRINGE at 15:15

## 2022-01-03 RX ADMIN — MIDAZOLAM HYDROCHLORIDE 1 MG: 1 INJECTION, SOLUTION INTRAMUSCULAR; INTRAVENOUS at 15:20

## 2022-01-03 RX ADMIN — MIDAZOLAM HYDROCHLORIDE 1 MG: 1 INJECTION, SOLUTION INTRAMUSCULAR; INTRAVENOUS at 14:59

## 2022-01-03 RX ADMIN — SODIUM CHLORIDE, PRESERVATIVE FREE 10 ML: 5 INJECTION INTRAVENOUS at 21:02

## 2022-01-03 RX ADMIN — ALTEPLASE 2 MG: 2.2 INJECTION, POWDER, LYOPHILIZED, FOR SOLUTION INTRAVENOUS at 15:20

## 2022-01-03 RX ADMIN — CARVEDILOL 12.5 MG: 12.5 TABLET, FILM COATED ORAL at 17:09

## 2022-01-03 RX ADMIN — FENTANYL CITRATE 50 MCG: 50 INJECTION, SOLUTION INTRAMUSCULAR; INTRAVENOUS at 14:58

## 2022-01-03 RX ADMIN — Medication 81 MG: at 17:03

## 2022-01-03 ASSESSMENT — PAIN SCALES - GENERAL: PAINLEVEL_OUTOF10: 0

## 2022-01-03 NOTE — PROGRESS NOTES
Successful L UE fistula declot. US and angiographic appearance show graft to be wearing down with areas of aneurysmal dilitation, infiltration and irregular wall and adherent clot. May need revisoion in near future if it does not stay open. Venous outflow and central venous structures wudeley patent. Patient had a good trhill post procedure. OK to use.

## 2022-01-03 NOTE — PROGRESS NOTES
[facility]  Documentation Note                Admission Dx: CKD (chronic kidney disease) requiring chronic dialysis ([facility] [N18.6, Z99.2]  Arteriovenous fistula occlusion, initial encounter ([facility] [W69.124F]    Past Medical History:   has a past medical history of Acute congestive heart failure (HCC), Acute on chronic diastolic congestive heart failure ([facility] Anemia, Anemia of chronic renal failure, AVF (arteriovenous fistula) ([facility] Bowel obstruction ([facility]), CAD (coronary artery disease), Chest pain at rest, CHF (congestive heart failure) ([facility] CHF (congestive heart failure), NYHA class I, acute on chronic, combined ([facility] Chronic kidney disease, CKD (chronic kidney disease) stage 4, GFR 15-29 ml/min ([facility] Coronary artery disease involving native coronary artery of native heart without angina pectoris, Diabetes mellitus ([facility] Dialysis patient ([facility] ESRD (end stage renal disease) ([facility] Essential hypertension, Groin swelling, Hemodialysis patient ([facility] Hydrocele, Hyperlipidemia, Hypertension, MI, old, NSTEMI (non-ST elevated myocardial infarction) ([facility] BATSHEVA (obstructive sleep apnea), Patient in clinical research study, Pneumonia, Prostatism, Respiratory distress, Rising PSA level, S/P arteriovenous (AV) graft placement, S/P PTCA - TIM distal LAD - Dr. Dowell Class 4/1/19, Sleep apnea, Small bowel obstruction ([facility] and Type 2 diabetes mellitus with chronic kidney disease on chronic dialysis ([facility] Previous Testing:     ECHO 2/10/2021: EF 35-40%, DD, LA is moderately dilated, FINN, mild MR, moderate TR, RVSP is 38 mmHg, mild GA. CATH 2/9/2021: Patent LAD stent     CATH 4/1/19: Single vessel distal LAD. Anterior wall hypokinesia. EF 45%. TIM to LAD.      STRESS 3/15/19: No ischemia or infarct.  EF 36%.      ECHO 11/27/18: EF 48%, mild MR/TR, DD with elevated left sided filling pressures.      Previous office/hospital visit:   Dr. Cleveland Mclaughlin 12/24/2021:   Patient presented with chest tightness and SOB, Feels better now. H/O CAD and PCI, Chronically elevated troponin from ESRD. ECG shows new onset Afib, rate controlled.  Cath on 2/2021 showed patent stents, continue current medications, add Eliquis 2.5 mg po BID.      Anne-Marie Marc, [facility]

## 2022-01-03 NOTE — CONSULTS
Attestation signed by      Attending Physician Statement:    I have discussed the care of  Leann Higginbotham , including pertinent history and exam findings, with the Cardiology fellow/resident. I have seen and examined the patient and the key elements of all parts of the encounter have been performed by me. I agree with the assessment, plan and orders as documented by the fellow/resident, after I modified exam findings and plan of treatments, and the final version is my approved version of the assessment. Additional Comments: admitted for occluded dialysis AV graft. Has CM, CAD. Cath 2/2021 showed patent LAD stent. Has chronically elevated HS trop. LVEF was 35-40% on last echo. Persistent atrial fibrillation - controlled. On eliquis. Obtain TTE. Harshil Ho, [facility]   Consultation Note               Today's Date: 1/3/2022  Patient Name: Leann Higginbotham  Date of admission: 12/31/2021 11:28 AM  Patient's age: 59 y.o., 1960  Admission Dx: CKD (chronic kidney disease) requiring chronic dialysis ([facility] [N18.6, Z99.2]  Arteriovenous fistula occlusion, initial encounter [facility]) [U08.929A]    Reason for Consult:  AICD evaluation    Requesting Physician: Sergio Vences MD    CHIEF COMPLAINT:    Chief Complaint   Patient presents with    Vascular Access Problem     poert not working, clogged    Hematuria       History Obtained From:  patient, electronic medical record    HISTORY OF PRESENT ILLNESS:      The patient is a 59 y.o.  male who was admitted to the hospital for issues with his AV graft during dialysis sessions. Vascular surgery evaluated the patient and was noted to have a non-functioning graft due to thrombosis. Patient underwent giles catheter placement for HD sessions with plan for fistulogram.  Cardiology consulted for evaluation on HFrEF and need for AICD placement given hx of cardiomyopathy.        Past Medical History:   has a past medical history of Acute congestive heart failure (HCC), Acute on chronic diastolic congestive heart failure (HCC), Anemia, Anemia of chronic renal failure, AVF (arteriovenous fistula) ([facility]), Bowel obstruction ([facility]), CAD (coronary artery disease), Chest pain at rest, CHF (congestive heart failure) ([facility] CHF (congestive heart failure), NYHA class I, acute on chronic, combined ([facility] Chronic kidney disease, CKD (chronic kidney disease) stage 4, GFR 15-29 ml/min ([facility]), Coronary artery disease involving native coronary artery of native heart without angina pectoris, Diabetes mellitus ([facility] Dialysis patient ([facility] ESRD (end stage renal disease) ([facility] Essential hypertension, Groin swelling, Hemodialysis patient ([facility] Hydrocele, Hyperlipidemia, Hypertension, MI, old, NSTEMI (non-ST elevated myocardial infarction) ([facility] BATSHEVA (obstructive sleep apnea), Patient in clinical research study, Pneumonia, Prostatism, Respiratory distress, Rising PSA level, S/P arteriovenous (AV) graft placement, S/P PTCA - TIM distal LAD - Dr. Romi Ashley 4/1/19, Sleep apnea, Small bowel obstruction ([facility] and Type 2 diabetes mellitus with chronic kidney disease on chronic dialysis ([facility] Past Surgical History:   has a past surgical history that includes Foot surgery (Left, 2005); Colonoscopy; Cystoscopy (11/17/2017); Prostate biopsy (N/A, 11/17/2017); Abdomen surgery (2013); Prostate Biopsy (02/16/2018); pr genital surg proc,male unlisted (N/A, 2/16/2018); Tonsillectomy (1968); Vasectomy (1983); Middle ear surgery (1966); Nose surgery (1968); Mouth surgery (2007); AV fistula creation (Left, 02/20/2019); Dialysis fistula creation (Left, 2/20/2019); other surgical history (Left, 03/06/2019); AV fistula repair (07/17/2019); vascular surgery (09/20/2019); Coronary angioplasty with stent (03/31/2019); Dialysis fistula creation (Left, 10/23/2019); Cardiac catheterization (02/09/2021); and Upper gastrointestinal endoscopy (N/A, 9/27/2021).        Home Medications:    Prior to Admission medications    Medication Sig Start Date End Date Taking?  Authorizing Provider   apixaban (ELIQUIS) 5 MG TABS tablet Take 1 tablet by mouth 2 times daily 12/24/21  Yes Donis Chao DO   aspirin EC 81 MG EC tablet Take 1 tablet by mouth daily 12/24/21  Yes Donis Chao, DO   carvedilol (COREG) 12.5 MG tablet Take 1 tablet by mouth 2 times daily (with meals) 12/24/21  Yes Donis Chao DO   furosemide (LASIX) 40 MG tablet Take 1 tablet by mouth 2 times daily 11/14/21  Yes Gail Berry MD   atorvastatin (LIPITOR) 40 MG tablet Take 1 tablet by mouth nightly 2/9/21  Yes Maxine Jung MD   losartan (COZAAR) 50 MG tablet Take 1 tablet by mouth daily 12/25/21   Raul Jean Baptiste,    glimepiride (AMARYL) 1 MG tablet Take 1 tablet by mouth every morning (before breakfast) Do not take if blood sugars are less than 110 11/14/21   Gail Berry MD   pantoprazole (PROTONIX) 40 MG tablet Take 1 tablet by mouth every morning (before breakfast) 5/19/21   Faisal Pena MD   benzonatate (TESSALON) 100 MG capsule take 1 capsule by mouth three times a day if needed for 10 days  Patient not taking: Reported on 11/17/2021 3/3/21   Historical Provider, MD   guaiFENesin-codeine (GUAIFENESIN AC) 100-10 MG/5ML liquid give 10 milliliters by mouth every 4 hours if needed 3/4/21   Historical Provider, MD   traMADol (ULTRAM) 50 MG tablet take 1 tablet by mouth every 6 hours if needed 10/21/20   Historical Provider, MD   calcium carbonate (TUMS) 500 MG chewable tablet Take 1 tablet by mouth daily as needed   Patient not taking: Reported on 11/17/2021    Historical Provider, MD   Multiple Vitamins-Minerals (RENAPLEX-D) TABS Take 1 tablet by mouth every other day Pt takes occasionally 5/14/19   Historical Provider, MD   lidocaine-prilocaine (EMLA) 2.5-2.5 % cream  3/18/19   Historical Provider, MD      Current Facility-Administered Medications: 0.9 % sodium chloride infusion, , IntraVENous, Continuous  cefTRIAXone (ROCEPHIN) 1000 mg IVPB in 50 mL D5W minibag, 1,000 mg, IntraVENous, Q24H  heparin flush 100 UNIT/ML injection 300 Units, 300 Units, Intercatheter, Once  apixaban (ELIQUIS) tablet 5 mg, 5 mg, Oral, BID  atorvastatin (LIPITOR) tablet 40 mg, 40 mg, Oral, Nightly  carvedilol (COREG) tablet 12.5 mg, 12.5 mg, Oral, BID WC  furosemide (LASIX) tablet 40 mg, 40 mg, Oral, BID  losartan (COZAAR) tablet 50 mg, 50 mg, Oral, Daily  pantoprazole (PROTONIX) tablet 40 mg, 40 mg, Oral, QAM AC  sodium chloride flush 0.9 % injection 5-40 mL, 5-40 mL, IntraVENous, 2 times per day  sodium chloride flush 0.9 % injection 5-40 mL, 5-40 mL, IntraVENous, PRN  0.9 % sodium chloride infusion, 25 mL, IntraVENous, PRN  ondansetron (ZOFRAN-ODT) disintegrating tablet 4 mg, 4 mg, Oral, Q8H PRN **OR** ondansetron (ZOFRAN) injection 4 mg, 4 mg, IntraVENous, Q6H PRN  polyethylene glycol (GLYCOLAX) packet 17 g, 17 g, Oral, Daily PRN  acetaminophen (TYLENOL) tablet 650 mg, 650 mg, Oral, Q6H PRN **OR** acetaminophen (TYLENOL) suppository 650 mg, 650 mg, Rectal, Q6H PRN  heparin (porcine) injection 1,900 Units, 1,900 Units, IntraCATHeter, PRN  heparin (porcine) injection 1,600 Units, 1,600 Units, IntraCATHeter, PRN  insulin lispro (HUMALOG) injection vial 0-12 Units, 0-12 Units, SubCUTAneous, TID WC  insulin lispro (HUMALOG) injection vial 0-6 Units, 0-6 Units, SubCUTAneous, Nightly  glucose (GLUTOSE) 40 % oral gel 15 g, 15 g, Oral, PRN  dextrose 50 % IV solution, 12.5 g, IntraVENous, PRN  glucagon (rDNA) injection 1 mg, 1 mg, IntraMUSCular, PRN  dextrose 5 % solution, 100 mL/hr, IntraVENous, PRN      Allergies:  Lisinopril      Social History:   reports that he has never smoked. He has never used smokeless tobacco. He reports that he does not drink alcohol and does not use drugs.        Family History: family history includes Asthma in his brother; Diabetes in his brother, brother, and brother; Early Death in his mother; Heart Disease in his mother; High Blood Pressure in his brother, brother, and brother. No h/o sudden cardiac death. No for premature CAD      REVIEW OF SYSTEMS:    · Constitutional: there has been no unanticipated weight loss. · Eyes: No visual changes or diplopia. · ENT: No Headaches  · Cardiovascular: see above  · Respiratory: No previous pulmonary problems, No cough  · Gastrointestinal: No abdominal pain. No change in bowel or bladder habits. · Genitourinary: No dysuria, trouble voiding, or hematuria. · Musculoskeletal:  No gait disturbance, No weakness or joint complaints. · Integumentary: No rash or pruritis. · Neurological: No headache, diplopia      PHYSICAL EXAM:      BP (!) 129/92   Pulse 60   Temp 100.6 °F (38.1 °C)   Resp 18   Wt 260 lb 12.9 oz (118.3 kg)   SpO2 94%   BMI 37.42 kg/m²    Constitutional and General Appearance: Alert, no distress  Respiratory:  · No increased work of breathing. · Clear to auscultation bilaterally. No wheeze or crackles. Cardiovascular:  · Normal S1 and S2.   · Jugular venous pulsation Normal  Abdomen:   · Soft  · No tenderness  Extremities:  · No lower extremity edema  Neurologic:  · Alert and oriented. · Moves all extremities well      DATA:    Diagnostics:    Labs:     CBC:   Recent Labs     01/02/22  0520 01/03/22  0500   WBC 6.9 5.7   HGB 9.8* 9.9*   HCT 32.5* 32.7*    152     BMP:   Recent Labs     01/02/22  0520 01/03/22  0500   * 134*   K 6.0* 4.7   CO2 18* 21   BUN 44* 40*   CREATININE 6.95* 6.73*   LABGLOM 8* 8*   GLUCOSE 137* 143*     BNP: No results for input(s): BNP in the last 72 hours. PT/INR:   Recent Labs     01/03/22  0921   PROTIME 14.2*   INR 1.4     APTT:No results for input(s): APTT in the last 72 hours. CARDIAC ENZYMES:  Recent Labs     12/31/21  1300   TROPHS 177*     No results for input(s): CKTOTAL, CKMB, CKMBINDEX, TROPONINI in the last 72 hours.     Invalid input(s):  [address]   Recent Labs     12/31/21  1300   TROPONINT NOT REPORTED     FASTING LIPID PANEL:  Lab Results   Component Value Date    HDL 45 11/12/2021    TRIG 107 11/12/2021     LIVER PROFILE:No results for input(s): AST, ALT, LABALBU in the last 72 hours. EKG: afib, rbbb, left axis, rate 85    Echo 6/18/19:  Summary  Mildly dilated LV cavity with moderate concentric LVH  Mild-moderate reduction in LV systolic function, estimated EF 45-50% with  global hypokinesis  Moderate diastolic dysfunction due to hypertensive cardiomyopathy and  myocardial calcification from CKD  No significant valvular thickening or stenosis seen. Mild mitral regurgitation  No pericardial effusion or thickecning    ECHO: 2/10/21  Mildly dilated left ventricle with moderately reduced systolic function with  EF 35-40%. Evidence of diastolic dysfunction. Left atrium is moderately dilated. Right atrial enlargement. Mildly dilated right ventricle with reduced function. Aortic sclerosis without stenosis. Mild mitral regurgitation. Moderate tricuspid regurgitation. Estimated right ventricular systolic  pressure is 38 mmHg. Mild pulmonic regurgitation. Cath 4/1/2019:   Single vessel distal LAD   Anterior wall hypokinesia, with LV EF 45%   Successful PTCA -TIM LAD    CATH: 2/9/21  LMCA: Normal 0% stenosis. LAD: Patent distal stent with 30-40% stenosis  LCx: Normal 0% stenosis. RCA: Normal 0% stenosis. Small, non dominant      IMPRESSION:    1. NICM  2. HFrEF 35-40% in 2/2021  3. Chronic SOB - stable symptoms  4. Paroxysmal afib - chadvasc 4, rate controlled  5. RBBB  6. Troponin elevation - chronic  7. ESRD on HD  8. Thrombosed AV graft of LUE - this admission  9. HTN  10. BATSHEVA      RECOMMENDATIONS:  1. Currently rate controlled afib  2. Continue eliquis, coreg, lipitor, losartan and aspirin. 3. CPAP nightly for BATSHEVA   4. Fluid management per nephrology. On HD.   5. Patient follows up with cardiology as outpatient. Can consider limited echo to evaluate for EF.        Thank you for allowing us to participate in [address] [place] [address].        Electronically signed on 01/03/22 at 12:09 PM by:    Nataliia Calles MD, MD   Fellow, [address]

## 2022-01-03 NOTE — PROGRESS NOTES
[facility]  Internal Medicine Teaching Residency Program  Inpatient Daily Progress Note  ______________________________________________________________________________    Patient: Mi Way  YOB: 1960   [id number]    Acct: [de-identified]     Room: 0339/0339-02  Admit date: 12/31/2021  Today's date: 01/03/22  Number of days in the hospital: 3    SUBJECTIVE   Admitting Diagnosis: Dialysis AV fistula malfunction ([address]  CC: Vascular access problem  Patient seen and examined at bedside. Chart and results reviewed. No acute event overnight. Patient slept well has a normal bowel and bladder movement. Vitals are stable and remain afebrile. Labs reviewed and evaluated. Plan  Plan for fistulogram with possible thrombectomy of graft. IR consult placed. Vascular surgery on board. ROS:  Constitutional:  negative for chills, fevers, sweats  Respiratory:  negative for cough, dyspnea on exertion, hemoptysis, shortness of breath, wheezing  Cardiovascular:  negative for chest pain, chest pressure/discomfort, lower extremity edema, palpitations  Gastrointestinal:  negative for abdominal pain, constipation, diarrhea, nausea, vomiting  Neurological:  negative for dizziness, headache    BRIEF HISTORY     The patient is a pleasant 59 y.o. male with a PMHx significant for anemia, a-fib, ESRD on hemodialysis, coronary artery disease, hypertension, obstructive sleep apnea type 2 diabetes who presents with a chief complaint of vascular access problem. Patient states that he was at dialysis on Tuesday Thursday Saturday was in dialysis today due to holiday schedule. During dialysis there were not able to to access his AV fistula which is located in the left upper arm. On examination of the AV fistula there was no bruit auscultated or thrill palpated. Patient denied any pain in the left upper extremity.   Patient endorses abdominal distention as well as increased shortness of breath. He chronically wears 2 L of oxygen at home due to CHF. Patient states he does not make much urine anymore and has become much more difficult to urinate. Patient denies any fevers chills, cough, nausea vomiting, abdominal pain or changes in bowel habits. Javon catheter placed in the right IJ for temporary dialysis. Patient is vaccinated against COVID. OBJECTIVE     Vital Signs:  BP (!) 129/92   Pulse 60   Temp 100.6 °F (38.1 °C)   Resp 18   Wt 260 lb 12.9 oz (118.3 kg)   SpO2 94%   BMI 37.42 kg/m²     Temp (24hrs), Av.2 °F (36.8 °C), Min:97 °F (36.1 °C), Max:100.6 °F (38.1 °C)    No intake/output data recorded. Physical Exam:  Physical Exam  Vitals and nursing note reviewed. Constitutional:       Appearance: Normal appearance. HENT:      Head: Normocephalic and atraumatic. Nose: Nose normal.      Mouth/Throat:      Mouth: Mucous membranes are moist.      Pharynx: Oropharynx is clear. Eyes:      Extraocular Movements: Extraocular movements intact. Conjunctiva/sclera: Conjunctivae normal.      Pupils: Pupils are equal, round, and reactive to light. Cardiovascular:      Rate and Rhythm: Normal rate and regular rhythm. Pulses: Normal pulses. Heart sounds: Normal heart sounds. No murmur heard. No friction rub. No gallop. Pulmonary:      Effort: Pulmonary effort is normal. No respiratory distress. Breath sounds: Normal breath sounds. No stridor. No wheezing, rhonchi or rales. Chest:      Chest wall: No tenderness. Abdominal:      General: Abdomen is flat. Bowel sounds are normal. There is distension. Palpations: Abdomen is soft. There is no mass. Tenderness: There is no abdominal tenderness. There is no guarding or rebound. Hernia: No hernia is present. Musculoskeletal:         General: No swelling, tenderness, deformity or signs of injury. Normal range of motion. Cervical back: Normal range of motion.       Right lower leg: No edema. Left lower leg: No edema. Skin:     General: Skin is warm. Neurological:      General: No focal deficit present. Mental Status: He is alert and oriented to person, place, and time. Mental status is at baseline. Psychiatric:         Mood and Affect: Mood normal.         Behavior: Behavior normal.         Thought Content:  Thought content normal.         Judgment: Judgment normal.           Medications:  Scheduled Medications:    aspirin  81 mg Oral Daily    cefTRIAXone (ROCEPHIN) IV  1,000 mg IntraVENous Q24H    heparin flush  300 Units Intercatheter Once    apixaban  5 mg Oral BID    atorvastatin  40 mg Oral Nightly    carvedilol  12.5 mg Oral BID WC    furosemide  40 mg Oral BID    losartan  50 mg Oral Daily    pantoprazole  40 mg Oral QAM AC    sodium chloride flush  5-40 mL IntraVENous 2 times per day    insulin lispro  0-12 Units SubCUTAneous TID WC    insulin lispro  0-6 Units SubCUTAneous Nightly     Continuous Infusions:    sodium chloride      sodium chloride      dextrose       PRN Medicationssodium chloride flush, 5-40 mL, PRN  sodium chloride, 25 mL, PRN  ondansetron, 4 mg, Q8H PRN   Or  ondansetron, 4 mg, Q6H PRN  polyethylene glycol, 17 g, Daily PRN  acetaminophen, 650 mg, Q6H PRN   Or  acetaminophen, 650 mg, Q6H PRN  heparin (porcine), 1,900 Units, PRN  heparin (porcine), 1,600 Units, PRN  glucose, 15 g, PRN  dextrose, 12.5 g, PRN  glucagon (rDNA), 1 mg, PRN  dextrose, 100 mL/hr, PRN        Diagnostic Labs:  CBC:   Recent Labs     01/01/22  0553 01/02/22  0520 01/03/22  0500   WBC 7.6 6.9 5.7   RBC 3.30* 3.22* 3.27*   HGB 10.0* 9.8* 9.9*   HCT 33.2* 32.5* 32.7*   .6 100.9 100.0   RDW 16.3* 16.0* 15.9*    162 152     BMP:   Recent Labs     01/01/22  0553 01/02/22  0520 01/03/22  0500    131* 134*   K 4.8 6.0* 4.7   CL 99 96* 95*   CO2 24 18* 21   BUN 28* 44* 40*   CREATININE 5.86* 6.95* 6.73*     BNP: No results for input(s): BNP in the last 72 hours. PT/INR:   Recent Labs     01/03/22  0921   PROTIME 14.2*   INR 1.4     APTT:   No results for input(s): APTT in the last 72 hours. CARDIAC ENZYMES: No results for input(s): CKMB, CKMBINDEX, TROPONINI in the last 72 hours. Invalid input(s): CKTOTAL;3  FASTING LIPID PANEL:  Lab Results   Component Value Date    CHOL 127 11/12/2021    HDL 45 11/12/2021    TRIG 107 11/12/2021     LIVER PROFILE: No results for input(s): AST, ALT, ALB, BILIDIR, BILITOT, ALKPHOS in the last 72 hours. MICROBIOLOGY:   Lab Results   Component Value Date/Time    CULTURE NO SIGNIFICANT GROWTH 12/31/2021 04:23 PM       Imaging:    XR CHEST PORTABLE    Result Date: 12/31/2021  1. Right IJ hemodialysis catheter has been placed in the interval, tip overlying the right atrium. 2. No pneumothorax. 3. Mild congestive heart failure is most likely given the radiographic findings; pneumonia is also a consideration in areas of consolidation with pleural effusion. XR CHEST PORTABLE    Result Date: 12/31/2021  Cardiomegaly with venous hypertension and perhaps mild CHF. No pulmonary edema. ASSESSMENT & PLAN     Assessment and Plan:    Principal Problem:    Dialysis AV fistula malfunction (HCC)  Active Problems:    Acute on chronic combined systolic (congestive) and diastolic (congestive) heart failure (HCC)    Type 2 diabetes mellitus with chronic kidney disease on chronic dialysis (HCC)    Anemia    Essential hypertension    AVF (arteriovenous fistula) (HCC)    ESRD (end stage renal disease) ([address]    Coronary artery disease involving native coronary artery of native heart without angina pectoris    BATSHEVA (obstructive sleep apnea)    Hypoxemia-due to CHF/fluid overload    Atrial fibrillation (HCC)    CKD (chronic kidney disease) requiring chronic dialysis ([address]    Arteriovenous fistula occlusion ([address]  Resolved Problems:    * No resolved hospital problems.  *      IMPRESSION  This is a 59 y.o. male who presented with from dialysis center with nonfunctional AV fistula in the left upper extremity. Patient admitted to inpatient status for continued hemodialysis and evaluation of left upper extremity AV fistula.     Active Problems:  Non-functional AV fistula   - Fistulogram today. Possible thrombectomy by IR.  - IR and vascular surgery following.      Acute cystitis-resolved  -Completed course of IV ceftriaxone. ESRD requiring chronic dialysis   - Javon catheter placed in the right IJ for continued dialysis while awaiting fistulogram by vascular surgery  - Patient states he is not making much urine and it is becoming harder for him to urinate  - Received hemodialysis yesterday, 3 L removed. - Hemodialysis Tuesday, Thursday and Saturday's  - Monitor electrolytes and replace when indicated  - Troponins 177 > near baseline  - Nephrology following     Atrial fibrillation  [id number] score 3/9  - Currently, rate & rhythm controlled  - Eliquis  - Coreg  - Continue to monitor     Acute on chronic combined systolic and diastolic CHF  - Shortness of breath and abdominal distention on exam  - Patient wears 2L of oxygen at home and is currently on 2L  - Pro-BNP 31,731 baseline roughly 30,000  - Coreg  - Lasix  - I's and O's  - Daily weights  -Cardiology was consulted today for possible AICD. May need limited echo to document ejection fraction. ECHO: 2/10/21  Mildly dilated left ventricle with moderately reduced systolic function with  EF 35-40%. Evidence of diastolic dysfunction. Left atrium is moderately dilated. Right atrial enlargement. Mildly dilated right ventricle with reduced function. Aortic sclerosis without stenosis. Mild mitral regurgitation. Moderate tricuspid regurgitation. Estimated right ventricular systolic  pressure is 38 mmHg.   Mild pulmonic regurgitation.     Hypertension   - Losartan     Coronary artery disease -stable  - Coreg  - Lipitor     Obstructive sleep apnea  - BiPAP at night and during daytime naps     Type 2 diabetes  - MDSS  - POCG [phone].  -Hypoglycemia protocol in place.     Diet: Carb controlled diet  DVT ppx: Heparin and Eliquis  GI ppx: Protonix     PT/OT/SW: Consulted  Discharge Planning:  In process    Jose Luis Lee MD  Internal Medicine Resident, PGY-1  [address]  1/3/2022,1:39 PM

## 2022-01-03 NOTE — PROGRESS NOTES
Renal Progress Note    Patient :  Vesta Alvarenga; 59 y.o. MRN# [id number]  Location:  [facility]4/5164-85  Attending:  Al Hernandez MD  Admit Date:  12/31/2021   Hospital Day: 3      Subjective:     Oral intake good. Feels well. Hemodynamically stable. Right IJ vein temporary dialysis catheter in place. Last dialysis yesterday but 3 L of fluid removed. No shortness of breath orthopnea. Plan for declot of left AV fistula by IR today. Vascular surgeon is Dr. Kapadia Labs  Denies any nausea vomiting.      Outpatient Medications:     Medications Prior to Admission: apixaban (ELIQUIS) 5 MG TABS tablet, Take 1 tablet by mouth 2 times daily  aspirin EC 81 MG EC tablet, Take 1 tablet by mouth daily  carvedilol (COREG) 12.5 MG tablet, Take 1 tablet by mouth 2 times daily (with meals)  furosemide (LASIX) 40 MG tablet, Take 1 tablet by mouth 2 times daily  atorvastatin (LIPITOR) 40 MG tablet, Take 1 tablet by mouth nightly  losartan (COZAAR) 50 MG tablet, Take 1 tablet by mouth daily  glimepiride (AMARYL) 1 MG tablet, Take 1 tablet by mouth every morning (before breakfast) Do not take if blood sugars are less than 110  pantoprazole (PROTONIX) 40 MG tablet, Take 1 tablet by mouth every morning (before breakfast)  benzonatate (TESSALON) 100 MG capsule, take 1 capsule by mouth three times a day if needed for 10 days (Patient not taking: Reported on 11/17/2021)  guaiFENesin-codeine (GUAIFENESIN AC) 100-10 MG/5ML liquid, give 10 milliliters by mouth every 4 hours if needed  traMADol (ULTRAM) 50 MG tablet, take 1 tablet by mouth every 6 hours if needed  calcium carbonate (TUMS) 500 MG chewable tablet, Take 1 tablet by mouth daily as needed  (Patient not taking: Reported on 11/17/2021)  Multiple Vitamins-Minerals (RENAPLEX-D) TABS, Take 1 tablet by mouth every other day Pt takes occasionally  lidocaine-prilocaine (EMLA) 2.5-2.5 % cream,     Current Medications:     Scheduled Meds:    cefTRIAXone (ROCEPHIN) IV  1,000 mg IntraVENous Q24H    heparin flush  300 Units Intercatheter Once    apixaban  5 mg Oral BID    atorvastatin  40 mg Oral Nightly    carvedilol  12.5 mg Oral BID WC    furosemide  40 mg Oral BID    losartan  50 mg Oral Daily    pantoprazole  40 mg Oral QAM AC    sodium chloride flush  5-40 mL IntraVENous 2 times per day    insulin lispro  0-12 Units SubCUTAneous TID WC    insulin lispro  0-6 Units SubCUTAneous Nightly     Continuous Infusions:    sodium chloride      sodium chloride      dextrose       PRN Meds:  sodium chloride flush, sodium chloride, ondansetron **OR** ondansetron, polyethylene glycol, acetaminophen **OR** acetaminophen, heparin (porcine), heparin (porcine), glucose, dextrose, glucagon (rDNA), dextrose    Input/Output:       I/O last 3 completed shifts: In: 100   Out: 3300 . Patient Vitals for the past 96 hrs (Last 3 readings):   Weight   22 1218 260 lb 12.9 oz (118.3 kg)   22 0904 267 lb 10.2 oz (121.4 kg)   21 2110 266 lb 5.1 oz (120.8 kg)       Vital Signs:   Temperature:  Temp: 100.6 °F (38.1 °C)  TMax:   Temp (24hrs), Av.1 °F (36.7 °C), Min:97 °F (36.1 °C), Max:100.6 °F (38.1 °C)    Respirations:  Resp: 18  Pulse:   Pulse: 60  BP:    BP: (!) 129/92  BP Range: Systolic (35CYT), GPD:085 , Min:119 , RLB:221       Diastolic (74HED), VGV:88, Min:68, Max:92      Physical Examination:     General:  AAO x 3, speaking in full sentences, no accessory muscle use. HEENT: Atraumatic, normocephalic, no throat congestion, moist mucosa. Eyes:   Pupils equal, round and reactive to light, EOMI. Neck:   No JVD, no thyromegaly, no lymphadenopathy. Chest:  Bilateral vesicular breath sounds, no rales or wheezes. Cardiac:  S1 S2 RR, no murmurs, gallops or rubs, JVP not raised. Abdomen: Soft, non-tender, no masses or organomegaly, BS audible. :   No suprapubic or flank tenderness. Neuro:  AAO x 3, No FND. SKIN:  No rashes, good skin turgor.   Extremities:  1+ edema, palpable peripheral pulses, no calf tenderness. Left upper extremity AV fistula with barely palpable thrill  Labs:       Recent Labs     01/01/22  0553 01/02/22  0520 01/03/22  0500   WBC 7.6 6.9 5.7   RBC 3.30* 3.22* 3.27*   HGB 10.0* 9.8* 9.9*   HCT 33.2* 32.5* 32.7*   .6 100.9 100.0   MCH 30.3 30.4 30.3   MCHC 30.1 30.2 30.3   RDW 16.3* 16.0* 15.9*    162 152   MPV 9.8 10.0 10.3      BMP:   Recent Labs     01/01/22  0553 01/02/22  0520 01/03/22  0500    131* 134*   K 4.8 6.0* 4.7   CL 99 96* 95*   CO2 24 18* 21   BUN 28* 44* 40*   CREATININE 5.86* 6.95* 6.73*   GLUCOSE 110* 137* 143*   CALCIUM 9.3 9.0 8.8      Phosphorus:   No results for input(s): PHOS in the last 72 hours. Magnesium:  No results for input(s): MG in the last 72 hours. Albumin:  No results for input(s): LABALBU in the last 72 hours.   BNP:      Lab Results   Component Value Date     09/08/2018     TATIANA:    No results found for: TATIANA  SPEP:  Lab Results   Component Value Date    PROT 8.0 11/12/2021     UPEP:   No results found for: LABPE  C3:   No results found for: C3  C4:   No results found for: C4  MPO ANCA:     Lab Results   Component Value Date    MPO 25 09/14/2016     PR3 ANCA:     Lab Results   Component Value Date    PR3 51 09/14/2016     Anti-GBM:     Lab Results   Component Value Date    GBMABIGG 13 09/14/2016     Hep BsAg:         Lab Results   Component Value Date    HEPBSAG NONREACTIVE 11/13/2021     Hep C AB:          Lab Results   Component Value Date    HEPCAB NONREACTIVE 11/13/2021       Urinalysis/Chemistries:      Lab Results   Component Value Date    NITRU POSITIVE 12/31/2021    COLORU [place] 12/31/2021    PHUR 5.0 12/31/2021    WBCUA 20 TO 50 12/31/2021    RBCUA TOO NUMEROUS TO COUNT 12/31/2021    MUCUS NOT REPORTED 12/31/2021    TRICHOMONAS NOT REPORTED 12/31/2021    YEAST FEW 12/31/2021    BACTERIA NOT REPORTED 12/31/2021    SPECGRAV 1.022 12/31/2021    LEUKOCYTESUR MODERATE 12/31/2021    UROBILINOGEN Normal 12/31/2021    BILIRUBINUR NEGATIVE  Verified by ictotest. 12/31/2021    GLUCOSEU TRACE 12/31/2021    KETUA NEGATIVE 12/31/2021    AMORPHOUS NOT REPORTED 12/31/2021     Urine Sodium:   No results found for: JEFFERSON  Urine Potassium:  No results found for: KUR  Urine Chloride:  No results found for: CLUR  Urine Osmolarity: No results found for: OSMOU  Urine Protein:   No components found for: TOTALPROTEIN, URINE   Urine Creatinine:     Lab Results   Component Value Date    LABCREA 73.8 11/25/2018     Urine Eosinophils:  No components found for: UEOS    Radiology:     CXR:     Assessment:     1. ESRD on hemodialysis Tuesday Thursday Saturday at the [facility] unit via left AV fistula under Dr. Real Rubio currently has a temporary catheter in place  2. Thrombosed left AV fistula temporary catheter in place  3. Mild volume overload  4. Hypertension    Plan:   1. Hemodialysis tomorrow if declot is successful will use his AV fistula otherwise he will need a tunneled catheter  2. Fluid restriction 1.5 L a day  3. BMP in the morning  4. We will follow with you    Nutrition   Please ensure that patient is on a renal diet/TF. Avoid nephrotoxic drugs/contrast exposure. We will continue to follow along with you.

## 2022-01-03 NOTE — PROGRESS NOTES
Vascular Surgery Progress Note         PATIENT NAME: Yamel Dunne     TODAY'S DATE: 1/3/2022     SUBJECTIVE:    Pt seen and examined. Sitting up, resting comfortably this AM. Temporary catheter placed in RIJ and dialysis ongoing. SOB seems to be improved today compared to days prior. OBJECTIVE:   Vitals:  /68   Pulse 83   Temp 97 °F (36.1 °C) (Oral)   Resp 20   Wt 260 lb 12.9 oz (118.3 kg)   SpO2 93%   BMI 37.42 kg/m²      INTAKE/OUTPUT:      Intake/Output Summary (Last 24 hours) at 1/3/2022 0656  Last data filed at 1/2/2022 1218  Gross per 24 hour   Intake 100 ml   Output 3300 ml   Net -3200 ml                 GENERAL: AOx3, NAD  HEENT: EOMI bilaterally, trachea midline  CARDIAC: Regular rate and rhythm. ABDOMEN: Softly distended, nontender, no guarding  EXTREMITY: moves all extremities, bl LE edema, LUE AV graft without palpable thrill, palpable radial pulses  NEURO: CN II-XII intact. Gross motor intact. ASSESSMENT   Thrombosed LUE AV graft    PLAN  1. Continue dialysis via nontunneled giles for now   Plan for fistulagram with possible thrombectomy of graft. Will consult IR for this. If unable to get graft functioning, will need tunneled catheter prior to dc.      Teto Screen, DO  General Surgery PGY-3

## 2022-01-04 LAB
ABSOLUTE EOS #: 0.13 K/UL (ref 0–0.44)
ABSOLUTE IMMATURE GRANULOCYTE: 0.07 K/UL (ref 0–0.3)
ABSOLUTE LYMPH #: 0.4 K/UL (ref 1.1–3.7)
ABSOLUTE MONO #: 1.45 K/UL (ref 0.1–1.2)
ANION GAP SERPL CALCULATED.3IONS-SCNC: 17 MMOL/L (ref 9–17)
BASOPHILS # BLD: 1 % (ref 0–2)
BASOPHILS ABSOLUTE: 0.07 K/UL (ref 0–0.2)
BUN BLDV-MCNC: 58 MG/DL (ref 8–23)
BUN/CREAT BLD: ABNORMAL (ref 9–20)
CALCIUM SERPL-MCNC: 8.9 MG/DL (ref 8.6–10.4)
CHLORIDE BLD-SCNC: 94 MMOL/L (ref 98–107)
CO2: 18 MMOL/L (ref 20–31)
CREAT SERPL-MCNC: 8.64 MG/DL (ref 0.7–1.2)
DIFFERENTIAL TYPE: ABNORMAL
EKG ATRIAL RATE: 66 BPM
EKG Q-T INTERVAL: 464 MS
EKG QRS DURATION: 154 MS
EKG QTC CALCULATION (BAZETT): 552 MS
EKG R AXIS: -71 DEGREES
EKG T AXIS: 80 DEGREES
EKG VENTRICULAR RATE: 85 BPM
EOSINOPHILS RELATIVE PERCENT: 2 % (ref 1–4)
GFR AFRICAN AMERICAN: 8 ML/MIN
GFR NON-AFRICAN AMERICAN: 6 ML/MIN
GFR SERPL CREATININE-BSD FRML MDRD: ABNORMAL ML/MIN/{1.73_M2}
GFR SERPL CREATININE-BSD FRML MDRD: ABNORMAL ML/MIN/{1.73_M2}
GLUCOSE BLD-MCNC: 119 MG/DL (ref 75–110)
GLUCOSE BLD-MCNC: 130 MG/DL (ref 70–99)
GLUCOSE BLD-MCNC: 144 MG/DL (ref 75–110)
GLUCOSE BLD-MCNC: 158 MG/DL (ref 75–110)
HCT VFR BLD CALC: 33.7 % (ref 40.7–50.3)
HEMOGLOBIN: 10.1 G/DL (ref 13–17)
IMMATURE GRANULOCYTES: 1 %
LV EF: 30 %
LVEF MODALITY: NORMAL
LYMPHOCYTES # BLD: 6 % (ref 24–43)
MAGNESIUM: 2.5 MG/DL (ref 1.6–2.6)
MCH RBC QN AUTO: 30.1 PG (ref 25.2–33.5)
MCHC RBC AUTO-ENTMCNC: 30 G/DL (ref 28.4–34.8)
MCV RBC AUTO: 100.3 FL (ref 82.6–102.9)
MONOCYTES # BLD: 22 % (ref 3–12)
MORPHOLOGY: ABNORMAL
NRBC AUTOMATED: 0 PER 100 WBC
PDW BLD-RTO: 16 % (ref 11.8–14.4)
PLATELET # BLD: 149 K/UL (ref 138–453)
PLATELET ESTIMATE: ABNORMAL
PMV BLD AUTO: 10.4 FL (ref 8.1–13.5)
POTASSIUM SERPL-SCNC: 6.4 MMOL/L (ref 3.7–5.3)
RBC # BLD: 3.36 M/UL (ref 4.21–5.77)
RBC # BLD: ABNORMAL 10*6/UL
SEG NEUTROPHILS: 68 % (ref 36–65)
SEGMENTED NEUTROPHILS ABSOLUTE COUNT: 4.48 K/UL (ref 1.5–8.1)
SODIUM BLD-SCNC: 129 MMOL/L (ref 135–144)
WBC # BLD: 6.6 K/UL (ref 3.5–11.3)
WBC # BLD: ABNORMAL 10*3/UL

## 2022-01-04 PROCEDURE — 1200000000 HC SEMI PRIVATE

## 2022-01-04 PROCEDURE — 6370000000 HC RX 637 (ALT 250 FOR IP): Performed by: STUDENT IN AN ORGANIZED HEALTH CARE EDUCATION/TRAINING PROGRAM

## 2022-01-04 PROCEDURE — 90935 HEMODIALYSIS ONE EVALUATION: CPT

## 2022-01-04 PROCEDURE — 93306 TTE W/DOPPLER COMPLETE: CPT

## 2022-01-04 PROCEDURE — 80048 BASIC METABOLIC PNL TOTAL CA: CPT

## 2022-01-04 PROCEDURE — 99232 SBSQ HOSP IP/OBS MODERATE 35: CPT | Performed by: INTERNAL MEDICINE

## 2022-01-04 PROCEDURE — 90935 HEMODIALYSIS ONE EVALUATION: CPT | Performed by: INTERNAL MEDICINE

## 2022-01-04 PROCEDURE — 36415 COLL VENOUS BLD VENIPUNCTURE: CPT

## 2022-01-04 PROCEDURE — 83735 ASSAY OF MAGNESIUM: CPT

## 2022-01-04 PROCEDURE — 6370000000 HC RX 637 (ALT 250 FOR IP)

## 2022-01-04 PROCEDURE — 85025 COMPLETE CBC W/AUTO DIFF WBC: CPT

## 2022-01-04 PROCEDURE — 2580000003 HC RX 258

## 2022-01-04 RX ORDER — SODIUM POLYSTYRENE SULFONATE 15 G/60ML
15 SUSPENSION ORAL; RECTAL ONCE
Status: DISCONTINUED | OUTPATIENT
Start: 2022-01-04 | End: 2022-01-05 | Stop reason: HOSPADM

## 2022-01-04 RX ORDER — CARVEDILOL 6.25 MG/1
6.25 TABLET ORAL 2 TIMES DAILY WITH MEALS
Status: DISCONTINUED | OUTPATIENT
Start: 2022-01-04 | End: 2022-01-05 | Stop reason: HOSPADM

## 2022-01-04 RX ORDER — CALCIUM GLUCONATE 20 MG/ML
1000 INJECTION, SOLUTION INTRAVENOUS ONCE
Status: DISCONTINUED | OUTPATIENT
Start: 2022-01-04 | End: 2022-01-05 | Stop reason: HOSPADM

## 2022-01-04 RX ORDER — DEXTROSE MONOHYDRATE 25 G/50ML
25 INJECTION, SOLUTION INTRAVENOUS ONCE
Status: DISCONTINUED | OUTPATIENT
Start: 2022-01-04 | End: 2022-01-04

## 2022-01-04 RX ORDER — CALCIUM GLUCONATE 94 MG/ML
1000 INJECTION, SOLUTION INTRAVENOUS ONCE
Status: DISCONTINUED | OUTPATIENT
Start: 2022-01-04 | End: 2022-01-04

## 2022-01-04 RX ADMIN — INSULIN LISPRO 1 UNITS: 100 INJECTION, SOLUTION INTRAVENOUS; SUBCUTANEOUS at 20:53

## 2022-01-04 RX ADMIN — APIXABAN 5 MG: 5 TABLET, FILM COATED ORAL at 20:53

## 2022-01-04 RX ADMIN — Medication 81 MG: at 07:57

## 2022-01-04 RX ADMIN — CARVEDILOL 12.5 MG: 12.5 TABLET, FILM COATED ORAL at 07:57

## 2022-01-04 RX ADMIN — FUROSEMIDE 40 MG: 40 TABLET ORAL at 07:57

## 2022-01-04 RX ADMIN — SODIUM CHLORIDE, PRESERVATIVE FREE 10 ML: 5 INJECTION INTRAVENOUS at 21:00

## 2022-01-04 RX ADMIN — FUROSEMIDE 40 MG: 40 TABLET ORAL at 16:37

## 2022-01-04 RX ADMIN — SODIUM CHLORIDE, PRESERVATIVE FREE 10 ML: 5 INJECTION INTRAVENOUS at 07:57

## 2022-01-04 RX ADMIN — DESMOPRESSIN ACETATE 40 MG: 0.2 TABLET ORAL at 20:53

## 2022-01-04 RX ADMIN — PANTOPRAZOLE SODIUM 40 MG: 40 TABLET, DELAYED RELEASE ORAL at 07:57

## 2022-01-04 RX ADMIN — APIXABAN 5 MG: 5 TABLET, FILM COATED ORAL at 07:57

## 2022-01-04 RX ADMIN — CARVEDILOL 6.25 MG: 6.25 TABLET, FILM COATED ORAL at 16:37

## 2022-01-04 ASSESSMENT — PAIN SCALES - GENERAL
PAINLEVEL_OUTOF10: 0

## 2022-01-04 NOTE — PROGRESS NOTES
[facility]  Internal Medicine Teaching Residency Program  Inpatient Daily Progress Note  ______________________________________________________________________________    Patient: Tawny Meza  YOB: 1960   DIV:[id number]    Acct: [de-identified]     Room: 0339/0339-02  Admit date: 12/31/2021  Today's date: 01/04/22  Number of days in the hospital: 4    SUBJECTIVE   Admitting Diagnosis: Dialysis AV fistula malfunction ([address]  CC: Vascular access problem  Patient seen at bedside. No acute event overnight. Remain afebrile. Blood pressure is 100//60. Pulse 52. Underwent successful declotting procedure by IR for left upper AV fistula. Patient will be getting hemodialysis today. Plan  HD today  Echo today  Discharge planning. ROS:  Constitutional:  negative for chills, fevers, sweats  Respiratory:  negative for cough, dyspnea on exertion, hemoptysis, shortness of breath, wheezing  Cardiovascular:  negative for chest pain, chest pressure/discomfort, lower extremity edema, palpitations  Gastrointestinal:  negative for abdominal pain, constipation, diarrhea, nausea, vomiting  Neurological:  negative for dizziness, headache    BRIEF HISTORY     The patient is a pleasant 59 y.o. male with a PMHx significant for anemia, a-fib, ESRD on hemodialysis, coronary artery disease, hypertension, obstructive sleep apnea type 2 diabetes who presents with a chief complaint of vascular access problem. Patient states that he was at dialysis on Tuesday Thursday Saturday was in dialysis today due to holiday schedule. During dialysis there were not able to to access his AV fistula which is located in the left upper arm. On examination of the AV fistula there was no bruit auscultated or thrill palpated. Patient denied any pain in the left upper extremity. Patient endorses abdominal distention as well as increased shortness of breath.   He chronically wears 2 L of oxygen at home due to CHF. Patient states he does not make much urine anymore and has become much more difficult to urinate. Patient denies any fevers chills, cough, nausea vomiting, abdominal pain or changes in bowel habits. Javon catheter placed in the right IJ for temporary dialysis. Patient is vaccinated against COVID. OBJECTIVE     Vital Signs:  BP (!) 140/70   Pulse 50   Temp 97.9 °F (36.6 °C) (Oral)   Resp 18   Wt 260 lb 12.9 oz (118.3 kg)   SpO2 95%   BMI 37.42 kg/m²     Temp (24hrs), Av.4 °F (36.9 °C), Min:96.6 °F (35.9 °C), Max:100.6 °F (38.1 °C)    No intake/output data recorded. Physical Exam:  Physical Exam  Vitals and nursing note reviewed. Constitutional:       Appearance: Normal appearance. HENT:      Head: Normocephalic and atraumatic. Nose: Nose normal.      Mouth/Throat:      Mouth: Mucous membranes are moist.      Pharynx: Oropharynx is clear. Eyes:      Extraocular Movements: Extraocular movements intact. Conjunctiva/sclera: Conjunctivae normal.      Pupils: Pupils are equal, round, and reactive to light. Cardiovascular:      Rate and Rhythm: Normal rate and regular rhythm. Pulses: Normal pulses. Heart sounds: Normal heart sounds. No murmur heard. No friction rub. No gallop. Pulmonary:      Effort: Pulmonary effort is normal. No respiratory distress. Breath sounds: Normal breath sounds. No stridor. No wheezing, rhonchi or rales. Chest:      Chest wall: No tenderness. Abdominal:      General: Abdomen is flat. Bowel sounds are normal. There is distension. Palpations: Abdomen is soft. There is no mass. Tenderness: There is no abdominal tenderness. There is no guarding or rebound. Hernia: No hernia is present. Musculoskeletal:         General: No swelling, tenderness, deformity or signs of injury. Normal range of motion. Cervical back: Normal range of motion. Right lower leg: No edema. Left lower leg: No edema. Skin:     General: Skin is warm. Neurological:      General: No focal deficit present. Mental Status: He is alert and oriented to person, place, and time. Mental status is at baseline. Psychiatric:         Mood and Affect: Mood normal.         Behavior: Behavior normal.         Thought Content:  Thought content normal.         Judgment: Judgment normal.       Medications:  Scheduled Medications:    sodium polystyrene  15 g Oral Once    calcium gluconate  1,000 mg IntraVENous Once    carvedilol  6.25 mg Oral BID WC    aspirin  81 mg Oral Daily    heparin flush  300 Units Intercatheter Once    apixaban  5 mg Oral BID    atorvastatin  40 mg Oral Nightly    furosemide  40 mg Oral BID    losartan  50 mg Oral Daily    pantoprazole  40 mg Oral QAM AC    sodium chloride flush  5-40 mL IntraVENous 2 times per day    insulin lispro  0-12 Units SubCUTAneous TID WC    insulin lispro  0-6 Units SubCUTAneous Nightly     Continuous Infusions:    sodium chloride      sodium chloride      dextrose       PRN Medicationssodium chloride flush, 5-40 mL, PRN  sodium chloride, 25 mL, PRN  ondansetron, 4 mg, Q8H PRN   Or  ondansetron, 4 mg, Q6H PRN  polyethylene glycol, 17 g, Daily PRN  acetaminophen, 650 mg, Q6H PRN   Or  acetaminophen, 650 mg, Q6H PRN  heparin (porcine), 1,900 Units, PRN  heparin (porcine), 1,600 Units, PRN  glucose, 15 g, PRN  dextrose, 12.5 g, PRN  glucagon (rDNA), 1 mg, PRN  dextrose, 100 mL/hr, PRN        Diagnostic Labs:  CBC:   Recent Labs     01/02/22  0520 01/03/22  0500 01/04/22  0615   WBC 6.9 5.7 6.6   RBC 3.22* 3.27* 3.36*   HGB 9.8* 9.9* 10.1*   HCT 32.5* 32.7* 33.7*   .9 100.0 100.3   RDW 16.0* 15.9* 16.0*    152 149     BMP:   Recent Labs     01/02/22  0520 01/03/22  0500 01/04/22  0615   * 134* 129*   K 6.0* 4.7 6.4*   CL 96* 95* 94*   CO2 18* 21 18*   BUN 44* 40* 58*   CREATININE 6.95* 6.73* 8.64*     BNP: No results for input(s): BNP in the last 72 hours.  PT/INR:   Recent Labs     01/03/22  0921   PROTIME 14.2*   INR 1.4     APTT:   No results for input(s): APTT in the last 72 hours. CARDIAC ENZYMES: No results for input(s): CKMB, CKMBINDEX, TROPONINI in the last 72 hours. Invalid input(s): CKTOTAL;3  FASTING LIPID PANEL:  Lab Results   Component Value Date    CHOL 127 11/12/2021    HDL 45 11/12/2021    TRIG 107 11/12/2021     LIVER PROFILE: No results for input(s): AST, ALT, ALB, BILIDIR, BILITOT, ALKPHOS in the last 72 hours. MICROBIOLOGY:   Lab Results   Component Value Date/Time    CULTURE NO SIGNIFICANT GROWTH 12/31/2021 04:23 PM       Imaging:    XR CHEST PORTABLE    Result Date: 12/31/2021  1. Right IJ hemodialysis catheter has been placed in the interval, tip overlying the right atrium. 2. No pneumothorax. 3. Mild congestive heart failure is most likely given the radiographic findings; pneumonia is also a consideration in areas of consolidation with pleural effusion. XR CHEST PORTABLE    Result Date: 12/31/2021  Cardiomegaly with venous hypertension and perhaps mild CHF. No pulmonary edema. ASSESSMENT & PLAN     Assessment and Plan:    Principal Problem:    Dialysis AV fistula malfunction (HCC)  Active Problems:    Acute on chronic combined systolic (congestive) and diastolic (congestive) heart failure (HCC)    Type 2 diabetes mellitus with chronic kidney disease on chronic dialysis (HCC)    Anemia    Essential hypertension    AVF (arteriovenous fistula) (HCC)    ESRD (end stage renal disease) ([address]    Coronary artery disease involving native coronary artery of native heart without angina pectoris    BATSHEVA (obstructive sleep apnea)    Hypoxemia-due to CHF/fluid overload    Atrial fibrillation (HCC)    CKD (chronic kidney disease) requiring chronic dialysis ([address]    Arteriovenous fistula occlusion ([address]  Resolved Problems:    * No resolved hospital problems.  *      IMPRESSION  This is a 59 y.o. male who presented with from dialysis [place] with nonfunctional AV fistula in the left upper extremity. Patient admitted to inpatient status for continued hemodialysis and evaluation of left upper extremity AV fistula. He underwent successful declot procedure by IR for left upper extremity AV fistula.     Active Problems:  Non-functional AV fistula   - Status post declot by IR yesterday. Tolerated procedure well. Okay to use for the hemodialysis. - Per vascular surgery, he will need to have a follow-up with vascular surgeon on outpatient basis with Dr. Paolo Payne.     Acute cystitis-resolved  -Completed course of IV ceftriaxone. ESRD requiring chronic dialysis   - Javon catheter placed in the right IJ for continued dialysis while awaiting fistulogram by vascular surgery  - Patient states he is not making much urine and it is becoming harder for him to urinate  - Hemodialysis today. - Hemodialysis Tuesday, Thursday and Saturday's  - Monitor electrolytes and replace when indicated  - Troponins 177 > near baseline  - Nephrology following     Atrial fibrillation  [id number] score 3/9  - Currently, rate & rhythm controlled  - Eliquis  - Coreg  - Continue to monitor     Acute on chronic systolic heart failure. EF 35 to 40%  - Patient wears 2L of oxygen at home and is currently on 2 L.  - Pro-BNP 31,731 baseline roughly 30,000  - Coreg 6.25 mg twice daily.  - Lasix  - I's and O's  - Daily weights  -Cardiology was consulted yesterday for possible AICD. Echo today. ECHO: 2/10/21  Mildly dilated left ventricle with moderately reduced systolic function with  EF 35-40%. Evidence of diastolic dysfunction. Left atrium is moderately dilated. Right atrial enlargement. Mildly dilated right ventricle with reduced function. Aortic sclerosis without stenosis. Mild mitral regurgitation. Moderate tricuspid regurgitation. Estimated right ventricular systolic  pressure is 38 mmHg.   Mild pulmonic regurgitation.     Hypertension   - Losartan     Coronary artery disease -stable  - Coreg  - Lipitor     Obstructive sleep apnea  - BiPAP at night and during daytime naps     Type 2 diabetes  - MDSS  - POCG 119-130.  -Hypoglycemia protocol in place.     Diet: Carb controlled diet  DVT ppx: Heparin and Eliquis  GI ppx: Protonix     PT/OT/SW: Consulted  Discharge Planning:  In process    Karlos Burger MD  Internal Medicine Resident, PGY-1  [address]  0/7/7681,1:41 AM

## 2022-01-04 NOTE — PROGRESS NOTES
Dialysis Time Out  To be done by RN and tech or 2 RNs  Staff Names Ed RN, Lizzie Garcia RN    [x]  Identity of the patient using 2 patient identifiers  [x]  Consent for treatment  [x]  Equipment-proper machine and dialyzer  [x]  B-Hep B status  [x]  Orders- to include bath, blood flow, dialyzer, time and fluid removal  [x]  Access-Correct site and in working order  [x]  Time for patient to ask questions.

## 2022-01-04 NOTE — PROGRESS NOTES
[facility]  Progress Note                   Date:   1/4/2022  Patient name: Barney Workman  Date of admission:  12/31/2021 11:28 AM  MRN:   [id number]  YOB: 1960  PCP: Leatha Zelaya    Reason for Admission: CKD (chronic kidney disease) requiring chronic dialysis ([facility] [N18.6, Z99.2]  Arteriovenous fistula occlusion, initial encounter ([facility] [T82.858A]    Subjective:       Clinical Changes /Abnormalities:  Patient seen and examined in room after returning from dialysis and ECHO lab. Denies chest pain. Reports ongoing SOB on home O2. Awaiting results of ECHO completed today. Refusing telemetry. Review of Systems    Medications:   Scheduled Meds:   sodium polystyrene  15 g Oral Once    carvedilol  6.25 mg Oral BID WC    calcium gluconate-NaCl  1,000 mg IntraVENous Once    aspirin  81 mg Oral Daily    heparin flush  300 Units Intercatheter Once    apixaban  5 mg Oral BID    atorvastatin  40 mg Oral Nightly    furosemide  40 mg Oral BID    losartan  50 mg Oral Daily    pantoprazole  40 mg Oral QAM AC    sodium chloride flush  5-40 mL IntraVENous 2 times per day    insulin lispro  0-12 Units SubCUTAneous TID WC    insulin lispro  0-6 Units SubCUTAneous Nightly     Continuous Infusions:   sodium chloride      sodium chloride      dextrose       CBC:   Recent Labs     01/02/22  0520 01/03/22  0500 01/04/22  0615   WBC 6.9 5.7 6.6   HGB 9.8* 9.9* 10.1*    152 149     BMP:    Recent Labs     01/02/22  0520 01/03/22  0500 01/04/22  0615   * 134* 129*   K 6.0* 4.7 6.4*   CL 96* 95* 94*   CO2 18* 21 18*   BUN 44* 40* 58*   CREATININE 6.95* 6.73* 8.64*   GLUCOSE 137* 143* 130*     Hepatic:No results for input(s): AST, ALT, ALB, BILITOT, ALKPHOS in the last 72 hours. Troponin: No results for input(s): TROPHS in the last 72 hours. BNP: No results for input(s): BNP in the last 72 hours.   Lipids: No results for input(s): CHOL, HDL in the last 72 hours.    Invalid input(s): LDLCALCU  INR:   Recent Labs     01/03/22  0921   INR 1.4     DIAGNOSTIC DATA     EKG: afib, rbbb, left axis, rate 85     Echo 6/18/19:  Summary  Mildly dilated LV cavity with moderate concentric LVH  Mild-moderate reduction in LV systolic function, estimated EF 45-50% with  global hypokinesis  Moderate diastolic dysfunction due to hypertensive cardiomyopathy and  myocardial calcification from CKD  No significant valvular thickening or stenosis seen. Mild mitral regurgitation  No pericardial effusion or thickecning     ECHO: 2/10/21  Mildly dilated left ventricle with moderately reduced systolic function with  EF 35-40%. Evidence of diastolic dysfunction. Left atrium is moderately dilated. Right atrial enlargement. Mildly dilated right ventricle with reduced function. Aortic sclerosis without stenosis. Mild mitral regurgitation. Moderate tricuspid regurgitation. Estimated right ventricular systolic  pressure is 38 mmHg. Mild pulmonic regurgitation.     Cath 4/1/2019:   Single vessel distal LAD   Anterior wall hypokinesia, with LV EF 45%   Successful PTCA -TIM LAD     CATH: 2/9/21  LMCA: Normal 0% stenosis. LAD: Patent distal stent with 30-40% stenosis  LCx: Normal 0% stenosis. RCA: Normal 0% stenosis. Small, non dominant      Objective:   Vitals: /71   Pulse 58   Temp 98.1 °F (36.7 °C)   Resp 18   Wt 263 lb 14.3 oz (119.7 kg)   SpO2 95%   BMI 37.86 kg/m²   General appearance: alert and cooperative with exam  HEENT: Head: Normocephalic, no lesions, without obvious abnormality. Neck:no JVD, trachea midline, no adenopathy  Lungs: Clear to auscultation  Heart: Regular rate and rhythm, s1/s2 auscultated, no murmurs  Abdomen: soft, non-tender, bowel sounds active  Extremities: no edema  Neurologic: not done        Assessment / Acute Cardiac Problems:   1. NICM  2. HFrEF 35-40% in 2/2021  3. Chronic SOB - stable symptoms  4.  Paroxysmal afib - chadvasc 4, rate controlled  5. RBBB  6. Troponin elevation - chronic  7. ESRD on HD  8. Thrombosed AV graft of LUE - this admission  9. HTN  10. BATSHEVA    Patient Active Problem List:     CKD (chronic kidney disease) stage 4, GFR 15-29 ml/min ([facility])     Anemia of chronic renal failure     Type 2 diabetes mellitus with chronic kidney disease on chronic dialysis ([facility])     Chest pain at rest     Pneumonia     Rising PSA level     Prostatism     Elevated troponin     Acute on chronic diastolic congestive heart failure ([facility])     Anemia     Essential hypertension     Respiratory distress     AVF (arteriovenous fistula) ([facility])     Precordial chest pain     S/P PTCA - TIM distal LAD - Dr. Sharon Mann 4/1/19     NSTEMI (non-ST elevated myocardial infarction) ([address]     S/P arteriovenous (AV) graft placement     Acute on chronic combined systolic (congestive) and diastolic (congestive) heart failure ([facility])     ESRD (end stage renal disease) ([address]     Coronary artery disease involving native coronary artery of native heart without angina pectoris     BATHSEVA (obstructive sleep apnea)     Acute congestive heart failure ([facility])     Small bowel obstruction ([facility])     Non-cardiac chest pain     Acute on chronic combined systolic and diastolic CHF (congestive heart failure) ([facility])     Chronic abdominal pain     Hypoxemia-due to CHF/fluid overload     Atrial fibrillation ([facility])     Hyponatremia     CKD (chronic kidney disease) requiring chronic dialysis ([address]     Dialysis AV fistula malfunction ([facility])     Arteriovenous fistula occlusion ([address]      Plan of Treatment:   1. Awaiting results of ECHO. IF ECHO stable okay for discharge with OP follow up   2. Continue Eliquis, BB, statin, ARB and ASA, Lasix. 3. Fluid management per Nephrology  On HD  4. Keep K > 4.0 and Mag > 2.0 K 6.4 (managed by nephrology on HD. [address] lab ordered.    5. Rest of care managed per Primary Team.    Electronically signed by ROB Alvarez NP on 1/4/2022 at 2:35 [facility] [address] [facility].  [phone]

## 2022-01-04 NOTE — PROGRESS NOTES
Nephrology Progress Note        Subjective: patient evaluated on dialysis. Pt is stable on dialysis. Access cannulated without problems. No new issues overnite. Stable hemodynamics. Left AV fistula declotted yesterday, is being used successfully today. Right IJ vein temporary dialysis catheter still in place. Admitted to the hospital with nonfunctioning left AV fistula. Has developed intermittent hyperkalemia requiring emergent dialysis. Objective:  CURRENT TEMPERATURE:  Temp: 98.1 °F (36.7 °C)  MAXIMUM TEMPERATURE OVER 24HRS:  Temp (24hrs), Av.5 °F (36.4 °C), Min:96.6 °F (35.9 °C), Max:98.1 °F (36.7 °C)    CURRENT RESPIRATORY RATE:  Resp: 18  CURRENT PULSE:  Pulse: 58  CURRENT BLOOD PRESSURE:  BP: 131/71  24HR BLOOD PRESSURE RANGE:  Systolic (01HBP), EBF:869 , Min:95 , PWB:817   ; Diastolic (47DIR), YHB:51, Min:54, Max:90    24HR INTAKE/OUTPUT:  No intake or output data in the 24 hours ending 22 1315  Patient Vitals for the past 96 hrs (Last 3 readings):   Weight   22 0945 263 lb 14.3 oz (119.7 kg)   22 1218 260 lb 12.9 oz (118.3 kg)   22 0904 267 lb 10.2 oz (121.4 kg)         Physical Exam:  General appearance:Awake, alert, in no acute distress  Skin: warm and dry, no rash or erythema  Eyes: conjunctivae normal and sclera anicteric  ENT:no thrush no pharyngeal congestion   Neck:  No JVD, No Thyromegaly  Pulmonary: clear to auscultation and no wheezing or rhonchi   Cardiovascular: normal S1 and S2. NO rubs and NO murmur.  No S3 OR S4   Abdomen: soft nontender, bowel sounds present, no organomegaly,  no ascites   Extremities: no cyanosis, clubbing or edema     Access:  Left AVF    Current Medications:    sodium polystyrene (KAYEXALATE) 15 GM/60ML suspension 15 g, Once  carvedilol (COREG) tablet 6.25 mg, BID WC  calcium gluconate 1000 mg in sodium chloride 50 mL, Once  0.9 % sodium chloride infusion, Continuous  aspirin EC tablet 81 mg, Daily  heparin flush 100 UNIT/ML injection 300 Units, Once  apixaban (ELIQUIS) tablet 5 mg, BID  atorvastatin (LIPITOR) tablet 40 mg, Nightly  furosemide (LASIX) tablet 40 mg, BID  losartan (COZAAR) tablet 50 mg, Daily  pantoprazole (PROTONIX) tablet 40 mg, QAM AC  sodium chloride flush 0.9 % injection 5-40 mL, 2 times per day  sodium chloride flush 0.9 % injection 5-40 mL, PRN  0.9 % sodium chloride infusion, PRN  ondansetron (ZOFRAN-ODT) disintegrating tablet 4 mg, Q8H PRN   Or  ondansetron (ZOFRAN) injection 4 mg, Q6H PRN  polyethylene glycol (GLYCOLAX) packet 17 g, Daily PRN  acetaminophen (TYLENOL) tablet 650 mg, Q6H PRN   Or  acetaminophen (TYLENOL) suppository 650 mg, Q6H PRN  heparin (porcine) injection 1,900 Units, PRN  heparin (porcine) injection 1,600 Units, PRN  insulin lispro (HUMALOG) injection vial 0-12 Units, TID WC  insulin lispro (HUMALOG) injection vial 0-6 Units, Nightly  glucose (GLUTOSE) 40 % oral gel 15 g, PRN  dextrose 50 % IV solution, PRN  glucagon (rDNA) injection 1 mg, PRN  dextrose 5 % solution, PRN      Labs:   CBC:   Recent Labs     01/02/22  0520 01/03/22  0500 01/04/22  0615   WBC 6.9 5.7 6.6   RBC 3.22* 3.27* 3.36*   HGB 9.8* 9.9* 10.1*   HCT 32.5* 32.7* 33.7*    152 149   MPV 10.0 10.3 10.4      BMP:   Recent Labs     01/02/22  0520 01/03/22  0500 01/04/22  0615   * 134* 129*   K 6.0* 4.7 6.4*   CL 96* 95* 94*   CO2 18* 21 18*   BUN 44* 40* 58*   CREATININE 6.95* 6.73* 8.64*   GLUCOSE 137* 143* 130*   CALCIUM 9.0 8.8 8.9        Phosphorus:  No results for input(s): PHOS in the last 72 hours. Magnesium: No results for input(s): MG in the last 72 hours. Albumin: No results for input(s): LABALBU in the last 72 hours. Dialysis bath: Dialysis Bath  K+ (Potassium): 2  Ca+ (Calcium): 2.25  Na+ (Sodium): 135  HCO3 (Bicarb): 35    Radiology:  Reviewed as available. Assessment:  1 ESRD on hemodialysis Tuesday Thursday Saturday at the [facility] unit via left AV fistula:dialysis bath reviewed with nurse.     2.HTN: Blood pressure somewhat uncontrolled  3 cardiomyopathy ejection fraction 35 to 40% with compensated CHF  4 EDEMA : Improved with dialysis  5. ANEMIA OF CHRONIC DISEASE: Stable  6. SECONDARY HYPERPARATHYROIDISM: Stable    Plan:  1. Wt removal and dialysis orders reviewed. 2.  Stable for discharge from nephrology standpoint  3. Cont pt on aranesp and zemplar per protocol. 4. Follow up labs ordered. 5.  We will discontinue right IJ vein temporary dialysis catheter  6. We will follow if he stays      Please do not hesitate to call with questions.     Electronically signed by Cruz Chester MD on 1/4/2022 at 1:15 PM

## 2022-01-04 NOTE — PROGRESS NOTES
Physician Progress Note      PATIENT:               Noe Nice  CSN #:                  [id number]  :                       1960  ADMIT DATE:       2021 9:56 PM  [address] DATE:        2021 2:59 PM  RESPONDING  PROVIDER #:        Eloisa Arora TEXT:    Patient admitted with chest pain with new onset Atrial Fibrillation with RVR. Per Cardiology consult note documentation of NSTEMI Type II. If possible,   please document in the progress notes further status of Type II MI    The medical record reflects the following:  Risk Factors: 59 yr old with ESRD  Clinical Indicators: troponin 171>188, BNP 65127, New onset Afib, cardiac Cath   2021 with minimal CAD  Treatment: Heparin gtt, Eliquis, Coreg increased, Cardiology recommended   outpatient JERONIMO cardioversion, no ischemic workup pursued    Thank you, please contact me for any questions! Blaise Souza RN, [facility]   Supervisor  [phone]  Options provided:  -- Demand Ischemia with MI  -- Demand Ischemia only, no MI  -- Type 2 MI  -- Other - I will add my own diagnosis  -- Disagree - Not applicable / Not valid  -- Disagree - Clinically unable to determine / Unknown  -- Refer to Clinical Documentation Reviewer    PROVIDER RESPONSE TEXT:    This patient has non-ischemic myocardial injury only, no MI. Query created by: Jerrald Fothergill on 2021 7:44 AM      Electronically signed by:   Tari Moore 2022 5:11 PM

## 2022-01-04 NOTE — PROGRESS NOTES
Vascular Surgery Progress Note         PATIENT NAME: Shy Roberson     TODAY'S DATE: 1/4/2022     SUBJECTIVE:    Pt seen and examined. No acute events overnight. Successful LUE fistula declot by IR yesterday, ok to use. Comfortable on NC. OBJECTIVE:   Vitals:  BP (!) 140/70   Pulse 50   Temp 97.9 °F (36.6 °C) (Oral)   Resp 18   Wt 260 lb 12.9 oz (118.3 kg)   SpO2 95%   BMI 37.42 kg/m²      INTAKE/OUTPUT:    No intake or output data in the 24 hours ending 01/04/22 0824              GENERAL: AOx3, NAD  HEENT: EOMI bilaterally, trachea midline  CARDIAC: Regular rate and rhythm. ABDOMEN: Softly distended, nontender, no guarding  EXTREMITY: moves all extremities, bl LE edema, LUE AV graft withpalpable thrill, suture intact from IR procedure palpable radial pulses  NEURO: CN II-XII intact. Gross motor intact. ASSESSMENT   Thrombosed LUE AV graft  S/p IR declot of LUE AV graft 1/3    PLAN  1. Successful IR declot of LUE AV graft 1/3. Ok to use fistula for dialysis. 2. Follow up outpatient with Dr. Marylou Holder.      Higinio Galvan,   General Surgery PGY-3

## 2022-01-05 VITALS
SYSTOLIC BLOOD PRESSURE: 127 MMHG | RESPIRATION RATE: 16 BRPM | OXYGEN SATURATION: 98 % | TEMPERATURE: 98.6 F | BODY MASS INDEX: 37.07 KG/M2 | HEART RATE: 55 BPM | DIASTOLIC BLOOD PRESSURE: 78 MMHG | WEIGHT: 258.38 LBS

## 2022-01-05 LAB
ABSOLUTE EOS #: 0.11 K/UL (ref 0–0.44)
ABSOLUTE IMMATURE GRANULOCYTE: 0.05 K/UL (ref 0–0.3)
ABSOLUTE LYMPH #: 0.42 K/UL (ref 1.1–3.7)
ABSOLUTE MONO #: 0.95 K/UL (ref 0.1–1.2)
ANION GAP SERPL CALCULATED.3IONS-SCNC: 17 MMOL/L (ref 9–17)
BASOPHILS # BLD: 1 % (ref 0–2)
BASOPHILS ABSOLUTE: 0.05 K/UL (ref 0–0.2)
BUN BLDV-MCNC: 45 MG/DL (ref 8–23)
BUN/CREAT BLD: ABNORMAL (ref 9–20)
CALCIUM SERPL-MCNC: 8.2 MG/DL (ref 8.6–10.4)
CHLORIDE BLD-SCNC: 89 MMOL/L (ref 98–107)
CO2: 24 MMOL/L (ref 20–31)
CREAT SERPL-MCNC: 7.85 MG/DL (ref 0.7–1.2)
DIFFERENTIAL TYPE: ABNORMAL
EOSINOPHILS RELATIVE PERCENT: 2 % (ref 1–4)
GFR AFRICAN AMERICAN: 8 ML/MIN
GFR NON-AFRICAN AMERICAN: 7 ML/MIN
GFR SERPL CREATININE-BSD FRML MDRD: ABNORMAL ML/MIN/{1.73_M2}
GFR SERPL CREATININE-BSD FRML MDRD: ABNORMAL ML/MIN/{1.73_M2}
GLUCOSE BLD-MCNC: 103 MG/DL (ref 75–110)
GLUCOSE BLD-MCNC: 129 MG/DL (ref 70–99)
HCT VFR BLD CALC: 32.1 % (ref 40.7–50.3)
HEMOGLOBIN: 9.9 G/DL (ref 13–17)
IMMATURE GRANULOCYTES: 1 %
LYMPHOCYTES # BLD: 8 % (ref 24–43)
MCH RBC QN AUTO: 30 PG (ref 25.2–33.5)
MCHC RBC AUTO-ENTMCNC: 30.8 G/DL (ref 28.4–34.8)
MCV RBC AUTO: 97.3 FL (ref 82.6–102.9)
MONOCYTES # BLD: 18 % (ref 3–12)
MORPHOLOGY: ABNORMAL
NRBC AUTOMATED: 0 PER 100 WBC
PDW BLD-RTO: 15.8 % (ref 11.8–14.4)
PLATELET # BLD: 146 K/UL (ref 138–453)
PLATELET ESTIMATE: ABNORMAL
PMV BLD AUTO: 10.1 FL (ref 8.1–13.5)
POTASSIUM SERPL-SCNC: 4.4 MMOL/L (ref 3.7–5.3)
RBC # BLD: 3.3 M/UL (ref 4.21–5.77)
RBC # BLD: ABNORMAL 10*6/UL
SEG NEUTROPHILS: 70 % (ref 36–65)
SEGMENTED NEUTROPHILS ABSOLUTE COUNT: 3.72 K/UL (ref 1.5–8.1)
SODIUM BLD-SCNC: 130 MMOL/L (ref 135–144)
WBC # BLD: 5.3 K/UL (ref 3.5–11.3)
WBC # BLD: ABNORMAL 10*3/UL

## 2022-01-05 PROCEDURE — 6370000000 HC RX 637 (ALT 250 FOR IP): Performed by: STUDENT IN AN ORGANIZED HEALTH CARE EDUCATION/TRAINING PROGRAM

## 2022-01-05 PROCEDURE — 36415 COLL VENOUS BLD VENIPUNCTURE: CPT

## 2022-01-05 PROCEDURE — 99232 SBSQ HOSP IP/OBS MODERATE 35: CPT | Performed by: INTERNAL MEDICINE

## 2022-01-05 PROCEDURE — 2580000003 HC RX 258

## 2022-01-05 PROCEDURE — 85025 COMPLETE CBC W/AUTO DIFF WBC: CPT

## 2022-01-05 PROCEDURE — 82947 ASSAY GLUCOSE BLOOD QUANT: CPT

## 2022-01-05 PROCEDURE — 80048 BASIC METABOLIC PNL TOTAL CA: CPT

## 2022-01-05 RX ORDER — CARVEDILOL 6.25 MG/1
6.25 TABLET ORAL 2 TIMES DAILY WITH MEALS
Qty: 60 TABLET | Refills: 3 | Status: SHIPPED | OUTPATIENT
Start: 2022-01-05

## 2022-01-05 RX ADMIN — SODIUM CHLORIDE, PRESERVATIVE FREE 10 ML: 5 INJECTION INTRAVENOUS at 08:23

## 2022-01-05 RX ADMIN — Medication 81 MG: at 08:22

## 2022-01-05 RX ADMIN — CARVEDILOL 6.25 MG: 6.25 TABLET, FILM COATED ORAL at 08:22

## 2022-01-05 RX ADMIN — LOSARTAN POTASSIUM 50 MG: 50 TABLET, FILM COATED ORAL at 08:23

## 2022-01-05 RX ADMIN — APIXABAN 5 MG: 5 TABLET, FILM COATED ORAL at 08:23

## 2022-01-05 RX ADMIN — PANTOPRAZOLE SODIUM 40 MG: 40 TABLET, DELAYED RELEASE ORAL at 08:23

## 2022-01-05 RX ADMIN — FUROSEMIDE 40 MG: 40 TABLET ORAL at 08:23

## 2022-01-05 ASSESSMENT — PAIN SCALES - GENERAL
PAINLEVEL_OUTOF10: 0
PAINLEVEL_OUTOF10: 0

## 2022-01-05 NOTE — PROGRESS NOTES
Renal Progress Note    Patient :  Mi [address]; 59 y.o. MRN# [id number]  Location:  1034/6127-45  Attending:  Teresa Mcallister MD  Admit Date:  12/31/2021   Hospital Day: 5      Subjective:       No acute events overnight. No new complaints     Vitally stable with /78, HR 55    Patient had dialysis yesterday. Removed 2.5L through Left AV fistula. Left IJ removed yesterday as patient AV fistula was clotted and was declotted.      Outpatient Medications:     Medications Prior to Admission: apixaban (ELIQUIS) 5 MG TABS tablet, Take 1 tablet by mouth 2 times daily  aspirin EC 81 MG EC tablet, Take 1 tablet by mouth daily  carvedilol (COREG) 12.5 MG tablet, Take 1 tablet by mouth 2 times daily (with meals)  furosemide (LASIX) 40 MG tablet, Take 1 tablet by mouth 2 times daily  atorvastatin (LIPITOR) 40 MG tablet, Take 1 tablet by mouth nightly  losartan (COZAAR) 50 MG tablet, Take 1 tablet by mouth daily  glimepiride (AMARYL) 1 MG tablet, Take 1 tablet by mouth every morning (before breakfast) Do not take if blood sugars are less than 110  pantoprazole (PROTONIX) 40 MG tablet, Take 1 tablet by mouth every morning (before breakfast)  benzonatate (TESSALON) 100 MG capsule, take 1 capsule by mouth three times a day if needed for 10 days (Patient not taking: Reported on 11/17/2021)  guaiFENesin-codeine (GUAIFENESIN AC) 100-10 MG/5ML liquid, give 10 milliliters by mouth every 4 hours if needed  traMADol (ULTRAM) 50 MG tablet, take 1 tablet by mouth every 6 hours if needed  calcium carbonate (TUMS) 500 MG chewable tablet, Take 1 tablet by mouth daily as needed  (Patient not taking: Reported on 11/17/2021)  Multiple Vitamins-Minerals (RENAPLEX-D) TABS, Take 1 tablet by mouth every other day Pt takes occasionally  lidocaine-prilocaine (EMLA) 2.5-2.5 % cream,     Current Medications:     Scheduled Meds:    sodium polystyrene  15 g Oral Once    carvedilol  6.25 mg Oral BID WC    calcium gluconate-NaCl  1,000 mg IntraVENous Once    aspirin  81 mg Oral Daily    heparin flush  300 Units Intercatheter Once    apixaban  5 mg Oral BID    atorvastatin  40 mg Oral Nightly    furosemide  40 mg Oral BID    losartan  50 mg Oral Daily    pantoprazole  40 mg Oral QAM AC    sodium chloride flush  5-40 mL IntraVENous 2 times per day    insulin lispro  0-12 Units SubCUTAneous TID WC    insulin lispro  0-6 Units SubCUTAneous Nightly     Continuous Infusions:    sodium chloride      sodium chloride      dextrose       PRN Meds:  sodium chloride flush, sodium chloride, ondansetron **OR** ondansetron, polyethylene glycol, acetaminophen **OR** acetaminophen, heparin (porcine), heparin (porcine), glucose, dextrose, glucagon (rDNA), dextrose    Input/Output:       I/O last 3 completed shifts: In: 0   Out: 2800 . Patient Vitals for the past 96 hrs (Last 3 readings):   Weight   22 1325 258 lb 6.1 oz (117.2 kg)   22 0945 263 lb 14.3 oz (119.7 kg)   22 1218 260 lb 12.9 oz (118.3 kg)       Vital Signs:   Temperature:  Temp: 98.6 °F (37 °C)  TMax:   Temp (24hrs), Av °F (36.7 °C), Min:97.5 °F (36.4 °C), Max:98.6 °F (37 °C)    Respirations:  Resp: 16  Pulse:   Pulse: 55  BP:    BP: 127/78  BP Range: Systolic (09QZG), LPK:610 , Min:99 , KVM:787       Diastolic (41FSU), SYH:32, Min:62, Max:78      Physical Examination:     General:  AAO x 3, speaking in full sentences, no accessory muscle use. HEENT: Atraumatic, normocephalic, no throat congestion, moist mucosa. Eyes:   Pupils equal, round and reactive to light, EOMI. Neck:   No JVD, no thyromegaly, no lymphadenopathy. Chest:  Bilateral vesicular breath sounds, no rales or wheezes. Cardiac:  S1 S2 RR, no murmurs, gallops or rubs, JVP not raised. Abdomen: Soft, non-tender, no masses or organomegaly, BS audible. :   No suprapubic or flank tenderness. Neuro:  AAO x 3, No FND. SKIN:  No rashes, good skin turgor.   Extremities:  1+ edema, palpable peripheral pulses, no calf tenderness. Left upper extremity AV fistula with barely palpable thrill  Labs:       Recent Labs     01/03/22  0500 01/04/22  0615 01/05/22  0523   WBC 5.7 6.6 5.3   RBC 3.27* 3.36* 3.30*   HGB 9.9* 10.1* 9.9*   HCT 32.7* 33.7* 32.1*   .0 100.3 97.3   MCH 30.3 30.1 30.0   MCHC 30.3 30.0 30.8   RDW 15.9* 16.0* 15.8*    149 146   MPV 10.3 10.4 10.1      BMP:   Recent Labs     01/03/22  0500 01/04/22  0615 01/05/22  0523   * 129* 130*   K 4.7 6.4* 4.4   CL 95* 94* 89*   CO2 21 18* 24   BUN 40* 58* 45*   CREATININE 6.73* 8.64* 7.85*   GLUCOSE 143* 130* 129*   CALCIUM 8.8 8.9 8.2*      Phosphorus:   No results for input(s): PHOS in the last 72 hours. Magnesium:    Recent Labs     01/04/22  0615   MG 2.5     Albumin:  No results for input(s): LABALBU in the last 72 hours.   BNP:      Lab Results   Component Value Date     09/08/2018     TATIANA:    No results found for: TATIANA  SPEP:  Lab Results   Component Value Date    PROT 8.0 11/12/2021     UPEP:   No results found for: LABPE  C3:   No results found for: C3  C4:   No results found for: C4  MPO ANCA:     Lab Results   Component Value Date    MPO 25 09/14/2016     PR3 ANCA:     Lab Results   Component Value Date    PR3 51 09/14/2016     Anti-GBM:     Lab Results   Component Value Date    GBMABIGG 13 09/14/2016     Hep BsAg:         Lab Results   Component Value Date    HEPBSAG NONREACTIVE 11/13/2021     Hep C AB:          Lab Results   Component Value Date    HEPCAB NONREACTIVE 11/13/2021       Urinalysis/Chemistries:      Lab Results   Component Value Date    NITRU POSITIVE 12/31/2021    COLORU [place] 12/31/2021    PHUR 5.0 12/31/2021    WBCUA 20 TO 50 12/31/2021    RBCUA TOO NUMEROUS TO COUNT 12/31/2021    MUCUS NOT REPORTED 12/31/2021    TRICHOMONAS NOT REPORTED 12/31/2021    YEAST FEW 12/31/2021    BACTERIA NOT REPORTED 12/31/2021    SPECGRAV 1.022 12/31/2021    LEUKOCYTESUR MODERATE 12/31/2021    UROBILINOGEN Normal 12/31/2021 BILIRUBINUR NEGATIVE  Verified by ictotest. 12/31/2021    GLUCOSEU TRACE 12/31/2021    KETUA NEGATIVE 12/31/2021    AMORPHOUS NOT REPORTED 12/31/2021     Urine Sodium:   No results found for: JEFFERSON  Urine Potassium:  No results found for: KUR  Urine Chloride:  No results found for: CLUR  Urine Osmolarity: No results found for: OSMOU  Urine Protein:   No components found for: TOTALPROTEIN, URINE   Urine Creatinine:     Lab Results   Component Value Date    LABCREA 73.8 11/25/2018     Urine Eosinophils:  No components found for: UEOS    Radiology:     CXR:     Assessment:     1. ESRD on hemodialysis Tuesday Thursday Saturday at the [facility] unit via left AV fistula under Dr. Leonor Fallon currently has a temporary catheter in place  2. Thrombosed left AV fistula t- declotted. Now functioning  3. Mild volume overload  4. Hypertension  5. NICM- EF 30%  6. Paroxymal A. Fib on eliquis   7. BATSHEVA     Plan:   1. Dialysis as per schedule    [facility]  3. Okay to discharge from nephrology stand point       Nutrition   Please ensure that patient is on a renal diet/TF. Avoid nephrotoxic drugs/contrast exposure.     Perfecto Danielle MD  Internal Medicine Resident PGY [place], [facility]   1/5/2022, 1:28 PM

## 2022-01-05 NOTE — PROGRESS NOTES
[facility]  Progress Note                   Date:   1/5/2022  Patient name: Meseret Hurt  Date of admission:  12/31/2021 11:28 AM  MRN:   [id number]  YOB: 1960  PCP: Levi Benítez    Reason for Admission: CKD (chronic kidney disease) requiring chronic dialysis ([facility] [N18.6, Z99.2]  Arteriovenous fistula occlusion, initial encounter ([facility] [T82.238A]    Subjective:       Clinical Changes /Abnormalities:  Patient seen and examined in room. Denies chest pain or SOB out of baseline. On NC oxygen which he states he wears at home. States he is feeling fine and wants \"to eat and get out of here. \"       Review of Systems    Medications:   Scheduled Meds:   sodium polystyrene  15 g Oral Once    carvedilol  6.25 mg Oral BID WC    calcium gluconate-NaCl  1,000 mg IntraVENous Once    aspirin  81 mg Oral Daily    heparin flush  300 Units Intercatheter Once    apixaban  5 mg Oral BID    atorvastatin  40 mg Oral Nightly    furosemide  40 mg Oral BID    losartan  50 mg Oral Daily    pantoprazole  40 mg Oral QAM AC    sodium chloride flush  5-40 mL IntraVENous 2 times per day    insulin lispro  0-12 Units SubCUTAneous TID WC    insulin lispro  0-6 Units SubCUTAneous Nightly     Continuous Infusions:   sodium chloride      sodium chloride      dextrose       CBC:   Recent Labs     01/03/22  0500 01/04/22  0615 01/05/22  0523   WBC 5.7 6.6 5.3   HGB 9.9* 10.1* 9.9*    149 146     BMP:    Recent Labs     01/03/22  0500 01/04/22  0615 01/05/22  0523   * 129* 130*   K 4.7 6.4* 4.4   CL 95* 94* 89*   CO2 21 18* 24   BUN 40* 58* 45*   CREATININE 6.73* 8.64* 7.85*   GLUCOSE 143* 130* 129*     Hepatic:No results for input(s): AST, ALT, ALB, BILITOT, ALKPHOS in the last 72 hours. Troponin: No results for input(s): TROPHS in the last 72 hours. BNP: No results for input(s): BNP in the last 72 hours.   Lipids: No results for input(s): CHOL, HDL in the last 72 hours.    Invalid input(s): LDLCALCU  INR:   Recent Labs     01/03/22  0921   INR 1.4     DIAGNOSTIC DATA     EKG: afib, rbbb, left axis, rate 85     Echo 6/18/19:  Summary  Mildly dilated LV cavity with moderate concentric LVH  Mild-moderate reduction in LV systolic function, estimated EF 45-50% with  global hypokinesis  Moderate diastolic dysfunction due to hypertensive cardiomyopathy and  myocardial calcification from CKD  No significant valvular thickening or stenosis seen. Mild mitral regurgitation  No pericardial effusion or thickecning     ECHO: 2/10/21  Mildly dilated left ventricle with moderately reduced systolic function with  EF 35-40%. Evidence of diastolic dysfunction. Left atrium is moderately dilated. Right atrial enlargement. Mildly dilated right ventricle with reduced function. Aortic sclerosis without stenosis. Mild mitral regurgitation. Moderate tricuspid regurgitation. Estimated right ventricular systolic  pressure is 38 mmHg. Mild pulmonic regurgitation.     Cath 4/1/2019:   Single vessel distal LAD   Anterior wall hypokinesia, with LV EF 45%   Successful PTCA -TIM LAD     CATH: 2/9/21  LMCA: Normal 0% stenosis. LAD: Patent distal stent with 30-40% stenosis  LCx: Normal 0% stenosis. RCA: Normal 0% stenosis. Small, non dominant    Echo 1/4/21  Summary  Left ventricle is moderately enlarged, global left ventricular systolic  function is moderately reduced, Visually estimated EF 30%. Global dysfunction is noted. Grade III (severe) left ventricular diastolic dysfunction. Left atrium is moderately dilated. Right atrial dilatation. Prominent Eustachian valve. Dialysis catheter  noted. Right ventricular dilatation with reduced systolic function. Aortic valve is sclerotic but opens well. Trivial aortic insufficiency. Mitral valve sclerosis without stenosis. Trivial mitral regurgitation. Moderate to severe tricuspid regurgitation.  Estimated right ventricular  systolic pressure is 39 mmHg.  Trivial pulmonic insufficiency. Objective:   Vitals: /78   Pulse 55   Temp 98.6 °F (37 °C) (Oral)   Resp 16   Wt 258 lb 6.1 oz (117.2 kg)   SpO2 98%   BMI 37.07 kg/m²   General appearance: alert and cooperative with exam  HEENT: Head: Normocephalic, no lesions, without obvious abnormality. Neck:no JVD, trachea midline, no adenopathy  Lungs: Clear to auscultation throughout. On NC without distress (chronic oxygen use)  Heart: Regular rate and rhythm, s1/s2 auscultated, no murmurs  Abdomen: soft, non-tender, bowel sounds active  Extremities: no edema  Neurologic: not done        Assessment / Acute Cardiac Problems:   1. NICM - EF on present echo 30%  2. Chronic SOB - stable symptoms  3. Paroxysmal afib - chadvasc 4, rate controlled  4. RBBB  5. Troponin elevation - chronic  6. ESRD on HD  7. Thrombosed AV graft of LUE - this admission  8. HTN  9.  BATSHEVA    Patient Active Problem List:     CKD (chronic kidney disease) stage 4, GFR 15-29 ml/min ([facility])     Anemia of chronic renal failure     Type 2 diabetes mellitus with chronic kidney disease on chronic dialysis ([facility])     Chest pain at rest     Pneumonia     Rising PSA level     Prostatism     Elevated troponin     Acute on chronic diastolic congestive heart failure ([facility])     Anemia     Essential hypertension     Respiratory distress     AVF (arteriovenous fistula) ([facility])     Precordial chest pain     S/P PTCA - TIM distal LAD - Dr. Orquidea Henry 4/1/19     NSTEMI (non-ST elevated myocardial infarction) ([facility]     S/P arteriovenous (AV) graft placement     Acute on chronic combined systolic (congestive) and diastolic (congestive) heart failure ([facility])     ESRD (end stage renal disease) ([address]     Coronary artery disease involving native coronary artery of native heart without angina pectoris     BATSHEVA (obstructive sleep apnea)     Acute congestive heart failure ([facility])     Small bowel obstruction ([facility])     Non-cardiac chest pain     Acute on chronic combined systolic and diastolic CHF (congestive heart failure) (HCC)     Chronic abdominal pain     Hypoxemia-due to CHF/fluid overload     Atrial fibrillation (HCC)     Hyponatremia     CKD (chronic kidney disease) requiring chronic dialysis [facility])     Dialysis AV fistula malfunction (HCC)     Arteriovenous fistula occlusion ([facility]      Plan of Treatment:   1. Echo reviewed as above. Disucssed in detail with patient. Questions/concerns addressed. He is not interested in discussion of AICD at this time. \"Will talk about that later. \"    2. Continue Eliquis, BB, statin, ARB and ASA, Lasix. 3. Fluid management per Nephrology  On HD  4. Keep K > 4.0 and Mag > 2.0   5. OK for discharge from CV standpoint. Continue med management with PO ARB/BB/Lasix.  Will plan further discussion/assessment of LVEF/AICD as OP    Electronically signed by ROB Trinh CNP on 1/5/2022 at 10:23 270[address] [facility].  [phone]

## 2022-01-05 NOTE — CARE COORDINATION
TASH notified [employer] of anticipated discharge for today. Yanelis with [facility] confirmed TTS 8am chair. Will anticipate patient tomorrow.

## 2022-01-05 NOTE — PROGRESS NOTES
CLINICAL PHARMACY NOTE: MEDS TO BEDS    Total # of Prescriptions Filled: 1   The following medications were delivered to the patient:  · COREG 6.25    Additional Documentation:    DELIVERED MED TO PT IN ROOM 339-2, PAID CASH ON IPAD

## 2022-01-05 NOTE — PROGRESS NOTES
[facility]  Internal Medicine Teaching Residency Program  Inpatient Daily Progress Note  ______________________________________________________________________________    Patient: Melodie Naranjo  YOB: 1960   DGJ:[id number]    Acct: [de-identified]     Room: 0339/0339-02  Admit date: 12/31/2021  Today's date: 01/05/22  Number of days in the hospital: 5    SUBJECTIVE   Admitting Diagnosis: Dialysis AV fistula malfunction ([address]  CC: Vascular access problem status post IR declot  Patient seen and examined at bedside. No acute event overnight. Remains afebrile and hemodynamically stable. Labs reviewed and evaluated. No significant change. Plan  Discharge today. ROS:  Constitutional:  negative for chills, fevers, sweats  Respiratory:  negative for cough, dyspnea on exertion, hemoptysis, shortness of breath, wheezing  Cardiovascular:  negative for chest pain, chest pressure/discomfort, lower extremity edema, palpitations  Gastrointestinal:  negative for abdominal pain, constipation, diarrhea, nausea, vomiting  Neurological:  negative for dizziness, headache    BRIEF HISTORY     The patient is a pleasant 59 y.o. male with a PMHx significant for anemia, a-fib, ESRD on hemodialysis, coronary artery disease, hypertension, obstructive sleep apnea type 2 diabetes who presents with a chief complaint of vascular access problem. Patient states that he was at dialysis on Tuesday Thursday Saturday was in dialysis today due to holiday schedule. During dialysis there were not able to to access his AV fistula which is located in the left upper arm. On examination of the AV fistula there was no bruit auscultated or thrill palpated. Patient denied any pain in the left upper extremity. Patient endorses abdominal distention as well as increased shortness of breath. He chronically wears 2 L of oxygen at home due to CHF.   Patient states he does not make much urine anymore and has become much more difficult to urinate. Patient denies any fevers chills, cough, nausea vomiting, abdominal pain or changes in bowel habits. Javon catheter placed in the right IJ for temporary dialysis. Patient is vaccinated against COVID. OBJECTIVE     Vital Signs:  /78   Pulse 55   Temp 98.6 °F (37 °C) (Oral)   Resp 16   Wt 258 lb 6.1 oz (117.2 kg)   SpO2 98%   BMI 37.07 kg/m²     Temp (24hrs), Av °F (36.7 °C), Min:97.5 °F (36.4 °C), Max:98.6 °F (37 °C)    In: 0   Out: 2800     Physical Exam:  Physical Exam  Vitals and nursing note reviewed. Constitutional:       Appearance: Normal appearance. HENT:      Head: Normocephalic and atraumatic. Nose: Nose normal.      Mouth/Throat:      Mouth: Mucous membranes are moist.      Pharynx: Oropharynx is clear. Eyes:      Extraocular Movements: Extraocular movements intact. Conjunctiva/sclera: Conjunctivae normal.      Pupils: Pupils are equal, round, and reactive to light. Cardiovascular:      Rate and Rhythm: Normal rate and regular rhythm. Pulses: Normal pulses. Heart sounds: Normal heart sounds. No murmur heard. No friction rub. No gallop. Pulmonary:      Effort: Pulmonary effort is normal. No respiratory distress. Breath sounds: Normal breath sounds. No stridor. No wheezing, rhonchi or rales. Chest:      Chest wall: No tenderness. Abdominal:      General: Abdomen is flat. Bowel sounds are normal. There is distension. Palpations: Abdomen is soft. There is no mass. Tenderness: There is no abdominal tenderness. There is no guarding or rebound. Hernia: No hernia is present. Musculoskeletal:         General: No swelling, tenderness, deformity or signs of injury. Normal range of motion. Cervical back: Normal range of motion. Right lower leg: No edema. Left lower leg: No edema. Skin:     General: Skin is warm. Neurological:      General: No focal deficit present. Mental Status: He is alert and oriented to person, place, and time. Mental status is at baseline. Psychiatric:         Mood and Affect: Mood normal.         Behavior: Behavior normal.         Thought Content: Thought content normal.         Judgment: Judgment normal.       Medications:  Scheduled Medications:    sodium polystyrene  15 g Oral Once    carvedilol  6.25 mg Oral BID WC    calcium gluconate-NaCl  1,000 mg IntraVENous Once    aspirin  81 mg Oral Daily    heparin flush  300 Units Intercatheter Once    apixaban  5 mg Oral BID    atorvastatin  40 mg Oral Nightly    furosemide  40 mg Oral BID    losartan  50 mg Oral Daily    pantoprazole  40 mg Oral QAM AC    sodium chloride flush  5-40 mL IntraVENous 2 times per day    insulin lispro  0-12 Units SubCUTAneous TID WC    insulin lispro  0-6 Units SubCUTAneous Nightly     Continuous Infusions:    sodium chloride      sodium chloride      dextrose       PRN Medicationssodium chloride flush, 5-40 mL, PRN  sodium chloride, 25 mL, PRN  ondansetron, 4 mg, Q8H PRN   Or  ondansetron, 4 mg, Q6H PRN  polyethylene glycol, 17 g, Daily PRN  acetaminophen, 650 mg, Q6H PRN   Or  acetaminophen, 650 mg, Q6H PRN  heparin (porcine), 1,900 Units, PRN  heparin (porcine), 1,600 Units, PRN  glucose, 15 g, PRN  dextrose, 12.5 g, PRN  glucagon (rDNA), 1 mg, PRN  dextrose, 100 mL/hr, PRN        Diagnostic Labs:  CBC:   Recent Labs     01/03/22  0500 01/04/22  0615 01/05/22  0523   WBC 5.7 6.6 5.3   RBC 3.27* 3.36* 3.30*   HGB 9.9* 10.1* 9.9*   HCT 32.7* 33.7* 32.1*   .0 100.3 97.3   RDW 15.9* 16.0* 15.8*    149 146     BMP:   Recent Labs     01/03/22  0500 01/04/22  0615 01/05/22  0523   * 129* 130*   K 4.7 6.4* 4.4   CL 95* 94* 89*   CO2 21 18* 24   BUN 40* 58* 45*   CREATININE 6.73* 8.64* 7.85*     BNP: No results for input(s): BNP in the last 72 hours.   PT/INR:   Recent Labs     01/03/22  0921   PROTIME 14.2*   INR 1.4     APTT:   No results for input(s): APTT in the last 72 hours. CARDIAC ENZYMES: No results for input(s): CKMB, CKMBINDEX, TROPONINI in the last 72 hours. Invalid input(s): CKTOTAL;3  FASTING LIPID PANEL:  Lab Results   Component Value Date    CHOL 127 11/12/2021    HDL 45 11/12/2021    TRIG 107 11/12/2021     LIVER PROFILE: No results for input(s): AST, ALT, ALB, BILIDIR, BILITOT, ALKPHOS in the last 72 hours. MICROBIOLOGY:   Lab Results   Component Value Date/Time    CULTURE NO SIGNIFICANT GROWTH 12/31/2021 04:23 PM       Imaging:    XR CHEST PORTABLE    Result Date: 12/31/2021  1. Right IJ hemodialysis catheter has been placed in the interval, tip overlying the right atrium. 2. No pneumothorax. 3. Mild congestive heart failure is most likely given the radiographic findings; pneumonia is also a consideration in areas of consolidation with pleural effusion. XR CHEST PORTABLE    Result Date: 12/31/2021  Cardiomegaly with venous hypertension and perhaps mild CHF. No pulmonary edema. ASSESSMENT & PLAN     Assessment and Plan:    Principal Problem:    Dialysis AV fistula malfunction (HCC)  Active Problems:    Acute on chronic combined systolic (congestive) and diastolic (congestive) heart failure (HCC)    Type 2 diabetes mellitus with chronic kidney disease on chronic dialysis (HCC)    Anemia    Essential hypertension    AVF (arteriovenous fistula) (HCC)    ESRD (end stage renal disease) ([address]    Coronary artery disease involving native coronary artery of native heart without angina pectoris    BATSHEVA (obstructive sleep apnea)    Hypoxemia-due to CHF/fluid overload    Atrial fibrillation (HCC)    CKD (chronic kidney disease) requiring chronic dialysis ([address]    Arteriovenous fistula occlusion ([address]  Resolved Problems:    * No resolved hospital problems. *      IMPRESSION  This is a 59 y.o. male who presented with from dialysis center with nonfunctional AV fistula in the left upper extremity.  Patient admitted to inpatient status for tricuspid regurgitation. Estimated right ventricular  systolic pressure is 39 mmHg. Trivial pulmonic insufficiency     Hypertension   - Losartan     Coronary artery disease -stable  - Coreg  - Lipitor     Obstructive sleep apnea  - BiPAP at night and during daytime naps     Type 2 diabetes  - MDSS  - POCG 103-129.  -Hypoglycemia protocol in place.     Diet: Carb controlled diet  DVT ppx: Heparin and Eliquis  GI ppx: Protonix     PT/OT/SW: Consulted  Discharge Planning:  Cardiology cleared him for discharge. He does not want AICD at this moment will have discussion regarding AICD on outpatient basis at heart failure clinic.     Shea Hawk MD  Internal Medicine Resident, PGY-1  [address]  5/1/7229,99:77 PM

## 2022-01-08 NOTE — DISCHARGE SUMMARY
[address]     Department of Internal Medicine - Staff Internal Medicine Teaching Service    INPATIENT DISCHARGE SUMMARY      Patient Identification:  Gonzalez Jacobo is a 59 y.o. male. :  1960  MRN: [id number]     Acct: [de-identified]   PCP: Nelly Sommers  Admit Date:  2021  Discharge date and time: 2022  4:28 PM   Attending Provider: No att. providers found                                     [address] Problem Lists:  Principal Problem:    Dialysis AV fistula malfunction ([address]  Active Problems:    Acute on chronic combined systolic (congestive) and diastolic (congestive) heart failure (HCC)    Type 2 diabetes mellitus with chronic kidney disease on chronic dialysis ([address]    Anemia    Essential hypertension    AVF (arteriovenous fistula) (HCC)    ESRD (end stage renal disease) ([address]    Coronary artery disease involving native coronary artery of native heart without angina pectoris    BATSHEVA (obstructive sleep apnea)    Hypoxemia-due to CHF/fluid overload    Atrial fibrillation (HCC)    CKD (chronic kidney disease) requiring chronic dialysis ([address]    Arteriovenous fistula occlusion ([address]  Resolved Problems:    * No resolved hospital problems. *      HOSPITAL STAY     Brief Inpatient course:   Gonzalez Jacobo is a 59 y.o. male who was admitted for the management of Dialysis AV fistula malfunction [facility]), presented to the emergency department with vascular access problem. Vascular surgery was taken on board for malfunctioning AV fistula. Recommend IR guided declot procedure which IR did successfully. Also Javon catheter was placed in the right IJ for temporary dialysis. Over the course of admission, cardiology was taken on board to evaluate him for possible AICD. Echo was done that showed EF 30% with garde 3 Diastolic dysfunction. Patient refused AICD and wanted to follow up as an OP cardiology clinic for AICD discussion.      Procedures/ Significant Interventions:    Echo  Left ventricle is moderately enlarged, global left ventricular systolic  function is moderately reduced, Visually estimated EF 30%. Global dysfunction is noted. Grade III (severe) left ventricular diastolic dysfunction. Left atrium is moderately dilated. Right atrial dilatation. Prominent Eustachian valve. Dialysis catheter  noted. Right ventricular dilatation with reduced systolic function. Aortic valve is sclerotic but opens well. Trivial aortic insufficiency. Mitral valve sclerosis without stenosis. Trivial mitral regurgitation. Moderate to severe tricuspid regurgitation. Estimated right ventricular  systolic pressure is 39 mmHg. Trivial pulmonic insufficiency.     Consults:     Consults:     Final Specialist Recommendations/Findings:   IP CONSULT TO VASCULAR SURGERY  IP CONSULT TO NEPHROLOGY  IP CONSULT TO INTERNAL MEDICINE  IP CONSULT TO CASE MANAGEMENT  IP CONSULT TO CARDIOLOGY      Any Hospital Acquired Infections: none    Discharge Functional Status:  stable    DISCHARGE PLAN     Disposition: home    Patient Instructions:   Discharge Medication List as of 1/5/2022  3:03 PM      CONTINUE these medications which have CHANGED    Details   carvedilol (COREG) 6.25 MG tablet Take 1 tablet by mouth 2 times daily (with meals), Disp-60 tablet, R-3Normal         CONTINUE these medications which have NOT CHANGED    Details   apixaban (ELIQUIS) 5 MG TABS tablet Take 1 tablet by mouth 2 times daily, Disp-60 tablet, R-5Normal      aspirin EC 81 MG EC tablet Take 1 tablet by mouth daily, Disp-90 tablet, R-1Normal      losartan (COZAAR) 50 MG tablet Take 1 tablet by mouth daily, Disp-30 tablet, R-3Normal      glimepiride (AMARYL) 1 MG tablet Take 1 tablet by mouth every morning (before breakfast) Do not take if blood sugars are less than 110, Disp-30 tablet, R-0Normal      furosemide (LASIX) 40 MG tablet Take 1 tablet by mouth 2 times daily, Disp-60 tablet, R-1Normal pantoprazole (PROTONIX) 40 MG tablet Take 1 tablet by mouth every morning (before breakfast), Disp-30 tablet, R-2Normal      benzonatate (TESSALON) 100 MG capsule take 1 capsule by mouth three times a day if needed for 10 daysHistorical Med      guaiFENesin-codeine (GUAIFENESIN AC) 100-10 MG/5ML liquid give 10 milliliters by mouth every 4 hours if neededHistorical Med      atorvastatin (LIPITOR) 40 MG tablet Take 1 tablet by mouth nightly, Disp-30 tablet, R-3Normal      traMADol (ULTRAM) 50 MG tablet take 1 tablet by mouth every 6 hours if neededHistorical Med      calcium carbonate (TUMS) 500 MG chewable tablet Take 1 tablet by mouth daily as needed Historical Med      Multiple Vitamins-Minerals (RENAPLEX-D) TABS Take 1 tablet by mouth every other day Pt takes occasionally, R-3Historical Med      lidocaine-prilocaine (EMLA) 2.5-2.5 % cream Historical Med             Activity: activity as tolerated    Diet: cardiac diet and renal diet    Follow-up:    Cornelia Hickey, [address]  [facility] [address]  [phone]    Schedule an appointment as soon as possible for a visit      Jose Charlton, APRN - [facility]  [id number]   [phone]    Go on 1/17/2022  at 1:45 pm Follow up [facility]      Patient Instructions: Take all your medicines as prescribed. Follow up with the Nephrologist as scheduled. Please follow up with the cardiologist and discuss the placement of AICD as you are at high risk of arrhythmias and cardiac arrest. Also follow up in the CHF clinic as scheduled. You also need to see vascular surgeon for re-evaluation of AV fistula at OP clinic. Follow up labs: BMP CBC   Follow up imaging: None    Note that over 30 minutes was spent in preparing discharge papers, discussing discharge with patient, medication review, etc.      Solange Vasquez MD,   Internal Medicine Resident, PGY-1  [facility];  [place], [place]  1/7/2022, 8:29 PM

## 2022-01-13 ENCOUNTER — TELEPHONE (OUTPATIENT)
Dept: VASCULAR SURGERY | Age: 62
End: 2022-01-13

## 2022-01-13 NOTE — TELEPHONE ENCOUNTER
Spoke to patient regarding this. He states that he is going to have to give us a call back in regards to this d/t lack of transportation.

## 2022-01-23 PROBLEM — R77.8 ELEVATED TROPONIN: Status: RESOLVED | Noted: 2018-02-16 | Resolved: 2022-01-23

## 2022-01-23 PROBLEM — R79.89 ELEVATED TROPONIN: Status: RESOLVED | Noted: 2018-02-16 | Resolved: 2022-01-23

## 2022-02-02 ENCOUNTER — HOSPITAL ENCOUNTER (OUTPATIENT)
Dept: PREADMISSION TESTING | Age: 62
Discharge: HOME | End: 2022-02-06
Payer: MEDICARE

## 2022-02-02 VITALS
TEMPERATURE: 97.8 F | OXYGEN SATURATION: 97 % | HEIGHT: 70 IN | WEIGHT: 269.9 LBS | DIASTOLIC BLOOD PRESSURE: 82 MMHG | RESPIRATION RATE: 22 BRPM | HEART RATE: 120 BPM | BODY MASS INDEX: 38.64 KG/M2 | SYSTOLIC BLOOD PRESSURE: 133 MMHG

## 2022-02-02 LAB
ABSOLUTE EOS #: 0.06 K/UL (ref 0–0.4)
ABSOLUTE IMMATURE GRANULOCYTE: 0 K/UL (ref 0–0.3)
ABSOLUTE LYMPH #: 0.35 K/UL (ref 1–4.8)
ABSOLUTE MONO #: 1.04 K/UL (ref 0.2–0.8)
ANION GAP SERPL CALCULATED.3IONS-SCNC: 18 MMOL/L (ref 9–17)
BASOPHILS # BLD: 0 %
BASOPHILS ABSOLUTE: 0 K/UL (ref 0–0.2)
BUN BLDV-MCNC: 51 MG/DL (ref 8–23)
BUN/CREAT BLD: 7 (ref 9–20)
CALCIUM SERPL-MCNC: 9 MG/DL (ref 8.6–10.4)
CHLORIDE BLD-SCNC: 92 MMOL/L (ref 98–107)
CO2: 26 MMOL/L (ref 20–31)
CREAT SERPL-MCNC: 7.43 MG/DL (ref 0.7–1.2)
DIFFERENTIAL TYPE: ABNORMAL
EOSINOPHILS RELATIVE PERCENT: 1 % (ref 1–4)
ESTIMATED AVERAGE GLUCOSE: 166 MG/DL
GFR AFRICAN AMERICAN: 9 ML/MIN
GFR NON-AFRICAN AMERICAN: 7 ML/MIN
GFR SERPL CREATININE-BSD FRML MDRD: ABNORMAL ML/MIN/{1.73_M2}
GFR SERPL CREATININE-BSD FRML MDRD: ABNORMAL ML/MIN/{1.73_M2}
GLUCOSE BLD-MCNC: 162 MG/DL (ref 70–99)
HBA1C MFR BLD: 7.4 % (ref 4–6)
HCT VFR BLD CALC: 34 % (ref 40.7–50.3)
HEMOGLOBIN: 10.2 G/DL (ref 13–17)
IMMATURE GRANULOCYTES: 0 %
LYMPHOCYTES # BLD: 6 % (ref 24–44)
MCH RBC QN AUTO: 30.5 PG (ref 25.2–33.5)
MCHC RBC AUTO-ENTMCNC: 30 G/DL (ref 28.4–34.8)
MCV RBC AUTO: 101.8 FL (ref 82.6–102.9)
MONOCYTES # BLD: 18 % (ref 1–7)
NRBC AUTOMATED: 0 PER 100 WBC
PDW BLD-RTO: 16.8 % (ref 11.8–14.4)
PLATELET # BLD: 156 K/UL (ref 138–453)
PLATELET ESTIMATE: ABNORMAL
PMV BLD AUTO: 9.6 FL (ref 8.1–13.5)
POTASSIUM SERPL-SCNC: 4.7 MMOL/L (ref 3.7–5.3)
RBC # BLD: 3.34 M/UL (ref 4.21–5.77)
RBC # BLD: ABNORMAL 10*6/UL
SEG NEUTROPHILS: 75 % (ref 36–66)
SEGMENTED NEUTROPHILS ABSOLUTE COUNT: 4.35 K/UL (ref 1.8–7.7)
SODIUM BLD-SCNC: 136 MMOL/L (ref 135–144)
WBC # BLD: 5.8 K/UL (ref 3.5–11.3)
WBC # BLD: ABNORMAL 10*3/UL

## 2022-02-02 PROCEDURE — 83036 HEMOGLOBIN GLYCOSYLATED A1C: CPT

## 2022-02-02 PROCEDURE — 85025 COMPLETE CBC W/AUTO DIFF WBC: CPT

## 2022-02-02 PROCEDURE — 36415 COLL VENOUS BLD VENIPUNCTURE: CPT

## 2022-02-02 PROCEDURE — 80048 BASIC METABOLIC PNL TOTAL CA: CPT

## 2022-02-02 ASSESSMENT — PAIN SCALES - GENERAL: PAINLEVEL_OUTOF10: 0

## 2022-02-02 NOTE — PROGRESS NOTES
PAT Progress Note    Pt Name: Gonzalez Jacobo  MRN: [id number]  YOB: 1960  Date of evaluation: 2/2/2022      [x] Called to [facility]. I spoke to the patient, Gonzalez Jacobo, a 59 y.o. male, who is scheduled for an upcoming cystoscopy bladder biopsy by Dr. Emir Giron for gross hematuria on 2/11/2022 at 1200. [x] I reviewed  the hard copy cardiology progress note by Mikey Vance CNP dated 1/17/2022 for an Interval History and Physical Note the day of surgery. Hard copy placed in chart. Functional Capacity per pt:  1) Pt is not able to walk 2 city blocks on level ground without SOB. 2) Pt is not able to climb 2 flights of stairs without SOB. 3) Pt is not able to walk up a hill for 1-2 city blocks without SOB. History of congestive heart failure, coronary artery disease, chronic kidney disease requiring hemodialysis [facility], diabetes, hypertension, hyperlipidemia, obstructive sleep apnea, cardiac stents. Patient uses 2 liters continuous nasal cannula at home. Patient follows with cardiologist Dr. Art Kolb. Patient was hospitalized 12/24/2021 for chest pain and palpitations. Patient was found to be in atrial fibrillation with RVR. Patient had Coreg increased and was started on Eliquis. Patient was then hospitalized 12/31/2021 for AV fistula malfunction. IR completed declot procedure. Cardiology was on to evaluate patient during hospitalization. Patient was recommended to have AICD placed which he refused during admission and wanted to discuss outpatient. Patient was discharged home on 1/5/2022. Patient was evaluated in the office by Mikey Vance CNP on 1/17/2022. Patient was agreeable for AICD if needed. Per note in short chart, patient was recommended to have MUGA scan and re-evaluate for AICD. Patient states he has not scheduled the scan yet. Patient follows with Jabier Perdomo and dialysis is managed by Dr. Adeola Pastrana.      Patient arrived to pre-admission testing with elevated heart rate of 120. Patient did not bring continuous oxygen to appointment due to \"too hard to pull around. \" Patient states his heart rate has been elevated \"for the last month. \" Patient's heart rate during dialysis treatment yesterday 2/1/2022 was 115. Patient is unsure of his medications that he is taking. Patient denies chest pain, palpitations, dizziness. Patient has shortness of breath with any exertion. Echo complete 2D with doppler with color 1/4/2022:  Left ventricle is moderately enlarged, global left ventricular systolic function is moderately reduced, Visually estimated EF 30%. Global dysfunction is noted. Grade III (severe) left ventricular diastolic dysfunction. Left atrium is moderately dilated. Right atrial dilatation. Prominent Eustachian valve. Dialysis catheter noted. Right ventricular dilatation with reduced systolic function. Aortic valve is sclerotic but opens well. Trivial aortic insufficiency. Mitral valve sclerosis without stenosis. Trivial mitral regurgitation. Moderate to severe tricuspid regurgitation. Estimated right ventricular systolic pressure is 39 mmHg. Trivial pulmonic insufficiency. Vital signs: /82   Pulse 120   Temp 97.8 °F (36.6 °C) (Skin)   Resp 22   Ht 5' 10\" (1.778 m)   Wt 269 lb 14.4 oz (122.4 kg)   SpO2 97%   BMI 38.73 kg/m²     Physical Exam:     General Appearance:  Alert, well appearing, and in no acute distress. Obese  Mental status:  Oriented to person, place, and time. Lungs:  Bilateral equal air entry, clear to auscultation, no wheezing, rales or rhonchi, and normal effort. Cardiovascular: Irregular rate, irregular rhythm. Heart rate 120. Grade II/VI systolic murmur. No gallop, or rub.     Investigations:      Laboratory Testing:  Recent Results (from the past 24 hour(s))   Basic Metabolic Panel    Collection Time: 02/02/22 12:42 PM   Result Value Ref Range    Glucose 162 (H) 70 - 99 mg/dL    BUN 51 (H) 8 - 23 mg/dL    CREATININE 7.43 (HH) 0.70 - 1.20 mg/dL    Bun/Cre Ratio 7 (L) 9 - 20    Calcium 9.0 8.6 - 10.4 mg/dL    Sodium 136 135 - 144 mmol/L    Potassium 4.7 3.7 - 5.3 mmol/L    Chloride 92 (L) 98 - 107 mmol/L    CO2 26 20 - 31 mmol/L    Anion Gap 18 (H) 9 - 17 mmol/L    GFR Non-African American 7 (L) >60 mL/min    GFR  9 (L) >60 mL/min    GFR Comment          GFR Staging NOT REPORTED    CBC Auto Differential    Collection Time: 02/02/22 12:42 PM   Result Value Ref Range    WBC 5.8 3.5 - 11.3 k/uL    RBC 3.34 (L) 4.21 - 5.77 m/uL    Hemoglobin 10.2 (L) 13.0 - 17.0 g/dL    Hematocrit 34.0 (L) 40.7 - 50.3 %    .8 82.6 - 102.9 fL    MCH 30.5 25.2 - 33.5 pg    MCHC 30.0 28.4 - 34.8 g/dL    RDW 16.8 (H) 11.8 - 14.4 %    Platelets 114 109 - 008 k/uL    MPV 9.6 8.1 - 13.5 fL    NRBC Automated 0.0 0.0 per 100 WBC    Differential Type NOT REPORTED     Seg Neutrophils PENDING %    Lymphocytes PENDING %    Monocytes PENDING %    Eosinophils % PENDING %    Basophils PENDING %    Immature Granulocytes PENDING 0 %    Segs Absolute PENDING k/uL    Absolute Lymph # PENDING k/uL    Absolute Mono # PENDING k/uL    Absolute Eos # PENDING k/uL    Basophils Absolute PENDING 0.0 - 0.2 k/uL    Absolute Immature Granulocyte PENDING 0.00 - 0.30 k/uL    WBC Morphology NOT REPORTED     RBC Morphology NOT REPORTED     Platelet Estimate NOT REPORTED        Recent Labs     02/02/22  1242   HGB 10.2*   HCT 34.0*   WBC 5.8   .8      K 4.7   CL 92*   CO2 26   BUN 51*   CREATININE 7.43*   GLUCOSE 162*       No results for input(s): COVID19 in the last 720 hours.     ROB Santos - CNP    Electronically signed 2/2/2022 at 12:48 PM

## 2022-02-02 NOTE — PRE-PROCEDURE INSTRUCTIONS
[facility] ON Friday, February 11, 2022, at 1000 am.    Once you enter the hospital lobby, after your temperature is taken, take the elevators to the second floor. Check-In is at the surgery registration desk    One person age 25 or older may remain in the waiting room while you are in surgery    Continue to take your home medications as you normally do up to and including the night before surgery with the exception of any blood thinning medications. Please stop any blood thinning medications as directed by your surgeon or prescribing physician. Failure to stop certain medications may interfere with your scheduled surgery. These may include:  Aspirin, Warfarin (Coumadin), Clopidogrel (Plavix), Ibuprofen (Motrin, Advil), Naproxen (Aleve), Meloxicam (Mobic), Celecoxib (Celebrex), Eliquis, Pradaxa, Xarelto, Effient, Fish Oil, Herbal supplements. STOP BABY ASA AS INSTRUCTED, CHECK WITH DOCTOR ABOUT WHEN TO STOP ELIQUIS. STOP MULTI VITAMIN ONE WEEK BEFORE SURGERY      If you are diabetic, do not take any of your diabetic medications by mouth the morning of surgery. If you are taking insulin contact the doctor that manages your diabetes for instructions about any changes to your insulin dosages the day before surgery. Do not inject insulin or other injectable diabetic medications the morning of surgery unless otherwise instructed by the doctor who manages your diabetes. Please take the following medication(s) the day of surgery with a small sip of water:  CARVEDILOL    CALL PRE ADMISSION TESTING WITH MEDICATION LIST [phone]      PREPARING FOR YOUR SURGERY:     Before surgery, you can play an important role in your own health. Because skin is not sterile, we need to be sure that your skin is as free of germs as possible before surgery by carefully washing before surgery. Preparing or prepping skin before surgery can reduce the risk of a surgical site infection.   Do not shave the area of your body where your surgery will be performed unless you received specific permission from your physician. Preoperative Bathing Instructions   Bathe or shower the day of surgery.  Wear clean clothing after bathing.  Shampoo hair before surgery   Keep nails clean    Do not apply alcohol-based hair or skin products,    Do not apply lotion, emollients, cosmetics, perfumes or deodorant the day of surgery.  Do not shave the area of your body where your surgery will be performed unless you receive specific permission from your surgeon. Patient Instructions:    Giacomo Radford If you are having any type of anesthesia you are to have nothing to eat or drink after midnight the night before your surgery. This includes gum, hard candy, mints, water or smoking or chewing tobacco.  The only exception to this is a small sip of water to take with any morning dose of heart, blood pressure, or seizure medications. No alcoholic beverages for 24 hours prior to surgery.  Brush your teeth but do not swallow water.  Bring your eye glasses and case with you. No contacts are to be worn the day of surgery. You also may bring your hearing aids. Most surgical procedures involving anesthesia will require that you remove your dentures prior to surgery.  If you are on C-PAP or Bi-PAP at home and plan on staying in the hospital overnight for your surgery please bring the machine with you.  Do not wear any jewelry or body piercings day of surgery. Also, NO lotion, perfume or deodorant to be used the day of surgery. No nail polish on the operative extremity (arm/leg surgeries)     If you are staying overnight with us, please bring a small bag of necessary personal items.  Please wear loose, comfortable clothing. If you are potentially going to have a cast or brace bring clothing that will fit over them.  In case of illness - If you have cold or flu like symptoms (high fever, runny nose, sore throat, cough, etc.) rash, nausea, vomiting, loose stools, and/or recent contact with someone who has a contagious disease (chicken pox, measles, etc.) Please call your doctor before coming to the hospital.     Day of Surgery/Procedure:    As a patient at [facility] you can expect quality medical and nursing care that is centered on your individual needs. Our goal is to make your surgical experience as comfortable as possible    . Transportation After Your Surgery/Procedure: You will need a friend or family member to drive you home after your procedure. Your  must be 25years of age or older. Discharge instructions will be reviewed with family/friend by phone. A taxi cab or any other form of public transportation is not acceptable. Someone must remain with you for the first 24 hours after your surgery if you receive anesthesia or medication. If you do not have someone to stay with you, your procedure may be cancelled.       If you have any other questions regarding your procedure or the day of surgery, please call [phone]      _________________________  ____________________________  Signature (Patient)              Signature (Provider)              Date

## 2022-02-10 ENCOUNTER — OFFICE VISIT (OUTPATIENT)
Dept: VASCULAR SURGERY | Age: 62
End: 2022-02-10
Payer: MEDICARE

## 2022-02-10 VITALS
OXYGEN SATURATION: 99 % | WEIGHT: 264 LBS | HEART RATE: 52 BPM | TEMPERATURE: 97.3 F | BODY MASS INDEX: 37.8 KG/M2 | DIASTOLIC BLOOD PRESSURE: 75 MMHG | HEIGHT: 70 IN | RESPIRATION RATE: 14 BRPM | SYSTOLIC BLOOD PRESSURE: 116 MMHG

## 2022-02-10 DIAGNOSIS — N18.6 ENCOUNTER REGARDING VASCULAR ACCESS FOR DIALYSIS FOR ESRD (HCC): Primary | ICD-10-CM

## 2022-02-10 DIAGNOSIS — Z99.2 ENCOUNTER REGARDING VASCULAR ACCESS FOR DIALYSIS FOR ESRD (HCC): Primary | ICD-10-CM

## 2022-02-10 PROCEDURE — G8427 DOCREV CUR MEDS BY ELIG CLIN: HCPCS | Performed by: SURGERY

## 2022-02-10 PROCEDURE — 3017F COLORECTAL CA SCREEN DOC REV: CPT | Performed by: SURGERY

## 2022-02-10 PROCEDURE — G8484 FLU IMMUNIZE NO ADMIN: HCPCS | Performed by: SURGERY

## 2022-02-10 PROCEDURE — G8417 CALC BMI ABV UP PARAM F/U: HCPCS | Performed by: SURGERY

## 2022-02-10 PROCEDURE — 99214 OFFICE O/P EST MOD 30 MIN: CPT | Performed by: SURGERY

## 2022-02-10 PROCEDURE — 1036F TOBACCO NON-USER: CPT | Performed by: SURGERY

## 2022-02-10 RX ORDER — LANTHANUM CARBONATE 1000 MG/1
TABLET, CHEWABLE ORAL
COMMUNITY
Start: 2021-11-30

## 2022-02-10 RX ORDER — SEVELAMER CARBONATE FOR ORAL SUSPENSION 2400 MG/1
1 POWDER, FOR SUSPENSION ORAL
COMMUNITY
Start: 2022-01-18

## 2022-02-10 ASSESSMENT — ENCOUNTER SYMPTOMS
ALLERGIC/IMMUNOLOGIC NEGATIVE: 1
ABDOMINAL PAIN: 0
SHORTNESS OF BREATH: 0
COLOR CHANGE: 0
CHEST TIGHTNESS: 0

## 2022-02-10 NOTE — PROGRESS NOTES
Division of Vascular Surgery        Follow Up      Left radial cephalic Av fistula creation (2/20/19)  Left AV fistula revision, branch ligation (3/6/19)  Fistulagram angioplasty (7/17/19)  LUE AV graft (10/23/19)    Chief Complaint:      Malfunctioning AV graft    History of Present Illness:      Mansi Lloyd is a 59 y.o. gentleman who presents for evaluation of his AV graft after undergoing declot with IR on 1/3/2022. Patient states that since that time he continues to have a stitch that is hanging and wants it taken out. Has been completing dialysis without many complications - states that he will bleed for some time that will take a while to stop. Otherwise, no other complaints noted. Denies symptoms suggestive of ischemic steal syndrome. Medical History:     Past Medical History:   Diagnosis Date    Acute congestive heart failure ([address] 12/21/2016    Acute on chronic diastolic congestive heart failure ([address] 11/24/2018    Anemia 11/25/2018    Anemia of chronic renal failure 10/17/2016    Atrial fibrillation ([address] 01/03/2022    AVF (arteriovenous fistula) (HCC)     Bowel obstruction ([address] 12/26/2013    CAD (coronary artery disease) 2013    ?     Chest pain at rest 12/21/2016    CHF (congestive heart failure) ([address] 2013    last episode 11/2018    CHF (congestive heart failure), NYHA class I, acute on chronic, combined ([address] 2/9/2021    Chronic kidney disease     CKD (chronic kidney disease) stage 4, GFR 15-29 ml/min ([address] 10/17/2016    Coronary artery disease involving native coronary artery of native heart without angina pectoris     Diabetes mellitus ([address] 2012    NIDDM    Dialysis patient ([address]     Rhonda Henkerry  /  Ronnie Menendez  /  Jered Burger    ESRD (end stage renal disease) ([address]     Essential hypertension 11/25/2018    Groin swelling 07/17/2019    Hemodialysis patient ([address] 11/28/2018    [facility] RIGHT DIALYSIS ACCESSTUES THUR[facility] AND AT [facility]    Hydrocele 07/17/2019    Hyperlipidemia 2013    ON RX    Hypertension 2013    ON RX    Inguinal hernia     BILATERAL- NEEDS REPAIRED, UMBILICAL HERNIA  ALSO    MI, old 12/26/2013    NSTEMI (non-ST elevated myocardial infarction) ([facility] 6/17/2019    On home O2     2 L    BATSHEVA (obstructive sleep apnea)     Patient in clinical research study 04/01/2019    XIENCE SHORT DAPT    Pneumonia     Poor historian     Prostatism 11/17/2017    Respiratory distress 11/25/2018    Rising PSA level 11/17/2017    S/P arteriovenous (AV) graft placement     S/P PTCA - TIM distal LAD - Dr. Luther Zeng 4/1/19 4/1/2019    Sleep apnea 2015    pt has machine and does not use it    Small bowel obstruction ([address] 5/1/2021    SOB (shortness of breath) 02/02/2022    noted walking from waiting room to pre op testing    Type 2 diabetes mellitus with chronic kidney disease on chronic dialysis ([address] 10/17/2016       Surgical History:     Past Surgical History:   Procedure Laterality Date    ABDOMEN SURGERY  2013    BOWEL OBSTRUCTION    AV FISTULA CREATION Left 02/20/2019    AV FISTULA REPAIR  07/17/2019    AV fistula revision, branch ligation / Percutaneous angioplasty of proximal anastomosis and outflow tract of dialysis circuit with drug coated balloon  /  Dr Ana Laura Pearson  02/09/2021    Dr. Shamika Smith  03/31/2019    TIM LAD    CYSTOSCOPY  11/17/2017    DIALYSIS FISTULA CREATION Left 2/20/2019    AV FISTULA CREATION ARM performed by Pedro Amaya MD at [address] [facility] 10/23/2019    LEFT UPPER EXTREMITY AV GRAFT CREATION WITH [id number] ARTEGRAFT, LEFT UPPER EXTREMITY AV FISTULA LIGATION performed by Pedro Amaya MD at [facility] Left 2005    [address] [facility]    IR THROMBECTOMY PERCUT AVF  1/3/2022    IR THROMBECTOMY PERCUT AVF 1/3/2022 [facility] DRUM REPAIRED    MOUTH SURGERY  2007    1 WISDOM TOOTH REMOVED    NOSE SURGERY  1968    CAUTERY TO STOP NOSE BEEDS    OTHER SURGICAL HISTORY Left 03/06/2019    fistulagram    CA GENITAL SURG PROC,MALE UNLISTED N/A 2/16/2018    US  PROSTATE BIOPSY WITH SATURATION performed by Sandy Cta MD at [address] N/A 11/17/2017    CYSTOSCOPY PROSTATE US BIOPSY performed by Sandy Cat MD at [address]  02/16/2018    TONSILLECTOMY  1968    UPPER GASTROINTESTINAL ENDOSCOPY N/A 9/27/2021    EGD BIOPSY performed by Vera Sifuentes MD at [address]  09/20/2019    LUE FISTULAGRAM  /  DR Olaf Benedict  /  Findings: Severe stenosis of proximal cephalic vein. / Will plan for LUE AVG placement.     VASECTOMY  1983       Family History:     Family History   Problem Relation Age of Onset    Heart Disease Mother         CHF    Early Death Mother     High Blood Pressure Brother     Diabetes Brother     Asthma Brother     High Blood Pressure Brother     Diabetes Brother     High Blood Pressure Brother     Diabetes Brother        Allergies:       Lisinopril    Medications:      Current Outpatient Medications   Medication Sig Dispense Refill    lanthanum (FOSRENOL) 1000 MG chewable tablet CHEW AND SWALLOW 1 TABLET THREE TIMES A DAY WITH MEALS      sevelamer (RENVELA) 2.4 g PACK packet Take 1 packet by mouth      carvedilol (COREG) 6.25 MG tablet Take 1 tablet by mouth 2 times daily (with meals) 60 tablet 3    apixaban (ELIQUIS) 5 MG TABS tablet Take 1 tablet by mouth 2 times daily 60 tablet 5    aspirin EC 81 MG EC tablet Take 1 tablet by mouth daily 90 tablet 1    losartan (COZAAR) 50 MG tablet Take 1 tablet by mouth daily 30 tablet 3    glimepiride (AMARYL) 1 MG tablet Take 1 tablet by mouth every morning (before breakfast) Do not take if blood sugars are less than 110 30 tablet 0    furosemide (LASIX) 40 MG tablet Take 1 tablet by mouth 2 times daily 60 tablet 1  pantoprazole (PROTONIX) 40 MG tablet Take 1 tablet by mouth every morning (before breakfast) (Patient taking differently: Take 40 mg by mouth every morning (before breakfast) States not taking) 30 tablet 2    benzonatate (TESSALON) 100 MG capsule take 1 capsule by mouth three times a day if needed for 10 days      guaiFENesin-codeine (GUAIFENESIN AC) 100-10 MG/5ML liquid give 10 milliliters by mouth every 4 hours if needed      atorvastatin (LIPITOR) 40 MG tablet Take 1 tablet by mouth nightly 30 tablet 3    traMADol (ULTRAM) 50 MG tablet take 1 tablet by mouth every 6 hours if needed      calcium carbonate (TUMS) 500 MG chewable tablet Take 1 tablet by mouth daily as needed       Multiple Vitamins-Minerals (RENAPLEX-D) TABS Take 1 tablet by mouth every other day Pt takes occasionally  3    lidocaine-prilocaine (EMLA) 2.5-2.5 % cream        No current facility-administered medications for this visit. Social History:     Tobacco:    reports that he has never smoked. He has never used smokeless tobacco.  Alcohol:      reports no history of alcohol use. Drug Use:  reports no history of drug use. Review of Systems:     Review of Systems   Constitutional: Negative for chills and fever. HENT: Negative for congestion. Eyes: Negative for visual disturbance. Respiratory: Negative for chest tightness and shortness of breath. Cardiovascular: Negative for chest pain and leg swelling. Gastrointestinal: Negative for abdominal pain. Endocrine: Negative. Genitourinary: Negative. Musculoskeletal: Negative. Skin: Negative for color change and wound. Allergic/Immunologic: Negative. Neurological: Negative for facial asymmetry, speech difficulty, weakness and numbness. Hematological: Negative. Psychiatric/Behavioral: Negative.       Physical Exam:     Vitals:  /75 (Site: Left Upper Arm, Position: Sitting, Cuff Size: Large Adult)   Pulse 52   Temp 97.3 °F (36.3 °C) (Temporal) Resp 14   Ht 5' 10\" (1.778 m) Comment: per patient  Wt 264 lb (119.7 kg)   SpO2 99%   BMI 37.88 kg/m²     Physical Exam  Constitutional:       Appearance: He is well-developed and well-groomed. HENT:      Mouth/Throat:      Pharynx: Oropharynx is clear. Eyes:      Extraocular Movements: Extraocular movements intact. Conjunctiva/sclera: Conjunctivae normal.   Cardiovascular:      Rate and Rhythm: Normal rate and regular rhythm. Pulses:           Radial pulses are 2+ on the left side. Arteriovenous access: left arteriovenous access is present. Comments: Good thrill over AV graft  Pulmonary:      Effort: Pulmonary effort is normal. No respiratory distress. Abdominal:      General: There is no distension. Palpations: Abdomen is soft. Tenderness: There is no abdominal tenderness. Musculoskeletal:         General: Normal range of motion. Left upper arm: No swelling or tenderness. Left hand: No tenderness. Normal strength. Normal sensation. Normal capillary refill. Cervical back: Full passive range of motion without pain and neck supple. Skin:     General: Skin is warm. Capillary Refill: Capillary refill takes less than 2 seconds. Comments: Vicryl suture over left medial arm from declot procedure; palpable thrill over AV graft   Neurological:      Mental Status: He is alert and oriented to person, place, and time. GCS: GCS eye subscore is 4. GCS verbal subscore is 5. GCS motor subscore is 6. Sensory: Sensation is intact. Motor: Motor function is intact.    Psychiatric:         Mood and Affect: Mood normal.         Speech: Speech normal.         Behavior: Behavior normal.       Imaging/Labs:     None to review    Assessment and Plan:     ESRD on hemodialysis via LUE AV graft  · Suture removed from LUE access site  · Continue to use stress ball for hand  exercises  · Follow up as needed    Electronically signed by Germán Devine MD on 2/13/2022 at 3:28 PM      [facility]: [phone]  C: [phone]  Email: [email or web address]

## 2022-02-16 ENCOUNTER — HOSPITAL ENCOUNTER (OUTPATIENT)
Dept: ULTRASOUND IMAGING | Age: 62
Discharge: HOME | End: 2022-02-18
Payer: MEDICARE

## 2022-02-16 DIAGNOSIS — N50.89 SCROTAL MASS: ICD-10-CM

## 2022-02-16 PROCEDURE — 93976 VASCULAR STUDY: CPT

## 2022-03-07 ENCOUNTER — TELEPHONE (OUTPATIENT)
Dept: VASCULAR SURGERY | Age: 62
End: 2022-03-07

## 2022-03-07 DIAGNOSIS — I73.9 PAD (PERIPHERAL ARTERY DISEASE) (HCC): Primary | ICD-10-CM

## 2022-03-07 NOTE — TELEPHONE ENCOUNTER
Patient called to schedule appt due to \"large sores on his legs\", patient has been schedule on 3/21 at the [facility] office.

## 2022-03-16 ENCOUNTER — HOSPITAL ENCOUNTER (EMERGENCY)
Age: 62
Discharge: HOME | End: 2022-03-16
Attending: EMERGENCY MEDICINE
Payer: MEDICARE

## 2022-03-16 ENCOUNTER — TELEPHONE (OUTPATIENT)
Dept: GASTROENTEROLOGY | Age: 62
End: 2022-03-16

## 2022-03-16 ENCOUNTER — HOSPITAL ENCOUNTER (OUTPATIENT)
Dept: VASCULAR LAB | Age: 62
Discharge: HOME | End: 2022-03-16
Payer: MEDICARE

## 2022-03-16 ENCOUNTER — APPOINTMENT (OUTPATIENT)
Dept: CT IMAGING | Age: 62
End: 2022-03-16
Payer: MEDICARE

## 2022-03-16 VITALS
WEIGHT: 270 LBS | BODY MASS INDEX: 38.74 KG/M2 | HEART RATE: 116 BPM | DIASTOLIC BLOOD PRESSURE: 94 MMHG | TEMPERATURE: 99 F | OXYGEN SATURATION: 100 % | RESPIRATION RATE: 20 BRPM | SYSTOLIC BLOOD PRESSURE: 135 MMHG

## 2022-03-16 DIAGNOSIS — R10.32 LEFT INGUINAL PAIN: Primary | ICD-10-CM

## 2022-03-16 DIAGNOSIS — I73.9 PAD (PERIPHERAL ARTERY DISEASE) (HCC): ICD-10-CM

## 2022-03-16 PROCEDURE — 99283 EMERGENCY DEPT VISIT LOW MDM: CPT

## 2022-03-16 PROCEDURE — 6370000000 HC RX 637 (ALT 250 FOR IP): Performed by: EMERGENCY MEDICINE

## 2022-03-16 PROCEDURE — 74176 CT ABD & PELVIS W/O CONTRAST: CPT

## 2022-03-16 PROCEDURE — 93923 UPR/LXTR ART STDY 3+ LVLS: CPT

## 2022-03-16 RX ORDER — ACETAMINOPHEN AND CODEINE PHOSPHATE 300; 30 MG/1; MG/1
1 TABLET ORAL EVERY 6 HOURS PRN
Qty: 20 TABLET | Refills: 0 | Status: SHIPPED | OUTPATIENT
Start: 2022-03-16 | End: 2022-03-21

## 2022-03-16 RX ORDER — CEPHALEXIN 500 MG/1
500 CAPSULE ORAL ONCE
Status: COMPLETED | OUTPATIENT
Start: 2022-03-16 | End: 2022-03-16

## 2022-03-16 RX ORDER — CEPHALEXIN 500 MG/1
500 CAPSULE ORAL 3 TIMES DAILY
Qty: 21 CAPSULE | Refills: 0 | Status: SHIPPED | OUTPATIENT
Start: 2022-03-16 | End: 2022-03-23

## 2022-03-16 RX ORDER — ACETAMINOPHEN AND CODEINE PHOSPHATE 300; 30 MG/1; MG/1
1 TABLET ORAL ONCE
Status: COMPLETED | OUTPATIENT
Start: 2022-03-16 | End: 2022-03-16

## 2022-03-16 RX ADMIN — ACETAMINOPHEN AND CODEINE PHOSPHATE 1 TABLET: 300; 30 TABLET ORAL at 20:30

## 2022-03-16 RX ADMIN — CEPHALEXIN 500 MG: 500 CAPSULE ORAL at 21:57

## 2022-03-16 ASSESSMENT — PAIN SCALES - GENERAL
PAINLEVEL_OUTOF10: 9
PAINLEVEL_OUTOF10: 8

## 2022-03-16 ASSESSMENT — ENCOUNTER SYMPTOMS
COUGH: 0
EYE REDNESS: 0
SHORTNESS OF BREATH: 0
COLOR CHANGE: 0
CONSTIPATION: 0
EYE DISCHARGE: 0
FACIAL SWELLING: 0
ABDOMINAL PAIN: 0
VOMITING: 0
DIARRHEA: 0

## 2022-03-16 ASSESSMENT — PAIN - FUNCTIONAL ASSESSMENT: PAIN_FUNCTIONAL_ASSESSMENT: 0-10

## 2022-03-16 ASSESSMENT — PAIN DESCRIPTION - FREQUENCY: FREQUENCY: CONTINUOUS

## 2022-03-16 ASSESSMENT — PAIN DESCRIPTION - LOCATION: LOCATION: GROIN

## 2022-03-16 ASSESSMENT — PAIN DESCRIPTION - ORIENTATION: ORIENTATION: MID

## 2022-03-17 NOTE — ED PROVIDER NOTES
[address] [place] [address] ED  EMERGENCY DEPARTMENT ENCOUNTER      Pt Name: Tati Carrasco  MRN: [id number]  [place] 1960  Date of evaluation: 3/16/2022  Provider: Indiana Shelton MD    [address] [place] [address]       Chief Complaint   Patient presents with    Groin Pain     denies hx of same         HISTORY OF PRESENT ILLNESS  (Location/Symptom, Timing/Onset, Context/Setting, Quality, Duration, Modifying Factors, Severity.)   Tati Carrasco is a 59 y.o. male who presents to the emergency department for groin pain. He is complaining of pain in the left groin area and he states that he has had this for months and it got worse in the past few days. He had a urologic procedure scheduled but it was canceled because could not get cardiology clearance but now he has gotten cardiology clearance. He does not know what the name of the procedure is or why he was having it or what diagnosis he was given. In speaking with his urologist it turns out that procedure is a cystoscopy. Nursing Notes were reviewed.     ALLERGIES     Lisinopril    CURRENT MEDICATIONS       Previous Medications    APIXABAN (ELIQUIS) 5 MG TABS TABLET    Take 1 tablet by mouth 2 times daily    ASPIRIN EC 81 MG EC TABLET    Take 1 tablet by mouth daily    ATORVASTATIN (LIPITOR) 40 MG TABLET    Take 1 tablet by mouth nightly    BENZONATATE (TESSALON) 100 MG CAPSULE    take 1 capsule by mouth three times a day if needed for 10 days    CALCIUM CARBONATE (TUMS) 500 MG CHEWABLE TABLET    Take 1 tablet by mouth daily as needed     CARVEDILOL (COREG) 6.25 MG TABLET    Take 1 tablet by mouth 2 times daily (with meals)    FUROSEMIDE (LASIX) 40 MG TABLET    Take 1 tablet by mouth 2 times daily    GLIMEPIRIDE (AMARYL) 1 MG TABLET    Take 1 tablet by mouth every morning (before breakfast) Do not take if blood sugars are less than 110    GUAIFENESIN-CODEINE (GUAIFENESIN AC) 100-10 MG/5ML LIQUID    give 10 milliliters by mouth every 4 hours if needed    LANTHANUM (FOSRENOL) 1000 MG CHEWABLE TABLET    CHEW AND SWALLOW 1 TABLET THREE TIMES A DAY WITH MEALS    LIDOCAINE-PRILOCAINE (EMLA) 2.5-2.5 % CREAM        LOSARTAN (COZAAR) 50 MG TABLET    Take 1 tablet by mouth daily    MULTIPLE VITAMINS-MINERALS (RENAPLEX-D) TABS    Take 1 tablet by mouth every other day Pt takes occasionally    PANTOPRAZOLE (PROTONIX) 40 MG TABLET    Take 1 tablet by mouth every morning (before breakfast)    SEVELAMER (RENVELA) 2.4 G PACK PACKET    Take 1 packet by mouth    TRAMADOL (ULTRAM) 50 MG TABLET    take 1 tablet by mouth every 6 hours if needed       PAST MEDICAL HISTORY         Diagnosis Date    Acute congestive heart failure (HCC) 12/21/2016    Acute on chronic diastolic congestive heart failure ([address] 11/24/2018    Anemia 11/25/2018    Anemia of chronic renal failure 10/17/2016    Atrial fibrillation ([address] 01/03/2022    [facility]iovenous fistula[facility]     Bowel obstruction ([address] 12/26/2013    CAD (coronary artery disease) 2013    ?     Chest pain at rest 12/21/2016    CHF (congestive heart failure) ([address] 2013    last episode 11/2018    CHF (congestive heart failure), NYHA class I, acute on chronic, combined ([address] 2/9/2021    Chronic kidney disease     CKD (chronic kidney disease) stage 4, GFR 15-29 ml/min ([address] 10/17/2016    Coronary artery disease involving native coronary artery of native heart without angina pectoris     Diabetes mellitus ([address] 2012    NIDDM    Dialysis patient ([address]     Ai Wilburn  /  Nia Portillo  /  Andria Rojas    ESRD (end stage renal disease) ([address]     Essential hypertension 11/25/2018    Groin swelling 07/17/2019    Hemodialysis patient ([address] 11/28/2018    TUNNELD CATHETER RIGHT DIALYSIS ACCESSTUES THURS AND AT [facility]    Hydrocele 07/17/2019    Hyperlipidemia 2013    ON RX    Hypertension 2013    ON RX    Inguinal hernia     BILATERAL- NEEDS REPAIRED, UMBILICAL HERNIA  ALSO    MI, old 12/26/2013    NSTEMI (non-ST elevated myocardial infarction) ([address] 6/17/2019    On home O2     2 L    BATSHEVA (obstructive sleep apnea)     Patient in clinical research study 04/01/2019    XIENCE SHORT DAPT    Pneumonia     Poor historian     Prostatism 11/17/2017    Respiratory distress 11/25/2018    Rising PSA level 11/17/2017    S/P arteriovenous (AV) graft placement     S/P PTCA - TIM distal LAD - Dr. Ivelisse Knight 4/1/19 4/1/2019    Sleep apnea 2015    pt has machine and does not use it    Small bowel obstruction ([address] 5/1/2021    SOB (shortness of breath) 02/02/2022    noted walking from waiting room to pre op testing    Type 2 diabetes mellitus with chronic kidney disease on chronic dialysis ([address] 10/17/2016       SURGICAL HISTORY           Procedure Laterality Date    ABDOMEN SURGERY  2013    BOWEL OBSTRUCTION    AV FISTULA CREATION Left 02/20/2019    AV FISTULA REPAIR  07/17/2019    AV fistula revision, branch ligation / Percutaneous angioplasty of proximal anastomosis and outflow tract of dialysis circuit with drug coated balloon  /  Dr Radha Worrell  02/09/2021    Peter Charles Crisp  03/31/2019    TIM LAD    CYSTOSCOPY  11/17/2017    DIALYSIS FISTULA CREATION Left 2/20/2019    AV FISTULA CREATION ARM performed by Sadiq Rodriguez MD at [address] [place] [address] 10/23/2019    LEFT UPPER EXTREMITY AV GRAFT CREATION WITH [id number] ARTEGRAFT, LEFT UPPER EXTREMITY AV FISTULA LIGATION performed by Sadiq Rodriguez MD at [facility] Left 2005    Clemente Keshia [address] IR THROMBECTOMY PERCUT AVF  1/3/2022    IR THROMBECTOMY PERCUT AVF 1/3/2022 [facility]    MIDDLE EAR SURGERY  1966    EAR DRUM REPAIRED   [address]  2007    1 WISDOM TOOTH REMOVED    NOSE SURGERY  1968    CAUTERY TO STOP NOSE BEEDS    OTHER SURGICAL HISTORY Left 03/06/2019    fistulagram    LA GENITAL SURG PROC,MALE UNLISTED N/A 2/16/2018     PROSTATE BIOPSY WITH SATURATION performed by Roxanne Pineda MD at [facility] 2017    CYSTOSCOPY PROSTATE US BIOPSY performed by Roxanne Pineda MD at [address] [place] [address]  2018    TONSILLECTOMY  1968    UPPER GASTROINTESTINAL ENDOSCOPY N/A 2021    EGD BIOPSY performed by Simone Huggins MD at [address]  2019    LUE FISTULAGRAM  /  DR Lashay Cook  /  Findings: Severe stenosis of proximal cephalic vein. / Will plan for LUE AVG placement.  VASECTOMY           FAMILY HISTORY           Problem Relation Age of Onset    Heart Disease Mother         CHF    Early Death Mother     High Blood Pressure Brother     Diabetes Brother     Asthma Brother     High Blood Pressure Brother     Diabetes Brother     High Blood Pressure Brother     Diabetes Brother      Family Status   Relation Name Status    Mother   at age 39    Father  Alive    Brother Tatiana 78 Alive    Brother [address] Alive    Brother [address] [facility] Alive    MGM      MGF      [facility]      PGF          SOCIAL HISTORY      reports that he has never smoked. He has never used smokeless tobacco. He reports that he does not drink alcohol and does not use drugs. REVIEW OF SYSTEMS    (2-9 systems for level 4, 10 or more for level 5)     Review of Systems   Constitutional: Negative for chills, fatigue and fever. HENT: Negative for congestion, ear discharge and facial swelling. Eyes: Negative for discharge and redness. Respiratory: Negative for cough and shortness of breath. Cardiovascular: Negative for chest pain. Gastrointestinal: Negative for abdominal pain, constipation, diarrhea and vomiting. Genitourinary: Negative for dysuria and flank pain. Musculoskeletal: Negative for arthralgias. Skin: Negative for color change and rash. Neurological: Negative for syncope, numbness and headaches.    Hematological: Negative for adenopathy. Psychiatric/Behavioral: Negative for confusion. The patient is not nervous/anxious. Except as noted above the remainder of the review of systems was reviewed and negative. PHYSICAL EXAM    (up to 7 for level 4, 8 or more for level 5)     Vitals:    03/16/22 1925   BP: (!) 135/94   Pulse: 116   Resp: 20   Temp: 99 °F (37.2 °C)   TempSrc: Oral   SpO2: 100%   Weight: 270 lb (122.5 kg)       Physical Exam  Vitals reviewed. Constitutional:       General: He is not in acute distress. Appearance: He is well-developed. He is not diaphoretic. HENT:      Head: Normocephalic and atraumatic. Eyes:      General: No scleral icterus. Right eye: No discharge. Left eye: No discharge. Cardiovascular:      Rate and Rhythm: Normal rate and regular rhythm. Pulmonary:      Effort: Pulmonary effort is normal. No respiratory distress. Breath sounds: Normal breath sounds. No stridor. No wheezing or rales. Abdominal:      General: There is no distension. Palpations: Abdomen is soft. Tenderness: There is no abdominal tenderness. Genitourinary:     Comments: He has no erythema or skin breakdown in the perineum or groin area. There is no masses. There is no scrotal mass. Musculoskeletal:         General: Normal range of motion. Cervical back: Neck supple. Lymphadenopathy:      Cervical: No cervical adenopathy. Skin:     General: Skin is warm and dry. Findings: No erythema or rash. Neurological:      Mental Status: He is alert and oriented to person, place, and time.    Psychiatric:         Behavior: Behavior normal.             DIAGNOSTIC RESULTS     EKG: All EKG's are interpreted by the Emergency Department Physician who either signs or Co-signs this chart in the absence of a cardiologist.    RADIOLOGY:   Non-plain film images such as CT, Ultrasound and MRI are read by the radiologist. Plain radiographic images are visualized and preliminarily interpreted by the emergency physician with the below findings:    Interpretation per the Radiologist below, if available at the time of this note:    CT ABDOMEN PELVIS WO CONTRAST Additional Contrast? None    Result Date: 3/16/2022  EXAMINATION: CT OF THE ABDOMEN AND PELVIS WITHOUT CONTRAST 3/16/2022 8:33 pm TECHNIQUE: CT of the abdomen and pelvis was performed without the administration of intravenous contrast. Multiplanar reformatted images are provided for review. Dose modulation, iterative reconstruction, and/or weight based adjustment of the mA/kV was utilized to reduce the radiation dose to as low as reasonably achievable. COMPARISON: 10/26/2021 HISTORY: ORDERING SYSTEM PROVIDED HISTORY: pain TECHNOLOGIST PROVIDED HISTORY: pain Decision Support Exception - unselect if not a suspected or confirmed emergency medical condition->Emergency Medical Condition (MA) Reason for Exam: groin pain, both side, has gotten worse FINDINGS: Lower Chest: Lung bases are clear. The heart is enlarged and unchanged. Organs: The gallbladder is contracted. No calcified gallstones. The liver, spleen and pancreas are grossly with the limitations of noncontrast study. There is bilateral adrenal gland hypertrophy unchanged from the prior study. Bilateral renal atrophy. No evidence of hydronephrosis. GI/Bowel: Status post partial sigmoid resection. No evidence of bowel obstruction. The appendix is normal. Pelvis: Urinary bladder was empty and therefore not visualized. There are moderate bilateral inguinal hernias containing fat and ascites. No evidence of incarceration. Peritoneum/Retroperitoneum: There is a small to moderate volume of ascites. There is diffuse infiltration of mesenteric fat. There is no free air. No adenopathy. Bones/Soft Tissues: Diffuse abdominal wall edema of anasarca. There are small fat and fluid containing ventral hernias.   Postsurgical change with radiopaque mesh in the left lower quadrant. No acute bone finding. Anasarca likely related to volume overload in this patient with renal failure. Small to moderate volume of ascites with diffuse infiltration of mesenteric fat, increased from 10/26/2021. Bilateral renal atrophy. Moderate-sized bilateral inguinal hernias containing fat and ascites. RECOMMENDATIONS: Unavailable       LABS:  Labs Reviewed - No data to display    All other labs were within normal range or not returned as of this dictation. EMERGENCY DEPARTMENT COURSE and DIFFERENTIAL DIAGNOSIS/MDM:   Vitals:    Vitals:    03/16/22 1925   BP: (!) 135/94   Pulse: 116   Resp: 20   Temp: 99 °F (37.2 °C)   TempSrc: Oral   SpO2: 100%   Weight: 270 lb (122.5 kg)       Orders Placed This Encounter   Medications    acetaminophen-codeine (TYLENOL #3) 300-30 MG per tablet 1 tablet     Order Specific Question:   Please select a reason the therapeutic interchange was not accepted: Answer:   Jocelyn Hanson for Pharmacy to Substitute    acetaminophen-codeine (TYLENOL/CODEINE #3) 300-30 MG per tablet     Sig: Take 1 tablet by mouth every 6 hours as needed for Pain for up to 5 days. Dispense:  20 tablet     Refill:  0    cephALEXin (KEFLEX) 500 MG capsule     Sig: Take 1 capsule by mouth 3 times daily for 7 days     Dispense:  21 capsule     Refill:  0    cephALEXin (KEFLEX) capsule 500 mg     Order Specific Question:   Antimicrobial Indications     Answer:   Urinary Tract Infection       Medical Decision Making: Case discussed with his urologist.  CT scan shows no acute findings, chronic anasarca present. He will be able to see his urologist tomorrow and he was placed on Keflex in the event that he is getting UTI. He produces minimal urine and is unable to give us a urine specimen. Treatment diagnosis and follow-up were discussed thoroughly. CONSULTS:  None    PROCEDURES:  None    FINAL IMPRESSION      1.  Left inguinal pain          DISPOSITION/PLAN   DISPOSITION Decision To Discharge 03/16/2022 09:46:57 PM      PATIENT REFERRED TO:   [address] [facility],[address] [facility]  [address]  [facility] [phone]      As needed    [facility] ED  [address] [facility] [address]  [phone]    If symptoms worsen    Miki Kelly MD  [address] [address]  [address]  [phone]      Call in the morning to be seen in the afternoon. DISCHARGE MEDICATIONS:     New Prescriptions    ACETAMINOPHEN-CODEINE (TYLENOL/CODEINE #3) 300-30 MG PER TABLET    Take 1 tablet by mouth every 6 hours as needed for Pain for up to 5 days. CEPHALEXIN (KEFLEX) 500 MG CAPSULE    Take 1 capsule by mouth 3 times daily for 7 days       The care is provided during an unprecedented national emergency due to the novel coronavirus, COVID-19.     (Please note that portions of this note were completed with a voice recognition program.  Efforts were made to edit the dictations but occasionally words are mis-transcribed.)    Abebe Peterson MD  Attending Emergency Physician           Abebe Peterson MD  03/16/22 3590

## 2022-03-17 NOTE — ED NOTES
Pt presents to ED via private auto with c/o groin pain. Pt states pain is close to rectum. Pt states hx of constipation. Abdomen distended. Pt able to ambulate without assist. Even, non-labored breathing. Pt is a dialysis pt.        Gideon Grove RN  03/16/22 0785

## 2022-03-18 ENCOUNTER — TELEPHONE (OUTPATIENT)
Dept: VASCULAR SURGERY | Age: 62
End: 2022-03-18

## 2022-03-18 NOTE — TELEPHONE ENCOUNTER
LM on VM stating appointment has been cancelled and asked to call office and get appointment rescheduled.

## 2022-04-01 ENCOUNTER — ANESTHESIA (OUTPATIENT)
Dept: OPERATING ROOM | Age: 62
End: 2022-04-01
Payer: MEDICARE

## 2022-04-01 ENCOUNTER — HOSPITAL ENCOUNTER (OUTPATIENT)
Age: 62
Setting detail: OUTPATIENT SURGERY
Discharge: HOME | End: 2022-04-01
Attending: UROLOGY | Admitting: UROLOGY
Payer: MEDICARE

## 2022-04-01 ENCOUNTER — ANESTHESIA EVENT (OUTPATIENT)
Dept: OPERATING ROOM | Age: 62
End: 2022-04-01
Payer: MEDICARE

## 2022-04-01 VITALS — OXYGEN SATURATION: 100 % | TEMPERATURE: 96.8 F | DIASTOLIC BLOOD PRESSURE: 77 MMHG | SYSTOLIC BLOOD PRESSURE: 135 MMHG

## 2022-04-01 VITALS
TEMPERATURE: 96.8 F | HEIGHT: 70 IN | RESPIRATION RATE: 30 BRPM | SYSTOLIC BLOOD PRESSURE: 118 MMHG | DIASTOLIC BLOOD PRESSURE: 85 MMHG | BODY MASS INDEX: 38.74 KG/M2 | OXYGEN SATURATION: 97 % | HEART RATE: 71 BPM

## 2022-04-01 LAB
-: NORMAL
BILIRUBIN URINE: NEGATIVE
COLOR: YELLOW
EPITHELIAL CELLS UA: NORMAL /HPF (ref 0–5)
GLUCOSE BLD-MCNC: 104 MG/DL (ref 75–110)
GLUCOSE URINE: NEGATIVE
KETONES, URINE: NEGATIVE
LEUKOCYTE ESTERASE, URINE: NEGATIVE
NITRITE, URINE: NEGATIVE
PH UA: 6 (ref 5–8)
POTASSIUM SERPL-SCNC: 4.9 MMOL/L (ref 3.7–5.3)
PROTEIN UA: NEGATIVE
RBC UA: NORMAL /HPF (ref 0–2)
SPECIFIC GRAVITY UA: 1.01 (ref 1–1.03)
TURBIDITY: CLEAR
URINE HGB: ABNORMAL
UROBILINOGEN, URINE: NORMAL
WBC UA: NORMAL /HPF (ref 0–5)

## 2022-04-01 PROCEDURE — 7100000011 HC PHASE II RECOVERY - ADDTL 15 MIN: Performed by: UROLOGY

## 2022-04-01 PROCEDURE — 6360000002 HC RX W HCPCS: Performed by: SPECIALIST

## 2022-04-01 PROCEDURE — 81001 URINALYSIS AUTO W/SCOPE: CPT

## 2022-04-01 PROCEDURE — 3700000001 HC ADD 15 MINUTES (ANESTHESIA): Performed by: UROLOGY

## 2022-04-01 PROCEDURE — 6370000000 HC RX 637 (ALT 250 FOR IP): Performed by: UROLOGY

## 2022-04-01 PROCEDURE — 84132 ASSAY OF SERUM POTASSIUM: CPT

## 2022-04-01 PROCEDURE — 2580000003 HC RX 258: Performed by: SPECIALIST

## 2022-04-01 PROCEDURE — 3700000000 HC ANESTHESIA ATTENDED CARE: Performed by: UROLOGY

## 2022-04-01 PROCEDURE — 3600000002 HC SURGERY LEVEL 2 BASE: Performed by: UROLOGY

## 2022-04-01 PROCEDURE — 3600000012 HC SURGERY LEVEL 2 ADDTL 15MIN: Performed by: UROLOGY

## 2022-04-01 PROCEDURE — 2709999900 HC NON-CHARGEABLE SUPPLY: Performed by: UROLOGY

## 2022-04-01 PROCEDURE — 2580000003 HC RX 258: Performed by: ANESTHESIOLOGY

## 2022-04-01 PROCEDURE — 7100000010 HC PHASE II RECOVERY - FIRST 15 MIN: Performed by: UROLOGY

## 2022-04-01 PROCEDURE — 2500000003 HC RX 250 WO HCPCS: Performed by: SPECIALIST

## 2022-04-01 PROCEDURE — 82947 ASSAY GLUCOSE BLOOD QUANT: CPT

## 2022-04-01 RX ORDER — SODIUM CHLORIDE 0.9 % (FLUSH) 0.9 %
5-40 SYRINGE (ML) INJECTION PRN
Status: DISCONTINUED | OUTPATIENT
Start: 2022-04-01 | End: 2022-04-01 | Stop reason: HOSPADM

## 2022-04-01 RX ORDER — PROPOFOL 10 MG/ML
INJECTION, EMULSION INTRAVENOUS PRN
Status: DISCONTINUED | OUTPATIENT
Start: 2022-04-01 | End: 2022-04-01 | Stop reason: SDUPTHER

## 2022-04-01 RX ORDER — FENTANYL CITRATE 50 UG/ML
25 INJECTION, SOLUTION INTRAMUSCULAR; INTRAVENOUS EVERY 5 MIN PRN
Status: DISCONTINUED | OUTPATIENT
Start: 2022-04-01 | End: 2022-04-01 | Stop reason: HOSPADM

## 2022-04-01 RX ORDER — SODIUM CHLORIDE, SODIUM LACTATE, POTASSIUM CHLORIDE, CALCIUM CHLORIDE 600; 310; 30; 20 MG/100ML; MG/100ML; MG/100ML; MG/100ML
INJECTION, SOLUTION INTRAVENOUS CONTINUOUS
Status: DISCONTINUED | OUTPATIENT
Start: 2022-04-02 | End: 2022-04-01

## 2022-04-01 RX ORDER — LIDOCAINE HYDROCHLORIDE 20 MG/ML
JELLY TOPICAL PRN
Status: DISCONTINUED | OUTPATIENT
Start: 2022-04-01 | End: 2022-04-01 | Stop reason: ALTCHOICE

## 2022-04-01 RX ORDER — SODIUM CHLORIDE 9 MG/ML
INJECTION, SOLUTION INTRAVENOUS PRN
Status: DISCONTINUED | OUTPATIENT
Start: 2022-04-01 | End: 2022-04-01 | Stop reason: HOSPADM

## 2022-04-01 RX ORDER — SODIUM CHLORIDE 9 MG/ML
INJECTION, SOLUTION INTRAVENOUS CONTINUOUS PRN
Status: DISCONTINUED | OUTPATIENT
Start: 2022-04-01 | End: 2022-04-01 | Stop reason: SDUPTHER

## 2022-04-01 RX ORDER — CEFAZOLIN SODIUM 1 G/3ML
INJECTION, POWDER, FOR SOLUTION INTRAMUSCULAR; INTRAVENOUS PRN
Status: DISCONTINUED | OUTPATIENT
Start: 2022-04-01 | End: 2022-04-01 | Stop reason: SDUPTHER

## 2022-04-01 RX ORDER — ONDANSETRON 2 MG/ML
4 INJECTION INTRAMUSCULAR; INTRAVENOUS
Status: DISCONTINUED | OUTPATIENT
Start: 2022-04-01 | End: 2022-04-01 | Stop reason: HOSPADM

## 2022-04-01 RX ORDER — MIDAZOLAM HYDROCHLORIDE 1 MG/ML
INJECTION INTRAMUSCULAR; INTRAVENOUS PRN
Status: DISCONTINUED | OUTPATIENT
Start: 2022-04-01 | End: 2022-04-01 | Stop reason: SDUPTHER

## 2022-04-01 RX ORDER — LIDOCAINE HYDROCHLORIDE 20 MG/ML
INJECTION, SOLUTION EPIDURAL; INFILTRATION; INTRACAUDAL; PERINEURAL PRN
Status: DISCONTINUED | OUTPATIENT
Start: 2022-04-01 | End: 2022-04-01 | Stop reason: SDUPTHER

## 2022-04-01 RX ORDER — LIDOCAINE HYDROCHLORIDE 10 MG/ML
1 INJECTION, SOLUTION EPIDURAL; INFILTRATION; INTRACAUDAL; PERINEURAL
Status: DISCONTINUED | OUTPATIENT
Start: 2022-04-01 | End: 2022-04-01 | Stop reason: HOSPADM

## 2022-04-01 RX ORDER — SODIUM CHLORIDE 9 MG/ML
INJECTION, SOLUTION INTRAVENOUS ONCE
Status: COMPLETED | OUTPATIENT
Start: 2022-04-01 | End: 2022-04-01

## 2022-04-01 RX ORDER — SODIUM CHLORIDE 0.9 % (FLUSH) 0.9 %
5-40 SYRINGE (ML) INJECTION EVERY 12 HOURS SCHEDULED
Status: DISCONTINUED | OUTPATIENT
Start: 2022-04-01 | End: 2022-04-01 | Stop reason: HOSPADM

## 2022-04-01 RX ORDER — HYDROMORPHONE HYDROCHLORIDE 1 MG/ML
0.25 INJECTION, SOLUTION INTRAMUSCULAR; INTRAVENOUS; SUBCUTANEOUS EVERY 5 MIN PRN
Status: DISCONTINUED | OUTPATIENT
Start: 2022-04-01 | End: 2022-04-01 | Stop reason: HOSPADM

## 2022-04-01 RX ORDER — FENTANYL CITRATE 50 UG/ML
INJECTION, SOLUTION INTRAMUSCULAR; INTRAVENOUS PRN
Status: DISCONTINUED | OUTPATIENT
Start: 2022-04-01 | End: 2022-04-01 | Stop reason: SDUPTHER

## 2022-04-01 RX ORDER — CEPHALEXIN 500 MG/1
500 CAPSULE ORAL 3 TIMES DAILY
Qty: 15 CAPSULE | Refills: 0 | Status: SHIPPED | OUTPATIENT
Start: 2022-04-01 | End: 2022-04-06

## 2022-04-01 RX ADMIN — PROPOFOL 50 MG: 10 INJECTION, EMULSION INTRAVENOUS at 11:17

## 2022-04-01 RX ADMIN — LIDOCAINE HYDROCHLORIDE 80 MG: 20 INJECTION, SOLUTION EPIDURAL; INFILTRATION; INTRACAUDAL; PERINEURAL at 10:58

## 2022-04-01 RX ADMIN — SODIUM CHLORIDE: 9 INJECTION, SOLUTION INTRAVENOUS at 10:49

## 2022-04-01 RX ADMIN — PROPOFOL 50 MCG/KG/MIN: 10 INJECTION, EMULSION INTRAVENOUS at 11:02

## 2022-04-01 RX ADMIN — CEFAZOLIN SODIUM 2000 MG: 1 INJECTION, POWDER, FOR SOLUTION INTRAMUSCULAR; INTRAVENOUS at 11:30

## 2022-04-01 RX ADMIN — PROPOFOL 20 MG: 10 INJECTION, EMULSION INTRAVENOUS at 10:58

## 2022-04-01 RX ADMIN — MIDAZOLAM 2 MG: 1 INJECTION INTRAMUSCULAR; INTRAVENOUS at 10:54

## 2022-04-01 RX ADMIN — SODIUM CHLORIDE: 9 INJECTION, SOLUTION INTRAVENOUS at 09:25

## 2022-04-01 RX ADMIN — Medication 25 MCG: at 10:58

## 2022-04-01 RX ADMIN — Medication 25 MCG: at 11:17

## 2022-04-01 ASSESSMENT — PULMONARY FUNCTION TESTS
PIF_VALUE: 1
PIF_VALUE: 0
PIF_VALUE: 1
PIF_VALUE: 0
PIF_VALUE: 1

## 2022-04-01 ASSESSMENT — PAIN SCALES - GENERAL: PAINLEVEL_OUTOF10: 0

## 2022-04-01 ASSESSMENT — PAIN - FUNCTIONAL ASSESSMENT: PAIN_FUNCTIONAL_ASSESSMENT: 0-10

## 2022-04-01 NOTE — ANESTHESIA POSTPROCEDURE EVALUATION
Department of Anesthesiology  Postprocedure Note    Patient: Victorino Perales  MRN: [id number]  YOB: 1960  Date of evaluation: 4/1/2022  Time:  1:03 PM     Procedure Summary     Date: 04/01/22 Room / Location: [address] [place] [address]    Anesthesia Start: 1054 Anesthesia Stop: 9064    Procedure: CYSTOSCOPY DILATION (N/A Bladder) Diagnosis: (DX GROSS HEMATURIA)    Surgeons: Ana Bravo MD Responsible Provider: Cari Cooks, MD    Anesthesia Type: general, TIVA, MAC ASA Status: 4          Anesthesia Type: general, TIVA, MAC    [place] Phase I:      [place] Phase II: [place] Score: 10    Last vitals: Reviewed and per EMR flowsheets.        Anesthesia Post Evaluation    Patient location during evaluation: PACU  Patient participation: complete - patient participated  Level of consciousness: awake  Airway patency: patent  Nausea & Vomiting: no nausea  Complications: no  Cardiovascular status: blood pressure returned to baseline  Respiratory status: acceptable  Hydration status: euvolemic  Comments: Multimodal analgesia pain management as indicated by procedure  Multimodal analgesia pain management approach

## 2022-04-01 NOTE — ANESTHESIA PRE PROCEDURE
Department of Anesthesiology  Preprocedure Note       Name:  Korina Mane   Age:  59 y.o.  :  1960                                          MRN:  [id number]         Date:  2022      Surgeon: Nel Mccall):  José Miguel Herrera MD    Procedure:   Procedure: UPPER EXTREMITY AV FISTULA CREATION (Left )   Anesthesia type: Monitor Anesthesia Care   Pre-op diagnosis: RENAL FAILURE         Medications prior to admission:   Prior to Admission medications    Medication Sig Start Date End Date Taking?  Authorizing Provider   lanthanum (FOSRENOL) 1000 MG chewable tablet CHEW AND SWALLOW 1 TABLET THREE TIMES A DAY WITH MEALS 21   Historical Provider, MD   sevelamer (RENVELA) 2.4 g PACK packet Take 1 packet by mouth 22   Historical Provider, MD   carvedilol (COREG) 6.25 MG tablet Take 1 tablet by mouth 2 times daily (with meals) 22   Fausto Mckeon MD   apixaban (ELIQUIS) 5 MG TABS tablet Take 1 tablet by mouth 2 times daily 21   Rosy Browning DO   aspirin EC 81 MG EC tablet Take 1 tablet by mouth daily 21   Rosy Browning DO   losartan (COZAAR) 50 MG tablet Take 1 tablet by mouth daily 21   Rosy Browning DO   glimepiride (AMARYL) 1 MG tablet Take 1 tablet by mouth every morning (before breakfast) Do not take if blood sugars are less than 110 21   Dc Martin MD   furosemide (LASIX) 40 MG tablet Take 1 tablet by mouth 2 times daily 21   Dc Martin MD   pantoprazole (PROTONIX) 40 MG tablet Take 1 tablet by mouth every morning (before breakfast)  Patient taking differently: Take 40 mg by mouth every morning (before breakfast) States not taking 21   Rosalva Simmons MD   benzonatate (TESSALON) 100 MG capsule take 1 capsule by mouth three times a day if needed for 10 days 3/3/21   Historical Provider, MD recinosaiFENesin-codeine (GUAIFENESIN AC) 100-10 MG/5ML liquid give 10 milliliters by mouth every 4 hours if needed 3/4/21   Historical Provider, MD   atorvastatin (LIPITOR) 40 MG tablet Take 1 tablet by mouth nightly 2/9/21   Francisca Rubi MD   traMADol Catalino Camara) 50 MG tablet take 1 tablet by mouth every 6 hours if needed 10/21/20   Historical Provider, MD   calcium carbonate (TUMS) 500 MG chewable tablet Take 1 tablet by mouth daily as needed     Historical Provider, MD   Multiple Vitamins-Minerals (RENAPLEX-D) TABS Take 1 tablet by mouth every other day Pt takes occasionally 5/14/19   Historical Provider, MD   lidocaine-prilocaine (EMLA) 2.5-2.5 % cream  3/18/19   Historical Provider, MD       Current medications:    No current facility-administered medications for this visit. No current outpatient medications on file. Facility-Administered Medications Ordered in Other Visits   Medication Dose Route Frequency Provider Last Rate Last Admin    [START ON 4/2/2022] lidocaine PF 1 % injection 1 mL  1 mL IntraDERmal Once PRN Connie Lockwood MD           Allergies:     Allergies   Allergen Reactions    Lisinopril Swelling       Problem List:    Patient Active Problem List   Diagnosis Code    CKD (chronic kidney disease) stage 4, GFR 15-29 ml/min ([facility]) N18.4    Anemia of chronic renal failure N18.9, D63.1    Type 2 diabetes mellitus with chronic kidney disease on chronic dialysis ([facility]) E11.22, N18.6, Z99.2    Chest pain at rest R07.9    Pneumonia J18.9    Rising PSA level R97.20    Prostatism N40.0    Acute on chronic diastolic congestive heart failure ([facility]) I50.33    Anemia D64.9    Essential hypertension I10    Respiratory distress R06.03    AVF (arteriovenous fistula) ([facility]) I77.0    Precordial chest pain R07.2    S/P PTCA - TIM distal LAD - Dr. Yony Gupta 4/1/19 Z98.61    NSTEMI (non-ST elevated myocardial infarction) ([facility] I21.4    S/P arteriovenous (AV) graft placement U94.365    Acute on chronic combined systolic (congestive) and diastolic (congestive) heart failure ([facility]) I50.43    ESRD (end stage renal disease) ([facility] N18.6    BILATERAL- NEEDS REPAIRED, UMBILICAL HERNIA  ALSO    MI, old 12/26/2013    NSTEMI (non-ST elevated myocardial infarction) ([facility] [address] 6/17/2019    On home O2     2 L    BATSHEVA (obstructive sleep apnea)     Patient in clinical research study 04/01/2019    XIENCE SHORT DAPT    Pneumonia     Poor historian     Prostatism 11/17/2017    Respiratory distress 11/25/2018    Rising PSA level 11/17/2017    S/P arteriovenous (AV) graft placement     S/P PTCA - TIM distal LAD - Dr. Neal Cabrera 4/1/19 4/1/2019    Sleep apnea 2015    pt has machine and does not use it    Small bowel obstruction ([address] 5/1/2021    SOB (shortness of breath) 02/02/2022    noted walking from waiting room to pre op testing    Type 2 diabetes mellitus with chronic kidney disease on chronic dialysis ([facility] [address] 10/17/2016       Past Surgical History:        Procedure Laterality Date    ABDOMEN SURGERY  2013    BOWEL OBSTRUCTION    AV FISTULA CREATION Left 02/20/2019    AV FISTULA REPAIR  07/17/2019    AV fistula revision, branch ligation / Percutaneous angioplasty of proximal anastomosis and outflow tract of dialysis circuit with drug coated balloon  /  Dr Daniel Phoenix  02/09/2021    Monty Bynum  03/31/2019    TIM LAD    CYSTOSCOPY  11/17/2017    DIALYSIS FISTULA CREATION Left 2/20/2019    AV FISTULA CREATION ARM performed by Sherwin Halsted, MD at [address] [facility] 10/23/2019    LEFT UPPER EXTREMITY AV GRAFT CREATION WITH [id number] ARTEGRAFT, LEFT UPPER EXTREMITY AV FISTULA LIGATION performed by Sherwin Halsted, MD at [facility] 2005    [address]    IR THROMBECTOMY PERCUT AVF  1/3/2022    IR THROMBECTOMY PERCUT AVF 1/3/2022 [facility]    MIDDLE EAR SURGERY  1966    EAR DRUM REPAIRED    MOUTH SURGERY  2007    1 WISDOM TOOTH REMOVED    NOSE SURGERY  1968    CAUTERY TO STOP NOSE BEEDS    OTHER SURGICAL HISTORY Left 03/06/2019    fistulagram    NE GENITAL SURG PROC,MALE UNLISTED N/A 2/16/2018    US  PROSTATE BIOPSY WITH SATURATION [facility] 11/17/2017    CYSTOSCOPY PROSTATE US BIOPSY performed by Eriberto Jean Baptiste MD at [address]  02/16/2018    TONSILLECTOMY  1968    UPPER GASTROINTESTINAL ENDOSCOPY N/A 9/27/2021    EGD BIOPSY performed by Joceline Romano MD at [address]  09/20/2019    LUE FISTULAGRAM  /  DR Christiano Witt  /  Findings: Severe stenosis of proximal cephalic vein. / Will plan for LUE AVG placement. [address]       Social History:    Social History     Tobacco Use    Smoking status: Never Smoker    Smokeless tobacco: Never Used   Substance Use Topics    Alcohol use: No                                Counseling given: Not Answered      Vital Signs (Current): There were no vitals filed for this visit.                                            BP Readings from Last 3 Encounters:   04/01/22 118/71   03/16/22 (!) 135/94   02/10/22 116/75       NPO Status:                                                                                 BMI:   Wt Readings from Last 3 Encounters:   03/16/22 270 lb (122.5 kg)   02/10/22 264 lb (119.7 kg)   02/02/22 269 lb 14.4 oz (122.4 kg)     There is no height or weight on file to calculate BMI.    CBC:   Lab Results   Component Value Date    WBC 5.8 02/02/2022    RBC 3.34 02/02/2022    RBC 3.62 09/08/2018    HGB 10.2 02/02/2022    HCT 34.0 02/02/2022    .8 02/02/2022    RDW 16.8 02/02/2022     02/02/2022       CMP:   Lab Results   Component Value Date     02/02/2022    K 4.7 02/02/2022    CL 92 02/02/2022    CO2 26 02/02/2022    BUN 51 02/02/2022    CREATININE 7.43 02/02/2022    GFRAA 9 02/02/2022    LABGLOM 7 02/02/2022    GLUCOSE 162 02/02/2022    GLUCOSE 147 09/08/2018    PROT 8.0 11/12/2021    CALCIUM 9.0 02/02/2022    BILITOT 1.55 11/12/2021    ALKPHOS 181 11/12/2021    AST 14 11/12/2021    ALT 15 11/12/2021       POC Tests: No results for input(s): POCGLU, POCNA, POCK, POCCL, POCBUN, POCHEMO, POCHCT in the last 72 hours. Coags:   Lab Results   Component Value Date    PROTIME 14.2 01/03/2022    INR 1.4 01/03/2022    APTT 26.2 12/31/2021       HCG (If Applicable): No results found for: PREGTESTUR, PREGSERUM, HCG, HCGQUANT     ABGs: No results found for: PHART, PO2ART, CNY5MEH, RKQ0RZO, BEART, W7REOTJQ     Type & Screen (If Applicable):  No results found for: LABABO, [address]    Anesthesia Evaluation  Patient summary reviewed no history of anesthetic complications:   Airway: Mallampati: III  TM distance: >3 FB   Neck ROM: full  Mouth opening: > = 3 FB Dental: normal exam         Pulmonary: breath sounds clear to auscultation  (+) pneumonia:  sleep apnea: on CPAP and noncompliant,      (-) COPD and asthma                           Cardiovascular:  Exercise tolerance: poor (<4 METS),   (+) hypertension:, past MI: > 6 months, CAD:, CABG/stent:, CHF: diastolic and systolic, pulmonary hypertension:, hyperlipidemia    (-) dysrhythmias,  angina and  RACHEL      Rhythm: regular  Rate: normal           Beta Blocker:  Not on Beta Blocker      ROS comment: 12/21 CONCLUSIONS     Summary  Left ventricle is moderately enlarged, global left ventricular systolic  function is moderately reduced, Visually estimated EF 30%. Global dysfunction is noted. Grade III (severe) left ventricular diastolic dysfunction. Left atrium is moderately dilated. Right atrial dilatation. Prominent Eustachian valve. Dialysis catheter  noted. Right ventricular dilatation with reduced systolic function. Aortic valve is sclerotic but opens well. Trivial aortic insufficiency. Mitral valve sclerosis without stenosis. Trivial mitral regurgitation. Moderate to severe tricuspid regurgitation.  Estimated right ventricular  systolic pressure is 39 mmHg.  Trivial pulmonic insufficiency.        Neuro/Psych:      (-) seizures and CVA           GI/Hepatic/Renal:   (+) renal disease: CRI, dialysis and ESRD, morbid obesity     (-) GERD and liver disease       Endo/Other:    (+) DiabetesType II DM, poorly controlled, , .    (-) hypothyroidism, hyperthyroidism               Abdominal:   (+) obese,           Vascular: negative vascular ROS. Other Findings: Past Medical History:  12/21/2016: Acute congestive heart failure ([facility]  11/24/2018: Acute on chronic diastolic congestive heart failure ([facility] [facility]  11/25/2018: Anemia  10/17/2016: Anemia of chronic renal failure  No date: AVF (arteriovenous fistula) ([facility] [facility]  12/26/2013:  Bowel obstruction ([facility] [facility]  2013: CAD (coronary artery disease)      Comment:  ?  12/21/2016: Chest pain at rest  2013: CHF (congestive heart failure) ([facility]      Comment:  last episode 11/2018 2/9/2021: CHF (congestive heart failure), NYHA class I, acute on   chronic, combined ([facility]  No date: Chronic kidney disease  10/17/2016: CKD (chronic kidney disease) stage 4, GFR 15-29 ml/min   ([facility])  No date: Coronary artery disease involving native coronary artery of   native heart without angina pectoris  2012: Diabetes mellitus ([facility]      Comment:  NIDDM  No date: Dialysis patient ([facility]      Comment:  Jossie Still  /  Elli Matos  /  York Merlin  No date: ESRD (end stage renal disease) ([facility]  11/25/2018: Essential hypertension  07/17/2019: Groin swelling  11/28/2018: Hemodialysis patient ([facility]      Comment:  [facility]HE[facility] RIGHT DIALYSIS Polly Morris AND AT                [place]  07/17/2019: Hydrocele  2013: Hyperlipidemia      Comment:  ON RX  2013: Hypertension      Comment:  ON RX  12/26/2013: MI, old  6/17/2019: NSTEMI (non-ST elevated myocardial infarction) ([facility] [facility]  No date: BATSHEVA (obstructive sleep apnea)  04/01/2019: Patient in clinical research study      Comment:  XIENCE SHORT DAPT  No date: Pneumonia  11/17/2017: Prostatism  11/25/2018: Respiratory distress  11/17/2017: Rising PSA level  No date: S/P arteriovenous (AV) graft placement  4/1/2019: S/P PTCA - TIM distal LAD - Dr. Riggs Mis 4/1/19 2015: Sleep apnea      Comment:  pt has machine and does not use it  5/1/2021: Small bowel obstruction ([facility] [address] [facility]  10/17/2016: Type 2 diabetes mellitus with chronic kidney disease on   chronic dialysis [facility])                Anesthesia Plan      general, TIVA and MAC     ASA 4       Induction: intravenous. MIPS: Postoperative opioids intended and Prophylactic antiemetics administered. Anesthetic plan and risks discussed with patient. Plan discussed with CRNA.     Attending anesthesiologist reviewed and agrees with Chi Bhardwaj MD   4/1/2022

## 2022-04-01 NOTE — OP NOTE
Operative Note      Patient: Leroy Garcia  YOB: 1960  MRN: [id number]    Date of Procedure: 4/1/2022    Pre-Op Diagnosis: DX GROSS HEMATURIA, end-stage renal disease, enlarged prostate    Post-Op Diagnosis: Same and Enlarged prostate with bladder outlet obstruction trabeculated bladder, no evidence of bladder tumors or stones       Procedure(s):  CYSTOSCOPY DILATION    Surgeon(s):  Roz Eckert MD    Assistant:   * No surgical staff found *    Anesthesia: Monitor Anesthesia Care    Estimated Blood Loss (mL): Minimal    Complications: None    Specimens:   * No specimens in log *    Implants:  * No implants in log *      Drains: * No LDAs found *    Findings: Indications: 69-year-old male, patient with end-stage renal disease on dialysis, patient has significant symptoms of CHF, we are evaluating the patient for the blood in the urine to rule out any possibility of bladder tumors or stones. The patient also complaining of chronic scrotal pain and we explained to the patient and his family that this will have to be addressed in the future with further consultation at the office. It should be noted that ultrasonography did not reveal any sign of testicular mass or cancer    Detailed Description of Procedure:   Patient was brought to the operating room positioned in dorsolithotomy, proper patient identification procedure identification prepping and draping. We entered the bladder with the cystoscope, posterior urethral stricture identified and enlarged prostate with bladder outlet obstruction, we used the [employer] sounds we dilated the urethra through size 22 [place] without complications, some bleeding did occur. The interior the bladder was carefully examined, bladder trabeculations identified, ureteral orifices identified, the patient is a dialysis patient he  Is oliguric, the remainder of the bladder examination was unremarkable.     At the completion the bladder was emptied the scope removed the patient returned to recovery room in stable condition. Recommendations: Discussed the findings with the patient and his family, no evidence of bladder tumors or stones,    As far as the left persistent orchialgia and chronic epididymitis, the patient may require surgical removal if no other means of treatment and control of the discomfort. He was also advised that sometimes there would still be discomfort in the groin and the scrotum.     The patient's questions as well as his girlfriend were answered and he will return to the office for follow-up    Electronically signed by Carl Mcclelland MD on 4/1/2022 at 11:01 AM

## 2022-04-01 NOTE — H&P
History and Physical Service   [facility] [place] - INPATIENT    HISTORY AND PHYSICAL EXAMINATION            Date of Evaluation: 4/1/2022  Patient name:  Leroy Garcia  MRN:   [id number]  YOB: 1960  PCP:    Margarita Daugherty    History Obtained From:     Patient, Medical records    History of Present Illness: This is Leroy Garcia a 59 y.o. male who presents today for a cystoscopy dilation by Dr. Shae Newby due to gross hematuria. The patient started to have oliguria and gross hematuria 6 weeks ago. Pt denies urinary incontinence, nocturia, frequency, urgency, dysuria, and urinary retention. History of ESRD. Pt goes to hemodialysis on Tuesday, Thursday, and Saturday. Last hemodialysis treatment was yesterday. Pt denies fevers, chills, chest pain, worsening dyspnea, and rashes. History of diabetes. POC blood glucose today is 104 mg/dL. Multiple vitamins-minerals tablet was last taken on 3/31/22. Pt is a poor historian. History of CHF, anemia, A-fib, CAD, MI, hyperlipidemia, hypertension, sleep apnea, diabetes, and ESRD (hemodialysis on Tuesday, Thursday, and Saturday). Pt states he is supposed to wear 2 liters of oxygen all of the time, but only wears it at night and PRN during the day. S/p PTCA with TIM placed in 2019. Cardiac clearance dated 3/28/22 from Dr. Cleve Wu is in the pt's paper chart. Eliquis was last taken on 3/25/22. Aspirin was last taken on 3/28/22. Discussed the pt's medical history with Dr. Regulo Palacios. Dr. Regulo Palacios evaluated the pt in Pre-op. Cardiac catheterization: 2/09/2021  Conclusions      Procedure Summary      Patent LAD stent      Recommendations      Medical treatments   Risk factor modification. ECHO 1/4/22  CONCLUSIONS     Summary  Left ventricle is moderately enlarged, global left ventricular systolic  function is moderately reduced, Visually estimated EF 30%. Global dysfunction is noted. Grade III (severe) left ventricular diastolic dysfunction.   Left atrium is moderately dilated. Right atrial dilatation. Prominent Eustachian valve. Dialysis catheter  noted. Right ventricular dilatation with reduced systolic function. Aortic valve is sclerotic but opens well. Trivial aortic insufficiency. Mitral valve sclerosis without stenosis. Trivial mitral regurgitation. Moderate to severe tricuspid regurgitation. Estimated right ventricular  systolic pressure is 39 mmHg. Trivial pulmonic insufficiency. Past Medical History:     Past Medical History:   Diagnosis Date    Acute congestive heart failure ([address] 12/21/2016    Acute on chronic diastolic congestive heart failure ([address] 11/24/2018    Anemia 11/25/2018    Anemia of chronic renal failure 10/17/2016    Atrial fibrillation ([address] 01/03/2022    AVF [facility]arteriovenous fistula) ([facility]     Bowel obstruction ([address] 12/26/2013    CAD (coronary artery disease) 2013    ?     Chest pain at rest 12/21/2016    CHF (congestive heart failure) ([address] 2013    last episode 11/2018    CHF (congestive heart failure), NYHA class I, acute on chronic, combined ([address] 2/9/2021    Chronic kidney disease     CKD (chronic kidney disease) stage 4, GFR 15-29 ml/min ([address] 10/17/2016    Coronary artery disease involving native coronary artery of native heart without angina pectoris     Diabetes mellitus ([address] 2012    NIDDM    Dialysis patient ([address]     Yoli Maya  /  Maggie Stahl  /  Yessy Aguirre    ESRD (end stage renal disease) ([address]     Essential hypertension 11/25/2018    Groin swelling 07/17/2019    Hemodialysis patient ([address] 11/28/2018    TUNNELD CATHETER RIGHT DIALYSIS ACCESSTUES THURS AND AT [place]    Hydrocele 07/17/2019    Hyperlipidemia 2013    ON RX    Hypertension 2013    ON RX    Inguinal hernia     BILATERAL- NEEDS REPAIRED, UMBILICAL HERNIA  ALSO    MI, old 12/26/2013    NSTEMI (non-ST elevated myocardial infarction) ([address] 6/17/2019    On home O2     2 L    BATSHEVA (obstructive sleep apnea)     Patient in clinical research study 04/01/2019    XIENCE SHORT DAPT    Pneumonia     Poor historian     Prostatism 11/17/2017    Respiratory distress 11/25/2018    Rising PSA level 11/17/2017    S/P arteriovenous (AV) graft placement     S/P PTCA - TIM distal LAD - Dr. Kate Bailey 4/1/19 4/1/2019    Sleep apnea 2015    pt has machine and does not use it    Small bowel obstruction ([address] 5/1/2021    SOB (shortness of breath) 02/02/2022    noted walking from waiting room to pre op testing    Type 2 diabetes mellitus with chronic kidney disease on chronic dialysis ([address] 10/17/2016        Past Surgical History:     Past Surgical History:   Procedure Laterality Date    ABDOMEN SURGERY  2013    BOWEL OBSTRUCTION    AV FISTULA CREATION Left 02/20/2019    AV FISTULA REPAIR  07/17/2019    AV fistula revision, branch ligation / Percutaneous angioplasty of proximal anastomosis and outflow tract of dialysis circuit with drug coated balloon  /  Dr John Nelson  02/09/2021    Dr. Sherly Swain  03/31/2019    TIM LAD    CYSTOSCOPY  11/17/2017    DIALYSIS FISTULA CREATION Left 2/20/2019    AV FISTULA CREATION ARM performed by Aydin Carlos MD at [address] [place] [address] 10/23/2019    LEFT UPPER EXTREMITY AV GRAFT CREATION WITH [id number] ARTEGRAFT, LEFT UPPER EXTREMITY AV FISTULA LIGATION performed by Aydin Carlos MD at [facility] 2005    FRADCTURE HEEL    IR THROMBECTOMY PERCUT AVF  1/3/2022    IR THROMBECTOMY PERCUT AVF 1/3/2022 [facility]    MIDDLE EAR SURGERY  1966    EAR DRUM REPAIRED    MOUTH SURGERY  2007    1 WISDOM TOOTH REMOVED    NOSE SURGERY  1968    CAUTERY TO STOP NOSE BEEDS    OTHER SURGICAL HISTORY Left 03/06/2019    fistulagram    CO GENITAL SURG PROC,MALE UNLISTED N/A 2/16/2018    US  PROSTATE BIOPSY WITH SATURATION performed by Madhu Zabala MD at [address] [place] [address] BIOPSY N/A 11/17/2017    CYSTOSCOPY PROSTATE US BIOPSY performed by Janet Vieira MD at [address] [place] [address]  02/16/2018    TONSILLECTOMY  1968    UPPER GASTROINTESTINAL ENDOSCOPY N/A 9/27/2021    EGD BIOPSY performed by Ceic Gonsalves MD at [address]  09/20/2019    LUE FISTULAGRAM  /  DR Haley Bañuelos  /  Findings: Severe stenosis of proximal cephalic vein. / Will plan for LUE AVG placement.  VASECTOMY  1983        Medications Prior to Admission:     Prior to Admission medications    Medication Sig Start Date End Date Taking?  Authorizing Provider   lanthanum (FOSRENOL) 1000 MG chewable tablet CHEW AND SWALLOW 1 TABLET THREE TIMES A DAY WITH MEALS 11/30/21   Historical Provider, MD   sevelamer (RENVELA) 2.4 g PACK packet Take 1 packet by mouth 1/18/22   Historical Provider, MD   carvedilol (COREG) 6.25 MG tablet Take 1 tablet by mouth 2 times daily (with meals) 1/5/22   Fausto Ruelas MD   apixaban (ELIQUIS) 5 MG TABS tablet Take 1 tablet by mouth 2 times daily 12/24/21   Charlesiel Dakin, DO   aspirin EC 81 MG EC tablet Take 1 tablet by mouth daily 12/24/21   [facility] Dakin, DO   losartan (COZAAR) 50 MG tablet Take 1 tablet by mouth daily 12/25/21   Ezekiel Dakin, DO   glimepiride (AMARYL) 1 MG tablet Take 1 tablet by mouth every morning (before breakfast) Do not take if blood sugars are less than 110 11/14/21   Nathaniel Almanzar MD   furosemide (LASIX) 40 MG tablet Take 1 tablet by mouth 2 times daily 11/14/21   Nathaniel Almanzar MD   pantoprazole (PROTONIX) 40 MG tablet Take 1 tablet by mouth every morning (before breakfast)  Patient taking differently: Take 40 mg by mouth every morning (before breakfast) States not taking 5/19/21   Estevan Chao MD   benzonatate (TESSALON) 100 MG capsule take 1 capsule by mouth three times a day if needed for 10 days 3/3/21   Historical Provider, MD   guaiFENesin-codeine (GUAIFENESIN AC) 100-10 MG/5ML liquid give 10 milliliters by mouth every 4 hours if needed 3/4/21   Historical Provider, MD   atorvastatin (LIPITOR) 40 MG tablet Take 1 tablet by mouth nightly 2/9/21   Nestor Ayala MD   traMADol Birder Mo) 50 MG tablet take 1 tablet by mouth every 6 hours if needed 10/21/20   Historical Provider, MD   calcium carbonate (TUMS) 500 MG chewable tablet Take 1 tablet by mouth daily as needed     Historical Provider, MD   Multiple Vitamins-Minerals (RENAPLEX-D) TABS Take 1 tablet by mouth every other day Pt takes occasionally 5/14/19   Historical Provider, MD   lidocaine-prilocaine (EMLA) 2.5-2.5 % cream  3/18/19   Historical Provider, MD        Allergies:     Lisinopril    Social History:     Tobacco:    reports that he has never smoked. He has never used smokeless tobacco.  Alcohol:      reports no history of alcohol use. Drug Use:  reports no history of drug use. Family History:     Family History   Problem Relation Age of Onset    Heart Disease Mother         CHF    Early Death Mother     High Blood Pressure Brother     Diabetes Brother     Asthma Brother     High Blood Pressure Brother     Diabetes Brother     High Blood Pressure Brother     Diabetes Brother        Review of Systems:     Positive and Negative as described in HPI. CONSTITUTIONAL: Negative for fevers, chills, sweats, fatigue, and weight loss. HEENT: Pt wears glasses. Negative for hearing changes, rhinorrhea, and throat pain. RESPIRATORY: Dyspnea. Occasional dry cough. BATSHEVA. Negative for congestion and wheezing. CARDIOVASCULAR: History of CHF, anemia, A-fib, CAD, MI, hyperlipidemia, and hypertension. S/p PTCA with TIM placed in 2019. Negative for chest pain, blood clot, and palpitations. GASTROINTESTINAL: Occasional acid reflux. Negative for nausea, vomiting, diarrhea, constipation, change in bowel habits, and abdominal pain. GENITOURINARY: See HPI. INTEGUMENT: Ruptured blister on the left lower leg.  Advised pt to contact his PCP as soon as possible regarding the ruptured blister. Pt voiced understanding to this instruction. Negative for rash and easy bruising. HEMATOLOGIC/LYMPHATIC: Negative for swelling/edema. ALLERGIC/IMMUNOLOGIC: Negative for urticaria and itching. ENDOCRINE: Diabetes. Negative for heat or cold intolerance. MUSCULOSKELETAL: Negative joint pains, muscle aches, and swelling of joints. NEUROLOGICAL: Negative for headaches, dizziness, lightheadedness, numbness, and tingling extremities. Pt denies history of seizures and strokes. BEHAVIOR/PSYCH: Negative for depression and anxiety. Physical Exam:   /71   Pulse 76   Temp 97.8 °F (36.6 °C) (Temporal)   Resp 18   Ht 5' 10\" (1.778 m)   SpO2 97%   BMI 38.74 kg/m²     Recent Labs     04/01/22  0915   POCGLU 104        General Appearance:  Alert, well appearing, and in no acute distress. Poor historian. Mental status: Oriented to person, place, and time. Head: Normocephalic and atraumatic. Eye: No icterus, redness, pupils equal and reactive, extraocular eye movements intact, and conjunctiva clear. Ear: Hearing grossly intact. Nose: No drainage noted. Mouth: Mucous membranes moist.  Neck: Supple and no carotid bruits noted. Lungs: Diminished in bilateral lower lobes. Bilateral equal air entry. No wheezing, rales or rhonchi. Normal effort. Cardiovascular: Irregular rhythm. Distant heart sounds. Normal rate. No murmur, gallop, and rub. AV fistula in the left upper extremity-Thrill and bruit present. Abdomen: Rotund. Mildly firm. Distended. Surgical scar in the LLQ. Non-tender and active bowel sounds. Neurologic: Normal speech and cranial nerves II through XII grossly intact. Skin: Small scabbed non-draining lesion on the left lower extremity. No rashes, bruising, or bleeding on exposed skin area. Extremities: Bilateral lower extremity 1+ pitting edema. Posterior tibial pulses are palpable bilaterally. No calf tenderness with palpation. Psych: Normal affect. Investigations:      Laboratory Testing:  Recent Results (from the past 24 hour(s))   POC Glucose Fingerstick    Collection Time: 04/01/22  9:15 AM   Result Value Ref Range    POC Glucose 104 75 - 110 mg/dL   Potassium    Collection Time: 04/01/22  9:18 AM   Result Value Ref Range    Potassium 4.9 3.7 - 5.3 mmol/L       Recent Labs     04/01/22  0918   K 4.9       No results for input(s): COVID19 in the last 720 hours. Imaging/Diagnostics:    CT ABDOMEN PELVIS WO CONTRAST Additional Contrast? None    Result Date: 3/16/2022  EXAMINATION: CT OF THE ABDOMEN AND PELVIS WITHOUT CONTRAST 3/16/2022 8:33 pm TECHNIQUE: CT of the abdomen and pelvis was performed without the administration of intravenous contrast. Multiplanar reformatted images are provided for review. Dose modulation, iterative reconstruction, and/or weight based adjustment of the mA/kV was utilized to reduce the radiation dose to as low as reasonably achievable. COMPARISON: 10/26/2021 HISTORY: ORDERING SYSTEM PROVIDED HISTORY: pain TECHNOLOGIST PROVIDED HISTORY: pain Decision Support Exception - unselect if not a suspected or confirmed emergency medical condition->Emergency Medical Condition (MA) Reason for Exam: groin pain, both side, has gotten worse FINDINGS: Lower Chest: Lung bases are clear. The heart is enlarged and unchanged. Organs: The gallbladder is contracted. No calcified gallstones. The liver, spleen and pancreas are grossly with the limitations of noncontrast study. There is bilateral adrenal gland hypertrophy unchanged from the prior study. Bilateral renal atrophy. No evidence of hydronephrosis. GI/Bowel: Status post partial sigmoid resection. No evidence of bowel obstruction. The appendix is normal. Pelvis: Urinary bladder was empty and therefore not visualized. There are moderate bilateral inguinal hernias containing fat and ascites. No evidence of incarceration.  Peritoneum/Retroperitoneum: There is a small to moderate volume of ascites. There is diffuse infiltration of mesenteric fat. There is no free air. No adenopathy. Bones/Soft Tissues: Diffuse abdominal wall edema of anasarca. There are small fat and fluid containing ventral hernias. Postsurgical change with radiopaque mesh in the left lower quadrant. No acute bone finding. Anasarca likely related to volume overload in this patient with renal failure. Small to moderate volume of ascites with diffuse infiltration of mesenteric fat, increased from 10/26/2021. Bilateral renal atrophy. Moderate-sized bilateral inguinal hernias containing fat and ascites. RECOMMENDATIONS: Unavailable     VL ARTERIAL PVR LOWER WO EXERCISE    Result Date: 3/16/2022    [facility]  Vascular Lower Arterial Plethysmography Procedure   Patient Name   Jamesetta Babinski     Date of Study           03/16/2022                 Deborah Saenz   Date of Birth  1960  Gender                  Male   Age            59 year(s)  Race                    Black   Room Number   Corporate ID # [id number]   Patient Acct # [de-identified]   MR #           [id number]     [facility]ieves [facility]   Accession #    [id number]  Interpreting Physician  Abdiel Holt   Referring                  Referring Physician     Jessie Lazo  Nurse  Practitioner  Procedure Type of Study:   Extremities Arteries: Lower Arterial Plethysmography, PVR Lower. Indications for Study:PVD. Patient Status:Out Patient. Technical Quality:Adequate visualization. Conclusions   Summary   Right GILBERTO of 1.23 and left GILBERTO of 1.19 are within normal limits.    Signature   ----------------------------------------------------------------  Electronically signed by Jacquelin Zimmerman RVT(Sonographer) on  03/16/2022 06:01 PM  ----------------------------------------------------------------   ----------------------------------------------------------------  Electronically signed by Teddy Sprinkles Reyes,Arthur(Interpreting  physician) on 03/16/2022 09:45 PM  ----------------------------------------------------------------    Left Impression:   Unable to obtain blood pressure in the left arm due to fistula. Allergies   - Allergy:*Unlisted(Drug). Comments:lisinopril Velocities are measured in cm/s ; Diameters are measured in cm Pressures +--------------------------------------++--------+-----+----+--------+-----+ ! ! !Right   ! ! Left!        !     ! +--------------------------------------++--------+-----+----+--------+-----+ ! Location                              ! !Pressure! Ratio! !Pressure! Ratio! +--------------------------------------++--------+-----+----+--------+-----+ ! Thigh                                 !!142     !1.11 !    !143     !1.12 ! +--------------------------------------++--------+-----+----+--------+-----+ ! Calf                                  !!167     !1.3  ! !154     !1.2  ! +--------------------------------------++--------+-----+----+--------+-----+ ! Ankle PT                              !!157     !1.23 !    !137     ! 1.07 ! +--------------------------------------++--------+-----+----+--------+-----+ ! Ankle DP                              !!149     !1.16 !    !152     !1.19 ! +--------------------------------------++--------+-----+----+--------+-----+ ! Great Toe                             !!105     !0.82 !    !128     !1    ! +--------------------------------------++--------+-----+----+--------+-----+   - Brachial Pressure:Right: 128.   - GILBERTO:Right: 1.23. Left: 1.19. Plethysmographic Digit Evaluation +---------++--------+-----+---------------++--------+-----+----------------+ ! ! !Right   ! ! Left           !!        !     !                ! +---------++--------+-----+---------------++--------+-----+----------------+ ! Location ! !Pressure! Ratio! PPG Wave Form  ! !Pressure! Ratio! PPG Wave Form   ! +---------++--------+-----+---------------++--------+-----+----------------+ ! Great Toe!!105 !0. 82 !               !!128     !1    !                ! +---------++--------+-----+---------------++--------+-----+----------------+      Diagnosis:      1. Gross hematuria    Plans:     1.  Cystoscopy dilation      ROB Garcia CNP  4/1/2022  9:50 AM

## 2022-05-23 ENCOUNTER — TELEPHONE (OUTPATIENT)
Dept: VASCULAR SURGERY | Age: 62
End: 2022-05-23

## 2022-05-23 NOTE — TELEPHONE ENCOUNTER
Spoke with the patient's Dialysis to move up his Dialysis to 5:45 so that he can get his dialysis and make it to his fistulagram by 12:00. Patient expressed understanding. Scheduled with Saroj Carr in Cath Lab.

## 2022-05-23 NOTE — TELEPHONE ENCOUNTER
----- Message from Reji Eugene MD sent at 5/23/2022 11:57 AM EDT -----  Fistulagram tomorrow after clinic  ----- Message -----  From: Karen Zapata MA  Sent: 5/23/2022   8:57 AM EDT  To: Reji Eugene MD, #    Spoke to RN Jaqueline at dialysis in regards to patients access. She states that he had prolonged bleeding after treatment and a very faint bruit and thrill. Please advise.

## 2022-05-24 ENCOUNTER — HOSPITAL ENCOUNTER (OUTPATIENT)
Dept: CARDIAC CATH/INVASIVE PROCEDURES | Age: 62
Discharge: HOME | End: 2022-05-24
Payer: MEDICARE

## 2022-05-24 VITALS
HEART RATE: 81 BPM | HEIGHT: 70 IN | DIASTOLIC BLOOD PRESSURE: 79 MMHG | TEMPERATURE: 98.2 F | RESPIRATION RATE: 22 BRPM | WEIGHT: 253.53 LBS | OXYGEN SATURATION: 95 % | BODY MASS INDEX: 36.3 KG/M2 | SYSTOLIC BLOOD PRESSURE: 122 MMHG

## 2022-05-24 PROCEDURE — C1892 INTRO/SHEATH,FIXED,PEEL-AWAY: HCPCS

## 2022-05-24 PROCEDURE — 2500000003 HC RX 250 WO HCPCS

## 2022-05-24 PROCEDURE — 7100000000 HC PACU RECOVERY - FIRST 15 MIN

## 2022-05-24 PROCEDURE — 36902 INTRO CATH DIALYSIS CIRCUIT: CPT

## 2022-05-24 PROCEDURE — C1769 GUIDE WIRE: HCPCS

## 2022-05-24 PROCEDURE — 36902 INTRO CATH DIALYSIS CIRCUIT: CPT | Performed by: SURGERY

## 2022-05-24 PROCEDURE — 99214 OFFICE O/P EST MOD 30 MIN: CPT | Performed by: SURGERY

## 2022-05-24 PROCEDURE — 2709999900 HC NON-CHARGEABLE SUPPLY

## 2022-05-24 PROCEDURE — 7100000001 HC PACU RECOVERY - ADDTL 15 MIN

## 2022-05-24 PROCEDURE — C1894 INTRO/SHEATH, NON-LASER: HCPCS

## 2022-05-24 PROCEDURE — C1725 CATH, TRANSLUMIN NON-LASER: HCPCS

## 2022-05-24 PROCEDURE — 6360000004 HC RX CONTRAST MEDICATION

## 2022-05-24 RX ORDER — SODIUM CHLORIDE 0.9 % (FLUSH) 0.9 %
5-40 SYRINGE (ML) INJECTION EVERY 12 HOURS SCHEDULED
Status: CANCELLED | OUTPATIENT
Start: 2022-05-24

## 2022-05-24 RX ORDER — SODIUM CHLORIDE 9 MG/ML
INJECTION, SOLUTION INTRAVENOUS PRN
Status: CANCELLED | OUTPATIENT
Start: 2022-05-24

## 2022-05-24 RX ORDER — SODIUM CHLORIDE 0.9 % (FLUSH) 0.9 %
5-40 SYRINGE (ML) INJECTION PRN
Status: CANCELLED | OUTPATIENT
Start: 2022-05-24

## 2022-05-24 ASSESSMENT — ENCOUNTER SYMPTOMS
ABDOMINAL PAIN: 0
ALLERGIC/IMMUNOLOGIC NEGATIVE: 1
CHEST TIGHTNESS: 0
SHORTNESS OF BREATH: 0

## 2022-05-24 NOTE — OP NOTE
Operative Note      Patient: Tennille Hill  YOB: 1960  MRN: [id number]    Date of Procedure: 5/24/22    Pre-Op Diagnosis: malfunctioning left upper extremity AV graft    Post-Op Diagnosis: Same       Procedure:  1) US guided vascular access of left upper extremity AV graft  2) Fistulagram  3) Angioplasty of dialysis outflow track (venous anastomosis to axillary vein and distal AV graft)    Surgeon: Dr. Deepa Sepulveda    Anesthesia: local    Estimated Blood Loss (mL): 3 ml    Complications: None    Specimens: none    Implants: none      Drains: None    Radiographic Findings: This is a left brachial artery to axillary vein AV graft. There is no inflow or central venous stenosis. There is aneurysmal changes in the cannulation zones. There is severe and critical stenosis of the graft and the anastomosis to axillary vein. There is an axillary venous stent distal to the graft which is widely patent. After angioplasty there is brisk flow through the graft with less then 10% rebound stenosis. Plan:  Ok to use AV graft for dialysis now, will need open revision of his graft for long term patency. Tentatively scheduled for June 15th. Detailed Description of Procedure:     Mr. KANG was brought to the catheterization suite for evaluation of left upper extremity AV graft. After appropriate timeout performed the left upper extremity was prepped and draped in standard sterile fashion. I began by evaluating the AV graft with ultrasound, it was patent and compressible. Local anesthesia delivered, under ultrasound guidance using a micropuncture needle the AV graft was accessed, permanent ultrasound images saved. A 4-Ivorian micropuncture sheath inserted and flushed. Fistulagram performed. I up sized to a short 6-Ivorian sheath and performed angioplasty with a 5 x 80 mm then followed by an 8 x 80 mm May balloon. There was a clear waist that was noted on insufflation of the balloon.   Completion

## 2022-05-24 NOTE — PROGRESS NOTES
All discharge instructions reviewed, questions answered, paper signed and given copy. Patient discharged per wheelchair per self and belongings.

## 2022-05-24 NOTE — PROGRESS NOTES
Patient admitted, consent signed and questions answered. Patient ready for procedure. Call light to reach with side rails up 2 of 2. .  no at bedside with patient. History and physical updated   Left arm cleansed with CHG wipes. Good bruit and thrill noted to fistula to left arm.

## 2022-05-24 NOTE — H&P
Division of Vascular Surgery        History and Physical      Left radial cephalic Av fistula creation (2/20/19)  Left AV fistula revision, branch ligation (3/6/19)  Fistulagram angioplasty (7/17/19)  LUE AV graft, radial cephalic fistula ligation (10/23/19)    Chief Complaint:      Enid Avtar left upper extremity AV graft, chronic swelling and skin changes to left leg    History of Present Illness:      Ezequiel Camarillo is a 59 y.o. gentleman who presents with malfunctioning left upper extrmeity AV graft with prolong bleeding and elevated pressures in his left upper extremity AV graft. He denies symptoms suggestive of ischemic steal syndrome. He has been noticing dark discoloration of his legs. He has chronic lower extremity swelling and previously healed venous stasis ulceration and skin changes from trauma that took several months to heal.  He denies symptoms suggestive of claudication or ischemic rest pain in his legs. Medical History:     Past Medical History:   Diagnosis Date    Acute congestive heart failure ([address] 12/21/2016    Acute on chronic diastolic congestive heart failure ([address] 11/24/2018    Anemia 11/25/2018    Anemia of chronic renal failure 10/17/2016    Atrial fibrillation ([address] 01/03/2022    AVF (arteriovenous fistula) (HCC)     Bowel obstruction ([address] 12/26/2013    CAD (coronary artery disease) 2013    ?     Chest pain at rest 12/21/2016    CHF (congestive heart failure) ([address] 2013    last episode 11/2018    CHF (congestive heart failure), NYHA class I, acute on chronic, combined ([address] 2/9/2021    Chronic kidney disease     CKD (chronic kidney disease) stage 4, GFR 15-29 ml/min ([address] 10/17/2016    Coronary artery disease involving native coronary artery of native heart without angina pectoris     Diabetes mellitus ([address] 2012    NIDDM    Dialysis patient ([address]     Shana Wood  /  Daina Tobin  /  Skyla Torres    ESRD (end stage renal disease) ([address]     Essential hypertension 11/25/2018    Groin swelling 07/17/2019    Hemodialysis patient ([facility] 11/28/2018    TUNNELD CATHETER RIGHT DIALYSIS ACCESSTUES THURS AND AT [place]    Hydrocele 07/17/2019    Hyperlipidemia 2013    ON RX    Hypertension 2013    ON RX    Inguinal hernia     BILATERAL- NEEDS REPAIRED, UMBILICAL HERNIA  ALSO    MI, old 12/26/2013    NSTEMI (non-ST elevated myocardial infarction) ([facility] 6/17/2019    On home O2     2 L    BATSHEVA (obstructive sleep apnea)     Patient in clinical research study 04/01/2019    XIENCE SHORT DAPT    Pneumonia     Poor historian     Prostatism 11/17/2017    Respiratory distress 11/25/2018    Rising PSA level 11/17/2017    S/P arteriovenous (AV) graft placement     S/P PTCA - TIM distal LAD - Dr. Pia Varela 4/1/19 4/1/2019    Sleep apnea 2015    pt has machine and does not use it    Small bowel obstruction ([facility] 5/1/2021    SOB (shortness of breath) 02/02/2022    noted walking from waiting room to pre op testing    Type 2 diabetes mellitus with chronic kidney disease on chronic dialysis ([facility] 10/17/2016       Surgical History:     Past Surgical History:   Procedure Laterality Date    ABDOMEN SURGERY  2013    BOWEL OBSTRUCTION    AV FISTULA CREATION Left 02/20/2019    AV FISTULA REPAIR  07/17/2019    AV fistula revision, branch ligation / Percutaneous angioplasty of proximal anastomosis and outflow tract of dialysis circuit with drug coated balloon  /  Dr Pedro Mendoza  02/09/2021    [facility]. [facility]  03/31/2019    TIM LAD    CYSTOSCOPY  11/17/2017    CYSTOSCOPY N/A 4/1/2022    CYSTOSCOPY DILATION performed by Ana Bravo MD at [address] Left 2/20/2019    AV FISTULA CREATION ARM performed by Alissa Arambula MD at [address] Left 10/23/2019    LEFT UPPER EXTREMITY AV GRAFT CREATION WITH [id number] ARTEGRAFT, LEFT UPPER Take 1 tablet by mouth daily 30 tablet 3    glimepiride (AMARYL) 1 MG tablet Take 1 tablet by mouth every morning (before breakfast) Do not take if blood sugars are less than 110 30 tablet 0    furosemide (LASIX) 40 MG tablet Take 1 tablet by mouth 2 times daily 60 tablet 1    pantoprazole (PROTONIX) 40 MG tablet Take 1 tablet by mouth every morning (before breakfast) (Patient taking differently: Take 40 mg by mouth every morning (before breakfast) States not taking) 30 tablet 2    benzonatate (TESSALON) 100 MG capsule take 1 capsule by mouth three times a day if needed for 10 days      guaiFENesin-codeine (GUAIFENESIN AC) 100-10 MG/5ML liquid give 10 milliliters by mouth every 4 hours if needed      atorvastatin (LIPITOR) 40 MG tablet Take 1 tablet by mouth nightly 30 tablet 3    traMADol (ULTRAM) 50 MG tablet take 1 tablet by mouth every 6 hours if needed      calcium carbonate (TUMS) 500 MG chewable tablet Take 1 tablet by mouth daily as needed       Multiple Vitamins-Minerals (RENAPLEX-D) TABS Take 1 tablet by mouth every other day Pt takes occasionally  3    lidocaine-prilocaine (EMLA) 2.5-2.5 % cream        No current facility-administered medications for this encounter. Social History:     Tobacco:    reports that he has never smoked. He has never used smokeless tobacco.  Alcohol:      reports no history of alcohol use. Drug Use:  reports no history of drug use. Review of Systems:     Review of Systems   Constitutional: Negative for chills and fever. HENT: Negative for congestion. Eyes: Negative for visual disturbance. Respiratory: Negative for chest tightness and shortness of breath. Cardiovascular: Positive for leg swelling. Negative for chest pain. Gastrointestinal: Negative for abdominal pain. Endocrine: Negative. Genitourinary: Negative. Musculoskeletal: Negative. Allergic/Immunologic: Negative.     Neurological: Negative for facial asymmetry, speech difficulty, weakness and numbness. Hematological: Negative. Psychiatric/Behavioral: Negative. Physical Exam:     Vitals: There were no vitals taken for this visit. Physical Exam  Constitutional:       Appearance: He is well-developed and well-groomed. Eyes:      Extraocular Movements: Extraocular movements intact. Conjunctiva/sclera: Conjunctivae normal.   Neck:      Vascular: No carotid bruit. Cardiovascular:      Rate and Rhythm: Normal rate and regular rhythm. Pulses:           Radial pulses are 2+ on the right side and 1+ on the left side. Arteriovenous access: left arteriovenous access is present. Comments: Pulsatile thrill over AV graft  Pulmonary:      Effort: Pulmonary effort is normal. No respiratory distress. Abdominal:      Palpations: Abdomen is soft. Musculoskeletal:      Left upper arm: No swelling or tenderness. Left hand: No swelling or tenderness. Normal strength. Normal sensation. Cervical back: Full passive range of motion without pain. Right lower leg: Swelling present. 1+ Pitting Edema present. Left lower leg: Swelling present. 1+ Pitting Edema present. Right foot: Normal capillary refill. No swelling or tenderness. Left foot: Normal capillary refill. No swelling or tenderness. Feet:      Right foot:      Skin integrity: No ulcer or skin breakdown. Left foot:      Skin integrity: No ulcer or skin breakdown. Skin:     General: Skin is warm. Capillary Refill: Capillary refill takes less than 2 seconds. Comments: Venous stasis skin changes, hemosiderin deposits, healed venous stasis ulcerations   Neurological:      Mental Status: He is alert and oriented to person, place, and time. GCS: GCS eye subscore is 4. GCS verbal subscore is 5. GCS motor subscore is 6. Sensory: Sensation is intact. Motor: Motor function is intact.    Psychiatric:         Mood and Affect: Mood normal.         Speech: Speech normal. Behavior: Behavior normal.         Imaging/Labs:     none    Assessment and Plan:     ESRD on hemodialysis, malfunctioning left upper extremity AV graft, chronic venous insufficiency and lymphedema of bilateral lower extremities  · Plan for fistulagram for evaluation and treatment of malfunctioning AV graft  · Risks and benefits explained , questions answered and he consented to proceed  · Will make referral to lymphedema clinic for evaluation and treatment  Patient has tried and failed 4 weeks of conservative treatments including elevation, compression, and exercise and significant symptoms still remain. Patient has proximal swelling leading into the groin and abdomen area. A basic pump could exacerbate symptoms and cause an increase in proximal swelling. I am recommending this patient receive a flexitouch to address truncal swelling and safely and efficiently move lymphatic fluid. Continue compression stockings    Electronically signed by David Cash MD on 5/24/22 at 12:49 PM EDT      [address] [place] [address],[address]: [phone]  C: [phone]  Email: [email or web address]

## 2022-05-25 ENCOUNTER — TELEPHONE (OUTPATIENT)
Dept: VASCULAR SURGERY | Age: 62
End: 2022-05-25

## 2022-05-25 DIAGNOSIS — M79.89 LEG SWELLING: Primary | ICD-10-CM

## 2022-05-25 NOTE — TELEPHONE ENCOUNTER
----- Message from Marta Soriano MD sent at 5/24/2022  2:12 PM EDT -----  Fistulagram done and fixed but will ultimately need revision    Ok to use in the mean time    Will need to be scheduled for left upper extremity AV graft revision and tunneled dialysis catheter placement on June 15th, Hybrid room 9 am    Also he needs to be set up for lymphedema clinic and pumps to bilateral lower extremities    Thank you

## 2022-05-25 NOTE — TELEPHONE ENCOUNTER
I have the surgery scheduled. Do you need the OR team with this, and what meds does the patient need to stop prior to procedure.   Please advise.    ----- Message from Dedra Hernandez MD sent at 5/24/2022  2:12 PM EDT -----  Fistulagram done and fixed but will ultimately need revision    Ok to use in the mean time    Will need to be scheduled for left upper extremity AV graft revision and tunneled dialysis catheter placement on June 15th, Hybrid room 9 am    Also he needs to be set up for lymphedema clinic and pumps to bilateral lower extremities    Thank you

## 2022-06-14 ENCOUNTER — ANESTHESIA EVENT (OUTPATIENT)
Dept: OPERATING ROOM | Age: 62
End: 2022-06-14
Payer: MEDICARE

## 2022-06-15 ENCOUNTER — HOSPITAL ENCOUNTER (OUTPATIENT)
Age: 62
Setting detail: OBSERVATION
Discharge: HOME | End: 2022-06-16
Attending: SURGERY
Payer: MEDICARE

## 2022-06-15 ENCOUNTER — APPOINTMENT (OUTPATIENT)
Dept: GENERAL RADIOLOGY | Age: 62
End: 2022-06-15
Attending: SURGERY
Payer: MEDICARE

## 2022-06-15 ENCOUNTER — ANESTHESIA (OUTPATIENT)
Dept: OPERATING ROOM | Age: 62
End: 2022-06-15
Payer: MEDICARE

## 2022-06-15 DIAGNOSIS — M79.602 PAIN OF LEFT UPPER EXTREMITY: Primary | ICD-10-CM

## 2022-06-15 DIAGNOSIS — Z98.890 S/P ARTERIOVENOUS (AV) GRAFT REPAIR: ICD-10-CM

## 2022-06-15 PROBLEM — J96.01 ACUTE RESPIRATORY FAILURE WITH HYPOXIA (HCC): Status: ACTIVE | Noted: 2022-06-15

## 2022-06-15 LAB
GFR NON-AFRICAN AMERICAN: 6 ML/MIN
GFR SERPL CREATININE-BSD FRML MDRD: 7 ML/MIN
GFR SERPL CREATININE-BSD FRML MDRD: ABNORMAL ML/MIN/{1.73_M2}
GLUCOSE BLD-MCNC: 107 MG/DL (ref 75–110)
GLUCOSE BLD-MCNC: 133 MG/DL (ref 74–100)
POC BUN: 50 MG/DL (ref 8–26)
POC CHLORIDE: 95 MMOL/L (ref 98–107)
POC CREATININE: 8.82 MG/DL (ref 0.51–1.19)
POC HEMATOCRIT: 34 % (ref 41–53)
POC HEMOGLOBIN: 11.7 G/DL (ref 13.5–17.5)
POC POTASSIUM: 4.3 MMOL/L (ref 3.5–4.5)
POC SODIUM: 135 MMOL/L (ref 138–146)

## 2022-06-15 PROCEDURE — 76937 US GUIDE VASCULAR ACCESS: CPT

## 2022-06-15 PROCEDURE — G0378 HOSPITAL OBSERVATION PER HR: HCPCS

## 2022-06-15 PROCEDURE — C1769 GUIDE WIRE: HCPCS

## 2022-06-15 PROCEDURE — 82565 ASSAY OF CREATININE: CPT

## 2022-06-15 PROCEDURE — 3600000014 HC SURGERY LEVEL 4 ADDTL 15MIN: Performed by: SURGERY

## 2022-06-15 PROCEDURE — 77001 FLUOROGUIDE FOR VEIN DEVICE: CPT | Performed by: SURGERY

## 2022-06-15 PROCEDURE — 2500000003 HC RX 250 WO HCPCS: Performed by: ANESTHESIOLOGY

## 2022-06-15 PROCEDURE — 82947 ASSAY GLUCOSE BLOOD QUANT: CPT

## 2022-06-15 PROCEDURE — 7100000011 HC PHASE II RECOVERY - ADDTL 15 MIN: Performed by: SURGERY

## 2022-06-15 PROCEDURE — 7100000010 HC PHASE II RECOVERY - FIRST 15 MIN: Performed by: SURGERY

## 2022-06-15 PROCEDURE — 76937 US GUIDE VASCULAR ACCESS: CPT | Performed by: SURGERY

## 2022-06-15 PROCEDURE — 71045 X-RAY EXAM CHEST 1 VIEW: CPT

## 2022-06-15 PROCEDURE — 3600000004 HC SURGERY LEVEL 4 BASE: Performed by: SURGERY

## 2022-06-15 PROCEDURE — C1769 GUIDE WIRE: HCPCS | Performed by: SURGERY

## 2022-06-15 PROCEDURE — 3700000001 HC ADD 15 MINUTES (ANESTHESIA): Performed by: SURGERY

## 2022-06-15 PROCEDURE — 84132 ASSAY OF SERUM POTASSIUM: CPT

## 2022-06-15 PROCEDURE — 6360000002 HC RX W HCPCS: Performed by: ANESTHESIOLOGY

## 2022-06-15 PROCEDURE — 2709999900 HC NON-CHARGEABLE SUPPLY: Performed by: SURGERY

## 2022-06-15 PROCEDURE — 7100000000 HC PACU RECOVERY - FIRST 15 MIN

## 2022-06-15 PROCEDURE — 85014 HEMATOCRIT: CPT

## 2022-06-15 PROCEDURE — 99225 PR SBSQ OBSERVATION CARE/DAY 25 MINUTES: CPT | Performed by: INTERNAL MEDICINE

## 2022-06-15 PROCEDURE — 6370000000 HC RX 637 (ALT 250 FOR IP): Performed by: SURGERY

## 2022-06-15 PROCEDURE — 36558 INSERT TUNNELED CV CATH: CPT | Performed by: SURGERY

## 2022-06-15 PROCEDURE — 36832 AV FISTULA REVISION OPEN: CPT | Performed by: SURGERY

## 2022-06-15 PROCEDURE — 7100000001 HC PACU RECOVERY - ADDTL 15 MIN

## 2022-06-15 PROCEDURE — 3700000000 HC ANESTHESIA ATTENDED CARE: Performed by: SURGERY

## 2022-06-15 PROCEDURE — 6360000002 HC RX W HCPCS

## 2022-06-15 PROCEDURE — 84295 ASSAY OF SERUM SODIUM: CPT

## 2022-06-15 PROCEDURE — 82435 ASSAY OF BLOOD CHLORIDE: CPT

## 2022-06-15 PROCEDURE — C1750 CATH, HEMODIALYSIS,LONG-TERM: HCPCS

## 2022-06-15 PROCEDURE — 6360000002 HC RX W HCPCS: Performed by: SURGERY

## 2022-06-15 PROCEDURE — 10701609 HC MISC GUIDEWIRE

## 2022-06-15 PROCEDURE — A4217 STERILE WATER/SALINE, 500 ML: HCPCS | Performed by: SURGERY

## 2022-06-15 PROCEDURE — 10701609 HC MISC CATH HEMODIALYSIS

## 2022-06-15 PROCEDURE — 6370000000 HC RX 637 (ALT 250 FOR IP): Performed by: NURSE PRACTITIONER

## 2022-06-15 PROCEDURE — 2580000003 HC RX 258: Performed by: SURGERY

## 2022-06-15 PROCEDURE — C1768 GRAFT, VASCULAR: HCPCS | Performed by: SURGERY

## 2022-06-15 PROCEDURE — 2500000003 HC RX 250 WO HCPCS: Performed by: SURGERY

## 2022-06-15 PROCEDURE — 84520 ASSAY OF UREA NITROGEN: CPT

## 2022-06-15 PROCEDURE — 99214 OFFICE O/P EST MOD 30 MIN: CPT | Performed by: SURGERY

## 2022-06-15 DEVICE — GRAFT VASC L40CM DIA6MM BOV CAR ART CLLGN FOR FUNC HAD ACCS: Type: IMPLANTABLE DEVICE | Site: ARM | Status: FUNCTIONAL

## 2022-06-15 RX ORDER — SODIUM CHLORIDE 0.9 % (FLUSH) 0.9 %
5-40 SYRINGE (ML) INJECTION EVERY 12 HOURS SCHEDULED
Status: DISCONTINUED | OUTPATIENT
Start: 2022-06-15 | End: 2022-06-15 | Stop reason: HOSPADM

## 2022-06-15 RX ORDER — MAGNESIUM HYDROXIDE 1200 MG/15ML
LIQUID ORAL CONTINUOUS PRN
Status: COMPLETED | OUTPATIENT
Start: 2022-06-15 | End: 2022-06-15

## 2022-06-15 RX ORDER — PROPOFOL 10 MG/ML
INJECTION, EMULSION INTRAVENOUS PRN
Status: DISCONTINUED | OUTPATIENT
Start: 2022-06-15 | End: 2022-06-15 | Stop reason: SDUPTHER

## 2022-06-15 RX ORDER — FENTANYL CITRATE 50 UG/ML
25 INJECTION, SOLUTION INTRAMUSCULAR; INTRAVENOUS EVERY 5 MIN PRN
Status: DISCONTINUED | OUTPATIENT
Start: 2022-06-15 | End: 2022-06-15 | Stop reason: HOSPADM

## 2022-06-15 RX ORDER — CEFAZOLIN SODIUM 1 G/3ML
INJECTION, POWDER, FOR SOLUTION INTRAMUSCULAR; INTRAVENOUS PRN
Status: DISCONTINUED | OUTPATIENT
Start: 2022-06-15 | End: 2022-06-15 | Stop reason: SDUPTHER

## 2022-06-15 RX ORDER — DIPHENHYDRAMINE HYDROCHLORIDE 50 MG/ML
12.5 INJECTION INTRAMUSCULAR; INTRAVENOUS
Status: DISCONTINUED | OUTPATIENT
Start: 2022-06-15 | End: 2022-06-15 | Stop reason: HOSPADM

## 2022-06-15 RX ORDER — SODIUM CHLORIDE 9 MG/ML
INJECTION, SOLUTION INTRAVENOUS PRN
Status: DISCONTINUED | OUTPATIENT
Start: 2022-06-15 | End: 2022-06-15 | Stop reason: HOSPADM

## 2022-06-15 RX ORDER — FENTANYL CITRATE 50 UG/ML
INJECTION, SOLUTION INTRAMUSCULAR; INTRAVENOUS PRN
Status: DISCONTINUED | OUTPATIENT
Start: 2022-06-15 | End: 2022-06-15 | Stop reason: SDUPTHER

## 2022-06-15 RX ORDER — METOPROLOL TARTRATE 5 MG/5ML
INJECTION INTRAVENOUS PRN
Status: DISCONTINUED | OUTPATIENT
Start: 2022-06-15 | End: 2022-06-15 | Stop reason: SDUPTHER

## 2022-06-15 RX ORDER — LIDOCAINE HYDROCHLORIDE 10 MG/ML
INJECTION, SOLUTION INFILTRATION; PERINEURAL
Status: DISPENSED
Start: 2022-06-15 | End: 2022-06-15

## 2022-06-15 RX ORDER — ONDANSETRON 2 MG/ML
4 INJECTION INTRAMUSCULAR; INTRAVENOUS
Status: DISCONTINUED | OUTPATIENT
Start: 2022-06-15 | End: 2022-06-15 | Stop reason: HOSPADM

## 2022-06-15 RX ORDER — SODIUM CHLORIDE 0.9 % (FLUSH) 0.9 %
5-40 SYRINGE (ML) INJECTION PRN
Status: DISCONTINUED | OUTPATIENT
Start: 2022-06-15 | End: 2022-06-15 | Stop reason: HOSPADM

## 2022-06-15 RX ORDER — FENTANYL CITRATE 50 UG/ML
50 INJECTION, SOLUTION INTRAMUSCULAR; INTRAVENOUS EVERY 5 MIN PRN
Status: DISCONTINUED | OUTPATIENT
Start: 2022-06-15 | End: 2022-06-15 | Stop reason: HOSPADM

## 2022-06-15 RX ORDER — MIDAZOLAM HYDROCHLORIDE 1 MG/ML
INJECTION INTRAMUSCULAR; INTRAVENOUS PRN
Status: DISCONTINUED | OUTPATIENT
Start: 2022-06-15 | End: 2022-06-15 | Stop reason: SDUPTHER

## 2022-06-15 RX ORDER — OXYCODONE HYDROCHLORIDE AND ACETAMINOPHEN 5; 325 MG/1; MG/1
2 TABLET ORAL EVERY 4 HOURS PRN
Status: DISCONTINUED | OUTPATIENT
Start: 2022-06-15 | End: 2022-06-16 | Stop reason: HOSPADM

## 2022-06-15 RX ORDER — PROPOFOL 10 MG/ML
INJECTION, EMULSION INTRAVENOUS
Status: DISPENSED
Start: 2022-06-15 | End: 2022-06-15

## 2022-06-15 RX ORDER — SODIUM CHLORIDE 9 MG/ML
INJECTION, SOLUTION INTRAVENOUS CONTINUOUS
Status: DISCONTINUED | OUTPATIENT
Start: 2022-06-15 | End: 2022-06-15

## 2022-06-15 RX ORDER — MAGNESIUM SULFATE 1 G/100ML
INJECTION INTRAVENOUS PRN
Status: DISCONTINUED | OUTPATIENT
Start: 2022-06-15 | End: 2022-06-15 | Stop reason: SDUPTHER

## 2022-06-15 RX ORDER — OXYCODONE HYDROCHLORIDE AND ACETAMINOPHEN 5; 325 MG/1; MG/1
1 TABLET ORAL EVERY 4 HOURS PRN
Status: DISCONTINUED | OUTPATIENT
Start: 2022-06-15 | End: 2022-06-16 | Stop reason: HOSPADM

## 2022-06-15 RX ORDER — LIDOCAINE HYDROCHLORIDE 10 MG/ML
INJECTION, SOLUTION INFILTRATION; PERINEURAL PRN
Status: DISCONTINUED | OUTPATIENT
Start: 2022-06-15 | End: 2022-06-15 | Stop reason: ALTCHOICE

## 2022-06-15 RX ORDER — OXYCODONE HYDROCHLORIDE AND ACETAMINOPHEN 5; 325 MG/1; MG/1
1 TABLET ORAL EVERY 6 HOURS PRN
Qty: 28 TABLET | Refills: 0 | Status: SHIPPED | OUTPATIENT
Start: 2022-06-15 | End: 2022-06-22

## 2022-06-15 RX ADMIN — OXYCODONE HYDROCHLORIDE AND ACETAMINOPHEN 2 TABLET: 5; 325 TABLET ORAL at 20:47

## 2022-06-15 RX ADMIN — PROPOFOL 50 MCG/KG/MIN: 10 INJECTION, EMULSION INTRAVENOUS at 09:29

## 2022-06-15 RX ADMIN — FENTANYL CITRATE 25 MCG: 50 INJECTION, SOLUTION INTRAMUSCULAR; INTRAVENOUS at 09:36

## 2022-06-15 RX ADMIN — MIDAZOLAM HYDROCHLORIDE 2 MG: 2 INJECTION, SOLUTION INTRAMUSCULAR; INTRAVENOUS at 09:29

## 2022-06-15 RX ADMIN — MAGNESIUM SULFATE HEPTAHYDRATE 1000 MG: 1 INJECTION, SOLUTION INTRAVENOUS at 11:29

## 2022-06-15 RX ADMIN — FENTANYL CITRATE 25 MCG: 50 INJECTION, SOLUTION INTRAMUSCULAR; INTRAVENOUS at 11:46

## 2022-06-15 RX ADMIN — SODIUM CHLORIDE: 9 INJECTION, SOLUTION INTRAVENOUS at 09:27

## 2022-06-15 RX ADMIN — FENTANYL CITRATE 25 MCG: 50 INJECTION, SOLUTION INTRAMUSCULAR; INTRAVENOUS at 09:30

## 2022-06-15 RX ADMIN — SODIUM CHLORIDE: 9 INJECTION, SOLUTION INTRAVENOUS at 12:45

## 2022-06-15 RX ADMIN — METOPROLOL TARTRATE 1 MG: 1 INJECTION, SOLUTION INTRAVENOUS at 11:30

## 2022-06-15 RX ADMIN — CEFAZOLIN 2000 MG: 1 INJECTION, POWDER, FOR SOLUTION INTRAMUSCULAR; INTRAVENOUS at 09:31

## 2022-06-15 ASSESSMENT — PAIN SCALES - GENERAL
PAINLEVEL_OUTOF10: 0
PAINLEVEL_OUTOF10: 9
PAINLEVEL_OUTOF10: 0
PAINLEVEL_OUTOF10: 0

## 2022-06-15 ASSESSMENT — PAIN DESCRIPTION - LOCATION: LOCATION: ARM

## 2022-06-15 ASSESSMENT — PAIN DESCRIPTION - ORIENTATION: ORIENTATION: LEFT

## 2022-06-15 ASSESSMENT — PAIN DESCRIPTION - ONSET: ONSET: ON-GOING

## 2022-06-15 ASSESSMENT — PAIN DESCRIPTION - FREQUENCY: FREQUENCY: CONTINUOUS

## 2022-06-15 ASSESSMENT — PAIN DESCRIPTION - DESCRIPTORS: DESCRIPTORS: ACHING;SORE

## 2022-06-15 ASSESSMENT — PAIN DESCRIPTION - PAIN TYPE: TYPE: ACUTE PAIN

## 2022-06-15 ASSESSMENT — ENCOUNTER SYMPTOMS: SHORTNESS OF BREATH: 1

## 2022-06-15 NOTE — ANESTHESIA POSTPROCEDURE EVALUATION
Department of Anesthesiology  Postprocedure Note    Patient: Samy Villatoro  MRN: [id number]  [place]: 1960  Date of evaluation: 6/15/2022  Time:  1:27 PM     Procedure Summary     Date: 06/15/22 Room / Location: [address] [place] [address]    Anesthesia Start: 9001 Anesthesia Stop: 5843    Procedure: REVISION OF UPPER EXTREMITY AV FISTULA GRAFT, CENTRAL VENOUS CATHETER PLACEMENT (C-ARM) (Left ) Diagnosis:       ESRD (end stage renal disease) ([facility]      (lue avg revison w/tunnel catheter placement)    Surgeons: Valorie Bazzi MD Responsible Provider: Deretha Habermann, MD    Anesthesia Type: general ASA Status: 4          Anesthesia Type: general    [place] Phase I:      [place] Phase II:      Last vitals: Reviewed and per EMR flowsheets.        Anesthesia Post Evaluation    Patient location during evaluation: PACU  Patient participation: complete - patient participated  Level of consciousness: awake  Pain score: 1  Airway patency: patent  Nausea & Vomiting: no nausea and no vomiting  Complications: no  Cardiovascular status: blood pressure returned to baseline and hemodynamically stable  Respiratory status: acceptable  Hydration status: euvolemic

## 2022-06-15 NOTE — PROGRESS NOTES
PHASE 2 initiated as patient returns to [facility] room 6 / PARS 8. Anesthesia at bedside / handoff given. Additional reversal given IV by Yrn anesthesia. Oral airway in place. Tunnel cath to right chest / 2 ports capped with site clean and dry. Band aid above site clean and dry. Band aid to left arm clean and dry with audible bruit /thrill present. Assessment further reviewed. Oral airway removed by anesthesia and productive cough with oral suctioning / white sputum. Slow to awake. Side rails up with writer at bedside.

## 2022-06-15 NOTE — H&P
[facility]  Office: [address], DO, Holly Stacey, DO, Carlos Alberto Adames, DO, Krystyna Guerrero, DO, Valiant Sicard, MD, Pam Hernandez MD, Carlos Sosa MD, Brett Michael MD, Renato Newman MD, Solange Lizama MD, Avinash Ferrera MD, Gisselle Carlisle, DO, Alia Gonzalez MD,  Elizabeth Bray DO, Tonya Joe MD, Radha Mayfield MD, Kelvin Short MD, Enrico Tran DO, Joselyn Lopez MD, Carroll Maya MD, Fadi Alba DO, Héctor Bettencourt MD, Walter Tamayo, [facility], [facility], CNP, Sue Cherry, CNP, Sarah Beth Arenas, CNP, Tray Hernandez, CNP, Joshua Morris, CNP, Loida Greer PA-C, Elizabeth Ackerman, [facility], Sejal Chen, CNP, Sergio Lombardi, CNP, Gavino Puentes, CNP, Golden Mckeon, CNS, Asif Frias, [facility], Delaney Selby, CNP, Nii Vogt, CNP, Evelyn Nunez, [address]    HISTORY AND PHYSICAL EXAMINATION            Date:   6/15/2022  Patient name:  Everett Sellers  Date of admission:  6/15/2022  8:04 AM  MRN:   [id number]  Account:  [de-identified]  YOB: 1960  PCP:    Dima Burgos  Room:   [facility]/0512-  Code Status:    Full Code    Chief Complaint:     Hypoxia    History Obtained From:     patient    History of Present Illness:     24-year-old male presents from outpatient for revision of his AV graft. Patient was brought to the OR with Dr. Lance Arenas this afternoon and had difficulty waking up from sedation. Patient required 2 L nasal cannula, was still drowsy and decision was made to admit to the hospital for observation overnight for hypoxia and lethargy. Patient upon examination was feeling much better ready, waking up but still upset about his nose being tickled by the nasal cannula.   Patient no complaints, discussed chest x-ray    Past Medical History:     Past Medical History:   Diagnosis Date    Acute congestive heart failure ([address] 12/21/2016    Acute on chronic diastolic congestive heart failure ([address] 11/24/2018    Anemia 11/25/2018    Anemia of chronic renal failure 10/17/2016    Atrial fibrillation ([address] 01/03/2022    AVF (arteriovenous fistula) (HCC)     Bowel obstruction ([address] 12/26/2013    CAD (coronary artery disease) 2013    ?     Chest pain at rest 12/21/2016    CHF (congestive heart failure) ([address] 2013    last episode 11/2018    CHF (congestive heart failure), NYHA class I, acute on chronic, combined ([address] 2/9/2021    Chronic kidney disease     CKD (chronic kidney disease) stage 4, GFR 15-29 ml/min ([address] 10/17/2016    Coronary artery disease involving native coronary artery of native heart without angina pectoris     Diabetes mellitus ([address] 2012    NIDDM    Dialysis patient ([facility]     Flavio Santiago  /  Griffin Acosta  /  Stephanie John    ESRD (end stage renal disease) ([facility]     Essential hypertension 11/25/2018    Groin swelling 07/17/2019    Hemodialysis patient ([address] 11/28/2018    TUNNELD CATHETER RIGHT DIALYSIS ACCESSTUES THURS AND AT ARROWHEAD    Hydrocele 07/17/2019    Hyperlipidemia 2013    ON RX    Hypertension 2013    ON RX    Inguinal hernia     BILATERAL- NEEDS REPAIRED, UMBILICAL HERNIA  ALSO    MI, old 12/26/2013    NSTEMI (non-ST elevated myocardial infarction) ([address] 6/17/2019    On home O2     2 L    BATSHEVA (obstructive sleep apnea)     Patient in clinical research study 04/01/2019    XIENCE SHORT DAPT    Pneumonia     Poor historian     Prostatism 11/17/2017    Respiratory distress 11/25/2018    Rising PSA level 11/17/2017    S/P arteriovenous (AV) graft placement     S/P PTCA - TIM distal LAD - Dr. Hoa oSlis 4/1/19 4/1/2019    Sleep apnea 2015    pt has machine and does not use it    Small bowel obstruction ([address] 5/1/2021    SOB (shortness of breath) 02/02/2022    noted walking from waiting room to pre op testing    Type 2 diabetes mellitus with chronic kidney disease on chronic dialysis ([address] 10/17/2016        Past Surgical History:     Past Surgical History: Procedure Laterality Date    ABDOMEN SURGERY  2013    BOWEL OBSTRUCTION    AV FISTULA CREATION Left 02/20/2019    AV FISTULA REPAIR  07/17/2019    AV fistula revision, branch ligation / Percutaneous angioplasty of proximal anastomosis and outflow tract of dialysis circuit with drug coated balloon  /  Dr Yuliet Mckeon  02/09/2021    Dr. Pia Araujo, [address]  03/31/2019    TIM LAD    CYSTOSCOPY  11/17/2017    CYSTOSCOPY N/A 04/01/2022    CYSTOSCOPY DILATION performed by Sergio Yoo MD at [address] [facility] [address] Left 02/20/2019    AV FISTULA CREATION ARM performed by Mery Younger MD at [address] [facility] [address] Left 10/23/2019    LEFT UPPER EXTREMITY AV GRAFT CREATION WITH [id number] ARTEGRAFT, LEFT UPPER EXTREMITY AV FISTULA LIGATION performed by Mery Younger MD at [address] Left 2005    FRADCTURE HEEL    IR THROMBECTOMY PERCUT AVF  01/03/2022    IR THROMBECTOMY PERCUT AVF 1/3/2022 [facility]    MIDDLE EAR SURGERY  1966    EAR DRUM REPAIRED    MOUTH SURGERY  2007    1 WISDOM TOOTH REMOVED    NOSE SURGERY  1968    CAUTERY TO STOP NOSE BEEDS    OTHER SURGICAL HISTORY Left 03/06/2019    fistulagram    WA GENITAL SURG PROC,MALE UNLISTED N/A 02/16/2018    US  PROSTATE BIOPSY WITH SATURATION performed by Sergio Yoo MD at [facility] 11/17/2017    CYSTOSCOPY PROSTATE US BIOPSY performed by Sergio Yoo MD at [address] [place] [address]  02/16/2018    TONSILLECTOMY  1968    TUNNELED VENOUS CATHETER PLACEMENT Right 06/15/2022    Placement of tunneled central venous hemodialysis catheter  /  Dr Cj Silva 09/27/2021    EGD BIOPSY performed by Faye Norman MD at [address]  09/20/2019    LUE FISTULAGRAM  /  DR Katina Liriano  /  Findings: Severe stenosis of proximal cephalic vein. / Will plan for LUE AVG placement.  VASCULAR SURGERY Left 06/15/2022    Open revision of left upper extremity AV graft   /  Dr Ana Frausto        Medications Prior to Admission:     Prior to Admission medications    Medication Sig Start Date End Date Taking? Authorizing Provider   oxyCODONE-acetaminophen (PERCOCET) 5-325 MG per tablet Take 1 tablet by mouth every 6 hours as needed for Pain for up to 7 days. Intended supply: 7 days.  Take lowest dose possible to manage pain 6/15/22 6/22/22 Yes Valorie Bazzi MD   lanthanum (FOSRENOL) 1000 MG chewable tablet CHEW AND SWALLOW 1 TABLET THREE TIMES A DAY WITH MEALS 11/30/21   Historical Provider, MD   sevelamer (RENVELA) 2.4 g PACK packet Take 1 packet by mouth 1/18/22   Historical Provider, MD   carvedilol (COREG) 6.25 MG tablet Take 1 tablet by mouth 2 times daily (with meals) 1/5/22   Fausto Montez MD   apixaban (ELIQUIS) 5 MG TABS tablet Take 1 tablet by mouth 2 times daily 12/24/21   Atlee Shingles, DO   aspirin EC 81 MG EC tablet Take 1 tablet by mouth daily 12/24/21   Atlee Shingles, DO   losartan (COZAAR) 50 MG tablet Take 1 tablet by mouth daily 12/25/21   Atlee Shingles, DO   glimepiride (AMARYL) 1 MG tablet Take 1 tablet by mouth every morning (before breakfast) Do not take if blood sugars are less than 110 11/14/21   Kiley Romero MD   benzonatate (TESSALON) 100 MG capsule take 1 capsule by mouth three times a day if needed for 10 days 3/3/21   Historical Provider, MD   guaiFENesin-codeine (GUAIFENESIN AC) 100-10 MG/5ML liquid give 10 milliliters by mouth every 4 hours if needed 3/4/21   Historical Provider, MD   atorvastatin (LIPITOR) 40 MG tablet Take 1 tablet by mouth nightly 2/9/21   Ramses Dietz MD   traMADol [place] Fare) 50 MG tablet take 1 tablet by mouth every 6 hours if needed 10/21/20   Historical Provider, MD   calcium carbonate (TUMS) 500 MG chewable tablet Take 1 tablet by mouth daily as needed     Historical Provider, MD   Multiple Vitamins-Minerals (RENAPLEX-D) TABS Take 1 tablet by mouth every other day Pt takes occasionally 5/14/19   Historical Provider, MD   lidocaine-prilocaine (EMLA) 2.5-2.5 % cream  3/18/19   Historical Provider, MD        Allergies:     Lisinopril    Social History:     Tobacco:    reports that he has never smoked. He has never used smokeless tobacco.  Alcohol:      reports no history of alcohol use. Drug Use:  reports no history of drug use. Family History:     Family History   Problem Relation Age of Onset    Heart Disease Mother         CHF    Early Death Mother     High Blood Pressure Brother     Diabetes Brother     Asthma Brother     High Blood Pressure Brother     Diabetes Brother     High Blood Pressure Brother     Diabetes Brother        Review of Systems:     Positive and Negative as described in HPI.     CONSTITUTIONAL:  negative for fevers, chills, sweats, fatigue, weight loss  HEENT:  negative for vision, hearing changes, runny nose, throat pain  RESPIRATORY:  negative for shortness of breath, cough, congestion, wheezing  CARDIOVASCULAR:  negative for chest pain, palpitations  GASTROINTESTINAL:  negative for nausea, vomiting, diarrhea, constipation, change in bowel habits, abdominal pain   GENITOURINARY:  negative for difficulty of urination, burning with urination, frequency   INTEGUMENT:  negative for rash, skin lesions, easy bruising   HEMATOLOGIC/LYMPHATIC:  negative for swelling/edema   ALLERGIC/IMMUNOLOGIC:  negative for urticaria , itching  ENDOCRINE:  negative increase in drinking, increase in urination, hot or cold intolerance  MUSCULOSKELETAL:  +left arm pain negative joint pains, muscle aches, swelling of joints  NEUROLOGICAL:  negative for headaches, dizziness, lightheadedness, numbness, pain, tingling extremities  BEHAVIOR/PSYCH:  negative for depression, anxiety    Physical Exam:   /86   Pulse 81   Temp 97.5 °F (36.4 °C) (Axillary)   Resp 20   Ht 5' 10\" (1.778 m)   Wt 260 lb 2.3 oz (118 kg)   SpO2 100%   BMI 37.33 kg/m²   Temp (24hrs), Av.9 °F (36.6 °C), Min:97.5 °F (36.4 °C), Max:98.3 °F (36.8 °C)    Recent Labs     06/15/22  0912 06/15/22  1404   POCGLU 133* 107       Intake/Output Summary (Last 24 hours) at 6/15/2022 1822  Last data filed at 6/15/2022 1330  Gross per 24 hour   Intake 600 ml   Output --   Net 600 ml       General Appearance: alert, well appearing, and in no acute distress  Mental status: oriented to person, place, and time  Head: normocephalic, atraumatic  Lungs: Bilateral equal air entry, clear to ausculation, no wheezing, rales or rhonchi, normal effort +CVC in place  Cardiovascular: normal rate, regular rhythm, no murmur, gallop, rub  Abdomen: Soft, nontender, nondistended, normal bowel sounds, no hepatomegaly or splenomegaly  Neurologic: There are no new focal motor or sensory deficits, normal muscle tone and bulk, no abnormal sensation, normal speech, cranial nerves II through XII grossly intact  Skin: No gross lesions, rashes, bruising or bleeding on exposed skin area  Extremities: peripheral pulses palpable, no pedal edema or calf pain with palpation AV fistual in place with thrill  Psych: normal affect    Investigations:      Laboratory Testing:  Recent Results (from the past 24 hour(s))   Hemoglobin and hematocrit, blood    Collection Time: 06/15/22  9:12 AM   Result Value Ref Range    POC Hemoglobin 11.7 (L) 13.5 - 17.5 g/dL    POC Hematocrit 34 (L) 41 - 53 %   Creatinine W/GFR Point of Care    Collection Time: 06/15/22  9:12 AM   Result Value Ref Range    POC Creatinine 8.82 (HH) 0.51 - 1.19 mg/dL    GFR Comment 7 (L) >60 mL/min    GFR Non-African American 6 (L) >60 mL/min    GFR Comment         SODIUM (POC)    Collection Time: 06/15/22  9:12 AM   Result Value Ref Range    POC Sodium 135 (L) 138 - 146 mmol/L   POTASSIUM (POC)    Collection Time: 06/15/22  9:12 AM   Result Value Ref Range    POC Potassium 4.3 3.5 - 4.5 mmol/L   CHLORIDE (POC)    Collection Time: 06/15/22  9:12 AM   Result Value Ref Range    POC Chloride 95 (L) 98 - 107 mmol/L   POCT urea (BUN)    Collection Time: 06/15/22  9:12 AM   Result Value Ref Range    POC BUN 50 (H) 8 - 26 mg/dL   POCT Glucose    Collection Time: 06/15/22  9:12 AM   Result Value Ref Range    POC Glucose 133 (H) 74 - 100 mg/dL   POC Glucose Fingerstick    Collection Time: 06/15/22  2:04 PM   Result Value Ref Range    POC Glucose 107 75 - 110 mg/dL       Imaging/Diagnostics:  No results found. Assessment :      Hospital Problems           Last Modified POA    * (Principal) S/P arteriovenous (AV) graft repair 6/15/2022 Yes    Acute respiratory failure with hypoxia ([address] 6/15/2022 Yes    Pain of left upper extremity 6/15/2022 Yes          Plan:     Patient status observation in the Progressive Unit/Step down    1. Much more awake upon evaluation, wean oxygen as tolerated  2. Check chest x-ray  3. Okay for diet, if hypoxia improves tomorrow likely discharge. Already cleared by vascular surgery      Consultations:   IP CONSULT TO IV TEAM  IP CONSULT TO NEPHROLOGY     Patient is admitted as inpatient status because of co-morbidities listed above, severity of signs and symptoms as outlined, requirement for current medical therapies and most importantly because of direct risk to patient if care not provided in a hospital setting. Expected length of stay > 48 hours.     Tamela Blackman DO  6/15/2022  6:22 PM    Copy sent to Dr. Yovani Chester

## 2022-06-15 NOTE — ANESTHESIA PRE PROCEDURE
Department of Anesthesiology  Preprocedure Note       Name:  Angelia Bowens   Age:  59 y.o.  :  1960                                          MRN:  [id number]         Date:  6/15/2022      Surgeon: Amanda Grant): David Cash MD    Procedure:   Procedure: REVISION OF UPPER EXTREMITY AV FISTULA GRAFT, CENTRAL VENOUS CATHETER PLACEMENT (C-ARM) (Left )   Anesthesia type: Monitor Anesthesia Care   Diagnosis: ESRD (end stage renal disease) ([facility] [address] [N18.6]   Pre-op diagnosis: lue avg revison w/tunnel catheter placement         Medications prior to admission:   Prior to Admission medications    Medication Sig Start Date End Date Taking?  Authorizing Provider   lanthanum (FOSRENOL) 1000 MG chewable tablet CHEW AND SWALLOW 1 TABLET THREE TIMES A DAY WITH MEALS 21   Historical Provider, MD   sevelamer (RENVELA) 2.4 g PACK packet Take 1 packet by mouth 22   Historical Provider, MD   carvedilol (COREG) 6.25 MG tablet Take 1 tablet by mouth 2 times daily (with meals) 22   Fausto Hirsch MD   apixaban (ELIQUIS) 5 MG TABS tablet Take 1 tablet by mouth 2 times daily 21   [facility] Case, DO   aspirin EC 81 MG EC tablet Take 1 tablet by mouth daily 21   [facility] Case, DO   losartan (COZAAR) 50 MG tablet Take 1 tablet by mouth daily 21   [facility] Case, DO   glimepiride (AMARYL) 1 MG tablet Take 1 tablet by mouth every morning (before breakfast) Do not take if blood sugars are less than 110 21   Jake Mohan MD   furosemide (LASIX) 40 MG tablet Take 1 tablet by mouth 2 times daily 21   Jake Mohan MD   pantoprazole (PROTONIX) 40 MG tablet Take 1 tablet by mouth every morning (before breakfast)  Patient taking differently: Take 40 mg by mouth every morning (before breakfast) States not taking 21   Kevon Moe MD   benzonatate (TESSALON) 100 MG capsule take 1 capsule by mouth three times a day if needed for 10 days 3/3/21 Historical Provider, MD   guaiFENesin-codeine (GUAIFENESIN AC) 100-10 MG/5ML liquid give 10 milliliters by mouth every 4 hours if needed 3/4/21   Historical Provider, MD   atorvastatin (LIPITOR) 40 MG tablet Take 1 tablet by mouth nightly 2/9/21   Bibi Chawla MD   traMADol Everrett Mouse) 50 MG tablet take 1 tablet by mouth every 6 hours if needed 10/21/20   Historical Provider, MD   calcium carbonate (TUMS) 500 MG chewable tablet Take 1 tablet by mouth daily as needed     Historical Provider, MD   Multiple Vitamins-Minerals (RENAPLEX-D) TABS Take 1 tablet by mouth every other day Pt takes occasionally 5/14/19   Historical Provider, MD   lidocaine-prilocaine (EMLA) 2.5-2.5 % cream  3/18/19   Historical Provider, MD       Current medications:    No current outpatient medications on file. No current facility-administered medications for this visit. Allergies:     Allergies   Allergen Reactions    Lisinopril Swelling       Problem List:    Patient Active Problem List   Diagnosis Code    CKD (chronic kidney disease) stage 4, GFR 15-29 ml/min ([facility]) N18.4    Anemia of chronic renal failure N18.9, D63.1    Type 2 diabetes mellitus with chronic kidney disease on chronic dialysis ([facility]) E11.22, N18.6, Z99.2    Chest pain at rest R07.9    Pneumonia J18.9    Rising PSA level R97.20    Prostatism N40.0    Acute on chronic diastolic congestive heart failure ([facility]) I50.33    Anemia D64.9    Essential hypertension I10    Respiratory distress R06.03    AVF (arteriovenous fistula) ([facility]) I77.0    Precordial chest pain R07.2    S/P PTCA - TIM distal LAD - Dr. Mikel Parnell 4/1/19 Z98.61    NSTEMI (non-ST elevated myocardial infarction) ([facility] [address] I21.4    S/P arteriovenous (AV) graft placement V29.063    Acute on chronic combined systolic (congestive) and diastolic (congestive) heart failure ([facility]) I50.43    ESRD (end stage renal disease) ([facility] [address] N18.6    Coronary artery disease involving native coronary artery of native heart without angina pectoris I25.10    BATSHEVA (obstructive sleep apnea) G47.33    Acute congestive heart failure ([facility]) I50.9    Small bowel obstruction ([address] K56.609    Non-cardiac chest pain R07.89    Acute on chronic combined systolic and diastolic CHF (congestive heart failure) ([facility]) I50.43    Chronic abdominal pain R10.9, G89.29    Hypoxemia-due to CHF/fluid overload R09.02    Atrial fibrillation ([facility]) I48.91    Hyponatremia E87.1    CKD (chronic kidney disease) requiring chronic dialysis ([facility]) N18.6, Z99.2    Dialysis AV fistula malfunction ([facility]) T82.590A    Arteriovenous fistula occlusion ([facility]) T82.898A    AV graft malfunction, initial encounter ([facility] [address] T82.590A    Lymphedema due to venous insufficiency I89.0, I87.2       Past Medical History:        Diagnosis Date    Acute congestive heart failure ([address] 12/21/2016    Acute on chronic diastolic congestive heart failure ([address] 11/24/2018    Anemia 11/25/2018    Anemia of chronic renal failure 10/17/2016    Atrial fibrillation ([address] 01/03/2022    AVF (arteriovenous fistula) ([facility])     Bowel obstruction ([address] 12/26/2013    CAD (coronary artery disease) 2013    ?     Chest pain at rest 12/21/2016    CHF (congestive heart failure) ([address] 2013    last episode 11/2018    CHF (congestive heart failure), NYHA class I, acute on chronic, combined ([address] 2/9/2021    Chronic kidney disease     CKD (chronic kidney disease) stage 4, GFR 15-29 ml/min ([address] 10/17/2016    Coronary artery disease involving native coronary artery of native heart without angina pectoris     Diabetes mellitus ([address] 2012    NIDDM    Dialysis patient ([address]     [address]  /  Judd Titus  /  [facility] [employer]    ESRD (end stage renal disease) ([address]     Essential hypertension 11/25/2018    Groin swelling 07/17/2019    Hemodialysis patient ([address] 11/28/2018    TUNNELD CATHETER RIGHT DIALYSIS ACCESSTUES THURS AND AT [place]    Hydrocele 07/17/2019    Hyperlipidemia 2013    ON RX    Hypertension 2013 ON RX    Inguinal hernia     BILATERAL- NEEDS REPAIRED, UMBILICAL HERNIA  ALSO    MI, old 12/26/2013    NSTEMI (non-ST elevated myocardial infarction) ([facility] [address] 6/17/2019    On home O2     2 L    BATSHEVA (obstructive sleep apnea)     Patient in clinical research study 04/01/2019    XIENCE SHORT DAPT    Pneumonia     Poor historian     Prostatism 11/17/2017    Respiratory distress 11/25/2018    Rising PSA level 11/17/2017    S/P arteriovenous (AV) graft placement     S/P PTCA - TIM distal LAD - Dr. Roman Meth 4/1/19 4/1/2019    Sleep apnea 2015    pt has machine and does not use it    Small bowel obstruction ([facility] [address] 5/1/2021    SOB (shortness of breath) 02/02/2022    noted walking from waiting room to pre op testing    Type 2 diabetes mellitus with chronic kidney disease on chronic dialysis ([facility] [address] 10/17/2016       Past Surgical History:        Procedure Laterality Date    ABDOMINAL SURGERY  2013    BOWEL OBSTRUCTION    AV FISTULA CREATION Left 02/20/2019    AV FISTULA REPAIR  07/17/2019    AV fistula revision, branch ligation / Percutaneous angioplasty of proximal anastomosis and outflow tract of dialysis circuit with drug coated balloon  /  Dr Hloli Wagoner  02/09/2021    Dr. Elvia Sheffield [facility]  03/31/2019    TIM LAD    CYSTOSCOPY  11/17/2017    CYSTOSCOPY N/A 4/1/2022    CYSTOSCOPY DILATION performed by José Miguel Herrera MD at [facility] 2/20/2019    AV FISTULA CREATION ARM performed by Ange Timmons MD at [facility] 10/23/2019    LEFT UPPER EXTREMITY AV GRAFT CREATION WITH [id number] ARTEGRAFT, LEFT UPPER EXTREMITY AV FISTULA LIGATION performed by Ange Timmons MD at [facility] 2005    [address] [facility]    IR THROMBECTOMY PERCUT AVF  1/3/2022    IR THROMBECTOMY PERCUT AVF 1/3/2022 [facility]    MIDDLE EAR SURGERY  1966    EAR DRUM REPAIRED    MOUTH SURGERY  2007    1 WISDOM TOOTH REMOVED    NOSE SURGERY  1968    CAUTERY TO STOP NOSE BEEDS    OTHER SURGICAL HISTORY Left 03/06/2019    fistulagram    MO GENITAL SURG PROC,MALE UNLISTED N/A 2/16/2018    US  PROSTATE BIOPSY WITH SATURATION performed by Federico Coleman MD at [address] N/A 11/17/2017    CYSTOSCOPY PROSTATE US BIOPSY performed by Federico Coleman MD at [address]  02/16/2018    TONSILLECTOMY  1968    UPPER GASTROINTESTINAL ENDOSCOPY N/A 9/27/2021    EGD BIOPSY performed by Bossman Carrera MD at [address]  09/20/2019    LUE FISTULAGRAM  /  DR Torie Galvez  /  Findings: Severe stenosis of proximal cephalic vein. / Will plan for LUE AVG placement. [address]       Social History:    Social History     Tobacco Use    Smoking status: Never Smoker    Smokeless tobacco: Never Used   Substance Use Topics    Alcohol use: No                                Counseling given: Not Answered      Vital Signs (Current): There were no vitals filed for this visit.                                            BP Readings from Last 3 Encounters:   05/24/22 122/79   04/01/22 118/85   04/01/22 135/77       NPO Status:                                                                                 BMI:   Wt Readings from Last 3 Encounters:   05/24/22 253 lb 8.5 oz (115 kg)   03/16/22 270 lb (122.5 kg)   02/10/22 264 lb (119.7 kg)     There is no height or weight on file to calculate BMI.    CBC:   Lab Results   Component Value Date    WBC 5.8 02/02/2022    RBC 3.34 02/02/2022    RBC 3.62 09/08/2018    HGB 10.2 02/02/2022    HCT 34.0 02/02/2022    .8 02/02/2022    RDW 16.8 02/02/2022     02/02/2022       CMP:   Lab Results   Component Value Date     02/02/2022    K 4.9 04/01/2022    CL 92 02/02/2022    CO2 26 02/02/2022    BUN 51 02/02/2022    CREATININE 7.43 02/02/2022    GFRAA 9 02/02/2022 LABGLOM 7 02/02/2022    GLUCOSE 162 02/02/2022    GLUCOSE 147 09/08/2018    PROT 8.0 11/12/2021    CALCIUM 9.0 02/02/2022    BILITOT 1.55 11/12/2021    ALKPHOS 181 11/12/2021    AST 14 11/12/2021    ALT 15 11/12/2021       POC Tests: No results for input(s): POCGLU, POCNA, POCK, POCCL, POCBUN, POCHEMO, POCHCT in the last 72 hours. Coags:   Lab Results   Component Value Date    PROTIME 14.2 01/03/2022    INR 1.4 01/03/2022    APTT 26.2 12/31/2021       HCG (If Applicable): No results found for: PREGTESTUR, PREGSERUM, HCG, HCGQUANT     ABGs: No results found for: PHART, PO2ART, QGQ8ZEZ, DEZ3ORX, BEART, X1NHAHDZ     Type & Screen (If Applicable):  No results found for: LABABO, [address]    Anesthesia Evaluation  Patient summary reviewed no history of anesthetic complications:   Airway: Mallampati: III  TM distance: >3 FB   Neck ROM: full  Mouth opening: > = 3 FB   Dental: normal exam         Pulmonary: breath sounds clear to auscultation  (+) pneumonia:  shortness of breath:  sleep apnea: on CPAP and noncompliant,      (-) COPD and asthma                           Cardiovascular:  Exercise tolerance: good (>4 METS),   (+) hypertension:, past MI: > 6 months, CAD:, CHF:, hyperlipidemia    (-) dysrhythmias,  angina and  RACHEL      Rhythm: regular  Rate: normal           Beta Blocker:  Not on Beta Blocker         Neuro/Psych:      (-) seizures and CVA           GI/Hepatic/Renal:   (+) renal disease: CRI and dialysis,      (-) GERD and liver disease       Endo/Other:    (+) DiabetesType II DM, poorly controlled, , .    (-) hypothyroidism, hyperthyroidism               Abdominal:   (+) obese,           Vascular: negative vascular ROS. Other Findings:             Anesthesia Plan      MAC     ASA 4     (Anesthesia consent signed by patient)  Induction: intravenous. MIPS: Postoperative opioids intended and Prophylactic antiemetics administered. Anesthetic plan and risks discussed with patient.       Plan discussed with CRNA. Attending anesthesiologist reviewed and agrees with Pre Eval content          Narrative & Impression    Atrial fibrillation  Left axis deviation  Right bundle branch block  Inferior infarct , age undetermined  Anterior infarct , age undetermined  Abnormal ECG  When compared with ECG of 31-DEC-2021 11:58,  Atrial fibrillation has replaced Wide QRS rhythm      Specimen Collected: 01/03/22 10:22          CONCLUSIONS     Summary  Left ventricle is moderately enlarged, global left ventricular systolic  function is moderately reduced, Visually estimated EF 30%. Global dysfunction is noted. Grade III (severe) left ventricular diastolic dysfunction. Left atrium is moderately dilated. Right atrial dilatation. Prominent Eustachian valve. Dialysis catheter  noted. Right ventricular dilatation with reduced systolic function. Aortic valve is sclerotic but opens well. Trivial aortic insufficiency. Mitral valve sclerosis without stenosis. Trivial mitral regurgitation. Moderate to severe tricuspid regurgitation. Estimated right ventricular  systolic pressure is 39 mmHg.   Trivial pulmonic insufficiency.         Vishnu Jameson MD   6/15/2022

## 2022-06-15 NOTE — PROGRESS NOTES
Dr Taniya Salazar called and in to see patient. Patient complains of SOB with sats on room air %. Patient unable to speak in complete sentences without having to catch breath. Oxygen back on 3 liters without acute distress.   Dr Taniya Salazar to speak with Dr Kaila Gomez

## 2022-06-15 NOTE — OP NOTE
Operative Note      Patient: Elie Meza  YOB: 1960  MRN: [id number]    Date of Procedure: 6/15/2022    Pre-Op Diagnosis: Malfunctioning left upper extremity AV graft    Post-Op Diagnosis: Same       Procedure:  1) Open revision of left upper extremity AV graft   2) US guided vascular access of right internal jugular vein  3) Placement of tunneled central venous hemodialysis catheter    Surgeon(s): Huma Torre MD    Anesthesia: Monitor Anesthesia Care, local    Estimated Blood Loss (mL): 23 ml    Complications: None    Specimens: none    Implants: 23 cm Palindrone tunneled central venous hemodialysis catheter placed into right internal jugular vein  Implant Name Type Inv. Item Serial No.  Lot No. LRB No. Used Action   GRAFT VASC L40CM DIA6MM BOV CAR ART CLLGN FOR FUNC HAD ACCS - [id number] Vascular grafts GRAFT VASC L40CM DIA6MM BOV CAR ART CLLGN FOR FUNC HAD ACCS  [employer]WD [id number] Left 1 Implanted     Drains: none    Findings: Good thrill over AV graft, palpable radial artery pulse on completion. Catheter works well, tip at atrial caval junction. Detailed Description of Procedure:     Mr. Tuyet Leblanc was brought to the operating room today for long term hemodialysis access creation. After appropriate anesthesia was delivered the left arm was prepped and draped in standard sterile fashion, timeout performed and agreed upon, antibiotics given during this period. I began by marking out the proximal segment of his previous AV graft with ultrasound. Local anesthesia delivered and a small longitudinal incision created over the proximal segment of the AV graft. Electrocautery utilized to get thru the subcutaneous tissue. The proximal AV graft was then dissected out sharply and vessel loops placed proximally and distally. Next a small longitudinal incision created over the axillary vein in the upper arm.   Electrocautery used to get thru the subcutaneous tissue down to the axillary vein. Sharp dissection performed and the axillary vein dissected out for several centimeters. Next local anesthesia deliver over the proposed track for the graft and then a 6 mm Artegraft was tunneled subcutaneously between the two incisions. Patient was given 5000 units of heparin and allowed to circulate. The graft was then cut to length and spatulated. The previous AV graft was controlled with proximal and distal clamps and transected. Distal end ligated with running 4-0 proline suture. The proximal end of the graft was spatulated as well. An end to end anastomosis with the new graft was performed using 6-0 proline. Prior to completion usual flushing maneuvers performed and the anastomosis was hemostatic. Next the graft was cut to length and spatulated over the axillary vein. The vein controlled with a clamps and a vessel loop. Venotomy created and extended. An end to side anastomosis from the graft to the axillary vein was performed with 6-0 proline, prior to completion usual flushing maneuvers performed and the anastomosis was hemostatic. There was a good thrill over the graft and a palpable radial artery pulse. The wound beds were irrigated and dried. Each closed with interrupted layers of vicryl and running monocryl. Steristrips and sterile dressing applied. Next the right neck and chest was prepped and draped in standard sterile fashion. I began by evaluating the right internal jugular vein under ultrasound, it was patent and compressible. Local anesthesia delivered and under ultrasound guidance using a micropuncture needle the right internal jugular vein was accessed, permanent ultrasound images saved, and a wire sent down the vena cava. Next the chest was anesthetized and a 23 cm Palindrome catheter was tunneled from the chest to the neck.   With an amplatz wire down the jugular access, the track was dilated and a 14-Latvian peel away sheath inserted under fluoroscopy. The catheter was then placed thru the sheath and the sheath removed. The tip of the catheter was positioned to the atrial caval junction. The catheter yamil back and flushed easily. It was locked with concentrated heparin. Small skin nick in the neck was closed with skin glue. The catheter secured with sterile dressings. Patient tolerated procedure well and was brought to the recovery room.     Electronically signed by Katy Blank MD on 6/15/2022 at 12:33 PM

## 2022-06-15 NOTE — PROGRESS NOTES
Dr Beverly High called and MD states had not contacted [facility] as of yet. MD aware that patient is being transferred to room 512 and until speaks with [facility] patient remains under his care. Patient transported per cart to room 512 with prescription for percocet given to [facility] RN by Marlee Zuñiga RN. Right tunnel cath site clean and dry with band aid at base of neck clean and dry. Band aid to left arm clean and dry. Patient awakens easily / oxygen on 3 liters without distress but patient still unable to complete full sentence without catching breath.

## 2022-06-15 NOTE — PROGRESS NOTES
Dr Josephine Cannon calls and after speaking with Dr Mcmullen [facility] and states will admit under [facility]. Patient updated and attempted to call daughter to update. Oxygen on 3 liters and resting quietly. Side rails up with call light to reach.

## 2022-06-15 NOTE — H&P
History and Physical        Left radial cephalic Av fistula creation (2/20/19)  Left AV fistula revision, branch ligation (3/6/19)  Fistulagram angioplasty (7/17/19)  LUE AV graft, radial cephalic fistula ligation (10/23/19)  Fistulagram, angioplasty (5/24/22)     Chief Complaint:       Malfunctioning left upper extremity AV graft, chronic swelling and skin changes to left leg     History of Present Illness:      Khushboo Lee is a 59 y.o. gentleman who presents for open revision of his AV graft. He has aneurysmal changes and a stent in his axillary vein that was noted on fistulagram last month. He was recommended to undergo new AV graft placement with possible axillary vein stent explantation for better outflow. He is working with lymphedema clinic to get compression stockings and pumps.   Continues to have chronic skin changes from swelling and venous insufficiency in his legs.     Medical History:      Past Medical History        Past Medical History:   Diagnosis Date    Acute congestive heart failure ([address] 12/21/2016    Acute on chronic diastolic congestive heart failure ([address] 11/24/2018    Anemia 11/25/2018    Anemia of chronic renal failure 10/17/2016    Atrial fibrillation ([address] 01/03/2022    AVF (arteriovenous fistula) (HCC)      Bowel obstruction ([address] 12/26/2013    CAD (coronary artery disease) 2013     ?    Chest pain at rest 12/21/2016    CHF (congestive heart failure) ([address] 2013     last episode 11/2018    CHF (congestive heart failure), NYHA class I, acute on chronic, combined ([address] 2/9/2021    Chronic kidney disease      CKD (chronic kidney disease) stage 4, GFR 15-29 ml/min ([address] 10/17/2016    Coronary artery disease involving native coronary artery of native heart without angina pectoris      Diabetes mellitus ([address] 2012     NIDDM    Dialysis patient ([address]       Gregoria Coleman  /  Sherry Mendez  /  Mi Rodney    ESRD (end stage renal disease) ([address]      Essential hypertension 11/25/2018    Groin swelling 07/17/2019    Hemodialysis patient ([facility] 11/28/2018     TUNNELD CATHETER RIGHT DIALYSIS ACCESSTUES THURS AND AT [facility]    Hydrocele 07/17/2019    Hyperlipidemia 2013     ON RX    Hypertension 2013     ON RX    Inguinal hernia       BILATERAL- NEEDS REPAIRED, UMBILICAL HERNIA  ALSO    MI, old 12/26/2013    NSTEMI (non-ST elevated myocardial infarction) ([facility] 6/17/2019    On home O2       2 L    BATSHEVA (obstructive sleep apnea)      Patient in clinical research study 04/01/2019     XIENCE SHORT DAPT    Pneumonia      Poor historian      Prostatism 11/17/2017    Respiratory distress 11/25/2018    Rising PSA level 11/17/2017    S/P arteriovenous (AV) graft placement      S/P PTCA - TIM distal LAD - Dr. Leonardo Fernandez 4/1/19 4/1/2019    Sleep apnea 2015     pt has machine and does not use it    Small bowel obstruction ([facility] 5/1/2021    SOB (shortness of breath) 02/02/2022     noted walking from waiting room to pre op testing    Type 2 diabetes mellitus with chronic kidney disease on chronic dialysis ([facility] 10/17/2016            Surgical History:      Past Surgical History         Past Surgical History:   Procedure Laterality Date    ABDOMEN SURGERY   2013     BOWEL OBSTRUCTION    AV FISTULA CREATION Left 02/20/2019    AV FISTULA REPAIR   07/17/2019     AV fistula revision, branch ligation / Percutaneous angioplasty of proximal anastomosis and outflow tract of dialysis circuit with drug coated balloon  /  Dr Yuliet Mckeon   02/09/2021     Dr. Pia Araujo, [address] [place] [address]   03/31/2019     TIM LAD    CYSTOSCOPY   11/17/2017    CYSTOSCOPY N/A 4/1/2022     CYSTOSCOPY DILATION performed by Sergio Yoo MD at [facility] 2/20/2019     AV FISTULA CREATION ARM performed by Mery Younger MD at [facility] 10/23/2019     LEFT UPPER EXTREMITY AV GRAFT CREATION WITH [id number] ARTEGRAFT, LEFT UPPER EXTREMITY AV FISTULA LIGATION performed by Sharlene Lau MD at [facility] Left 2005     FRADCTURE HEEL    IR THROMBECTOMY PERCUT AVF   1/3/2022     IR THROMBECTOMY PERCUT AVF 1/3/2022 [facility]    MIDDLE EAR SURGERY   1966     EAR DRUM REPAIRED    MOUTH SURGERY   2007     1 WISDOM TOOTH REMOVED    NOSE SURGERY   1968     CAUTERY TO STOP NOSE BEEDS    OTHER SURGICAL HISTORY Left 03/06/2019     fistulagram    ME GENITAL SURG PROC,MALE UNLISTED N/A 2/16/2018     US  PROSTATE BIOPSY WITH SATURATION performed by Chalo Pugh MD at [facility] 11/17/2017     CYSTOSCOPY PROSTATE US BIOPSY performed by Chalo Pugh MD at [address] [place] [address]   02/16/2018    TONSILLECTOMY   1968    UPPER GASTROINTESTINAL ENDOSCOPY N/A 9/27/2021     EGD BIOPSY performed by Barb Dupree MD at [facility]   09/20/2019     LUE FISTULAGRAM  /  DR Breezy Hoover  /  Findings: Severe stenosis of proximal cephalic vein. / Will plan for LUE AVG placement.     VASECTOMY   1983            Family History:      Family History         Family History   Problem Relation Age of Onset    Heart Disease Mother           CHF    Early Death Mother      High Blood Pressure Brother      Diabetes Brother      Asthma Brother      High Blood Pressure Brother      Diabetes Brother      High Blood Pressure Brother      Diabetes Brother              Allergies:       Lisinopril     Medications:      Current Facility-Administered Medications          Current Outpatient Medications   Medication Sig Dispense Refill    lanthanum (FOSRENOL) 1000 MG chewable tablet CHEW AND SWALLOW 1 TABLET THREE TIMES A DAY WITH MEALS        sevelamer (RENVELA) 2.4 g PACK packet Take 1 packet by mouth        carvedilol (COREG) 6.25 MG tablet Take 1 tablet by mouth 2 times daily (with meals) 60 tablet 3    apixaban (ELIQUIS) 5 MG TABS tablet Take 1 tablet by mouth 2 times daily 60 tablet 5    aspirin EC 81 MG EC tablet Take 1 tablet by mouth daily 90 tablet 1    losartan (COZAAR) 50 MG tablet Take 1 tablet by mouth daily 30 tablet 3    glimepiride (AMARYL) 1 MG tablet Take 1 tablet by mouth every morning (before breakfast) Do not take if blood sugars are less than 110 30 tablet 0    furosemide (LASIX) 40 MG tablet Take 1 tablet by mouth 2 times daily 60 tablet 1    pantoprazole (PROTONIX) 40 MG tablet Take 1 tablet by mouth every morning (before breakfast) (Patient taking differently: Take 40 mg by mouth every morning (before breakfast) States not taking) 30 tablet 2    benzonatate (TESSALON) 100 MG capsule take 1 capsule by mouth three times a day if needed for 10 days        guaiFENesin-codeine (GUAIFENESIN AC) 100-10 MG/5ML liquid give 10 milliliters by mouth every 4 hours if needed        atorvastatin (LIPITOR) 40 MG tablet Take 1 tablet by mouth nightly 30 tablet 3    traMADol (ULTRAM) 50 MG tablet take 1 tablet by mouth every 6 hours if needed        calcium carbonate (TUMS) 500 MG chewable tablet Take 1 tablet by mouth daily as needed         Multiple Vitamins-Minerals (RENAPLEX-D) TABS Take 1 tablet by mouth every other day Pt takes occasionally   3    lidocaine-prilocaine (EMLA) 2.5-2.5 % cream            No current facility-administered medications for this encounter.         Social History:      Tobacco:    reports that he has never smoked. He has never used smokeless tobacco.  Alcohol:      reports no history of alcohol use. Drug Use:  reports no history of drug use.     Review of Systems:      Review of Systems   Constitutional: Negative for chills and fever. HENT: Negative for congestion. Eyes: Negative for visual disturbance. Respiratory: Negative for chest tightness and shortness of breath. Cardiovascular: Positive for leg swelling. Negative for chest pain.    Gastrointestinal: Negative for abdominal pain.   Endocrine: Negative. Genitourinary: Negative. Musculoskeletal: Negative. Allergic/Immunologic: Negative. Neurological: Negative for facial asymmetry, speech difficulty, weakness and numbness. Hematological: Negative. Psychiatric/Behavioral: Negative.       Physical Exam:      Vitals: There were no vitals taken for this visit.     Physical Exam  Constitutional:       Appearance: He is well-developed and well-groomed. Eyes:      Extraocular Movements: Extraocular movements intact. Conjunctiva/sclera: Conjunctivae normal.   Neck:      Vascular: No carotid bruit. Cardiovascular:      Rate and Rhythm: Normal rate and regular rhythm. Pulses:           Radial pulses are 2+ on the right side and 1+ on the left side. Arteriovenous access: left arteriovenous access is present. Comments: Good thrill over AV graft, aneurysmal changes noted  Pulmonary:      Effort: Pulmonary effort is normal. No respiratory distress. Abdominal:      Palpations: Abdomen is soft. Musculoskeletal:      Left upper arm: No swelling or tenderness. Left hand: No swelling or tenderness. Normal strength. Normal sensation. Cervical back: Full passive range of motion without pain. Right lower leg: Swelling present. 1+ Pitting Edema present. Left lower leg: Swelling present. 1+ Pitting Edema present. Right foot: Normal capillary refill. No swelling or tenderness. Left foot: Normal capillary refill. No swelling or tenderness. Feet:      Right foot:      Skin integrity: No ulcer or skin breakdown. Left foot:      Skin integrity: No ulcer or skin breakdown. Skin:     General: Skin is warm. Capillary Refill: Capillary refill takes less than 2 seconds. Comments: Venous stasis skin changes, hemosiderin deposits, healed venous stasis ulcerations   Neurological:      Mental Status: He is alert and oriented to person, place, and time.       GCS: GCS eye subscore is 4. GCS verbal subscore is 5. GCS motor subscore is 6. Sensory: Sensation is intact. Motor: Motor function is intact.    Psychiatric:         Mood and Affect: Mood normal.         Speech: Speech normal.         Behavior: Behavior normal.           Imaging/Labs:      none     Assessment and Plan:      ESRD on hemodialysis, malfunctioning left upper extremity AV graft, chronic venous insufficiency and lymphedema of bilateral lower extremities  · Plan for open revision with new AV graft placement in Left arm  · Will also be placing tunneled hemodialysis catheter so he can undergo dialysis while he heals from surgery  · Risks and benefits discussed, questions answered and he consented to proceed    Electronically signed by Fanny Matt MD on 6/15/2022 at 9:01 AM

## 2022-06-15 NOTE — PROGRESS NOTES
Division of Vascular Surgery           Patient sen and examined in the room. Feeling better sitting up and while on oxygen. No respiratory distress currently. He is hungry. Discussed monitoring him overnight with evaluation by medical and nephrology teams to see if he needs dialysis this evening. If not likely dialysis in the morning prior to discharge. His arm is sore, dressings intact. Good thrill over graft, palpable radial artery pulse. Sera Hodge for diet, discussed with nursing staff to get him ESTER compression stockings to help with his chronic lower extremity swelling and venous insufficiency. Ok to use tunneled hemodialysis catheter for dialysis. No blood draws, IVs or BP measurements in left arm. Percocet for moderate pain, tylenol as needed for mild pain. Please call if there are any questions or concerns.     Electronically signed by Samantha Hylton MD on 6/15/2022 at 6:14 PM

## 2022-06-15 NOTE — PROGRESS NOTES
Patient admitted, consent signed and questions answered. Patient ready for procedure. Call light to reach with side rails up 2 of 2. No family at bedside with patient. History and physical completed and site marked. Anesthesia in to see.   2% Chlorhexidine cloths used to prep site / left arm

## 2022-06-15 NOTE — PROGRESS NOTES
[facility]  Office: [address], DO, Nai Dos Santos, DO, Cortes Patee, DO, Angelita Everettedamon Blood, DO, Byron Peña MD, Radhika Hadley MD, Jacob Argueta MD, Bernadine Borges MD, Piotr Jade MD, Libia Lopez MD, Lukas Marmolejo MD, Shahida Ortez, DO, Bj Smith MD,  Dari Rota, DO, Ivelisse Woody MD, Kevin Sanchez MD, Adam Mcclain MD, Ricci Lundborg, DO, Maribel Ibarra MD, Kaykay Sher MD, Nate Smith, DO, Nia Leonard MD, Sanjay Azul CNP, [facility], CNP, Jenny Delgado, CNP, Candy Mendieta, CNP, Yumiko Lawson, CNP, Teresa Silva, CNP, Jina Llamas PA-C, Aleksandr Hansen, DNP, Jason Arita, HEIDY, Jas Ramey, CNP, Meenakshi Eugene, CNP, Margaux Barry, CNS, Sharita Kelley, DNP, Laura Robles, CNP, Jada Garza, CNP, Anette Sahni, CNP         [facility]   [address] [facility]    Progress Note    6/15/2022    6:16 PM    Name:   Solis Luna  MRN:     [id number]     Acct:      [de-identified]   Room:   [address] [place] [address] Day:  0  Admit Date:  6/15/2022  8:04 AM    PCP:   Cielo Alejo  Code Status:  Full Code    Subjective:     C/C: Hypoxia     Interval History Status: not changed. Patient seen at bedside. Patient was on 2 L nasal cannula which was off his nose. Patient had no complaint of shortness of breath, patient was able to answer questions fluidly. Patient was informed of observation overnight and reevaluation tomorrow, informed of chest x-ray    Brief History:     25-year-old male presents from outpatient for revision of his AV graft. Patient was brought to the OR with Dr. Carlie Caceres this afternoon and had difficulty waking up from sedation. Patient required 2 L nasal cannula, was still drowsy and decision was made to admit to the hospital for observation overnight for hypoxia and lethargy.   Patient upon examination was feeling much better ready, waking up but still upset about his nose being tickled by the nasal cannula. Patient no complaints, discussed chest x-ray    Review of Systems:     Constitutional:  negative for chills, fevers, sweats  Respiratory:  negative for cough, dyspnea on exertion, shortness of breath, wheezing  Cardiovascular:  negative for chest pain, chest pressure/discomfort, lower extremity edema, palpitations  Gastrointestinal:  negative for abdominal pain, constipation, diarrhea, nausea, vomiting  Neurological:  negative for dizziness, headache    Medications: Allergies:     Allergies   Allergen Reactions    Lisinopril Swelling       Current Meds:   Scheduled Meds:    thrombin        gelatin adsorbable        lidocaine        propofol         Continuous Infusions:    sodium chloride Stopped (06/15/22 1342)     PRN Meds:     Data:     Past Medical History:   has a past medical history of Acute congestive heart failure (HCC), Acute on chronic diastolic congestive heart failure ([facility]), Anemia, Anemia of chronic renal failure, Atrial fibrillation ([facility] AVF (arteriovenous fistula) ([facility]), Bowel obstruction ([facility]), CAD (coronary artery disease), Chest pain at rest, CHF (congestive heart failure) ([facility] CHF (congestive heart failure), NYHA class I, acute on chronic, combined ([facility] Chronic kidney disease, CKD (chronic kidney disease) stage 4, GFR 15-29 ml/min ([facility] Coronary artery disease involving native coronary artery of native heart without angina pectoris, Diabetes mellitus ([facility] Dialysis patient ([facility] ESRD (end stage renal disease) ([facility] Essential hypertension, Groin swelling, Hemodialysis patient ([facility] Hydrocele, Hyperlipidemia, Hypertension, Inguinal hernia, MI, old, NSTEMI (non-ST elevated myocardial infarction) ([facility] On home O2, BATSHEVA (obstructive sleep apnea), Patient in clinical research study, Pneumonia, Poor historian, Prostatism, Respiratory distress, Rising PSA level, S/P arteriovenous (AV) graft placement, S/P PTCA - TIM distal LAD - Dr. Eric Pedroza 4/1/19, Sleep apnea, Small bowel obstruction (HCC), SOB (shortness of breath), and Type 2 diabetes mellitus with chronic kidney disease on chronic dialysis ([address] Social History:   reports that he has never smoked. He has never used smokeless tobacco. He reports that he does not drink alcohol and does not use drugs. Family History:   Family History   Problem Relation Age of Onset    Heart Disease Mother         CHF    Early Death Mother     High Blood Pressure Brother     Diabetes Brother     Asthma Brother     High Blood Pressure Brother     Diabetes Brother     High Blood Pressure Brother     Diabetes Brother        Vitals:  /86   Pulse 81   Temp 97.5 °F (36.4 °C) (Axillary)   Resp 20   Ht 5' 10\" (1.778 m)   Wt 260 lb 2.3 oz (118 kg)   SpO2 100%   BMI 37.33 kg/m²   Temp (24hrs), Av.9 °F (36.6 °C), Min:97.5 °F (36.4 °C), Max:98.3 °F (36.8 °C)    Recent Labs     06/15/22  0912 06/15/22  1404   POCGLU 133* 107       I/O (24Hr): Intake/Output Summary (Last 24 hours) at 6/15/2022 1816  Last data filed at 6/15/2022 1330  Gross per 24 hour   Intake 600 ml   Output    Net 600 ml       Labs:  Hematology:No results for input(s): WBC, RBC, HGB, HCT, MCV, MCH, MCHC, RDW, PLT, MPV, SEDRATE, CRP, INR, DDIMER, [id number], LABABSO in the last 72 hours. Invalid input(s): PT  Chemistry:  Recent Labs     06/15/22  0912   CREATININE 8.82*   LABGLOM 6*     Recent Labs     06/15/22  0912 06/15/22  1404   POCGLU 133* 107     ABG:  Lab Results   Component Value Date    PH 7.45 2018    PCO2 33 2018    PO2 131 2018    HCO3 23 2018    BE -1 2018    FIO2 UNKNOWN 2021     Lab Results   Component Value Date/Time    SPECIAL NOT REPORTED 2021 04:23 PM     Lab Results   Component Value Date/Time    CULTURE NO SIGNIFICANT GROWTH 2021 04:23 PM       Radiology:  No results found.     Physical Examination:        General appearance:  alert, cooperative and no distress  Mental Status:  oriented to person, place and time and normal affect  Lungs:  clear to auscultation bilaterally, normal effort  Heart:  regular rate and rhythm, no murmur  Abdomen:  soft, nontender, nondistended, normal bowel sounds, no masses, hepatomegaly, splenomegaly  Extremities:  no edema, redness, tenderness in the calves +bandage of left AV fistula, +thrill   Skin:  no gross lesions, rashes, induration    Assessment:        Hospital Problems           Last Modified POA    * (Principal) S/P arteriovenous (AV) graft repair 6/15/2022 Yes    Acute respiratory failure with hypoxia ([address] 6/15/2022 Yes          Plan:        1. Much more awake upon evaluation, wean oxygen as tolerated  2. Check chest x-ray  3. Okay for diet, if hypoxia improves tomorrow likely discharge.   Already cleared by vascular surgery    Luke Mojica DO  6/15/2022  6:16 PM

## 2022-06-16 ENCOUNTER — APPOINTMENT (OUTPATIENT)
Dept: DIALYSIS | Age: 62
End: 2022-06-16
Attending: SURGERY
Payer: MEDICARE

## 2022-06-16 VITALS
BODY MASS INDEX: 36.96 KG/M2 | HEART RATE: 76 BPM | RESPIRATION RATE: 18 BRPM | HEIGHT: 70 IN | DIASTOLIC BLOOD PRESSURE: 70 MMHG | TEMPERATURE: 97.3 F | OXYGEN SATURATION: 99 % | SYSTOLIC BLOOD PRESSURE: 129 MMHG | WEIGHT: 258.16 LBS

## 2022-06-16 PROBLEM — J96.11 CHRONIC RESPIRATORY FAILURE WITH HYPOXIA (HCC): Status: ACTIVE | Noted: 2022-06-15

## 2022-06-16 PROBLEM — G93.41 METABOLIC ENCEPHALOPATHY: Status: ACTIVE | Noted: 2022-06-16

## 2022-06-16 PROBLEM — I48.0 PAROXYSMAL ATRIAL FIBRILLATION (HCC): Status: ACTIVE | Noted: 2021-12-24

## 2022-06-16 LAB
ABSOLUTE EOS #: 0 K/UL (ref 0–0.4)
ABSOLUTE IMMATURE GRANULOCYTE: 0 K/UL (ref 0–0.3)
ABSOLUTE LYMPH #: 0.78 K/UL (ref 1–4.8)
ABSOLUTE MONO #: 0.65 K/UL (ref 0.1–0.8)
ANION GAP SERPL CALCULATED.3IONS-SCNC: 20 MMOL/L (ref 9–17)
BASOPHILS # BLD: 0 % (ref 0–2)
BASOPHILS ABSOLUTE: 0 K/UL (ref 0–0.2)
BUN BLDV-MCNC: 64 MG/DL (ref 8–23)
CALCIUM SERPL-MCNC: 9.4 MG/DL (ref 8.6–10.4)
CHLORIDE BLD-SCNC: 89 MMOL/L (ref 98–107)
CO2: 25 MMOL/L (ref 20–31)
CREAT SERPL-MCNC: 9.29 MG/DL (ref 0.7–1.2)
EOSINOPHILS RELATIVE PERCENT: 0 % (ref 1–4)
GFR AFRICAN AMERICAN: 7 ML/MIN
GFR NON-AFRICAN AMERICAN: 6 ML/MIN
GFR SERPL CREATININE-BSD FRML MDRD: ABNORMAL ML/MIN/{1.73_M2}
GLUCOSE BLD-MCNC: 146 MG/DL (ref 70–99)
GLUCOSE BLD-MCNC: 167 MG/DL (ref 75–110)
HCT VFR BLD CALC: 34.8 % (ref 40.7–50.3)
HEMOGLOBIN: 10.8 G/DL (ref 13–17)
IMMATURE GRANULOCYTES: 0 %
LYMPHOCYTES # BLD: 12 % (ref 24–44)
MCH RBC QN AUTO: 32 PG (ref 25.2–33.5)
MCHC RBC AUTO-ENTMCNC: 31 G/DL (ref 28.4–34.8)
MCV RBC AUTO: 103 FL (ref 82.6–102.9)
MONOCYTES # BLD: 10 % (ref 1–7)
MORPHOLOGY: ABNORMAL
NRBC AUTOMATED: 0 PER 100 WBC
PDW BLD-RTO: 15.9 % (ref 11.8–14.4)
PLATELET # BLD: 156 K/UL (ref 138–453)
PMV BLD AUTO: 10.4 FL (ref 8.1–13.5)
POTASSIUM SERPL-SCNC: 5.2 MMOL/L (ref 3.7–5.3)
RBC # BLD: 3.38 M/UL (ref 4.21–5.77)
SEG NEUTROPHILS: 78 % (ref 36–66)
SEGMENTED NEUTROPHILS ABSOLUTE COUNT: 5.07 K/UL (ref 1.8–7.7)
SODIUM BLD-SCNC: 134 MMOL/L (ref 135–144)
WBC # BLD: 6.5 K/UL (ref 3.5–11.3)

## 2022-06-16 PROCEDURE — 6360000002 HC RX W HCPCS: Performed by: INTERNAL MEDICINE

## 2022-06-16 PROCEDURE — 90935 HEMODIALYSIS ONE EVALUATION: CPT

## 2022-06-16 PROCEDURE — 85025 COMPLETE CBC W/AUTO DIFF WBC: CPT

## 2022-06-16 PROCEDURE — 99217 PR OBSERVATION CARE DISCHARGE MANAGEMENT: CPT | Performed by: STUDENT IN AN ORGANIZED HEALTH CARE EDUCATION/TRAINING PROGRAM

## 2022-06-16 PROCEDURE — 82947 ASSAY GLUCOSE BLOOD QUANT: CPT

## 2022-06-16 PROCEDURE — 6370000000 HC RX 637 (ALT 250 FOR IP): Performed by: NURSE PRACTITIONER

## 2022-06-16 PROCEDURE — 6370000000 HC RX 637 (ALT 250 FOR IP): Performed by: STUDENT IN AN ORGANIZED HEALTH CARE EDUCATION/TRAINING PROGRAM

## 2022-06-16 PROCEDURE — 80048 BASIC METABOLIC PNL TOTAL CA: CPT

## 2022-06-16 PROCEDURE — G0378 HOSPITAL OBSERVATION PER HR: HCPCS

## 2022-06-16 PROCEDURE — 99214 OFFICE O/P EST MOD 30 MIN: CPT | Performed by: INTERNAL MEDICINE

## 2022-06-16 PROCEDURE — 36415 COLL VENOUS BLD VENIPUNCTURE: CPT

## 2022-06-16 RX ORDER — ASPIRIN 81 MG/1
81 TABLET, CHEWABLE ORAL DAILY
Status: DISCONTINUED | OUTPATIENT
Start: 2022-06-16 | End: 2022-06-16 | Stop reason: HOSPADM

## 2022-06-16 RX ORDER — HEPARIN SODIUM 1000 [USP'U]/ML
1900 INJECTION, SOLUTION INTRAVENOUS; SUBCUTANEOUS ONCE
Status: COMPLETED | OUTPATIENT
Start: 2022-06-16 | End: 2022-06-16

## 2022-06-16 RX ORDER — ATORVASTATIN CALCIUM 40 MG/1
40 TABLET, FILM COATED ORAL NIGHTLY
Status: DISCONTINUED | OUTPATIENT
Start: 2022-06-16 | End: 2022-06-16 | Stop reason: HOSPADM

## 2022-06-16 RX ORDER — CARVEDILOL 6.25 MG/1
6.25 TABLET ORAL 2 TIMES DAILY WITH MEALS
Status: DISCONTINUED | OUTPATIENT
Start: 2022-06-16 | End: 2022-06-16 | Stop reason: HOSPADM

## 2022-06-16 RX ADMIN — OXYCODONE HYDROCHLORIDE AND ACETAMINOPHEN 1 TABLET: 5; 325 TABLET ORAL at 00:50

## 2022-06-16 RX ADMIN — OXYCODONE HYDROCHLORIDE AND ACETAMINOPHEN 1 TABLET: 5; 325 TABLET ORAL at 05:47

## 2022-06-16 RX ADMIN — HEPARIN SODIUM 1900 UNITS: 1000 INJECTION INTRAVENOUS; SUBCUTANEOUS at 13:08

## 2022-06-16 RX ADMIN — CARVEDILOL 6.25 MG: 6.25 TABLET, FILM COATED ORAL at 08:50

## 2022-06-16 RX ADMIN — HEPARIN SODIUM 1900 UNITS: 1000 INJECTION INTRAVENOUS; SUBCUTANEOUS at 13:07

## 2022-06-16 RX ADMIN — ASPIRIN 81 MG: 81 TABLET, CHEWABLE ORAL at 08:50

## 2022-06-16 ASSESSMENT — PAIN DESCRIPTION - ORIENTATION
ORIENTATION: LEFT

## 2022-06-16 ASSESSMENT — PAIN DESCRIPTION - ONSET
ONSET: ON-GOING

## 2022-06-16 ASSESSMENT — PAIN - FUNCTIONAL ASSESSMENT
PAIN_FUNCTIONAL_ASSESSMENT: PREVENTS OR INTERFERES WITH ALL ACTIVE AND SOME PASSIVE ACTIVITIES
PAIN_FUNCTIONAL_ASSESSMENT: PREVENTS OR INTERFERES SOME ACTIVE ACTIVITIES AND ADLS

## 2022-06-16 ASSESSMENT — PAIN SCALES - GENERAL
PAINLEVEL_OUTOF10: 0
PAINLEVEL_OUTOF10: 0
PAINLEVEL_OUTOF10: 6
PAINLEVEL_OUTOF10: 4
PAINLEVEL_OUTOF10: 6
PAINLEVEL_OUTOF10: 0

## 2022-06-16 ASSESSMENT — PAIN DESCRIPTION - FREQUENCY
FREQUENCY: CONTINUOUS

## 2022-06-16 ASSESSMENT — PAIN DESCRIPTION - PAIN TYPE
TYPE: ACUTE PAIN
TYPE: ACUTE PAIN

## 2022-06-16 ASSESSMENT — PAIN DESCRIPTION - DESCRIPTORS
DESCRIPTORS: ACHING
DESCRIPTORS: SORE
DESCRIPTORS: ACHING

## 2022-06-16 ASSESSMENT — PAIN DESCRIPTION - LOCATION
LOCATION: ARM

## 2022-06-16 NOTE — CONSULTS
REASON FOR  NEPHROLOGY CONSULT     Fluid and BP management in ESRD     ACCESS   Tunneled catheter    DRY WEIGHT   115    NEPHROLOGIST       DIALYSIS UNIT    [facility]    HISTORY OF PRESENTING ILLNESS               This is a 59 y.o. male with end stage renal disease on hemodialysis Tuesday Thursday Saturday was hospitalized for open AV graft revision which was successfully carried out. He also underwent tunnel catheter placement during this procedure. Immediate postoperative was noted to have lethargy/some confusion. Hence was admitted for overnight observation. Chest x-ray done showed evidence of fluid overload. He is 6 kg above his dry weight. His cardiomyopathy ejection fraction 07% with diastolic dysfunction and severe tricuspid regurgitation. Currently patient is asymptomatic and very keen to be discharged home. Currently his hemodialysis well. PAST MEDICAL HISTORY         Diagnosis Date    Acute congestive heart failure ([address] 12/21/2016    Acute on chronic diastolic congestive heart failure ([address] 11/24/2018    Anemia 11/25/2018    Anemia of chronic renal failure 10/17/2016    Atrial fibrillation ([address] 01/03/2022    [facility]     Bowel obstruction ([address] 12/26/2013    CAD (coronary artery disease) 2013    ?     Chest pain at rest 12/21/2016    CHF (congestive heart failure) ([address] 2013    last episode 11/2018    CHF (congestive heart failure), NYHA class I, acute on chronic, combined ([address] 2/9/2021    Chronic kidney disease     CKD (chronic kidney disease) stage 4, GFR 15-29 ml/min ([address] 10/17/2016    Coronary artery disease involving native coronary artery of native heart without angina pectoris     Diabetes mellitus ([address] 2012    NIDDM    Dialysis patient ([address]     [place]  /  Faythe Class  /  Samaria Gang    ESRD (end stage renal disease) ([address]     Essential hypertension 11/25/2018    Groin swelling 07/17/2019    Hemodialysis patient ([address] 11/28/2018    TUNNELD CATHETER RIGHT DIALYSIS ACCESSTUES THURS AND AT [facility]    Hydrocele 07/17/2019    Hyperlipidemia 2013    ON RX    Hypertension 2013    ON RX    Inguinal hernia     BILATERAL- NEEDS REPAIRED, UMBILICAL HERNIA  ALSO    MI, old 12/26/2013    NSTEMI (non-ST elevated myocardial infarction) ([address] 6/17/2019    On home O2     2 L    BATSHEVA (obstructive sleep apnea)     Patient in clinical research study 04/01/2019    XIENCE SHORT DAPT    Pneumonia     Poor historian     Prostatism 11/17/2017    Respiratory distress 11/25/2018    Rising PSA level 11/17/2017    S/P arteriovenous (AV) graft placement     S/P PTCA - TIM distal LAD - Dr. Cervantes Foots 4/1/19 4/1/2019    Sleep apnea 2015    pt has machine and does not use it    Small bowel obstruction ([address] 5/1/2021    SOB (shortness of breath) 02/02/2022    noted walking from waiting room to pre op testing    Type 2 diabetes mellitus with chronic kidney disease on chronic dialysis ([address] 10/17/2016         PAST SURGICAL HISTORY         Procedure Laterality Date    ABDOMEN SURGERY  2013    BOWEL OBSTRUCTION    AV FISTULA CREATION Left 02/20/2019    AV FISTULA REPAIR  07/17/2019    AV fistula revision, branch ligation / Percutaneous angioplasty of proximal anastomosis and outflow tract of dialysis circuit with drug coated balloon  /  Dr Beulah Gibbs  02/09/2021    Dr. Julita Portillo, [address]  03/31/2019    TIM LAD    CYSTOSCOPY  11/17/2017    CYSTOSCOPY N/A 04/01/2022    CYSTOSCOPY DILATION performed by Carl Mcclelland MD at [facility] 02/20/2019    AV FISTULA CREATION ARM performed by Mraisa Javier MD at [facility] 10/23/2019    LEFT UPPER EXTREMITY AV GRAFT CREATION WITH [id number] ARTEGRAFT, LEFT UPPER EXTREMITY AV FISTULA LIGATION performed by Marisa Javier MD at [facility]  FOOT SURGERY Left 2005    FRADCTURE HEEL    IR THROMBECTOMY PERCUT AVF  01/03/2022    IR THROMBECTOMY PERCUT AVF 1/3/2022 [facility]    MIDDLE EAR SURGERY  1966    EAR DRUM REPAIRED    MOUTH SURGERY  2007    1 WISDOM TOOTH REMOVED    NOSE SURGERY  1968    CAUTERY TO STOP NOSE BEEDS    OTHER SURGICAL HISTORY Left 03/06/2019    fistulagram    OH GENITAL SURG PROC,MALE UNLISTED N/A 02/16/2018    US  PROSTATE BIOPSY WITH SATURATION performed by Armaan Adhikari MD at [facility] 11/17/2017    CYSTOSCOPY PROSTATE US BIOPSY performed by Armaan Adhikari MD at [address]  02/16/2018    TONSILLECTOMY  1968    TUNNELED VENOUS CATHETER PLACEMENT Right 06/15/2022    Placement of tunneled central venous hemodialysis catheter  /  Dr Thomas Hernandes N/A 09/27/2021    EGD BIOPSY performed by Angi Wolfe MD at [address]  09/20/2019    LUE FISTULAGRAM  /  DR Viera Farm  /  Findings: Severe stenosis of proximal cephalic vein. / Will plan for LUE AVG placement.     VASCULAR SURGERY Left 06/15/2022    Open revision of left upper extremity AV graft   /  Dr Hassan Core:                Medications Prior to Admission: lanthanum (FOSRENOL) 1000 MG chewable tablet, CHEW AND SWALLOW 1 TABLET THREE TIMES A DAY WITH MEALS  sevelamer (RENVELA) 2.4 g PACK packet, Take 1 packet by mouth  carvedilol (COREG) 6.25 MG tablet, Take 1 tablet by mouth 2 times daily (with meals)  apixaban (ELIQUIS) 5 MG TABS tablet, Take 1 tablet by mouth 2 times daily  aspirin EC 81 MG EC tablet, Take 1 tablet by mouth daily  losartan (COZAAR) 50 MG tablet, Take 1 tablet by mouth daily  glimepiride (AMARYL) 1 MG tablet, Take 1 tablet by mouth every morning (before breakfast) Do not take if blood sugars are less than 110  [DISCONTINUED] furosemide (LASIX) 40 MG tablet, Take 1 tablet by mouth 2 times daily  [DISCONTINUED] pantoprazole (PROTONIX) 40 MG tablet, Take 1 tablet by mouth every morning (before breakfast) (Patient taking differently: Take 40 mg by mouth every morning (before breakfast) States not taking)  benzonatate (TESSALON) 100 MG capsule, take 1 capsule by mouth three times a day if needed for 10 days  guaiFENesin-codeine (GUAIFENESIN AC) 100-10 MG/5ML liquid, give 10 milliliters by mouth every 4 hours if needed  atorvastatin (LIPITOR) 40 MG tablet, Take 1 tablet by mouth nightly  traMADol (ULTRAM) 50 MG tablet, take 1 tablet by mouth every 6 hours if needed  calcium carbonate (TUMS) 500 MG chewable tablet, Take 1 tablet by mouth daily as needed   Multiple Vitamins-Minerals (RENAPLEX-D) TABS, Take 1 tablet by mouth every other day Pt takes occasionally  lidocaine-prilocaine (EMLA) 2.5-2.5 % cream,   Scheduled Meds:    carvedilol  6.25 mg Oral BID WC    atorvastatin  40 mg Oral Nightly    aspirin  81 mg Oral Daily     Continuous Infusions:   PRN Meds:  oxyCODONE-acetaminophen **OR** oxyCODONE-acetaminophen    ALLERGY     Lisinopril    SOCIAL HISTORY     Social History     Socioeconomic History    Marital status: Single     Spouse name: Not on file    Number of children: Not on file    Years of education: Not on file    Highest education level: Not on file   Occupational History    Not on file   Tobacco Use    Smoking status: Never Smoker    Smokeless tobacco: Never Used   Vaping Use    Vaping Use: Never used   Substance and Sexual Activity    Alcohol use: No    Drug use: No    Sexual activity: Not on file   Other Topics Concern    Not on file   Social History Narrative    Not on file     Social Determinants of Health     Financial Resource Strain:     Difficulty of Paying Living Expenses: Not on file   Food Insecurity:     Worried About Running Out of Food in the Last Year: Not on file    Frank of Food in the Last Year: Not on file   Transportation Needs:     Lack of Transportation (Medical): Not on file    Lack of Transportation (Non-Medical): Not on file   Physical Activity:     Days of Exercise per Week: Not on file    Minutes of Exercise per Session: Not on file   Stress:     Feeling of Stress : Not on file   Social Connections:     Frequency of Communication with Friends and Family: Not on file    Frequency of Social Gatherings with Friends and Family: Not on file    Attends Yarsani Services: Not on file    Active Member of [address] [place] [address] or Organizations: Not on file    Attends Club or Organization Meetings: Not on file    Marital Status: Not on file   Intimate Partner Violence:     Fear of Current or Ex-Partner: Not on file    Emotionally Abused: Not on file    Physically Abused: Not on file    Sexually Abused: Not on file   Housing Stability:     Unable to Pay for Housing in the Last Year: Not on file    Number of [place] in the Last Year: Not on file    Unstable Housing in the Last Year: Not on file       FAMILY HISTORY      Family History   Problem Relation Age of Onset    Heart Disease Mother         CHF    Early Death Mother     High Blood Pressure Brother     Diabetes Brother     Asthma Brother     High Blood Pressure Brother     Diabetes Brother     High Blood Pressure Brother     Diabetes Brother           REVIEW OF SYSTEM      Constitutional: No asthenia/weight loss/anorexia    HEENT : No epistaxis/visual blurriness/rhinorrhoea/sorethroat/trauma  Cardiovascular:No chest pain/palpitation/SOB  Respiratory: No cough/fever/SOB/Wheezing    Gastrointestinal: No abdominal pain/nausea/vomiting/diarrhoea/constipation  Genitourinary: No dysuria/pyuria/hematuria/incomplete emptying of bladder  Musculoskeletal:  No gait disturbance/weakness or joint complaints  Integumentary: No rash or pruritis. Neurological: No headache/diplopia/change in muscle strength/numbness or tingling.  No change in gait, balance, coordination, mood, affect, memory, mentation, behavior. Psychiatric: No anxiety/depression. Endocrine: No temperature intolerance. No excessive thirst, fluid intake, or urination. No tremor. Hematologic/Lymphatic: No abnormal bruising or bleeding, blood clots or swolle lymph nodes. Allergic/Immunologic: No nasal congestion or hives    EXAMINATION       Vitals:    06/16/22 0547 06/16/22 0753 06/16/22 0915 06/16/22 0930   BP:  129/74 (!) 155/82 (!) 145/87   Pulse:  83 92 85   Resp:  20 18    Temp:  97.7 °F (36.5 °C) 97.5 °F (36.4 °C)    TempSrc:  Axillary     SpO2:       Weight: 239 lb 13.8 oz (108.8 kg)  267 lb 6.7 oz (121.3 kg)    Height:         24HR INTAKE/OUTPUT:      Intake/Output Summary (Last 24 hours) at 6/16/2022 0956  Last data filed at 6/15/2022 1330  Gross per 24 hour   Intake 600 ml   Output --   Net 600 ml       General appearance:Awake, alert, in no acute distress  Skin: warm and dry, no rash or erythema  Eyes: conjunctivae normal and sclera anicteric  ENT: no thrush no pharyngeal congestion  orodental hygiene   Neck: No JVD, Lymphadenopathty or thyromegaly  Respiratory: vesicular breath sounds,no wheeze/crackles  Cardiovascular: S1 S2 normal,no gallop or organic murmur. No carotid bruit  Abdomen:Non tender/non distended. Bowel sounds present  Extremities: No Cyanosis or Clubbing, present lower extremity edema  Neurological:Alert and oriented. No abnormalities of mood, affect, memory, mentation, or behavior are noted    INVESTIGATIONS     PTH:  No results found for: PTH  abs:   CBC:   Recent Labs     06/16/22  0813   WBC 6.5   RBC 3.38*   HGB 10.8*   HCT 34.8*   .0*   MCH 32.0   MCHC 31.0   RDW 15.9*      MPV 10.4      BMP:   Recent Labs     06/15/22  0912 06/16/22  0813   NA  --  134*   K  --  5.2   CL  --  89*   CO2  --  25   BUN  --  64*   CREATININE 8.82* 9.29*   GLUCOSE  --  146*   CALCIUM  --  9.4        Phosphorus:  No results for input(s): PHOS in the last 72 hours.   Magnesium: No results for input(s): MG in the last 72 hours. Albumin: No results for input(s): LABALBU in the last 72 hours. ASSESSMENT     #1 ESRD on maintenance hemodialysis Tuesday Thursday Saturday using tunnel catheter now at [facility] dialysis unit. Dry weight 115.5 kg  #2 status post open revision of left upper extremity AV graft with placement of tunneled central venous dialysis catheter  #3 postoperative lethargy and hypoxia. Improved  #4 cardiomyopathy status post PCI with ejection fraction 30%/left ventricular diastolic dysfunction  #5 essential hypertension  #6 chronic venous insufficiency with lymphedema bilateral lower extremities    PLAN     #1 seen and examined on hemodialysis. Orders reviewed with the nursing staff. Ultrafiltration 3 to 4 kg based upon hemodynamics  #2 resume all home antihypertensive medications  #3 okay to discharge from nephrology standpoint      Thank you for the consultation. Please do not hesitate to call with questions.     This note is created with the assistance of a speech-recognition program. While intending to generate a document that actually reflects the content of the visit, no guarantees can be provided that every mistake has been identified and corrected by editing      Bean English MD MD, MRCP [place]), [address]   6/16/2022 9:56 AM  NEPHROLOGY [facility]

## 2022-06-16 NOTE — PROGRESS NOTES
[facility]  Office: [address], DO, Piyush Zayra, DO, De Wittcdoy Contreras, DO, Joel Brush Blood, DO, Caridad Portillo MD, Zuleika Donaldson MD, Cisco Maier MD, Kenyatta De La O MD, Minerva Wong MD, Juliet Poole MD, Dimitrios Ferrell MD, Dada Forde, DO, Jadene Boast, MD,  Kristina Hannah DO, Colten Brohters MD, Faith Carbajal MD, Yue Dey MD, Kemi Newman DO, Mariella Mirlees MD, Tete Delgadillo MD, Virgil Hopson DO, Mikey Hughes MD, Jeanne Yates CNP, [facility], CNP, Scarlet Erazo, CNP, Hilaria Best, CNP, Sia Sanchez, CNP, Citlali Rodriguez, CNP, Jyoti Yeager PA-C, Shagufta Wesley DNP, Julia Majano, CNP, Babita Adames, CNP, Ciera Lizarraga CNP, Buddy Abraham, CNS, Carlie Sherman, DNP, Jazmine Price, CNP, Shayne Haji, CNP, Frederick Reza, CNP         [facility]   [address] [facility]    Progress Note    6/16/2022    12:43 PM    Name:   Angella Greene  MRN:     [id number]     Acct:      [de-identified]   Room:   [address] [place] [address] Day:  0  Admit Date:  6/15/2022  8:04 AM    PCP:   Arturo Hines  Code Status:  Full Code    Subjective:     C/C: drowsy post av graft revision  Interval History Status: significantly improved.    Patient examined in dialysis  States that he uses oxygen at home  Is awake and alert   No sob  Has chronic leg swelling    Brief History:     49-year-old male presents from outpatient for revision of his AV graft.  Patient was brought to the OR with Dr. Joan Rodriguez afternoon and had difficulty waking up from sedation.  Patient required 2 L nasal cannula, was still drowsy and decision was made to admit to the hospital for observation overnight for hypoxia and lethargy.  Patient upon examination was feeling much better ready, waking up but still upset about his nose being tickled by the nasal cannula.  Patient no complaints, discussed chest x-ray    Review of Systems:     Constitutional:  negative for chills, fevers, sweats  Respiratory:  negative for cough, dyspnea on exertion, shortness of breath, wheezing  Cardiovascular:  negative for chest pain, chest pressure/discomfort, lower extremity edema, palpitations  Gastrointestinal:  negative for abdominal pain, constipation, diarrhea, nausea, vomiting  Neurological:  negative for dizziness, headache    Medications: Allergies:     Allergies   Allergen Reactions    Lisinopril Swelling       Current Meds:   Scheduled Meds:    carvedilol  6.25 mg Oral BID WC    atorvastatin  40 mg Oral Nightly    aspirin  81 mg Oral Daily     Continuous Infusions:   PRN Meds: oxyCODONE-acetaminophen **OR** oxyCODONE-acetaminophen    Data:     Past Medical History:   has a past medical history of Acute congestive heart failure (HCC), Acute on chronic diastolic congestive heart failure (HCC), Anemia, Anemia of chronic renal failure, Atrial fibrillation ([facility] AVF (arteriovenous fistula) ([facility]), Bowel obstruction ([facility]), CAD (coronary artery disease), Chest pain at rest, CHF (congestive heart failure) ([facility] CHF (congestive heart failure), NYHA class I, acute on chronic, combined ([facility] Chronic kidney disease, CKD (chronic kidney disease) stage 4, GFR 15-29 ml/min ([facility] Coronary artery disease involving native coronary artery of native heart without angina pectoris, Diabetes mellitus ([facility] Dialysis patient ([facility] ESRD (end stage renal disease) ([facility] Essential hypertension, Groin swelling, Hemodialysis patient ([facility] Hydrocele, Hyperlipidemia, Hypertension, Inguinal hernia, MI, old, NSTEMI (non-ST elevated myocardial infarction) ([facility] On home O2, BATSHEVA (obstructive sleep apnea), Patient in clinical research study, Pneumonia, Poor historian, Prostatism, Respiratory distress, Rising PSA level, S/P arteriovenous (AV) graft placement, S/P PTCA - TIM distal LAD - Dr. Monica Jean 4/1/19, Sleep apnea, Small bowel obstruction (HCC), SOB (shortness of breath), and Type 2 diabetes mellitus with chronic kidney disease on chronic dialysis ([facility] Social History:   reports that he has never smoked. He has never used smokeless tobacco. He reports that he does not drink alcohol and does not use drugs. Family History:   Family History   Problem Relation Age of Onset    Heart Disease Mother         CHF    Early Death Mother     High Blood Pressure Brother     Diabetes Brother     Asthma Brother     High Blood Pressure Brother     Diabetes Brother     High Blood Pressure Brother     Diabetes Brother        Vitals:  /66   Pulse 76   Temp 97.5 °F (36.4 °C)   Resp 18   Ht 5' 10\" (1.778 m)   Wt 267 lb 6.7 oz (121.3 kg)   SpO2 99%   BMI 38.37 kg/m²   Temp (24hrs), Av.6 °F (36.4 °C), Min:97.5 °F (36.4 °C), Max:97.9 °F (36.6 °C)    Recent Labs     06/15/22  0912 06/15/22  1404   POCGLU 133* 107       I/O (24Hr):     Intake/Output Summary (Last 24 hours) at 2022 1243  Last data filed at 6/15/2022 1330  Gross per 24 hour   Intake 600 ml   Output --   Net 600 ml       Labs:  Hematology:  Recent Labs     22  0813   WBC 6.5   RBC 3.38*   HGB 10.8*   HCT 34.8*   .0*   MCH 32.0   MCHC 31.0   RDW 15.9*      MPV 10.4     Chemistry:  Recent Labs     06/15/22  0912 22  0813   NA  --  134*   K  --  5.2   CL  --  89*   CO2  --  25   GLUCOSE  --  146*   BUN  --  64*   CREATININE 8.82* 9.29*   ANIONGAP  --  20*   LABGLOM 6* 6*   GFRAA  --  7*   CALCIUM  --  9.4     Recent Labs     06/15/22  0912 06/15/22  1404   POCGLU 133* 107     ABG:  Lab Results   Component Value Date    PH 7.45 2018    PCO2 33 2018    PO2 131 2018    HCO3 23 2018    BE -1 2018    FIO2 UNKNOWN 2021     Lab Results   Component Value Date/Time    SPECIAL NOT REPORTED 2021 04:23 PM     Lab Results   Component Value Date/Time    CULTURE NO SIGNIFICANT GROWTH 2021 04:23 PM       Radiology:  XR CHEST (SINGLE VIEW FRONTAL)    Result Date: 6/15/2022  Cardiomegaly with perihilar congestion and edema. Physical Examination:        General appearance:  alert, cooperative and no distress  Mental Status:  oriented to person, place and time and normal affect  Lungs:  clear to auscultation bilaterally, normal effort  Heart:  regular rate and rhythm, no murmur  Abdomen:  soft, nontender, nondistended, normal bowel sounds, no masses, hepatomegaly, splenomegaly  Extremities: 1+ edema, redness, tenderness in the calves  Skin:  no gross lesions, rashes, induration  wearing compression stockings  Hemodialysis catheter right side, fistula present  Assessment:        Hospital Problems           Last Modified POA    * (Principal) S/P arteriovenous (AV) graft repair 6/15/2022 Yes    S/P PTCA - TIM distal LAD - Dr. Mikel Parnell 4/1/19 6/16/2022 Yes    Acute on chronic combined systolic (congestive) and diastolic (congestive) heart failure ([address] 6/16/2022 Yes    Chronic respiratory failure with hypoxia ([address] 6/16/2022 Yes    Pain of left upper extremity 1/59/1827 Yes    Metabolic encephalopathy 3/15/6858 Yes    Type 2 diabetes mellitus with chronic kidney disease on chronic dialysis ([address] 6/16/2022 Yes    Essential hypertension 6/16/2022 Yes    BATSHEVA (obstructive sleep apnea) 6/16/2022 Yes    Dialysis AV fistula malfunction ([address] 6/16/2022 Yes          Plan:        Encephalopathy-drowsiness postanesthesia for AV fistula revision- now resolved,   Patient is awake and alert. Ready for discharge today  Resume home antihypertensives  Dialysis for CHF, back to baseline  Patient uses oxygen at home 2 to 3 L. Currently at baseline  Patient is on Eliquis for history of atrial fibrillation, states that he stopped due to nosebleeds  Eliquis resumed on discharge, patient states he will only take it after talking to Dr. Mikel Parnell in the office.     Ready for discharge    Xavier Kaur MD  6/16/2022  12:43 PM

## 2022-06-16 NOTE — PROGRESS NOTES
Division of Vascular Surgery    Seen and examined. POD#1 s/p open revision of left upper extremity AV graft. Placement of tunneled dialysis catheter RIJ. Doing well, pain controlled. Good thrill in graft, palpable radial pulse. Motor and sensation intact to the extremity. RIJ tunneled catheter CDI. Use tunneled catheter for dialysis. Follow up outpatient with Dr. Davis Crespo.      [facility], DO  General Surgery PGY-3

## 2022-06-16 NOTE — PLAN OF CARE
Problem: Discharge Planning  Goal: Discharge to home or other facility with appropriate resources  6/16/2022 1250 by Noemí Araujo RN  Outcome: Completed  6/16/2022 0836 by Noemí Araujo RN  Outcome: Progressing     Problem: Safety - Adult  Goal: Free from fall injury  6/16/2022 1250 by Noemí Araujo RN  Outcome: Completed  6/16/2022 0836 by Noemí Araujo RN  Outcome: Progressing     Problem: ABCDS Injury Assessment  Goal: Absence of physical injury  6/16/2022 1250 by Noemí Araujo RN  Outcome: Completed  6/16/2022 0836 by Noemí Araujo RN  Outcome: Progressing     Problem: Pain  Goal: Verbalizes/displays adequate comfort level or baseline comfort level  6/16/2022 1250 by Noemí Araujo RN  Outcome: Completed  6/16/2022 0836 by Noemí Araujo RN  Outcome: Progressing  Flowsheets (Taken 6/16/2022 0400 by Harrison Schaefer RN)  Verbalizes/displays adequate comfort level or baseline comfort level:   Encourage patient to monitor pain and request assistance   Assess pain using appropriate pain scale     Problem: Skin/Tissue Integrity  Goal: Absence of new skin breakdown  Description: 1. Monitor for areas of redness and/or skin breakdown  2. Assess vascular access sites hourly  3. Every 4-6 hours minimum:  Change oxygen saturation probe site  4. Every 4-6 hours:  If on nasal continuous positive airway pressure, respiratory therapy assess nares and determine need for appliance change or resting period.   6/16/2022 1250 by Noemí Araujo RN  Outcome: Completed  6/16/2022 0836 by Noemí Araujo RN  Outcome: Progressing     Problem: Chronic Conditions and Co-morbidities  Goal: Patient's chronic conditions and co-morbidity symptoms are monitored and maintained or improved  6/16/2022 1250 by Noemí Araujo RN  Outcome: Completed  6/16/2022 0836 by Noemí Araujo RN  Outcome: Progressing

## 2022-06-16 NOTE — PROGRESS NOTES
IV removed, instructions provided, pt had written script, pt left via wheelchair with all belongings

## 2022-06-16 NOTE — DISCHARGE SUMMARY
[facility]  Office: [address], DO, Lucy Laura, DO, Nisha Dunham, DO, Valentino Guerrero, DO, Melissa Ga MD, Paulette Carter MD, Debbie Charlton MD, Gisele Stanley MD, Magda Person MD, Юлия Ravi MD, Morena Tapia MD, Brigido Nicole, DO, Rosemary Sterling MD,  Wanda Smith, DO, Shauna Rausch MD, Asia Waller MD, Breezy Shahid MD, Shy Castillo, DO, Keron Ramsay MD, Nuvia Ordaz MD, Augustus Adams, DO, Cyndy Castleman, MD, Mike Valles [facility], [facility], CNP, Carrillo Abel, CNP, Michael Rogers, CNP, Diane Mejia, CNP, Nguyen Ibarra, CNP, Jennifer Kennedy PA-C, Flako Rhodes, [facility], Arpit Powell, CNP, Landen Sierra, CNP, Ethan Marquez, CNP, Gucci Baptiste, [facility], Robin Azevedo, [facility], Herrera Leak, CNP, Chnaa Ribera, CNP, Janak Cheung, [facility]    Discharge Summary     Patient ID: Pal Clark  :  1960   MRN: [id number]     ACCOUNT:  [de-identified]   Patient's PCP: Bruce Chambers  Admit Date: 6/15/2022   Discharge Date: 2022     Length of Stay: 0  Code Status:  Full Code  Admitting Physician: No admitting provider for patient encounter. Discharge Physician: Asia Waller MD     Active Discharge Diagnoses:     Hospital Problem Lists:  Principal Problem:    S/P arteriovenous (AV) graft repair  Active Problems:    S/P PTCA - TIM distal LAD - Dr. Sergio Benson 19    Acute on chronic combined systolic (congestive) and diastolic (congestive) heart failure (HCC)    Chronic respiratory failure with hypoxia (HCC)    Pain of left upper extremity    Metabolic encephalopathy    Type 2 diabetes mellitus with chronic kidney disease on chronic dialysis ([facility]    Essential hypertension    BATSHEVA (obstructive sleep apnea)    Dialysis AV fistula malfunction ([facility]  Resolved Problems:    * No resolved hospital problems.  *      Admission Condition:  fair     Discharged Condition: good    Hospital Stay:     Hospital Course:  Reji Meyer is a 59 y.o. male who was admitted for the management of   S/P arteriovenous (AV) graft repair , presented to ER with drowsiness. 56-year-old male presents from outpatient for revision of his AV graft.  Patient was brought to the OR with Dr. Zuleyka Regalado and had difficulty waking up from sedation.  Patient required 2 L nasal cannula, was still drowsy and decision was made to admit to the hospital for observation overnight for hypoxia and lethargy.  Patient upon examination was feeling much better ready, waking up but still upset about his nose being tickled by the nasal cannula.  Patient no complaints, discussed chest x-ray showed congestion. Significant therapeutic interventions:   Encephalopathy-drowsiness postanesthesia for AV fistula revision- now resolved,   Patient is awake and alert. Ready for discharge today  Resume home antihypertensives  Dialysis for CHF, back to baseline  Patient uses oxygen at home 2 to 3 L.   Currently at baseline  Patient is on Eliquis for history of atrial fibrillation, states that he stopped due to nosebleeds  Eliquis resumed on discharge, patient states he will only take it after talking to Dr. Monica Jean in the office    Significant Diagnostic Studies:   Labs / Micro:  CBC:   Lab Results   Component Value Date    WBC 6.5 06/16/2022    RBC 3.38 06/16/2022    RBC 3.62 09/08/2018    HGB 10.8 06/16/2022    HCT 34.8 06/16/2022    .0 06/16/2022    MCH 32.0 06/16/2022    MCHC 31.0 06/16/2022    RDW 15.9 06/16/2022     06/16/2022     BMP:    Lab Results   Component Value Date    GLUCOSE 146 06/16/2022    GLUCOSE 147 09/08/2018     06/16/2022    K 5.2 06/16/2022    CL 89 06/16/2022    CO2 25 06/16/2022    ANIONGAP 20 06/16/2022    BUN 64 06/16/2022    CREATININE 9.29 06/16/2022    BUNCRER 7 02/02/2022    CALCIUM 9.4 06/16/2022    LABGLOM 6 06/16/2022    GFRAA 7 06/16/2022    GFR      06/16/2022     HFP:    Lab Results   Component Value Date    PROT 8.0 11/12/2021     CMP:    Lab Results   Component Value Date    GLUCOSE 146 06/16/2022    GLUCOSE 147 09/08/2018     06/16/2022    K 5.2 06/16/2022    CL 89 06/16/2022    CO2 25 06/16/2022    BUN 64 06/16/2022    CREATININE 9.29 06/16/2022    ANIONGAP 20 06/16/2022    ALKPHOS 181 11/12/2021    ALT 15 11/12/2021    AST 14 11/12/2021    BILITOT 1.55 11/12/2021    LABALBU 4.1 11/12/2021    ALBUMIN 1.1 11/12/2021    LABGLOM 6 06/16/2022    GFRAA 7 06/16/2022    GFR      06/16/2022    PROT 8.0 11/12/2021    CALCIUM 9.4 06/16/2022     PT/INR:    Lab Results   Component Value Date    PROTIME 14.2 01/03/2022    INR 1.4 01/03/2022        Radiology:  XR CHEST (SINGLE VIEW FRONTAL)    Result Date: 6/15/2022  Cardiomegaly with perihilar congestion and edema. Consultations:    Consults:     Final Specialist Recommendations/Findings:   IP CONSULT TO IV TEAM  IP CONSULT TO NEPHROLOGY      The patient was seen and examined on day of discharge and this discharge summary is in conjunction with any daily progress note from day of discharge. Discharge plan:     Disposition: Home    Physician Follow Up:      Roxie [place] MD  [address] [place] [address] [id number]  [place] [address]  [phone]      PLEASE CONTACT OFFICE FOR FOLLOW UP    [address] [facility],[address] [place] [address]  [address]  [address]  [phone]    Schedule an appointment as soon as possible for a visit in 3 days      Rogelio Spence MD  [address] [place] [address]  [phone]    Schedule an appointment as soon as possible for a visit in 1 week         Requiring Further Evaluation/Follow Up POST HOSPITALIZATION/Incidental Findings: follow up on eliquis with cardiology, patient explained stroke risk but he wants to talk to his cardiologist first    Diet: cardiac diet and renal diet    Activity: As tolerated    Instructions to Patient: take meds as ordered    Discharge Medications:      Medication List START taking these medications    oxyCODONE-acetaminophen 5-325 MG per tablet  Commonly known as: Percocet  Take 1 tablet by mouth every 6 hours as needed for Pain for up to 7 days. Intended supply: 7 days. Take lowest dose possible to manage pain        CONTINUE taking these medications    apixaban 5 MG Tabs tablet  Commonly known as: Eliquis  Take 1 tablet by mouth 2 times daily     aspirin EC 81 MG EC tablet  Take 1 tablet by mouth daily     atorvastatin 40 MG tablet  Commonly known as: LIPITOR  Take 1 tablet by mouth nightly     benzonatate 100 MG capsule  Commonly known as: TESSALON     calcium carbonate 500 MG chewable tablet  Commonly known as: TUMS     carvedilol 6.25 MG tablet  Commonly known as: COREG  Take 1 tablet by mouth 2 times daily (with meals)     glimepiride 1 MG tablet  Commonly known as: AMARYL  Take 1 tablet by mouth every morning (before breakfast) Do not take if blood sugars are less than 110     guaiFENesin-codeine 100-10 MG/5ML liquid  Commonly known as: GUAIFENESIN AC     lanthanum 1000 MG chewable tablet  Commonly known as: FOSRENOL     lidocaine-prilocaine 2.5-2.5 % cream  Commonly known as: EMLA     losartan 50 MG tablet  Commonly known as: COZAAR  Take 1 tablet by mouth daily     RenaPlex-D Tabs     Renvela 2.4 g Pack packet  Generic drug: sevelamer     traMADol 50 MG tablet  Commonly known as: ULTRAM           Where to Get Your Medications      You can get these medications from any pharmacy    Bring a paper prescription for each of these medications  · oxyCODONE-acetaminophen 5-325 MG per tablet         No discharge procedures on file. Time Spent on discharge is  33 mins in patient examination, evaluation, counseling as well as medication reconciliation, prescriptions for required medications, discharge plan and follow up.     Electronically signed by   William Monson MD  6/16/2022  12:44 PM      Thank you Dr. April Brock for the opportunity to be involved in this patient's care.

## 2022-06-16 NOTE — PROGRESS NOTES
Dialysis Time Out  To be done by RN and tech or 2 RNs  Staff Names: Jean Meraz Marla 9 RN     [x]  Identity of the patient using 2 patient identifiers  [x]  Consent for treatment  [x]  Equipment-proper machine and dialyzer  [x]  B-Hep B status  [x]  Orders- to include bath, blood flow, dialyzer, time and fluid removal  [x]  Access-Correct site and in working order  [x]  Time for patient to ask questions.

## 2022-06-16 NOTE — PROGRESS NOTES
Dialysis Post Treatment Note  Vitals:    06/16/22 1303   BP: 129/70   Pulse: 76   Resp: 18   Temp: 97.3 °F (36.3 °C)   SpO2:      Pre-Weight = 121.3  Post-weight = Weight: 267 lb 6.7 oz (121.3 kg)  Total Liters Processed = Total Liters Processed (l/min): 78.2 l/min  Rinseback Volume (mL) = Rinseback Volume (ml): 300 ml  Net Removal (mL) =  4000  Patient's dry weight=115.5  Type of access used=cath  Length of treatment=210    Pt came off of treatment with no complaints

## 2022-06-16 NOTE — PLAN OF CARE
Problem: Discharge Planning  Goal: Discharge to home or other facility with appropriate resources  6/16/2022 0836 by Mikey Berman RN  Outcome: Progressing  6/15/2022 2145 by Ava Lyle RN  Outcome: Progressing  Flowsheets (Taken 6/15/2022 1713 by Marimar Wynn RN)  Discharge to home or other facility with appropriate resources: Arrange for needed discharge resources and transportation as appropriate     Problem: Safety - Adult  Goal: Free from fall injury  6/16/2022 0836 by Mikey Berman RN  Outcome: Progressing  6/15/2022 2145 by Ava Lyle RN  Outcome: Progressing     Problem: ABCDS Injury Assessment  Goal: Absence of physical injury  6/16/2022 0836 by Mikey Berman RN  Outcome: Progressing  6/15/2022 2145 by Ava Lyle RN  Outcome: Progressing     Problem: Pain  Goal: Verbalizes/displays adequate comfort level or baseline comfort level  6/16/2022 0836 by Mikey Berman RN  Outcome: Progressing  Flowsheets (Taken 6/16/2022 0400 by Ava Lyle RN)  Verbalizes/displays adequate comfort level or baseline comfort level:   Encourage patient to monitor pain and request assistance   Assess pain using appropriate pain scale  6/15/2022 2145 by Ava Lyle RN  Outcome: Progressing  Flowsheets (Taken 6/15/2022 2000)  Verbalizes/displays adequate comfort level or baseline comfort level:   Encourage patient to monitor pain and request assistance   Assess pain using appropriate pain scale     Problem: Skin/Tissue Integrity  Goal: Absence of new skin breakdown  Description: 1. Monitor for areas of redness and/or skin breakdown  2. Assess vascular access sites hourly  3. Every 4-6 hours minimum:  Change oxygen saturation probe site  4. Every 4-6 hours:  If on nasal continuous positive airway pressure, respiratory therapy assess nares and determine need for appliance change or resting period.   6/16/2022 0836 by Mikey Berman RN  Outcome: Progressing  6/15/2022 2145 by Ava Lyle RN  Outcome: Progressing     Problem: Chronic Conditions and Co-morbidities  Goal: Patient's chronic conditions and co-morbidity symptoms are monitored and maintained or improved  Outcome: Progressing

## 2022-06-16 NOTE — PLAN OF CARE
Problem: Discharge Planning  Goal: Discharge to home or other facility with appropriate resources  Outcome: Progressing  Flowsheets (Taken 6/15/2022 1713 by Lo Washington RN)  Discharge to home or other facility with appropriate resources: Arrange for needed discharge resources and transportation as appropriate     Problem: Safety - Adult  Goal: Free from fall injury  Outcome: Progressing     Problem: ABCDS Injury Assessment  Goal: Absence of physical injury  Outcome: Progressing     Problem: Pain  Goal: Verbalizes/displays adequate comfort level or baseline comfort level  Outcome: Progressing  Flowsheets (Taken 6/15/2022 2000)  Verbalizes/displays adequate comfort level or baseline comfort level:   Encourage patient to monitor pain and request assistance   Assess pain using appropriate pain scale     Problem: Skin/Tissue Integrity  Goal: Absence of new skin breakdown  Description: 1. Monitor for areas of redness and/or skin breakdown  2. Assess vascular access sites hourly  3. Every 4-6 hours minimum:  Change oxygen saturation probe site  4. Every 4-6 hours:  If on nasal continuous positive airway pressure, respiratory therapy assess nares and determine need for appliance change or resting period.   Outcome: Progressing

## 2022-07-04 RX ORDER — OXYCODONE HYDROCHLORIDE AND ACETAMINOPHEN 5; 325 MG/1; MG/1
TABLET ORAL
Qty: 28 TABLET | OUTPATIENT
Start: 2022-07-04

## 2022-07-15 ENCOUNTER — OFFICE VISIT (OUTPATIENT)
Dept: VASCULAR SURGERY | Age: 62
End: 2022-07-15

## 2022-07-15 VITALS
BODY MASS INDEX: 37.12 KG/M2 | HEIGHT: 70 IN | WEIGHT: 259.26 LBS | SYSTOLIC BLOOD PRESSURE: 112 MMHG | OXYGEN SATURATION: 95 % | TEMPERATURE: 98.1 F | DIASTOLIC BLOOD PRESSURE: 74 MMHG | RESPIRATION RATE: 24 BRPM | HEART RATE: 91 BPM

## 2022-07-15 DIAGNOSIS — I89.0 LYMPHEDEMA DUE TO VENOUS INSUFFICIENCY: ICD-10-CM

## 2022-07-15 DIAGNOSIS — M79.89 LEG SWELLING: Primary | ICD-10-CM

## 2022-07-15 DIAGNOSIS — Z99.2 ENCOUNTER REGARDING VASCULAR ACCESS FOR DIALYSIS FOR ESRD (HCC): ICD-10-CM

## 2022-07-15 DIAGNOSIS — I87.2 LYMPHEDEMA DUE TO VENOUS INSUFFICIENCY: ICD-10-CM

## 2022-07-15 DIAGNOSIS — N18.6 ENCOUNTER REGARDING VASCULAR ACCESS FOR DIALYSIS FOR ESRD (HCC): ICD-10-CM

## 2022-07-15 PROCEDURE — 99024 POSTOP FOLLOW-UP VISIT: CPT | Performed by: SURGERY

## 2022-07-15 NOTE — PROGRESS NOTES
Division of Vascular Surgery        Follow Up      Left radial cephalic Av fistula creation (2/20/19)  Left AV fistula revision, branch ligation (3/6/19)  Fistulagram angioplasty (7/17/19)  LUE AV graft, radial cephalic fistula ligation (10/23/19)  Fistulagram, angioplasty (5/24/22)  New left upper extremity AV graft placement and tunneled catheter (6/15/22)    Chief Complaint:     Postoperative follow up    History of Present Illness:      Yumi Meyer is a 57 y.o. gentleman who presents for follow up after undergoing left upper extremity AV graft placement. He has done well, denies any pain, weakness to suggest ischemic steal syndrome. Incisions have healed nicely and he has minimal swelling. He has been undergoing dialysis through a catheter that has been working well. His graft has some pulsatility in it concerning for outflow issues, he has a palpable radial artery pulse. Medical History:     Past Medical History:   Diagnosis Date    Acute congestive heart failure ([address] 12/21/2016    Acute on chronic diastolic congestive heart failure ([address] 11/24/2018    Anemia 11/25/2018    Anemia of chronic renal failure 10/17/2016    Atrial fibrillation ([address] 01/03/2022    AVF (arteriovenous fistula) (HCC)     Bowel obstruction ([address] 12/26/2013    CAD (coronary artery disease) 2013    ?     Chest pain at rest 12/21/2016    CHF (congestive heart failure) ([address] 2013    last episode 11/2018    CHF (congestive heart failure), NYHA class I, acute on chronic, combined ([address] 2/9/2021    Chronic kidney disease     CKD (chronic kidney disease) stage 4, GFR 15-29 ml/min ([address] 10/17/2016    Coronary artery disease involving native coronary artery of native heart without angina pectoris     Diabetes mellitus ([address] 2012    NIDDM    Dialysis patient ([address]     Clemente Mello  /  Trey Lynch  /  Adri Reese    ESRD (end stage renal disease) ([address]     Essential hypertension 11/25/2018    Groin swelling 07/17/2019    Hemodialysis patient ([facility] 11/28/2018    TUNNELD CATHETER RIGHT DIALYSIS ACCESSTUES THURS AND AT ARROWHEAD    Hydrocele 07/17/2019    Hyperlipidemia 2013    ON RX    Hypertension 2013    ON RX    Inguinal hernia     BILATERAL- NEEDS REPAIRED, UMBILICAL HERNIA  ALSO    MI, old 12/26/2013    NSTEMI (non-ST elevated myocardial infarction) ([facility] 6/17/2019    On home O2     2 L    BATSHEVA (obstructive sleep apnea)     Patient in clinical research study 04/01/2019    XIENCE SHORT DAPT    Pneumonia     Poor historian     Prostatism 11/17/2017    Respiratory distress 11/25/2018    Rising PSA level 11/17/2017    S/P arteriovenous (AV) graft placement     S/P PTCA - TIM distal LAD - Dr. Luis Armando Cantu 4/1/19 4/1/2019    Sleep apnea 2015    pt has machine and does not use it    Small bowel obstruction ([facility] 5/1/2021    SOB (shortness of breath) 02/02/2022    noted walking from waiting room to pre op testing    Type 2 diabetes mellitus with chronic kidney disease on chronic dialysis ([facility] 10/17/2016       Surgical History:     Past Surgical History:   Procedure Laterality Date    ABDOMEN SURGERY  2013    BOWEL OBSTRUCTION    AV FISTULA CREATION Left 02/20/2019    AV FISTULA REPAIR  07/17/2019    AV fistula revision, branch ligation / Percutaneous angioplasty of proximal anastomosis and outflow tract of dialysis circuit with drug coated balloon  /  Dr Tommy Scheuermann  02/09/2021    Dr. Ron Rodas, [address]  03/31/2019    TIM LAD    CYSTOSCOPY  11/17/2017    CYSTOSCOPY N/A 04/01/2022    CYSTOSCOPY DILATION performed by Layla Prader, MD at [address] Left 02/20/2019    AV FISTULA CREATION ARM performed by Jackolyn Angelucci, MD at [address] [facility] Left 10/23/2019    LEFT UPPER EXTREMITY AV GRAFT CREATION WITH [id number] ARTEGRAFT, LEFT UPPER EXTREMITY AV FISTULA LIGATION performed by Jackolyn Angelucci, MD at [address] FISTULA CREATION Left 6/15/2022    REVISION OF UPPER EXTREMITY AV FISTULA GRAFT, CENTRAL VENOUS CATHETER PLACEMENT (C-ARM) performed by Cheikh Rodgers MD at [address] Left 2005    [address] [facility]    IR THROMBECTOMY PERCUT AVF  01/03/2022    IR THROMBECTOMY PERCUT AVF 1/3/2022 [facility] SPECIAL PROCEDURES    MIDDLE EAR SURGERY  1966    EAR DRUM REPAIRED    MOUTH SURGERY  2007    1 WISDOM TOOTH REMOVED    NOSE SURGERY  1968    CAUTERY TO STOP NOSE BEEDS    OTHER SURGICAL HISTORY Left 03/06/2019    fistulagram    NE GENITAL SURG PROC,MALE UNLISTED N/A 02/16/2018    US  PROSTATE BIOPSY WITH SATURATION performed by Delon Aguilar MD at [address] 11/17/2017    CYSTOSCOPY PROSTATE US BIOPSY performed by Dleon Aguilar MD at [address]  02/16/2018    TONSILLECTOMY  1968    TUN[address]ELED [address] Right 06/15/2022    Placement of tunneled central venous hemodialysis catheter  /  Dr Kirsten Villa 09/27/2021    EGD BIOPSY performed by Bakari Perez MD at [address] [facility]  09/20/2019    LUE FISTULAGRAM  /  DR Hooker Knee  /  Findings: Severe stenosis of proximal cephalic vein. / Will plan for LUE AVG placement.     VASCULAR SURGERY Left 06/15/2022    Open revision of left upper extremity AV graft   /  Dr Kelby Florez       Family History:     Family History   Problem Relation Age of Onset    Heart Disease Mother         CHF    Early Death Mother     High Blood Pressure Brother     Diabetes Brother     Asthma Brother     High Blood Pressure Brother     Diabetes Brother     High Blood Pressure Brother     Diabetes Brother        Allergies:       Lisinopril    Medications:      Current Outpatient Medications   Medication Sig Dispense Refill    lanthanum (FOSRENOL) 1000 MG chewable tablet CHEW AND SWALLOW 1 TABLET THREE TIMES A DAY WITH MEALS      sevelamer (RENVELA) 2.4 g PACK packet Take 1 packet by Musculoskeletal: Negative. Skin:  Negative for color change and wound. Allergic/Immunologic: Negative. Neurological:  Negative for facial asymmetry, speech difficulty, weakness and numbness. Hematological: Negative. Psychiatric/Behavioral: Negative. Physical Exam:     Vitals:  /74 (Site: Left Upper Arm, Position: Sitting, Cuff Size: Medium Adult)   Pulse 91   Temp 98.1 °F (36.7 °C) (Temporal)   Resp 24   Ht 5' 10\" (1.778 m)   Wt 259 lb 4.2 oz (117.6 kg)   SpO2 95%   BMI 37.20 kg/m²     Physical Exam  Constitutional:       Appearance: He is well-developed and well-groomed. HENT:      Mouth/Throat:      Pharynx: Oropharynx is clear. Eyes:      Extraocular Movements: Extraocular movements intact. Conjunctiva/sclera: Conjunctivae normal.   Cardiovascular:      Rate and Rhythm: Normal rate and regular rhythm. Pulses:           Radial pulses are 2+ on the left side. Arteriovenous access: Left arteriovenous access is present. Comments: Pulsatile thrill over AV graft  Pulmonary:      Effort: Pulmonary effort is normal. No respiratory distress. Abdominal:      General: There is no distension. Palpations: Abdomen is soft. Tenderness: There is no abdominal tenderness. Musculoskeletal:         General: Normal range of motion. Left upper arm: No swelling or tenderness. Left hand: No tenderness. Normal strength. Normal sensation. Normal capillary refill. Cervical back: Full passive range of motion without pain and neck supple. Right lower le+ Pitting Edema present. Left lower le+ Pitting Edema present. Skin:     General: Skin is warm. Capillary Refill: Capillary refill takes less than 2 seconds. Comments: Vicryl suture over left medial arm from declot procedure; palpable thrill over AV graft   Neurological:      Mental Status: He is alert and oriented to person, place, and time. GCS: GCS eye subscore is 4.  GCS verbal subscore is 5. GCS motor subscore is 6. Sensory: Sensation is intact. Motor: Motor function is intact. Psychiatric:         Mood and Affect: Mood normal.         Speech: Speech normal.         Behavior: Behavior normal.     Imaging/Labs:     none    Assessment and Plan:     ESRD on hemodialysis, chronic lower extrmeity swelling and lymphadema  Will plan on performing fistulagram before using AV graft  Risks and benefits explained and he consented to proceed  Had started process for lymphedema pumps, will make referral to lymphedema clinic for evaluation   Patient has tried and failed 4 weeks of conservative treatments including elevation, compression, and exercise and significant symptoms still remain. Patient has proximal swelling leading into the groin and abdomen area. A basic pump could exacerbate symptoms and cause an increase in proximal swelling. I am recommending this patient receive a flexitouch to address truncal swelling and safely and efficiently move lymphatic fluid. Electronically signed by Cody Carmona MD on 7/15/22 at 9:17 AM EDT      [address] [place] [address],[address]: [phone]  C: [phone]  Email: [email or web address]

## 2022-07-17 PROBLEM — M79.89 LEG SWELLING: Status: ACTIVE | Noted: 2022-07-17

## 2022-07-17 ASSESSMENT — ENCOUNTER SYMPTOMS
CHEST TIGHTNESS: 0
ALLERGIC/IMMUNOLOGIC NEGATIVE: 1
SHORTNESS OF BREATH: 0
ABDOMINAL PAIN: 0
COLOR CHANGE: 0

## 2022-07-18 ENCOUNTER — HOSPITAL ENCOUNTER (OUTPATIENT)
Age: 62
Setting detail: OUTPATIENT SURGERY
Discharge: HOME | End: 2022-07-18
Attending: SURGERY
Payer: MEDICARE

## 2022-07-18 VITALS
SYSTOLIC BLOOD PRESSURE: 113 MMHG | DIASTOLIC BLOOD PRESSURE: 72 MMHG | HEART RATE: 79 BPM | OXYGEN SATURATION: 98 % | WEIGHT: 255.07 LBS | HEIGHT: 70 IN | TEMPERATURE: 97.9 F | RESPIRATION RATE: 18 BRPM | BODY MASS INDEX: 36.52 KG/M2

## 2022-07-18 PROCEDURE — 2580000003 HC RX 258: Performed by: SURGERY

## 2022-07-18 PROCEDURE — 7100000001 HC PACU RECOVERY - ADDTL 15 MIN

## 2022-07-18 PROCEDURE — 2500000003 HC RX 250 WO HCPCS: Performed by: SURGERY

## 2022-07-18 PROCEDURE — 7100000000 HC PACU RECOVERY - FIRST 15 MIN

## 2022-07-18 PROCEDURE — 6360000004 HC RX CONTRAST MEDICATION: Performed by: SURGERY

## 2022-07-18 PROCEDURE — 36901 INTRO CATH DIALYSIS CIRCUIT: CPT | Performed by: SURGERY

## 2022-07-18 PROCEDURE — 3600000007 HC SURGERY HYBRID BASE: Performed by: SURGERY

## 2022-07-18 PROCEDURE — 2709999900 HC NON-CHARGEABLE SUPPLY: Performed by: SURGERY

## 2022-07-18 PROCEDURE — 3600000017 HC SURGERY HYBRID ADDL 15MIN: Performed by: SURGERY

## 2022-07-18 PROCEDURE — 2709999900 HC NON-CHARGEABLE SUPPLY

## 2022-07-18 PROCEDURE — A4217 STERILE WATER/SALINE, 500 ML: HCPCS | Performed by: SURGERY

## 2022-07-18 PROCEDURE — C1769 GUIDE WIRE: HCPCS | Performed by: SURGERY

## 2022-07-18 PROCEDURE — 6360000002 HC RX W HCPCS: Performed by: SURGERY

## 2022-07-18 RX ORDER — IODIXANOL 320 MG/ML
INJECTION, SOLUTION INTRAVASCULAR PRN
Status: DISCONTINUED | OUTPATIENT
Start: 2022-07-18 | End: 2022-07-18 | Stop reason: ALTCHOICE

## 2022-07-18 RX ORDER — LIDOCAINE HYDROCHLORIDE 10 MG/ML
INJECTION, SOLUTION INFILTRATION; PERINEURAL PRN
Status: DISCONTINUED | OUTPATIENT
Start: 2022-07-18 | End: 2022-07-18 | Stop reason: ALTCHOICE

## 2022-07-18 NOTE — DISCHARGE INSTRUCTIONS
Remove bandage in 24-48 hours  OK to Steven's activity for the remainder of the day  OK for dialysis to start using fistula graft, using two needles in marked area

## 2022-07-18 NOTE — PROGRESS NOTES
Patient admitted, consent signed and questions answered. Patient ready for procedure. Call light to reach with side rails up 2 of 2  No family at bedside with patient. History and physical complete. 2% Chlorhexidine cloths used to prep site / left arm.   Patient poor historian regarding home medications

## 2022-07-18 NOTE — H&P
Division of Vascular Surgery        H&P Update    Patient's Office Note from 7/15/22 was reviewed. There are no changes today. Plan to proceed with left upper extremity fistulagram.      Electronically signed by Mikey Krueger MD on 7/18/22 at 1:20 PM EDT      [address] [facility],[address]: [phone]  C: [phone]  Email: [email or web address]           Left radial cephalic Av fistula creation (2/20/19)  Left AV fistula revision, branch ligation (3/6/19)  Fistulagram angioplasty (7/17/19)  LUE AV graft, radial cephalic fistula ligation (10/23/19)  Fistulagram, angioplasty (5/24/22)  New left upper extremity AV graft placement and tunneled catheter (6/15/22)     Chief Complaint:      Postoperative follow up     History of Present Illness:      Terese Baumgarten is a 57 y.o. gentleman who presents for follow up after undergoing left upper extremity AV graft placement. He has done well, denies any pain, weakness to suggest ischemic steal syndrome. Incisions have healed nicely and he has minimal swelling. He has been undergoing dialysis through a catheter that has been working well. His graft has some pulsatility in it concerning for outflow issues, he has a palpable radial artery pulse. Medical History:      Past Medical History        Past Medical History:   Diagnosis Date    Acute congestive heart failure ([address] 12/21/2016    Acute on chronic diastolic congestive heart failure ([address] 11/24/2018    Anemia 11/25/2018    Anemia of chronic renal failure 10/17/2016    Atrial fibrillation ([address] 01/03/2022    AVF (arteriovenous fistula) (HCC)      Bowel obstruction ([address] 12/26/2013    CAD (coronary artery disease) 2013     ?     Chest pain at rest 12/21/2016    CHF (congestive heart failure) ([address] 2013     last episode 11/2018    CHF (congestive heart failure), NYHA class I, acute on chronic, combined ([address] 2/9/2021    Chronic kidney disease      CKD (chronic kidney disease) stage 4, GFR 15-29 ml/min ([facility]) 10/17/2016    Coronary artery disease involving native coronary artery of native heart without angina pectoris      Diabetes mellitus ([facility] 2012     NIDDM    Dialysis patient ([address]       Mosher Wilmarcanelo  /  Rebecca Gu  /  Willard Rile    ESRD (end stage renal disease) ([address]      Essential hypertension 11/25/2018    Groin swelling 07/17/2019    Hemodialysis patient ([address] 11/28/2018     TUNNELD CATHETER RIGHT DIALYSIS ACCESSTUES THURS AND AT [place]    Hydrocele 07/17/2019    Hyperlipidemia 2013     ON RX    Hypertension 2013     ON RX    Inguinal hernia       BILATERAL- NEEDS REPAIRED, UMBILICAL HERNIA  ALSO    MI, old 12/26/2013    NSTEMI (non-ST elevated myocardial infarction) ([address] 6/17/2019    On home O2       2 L    BATSHEVA (obstructive sleep apnea)      Patient in clinical research study 04/01/2019     XIENCE SHORT DAPT    Pneumonia      Poor historian      Prostatism 11/17/2017    Respiratory distress 11/25/2018    Rising PSA level 11/17/2017    S/P arteriovenous (AV) graft placement      S/P PTCA - TIM distal LAD - Dr. Oviedo [place] 4/1/19 4/1/2019    Sleep apnea 2015     pt has machine and does not use it    Small bowel obstruction ([address] 5/1/2021    SOB (shortness of breath) 02/02/2022     noted walking from waiting room to pre op testing    Type 2 diabetes mellitus with chronic kidney disease on chronic dialysis ([address] 10/17/2016            Surgical History:      Past Surgical History         Past Surgical History:   Procedure Laterality Date    ABDOMEN SURGERY   2013     BOWEL OBSTRUCTION    AV FISTULA CREATION Left 02/20/2019    AV FISTULA REPAIR   07/17/2019     AV fistula revision, branch ligation / Percutaneous angioplasty of proximal anastomosis and outflow tract of dialysis circuit with drug coated balloon  /  Dr Leslie Roman   02/09/2021     Dr. Tiara Alba [facility]   03/31/2019     TIM LAD    CYSTOSCOPY   11/17/2017 CYSTOSCOPY N/A 04/01/2022     CYSTOSCOPY DILATION performed by Rolo Torres MD at [place] [address] Left 02/20/2019     AV FISTULA CREATION ARM performed by Mónica Long MD at [place] [address] Left 10/23/2019     LEFT UPPER EXTREMITY AV GRAFT CREATION WITH [id number] ARTEGRAFT, LEFT UPPER EXTREMITY AV FISTULA LIGATION performed by Mónica Long MD at [place] [address] Left 6/15/2022     REVISION OF UPPER EXTREMITY AV FISTULA GRAFT, CENTRAL VENOUS CATHETER PLACEMENT (C-ARM) performed by Mónica Long MD at [facility] 2005     [address] [facility]    IR THROMBECTOMY PERCUT AVF   01/03/2022     IR THROMBECTOMY PERCUT AVF 1/3/2022 [facility] SPECIAL PROCEDURES    MIDDLE EAR SURGERY   1966     EAR DRUM REPAIRED    MOUTH SURGERY   2007     1 WISDOM TOOTH REMOVED    NOSE SURGERY   1968     CAUTERY TO STOP NOSE BEEDS    OTHER SURGICAL HISTORY Left 03/06/2019     fistulagram    MI GENITAL SURG PROC,MALE UNLISTED N/A 02/16/2018     US  PROSTATE BIOPSY WITH SATURATION performed by Rolo Torres MD at [address] 11/17/2017     CYSTOSCOPY PROSTATE US BIOPSY performed by Rolo Torres MD at [address]   02/16/2018    TONSILLECTOMY   1968    TUNNELED [address] Right 06/15/2022     Placement of tunneled central venous hemodialysis catheter  /  Dr Saeed Ruvalcaba 09/27/2021     EGD BIOPSY performed by Pili Julien MD at [address] [facility]   09/20/2019     LUE FISTULAGRAM  /  DR Nikolai Mcclelland  /  Findings: Severe stenosis of proximal cephalic vein. / Will plan for LUE AVG placement.     VASCULAR SURGERY Left 06/15/2022     Open revision of left upper extremity AV graft   /  Dr Mónica Medeiros            Family History:      Family History         Family History   Problem Relation Age of Onset    Heart Disease Mother CHF    Early Death Mother      High Blood Pressure Brother      Diabetes Brother      Asthma Brother      High Blood Pressure Brother      Diabetes Brother      High Blood Pressure Brother      Diabetes Brother              Allergies:       Lisinopril     Medications:      Current Facility-Administered Medications          Current Outpatient Medications   Medication Sig Dispense Refill    lanthanum (FOSRENOL) 1000 MG chewable tablet CHEW AND SWALLOW 1 TABLET THREE TIMES A DAY WITH MEALS        sevelamer (RENVELA) 2.4 g PACK packet Take 1 packet by mouth        carvedilol (COREG) 6.25 MG tablet Take 1 tablet by mouth 2 times daily (with meals) 60 tablet 3    glimepiride (AMARYL) 1 MG tablet Take 1 tablet by mouth every morning (before breakfast) Do not take if blood sugars are less than 110 30 tablet 0    benzonatate (TESSALON) 100 MG capsule take 1 capsule by mouth three times a day if needed for 10 days        guaiFENesin-codeine (GUAIFENESIN AC) 100-10 MG/5ML liquid give 10 milliliters by mouth every 4 hours if needed        atorvastatin (LIPITOR) 40 MG tablet Take 1 tablet by mouth nightly 30 tablet 3    traMADol (ULTRAM) 50 MG tablet take 1 tablet by mouth every 6 hours if needed        calcium carbonate (TUMS) 500 MG chewable tablet Take 1 tablet by mouth daily as needed        Multiple Vitamins-Minerals (RENAPLEX-D) TABS Take 1 tablet by mouth every other day Pt takes occasionally   3    lidocaine-prilocaine (EMLA) 2.5-2.5 % cream          apixaban (ELIQUIS) 5 MG TABS tablet Take 1 tablet by mouth 2 times daily (Patient not taking: Reported on 7/15/2022) 60 tablet 5    aspirin EC 81 MG EC tablet Take 1 tablet by mouth daily (Patient not taking: Reported on 7/15/2022) 90 tablet 1    losartan (COZAAR) 50 MG tablet Take 1 tablet by mouth daily (Patient not taking: Reported on 7/15/2022) 30 tablet 3      No current facility-administered medications for this visit.          Social History:      Tobacco: reports that he has never smoked. He has never used smokeless tobacco.  Alcohol:      reports no history of alcohol use. Drug Use:  reports no history of drug use. Review of Systems:      Review of Systems  Constitutional:  Negative for chills and fever. HENT:  Negative for congestion. Eyes:  Negative for visual disturbance. Respiratory:  Negative for chest tightness and shortness of breath. Cardiovascular:  Positive for leg swelling. Negative for chest pain. Gastrointestinal:  Negative for abdominal pain. Endocrine: Negative. Genitourinary: Negative. Musculoskeletal: Negative. Skin:  Negative for color change and wound. Allergic/Immunologic: Negative. Neurological:  Negative for facial asymmetry, speech difficulty, weakness and numbness. Hematological: Negative. Psychiatric/Behavioral: Negative. Physical Exam:      Vitals:  /74 (Site: Left Upper Arm, Position: Sitting, Cuff Size: Medium Adult)   Pulse 91   Temp 98.1 °F (36.7 °C) (Temporal)   Resp 24   Ht 5' 10\" (1.778 m)   Wt 259 lb 4.2 oz (117.6 kg)   SpO2 95%   BMI 37.20 kg/m²      Physical Exam  Constitutional:       Appearance: He is well-developed and well-groomed. HENT:     Mouth/Throat:     Pharynx: Oropharynx is clear. Eyes:     Extraocular Movements: Extraocular movements intact. Conjunctiva/sclera: Conjunctivae normal.  Cardiovascular:     Rate and Rhythm: Normal rate and regular rhythm. Pulses:           Radial pulses are 2+ on the left side. Arteriovenous access: Left arteriovenous access is present. Comments: Pulsatile thrill over AV graft  Pulmonary:     Effort: Pulmonary effort is normal. No respiratory distress. Abdominal:     General: There is no distension. Palpations: Abdomen is soft. Tenderness: There is no abdominal tenderness. Musculoskeletal:         General: Normal range of motion. Left upper arm: No swelling or tenderness. Left hand: No tenderness. Normal strength. Normal sensation. Normal capillary refill. Cervical back: Full passive range of motion without pain and neck supple. Right lower le+ Pitting Edema present. Left lower le+ Pitting Edema present. Skin:     General: Skin is warm. Capillary Refill: Capillary refill takes less than 2 seconds. Comments: Vicryl suture over left medial arm from declot procedure; palpable thrill over AV graft   Neurological:     Mental Status: He is alert and oriented to person, place, and time. GCS: GCS eye subscore is 4. GCS verbal subscore is 5. GCS motor subscore is 6. Sensory: Sensation is intact. Motor: Motor function is intact. Psychiatric:         Mood and Affect: Mood normal.         Speech: Speech normal.         Behavior: Behavior normal.     Imaging/Labs:      none     Assessment and Plan:      ESRD on hemodialysis, chronic lower extrmeity swelling and lymphadema  Will plan on performing fistulagram before using AV graft  Risks and benefits explained and he consented to proceed  Had started process for lymphedema pumps, will make referral to lymphedema clinic for evaluation   Patient has tried and failed 4 weeks of conservative treatments including elevation, compression, and exercise and significant symptoms still remain. Patient has proximal swelling leading into the groin and abdomen area. A basic pump could exacerbate symptoms and cause an increase in proximal swelling. I am recommending this patient receive a flexitouch to address truncal swelling and safely and efficiently move lymphatic fluid.

## 2022-07-18 NOTE — PROGRESS NOTES
Patient returns to [facility] room 12 / local only  used / bandaid to left arm clean and dry at 1500. Thrill and bruit present. Arrangements for transportation made and fluids offered. Dressing change to right tunnel cath per MD as well as bandaid to left fistula  All discharge instructions reviewed, questions answered, paper signed and given copy. Patient discharged per own wheelchair with friend and belongings.  Encouraged to bring medication list and/or med bottles with him next time has scheduled appointment

## 2022-07-19 ENCOUNTER — TELEPHONE (OUTPATIENT)
Dept: VASCULAR SURGERY | Age: 62
End: 2022-07-19

## 2022-07-19 DIAGNOSIS — I73.9 PAD (PERIPHERAL ARTERY DISEASE) (HCC): ICD-10-CM

## 2022-07-19 DIAGNOSIS — M79.89 LEG SWELLING: Primary | ICD-10-CM

## 2022-07-19 DIAGNOSIS — I87.2 LYMPHEDEMA DUE TO VENOUS INSUFFICIENCY: ICD-10-CM

## 2022-07-19 DIAGNOSIS — I89.0 LYMPHEDEMA DUE TO VENOUS INSUFFICIENCY: ICD-10-CM

## 2022-07-19 NOTE — TELEPHONE ENCOUNTER
----- Message from Amparo Cash MA sent at 7/19/2022  9:38 AM EDT -----  Spoke with Barb Ferreira she verbalized understanding put in lymphedema referral gave number to pt.  ----- Message -----  From: Jenniffer Gracia MD  Sent: 7/18/2022   2:51 PM EDT  To: [facility] Clinical Staff    Fistulagram done  Graft looks good  Let dialysis center know that they can access him with 2 needles, if no problems he can come in 2 weeks to have his catheter removed    Also he was asking about how to get to lymphedema clinic, can you guys get them their number and address

## 2022-08-04 ENCOUNTER — OFFICE VISIT (OUTPATIENT)
Dept: VASCULAR SURGERY | Age: 62
End: 2022-08-04
Payer: MEDICARE

## 2022-08-04 VITALS
TEMPERATURE: 98.1 F | BODY MASS INDEX: 36.51 KG/M2 | DIASTOLIC BLOOD PRESSURE: 72 MMHG | OXYGEN SATURATION: 97 % | WEIGHT: 255 LBS | HEIGHT: 70 IN | RESPIRATION RATE: 17 BRPM | HEART RATE: 76 BPM | SYSTOLIC BLOOD PRESSURE: 110 MMHG

## 2022-08-04 DIAGNOSIS — N18.6 ENCOUNTER REGARDING VASCULAR ACCESS FOR DIALYSIS FOR END-STAGE RENAL DISEASE (HCC): Primary | ICD-10-CM

## 2022-08-04 DIAGNOSIS — Z99.2 ENCOUNTER REGARDING VASCULAR ACCESS FOR DIALYSIS FOR END-STAGE RENAL DISEASE (HCC): Primary | ICD-10-CM

## 2022-08-04 DIAGNOSIS — Z99.2 S/P HEMODIALYSIS CATHETER INSERTION (HCC): ICD-10-CM

## 2022-08-04 PROCEDURE — 36589 REMOVAL TUNNELED CV CATH: CPT | Performed by: SURGERY

## 2023-01-18 NOTE — PROGRESS NOTES
Dialysis Post Treatment Note  Vitals:    01/04/22 1325   BP: 130/78   Pulse: 77   Resp: 18   Temp: 98 °F (36.7 °C)   SpO2:      Pre-Weight = 119.7kg  Post-weight = Weight: 258 lb 6.1 oz (117.2 kg)  Total Liters Processed = Total Liters Processed (l/min): 64.2 l/min  Rinseback Volume (mL) = Rinseback Volume (ml): 300 ml  Net Removal (mL) = NET Removed (ml): 2500 ml  Patient's dry weight=117.3kg  Type of access used=Left Fistula  Length of treatment=3.5hrs      Pt tolerated tx well with a total of 2.5 liters off. Left IJ removed as per MD order. No

## 2023-01-23 ENCOUNTER — APPOINTMENT (OUTPATIENT)
Dept: GENERAL RADIOLOGY | Age: 63
End: 2023-01-23
Payer: MEDICARE

## 2023-01-23 ENCOUNTER — HOSPITAL ENCOUNTER (INPATIENT)
Age: 63
LOS: 3 days | Discharge: HOME | End: 2023-01-26
Attending: EMERGENCY MEDICINE
Payer: MEDICARE

## 2023-01-23 DIAGNOSIS — J18.9 PNEUMONIA OF RIGHT LOWER LOBE DUE TO INFECTIOUS ORGANISM: Primary | ICD-10-CM

## 2023-01-23 LAB
ABSOLUTE EOS #: 0 K/UL (ref 0–0.4)
ABSOLUTE IMMATURE GRANULOCYTE: 0 K/UL (ref 0–0.3)
ABSOLUTE LYMPH #: 0.8 K/UL (ref 1–4.8)
ABSOLUTE MONO #: 0.8 K/UL (ref 0.1–0.8)
ANION GAP SERPL CALCULATED.3IONS-SCNC: 18 MMOL/L (ref 9–17)
BASOPHILS # BLD: 0 % (ref 0–2)
BASOPHILS ABSOLUTE: 0 K/UL (ref 0–0.2)
BUN BLDV-MCNC: 74 MG/DL (ref 8–23)
CALCIUM SERPL-MCNC: 9.1 MG/DL (ref 8.6–10.4)
CHLORIDE BLD-SCNC: 88 MMOL/L (ref 98–107)
CO2: 29 MMOL/L (ref 20–31)
CREAT SERPL-MCNC: 10.06 MG/DL (ref 0.7–1.2)
EOSINOPHILS RELATIVE PERCENT: 0 % (ref 1–4)
GFR SERPL CREATININE-BSD FRML MDRD: 5 ML/MIN/1.73M2
GLUCOSE BLD-MCNC: 196 MG/DL (ref 70–99)
HCT VFR BLD CALC: 35 % (ref 40.7–50.3)
HCT VFR BLD CALC: 35.1 % (ref 40.7–50.3)
HEMOGLOBIN: 10.5 G/DL (ref 13–17)
HEMOGLOBIN: 10.6 G/DL (ref 13–17)
IMMATURE GRANULOCYTES: 0 %
LYMPHOCYTES # BLD: 7 % (ref 24–44)
MCH RBC QN AUTO: 30.7 PG (ref 25.2–33.5)
MCH RBC QN AUTO: 30.9 PG (ref 25.2–33.5)
MCHC RBC AUTO-ENTMCNC: 30 G/DL (ref 28.4–34.8)
MCHC RBC AUTO-ENTMCNC: 30.2 G/DL (ref 28.4–34.8)
MCV RBC AUTO: 101.7 FL (ref 82.6–102.9)
MCV RBC AUTO: 102.9 FL (ref 82.6–102.9)
MONOCYTES # BLD: 7 % (ref 1–7)
MORPHOLOGY: NORMAL
NRBC AUTOMATED: 0 PER 100 WBC
NRBC AUTOMATED: 0 PER 100 WBC
PARTIAL THROMBOPLASTIN TIME: 25.7 SEC (ref 20.5–30.5)
PDW BLD-RTO: 16.4 % (ref 11.8–14.4)
PDW BLD-RTO: 16.6 % (ref 11.8–14.4)
PLATELET # BLD: 153 K/UL (ref 138–453)
PLATELET # BLD: 159 K/UL (ref 138–453)
PMV BLD AUTO: 10.1 FL (ref 8.1–13.5)
PMV BLD AUTO: 9.6 FL (ref 8.1–13.5)
POTASSIUM SERPL-SCNC: 4.7 MMOL/L (ref 3.7–5.3)
RBC # BLD: 3.4 M/UL (ref 4.21–5.77)
RBC # BLD: 3.45 M/UL (ref 4.21–5.77)
SEG NEUTROPHILS: 86 % (ref 36–66)
SEGMENTED NEUTROPHILS ABSOLUTE COUNT: 9.8 K/UL (ref 1.8–7.7)
SODIUM BLD-SCNC: 135 MMOL/L (ref 135–144)
TROPONIN, HIGH SENSITIVITY: 273 NG/L (ref 0–22)
TROPONIN, HIGH SENSITIVITY: 275 NG/L (ref 0–22)
TROPONIN, HIGH SENSITIVITY: 278 NG/L (ref 0–22)
WBC # BLD: 11.4 K/UL (ref 3.5–11.3)
WBC # BLD: 12.3 K/UL (ref 3.5–11.3)

## 2023-01-23 PROCEDURE — 6370000000 HC RX 637 (ALT 250 FOR IP): Performed by: EMERGENCY MEDICINE

## 2023-01-23 PROCEDURE — 85730 THROMBOPLASTIN TIME PARTIAL: CPT

## 2023-01-23 PROCEDURE — 94640 AIRWAY INHALATION TREATMENT: CPT

## 2023-01-23 PROCEDURE — 6360000002 HC RX W HCPCS: Performed by: EMERGENCY MEDICINE

## 2023-01-23 PROCEDURE — 71045 X-RAY EXAM CHEST 1 VIEW: CPT

## 2023-01-23 PROCEDURE — 2500000003 HC RX 250 WO HCPCS: Performed by: EMERGENCY MEDICINE

## 2023-01-23 PROCEDURE — 99285 EMERGENCY DEPT VISIT HI MDM: CPT

## 2023-01-23 PROCEDURE — 2060000000 HC ICU INTERMEDIATE R&B

## 2023-01-23 PROCEDURE — 96367 TX/PROPH/DG ADDL SEQ IV INF: CPT

## 2023-01-23 PROCEDURE — 83880 ASSAY OF NATRIURETIC PEPTIDE: CPT

## 2023-01-23 PROCEDURE — 93005 ELECTROCARDIOGRAM TRACING: CPT | Performed by: EMERGENCY MEDICINE

## 2023-01-23 PROCEDURE — 84484 ASSAY OF TROPONIN QUANT: CPT

## 2023-01-23 PROCEDURE — 80048 BASIC METABOLIC PNL TOTAL CA: CPT

## 2023-01-23 PROCEDURE — 2700000000 HC OXYGEN THERAPY PER DAY

## 2023-01-23 PROCEDURE — 85027 COMPLETE CBC AUTOMATED: CPT

## 2023-01-23 PROCEDURE — 85025 COMPLETE CBC W/AUTO DIFF WBC: CPT

## 2023-01-23 PROCEDURE — 2580000003 HC RX 258: Performed by: EMERGENCY MEDICINE

## 2023-01-23 PROCEDURE — 96365 THER/PROPH/DIAG IV INF INIT: CPT

## 2023-01-23 RX ORDER — IPRATROPIUM BROMIDE AND ALBUTEROL SULFATE 2.5; .5 MG/3ML; MG/3ML
1 SOLUTION RESPIRATORY (INHALATION)
Status: DISCONTINUED | OUTPATIENT
Start: 2023-01-23 | End: 2023-01-24 | Stop reason: SDUPTHER

## 2023-01-23 RX ORDER — HEPARIN SODIUM 1000 [USP'U]/ML
2000 INJECTION, SOLUTION INTRAVENOUS; SUBCUTANEOUS PRN
Status: DISCONTINUED | OUTPATIENT
Start: 2023-01-23 | End: 2023-01-25

## 2023-01-23 RX ORDER — ASPIRIN 81 MG/1
324 TABLET, CHEWABLE ORAL ONCE
Status: COMPLETED | OUTPATIENT
Start: 2023-01-23 | End: 2023-01-23

## 2023-01-23 RX ORDER — HEPARIN SODIUM 1000 [USP'U]/ML
4000 INJECTION, SOLUTION INTRAVENOUS; SUBCUTANEOUS ONCE
Status: COMPLETED | OUTPATIENT
Start: 2023-01-23 | End: 2023-01-23

## 2023-01-23 RX ORDER — HEPARIN SODIUM 1000 [USP'U]/ML
4000 INJECTION, SOLUTION INTRAVENOUS; SUBCUTANEOUS PRN
Status: DISCONTINUED | OUTPATIENT
Start: 2023-01-23 | End: 2023-01-25

## 2023-01-23 RX ORDER — CALCIUM GLUCONATE 20 MG/ML
1000 INJECTION, SOLUTION INTRAVENOUS ONCE
Status: COMPLETED | OUTPATIENT
Start: 2023-01-23 | End: 2023-01-23

## 2023-01-23 RX ORDER — HEPARIN SODIUM AND DEXTROSE 10000; 5 [USP'U]/100ML; G/100ML
5-30 INJECTION INTRAVENOUS CONTINUOUS
Status: DISCONTINUED | OUTPATIENT
Start: 2023-01-23 | End: 2023-01-25

## 2023-01-23 RX ORDER — HEPARIN SODIUM 5000 [USP'U]/ML
5000 INJECTION, SOLUTION INTRAVENOUS; SUBCUTANEOUS EVERY 8 HOURS SCHEDULED
Status: CANCELLED | OUTPATIENT
Start: 2023-01-23

## 2023-01-23 RX ADMIN — HEPARIN SODIUM 4000 UNITS: 1000 INJECTION INTRAVENOUS; SUBCUTANEOUS at 23:24

## 2023-01-23 RX ADMIN — ASPIRIN 324 MG: 81 TABLET, CHEWABLE ORAL at 21:51

## 2023-01-23 RX ADMIN — AZITHROMYCIN MONOHYDRATE 500 MG: 500 INJECTION, POWDER, LYOPHILIZED, FOR SOLUTION INTRAVENOUS at 22:10

## 2023-01-23 RX ADMIN — HEPARIN SODIUM 8 UNITS/KG/HR: 10000 INJECTION, SOLUTION INTRAVENOUS at 23:25

## 2023-01-23 RX ADMIN — CEFTRIAXONE SODIUM 1000 MG: 1 INJECTION, POWDER, FOR SOLUTION INTRAMUSCULAR; INTRAVENOUS at 21:06

## 2023-01-23 RX ADMIN — CALCIUM GLUCONATE 1000 MG: 20 INJECTION, SOLUTION INTRAVENOUS at 20:06

## 2023-01-23 RX ADMIN — IPRATROPIUM BROMIDE AND ALBUTEROL SULFATE 1 AMPULE: 2.5; .5 SOLUTION RESPIRATORY (INHALATION) at 19:42

## 2023-01-23 ASSESSMENT — ENCOUNTER SYMPTOMS
COUGH: 0
CONSTIPATION: 0
RHINORRHEA: 0
ABDOMINAL PAIN: 0
VOMITING: 0
SHORTNESS OF BREATH: 1
NAUSEA: 0
SORE THROAT: 0
DIARRHEA: 0

## 2023-01-24 ENCOUNTER — APPOINTMENT (OUTPATIENT)
Dept: GENERAL RADIOLOGY | Age: 63
End: 2023-01-24
Payer: MEDICARE

## 2023-01-24 PROBLEM — D63.8 ANEMIA OF CHRONIC DISEASE: Status: ACTIVE | Noted: 2018-11-25

## 2023-01-24 PROBLEM — E87.20 METABOLIC ACIDOSIS: Status: ACTIVE | Noted: 2023-01-24

## 2023-01-24 LAB
ANION GAP SERPL CALCULATED.3IONS-SCNC: 29 MMOL/L (ref 9–17)
BUN BLDV-MCNC: 80 MG/DL (ref 8–23)
CALCIUM SERPL-MCNC: 9 MG/DL (ref 8.6–10.4)
CHLORIDE BLD-SCNC: 91 MMOL/L (ref 98–107)
CO2: 17 MMOL/L (ref 20–31)
CREAT SERPL-MCNC: 10.41 MG/DL (ref 0.7–1.2)
EKG ATRIAL RATE: 110 BPM
EKG Q-T INTERVAL: 442 MS
EKG QRS DURATION: 156 MS
EKG QTC CALCULATION (BAZETT): 552 MS
EKG R AXIS: -66 DEGREES
EKG T AXIS: 100 DEGREES
EKG VENTRICULAR RATE: 94 BPM
ESTIMATED AVERAGE GLUCOSE: 126 MG/DL
GFR SERPL CREATININE-BSD FRML MDRD: 5 ML/MIN/1.73M2
GLUCOSE BLD-MCNC: 112 MG/DL (ref 75–110)
GLUCOSE BLD-MCNC: 160 MG/DL (ref 70–99)
HBA1C MFR BLD: 6 % (ref 4–6)
HCT VFR BLD CALC: 38.9 % (ref 40.7–50.3)
HEMOGLOBIN: 10.7 G/DL (ref 13–17)
MCH RBC QN AUTO: 30.7 PG (ref 25.2–33.5)
MCHC RBC AUTO-ENTMCNC: 27.5 G/DL (ref 28.4–34.8)
MCV RBC AUTO: 111.8 FL (ref 82.6–102.9)
MYOGLOBIN: 251 NG/ML (ref 28–72)
MYOGLOBIN: 253 NG/ML (ref 28–72)
MYOGLOBIN: 298 NG/ML (ref 28–72)
NRBC AUTOMATED: 0.2 PER 100 WBC
PARTIAL THROMBOPLASTIN TIME: 27.9 SEC (ref 20.5–30.5)
PARTIAL THROMBOPLASTIN TIME: 33 SEC (ref 20.5–30.5)
PARTIAL THROMBOPLASTIN TIME: 41 SEC (ref 20.5–30.5)
PDW BLD-RTO: 16.7 % (ref 11.8–14.4)
PLATELET # BLD: 147 K/UL (ref 138–453)
PMV BLD AUTO: 10 FL (ref 8.1–13.5)
POTASSIUM SERPL-SCNC: 5.3 MMOL/L (ref 3.7–5.3)
PRO-BNP: ABNORMAL PG/ML
RBC # BLD: 3.48 M/UL (ref 4.21–5.77)
REASON FOR REJECTION: NORMAL
SODIUM BLD-SCNC: 137 MMOL/L (ref 135–144)
TROPONIN, HIGH SENSITIVITY: 242 NG/L (ref 0–22)
TROPONIN, HIGH SENSITIVITY: 246 NG/L (ref 0–22)
TROPONIN, HIGH SENSITIVITY: 247 NG/L (ref 0–22)
WBC # BLD: 10.9 K/UL (ref 3.5–11.3)
ZZ NTE CLEAN UP: ORDERED TEST: NORMAL
ZZ NTE WITH NAME CLEAN UP: SPECIMEN SOURCE: NORMAL

## 2023-01-24 PROCEDURE — 84484 ASSAY OF TROPONIN QUANT: CPT

## 2023-01-24 PROCEDURE — 5A1D70Z PERFORMANCE OF URINARY FILTRATION, INTERMITTENT, LESS THAN 6 HOURS PER DAY: ICD-10-PCS | Performed by: HOSPITALIST

## 2023-01-24 PROCEDURE — 6370000000 HC RX 637 (ALT 250 FOR IP): Performed by: NURSE PRACTITIONER

## 2023-01-24 PROCEDURE — 85027 COMPLETE CBC AUTOMATED: CPT

## 2023-01-24 PROCEDURE — 85730 THROMBOPLASTIN TIME PARTIAL: CPT

## 2023-01-24 PROCEDURE — 6360000002 HC RX W HCPCS: Performed by: EMERGENCY MEDICINE

## 2023-01-24 PROCEDURE — 2580000003 HC RX 258: Performed by: NURSE PRACTITIONER

## 2023-01-24 PROCEDURE — 90935 HEMODIALYSIS ONE EVALUATION: CPT

## 2023-01-24 PROCEDURE — 36415 COLL VENOUS BLD VENIPUNCTURE: CPT

## 2023-01-24 PROCEDURE — 83874 ASSAY OF MYOGLOBIN: CPT

## 2023-01-24 PROCEDURE — 94640 AIRWAY INHALATION TREATMENT: CPT

## 2023-01-24 PROCEDURE — 2700000000 HC OXYGEN THERAPY PER DAY

## 2023-01-24 PROCEDURE — 73030 X-RAY EXAM OF SHOULDER: CPT

## 2023-01-24 PROCEDURE — 99223 1ST HOSP IP/OBS HIGH 75: CPT | Performed by: INTERNAL MEDICINE

## 2023-01-24 PROCEDURE — 94761 N-INVAS EAR/PLS OXIMETRY MLT: CPT

## 2023-01-24 PROCEDURE — 93005 ELECTROCARDIOGRAM TRACING: CPT | Performed by: STUDENT IN AN ORGANIZED HEALTH CARE EDUCATION/TRAINING PROGRAM

## 2023-01-24 PROCEDURE — 99233 SBSQ HOSP IP/OBS HIGH 50: CPT | Performed by: INTERNAL MEDICINE

## 2023-01-24 PROCEDURE — 2060000000 HC ICU INTERMEDIATE R&B

## 2023-01-24 PROCEDURE — 82947 ASSAY GLUCOSE BLOOD QUANT: CPT

## 2023-01-24 PROCEDURE — 6370000000 HC RX 637 (ALT 250 FOR IP): Performed by: STUDENT IN AN ORGANIZED HEALTH CARE EDUCATION/TRAINING PROGRAM

## 2023-01-24 PROCEDURE — 80048 BASIC METABOLIC PNL TOTAL CA: CPT

## 2023-01-24 PROCEDURE — 93010 ELECTROCARDIOGRAM REPORT: CPT | Performed by: INTERNAL MEDICINE

## 2023-01-24 PROCEDURE — 6360000002 HC RX W HCPCS: Performed by: NURSE PRACTITIONER

## 2023-01-24 PROCEDURE — 83036 HEMOGLOBIN GLYCOSYLATED A1C: CPT

## 2023-01-24 PROCEDURE — 6360000002 HC RX W HCPCS: Performed by: INTERNAL MEDICINE

## 2023-01-24 RX ORDER — M-VIT,TX,IRON,MINS/CALC/FOLIC 27MG-0.4MG
1 TABLET ORAL EVERY OTHER DAY
Status: DISCONTINUED | OUTPATIENT
Start: 2023-01-24 | End: 2023-01-26 | Stop reason: HOSPADM

## 2023-01-24 RX ORDER — ATORVASTATIN CALCIUM 80 MG/1
80 TABLET, FILM COATED ORAL NIGHTLY
Status: DISCONTINUED | OUTPATIENT
Start: 2023-01-24 | End: 2023-01-26 | Stop reason: HOSPADM

## 2023-01-24 RX ORDER — HEPARIN SODIUM AND DEXTROSE 10000; 5 [USP'U]/100ML; G/100ML
5-30 INJECTION INTRAVENOUS CONTINUOUS
Status: DISCONTINUED | OUTPATIENT
Start: 2023-01-24 | End: 2023-01-24 | Stop reason: SDUPTHER

## 2023-01-24 RX ORDER — ASPIRIN 81 MG/1
81 TABLET ORAL DAILY
Status: DISCONTINUED | OUTPATIENT
Start: 2023-01-24 | End: 2023-01-26 | Stop reason: HOSPADM

## 2023-01-24 RX ORDER — ONDANSETRON 4 MG/1
4 TABLET, ORALLY DISINTEGRATING ORAL EVERY 8 HOURS PRN
Status: DISCONTINUED | OUTPATIENT
Start: 2023-01-24 | End: 2023-01-26 | Stop reason: HOSPADM

## 2023-01-24 RX ORDER — SODIUM CHLORIDE 0.9 % (FLUSH) 0.9 %
5-40 SYRINGE (ML) INJECTION EVERY 12 HOURS SCHEDULED
Status: DISCONTINUED | OUTPATIENT
Start: 2023-01-24 | End: 2023-01-26 | Stop reason: HOSPADM

## 2023-01-24 RX ORDER — ALBUTEROL SULFATE 2.5 MG/3ML
2.5 SOLUTION RESPIRATORY (INHALATION)
Status: DISCONTINUED | OUTPATIENT
Start: 2023-01-24 | End: 2023-01-26 | Stop reason: HOSPADM

## 2023-01-24 RX ORDER — SODIUM CHLORIDE 0.9 % (FLUSH) 0.9 %
10 SYRINGE (ML) INJECTION PRN
Status: DISCONTINUED | OUTPATIENT
Start: 2023-01-24 | End: 2023-01-26 | Stop reason: HOSPADM

## 2023-01-24 RX ORDER — CARVEDILOL 6.25 MG/1
6.25 TABLET ORAL 2 TIMES DAILY WITH MEALS
Status: DISCONTINUED | OUTPATIENT
Start: 2023-01-24 | End: 2023-01-26 | Stop reason: HOSPADM

## 2023-01-24 RX ORDER — HEPARIN SODIUM 1000 [USP'U]/ML
30 INJECTION, SOLUTION INTRAVENOUS; SUBCUTANEOUS PRN
Status: DISCONTINUED | OUTPATIENT
Start: 2023-01-24 | End: 2023-01-24

## 2023-01-24 RX ORDER — FENTANYL CITRATE 50 UG/ML
25 INJECTION, SOLUTION INTRAMUSCULAR; INTRAVENOUS
Status: DISCONTINUED | OUTPATIENT
Start: 2023-01-24 | End: 2023-01-26 | Stop reason: HOSPADM

## 2023-01-24 RX ORDER — ATORVASTATIN CALCIUM 40 MG/1
40 TABLET, FILM COATED ORAL NIGHTLY
Status: DISCONTINUED | OUTPATIENT
Start: 2023-01-24 | End: 2023-01-24

## 2023-01-24 RX ORDER — SODIUM CHLORIDE 9 MG/ML
INJECTION, SOLUTION INTRAVENOUS PRN
Status: DISCONTINUED | OUTPATIENT
Start: 2023-01-24 | End: 2023-01-26 | Stop reason: HOSPADM

## 2023-01-24 RX ORDER — ACETAMINOPHEN 650 MG/1
650 SUPPOSITORY RECTAL EVERY 6 HOURS PRN
Status: DISCONTINUED | OUTPATIENT
Start: 2023-01-24 | End: 2023-01-26 | Stop reason: HOSPADM

## 2023-01-24 RX ORDER — TRAMADOL HYDROCHLORIDE 50 MG/1
50 TABLET ORAL EVERY 6 HOURS PRN
Status: DISCONTINUED | OUTPATIENT
Start: 2023-01-24 | End: 2023-01-24

## 2023-01-24 RX ORDER — HEPARIN SODIUM 1000 [USP'U]/ML
2000 INJECTION, SOLUTION INTRAVENOUS; SUBCUTANEOUS PRN
Status: DISCONTINUED | OUTPATIENT
Start: 2023-01-24 | End: 2023-01-24

## 2023-01-24 RX ORDER — HEPARIN SODIUM 1000 [USP'U]/ML
3700 INJECTION, SOLUTION INTRAVENOUS; SUBCUTANEOUS PRN
Status: DISCONTINUED | OUTPATIENT
Start: 2023-01-24 | End: 2023-01-24

## 2023-01-24 RX ORDER — MORPHINE SULFATE 2 MG/ML
2 INJECTION, SOLUTION INTRAMUSCULAR; INTRAVENOUS EVERY 4 HOURS PRN
Status: DISCONTINUED | OUTPATIENT
Start: 2023-01-24 | End: 2023-01-24

## 2023-01-24 RX ORDER — POLYETHYLENE GLYCOL 3350 17 G/17G
17 POWDER, FOR SOLUTION ORAL DAILY PRN
Status: DISCONTINUED | OUTPATIENT
Start: 2023-01-24 | End: 2023-01-26 | Stop reason: HOSPADM

## 2023-01-24 RX ORDER — HEPARIN SODIUM 1000 [USP'U]/ML
60 INJECTION, SOLUTION INTRAVENOUS; SUBCUTANEOUS PRN
Status: DISCONTINUED | OUTPATIENT
Start: 2023-01-24 | End: 2023-01-24 | Stop reason: SDUPTHER

## 2023-01-24 RX ORDER — DEXTROSE MONOHYDRATE 100 MG/ML
INJECTION, SOLUTION INTRAVENOUS CONTINUOUS PRN
Status: DISCONTINUED | OUTPATIENT
Start: 2023-01-24 | End: 2023-01-26 | Stop reason: HOSPADM

## 2023-01-24 RX ORDER — INSULIN LISPRO 100 [IU]/ML
0-4 INJECTION, SOLUTION INTRAVENOUS; SUBCUTANEOUS
Status: DISCONTINUED | OUTPATIENT
Start: 2023-01-24 | End: 2023-01-26 | Stop reason: HOSPADM

## 2023-01-24 RX ORDER — AZITHROMYCIN 250 MG/1
500 TABLET, FILM COATED ORAL EVERY 24 HOURS
Status: DISCONTINUED | OUTPATIENT
Start: 2023-01-24 | End: 2023-01-26 | Stop reason: HOSPADM

## 2023-01-24 RX ORDER — INSULIN LISPRO 100 [IU]/ML
0-4 INJECTION, SOLUTION INTRAVENOUS; SUBCUTANEOUS NIGHTLY
Status: DISCONTINUED | OUTPATIENT
Start: 2023-01-24 | End: 2023-01-26 | Stop reason: HOSPADM

## 2023-01-24 RX ORDER — ACETAMINOPHEN 325 MG/1
650 TABLET ORAL EVERY 6 HOURS PRN
Status: DISCONTINUED | OUTPATIENT
Start: 2023-01-24 | End: 2023-01-26 | Stop reason: HOSPADM

## 2023-01-24 RX ORDER — TRAMADOL HYDROCHLORIDE 50 MG/1
50 TABLET ORAL EVERY 6 HOURS PRN
Status: DISCONTINUED | OUTPATIENT
Start: 2023-01-24 | End: 2023-01-26 | Stop reason: HOSPADM

## 2023-01-24 RX ORDER — SEVELAMER CARBONATE FOR ORAL SUSPENSION 800 MG/1
2.4 POWDER, FOR SUSPENSION ORAL
Status: DISCONTINUED | OUTPATIENT
Start: 2023-01-24 | End: 2023-01-26 | Stop reason: HOSPADM

## 2023-01-24 RX ORDER — ONDANSETRON 2 MG/ML
4 INJECTION INTRAMUSCULAR; INTRAVENOUS EVERY 6 HOURS PRN
Status: DISCONTINUED | OUTPATIENT
Start: 2023-01-24 | End: 2023-01-26 | Stop reason: HOSPADM

## 2023-01-24 RX ORDER — IPRATROPIUM BROMIDE AND ALBUTEROL SULFATE 2.5; .5 MG/3ML; MG/3ML
1 SOLUTION RESPIRATORY (INHALATION)
Status: DISCONTINUED | OUTPATIENT
Start: 2023-01-24 | End: 2023-01-26 | Stop reason: HOSPADM

## 2023-01-24 RX ADMIN — FENTANYL CITRATE 25 MCG: 50 INJECTION INTRAMUSCULAR; INTRAVENOUS at 16:16

## 2023-01-24 RX ADMIN — CARVEDILOL 6.25 MG: 6.25 TABLET, FILM COATED ORAL at 16:51

## 2023-01-24 RX ADMIN — SODIUM CHLORIDE, PRESERVATIVE FREE 10 ML: 5 INJECTION INTRAVENOUS at 20:42

## 2023-01-24 RX ADMIN — Medication 1 TABLET: at 16:17

## 2023-01-24 RX ADMIN — AZITHROMYCIN 500 MG: 250 TABLET, FILM COATED ORAL at 20:41

## 2023-01-24 RX ADMIN — HEPARIN SODIUM 4000 UNITS: 1000 INJECTION INTRAVENOUS; SUBCUTANEOUS at 07:24

## 2023-01-24 RX ADMIN — IPRATROPIUM BROMIDE AND ALBUTEROL SULFATE 1 AMPULE: 2.5; .5 SOLUTION RESPIRATORY (INHALATION) at 07:48

## 2023-01-24 RX ADMIN — TRAMADOL HYDROCHLORIDE 50 MG: 50 TABLET, COATED ORAL at 05:14

## 2023-01-24 RX ADMIN — CEFTRIAXONE SODIUM 1000 MG: 10 INJECTION, POWDER, FOR SOLUTION INTRAVENOUS at 22:37

## 2023-01-24 RX ADMIN — Medication 81 MG: at 16:17

## 2023-01-24 RX ADMIN — IPRATROPIUM BROMIDE AND ALBUTEROL SULFATE 1 AMPULE: 2.5; .5 SOLUTION RESPIRATORY (INHALATION) at 19:53

## 2023-01-24 RX ADMIN — ATORVASTATIN CALCIUM 80 MG: 80 TABLET, FILM COATED ORAL at 20:41

## 2023-01-24 RX ADMIN — HEPARIN SODIUM 2000 UNITS: 1000 INJECTION INTRAVENOUS; SUBCUTANEOUS at 17:09

## 2023-01-24 ASSESSMENT — PAIN DESCRIPTION - LOCATION
LOCATION: CHEST
LOCATION: CHEST
LOCATION: SHOULDER
LOCATION: SHOULDER
LOCATION: SHOULDER;CHEST

## 2023-01-24 ASSESSMENT — PAIN SCALES - GENERAL
PAINLEVEL_OUTOF10: 7
PAINLEVEL_OUTOF10: 5
PAINLEVEL_OUTOF10: 7
PAINLEVEL_OUTOF10: 6
PAINLEVEL_OUTOF10: 3

## 2023-01-24 ASSESSMENT — PAIN DESCRIPTION - FREQUENCY: FREQUENCY: INTERMITTENT

## 2023-01-24 ASSESSMENT — PAIN DESCRIPTION - DESCRIPTORS: DESCRIPTORS: ACHING

## 2023-01-24 NOTE — CARE COORDINATION
Case Management Assessment  Initial Evaluation    Date/Time of Evaluation: 1/24/2023 5:26 PM  Assessment Completed by: Janeen Dugan RN    If patient is discharged prior to next notation, then this note serves as note for discharge by case management. Patient Name: Terese Baumgarten                   YOB: 1960  Diagnosis: Pneumonia of right lower lobe due to infectious organism [J18.9]  Pneumonia due to infectious organism [J18.9]                   Date / Time: 1/23/2023  6:57 PM    Patient Admission Status: Inpatient   Readmission Risk (Low < 19, Mod (19-27), High > 27): Readmission Risk Score: 17.5    Current PCP: Katherine Cavazos  PCP verified by CM? Yes    Chart Reviewed: Yes      History Provided by: Patient  Patient Orientation: Alert and Oriented    Patient Cognition: Alert    Hospitalization in the last 30 days (Readmission):  No    If yes, Readmission Assessment in CM Navigator will be completed. Advance Directives:      Code Status: Full Code   Patient's Primary Decision Maker is: Legal Next of Kin      Discharge Planning:    Patient lives with: Alone Type of Home: Apartment  Primary Care Giver: Self  Patient Support Systems include: Children   Current Financial resources:    Current community resources:    Current services prior to admission: Other (Comment) (HD TTS at [facility] in [place])            Current DME:              Type of Home Care services:  None    ADLS  Prior functional level: Assistance with the following:, Shopping  Current functional level: Assistance with the following:, Shopping    PT AM-PAC:   /24  OT AM-PAC:   /24    Family can provide assistance at DC: No  Would you like Case Management to discuss the discharge plan with any other family members/significant others, and if so, who?  No  Plans to Return to Present Housing: Yes  Other Identified Issues/Barriers to RETURNING to current housing:   Potential Assistance needed at discharge: N/A            Potential DME: Patient expects to discharge to: [facility] for transportation at discharge:      Financial    Payor: MEDICARE / Plan: MEDICARE PART A AND B / Product Type: *No Product type* /     Does insurance require precert for SNF: No    Potential assistance Purchasing Medications: No  Meds-to-Beds request: Yes      [facility] [address] [id number] - [address] [facility], [address] [place] [address]  Phone: [phone] Fax: [phone]    [address], [address] [phone] - f [phone] [address] [address]  Phone: [phone] Fax: [phone]      Notes:    Factors facilitating achievement of predicted outcomes: Has needed Durable Medical Equipment at home    Barriers to discharge: Limited family support    Additional Case Management Notes: Patient lives alone in [facility] [place]. He plans to return there. Patient goes to [facility] in [place] for HD TTS-he is trying to get transportation to hospitals. [facility] does not service his area. Dtr is currently helping. CM left VM with Shayy to see if she could help patient with transportation issues. He has home oxygen but does not know the name of the company that provides it . He wears 3L NC PRN. He has a portable oxygen with him in his hospital room. The Plan for Transition of Care is related to the following treatment goals of Pneumonia of right lower lobe due to infectious organism [J18.9]  Pneumonia due to infectious organism [N86.3]    IF APPLICABLE: The Patient and/or patient representative Elieser Granger and his family were provided with a choice of provider and agrees with the discharge plan.  Freedom of choice list with basic dialogue that supports the patient's individualized plan of care/goals and shares the quality data associated with the providers was provided to: Patient   Patient Representative Name:       The Patient and/or Patient Representative Agree with the Discharge Plan?  Yes    Nimco Workman RN  Case Management Department  Ph:   Fax:

## 2023-01-24 NOTE — PROGRESS NOTES
Dialysis Post Treatment Note  Vitals:    01/24/23 1312   BP:    Pulse: 97   Resp: 19   Temp: 98.1 °F (36.7 °C)   SpO2:      Pre-Weight = 112.3KG  Post-weight = Weight: 242 lb 11.6 oz (110.1 kg)  Total Liters Processed =  75.5  Rinseback Volume (mL) =  300  Net Removal (mL) =  2100  Type of access used=LT AVG  Length of treatment=3.5 hours     Pt tolerated tx well, no issues noted.

## 2023-01-24 NOTE — PROGRESS NOTES
Edwardo Knutson, [facility]. Pneumonia of right lower lobe due to infectious organism [J18.9]  Pneumonia due to infectious organism [J18.9] . Vitals:    01/24/23 0544   BP:    Pulse:    Resp: 18   Temp:    SpO2:    . Patients home meds are   Prior to Admission medications    Medication Sig Start Date End Date Taking? Authorizing Provider   sevelamer (RENVELA) 2.4 g PACK packet Take 1 packet by mouth 3 times daily (with meals) 1/18/22   Historical Provider, MD   carvedilol (COREG) 6.25 MG tablet Take 1 tablet by mouth 2 times daily (with meals) 1/5/22   Fausto Cruz MD   apixaban (ELIQUIS) 5 MG TABS tablet Take 1 tablet by mouth 2 times daily  Patient not taking: No sig reported 12/24/21   Nidia Schroeder, DO   aspirin EC 81 MG EC tablet Take 1 tablet by mouth daily  Patient not taking: No sig reported 12/24/21   Nidia Schroeder,    glimepiride (AMARYL) 1 MG tablet Take 1 tablet by mouth every morning (before breakfast) Do not take if blood sugars are less than 110 11/14/21   Jose Bonilla MD   atorvastatin (LIPITOR) 40 MG tablet Take 1 tablet by mouth nightly 2/9/21   Marlo Rosenberg MD   traMADol Billee Severin) 50 MG tablet take 1 tablet by mouth every 6 hours if needed 10/21/20   Historical Provider, MD   Multiple Vitamins-Minerals (RENAPLEX-D) TABS Take 1 tablet by mouth every other day Pt takes occasionally 5/14/19   Historical Provider, MD   lidocaine-prilocaine (EMLA) 2.5-2.5 % cream  3/18/19   Historical Provider, MD   .  Recent Surgical History: None = 0     Assessment Pt. Wears homee O 2 at 3 lpm. He is opn 3 liters now and saturation is fine at 95%.  Pt. Does not take any breathing medication at home       RR 18  Breath Sounds: diminished      Bronchodilator assessment at level  2      [x]    Bronchodilator Assessment  BRONCHODILATOR ASSESSMENT SCORE  Score 0 1 2 3 4 5   Breath Sounds   []  Patient Baseline []  No Wheeze good aeration []  Faint, scattered wheezing, good aeration [x]  Expiratory Wheezing and or moderately diminished []  Insp/Exp wheeze and/or very diminished []  Insp/Exp and/ or marked distress   Respiratory Rate   []  Patient Baseline []  Less than 20 [x]  Less than 20 []  20-25 []  Greater than 25 []  Greater than 25   Peak flow % of Pred or PB []  NA   []  Greater than 90%  []  81-90% []  71-80% []  Less than or equal to 70%  or unable to perform []  Unable due to Respiratory Distress   Dyspnea re []  Patient Baseline []  No SOB []  No SOB [x]  SOB on exertion []  SOB min activity []  At rest/acute   e FEV% Predicted       []  NA []  Above 69%  []  Unable []  Above 60-69%  [x]  Unable []  Above 50-59%  []  Unable []  Above 35-49%  []  Unable []  Less than 35%  []  Unable            Nguyen Elkins RCP  7:51 AM

## 2023-01-24 NOTE — ED PROVIDER NOTES
[facility] ED  Emergency Department Encounter  Emergency Medicine Resident     Pt Name:Sanjay Riggs  MRN: [id number]  [place] 1960  Date of evaluation: 1/23/23  PCP:  Owen Esqueda  Note Started: 7:00 PM EST      CHIEF COMPLAINT       Chief Complaint   Patient presents with    Chest Pain    Shortness of Breath       HISTORY OF PRESENT ILLNESS  (Location/Symptom, Timing/Onset, Context/Setting, Quality, Duration, Modifying Factors, Severity.)      Taj Tom is a 57 y.o. male who presents with chest pain and shortness of breath. Patient states this has been ongoing for approximately 2 days. He describes it as a left-sided chest pain that radiates to his neck and shoulder. He also endorses shortness of breath. Patient does state he has baseline shortness of breath secondary to congestive heart failure. He is on 3 L nasal cannula at baseline he is currently using 5 L on his portable home oxygen. He states his brother increased his oxygen because he was having difficulty breathing, he states he does not check his oxygen at home.       PAST MEDICAL / SURGICAL / SOCIAL / FAMILY HISTORY      has a past medical history of Acute congestive heart failure (HCC), Acute on chronic diastolic congestive heart failure (HCC), Anemia, Anemia of chronic renal failure, Atrial fibrillation ([address] AVF (arteriovenous fistula) ([address] Bowel obstruction (HCC), CAD (coronary artery disease), Chest pain at rest, CHF (congestive heart failure) ([address] CHF (congestive heart failure), NYHA class I, acute on chronic, combined ([address] Chronic kidney disease, CKD (chronic kidney disease) stage 4, GFR 15-29 ml/min ([address] Coronary artery disease involving native coronary artery of native heart without angina pectoris, Diabetes mellitus ([address] Dialysis patient ([address] ESRD (end stage renal disease) ([address] Essential hypertension, Groin swelling, Hemodialysis patient ([address] Hydrocele, Hyperlipidemia, Hypertension, Inguinal hernia, MI, old, NSTEMI (non-ST elevated myocardial infarction) ([facility] On home O2, BATSHEVA (obstructive sleep apnea), Patient in clinical research study, Pneumonia, Poor historian, Prostatism, Respiratory distress, Rising PSA level, S/P arteriovenous (AV) graft placement, S/P PTCA - TIM distal LAD - Dr. Tory Francois 4/1/19, Sleep apnea, Small bowel obstruction (HCC), SOB (shortness of breath), and Type 2 diabetes mellitus with chronic kidney disease on chronic dialysis ([facility] [address] [facility] has a past surgical history that includes Foot surgery (Left, 2005); Colonoscopy; Cystoscopy (11/17/2017); Prostate biopsy (N/A, 11/17/2017); Abdomen surgery (2013); Prostate Biopsy (02/16/2018); pr unlisted procedure male genital system (N/A, 02/16/2018); Tonsillectomy (1968); Vasectomy (1983); Middle ear surgery (1966); Nose surgery (1968); Mouth surgery (2007); AV fistula creation (Left, 02/20/2019); Dialysis fistula creation (Left, 02/20/2019); other surgical history (Left, 03/06/2019); AV fistula repair (07/17/2019); vascular surgery (09/20/2019); Coronary angioplasty with stent (03/31/2019); Dialysis fistula creation (Left, 10/23/2019); Cardiac catheterization (02/09/2021); Upper gastrointestinal endoscopy (N/A, 09/27/2021); IR THROMBECTOMY AVF PERC (01/03/2022); Cystoscopy (N/A, 04/01/2022); Tunneled venous catheter placement (Right, 06/15/2022); vascular surgery (Left, 06/15/2022); Dialysis fistula creation (Left, 06/15/2022); vascular surgery (Left, 07/18/2022); and Fistulagram (Left, 7/18/2022).       Social History     Socioeconomic History    Marital status: Single     Spouse name: Not on file    Number of children: Not on file    Years of education: Not on file    Highest education level: Not on file   Occupational History    Not on file   Tobacco Use    Smoking status: Never    Smokeless tobacco: Never   Vaping Use    Vaping Use: Never used   Substance and Sexual Activity    Alcohol use: No    Drug use: No    Sexual activity: Not on file   Other Topics Concern    Not on file   Social History Narrative    Not on file     Social Determinants of Health     Financial Resource Strain: Not on file   Food Insecurity: Not on file   Transportation Needs: Not on file   Physical Activity: Not on file   Stress: Not on file   Social Connections: Not on file   Intimate Partner Violence: Not on file   Housing Stability: Not on file       Family History   Problem Relation Age of Onset    Heart Disease Mother         CHF    Early Death Mother     High Blood Pressure Brother     Diabetes Brother     Asthma Brother     High Blood Pressure Brother     Diabetes Brother     High Blood Pressure Brother     Diabetes Brother        Allergies:  Lisinopril    Home Medications:  Prior to Admission medications    Medication Sig Start Date End Date Taking?  Authorizing Provider   lanthanum (FOSRENOL) 1000 MG chewable tablet CHEW AND SWALLOW 1 TABLET THREE TIMES A DAY WITH MEALS 11/30/21   Historical Provider, MD   sevelamer (RENVELA) 2.4 g PACK packet Take 1 packet by mouth 1/18/22   Historical Provider, MD   carvedilol (COREG) 6.25 MG tablet Take 1 tablet by mouth 2 times daily (with meals) 1/5/22   Venkat Prajwal Cheryle Graver, MD   apixaban (ELIQUIS) 5 MG TABS tablet Take 1 tablet by mouth 2 times daily  Patient not taking: No sig reported 12/24/21   Kelli Perez DO   aspirin EC 81 MG EC tablet Take 1 tablet by mouth daily  Patient not taking: No sig reported 12/24/21   Kelli Perez DO   losartan (COZAAR) 50 MG tablet Take 1 tablet by mouth daily 12/25/21   Caryl Mackay, DO   glimepiride (AMARYL) 1 MG tablet Take 1 tablet by mouth every morning (before breakfast) Do not take if blood sugars are less than 110 11/14/21   Kita Castro MD   benzonatate (TESSALON) 100 MG capsule take 1 capsule by mouth three times a day if needed for 10 days 3/3/21   Historical Provider, MD   guaiFENesin-codeine (GUAIFENESIN AC) 100-10 MG/5ML liquid give 10 milliliters by mouth every 4 hours if needed 3/4/21   Historical Provider, MD   atorvastatin (LIPITOR) 40 MG tablet Take 1 tablet by mouth nightly 2/9/21   Rajinder Pedraza MD   traMADol Orie Jimmy) 50 MG tablet take 1 tablet by mouth every 6 hours if needed 10/21/20   Historical Provider, MD   calcium carbonate (TUMS) 500 MG chewable tablet Take 1 tablet by mouth daily as needed     Historical Provider, MD   Multiple Vitamins-Minerals (RENAPLEX-D) TABS Take 1 tablet by mouth every other day Pt takes occasionally 5/14/19   Historical Provider, MD   lidocaine-prilocaine (EMLA) 2.5-2.5 % cream  3/18/19   Historical Provider, MD       REVIEW OF SYSTEMS       Review of Systems   Constitutional:  Negative for chills and fever. HENT:  Negative for rhinorrhea and sore throat. Eyes:  Negative for visual disturbance. Respiratory:  Positive for shortness of breath. Negative for cough. Cardiovascular:  Positive for chest pain. Negative for leg swelling. Gastrointestinal:  Negative for abdominal pain, constipation, diarrhea, nausea and vomiting. Endocrine: Negative for polyuria. Genitourinary:  Negative for dysuria, frequency and hematuria. Musculoskeletal:  Negative for arthralgias, myalgias and neck pain. Skin:  Negative for pallor. Neurological:  Negative for numbness and headaches. Psychiatric/Behavioral:  Negative for behavioral problems. PHYSICAL EXAM      INITIAL VITALS:   /70   Pulse 97   Resp 23   Wt 255 lb (115.7 kg)   SpO2 95%   BMI 36.59 kg/m²     Physical Exam  Constitutional:       General: He is not in acute distress. Appearance: Normal appearance. He is not toxic-appearing. HENT:      Head: Normocephalic and atraumatic. Nose: No congestion or rhinorrhea. Mouth/Throat:      Mouth: Mucous membranes are moist.   Eyes:      Conjunctiva/sclera: Conjunctivae normal.      Pupils: Pupils are equal, round, and reactive to light.    Cardiovascular:      Rate and Rhythm: Normal rate and regular rhythm. Pulses: Normal pulses. Heart sounds: No murmur heard. Pulmonary:      Effort: No respiratory distress. Breath sounds: Examination of the right-upper field reveals decreased breath sounds. Examination of the left-upper field reveals decreased breath sounds. Examination of the right-middle field reveals decreased breath sounds. Examination of the right-lower field reveals decreased breath sounds. Examination of the left-lower field reveals decreased breath sounds. Decreased breath sounds present. No wheezing, rhonchi or rales. Comments: Increased work of breathing  Abdominal:      General: Bowel sounds are normal. There is no distension. Palpations: Abdomen is soft. Tenderness: There is no abdominal tenderness. There is no guarding or rebound. Musculoskeletal:      Right lower leg: Edema present. Left lower leg: Edema present. Skin:     General: Skin is warm and dry. Neurological:      Mental Status: He is alert and oriented to person, place, and time. Psychiatric:         Mood and Affect: Mood normal.         Behavior: Behavior normal.         Judgment: Judgment normal.         DDX/DIAGNOSTIC RESULTS / EMERGENCY DEPARTMENT COURSE / MDM     Medical Decision Making  49-year-old male with a history of end-stage renal disease on dialysis, coronary artery disease status post stenting, congestive heart failure with an EF of 15 to 20% presenting with chest pain and shortness of breath. On exam, he is hypotensive, saturating well on his baseline 3 L of oxygen, afebrile, with heart rate in the 90s. On auscultation, he is decreased air movement throughout all lung fields. No crackles. He has chronic venous stasis changes on his bilateral lower extremities. Initial EKG showing wide complex. History of end-stage renal disease, will treat with a gram of calcium. Additionally, will check CBC, BMP, troponin, BNP, and chest x-ray.   At this time, differential includes CHF exacerbation, ACS, PNA, pericardial effusion, electrolyte abnormality secondary to end-stage renal disease. Amount and/or Complexity of Data Reviewed  External Data Reviewed: labs, radiology, ECG and notes. Labs: ordered. Decision-making details documented in ED Course. Radiology: ordered. ECG/medicine tests: ordered. Discussion of management or test interpretation with external provider(s): Discussed with cardiology regarding elevated troponin from baseline. Additionally, discussed with hospitalist service regarding admission. Risk  OTC drugs. Prescription drug management. Decision regarding hospitalization. Critical Care  Total time providing critical care: 30-74 minutes      EKG  Rhythm: a fib   Rate: normal  Axis: ns  Conduction: Nonspecific intraventricular conduction delay  ST Segments: no acute change  T Waves: no acute change  Q Waves: no acute change    Impression: nonspecific EKG    All EKG's are interpreted by the Emergency Department Physician who either signs or Co-signs this chart in the absence of a cardiologist.    EMERGENCY DEPARTMENT COURSE:    ED Course as of 01/23/23 2309   Mon Jan 23, 2023 1941 Patient EKG showing wide complex, wider than previous EKG. Will treat with 1 g calcium gluconate once IV access is obtained as the patient is ESRD on dialysis with EKG changes. [BL]   2041 Cardiac ultrasound at the bedside showing no evidence of pericardial effusion. Patient is noted to have significant septal hypertrophy and decreased ejection fraction based on visualization. [BL]   2042 Chest x-ray showing significant cardiomegaly with bibasilar vascular congestion. Per read, cannot exclude right basilar pneumonia. We will add IV azithromycin and Rocephin. [BL]   2103 Potassium: 4.7 [BL]   2120 Troponin, High Sensitivity(!!): 273 [BL]   2141 Troponin elevated at 278, repeat 273. We will give the patient 324 of aspirin. Patient's creatinine however is 10.0.   This could be related to his elevated creatinine. Previous troponin from 1 year ago was 177. Patient's creatinine at that time was in the range of 7. [BL]   2149 Creatinine(!!): 10.06 [BL]   2219 Spoke with [facility] who will admit patient, requesting cardiology consult due to elevated troponins. [BL]   4057 Spoke with cardiology who recommends heparin, trending troponins and repeat EKG. [BL]      ED Course User Index  [BL] Ryan Sherman DO       PROCEDURES:  none    CONSULTS:  IP CONSULT TO HOSPITALIST  IP CONSULT TO CARDIOLOGY    CRITICAL CARE:  There was significant risk of life threatening deterioration of patient's condition requiring my direct management. Critical care time 45 minutes, excluding any documented procedures. FINAL IMPRESSION      1. Pneumonia of right lower lobe due to infectious organism          DISPOSITION / PLAN     DISPOSITION Admitted 01/23/2023 10:18:21 PM      PATIENT REFERRED TO:  No follow-up provider specified.     DISCHARGE MEDICATIONS:  New Prescriptions    No medications on file       Ryan Sherman DO  Emergency Medicine Resident    (Please note that portions of thisnote were completed with a voice recognition program.  Efforts were made to edit the dictations but occasionally words are mis-transcribed.)       Ryan Sherman DO  Resident  01/23/23 0947

## 2023-01-24 NOTE — PROGRESS NOTES
Dialysis Time Out  To be done by RN and tech or 2 RNs  Staff Names Janak WOODS and Virgil Sr    [x]  Identity of the patient using 2 patient identifiers  [x]  Consent for treatment  [x]  Equipment-proper machine and dialyzer  [x]  B-Hep B status  [x]  Orders- to include bath, blood flow, dialyzer, time and fluid removal  [x]  Access-Correct site and in working order  [x]  Time for patient to ask questions.

## 2023-01-24 NOTE — PROGRESS NOTES
Physical Therapy        Physical Therapy Cancel Note      DATE: 2023    NAME: Laurent Fairchild  MRN: [id number]   : 1960      Patient not seen this date for Physical Therapy due to:    Hemodialysis:      Electronically signed by Munir Perry PT on 3062 at 10:24 AM

## 2023-01-24 NOTE — H&P
[facility]  Office: [address], DO, Anderson La Verne, DO, Gisele Turpiner, DO, Keron Zaldivar Blood, DO, Herb Angelucci, MD, Isabel Ferrell MD, Emiliano Nolan MD, Vikki Triplett MD,  Brigida Escobedo MD, Deniz Flower MD, Kayode Sampson, DO, Katina Rey MD,  Nathan Oneal MD, Jane Lema MD, Melanie Grace, DO, Don Michael MD, Hayden Qureshi MD, Jacqueline Garcia, DO, Roderick Coker MD, Jack Maldonado MD, Alexa Abel MD, Deysi Markham MD, Magdy Goel, DO, Kamala Franklin MD, Emilia Delgadillo MD, Kaleb Arora, CNP,  Roxanna Babinski, CNP, Laura Joy, CNP, Rudy Zabala, CNP,  Karin Peguero, [facility], Rachel Brambila, CNP, Kemi Rodas, CNP, Carol Ann Rich, CNP, Heidi Hodges, CNP, Delfino Marsh, CNP, Sherman Jean PARasheedaC, Jabier Miranda, CNS, Iram Ingram, CNP, Perlita Traore, [address]    HISTORY AND PHYSICAL EXAMINATION            Date:   1/24/2023  Patient name:  Deyanira Ramirez  Date of admission:  1/23/2023  6:57 PM  MRN:   [id number]  Account:  [de-identified]  YOB: 1960  PCP:    Marti Maciel  Room:   [address] [place], [place] [address]  Code Status:    Full Code    Chief Complaint:     Chief Complaint   Patient presents with    Chest Pain    Shortness of Breath       History Obtained From:     patient, electronic medical record, Quality of history:  poor historian    History of Present Illness:     Deyanira Ramirez is a 57 y.o. Non- / non  male who presents with Chest Pain and Shortness of Breath   and is admitted to the hospital for the management of NSTEMI (non-ST elevated myocardial infarction) ([facility] [address] This 57 yom presents with cp on left side. It has been there for a couple of days and is constant and still there now. Described as a sharp pressure radiating to left side of neck. He also has left shoulder pain but thinks that that is different.   He has had a cough for years, no change recently; and about a month ago had fevers and chills but not since. He says after the pain is there for a while he can then feel a lump on left chest.  He has also had sob but unclear for how long and if related to cp or not. Separately he has pelon shoulder pain-says the right side has hurt for a while and the left side pain is new. Wears 3L o2 24/7    Past Medical History:     Past Medical History:   Diagnosis Date    Acute congestive heart failure ([address] 12/21/2016    Acute on chronic diastolic congestive heart failure ([address] 11/24/2018    Anemia 11/25/2018    Anemia of chronic renal failure 10/17/2016    Atrial fibrillation ([address] 01/03/2022    AVF (arteriovenous fistula) ([facility])     Bowel obstruction ([address] 12/26/2013    CAD (coronary artery disease) 2013    ?     Chest pain at rest 12/21/2016    CHF (congestive heart failure) ([address] 2013    last episode 11/2018    CHF (congestive heart failure), NYHA class I, acute on chronic, combined ([address] 2/9/2021    Chronic kidney disease     CKD (chronic kidney disease) stage 4, GFR 15-29 ml/min ([address] 10/17/2016    Coronary artery disease involving native coronary artery of native heart without angina pectoris     Diabetes mellitus ([address] 2012    NIDDM    Dialysis patient ([facility] [address]     Dianna Longoria  /  Morgan Baldwin  /  Hardy Garcia    ESRD (end stage renal disease) ([facility] [address]     Essential hypertension 11/25/2018    Groin swelling 07/17/2019    Hemodialysis patient ([address] 11/28/2018    TUNNELD CATHETER RIGHT DIALYSIS ACCESSTUES THURS AND AT [place]    Hydrocele 07/17/2019    Hyperlipidemia 2013    ON RX    Hypertension 2013    ON RX    Inguinal hernia     BILATERAL- NEEDS REPAIRED, UMBILICAL HERNIA  ALSO    MI, old 12/26/2013    NSTEMI (non-ST elevated myocardial infarction) ([address] 6/17/2019    On home O2     2 L    BATSHEVA (obstructive sleep apnea)     Patient in clinical research study 04/01/2019    XIENCE SHORT DAPT    Pneumonia     Poor historian     Prostatism 11/17/2017    Respiratory distress 11/25/2018    Rising PSA level 11/17/2017    S/P arteriovenous (AV) graft placement     S/P PTCA - TIM distal LAD - Dr. Neftali Fuchs 4/1/19 4/1/2019    Sleep apnea 2015    pt has machine and does not use it    Small bowel obstruction ([address] 5/1/2021    SOB (shortness of breath) 02/02/2022    noted walking from waiting room to pre op testing    Type 2 diabetes mellitus with chronic kidney disease on chronic dialysis ([address] 10/17/2016        Past Surgical History:     Past Surgical History:   Procedure Laterality Date    ABDOMEN SURGERY  2013    BOWEL OBSTRUCTION    AV FISTULA CREATION Left 02/20/2019    AV FISTULA REPAIR  07/17/2019    AV fistula revision, branch ligation / Percutaneous angioplasty of proximal anastomosis and outflow tract of dialysis circuit with drug coated balloon  /  Dr Manuel Powell  02/09/2021    Dr. Carmen Paulson, [address]  03/31/2019    TIM LAD    CYSTOSCOPY  11/17/2017    CYSTOSCOPY N/A 04/01/2022    CYSTOSCOPY DILATION performed by Laura Carmona MD at [address] 02/20/2019    AV FISTULA CREATION ARM performed by David Rodas MD at [address] 10/23/2019    LEFT UPPER EXTREMITY AV GRAFT CREATION WITH [id number] ARTEGRAFT, LEFT UPPER EXTREMITY AV FISTULA LIGATION performed by David Rodas MD at [address] 06/15/2022    REVISION OF UPPER EXTREMITY AV FISTULA GRAFT, CENTRAL VENOUS CATHETER PLACEMENT (C-ARM) performed by David Rodas MD at [facility] 7/18/2022    UPPER EXTREMITY FISTULAGRAM performed by David Rodas MD at [facility] 2005    [address] [facility],[address]    IR THROMBECTOMY PERCUT AVF  01/03/2022    IR THROMBECTOMY PERCUT AVF 1/3/2022 [address]    EAR DRUM REPAIRED    MOUTH SURGERY  2007    1 JANE TOOTH REMOVED    NOSE SURGERY  1968    CAUTERY TO STOP NOSE BEEDS    OTHER SURGICAL HISTORY Left 03/06/2019    fistulagram    AL UNLISTED PROCEDURE MALE GENITAL SYSTEM N/A 02/16/2018    US  PROSTATE BIOPSY WITH SATURATION performed by Dhara Welsh MD at [address] 11/17/2017    CYSTOSCOPY PROSTATE US BIOPSY performed by Dhara Welsh MD at [address]  02/16/2018    TONSILLECTOMY  1968    TUNNELED [address] Right 06/15/2022    Placement of tunneled central venous hemodialysis catheter  /  Dr Sheela Bridges 09/27/2021    EGD BIOPSY performed by Darion Branham MD at [address] [facility]  09/20/2019    LUE FISTULAGRAM  /  DR Martin Beltrán  /  Findings: Severe stenosis of proximal cephalic vein. / Will plan for LUE AVG placement. VASCULAR SURGERY Left 06/15/2022    Open revision of left upper extremity AV graft   /  Dr Yaya Pelayo Left 07/18/2022    LUE FISTULAGRAM / DR Martin Belrtán    VASECTOMY  1983        Medications Prior to Admission:     Prior to Admission medications    Medication Sig Start Date End Date Taking?  Authorizing Provider   sevelamer (RENVELA) 2.4 g PACK packet Take 1 packet by mouth 3 times daily (with meals) 1/18/22   Historical Provider, MD   carvedilol (COREG) 6.25 MG tablet Take 1 tablet by mouth 2 times daily (with meals) 1/5/22   Fausto Peterson MD   apixaban (ELIQUIS) 5 MG TABS tablet Take 1 tablet by mouth 2 times daily  Patient not taking: No sig reported 12/24/21   Chandni Dao DO   aspirin EC 81 MG EC tablet Take 1 tablet by mouth daily  Patient not taking: No sig reported 12/24/21   Chandni Dao DO   glimepiride (AMARYL) 1 MG tablet Take 1 tablet by mouth every morning (before breakfast) Do not take if blood sugars are less than 110 11/14/21   Isabelle Aceves MD   atorvastatin (LIPITOR) 40 MG tablet Take 1 tablet by mouth nightly 2/9/21   Radha Root MD   traMADol (ULTRAM) 50 MG tablet take 1 tablet by mouth every 6 hours if needed 10/21/20   Historical Provider, MD   Multiple Vitamins-Minerals (RENAPLEX-D) TABS Take 1 tablet by mouth every other day Pt takes occasionally 5/14/19   Historical Provider, MD   lidocaine-prilocaine (EMLA) 2.5-2.5 % cream  3/18/19   Historical Provider, MD        Allergies:     Lisinopril    Social History:     Tobacco:    reports that he has never smoked. He has never used smokeless tobacco.  Alcohol:      reports no history of alcohol use. Drug Use:  reports no history of drug use. Family History:     Family History   Problem Relation Age of Onset    Heart Disease Mother         CHF    Early Death Mother     High Blood Pressure Brother     Diabetes Brother     Asthma Brother     High Blood Pressure Brother     Diabetes Brother     High Blood Pressure Brother     Diabetes Brother        Review of Systems:     Positive and Negative as described in HPI.     CONSTITUTIONAL:  negative for fevers, chills, sweats, weight loss  HEENT:  negative for vision, hearing changes, runny nose, throat pain  RESPIRATORY:  negative for congestion, wheezing  CARDIOVASCULAR:  negative for palpitations  GASTROINTESTINAL:  negative for nausea, vomiting, diarrhea, constipation, change in bowel habits, abdominal pain   GENITOURINARY:  negative for difficulty of urination, burning with urination, frequency   INTEGUMENT:  negative for rash, skin lesions, easy bruising   HEMATOLOGIC/LYMPHATIC:  negative for swelling/edema   ALLERGIC/IMMUNOLOGIC:  negative for urticaria , itching  ENDOCRINE:  negative increase in drinking, increase in urination, hot or cold intolerance  MUSCULOSKELETAL:  negative swelling of joints  NEUROLOGICAL:  negative for headaches, dizziness, lightheadedness, numbness, tingling extremities  BEHAVIOR/PSYCH:  negative for depression, anxiety    Physical Exam:   /67   Pulse 92   Temp 98.8 °F (37.1 °C) (Oral)   Resp 18   Wt 241 lb 9.6 oz (109.6 kg)   SpO2 98%   BMI 34.67 kg/m²   Temp (24hrs), Av.8 °F (37.1 °C), Min:98.8 °F (37.1 °C), Max:98.8 °F (37.1 °C)    No results for input(s): POCGLU in the last 72 hours.   No intake or output data in the 24 hours ending 23 0844    General Appearance: alert, well appearing, and in no acute distress  Mental status: oriented to person, place, and time  Head: normocephalic, atraumatic  Eye: no icterus, redness, pupils equal and reactive, extraocular eye movements intact, conjunctiva clear  Ear: normal external ear, no discharge, hearing intact  Nose: no drainage noted  Mouth: mucous membranes moist  Neck: supple, no carotid bruits, thyroid not palpable  Lungs: Bilateral equal air entry, clear to ausculation, no wheezing, rales or rhonchi, normal effort  Cardiovascular: normal rate, irregularly irregular rhythm, no murmur, gallop, rub  Abdomen: Soft, nontender, nondistended, normal bowel sounds, no hepatomegaly or splenomegaly; obese  Neurologic: There are no new focal motor or sensory deficits, normal muscle tone and bulk, no abnormal sensation, normal speech, cranial nerves II through XII grossly intact; reduced rom pelon shoulders-cannot flex or abduct past ~70 degrees  Skin: No gross lesions, rashes, bruising or bleeding on exposed skin area  Extremities: peripheral pulses palpable, no pedal edema or calf pain with palpation  Psych: normal affect  Left chest and anterior shoulder ttp    Investigations:      Laboratory Testing:  Recent Results (from the past 24 hour(s))   EKG 12 Lead    Collection Time: 23  7:20 PM   Result Value Ref Range    Ventricular Rate 94 BPM    Atrial Rate 110 BPM    QRS Duration 156 ms    Q-T Interval 442 ms    QTc Calculation (Bazett) 552 ms    R Axis -66 degrees    T Axis 100 degrees   CBC with Auto Differential    Collection Time: 23  8:08 PM   Result Value Ref Range    WBC 11.4 (H) 3.5 - 11.3 k/uL    RBC 3.45 (L) 4.21 - 5.77 m/uL    Hemoglobin 10.6 (L) 13.0 - 17.0 g/dL    Hematocrit 35.1 (L) 40.7 - 50.3 %    .7 82.6 - 102.9 fL    MCH 30.7 25.2 - 33.5 pg    MCHC 30.2 28.4 - 34.8 g/dL    RDW 16.6 (H) 11.8 - 14.4 %    Platelets 697 393 - 532 k/uL    MPV 9.6 8.1 - 13.5 fL    NRBC Automated 0.0 0.0 per 100 WBC    Immature Granulocytes 0 0 %    Seg Neutrophils 86 (H) 36 - 66 %    Lymphocytes 7 (L) 24 - 44 %    Monocytes 7 1 - 7 %    Eosinophils % 0 (L) 1 - 4 %    Basophils 0 0 - 2 %    Absolute Immature Granulocyte 0.00 0.00 - 0.30 k/uL    Segs Absolute 9.80 (H) 1.8 - 7.7 k/uL    Absolute Lymph # 0.80 (L) 1.0 - 4.8 k/uL    Absolute Mono # 0.80 0.1 - 0.8 k/uL    Absolute Eos # 0.00 0.0 - 0.4 k/uL    Basophils Absolute 0.00 0.0 - 0.2 k/uL    Morphology Normal    Basic Metabolic Panel    Collection Time: 01/23/23  8:08 PM   Result Value Ref Range    Glucose 196 (H) 70 - 99 mg/dL    BUN 74 (H) 8 - 23 mg/dL    Creatinine 10.06 (HH) 0.70 - 1.20 mg/dL    Est, Glom Filt Rate 5 (L) >60 mL/min/1.73m2    Calcium 9.1 8.6 - 10.4 mg/dL    Sodium 135 135 - 144 mmol/L    Potassium 4.7 3.7 - 5.3 mmol/L    Chloride 88 (L) 98 - 107 mmol/L    CO2 29 20 - 31 mmol/L    Anion Gap 18 (H) 9 - 17 mmol/L   Troponin    Collection Time: 01/23/23  8:08 PM   Result Value Ref Range    Troponin, High Sensitivity 278 (HH) 0 - 22 ng/L   Troponin    Collection Time: 01/23/23  8:42 PM   Result Value Ref Range    Troponin, High Sensitivity 273 (HH) 0 - 22 ng/L   Troponin    Collection Time: 01/23/23 10:25 PM   Result Value Ref Range    Troponin, High Sensitivity 275 (HH) 0 - 22 ng/L   CBC    Collection Time: 01/23/23 10:37 PM   Result Value Ref Range    WBC 12.3 (H) 3.5 - 11.3 k/uL    RBC 3.40 (L) 4.21 - 5.77 m/uL    Hemoglobin 10.5 (L) 13.0 - 17.0 g/dL    Hematocrit 35.0 (L) 40.7 - 50.3 %    .9 82.6 - 102.9 fL    MCH 30.9 25.2 - 33.5 pg    MCHC 30.0 28.4 - 34.8 g/dL    RDW 16.4 (H) 11.8 - 14.4 %    Platelets 965 333 - 960 k/uL    MPV 10.1 8.1 - 13.5 fL    NRBC Automated 0.0 0.0 per 100 WBC   APTT    Collection Time: 01/23/23 10:37 PM   Result Value Ref Range    PTT 25.7 20.5 - 30.5 sec   SPECIMEN REJECTION    Collection Time: 01/24/23  4:13 AM   Result Value Ref Range    Specimen Source . BLOOD     Ordered Test PTT     Reason for Rejection       Unable to perform testing: Specimen quantity not sufficient. APTT    Collection Time: 01/24/23  5:19 AM   Result Value Ref Range    PTT 27.9 20.5 - 30.5 sec   TROP/MYOGLOBIN    Collection Time: 01/24/23  5:19 AM   Result Value Ref Range    Troponin, High Sensitivity 242 (HH) 0 - 22 ng/L    Myoglobin 298 (H) 28 - 72 ng/mL   CBC    Collection Time: 01/24/23  5:19 AM   Result Value Ref Range    WBC 10.9 3.5 - 11.3 k/uL    RBC 3.48 (L) 4.21 - 5.77 m/uL    Hemoglobin 10.7 (L) 13.0 - 17.0 g/dL    Hematocrit 38.9 (L) 40.7 - 50.3 %    .8 (H) 82.6 - 102.9 fL    MCH 30.7 25.2 - 33.5 pg    MCHC 27.5 (L) 28.4 - 34.8 g/dL    RDW 16.7 (H) 11.8 - 14.4 %    Platelets 337 822 - 219 k/uL    MPV 10.0 8.1 - 13.5 fL    NRBC Automated 0.2 (H) 0.0 per 100 WBC   Basic Metabolic Panel w/ Reflex to MG    Collection Time: 01/24/23  5:19 AM   Result Value Ref Range    Glucose 160 (H) 70 - 99 mg/dL    BUN 80 (H) 8 - 23 mg/dL    Creatinine 10.41 (HH) 0.70 - 1.20 mg/dL    Est, Glom Filt Rate 5 (L) >60 mL/min/1.73m2    Calcium 9.0 8.6 - 10.4 mg/dL    Sodium 137 135 - 144 mmol/L    Potassium 5.3 3.7 - 5.3 mmol/L    Chloride 91 (L) 98 - 107 mmol/L    CO2 17 (L) 20 - 31 mmol/L    Anion Gap 29 (H) 9 - 17 mmol/L   EKG 12 Lead    Collection Time: 01/24/23  9:37 AM   Result Value Ref Range    Ventricular Rate 88 BPM    Atrial Rate 88 BPM    P-R Interval 224 ms    QRS Duration 154 ms    Q-T Interval 484 ms    QTc Calculation (Bazett) 585 ms    P Axis 23 degrees    R Axis -77 degrees    T Axis 72 degrees       Imaging/Diagnostics:  XR CHEST PORTABLE    Result Date: 1/23/2023  Cardiomegaly with probable bibasal pulmonary vascular congestion.   Underlying pneumonia in the right base cannot be excluded. Assessment :      Hospital Problems             Last Modified POA    * (Principal) NSTEMI (non-ST elevated myocardial infarction) ([facility] [address] 1/24/2023 Yes    Chronic respiratory failure with hypoxia ([facility] [address] 1/24/2023 Yes    Pain of left upper extremity 1/24/2023 Yes    Type 2 diabetes mellitus with chronic kidney disease on chronic dialysis, without long-term current use of insulin ([address] 1/24/2023 Yes    Chest pain at rest 1/24/2023 Yes    Essential hypertension 1/24/2023 Yes    BATSHEVA (obstructive sleep apnea) 1/24/2023 Yes    Paroxysmal atrial fibrillation ([facility] [address] 1/24/2023 Yes   Cannot rule out RLL pneumonia by cxr, though symptoms not very suggestive    Plan:     Patient status inpatient in the Progressive Unit/Step down    Cardiology consult  Heparin drip  On empiric antibiotics for possible RLL pneumonia: symptoms not suggestive but cxr is  Renal consult  Will check pelon shoulder xrays as he says the left shoulder pain is acute but has limited motion pelon  Morphine for pain control, ultram if mild  Possible heart cath today  monitor/control glucose    Consultations:   IP CONSULT TO HOSPITALIST  IP CONSULT TO CARDIOLOGY     Patient is admitted as inpatient status because of co-morbidities listed above, severity of signs and symptoms as outlined, requirement for current medical therapies and most importantly because of direct risk to patient if care not provided in a hospital setting. Expected length of stay > 48 hours.     Sridhar Guerrero DO  1/24/2023  8:44 AM    Copy sent to Dr. Oliva Irving

## 2023-01-24 NOTE — ED PROVIDER NOTES
[facility]     Emergency Department     Faculty Attestation    I performed a history and physical examination of the patient and discussed management with the resident. I reviewed the residents note and agree with the documented findings and plan of care. Any areas of disagreement are noted on the chart. I was personally present for the key portions of any procedures. I have documented in the chart those procedures where I was not present during the key portions. I have reviewed the emergency nurses triage note. I agree with the chief complaint, past medical history, past surgical history, allergies, medications, social and family history as documented unless otherwise noted below. For Physician Assistant/ Nurse Practitioner cases/documentation I have personally evaluated this patient and have completed at least one if not all key elements of the E/M (history, physical exam, and MDM). Additional findings are as noted. I have personally seen and evaluated the patient. I find the patient's history and physical exam are consistent with the NP/PA documentation. I agree with the care provided, treatment rendered, disposition and follow-up plan. Specific EKG    EKG Interpretation    Interpreted by emergency department physician    Rhythm: a fib   Rate: normal  Axis: ns  Conduction: Nonspecific intraventricular conduction delay  ST Segments: no acute change  T Waves: no acute change  Q Waves: no acute change    Clinical Impression:  nonspecific EKG. there has been no recent EKG that we can compare however with the QRS is noted to be markedly widened compared to previous history of dialysis. Patient is compliant with dialysis however hyperkalemia suspected        Critical Care     Melissa Dias M.D.   Attending Emergency  Physician            Nolia Nageotte, MD  01/23/23 1359       Nolia Nageotte, MD  01/23/23 [id number]

## 2023-01-24 NOTE — ED NOTES
Pt to ED for chest pain and SOB x4 days. Pt states he has increased chest pain, describes it as \"pressure\" radiating to his neck. Receives dialysis on Mon/Wed/Friday schedule. Hx of CHF, wears 3L O2 at home. Patient alert and oriented x4, talking in broken sentences with labored respirations. Patient placed on continuous cardiac monitoring, BP cuff, and pulse ox. EKG obtained, blood work obtained.  Call light in reach, all needs met at this time     Mansi Lopez RN  01/23/23 2001

## 2023-01-24 NOTE — ED NOTES
The following labs were labeled with appropriate pt sticker and tubed to lab:     [x] Blue     [x] Lavender   [] on ice  [x] Green/yellow  [] Green/black [] on ice  [] Clovia Green  [] on ice  [] Yellow  [] Red  [] Type/ Screen  [] ABG  [] VBG    [] COVID-19 swab    [] Rapid  [] PCR  [] Flu swab  [] Peds Viral Panel     [] Urine Sample  [] Fecal Sample  [] Pelvic Cultures  [] Blood Cultures  [] X 2  [] STREP Cultures         Iain Zapata RN  01/23/23 2010

## 2023-01-24 NOTE — CONSULTS
[facility]   Consult Note                 Date:   1/24/2023  Patient name: Vicky Gottron  Date of admission:  1/23/2023  6:57 PM  MRN:   [id number]  YOB: 1960    Reason for Consult:  Cardiac evaluation    CHIEF COMPLAINT:  Chest pain    History Obtained From:  Patient     HISTORY OF PRESENT ILLNESS:    The patient is a 57 y.o. male reporting chest pain, left sided, 7/10, sharp, radiating to left shoulder and left neck, worse on exertion and relieved on rest, lasting a few minutes. Patient also reports SOB. Patient reports this is happening for the past 2-3 days. Patient EKG was suspected to have wide complex in the ED, was given 1g  calcium due to suspicion of hyperkalemia. Patient had bedside POCUS to rule out effusion. CXR showed pulmonary venous congestion with suspicion for pneumonia and was started on empiric abx. Troponin noted to be 278 on admission >>273 (baseline 177), given ASA loading dose. Cr noted to be 10. Patient was started on heparin. Patient denies palpitations, dizziness, headache, LOC, presyncope. Patient has history of PAF on eliquis (possible non compliance), HTN, HLD, DM, ESRD (left UE AVF), BATSHEVA, CAD s/p PCI LAD. He follows with Dr Severo Nuñez outpatient. LAST OFFICE NOTE 03/2022  Medical History  1. Essential hypertension. 2. Hyperlipidemia. On Lipitor. Cholesterol 180, , HDL 35 on the September 2020.  3. Type 2 diabetes mellitus. .  4. CAD. Persantine stress test 3/15/19 showed no perfusion abnormality with a drop in LVEF to 36% with inferior and inferoseptal hypokinesis. Coronary angiography 4/2019 shows single-vessel in distal LAD which was reduced s/p PTCA/TIM, with LVEF 45% with anterior wall hypokinesis. 5. Ischemic cardiomyopathy. Echo 6/2019 EF 45-50%. Moderate LVH. No significant valvular disease.  Repeat echocardiogram on January 2021 showing an ejection fraction of 35-40% with no significant valvular regurgitation or stenosis is seen with moderately elevated pulmonary pressure. 6. ESRD on HD.  7. Obesity  8. BATSHEVA. 9. Stress test 1/27/21 No ischemia, Mod inferior infarct, EF 40%, inferior wall hypokinesis  10. Cardiac cath 2/9/21 LMCA 0%, LAD patent stent with 30-40%, LCX/RCA 0%  11. ECHO 1/4/22 EF 30% mod to severe TR Grade III DD  12. MUGA scan 03/2022 showed ejection fraction of 40%    Plan:  1. Ischemic cardiomyopathy ejection fraction improved on MUGA scan to 40% currently stable with no need to proceed with ICD at this time. Volume management is done via dialysis. Continue with beta-blockers and ARB. 2. CAD with prior stenting to the LAD patent on recent cardiac catheterization 02/2021 with nonobstructive disease  3. HTN. well controlled on current medical regimen. 4. Hyperlipidemia currently on statins  5. Paroxysmal atrial fibrillation with Eliquis listed on his medications but stated that he has not been taking it  6. Preop risk stratification for cystoscopy since his ejection fraction has improved without signs of volume overload and no signs of ischemia I gave him clearance with intermediate cardiac risk  7.  Follow-up in 6 months    Past Medical History:   has a past medical history of Acute congestive heart failure (HCC), Acute on chronic diastolic congestive heart failure ([address] Anemia, Anemia of chronic renal failure, Atrial fibrillation ([address] AVF (arteriovenous fistula) ([address] Bowel obstruction (HCC), CAD (coronary artery disease), Chest pain at rest, CHF (congestive heart failure) ([address] CHF (congestive heart failure), NYHA class I, acute on chronic, combined ([address] Chronic kidney disease, CKD (chronic kidney disease) stage 4, GFR 15-29 ml/min ([address] Coronary artery disease involving native coronary artery of native heart without angina pectoris, Diabetes mellitus ([address] Dialysis patient ([address] ESRD (end stage renal disease) ([address] Essential hypertension, Groin swelling, Hemodialysis patient ([address] Hydrocele, Hyperlipidemia, Hypertension, Inguinal hernia, MI, old, NSTEMI (non-ST elevated myocardial infarction) ([facility] On home O2, BATSHEVA (obstructive sleep apnea), Patient in clinical research study, Pneumonia, Poor historian, Prostatism, Respiratory distress, Rising PSA level, S/P arteriovenous (AV) graft placement, S/P PTCA - TIM distal LAD - Dr. Joshua Chapa 4/1/19, Sleep apnea, Small bowel obstruction (HCC), SOB (shortness of breath), and Type 2 diabetes mellitus with chronic kidney disease on chronic dialysis ([facility] [facility] Past Surgical History:   has a past surgical history that includes Foot surgery (Left, 2005); Colonoscopy; Cystoscopy (11/17/2017); Prostate biopsy (N/A, 11/17/2017); Abdomen surgery (2013); Prostate Biopsy (02/16/2018); pr unlisted procedure male genital system (N/A, 02/16/2018); Tonsillectomy (1968); Vasectomy (1983); Middle ear surgery (1966); Nose surgery (1968); Mouth surgery (2007); AV fistula creation (Left, 02/20/2019); Dialysis fistula creation (Left, 02/20/2019); other surgical history (Left, 03/06/2019); AV fistula repair (07/17/2019); vascular surgery (09/20/2019); Coronary angioplasty with stent (03/31/2019); Dialysis fistula creation (Left, 10/23/2019); Cardiac catheterization (02/09/2021); Upper gastrointestinal endoscopy (N/A, 09/27/2021); IR THROMBECTOMY AVF PERC (01/03/2022); Cystoscopy (N/A, 04/01/2022); Tunneled venous catheter placement (Right, 06/15/2022); vascular surgery (Left, 06/15/2022); Dialysis fistula creation (Left, 06/15/2022); vascular surgery (Left, 07/18/2022); and Fistulagram (Left, 7/18/2022). Home Medications:    Prior to Admission medications    Medication Sig Start Date End Date Taking?  Authorizing Provider   sevelamer (RENVELA) 2.4 g PACK packet Take 1 packet by mouth 3 times daily (with meals) 1/18/22   Historical Provider, MD   carvedilol (COREG) 6.25 MG tablet Take 1 tablet by mouth 2 times daily (with meals) 1/5/22   Fausto Nagy MD   apixaban (ELIQUIS) 5 MG TABS tablet Take 1 tablet by mouth 2 times daily  Patient not taking: No sig reported 12/24/21   Kelli Perez DO   aspirin EC 81 MG EC tablet Take 1 tablet by mouth daily  Patient not taking: No sig reported 12/24/21   Kelli Perez DO   glimepiride (AMARYL) 1 MG tablet Take 1 tablet by mouth every morning (before breakfast) Do not take if blood sugars are less than 110 11/14/21   Kita Castro MD   atorvastatin (LIPITOR) 40 MG tablet Take 1 tablet by mouth nightly 2/9/21   Xander Leger MD   traMADol Axis Petit) 50 MG tablet take 1 tablet by mouth every 6 hours if needed 10/21/20   Historical Provider, MD   Multiple Vitamins-Minerals (RENAPLEX-D) TABS Take 1 tablet by mouth every other day Pt takes occasionally 5/14/19   Historical Provider, MD   lidocaine-prilocaine (EMLA) 2.5-2.5 % cream  3/18/19   Historical Provider, MD       Allergies:  Lisinopril    Social History:   reports that he has never smoked. He has never used smokeless tobacco. He reports that he does not drink alcohol and does not use drugs. Family History: family history includes Asthma in his brother; Diabetes in his brother, brother, and brother; Early Death in his mother; Heart Disease in his mother; High Blood Pressure in his brother, brother, and brother. No for premature CAD. No for h/o sudden cardiac death    REVIEW OF SYSTEMS:    Constitutional: there has been no unanticipated weight loss. There's been No change in activity level. Eyes: No visual changes or diplopia. No scleral icterus. ENT: No Headaches, hearing loss or vertigo. Cardiovascular: Yes chest pain, Yes dyspnea on exertion, No palpitations or No loss of consciousness. Respiratory: No cough or wheezing, no sputum production. No hematemesis. No pleuritic chest pain   Gastrointestinal: No abdominal pain, blood in stools. Genitourinary: No dysuria, trouble voiding, or hematuria.   Musculoskeletal:  No gait disturbance, No weakness or joint complaints. Integumentary: No rash or pruritis. Neurological: No headache, diplopia, change in muscle strength, numbness or tingling. Psychiatric: No anxiety, or depression. Endocrine: No temperature intolerance. No excessive thirst, fluid intake, or urination. Hematologic/Lymphatic: No abnormal bruising or bleeding, blood clots or swollen lymph nodes. Allergic/Immunologic: No nasal congestion or hives. PHYSICAL EXAM:    Physical Examination:    /67   Pulse 92   Temp 98.8 °F (37.1 °C) (Oral)   Resp 18   Wt 241 lb 9.6 oz (109.6 kg)   SpO2 98%   BMI 34.67 kg/m²    Constitutional and General Appearance: alert, cooperative, in no apparent resp distress HEENT: PERRL, no cervical lymphadenopathy  Respiratory:  Good respiratory effort. No for increased work of breathing. On auscultation: clear to auscultation bilaterally, no basilar rales, no wheezing   Cardiovascular:  regular S1 and S2. No murmur audible   No Jugular venous distention, no hepatojugular reflex  Peripheral pulses are symmetrical and full   Abdomen:  No masses or tenderness  Bowel sounds present  Extremities:   No Cyanosis or Clubbing   Lower extremity edema: No   Skin: Warm and dry  Left upper extremity AVF  Neurological:  Not done       DATA:    Diagnostics:      EKG  1/3/22  Atrial fibrillation, RBBB    1/24/23  pending    ECHO  1/4/22  Summary  Left ventricle is moderately enlarged, global left ventricular systolic  function is moderately reduced, Visually estimated EF 30%. Global dysfunction is noted. Grade III (severe) left ventricular diastolic dysfunction. Left atrium is moderately dilated. Right atrial dilatation. Prominent Eustachian valve. Dialysis catheter  noted. Right ventricular dilatation with reduced systolic function. Aortic valve is sclerotic but opens well. Trivial aortic insufficiency. Mitral valve sclerosis without stenosis. Trivial mitral regurgitation. Moderate to severe tricuspid regurgitation. Estimated right ventricular  systolic pressure is 39 mmHg. Trivial pulmonic insufficiency. CATH  2/9/21  Indications:    - CHF       - Coronary risk factors       - Previous stent placement      Conclusions      Procedure Summary      Patent LAD stent      Recommendations      Medical treatments   Risk factor modification. Signature      ----------------------------------------------------------------   Electronically signed by David Orozco(Performing Physician) on   02/09/2021 13:09   ----------------------------------------------------------------      Angiographic Findings      Cardiac Arteries and Lesion Findings     LMCA: Normal 0% stenosis. LAD: Patent distal stent with 30-40% stenosis     LCx: Normal 0% stenosis. RCA: Normal 0% stenosis. Small, non dominant      Coronary Tree      Dominance: Right    Labs:     CBC:   Recent Labs     01/23/23 2237 01/24/23 0519   WBC 12.3* 10.9   HGB 10.5* 10.7*   HCT 35.0* 38.9*    147     BMP:   Recent Labs     01/23/23 2008 01/24/23 0519    137   K 4.7 5.3   CO2 29 17*   BUN 74* 80*   CREATININE 10.06* 10.41*   LABGLOM 5* 5*   GLUCOSE 196* 160*     BNP: No results for input(s): BNP in the last 72 hours. PT/INR: No results for input(s): PROTIME, INR in the last 72 hours. APTT:  Recent Labs     01/23/23 2237 01/24/23 0519   APTT 25.7 27.9     CARDIAC ENZYMES:No results for input(s): CKTOTAL, CKMB, CKMBINDEX, TROPONINI in the last 72 hours. FASTING LIPID PANEL:  Lab Results   Component Value Date/Time    HDL 45 11/12/2021 07:27 AM    TRIG 107 11/12/2021 07:27 AM     LIVER PROFILE:No results for input(s): AST, ALT, LABALBU in the last 72 hours. IMPRESSION:    1.  NSTEMI, troponin higher than baseline and chest pain likely anginal.   2.  A. fib on EKG. [address] of 4 on heparin drip, (on home eliquis, non compliance history)  3. Ischemic cardiomyopathy 40% (recent MUGA scan)   4. CAD with previous TIM to distal LAD in 2019 April.   5. ESRD on hemodialysis. 6. Hypertension. 7. DM  8. Hyperlipidemia  9. Suspected pneumonia  10. On Home 3L NC    RECOMMENDATIONS:  High risk for ACS, elevated troponin and anginal chest pain; continue on heparin infusion, ASA, lipitor, monitor on telemetry, follow up EKG, Keep NPO, possible LHC today. Continue on coreg for bp control  On empiric abx for suspected PNA  Nephrology following for volume management and dialysis  Follow on telemetry. Glycemic control as per primary  Pain management    Discussed with patient and nursing. Shravan Alaniz MD MD  Fellow, [address]   Pager - [phone]      Attending Physician Statement  I have discussed the care of Sho Rojo, including pertinent history and exam findings,  with the cardiology fellow/resident. I have seen and examined the patient and the key elements of all parts of the encounter have been performed by me. I have completed at least one if not all key elements of the E/M (history, physical exam, and MDM). I agree with the assessment, plan and orders as documented by the resident with additional recommendations as below:     CAD with hx of pci to mid-distal LAD, moderate nonobstructive in-stent restenosis on last cath in 2021, admitted with increasing frequency of left chest pain radiating to neck, hs troponin elevated above baseline concerning for NSTEMI. Patient In HD today. Will schedule coronary angiography tomorrow AM. NPO from midnight. Continue asa and heparin drip. On empiric antibiotics for suspected PNA on CXR. No clinical signs of infection.      Eileen Noriega MD, [facility] [place], [facility]

## 2023-01-24 NOTE — CONSULTS
Renal Consult Note    Patient :  Charlanne Goltz; 57 y.o. MRN# [id number]  Location:  0120/0120-01  Attending:  Mary Guerrero DO  Admit Date:  1/23/2023   Hospital Day: 1    Reason for Consult:     Asked by Dr Mary Guerrero DO to see for ESRD on HD. History Obtained From:     patient, electronic medical record    HD Access:     previous  Left AV graft    HD Unit:     [facility]    Nephrologist:     Dr. Frankie Rowe    Dry Weight:     74.2 kgs    History of Present Illness:     Charlanne Goltz; 57 y.o. male with past medical history as mentioned below presented to the hospital with the chief complaint of chest pain and shortness of breath. Patient mentioned that he has been having some left-sided chest pain, seems reproducible. He did mention that the pain was radiating to left shoulder and left neck worsened with exertion. He does have past medical history of coronary artery disease with stent placement. Concern for NSTEMI and patient was admitted under medicine service. Cardiology was consulted and likely will be getting a cardiac catheterization in AM.  Nephrology is consulted due to ESRD on HD. Patient is currently seen and examined on dialysis, tolerating the procedure well. Past History/Allergies? Social History:     Past Medical History:   Diagnosis Date    Acute congestive heart failure ([address] 12/21/2016    Acute on chronic diastolic congestive heart failure ([address] 11/24/2018    Anemia 11/25/2018    Anemia of chronic renal failure 10/17/2016    Atrial fibrillation ([address] 01/03/2022    AVF (arteriovenous fistula) ([facility])     Bowel obstruction ([address] 12/26/2013    CAD (coronary artery disease) 2013    ?     Chest pain at rest 12/21/2016    CHF (congestive heart failure) ([address] 2013    last episode 11/2018    CHF (congestive heart failure), NYHA class I, acute on chronic, combined ([address] 2/9/2021    Chronic kidney disease     CKD (chronic kidney disease) stage 4, GFR 15-29 ml/min ([address] 10/17/2016 Coronary artery disease involving native coronary artery of native heart without angina pectoris     Diabetes mellitus ([facility] 2012    NIDDM    Dialysis patient ([facility]     Dari Marc  /  Kong Cullen  /  Sigifredo Ayala    ESRD (end stage renal disease) ([facility]     Essential hypertension 11/25/2018    Groin swelling 07/17/2019    Hemodialysis patient ([facility] 11/28/2018    TUNNELD CATHETER RIGHT DIALYSIS ACCESSTUES THURS AND AT [place]    Hydrocele 07/17/2019    Hyperlipidemia 2013    ON RX    Hypertension 2013    ON RX    Inguinal hernia     BILATERAL- NEEDS REPAIRED, UMBILICAL HERNIA  ALSO    MI, old 12/26/2013    NSTEMI (non-ST elevated myocardial infarction) ([facility] 6/17/2019    On home O2     2 L    BATSHEVA (obstructive sleep apnea)     Patient in clinical research study 04/01/2019    XIENCE SHORT DAPT    Pneumonia     Poor historian     Prostatism 11/17/2017    Respiratory distress 11/25/2018    Rising PSA level 11/17/2017    S/P arteriovenous (AV) graft placement     S/P PTCA - TIM distal LAD - Dr. Laura Encinas 4/1/19 4/1/2019    Sleep apnea 2015    pt has machine and does not use it    Small bowel obstruction ([facility] 5/1/2021    SOB (shortness of breath) 02/02/2022    noted walking from waiting room to pre op testing    Type 2 diabetes mellitus with chronic kidney disease on chronic dialysis ([facility] 10/17/2016       Past Surgical History:   Procedure Laterality Date    ABDOMEN SURGERY  2013    BOWEL OBSTRUCTION    AV FISTULA CREATION Left 02/20/2019    AV FISTULA REPAIR  07/17/2019    AV fistula revision, branch ligation / Percutaneous angioplasty of proximal anastomosis and outflow tract of dialysis circuit with drug coated balloon  /  Dr Keaton Naylor  02/09/2021    Dr. Nicole Lora, [address]  03/31/2019    TIM LAD    CYSTOSCOPY  11/17/2017    CYSTOSCOPY N/A 04/01/2022    CYSTOSCOPY DILATION performed by Kyung Miller MD at [place] [address] Left 02/20/2019    AV FISTULA CREATION ARM performed by Thien Lynch MD at [facility] Left 10/23/2019    LEFT UPPER EXTREMITY AV GRAFT CREATION WITH [id number] ARTEGRAFT, LEFT UPPER EXTREMITY AV FISTULA LIGATION performed by Thien Lynch MD at [facility] 06/15/2022    REVISION OF UPPER EXTREMITY AV FISTULA GRAFT, CENTRAL VENOUS CATHETER PLACEMENT (C-ARM) performed by Thien Lynch MD at [facility] 7/18/2022    UPPER EXTREMITY FISTULAGRAM performed by Thien Lynch MD at [facility] 2005    [address] [facility],[address]    IR THROMBECTOMY PERCUT AVF  01/03/2022    IR THROMBECTOMY PERCUT AVF 1/3/2022 [facility] SPECIAL PROCEDURES    MIDDLE EAR SURGERY  1966    EAR DRUM REPAIRED    MOUTH SURGERY  2007    1 WISDOM TOOTH REMOVED    NOSE SURGERY  1968    CAUTERY TO STOP NOSE BEEDS    OTHER SURGICAL HISTORY Left 03/06/2019    fistulagram    AK UNLISTED PROCEDURE MALE GENITAL SYSTEM N/A 02/16/2018    US  PROSTATE BIOPSY WITH SATURATION performed by Jl Kathleen MD at [facility] 11/17/2017    CYSTOSCOPY PROSTATE US BIOPSY performed by Jl Kathleen MD at [facility]  02/16/2018    TONSILLECTOMY  1968    TUN[address]ELED [address] Right 06/15/2022    Placement of tunneled central venous hemodialysis catheter  /  Dr Chel Curry 09/27/2021    EGD BIOPSY performed by Vishnu Dinero MD at [address] [place] [address]  09/20/2019    LUE FISTULAGRAM  /  DR Anaya Gomes  /  Findings: Severe stenosis of proximal cephalic vein. / Will plan for LUE AVG placement.     VASCULAR SURGERY Left 06/15/2022    Open revision of left upper extremity AV graft   /  Dr Sommer Standing Left 07/18/2022    LUE FISTULAGRAM / DR Anaya Gomes    VASECTOMY  1983       Allergies   Allergen Reactions    Lisinopril Swelling       Social History Socioeconomic History    Marital status: Single     Spouse name: Not on file    Number of children: Not on file    Years of education: Not on file    Highest education level: Not on file   Occupational History    Not on file   Tobacco Use    Smoking status: Never    Smokeless tobacco: Never   Vaping Use    Vaping Use: Never used   Substance and Sexual Activity    Alcohol use: No    Drug use: No    Sexual activity: Not on file   Other Topics Concern    Not on file   Social History Narrative    Not on file     Social Determinants of Health     Financial Resource Strain: Not on file   Food Insecurity: Not on file   Transportation Needs: Not on file   Physical Activity: Not on file   Stress: Not on file   Social Connections: Not on file   Intimate Partner Violence: Not on file   Housing Stability: Not on file       Family History:        Family History   Problem Relation Age of Onset    Heart Disease Mother         CHF    Early Death Mother     High Blood Pressure Brother     Diabetes Brother     Asthma Brother     High Blood Pressure Brother     Diabetes Brother     High Blood Pressure Brother     Diabetes Brother        Outpatient Medications:     Medications Prior to Admission: sevelamer (RENVELA) 2.4 g PACK packet, Take 1 packet by mouth 3 times daily (with meals)  [DISCONTINUED] lanthanum (FOSRENOL) 1000 MG chewable tablet, CHEW AND SWALLOW 1 TABLET THREE TIMES A DAY WITH MEALS  carvedilol (COREG) 6.25 MG tablet, Take 1 tablet by mouth 2 times daily (with meals)  apixaban (ELIQUIS) 5 MG TABS tablet, Take 1 tablet by mouth 2 times daily (Patient not taking: No sig reported)  aspirin EC 81 MG EC tablet, Take 1 tablet by mouth daily (Patient not taking: No sig reported)  [DISCONTINUED] losartan (COZAAR) 50 MG tablet, Take 1 tablet by mouth daily  glimepiride (AMARYL) 1 MG tablet, Take 1 tablet by mouth every morning (before breakfast) Do not take if blood sugars are less than 110  [DISCONTINUED] benzonatate (TESSALON) 100 MG capsule, take 1 capsule by mouth three times a day if needed for 10 days  [DISCONTINUED] guaiFENesin-codeine (GUAIFENESIN AC) 100-10 MG/5ML liquid, give 10 milliliters by mouth every 4 hours if needed  atorvastatin (LIPITOR) 40 MG tablet, Take 1 tablet by mouth nightly  traMADol (ULTRAM) 50 MG tablet, take 1 tablet by mouth every 6 hours if needed  [DISCONTINUED] calcium carbonate (TUMS) 500 MG chewable tablet, Take 1 tablet by mouth daily as needed   Multiple Vitamins-Minerals (RENAPLEX-D) TABS, Take 1 tablet by mouth every other day Pt takes occasionally  lidocaine-prilocaine (EMLA) 2.5-2.5 % cream,     Current Medications:     Scheduled Meds:    aspirin EC  81 mg Oral Daily    carvedilol  6.25 mg Oral BID WC    therapeutic multivitamin-minerals  1 tablet Oral Every Other Day    sevelamer  2.4 g Oral TID WC    sodium chloride flush  5-40 mL IntraVENous 2 times per day    ipratropium-albuterol  1 ampule Inhalation Q4H WA    cefTRIAXone (ROCEPHIN) IV  1,000 mg IntraVENous Q24H    And    azithromycin  500 mg Oral Q24H    insulin lispro  0-4 Units SubCUTAneous TID WC    insulin lispro  0-4 Units SubCUTAneous Nightly    atorvastatin  80 mg Oral Nightly     Continuous Infusions:    dextrose      sodium chloride      heparin (PORCINE) Infusion 12 Units/kg/hr (01/24/23 0726)     PRN Meds:  glucose, dextrose bolus **OR** dextrose bolus, glucagon (rDNA), dextrose, sodium chloride flush, sodium chloride, ondansetron **OR** ondansetron, acetaminophen **OR** acetaminophen, albuterol, polyethylene glycol, traMADol, fentanNYL, heparin (porcine), heparin (porcine)    Review of Systems:     Constitutional: No fever, no chills, no lethargy, no weakness. HEENT:  No headache, otalgia, itchy eyes, nasal discharge or sore throat. Cardiac:  Positive chest pain, positive dyspnea, no orthopnea or PND. Chest:   No cough, phlegm or wheezing. Abdomen:  No abdominal pain, nausea or vomiting.   Neuro:  No focal weakness, abnormal movements orseizure like activity. Skin:   No rashes, no itching. :   No hematuria, no pyuria, no dysuria, no flank pain. Extremities:  No swelling or joint pains. ROS was otherwise negative except as mentioned in the [address] Input/Output:     No intake/output data recorded. Vital Signs:   Temperature:  Temp: 98.1 °F (36.7 °C)  TMax:   Temp (24hrs), Av.3 °F (36.8 °C), Min:98 °F (36.7 °C), Max:98.8 °F (37.1 °C)    Respirations:  Resp: 19  Pulse:   Heart Rate: 97  BP:    BP: 109/74  BP Range: Systolic (04IYW), IQX:188 , Min:92 , CPE:462       Diastolic (13YNK), WGZ:35, Min:67, Max:90      Physical Examination:     General:  AAO x 3, speaking in full sentences, no accessory muscle use. HEENT: Atraumatic, normocephalic, no throat congestion, moist mucosa. Eyes:   Pupils equal, round and reactive to light, EOMI. Neck:   Supple  Chest:   Bilateral vesicular breath sounds, no rales or wheezes. Positive some chest tenderness on palpitation. Cardiac:  S1 S2 RR, no murmurs, gallops or rubs. Abdomen: Soft, non-tender, no masses or organomegaly, BS audible. :   No suprapubic or flank tenderness. Neuro:  AAO x 3, No FND. SKIN:  No rashes, good skin turgor. Extremities:  No edema.     Labs:       Recent Labs     23  0519   WBC 11.4* 12.3* 10.9   RBC 3.45* 3.40* 3.48*   HGB 10.6* 10.5* 10.7*   HCT 35.1* 35.0* 38.9*   .7 102.9 111.8*   MCH 30.7 30.9 30.7   MCHC 30.2 30.0 27.5*   RDW 16.6* 16.4* 16.7*    159 147   MPV 9.6 10.1 10.0      BMP:   Recent Labs     23  0519    137   K 4.7 5.3   CL 88* 91*   CO2 29 17*   BUN 74* 80*   CREATININE 10.06* 10.41*   GLUCOSE 196* 160*   CALCIUM 9.1 9.0        BNP:      Lab Results   Component Value Date/Time     2018 08:04 PM     PTH:     Lab Results   Component Value Date/Time    IPTH 144.1 2017 12:50 PM     Urinalysis/Chemistries:      Lab Results   Component Value Date/Time    NITRU NEGATIVE 04/01/2022 11:27 AM    COLORU Yellow 04/01/2022 11:27 AM    PHUR 6.0 04/01/2022 11:27 AM    WBCUA 5 TO 10 04/01/2022 11:27 AM    RBCUA 20 TO 50 04/01/2022 11:27 AM    MUCUS NOT REPORTED 12/31/2021 04:23 PM    TRICHOMONAS NOT REPORTED 12/31/2021 04:23 PM    YEAST FEW 12/31/2021 04:23 PM    BACTERIA NOT REPORTED 12/31/2021 04:23 PM    SPECGRAV 1.010 04/01/2022 11:27 AM    LEUKOCYTESUR NEGATIVE 04/01/2022 11:27 AM    UROBILINOGEN Normal 04/01/2022 11:27 AM    BILIRUBINUR NEGATIVE 04/01/2022 11:27 AM    GLUCOSEU NEGATIVE 04/01/2022 11:27 AM    KETUA NEGATIVE 04/01/2022 11:27 AM    AMORPHOUS NOT REPORTED 12/31/2021 04:23 PM       Radiology:     Reviewed. Assessment:       ESRD on Hemodialysis. His regular HD days are TTS at [facility] hemodialysis facility using left AV graft under Dr. Oneil Camargo. His dry weight is 74.2 daily. Chest pain. Metabolic acidosis  Coronary artery disease history of stent in the past.  Anemia of chronic disease  Secondary hyperparathyroidism  Hypertension    Plan:     Patient was seen and examined on HD at bedside. Orders were confirmed with the HD nurse. Strict Input and Output, Daily weigh and document in the chart. Low Potassium, Low phosphorus and low salt diet. Fluids to be restricted to 1500ml/day. IV Aranesp/Epogen for anemia of chronic disease with HD weekly. IV Zemplar per protocol for secondary hyperparathyroidism with HD thrice a week. Chest pain management as per cardiology. Will likely be getting a cardiac catheterization in AM.  Will follow. Nutrition   Please ensure that patient is on a renal diet/TF. Thank you for the consultation. Please do not hesitate to contact us for any further questions/concerns. Edgardo Wilson MD  [facility]     This note is created with the assistance of a speech-recognition program. While intending to generate a document that actually reflects the content of the visit, no guarantees can be provided that every mistake has been identified and corrected by editing.

## 2023-01-25 ENCOUNTER — APPOINTMENT (OUTPATIENT)
Dept: CARDIAC CATH/INVASIVE PROCEDURES | Age: 63
End: 2023-01-25
Payer: MEDICARE

## 2023-01-25 LAB
ANION GAP SERPL CALCULATED.3IONS-SCNC: 17 MMOL/L (ref 9–17)
BUN BLDV-MCNC: 56 MG/DL (ref 8–23)
CALCIUM SERPL-MCNC: 8.8 MG/DL (ref 8.6–10.4)
CHLORIDE BLD-SCNC: 90 MMOL/L (ref 98–107)
CO2: 26 MMOL/L (ref 20–31)
CREAT SERPL-MCNC: 8.26 MG/DL (ref 0.7–1.2)
EKG ATRIAL RATE: 72 BPM
EKG ATRIAL RATE: 88 BPM
EKG P AXIS: 23 DEGREES
EKG P-R INTERVAL: 224 MS
EKG Q-T INTERVAL: 446 MS
EKG Q-T INTERVAL: 484 MS
EKG QRS DURATION: 154 MS
EKG QRS DURATION: 156 MS
EKG QTC CALCULATION (BAZETT): 554 MS
EKG QTC CALCULATION (BAZETT): 585 MS
EKG R AXIS: -68 DEGREES
EKG R AXIS: -77 DEGREES
EKG T AXIS: 72 DEGREES
EKG T AXIS: 90 DEGREES
EKG VENTRICULAR RATE: 88 BPM
EKG VENTRICULAR RATE: 93 BPM
GFR SERPL CREATININE-BSD FRML MDRD: 7 ML/MIN/1.73M2
GLUCOSE BLD-MCNC: 107 MG/DL (ref 75–110)
GLUCOSE BLD-MCNC: 111 MG/DL (ref 75–110)
GLUCOSE BLD-MCNC: 133 MG/DL (ref 75–110)
GLUCOSE BLD-MCNC: 134 MG/DL (ref 70–99)
GLUCOSE BLD-MCNC: 179 MG/DL (ref 75–110)
HCT VFR BLD CALC: 35 % (ref 40.7–50.3)
HEMOGLOBIN: 10.5 G/DL (ref 13–17)
MCH RBC QN AUTO: 30.8 PG (ref 25.2–33.5)
MCHC RBC AUTO-ENTMCNC: 30 G/DL (ref 28.4–34.8)
MCV RBC AUTO: 102.6 FL (ref 82.6–102.9)
NRBC AUTOMATED: 0 PER 100 WBC
PARTIAL THROMBOPLASTIN TIME: 38.4 SEC (ref 20.5–30.5)
PARTIAL THROMBOPLASTIN TIME: 50.3 SEC (ref 20.5–30.5)
PDW BLD-RTO: 16.6 % (ref 11.8–14.4)
PLATELET # BLD: 179 K/UL (ref 138–453)
PMV BLD AUTO: 9.3 FL (ref 8.1–13.5)
POTASSIUM SERPL-SCNC: 4.9 MMOL/L (ref 3.7–5.3)
RBC # BLD: 3.41 M/UL (ref 4.21–5.77)
SODIUM BLD-SCNC: 133 MMOL/L (ref 135–144)
WBC # BLD: 11.5 K/UL (ref 3.5–11.3)

## 2023-01-25 PROCEDURE — 94760 N-INVAS EAR/PLS OXIMETRY 1: CPT

## 2023-01-25 PROCEDURE — 6370000000 HC RX 637 (ALT 250 FOR IP): Performed by: NURSE PRACTITIONER

## 2023-01-25 PROCEDURE — 99232 SBSQ HOSP IP/OBS MODERATE 35: CPT | Performed by: HOSPITALIST

## 2023-01-25 PROCEDURE — B2151ZZ FLUOROSCOPY OF LEFT HEART USING LOW OSMOLAR CONTRAST: ICD-10-PCS | Performed by: INTERNAL MEDICINE

## 2023-01-25 PROCEDURE — 6360000002 HC RX W HCPCS: Performed by: STUDENT IN AN ORGANIZED HEALTH CARE EDUCATION/TRAINING PROGRAM

## 2023-01-25 PROCEDURE — B2111ZZ FLUOROSCOPY OF MULTIPLE CORONARY ARTERIES USING LOW OSMOLAR CONTRAST: ICD-10-PCS | Performed by: INTERNAL MEDICINE

## 2023-01-25 PROCEDURE — 6370000000 HC RX 637 (ALT 250 FOR IP): Performed by: STUDENT IN AN ORGANIZED HEALTH CARE EDUCATION/TRAINING PROGRAM

## 2023-01-25 PROCEDURE — 6370000000 HC RX 637 (ALT 250 FOR IP): Performed by: INTERNAL MEDICINE

## 2023-01-25 PROCEDURE — 6360000002 HC RX W HCPCS: Performed by: EMERGENCY MEDICINE

## 2023-01-25 PROCEDURE — 4A023N7 MEASUREMENT OF CARDIAC SAMPLING AND PRESSURE, LEFT HEART, PERCUTANEOUS APPROACH: ICD-10-PCS | Performed by: INTERNAL MEDICINE

## 2023-01-25 PROCEDURE — 99152 MOD SED SAME PHYS/QHP 5/>YRS: CPT

## 2023-01-25 PROCEDURE — 2580000003 HC RX 258: Performed by: STUDENT IN AN ORGANIZED HEALTH CARE EDUCATION/TRAINING PROGRAM

## 2023-01-25 PROCEDURE — 36415 COLL VENOUS BLD VENIPUNCTURE: CPT

## 2023-01-25 PROCEDURE — 93010 ELECTROCARDIOGRAM REPORT: CPT | Performed by: INTERNAL MEDICINE

## 2023-01-25 PROCEDURE — 93454 CORONARY ARTERY ANGIO S&I: CPT

## 2023-01-25 PROCEDURE — C1894 INTRO/SHEATH, NON-LASER: HCPCS

## 2023-01-25 PROCEDURE — 6360000002 HC RX W HCPCS: Performed by: INTERNAL MEDICINE

## 2023-01-25 PROCEDURE — 2060000000 HC ICU INTERMEDIATE R&B

## 2023-01-25 PROCEDURE — 2709999900 HC NON-CHARGEABLE SUPPLY

## 2023-01-25 PROCEDURE — 85730 THROMBOPLASTIN TIME PARTIAL: CPT

## 2023-01-25 PROCEDURE — C1892 INTRO/SHEATH,FIXED,PEEL-AWAY: HCPCS

## 2023-01-25 PROCEDURE — 6360000004 HC RX CONTRAST MEDICATION

## 2023-01-25 PROCEDURE — C1769 GUIDE WIRE: HCPCS

## 2023-01-25 PROCEDURE — 6360000002 HC RX W HCPCS

## 2023-01-25 PROCEDURE — 94640 AIRWAY INHALATION TREATMENT: CPT

## 2023-01-25 PROCEDURE — 99232 SBSQ HOSP IP/OBS MODERATE 35: CPT | Performed by: INTERNAL MEDICINE

## 2023-01-25 PROCEDURE — 80048 BASIC METABOLIC PNL TOTAL CA: CPT

## 2023-01-25 PROCEDURE — 2700000000 HC OXYGEN THERAPY PER DAY

## 2023-01-25 PROCEDURE — 85027 COMPLETE CBC AUTOMATED: CPT

## 2023-01-25 RX ORDER — ACETAMINOPHEN 325 MG/1
650 TABLET ORAL EVERY 4 HOURS PRN
Status: DISCONTINUED | OUTPATIENT
Start: 2023-01-25 | End: 2023-01-26 | Stop reason: HOSPADM

## 2023-01-25 RX ORDER — SODIUM CHLORIDE 0.9 % (FLUSH) 0.9 %
5-40 SYRINGE (ML) INJECTION EVERY 12 HOURS SCHEDULED
Status: DISCONTINUED | OUTPATIENT
Start: 2023-01-25 | End: 2023-01-26 | Stop reason: HOSPADM

## 2023-01-25 RX ORDER — SODIUM CHLORIDE 9 MG/ML
INJECTION, SOLUTION INTRAVENOUS PRN
Status: DISCONTINUED | OUTPATIENT
Start: 2023-01-25 | End: 2023-01-26 | Stop reason: HOSPADM

## 2023-01-25 RX ORDER — SODIUM CHLORIDE 0.9 % (FLUSH) 0.9 %
5-40 SYRINGE (ML) INJECTION PRN
Status: DISCONTINUED | OUTPATIENT
Start: 2023-01-25 | End: 2023-01-26 | Stop reason: HOSPADM

## 2023-01-25 RX ADMIN — TRAMADOL HYDROCHLORIDE 50 MG: 50 TABLET, COATED ORAL at 15:40

## 2023-01-25 RX ADMIN — AZITHROMYCIN 500 MG: 250 TABLET, FILM COATED ORAL at 20:57

## 2023-01-25 RX ADMIN — SODIUM CHLORIDE, PRESERVATIVE FREE 10 ML: 5 INJECTION INTRAVENOUS at 21:02

## 2023-01-25 RX ADMIN — HEPARIN SODIUM 16 UNITS/KG/HR: 10000 INJECTION, SOLUTION INTRAVENOUS at 01:07

## 2023-01-25 RX ADMIN — IPRATROPIUM BROMIDE AND ALBUTEROL SULFATE 1 AMPULE: 2.5; .5 SOLUTION RESPIRATORY (INHALATION) at 07:51

## 2023-01-25 RX ADMIN — CEFTRIAXONE SODIUM 1000 MG: 10 INJECTION, POWDER, FOR SOLUTION INTRAVENOUS at 20:58

## 2023-01-25 RX ADMIN — IPRATROPIUM BROMIDE AND ALBUTEROL SULFATE 1 AMPULE: 2.5; .5 SOLUTION RESPIRATORY (INHALATION) at 12:26

## 2023-01-25 RX ADMIN — Medication 81 MG: at 07:25

## 2023-01-25 RX ADMIN — TRAMADOL HYDROCHLORIDE 50 MG: 50 TABLET, COATED ORAL at 01:10

## 2023-01-25 RX ADMIN — IPRATROPIUM BROMIDE AND ALBUTEROL SULFATE 1 AMPULE: 2.5; .5 SOLUTION RESPIRATORY (INHALATION) at 16:10

## 2023-01-25 RX ADMIN — FENTANYL CITRATE 25 MCG: 50 INJECTION INTRAMUSCULAR; INTRAVENOUS at 06:49

## 2023-01-25 RX ADMIN — HEPARIN SODIUM 2000 UNITS: 1000 INJECTION INTRAVENOUS; SUBCUTANEOUS at 01:08

## 2023-01-25 RX ADMIN — CARVEDILOL 6.25 MG: 6.25 TABLET, FILM COATED ORAL at 07:25

## 2023-01-25 RX ADMIN — TRAMADOL HYDROCHLORIDE 50 MG: 50 TABLET, COATED ORAL at 07:24

## 2023-01-25 RX ADMIN — CARVEDILOL 6.25 MG: 6.25 TABLET, FILM COATED ORAL at 16:40

## 2023-01-25 RX ADMIN — ATORVASTATIN CALCIUM 80 MG: 80 TABLET, FILM COATED ORAL at 20:58

## 2023-01-25 ASSESSMENT — PAIN SCALES - GENERAL
PAINLEVEL_OUTOF10: 7
PAINLEVEL_OUTOF10: 4
PAINLEVEL_OUTOF10: 8
PAINLEVEL_OUTOF10: 8
PAINLEVEL_OUTOF10: 7
PAINLEVEL_OUTOF10: 8
PAINLEVEL_OUTOF10: 3

## 2023-01-25 ASSESSMENT — PAIN DESCRIPTION - ORIENTATION
ORIENTATION: LEFT

## 2023-01-25 ASSESSMENT — PAIN DESCRIPTION - DESCRIPTORS
DESCRIPTORS: ACHING
DESCRIPTORS: SHARP;DISCOMFORT

## 2023-01-25 ASSESSMENT — PAIN SCALES - WONG BAKER
WONGBAKER_NUMERICALRESPONSE: 0

## 2023-01-25 ASSESSMENT — PAIN DESCRIPTION - LOCATION
LOCATION: CHEST
LOCATION: SHOULDER

## 2023-01-25 ASSESSMENT — PAIN - FUNCTIONAL ASSESSMENT: PAIN_FUNCTIONAL_ASSESSMENT: ACTIVITIES ARE NOT PREVENTED

## 2023-01-25 NOTE — PROGRESS NOTES
[facility]  Progress Note                   Date:   1/25/2023  Patient name: Brenda Maldonado  Date of admission:  1/23/2023  6:57 PM  MRN:   [id number]  YOB: 1960  PCP: Tyesha Rodriguez    Reason for Admission: Pneumonia of right lower lobe due to infectious organism [J18.9]  Pneumonia due to infectious organism [J18.9]    Subjective:       Clinical Changes /Abnormalities: Patient seen and examined in room. He has continued complaints of chest pain and shortness of breath. Scheduled for cardiac cath today. Labs/vitals/tele reviewed. Discussed with RN, no acute CV issues or concerns overnight. On heparin gtt. And O2 via NC. Questioning bp this am. Follow up with repeat bp. Review of Systems    Medications:   Scheduled Meds:   aspirin EC  81 mg Oral Daily    carvedilol  6.25 mg Oral BID WC    therapeutic multivitamin-minerals  1 tablet Oral Every Other Day    sevelamer  2.4 g Oral TID WC    sodium chloride flush  5-40 mL IntraVENous 2 times per day    ipratropium-albuterol  1 ampule Inhalation Q4H WA    cefTRIAXone (ROCEPHIN) IV  1,000 mg IntraVENous Q24H    And    azithromycin  500 mg Oral Q24H    insulin lispro  0-4 Units SubCUTAneous TID WC    insulin lispro  0-4 Units SubCUTAneous Nightly    atorvastatin  80 mg Oral Nightly     Continuous Infusions:   dextrose      sodium chloride      heparin (PORCINE) Infusion 16 Units/kg/hr (01/25/23 0107)     CBC:   Recent Labs     01/23/23  2237 01/24/23  0519 01/25/23  0611   WBC 12.3* 10.9 11.5*   HGB 10.5* 10.7* 10.5*    147 179     BMP:    Recent Labs     01/23/23 2008 01/24/23  0519    137   K 4.7 5.3   CL 88* 91*   CO2 29 17*   BUN 74* 80*   CREATININE 10.06* 10.41*   GLUCOSE 196* 160*     Hepatic:No results for input(s): AST, ALT, ALB, BILITOT, ALKPHOS in the last 72 hours.   Troponin:   Recent Labs     01/24/23  0519 01/24/23  1106 01/24/23  1524   TROPHS 242* 247* 246*     BNP: No results for input(s): BNP in the last 72 hours. Lipids: No results for input(s): CHOL, HDL in the last 72 hours. Invalid input(s): LDLCALCU  INR: No results for input(s): INR in the last 72 hours. EKG  1/3/22  Atrial fibrillation, RBBB     1/24/23  pending     ECHO  1/4/22  Summary  Left ventricle is moderately enlarged, global left ventricular systolic  function is moderately reduced, Visually estimated EF 30%. Global dysfunction is noted. Grade III (severe) left ventricular diastolic dysfunction. Left atrium is moderately dilated. Right atrial dilatation. Prominent Eustachian valve. Dialysis catheter  noted. Right ventricular dilatation with reduced systolic function. Aortic valve is sclerotic but opens well. Trivial aortic insufficiency. Mitral valve sclerosis without stenosis. Trivial mitral regurgitation. Moderate to severe tricuspid regurgitation. Estimated right ventricular  systolic pressure is 39 mmHg. Trivial pulmonic insufficiency. CATH  2/9/21  Indications:    - CHF       - Coronary risk factors       - Previous stent placement      Conclusions      Procedure Summary      Patent LAD stent      Recommendations      Medical treatments   Risk factor modification. Signature      ----------------------------------------------------------------   Electronically signed by David Orozco(Performing Physician) on   02/09/2021 13:09   ----------------------------------------------------------------      Angiographic Findings      Cardiac Arteries and Lesion Findings     LMCA: Normal 0% stenosis. LAD: Patent distal stent with 30-40% stenosis     LCx: Normal 0% stenosis. RCA: Normal 0% stenosis. Small, non dominant      Coronary Tree      Dominance: Right    Objective:   Vitals: BP (!) 177/67   Pulse 70   Temp 97.4 °F (36.3 °C) (Oral)   Resp 23   Wt 241 lb 6.4 oz (109.5 kg)   SpO2 94%   BMI 34.64 kg/m²   General appearance: alert and cooperative with exam  HEENT: Head: Normocephalic, no lesions, without obvious abnormality. Neck:no JVD, trachea midline, no adenopathy  Lungs: Clear to auscultation  Heart: Irregular rate and rhythm, s1/s2 auscultated, no murmurs, Afib, HR 70's  Abdomen: soft, non-tender, bowel sounds active  Extremities: no edema  Neurologic: not done        Assessment / Acute Cardiac Problems:   NSTEMI, troponin higher than baseline and chest pain likely anginal.   A. fib on EKG. [address] of 4 on heparin drip, (on home eliquis, non compliance history)  Ischemic cardiomyopathy 40% (recent MUGA scan)   CAD with previous TIM to distal LAD in 2019 April. ESRD on hemodialysis. Hypertension.    DM  Hyperlipidemia  Suspected pneumonia  On Home 3L NC    Patient Active Problem List:     CKD (chronic kidney disease) stage 4, GFR 15-29 ml/min ([facility])     Anemia of chronic renal failure     Type 2 diabetes mellitus with chronic kidney disease on chronic dialysis, without long-term current use of insulin ([facility])     Chest pain at rest     Pneumonia     Rising PSA level     Prostatism     Acute on chronic diastolic congestive heart failure ([facility])     Anemia of chronic disease     Hypertension, essential     Respiratory distress     AVF (arteriovenous fistula) ([facility])     Precordial chest pain     S/P PTCA - TIM distal LAD - Dr. Pooja Bradley 4/1/19     NSTEMI (non-ST elevated myocardial infarction) ([address]     S/P arteriovenous (AV) graft placement     Acute on chronic combined systolic (congestive) and diastolic (congestive) heart failure ([address]     Encounter regarding vascular access for dialysis for ESRD ([address]     Coronary artery disease involving native coronary artery of native heart without angina pectoris     BATSHEVA (obstructive sleep apnea)     Acute congestive heart failure (HCC)     Small bowel obstruction (HCC)     Chest pain     Acute on chronic combined systolic and diastolic CHF (congestive heart failure) ([facility])     Chronic abdominal pain     Hypoxemia-due to CHF/fluid overload     Paroxysmal atrial fibrillation ([address] Hyponatremia     ESRD on dialysis [facility])     Dialysis AV fistula malfunction (HCC)     Arteriovenous fistula occlusion (HCC)     AV graft malfunction, initial encounter ([facility]     Lymphedema due to venous insufficiency     S/P arteriovenous (AV) graft repair     Chronic respiratory failure with hypoxia (HCC)     Pain of left upper extremity     Metabolic encephalopathy     Leg swelling     Pneumonia due to infectious organism     Metabolic acidosis      Plan of Treatment:   I have discussed risks (including but not limited to vascular injury, infection, hematoma, contrast induced kidney dysfunction, CVA and MI), benefits, alternatives in detail. All questions answered. Patient agrees to proceed. Patient NPO, able to lay flat. Continue heparin gtt, asa, statin, and BB. ESRD on HD. Nephrology following. Appreciate assistance with volume management. On empiric abx for suspected PNA. Rest of care per primary.      Electronically signed by ROB Hall CNP on 1/25/2023 at 9:31 AM  [address]  [phone]

## 2023-01-25 NOTE — CARE COORDINATION
SW received consult for HD transportation needs. Patient receiving care at this time, will follow up later today.

## 2023-01-25 NOTE — PROGRESS NOTES
[facility]  Occupational Therapy Not Seen Note    DATE: 2023    NAME: Laurent Fairchild  MRN: [id number]   : 1960      Patient not seen this date for Occupational Therapy due to:     Other: cardiac cath today     Next Scheduled Treatment: check back 2023    Electronically signed by CA Lewis on 2023 at 8:22 AM

## 2023-01-25 NOTE — PROGRESS NOTES
@[facility]@    [facility]    Progress Note    1/25/2023    3:40 PM    Name:   Vicky Gottron  MRN:     [id number]     Elenalyside:      [de-identified]   Room:   [address] [place] [address] Day:  2  Admit Date:  1/23/2023  6:57 PM    PCP:   Parvez Kraft  Code Status:  Full Code    Subjective:     C/C:   Chief Complaint   Patient presents with    Chest Pain    Shortness of Breath     Interval History Status: not changed. Patient was seen and examined at bedside. No acute overnight event. No new complaint. Patient continues to have chest pain this morning. Patient is scheduled for cardiac catheterization today. Denies having any other acute complaint. Patient did not want to communicate that much. Did not answer most of my questions. Brief History:     Vicky Gottron is a 57 y.o. Non- / non  male who presents with Chest Pain and Shortness of Breath   and is admitted to the hospital for the management of NSTEMI (non-ST elevated myocardial infarction) ([address] This 57 yom presents with cp on left side. It has been there for a couple of days and is constant and still there now. Described as a sharp pressure radiating to left side of neck. He also has left shoulder pain but thinks that that is different. He has had a cough for years, no change recently; and about a month ago had fevers and chills but not since. He says after the pain is there for a while he can then feel a lump on left chest.  He has also had sob but unclear for how long and if related to cp or not. Separately he has pelon shoulder pain-says the right side has hurt for a while and the left side pain is new. Wears 3L o2 24/7    Review of Systems:     Constitutional:  negative for chills, fevers, sweats  Respiratory:  negative for cough, dyspnea on exertion, shortness of breath, wheezing  Cardiovascular: Positive chest pain.   Negative for lower extremity edema, palpitations  Gastrointestinal:  negative for abdominal pain, constipation, diarrhea, nausea, vomiting  Neurological:  negative for dizziness, headache    Medications: Allergies:     Allergies   Allergen Reactions    Lisinopril Swelling       Current Meds:   Scheduled Meds:    sodium chloride flush  5-40 mL IntraVENous 2 times per day    aspirin EC  81 mg Oral Daily    carvedilol  6.25 mg Oral BID     therapeutic multivitamin-minerals  1 tablet Oral Every Other Day    sevelamer  2.4 g Oral TID     sodium chloride flush  5-40 mL IntraVENous 2 times per day    ipratropium-albuterol  1 ampule Inhalation Q4H WA    cefTRIAXone (ROCEPHIN) IV  1,000 mg IntraVENous Q24H    And    azithromycin  500 mg Oral Q24H    insulin lispro  0-4 Units SubCUTAneous TID WC    insulin lispro  0-4 Units SubCUTAneous Nightly    atorvastatin  80 mg Oral Nightly     Continuous Infusions:    sodium chloride      dextrose      sodium chloride       PRN Meds: sodium chloride flush, sodium chloride, acetaminophen, glucose, dextrose bolus **OR** dextrose bolus, glucagon (rDNA), dextrose, sodium chloride flush, sodium chloride, ondansetron **OR** ondansetron, acetaminophen **OR** acetaminophen, albuterol, polyethylene glycol, traMADol, fentanNYL    Data:     Past Medical History:   has a past medical history of Acute congestive heart failure (HCC), Acute on chronic diastolic congestive heart failure (HCC), Anemia, Anemia of chronic renal failure, Atrial fibrillation ([facility] AVF (arteriovenous fistula) ([facility]), Bowel obstruction ([facility]), CAD (coronary artery disease), Chest pain at rest, CHF (congestive heart failure) ([facility] CHF (congestive heart failure), NYHA class I, acute on chronic, combined ([facility] Chronic kidney disease, CKD (chronic kidney disease) stage 4, GFR 15-29 ml/min ([facility] Coronary artery disease involving native coronary artery of native heart without angina pectoris, Diabetes mellitus ([facility] Dialysis patient ([facility] ESRD (end stage renal disease) ([facility] Essential hypertension, Groin swelling, Hemodialysis patient ([facility] Hydrocele, Hyperlipidemia, Hypertension, Inguinal hernia, MI, old, NSTEMI (non-ST elevated myocardial infarction) ([facility] On home O2, BATSHEVA (obstructive sleep apnea), Patient in clinical research study, Pneumonia, Poor historian, Prostatism, Respiratory distress, Rising PSA level, S/P arteriovenous (AV) graft placement, S/P PTCA - TIM distal LAD - Dr. [place] 19, Sleep apnea, Small bowel obstruction (HCC), SOB (shortness of breath), and Type 2 diabetes mellitus with chronic kidney disease on chronic dialysis ([facility] Social History:   reports that he has never smoked. He has never used smokeless tobacco. He reports that he does not drink alcohol and does not use drugs. Family History:   Family History   Problem Relation Age of Onset    Heart Disease Mother         CHF    Early Death Mother     High Blood Pressure Brother     Diabetes Brother     Asthma Brother     High Blood Pressure Brother     Diabetes Brother     High Blood Pressure Brother     Diabetes Brother        Vitals:  /74   Pulse 97   Temp 97.4 °F (36.3 °C) (Oral)   Resp 18   Wt 241 lb 6.4 oz (109.5 kg)   SpO2 98%   BMI 34.64 kg/m²   Temp (24hrs), Av.7 °F (36.5 °C), Min:97.4 °F (36.3 °C), Max:98.1 °F (36.7 °C)    Recent Labs     23  1702 23  1213   POCGLU 112* 179* 133*       I/O (24Hr):     Intake/Output Summary (Last 24 hours) at 2023 1540  Last data filed at 2023 1822  Gross per 24 hour   Intake 240 ml   Output --   Net 240 ml       Labs:  Hematology:  Recent Labs     23  2237 23  0519 23  0611   WBC 12.3* 10.9 11.5*   RBC 3.40* 3.48* 3.41*   HGB 10.5* 10.7* 10.5*   HCT 35.0* 38.9* 35.0*   .9 111.8* 102.6   MCH 30.9 30.7 30.8   MCHC 30.0 27.5* 30.0   RDW 16.4* 16.7* 16.6*    147 179   MPV 10.1 10.0 9.3     Chemistry:  Recent Labs     23 01/24/23  0519 01/24/23  1106 01/24/23  1524 01/25/23  0929     --  137  --   --  133*   K 4.7  --  5.3  --   --  4.9   CL 88*  --  91*  --   --  90*   CO2 29  --  17*  --   --  26   GLUCOSE 196*  --  160*  --   --  134*   BUN 74*  --  80*  --   --  56*   CREATININE 10.06*  --  10.41*  --   --  8.26*   ANIONGAP 18*  --  29*  --   --  17   LABGLOM 5*  --  5*  --   --  7*   CALCIUM 9.1  --  9.0  --   --  8.8   PROBNP 94,730*  --   --   --   --   --    TROPHS 278*   < > 242* 247* 246*  --    MYOGLOBIN  --   --  298* 253* 251*  --     < > = values in this interval not displayed. Recent Labs     01/24/23  0519 01/24/23  1702 01/24/23 2050 01/25/23  1213   LABA1C 6.0  --   --   --    POCGLU  --  112* 179* 133*     ABG:  Lab Results   Component Value Date/Time    PH 7.45 09/08/2018 08:57 PM    PCO2 33 09/08/2018 08:57 PM    PO2 131 09/08/2018 08:57 PM    HCO3 23 09/08/2018 08:57 PM    BE -1 09/08/2018 08:57 PM    FIO2 UNKNOWN 06/21/2021 12:27 PM     Lab Results   Component Value Date/Time    SPECIAL NOT REPORTED 12/31/2021 04:23 PM     Lab Results   Component Value Date/Time    CULTURE NO SIGNIFICANT GROWTH 12/31/2021 04:23 PM       Radiology:  XR SHOULDER RIGHT (MIN 2 VIEWS)    Result Date: 1/24/2023  No acute abnormality. AC joint and glenohumeral joint degenerative changes Right lung base opacity could represent atelectasis or infection     XR SHOULDER LEFT (MIN 2 VIEWS)    Result Date: 1/24/2023  1. No evidence of acute fracture involving the left shoulder. 2.  There is significant narrowing of the subacromial space, which can be seen in chronic rotator cuff disease. 3.  Moderate left shoulder osteoarthritis. XR CHEST PORTABLE    Result Date: 1/23/2023  Cardiomegaly with probable bibasal pulmonary vascular congestion. Underlying pneumonia in the right base cannot be excluded.        Physical Examination:        General appearance:  alert, cooperative and no distress  Mental Status:  oriented to person, place and time and normal affect  Lungs:  clear to auscultation bilaterally, normal effort  Heart:  regular rate and rhythm, no murmur  Abdomen:  soft, nontender, nondistended, normal bowel sounds, no masses, hepatomegaly, splenomegaly  Extremities:  no edema, redness, tenderness in the calves  Skin:  no gross lesions, rashes, induration    Assessment:        Hospital Problems             Last Modified POA    * (Principal) NSTEMI (non-ST elevated myocardial infarction) ([address] 1/24/2023 Yes    Chronic respiratory failure with hypoxia ([address] 1/24/2023 Yes    Pain of left upper extremity 1/71/6138 Yes    Metabolic acidosis 6/29/4640 Yes    Type 2 diabetes mellitus with chronic kidney disease on chronic dialysis, without long-term current use of insulin ([address] 1/24/2023 Yes    Chest pain at rest 1/24/2023 Yes    Anemia of chronic disease 1/24/2023 Yes    Hypertension, essential 1/24/2023 Yes    BATSHEVA (obstructive sleep apnea) 1/24/2023 Yes    Chest pain 1/24/2023 Yes    Paroxysmal atrial fibrillation ([address] 1/24/2023 Yes    ESRD on dialysis ([address] 1/24/2023 Yes       Plan:        Chest pain -no significant change in troponin level. Patient is status post cardiac cath today with negative result. Diabetes mellitus type 2 -sliding scale insulin. Diabetic diet. Essential hypertension  Paroxysmal atrial fibrillation -currently on heparin drip. Will defer to cardiology for change in medications. DM2 - sliding scale insulin  HTN, HLD - stable  Paroxysmal afib - rate well controlled. Was on Eliquis at home but now on heparin drip. ESRD on HD -nephrology is on board  Disposition -discharge to home when cardiology clears.     Jose C Cantu DO  1/25/2023  3:40 PM

## 2023-01-25 NOTE — OP NOTE
[facility]    CARDIAC CATHETERIZATION    Date:   1/25/2023  Patient name:  Go Zacarias  Date of admission:  1/23/2023  6:57 PM  MRN:   [id number]  YOB: 1960    Operators:  Primary:   Vasiliy Funes MD (Attending Physician)    Assistant/CV fellow:  Dieter Darby MD      Procedure performed:      [x] Left Heart Catheterization. [] Graft Angiography. [x] Left Ventriculography. [] Right Heart Catheterization. [x] Coronary Angiography. [] Aortic Valve Studies. [] PCI:      [] Other:       Pre Procedure Conscious Sedation Data:  ASA Class:    [] I [] II [x] III [] IV    Mallampati Class:  [x] I [] II [] III [] IV      Indication:  [] STEMI      [] + Stress test  [x] ACS      [] + EKG Changes  [] Non Q MI       [] Significant Risk Factors  [] Recurrent Angina             [] Diabetes Mellitus    [] New LBBB      [] Uncontrolled HTN. [] CHF / Low EF changes     [] Abnormal CTA / Ca Score      Procedure:  Access:  [x] Femoral  [] Radial  artery       [x] Right  [] Left    Procedure: After informed consent was obtained with explanation of the risks and benefits, patient was brought to the cath lab. The access area was prepped and draped in sterile fashion. 1% lidocaine was used for local block. The artery was cannulated with 6  Fr sheath with brisk arterial blood return. The side port was frequently flushed and aspirated with normal saline. Cardiac Arteries and Lesion Findings       LMCA: Mild irregularities 10-20%. LAD: Mild irregularities 10-20%. patent mid stent     LCx: Mild irregularities 10-20%. RCA: Mild irregularities 10-20%. small nondominant      Coronary Tree        Dominance: Left   Conclusions        Procedure Summary        patent mid LAD stent with nonobstructive disease. Recommendations        Medical therapy as needed.      Estimated Blood Loss: 10 mL      ____________________________________________________________________    History and Risk Factors    [x] Hypertension     [] Family history of CAD  [x] Hyperlipidemia     [] Cerebrovascular Disease   [] Prior MI       [] Peripheral Vascular disease   [] Prior PCI              [] Diabetes Mellitus    [] Left Main PCI. [] Currently on Dialysis. [] Prior CABG. [] Currently smoker. [] Cardiac Arrest outside of healthcare facility. [] Yes    [] No        Witnessed     [] Yes   [] No     Arrest after arrival of EMS  [] Yes   [] No     [] Cardiac Arrest at other Facility. [] Yes   [] No    Pre-Procedure Information. Heart Failure       [] Yes    [x] No        Class  [] I      [] II  [] III    [] IV. New Diagnosis    [] Yes  [] No    HF Type      [] Systolic   [] Diastolic          [] Unknown. Diagnostic Test:   EKG       [] Normal   [x] Abnormal    New antiarrhythmia medications:    [] Yes   [] No   New onset atrial fibrillation / Flutter     [] Yes   [] No   ECG Abnormalities:      [] V. Fib   [] Shira V. Tach           [] NS V. T   [] New LBBB           [] T. Inv  []  ST dev > 0.5 mm         [] PVC's freq  [] PVC's infrequent    Stress Test Performed:      [] Yes    [x] No     Type:     [] Stress Echo   [] Exercise Stress Test (no imaging)      [] Stress Nuclear  [] Stress Imaging     Results   [] Negative   [] Positive        [] Indeterminate  [] Unavailable     If Positive/ Risk / Extent of Ischemia:       [] Low  [] Intermediate         [] High  [] Unavailable      Cardiac CTA Performed:     [] Yes    [x] No      Results   [] CAD   [] Non obstructive CAD      [] No CAD   [] Uncertain      [] Unknown   [] Structural Disease. Pre Procedure Medications:   [x] Yes    [] No         [x] ASA   [] Beta Blockers      [] Nitrate   [] Ca Channel Blockers      [] Ranolazine   [] Statin       [] Plavix/Others antiplatelets      Electronically signed on 1/25/2023 at 11:50 AM by:    Jani Ferrell MD  Fellow, [address] [place] [address].  [facility], [id number]

## 2023-01-25 NOTE — CARE COORDINATION
[facility] Flow/Interdisciplinary Rounds Progress Note    Quality Flow Rounds held on January 25, 2023 at [address] [facility] Attending:  Bedside Nurse, , and Nursing Unit Leadership    Barriers to Discharge: Clinical status    Anticipated Discharge Date:   1/27/22    Anticipated Discharge Disposition: home independent    Readmission Risk              Risk of Unplanned Readmission:  19           Discussed patient goal for the day, patient clinical progression, and barriers to discharge. Plan for heart cath today and dialysis tomorrow.       Shane Abdul RN  January 25, 2023

## 2023-01-25 NOTE — PROGRESS NOTES
Nephrology Progress Note      SUBJECTIVE      Patient was seen and examined. No new issues reported overnight. Stable hemodynamics. Patient is mildly tachycardic with . Most recent blood pressure is 116/69 mmHg. He is saturating 94% on 3 L O2 through nasal cannula. Patient had dialysis yesterday with 2.1 L removal.  He tolerated the treatment well without any issues. Length of treatment is 3.5 hours. He is scheduled for cardiac catheterization today per cardio. BMP results from today shows sodium 133, potassium 4.1, bicarbonate 6 and calcium 8.8. BUN 56, creatinine 8.26 mg/dl. CBC shows leukocytosis with WBC of 11.5, hemoglobin 10.5 and platelet count 078. Status post cardiac catheterization done today 2023, patent mid LAD stent with nonobstructive disease. OBJECTIVE      CURRENT TEMPERATURE:  Temp: 97.4 °F (36.3 °C)  MAXIMUM TEMPERATURE OVER 24HRS:  Temp (24hrs), Av.8 °F (36.6 °C), Min:97.4 °F (36.3 °C), Max:98.1 °F (36.7 °C)    CURRENT RESPIRATORY RATE:  Resp: 20  CURRENT PULSE:  Heart Rate: 88  CURRENT BLOOD PRESSURE:  BP: (!) 116/94  24HR BLOOD PRESSURE RANGE:  Systolic (35JQH), PKK:208 , Min:103 , LLW:459   ; Diastolic (87XQI), HGA:79, Min:67, Max:94    24HR INTAKE/OUTPUT:    Intake/Output Summary (Last 24 hours) at 2023 1238  Last data filed at 2023 1822  Gross per 24 hour   Intake 540 ml   Output 2400 ml   Net -1860 ml       WEIGHT :Patient Vitals for the past 96 hrs (Last 3 readings):   Weight   23 0600 241 lb 6.4 oz (109.5 kg)   23 1312 242 lb 11.6 oz (110.1 kg)   23 0930 247 lb 9.2 oz (112.3 kg)       PHYSICAL EXAM      General:          AAO x 3, speaking in full sentences, no accessory muscle use. HEENT:           Atraumatic, normocephalic, no throat congestion, moist mucosa. Eyes:               Pupils equal, round and reactive to light, EOMI.   Neck:               Supple  Chest:              Bilateral vesicular breath sounds, no rales or wheezes. Cardiac:           S1 S2 RR, no murmurs, gallops or rubs. Abdomen:        Soft, non-tender, no masses or organomegaly, BS audible. :                  No suprapubic or flank tenderness. Neuro:             AAO x 3, No FND. SKIN:               No rashes, good skin turgor. Extremities:     No edema.     CURRENT MEDICATIONS      sodium chloride flush 0.9 % injection 5-40 mL, 2 times per day  sodium chloride flush 0.9 % injection 5-40 mL, PRN  0.9 % sodium chloride infusion, PRN  acetaminophen (TYLENOL) tablet 650 mg, Q4H PRN  aspirin EC tablet 81 mg, Daily  carvedilol (COREG) tablet 6.25 mg, BID WC  therapeutic multivitamin-minerals 1 tablet, Every Other Day  sevelamer (RENVELA) packet 2.4 g, TID WC  glucose chewable tablet 16 g, PRN  dextrose bolus 10% 125 mL, PRN   Or  dextrose bolus 10% 250 mL, PRN  glucagon (rDNA) injection 1 mg, PRN  dextrose 10 % infusion, Continuous PRN  sodium chloride flush 0.9 % injection 5-40 mL, 2 times per day  sodium chloride flush 0.9 % injection 10 mL, PRN  0.9 % sodium chloride infusion, PRN  ondansetron (ZOFRAN-ODT) disintegrating tablet 4 mg, Q8H PRN   Or  ondansetron (ZOFRAN) injection 4 mg, Q6H PRN  acetaminophen (TYLENOL) tablet 650 mg, Q6H PRN   Or  acetaminophen (TYLENOL) suppository 650 mg, Q6H PRN  albuterol (PROVENTIL) nebulizer solution 2.5 mg, Q2H PRN  ipratropium-albuterol (DUONEB) nebulizer solution 1 ampule, Q4H WA  cefTRIAXone (ROCEPHIN) 1,000 mg in sterile water 10 mL IV syringe, Q24H   And  azithromycin (ZITHROMAX) tablet 500 mg, Q24H  polyethylene glycol (GLYCOLAX) packet 17 g, Daily PRN  insulin lispro (HUMALOG) injection vial 0-4 Units, TID WC  insulin lispro (HUMALOG) injection vial 0-4 Units, Nightly  atorvastatin (LIPITOR) tablet 80 mg, Nightly  traMADol (ULTRAM) tablet 50 mg, Q6H PRN  fentaNYL (SUBLIMAZE) injection 25 mcg, Q1H PRN        LABS      CBC:   Recent Labs     01/23/23  2237 01/24/23  0519 01/25/23  0611   WBC 12.3* 10.9 11.5*   RBC 3.40* 3.48* 3.41*   HGB 10.5* 10.7* 10.5*   HCT 35.0* 38.9* 35.0*   .9 111.8* 102.6   MCH 30.9 30.7 30.8   MCHC 30.0 27.5* 30.0   RDW 16.4* 16.7* 16.6*    147 179   MPV 10.1 10.0 9.3        BMP:   Recent Labs     01/23/23 2008 01/24/23  0519 01/25/23  0929    137 133*   K 4.7 5.3 4.9   CL 88* 91* 90*   CO2 29 17* 26   BUN 74* 80* 56*   CREATININE 10.06* 10.41* 8.26*   GLUCOSE 196* 160* 134*   CALCIUM 9.1 9.0 8.8        BNP:  Lab Results   Component Value Date/Time     09/08/2018 08:04 PM     IRON:    Lab Results   Component Value Date/Time    IRON 13 11/25/2018 03:41 AM     IRON SATURATION:    Lab Results   Component Value Date/Time    LABIRON 8 11/25/2018 03:41 AM     TIBC:    Lab Results   Component Value Date/Time    TIBC 156 11/25/2018 03:41 AM     FERRITIN:    Lab Results   Component Value Date/Time    FERRITIN 363 11/25/2018 03:41 AM       SPEP:   Lab Results   Component Value Date/Time    PROT 8.0 11/12/2021 07:27 AM      HEPBSAG:  Lab Results   Component Value Date/Time    HEPBSAG NONREACTIVE 11/13/2021 12:04 PM     HEPCAB:  Lab Results   Component Value Date/Time    HEPCAB NONREACTIVE 11/13/2021 12:04 PM     MPO ANCA:   Lab Results   Component Value Date/Time    MPO 25 09/14/2016 06:15 PM    .  PR3 ANCA:    Lab Results   Component Value Date/Time    PR3 51 09/14/2016 06:15 PM      URINE CREATININE:    Lab Results   Component Value Date/Time    LABCREA 73.8 11/25/2018 01:36 AM     URINALYSIS:  U/A:   Lab Results   Component Value Date/Time    NITRU NEGATIVE 04/01/2022 11:27 AM    COLORU Yellow 04/01/2022 11:27 AM    PHUR 6.0 04/01/2022 11:27 AM    WBCUA 5 TO 10 04/01/2022 11:27 AM    RBCUA 20 TO 50 04/01/2022 11:27 AM    MUCUS NOT REPORTED 12/31/2021 04:23 PM    TRICHOMONAS NOT REPORTED 12/31/2021 04:23 PM    YEAST FEW 12/31/2021 04:23 PM    BACTERIA NOT REPORTED 12/31/2021 04:23 PM    SPECGRAV 1.010 04/01/2022 11:27 AM    LEUKOCYTESUR NEGATIVE 04/01/2022 11:27 AM    UROBILINOGEN Normal 04/01/2022 11:27 AM    BILIRUBINUR NEGATIVE 04/01/2022 11:27 AM    GLUCOSEU NEGATIVE 04/01/2022 11:27 AM    KETUA NEGATIVE 04/01/2022 11:27 AM    AMORPHOUS NOT REPORTED 12/31/2021 04:23 PM     ANTIGBM:  Lab Results   Component Value Date/Time    GBMABIGG 13 09/14/2016 06:15 PM         RADIOLOGY      XR SHOULDER RIGHT (MIN 2 VIEWS)    Result Date: 1/24/2023  EXAMINATION: 3 XRAY VIEWS OF THE RIGHT SHOULDER 1/24/2023 3:56 pm COMPARISON: None. HISTORY: ORDERING SYSTEM PROVIDED HISTORY: pain, limited rom TECHNOLOGIST PROVIDED HISTORY: pain, limited rom FINDINGS: Glenohumeral joint is normally aligned. No evidence of acute fracture or dislocation. No abnormal periarticular calcifications. AC joint and glenohumeral joint degenerative changes. Right basilar opacity     No acute abnormality. AC joint and glenohumeral joint degenerative changes Right lung base opacity could represent atelectasis or infection     XR SHOULDER LEFT (MIN 2 VIEWS)    Result Date: 1/24/2023  EXAMINATION: THREE XRAY VIEWS OF THE LEFT SHOULDER 1/24/2023 3:56 pm COMPARISON: None. HISTORY: ORDERING SYSTEM PROVIDED HISTORY: pain, limited rom TECHNOLOGIST PROVIDED HISTORY: pain, limited rom FINDINGS: No acute fracture, dislocation or suspicious osseous lesions. There is moderate osteoarthritis of the glenohumeral joint and mild osteoarthritis of the acromioclavicular joint. There is marked narrowing of the subacromial space which can be seen in chronic rotator cuff tearing. A vascular stent overlies the medial left upper extremity. No appreciable soft tissue abnormality. 1.  No evidence of acute fracture involving the left shoulder. 2.  There is significant narrowing of the subacromial space, which can be seen in chronic rotator cuff disease. 3.  Moderate left shoulder osteoarthritis.      XR CHEST PORTABLE    Result Date: 1/23/2023  EXAMINATION: ONE XRAY VIEW OF THE CHEST 1/23/2023 5:18 pm COMPARISON: 06/15/2022 HISTORY: ORDERING SYSTEM PROVIDED HISTORY: chest pain TECHNOLOGIST PROVIDED HISTORY: chest pain Reason for Exam: port Upright FINDINGS: Suboptimal inspiration. Cardiac size is enlarged. Bibasal opacities. The bibasal pulmonary vascularity is hazy and indistinct. No pneumothorax. No pleural effusions identified . Bonne Major Cardiomegaly with probable bibasal pulmonary vascular congestion. Underlying pneumonia in the right base cannot be excluded. ASSESSMENT      ESRD on Hemodialysis. His regular HD days are TTS at [facility] using left AV graft under Dr. Rashid Valente. His dry weight is 74.2 daily. Chest pain. Status post cardiac catheterization done today 1/25/2023, patent mid LAD stent with nonobstructive disease. Metabolic acidosis  Coronary artery disease history of stent in the past.  Anemia of chronic disease  Secondary hyperparathyroidism  Hypertension    PLAN      No acute need for dialysis today. Will get regular dialysis in a.m. as per TTS schedule. Strict Input and Output, Daily weigh and document in the chart. Low Potassium, Low phosphorus and low salt diet. Fluids to be restricted to 1500ml/day. IV Aranesp/Epogen for anemia of chronic disease with HD weekly. IV Zemplar per protocol for secondary hyperparathyroidism with HD thrice a week. Okay to discharge from nephrology standpoint. Please do not hesitate to call with questions. This note is created with the assistance of a speech-recognition program. While intending to generate a document that actually reflects the content of the visit, no guarantees can be provided that every mistake has been identified and corrected by editing    Pavithra Rnener MD  Internal medicine resident, PGY 2  Nephrology service  [address] [place] [address]  1/25/2023      Attending Physician Statement  I have discussed the care of this patient, including pertinent history and exam findings, with the Resident/CNP.  I have reviewed and edited the key elements of all parts of the encounter with the Resident/CNP. I agree with the assessment, plan and orders as documented by the Resident/CNP. Edgardo Wilson MD   Nephrology [address] [place] [address] Drive    This note is created with the assistance of a speech-recognition program. While intending to generate a document that actually reflects the content of the visit, no guarantees can be provided that every mistake has been identified and corrected by editing.

## 2023-01-25 NOTE — PLAN OF CARE
Problem: Discharge Planning  Goal: Discharge to home or other facility with appropriate resources  1/25/2023 0740 by Jerry Puga RN  Outcome: Progressing  Flowsheets (Taken 1/25/2023 0875)  Discharge to home or other facility with appropriate resources: Identify barriers to discharge with patient and caregiver  1/24/2023 1823 by Piyush Nation RN  Outcome: Progressing  Flowsheets  Taken 1/24/2023 1615  Discharge to home or other facility with appropriate resources:   Identify barriers to discharge with patient and caregiver   Arrange for needed discharge resources and transportation as appropriate   Identify discharge learning needs (meds, wound care, etc)  Taken 1/24/2023 0800  Discharge to home or other facility with appropriate resources:   Identify barriers to discharge with patient and caregiver   Arrange for needed discharge resources and transportation as appropriate   Identify discharge learning needs (meds, wound care, etc)     Problem: Chronic Conditions and Co-morbidities  Goal: Patient's chronic conditions and co-morbidity symptoms are monitored and maintained or improved  1/25/2023 0740 by Jerry Puga RN  Outcome: Progressing  Flowsheets (Taken 1/25/2023 0724)  Care Plan - Patient's Chronic Conditions and Co-Morbidity Symptoms are Monitored and Maintained or Improved: Monitor and assess patient's chronic conditions and comorbid symptoms for stability, deterioration, or improvement  1/24/2023 1823 by Piyush Nation RN  Outcome: Progressing  Flowsheets  Taken 1/24/2023 1615  Care Plan - Patient's Chronic Conditions and Co-Morbidity Symptoms are Monitored and Maintained or Improved:   Monitor and assess patient's chronic conditions and comorbid symptoms for stability, deterioration, or improvement   Collaborate with multidisciplinary team to address chronic and comorbid conditions and prevent exacerbation or deterioration  Taken 1/24/2023 0800  Care Plan - Patient's Chronic Conditions and Co-Morbidity Symptoms are Monitored and Maintained or Improved:   Monitor and assess patient's chronic conditions and comorbid symptoms for stability, deterioration, or improvement   Collaborate with multidisciplinary team to address chronic and comorbid conditions and prevent exacerbation or deterioration     Problem: Pain  Goal: Verbalizes/displays adequate comfort level or baseline comfort level  1/25/2023 0740 by Verner Repress, RN  Outcome: Progressing  1/24/2023 1823 by Nick Martines RN  Outcome: Progressing

## 2023-01-25 NOTE — PROGRESS NOTES
Nephrology Progress Note      SUBJECTIVE      Pt was seen and examined. No acute issues overnite. Stable hemodynamics . Patient is mildly tachycardic with . Most recent blood pressure is 116/69 mmHg. He is saturating 94% on 3 L O2 through nasal cannula. Patient had dialysis yesterday with 2.1 L removal.  He tolerated the treatment well without any issues. Length of treatment is 3.5 hours. He is scheduled for cardiac catheterization today per cardio. Labs reviewed today. No BMP from today, will follow-up the order just placed. CBC shows leukocytosis with WBC of 11.5, hemoglobin 10.5 and platelet count 711     Reason for Consult:     Asked by Dr Catrina uGerrero, DO to see for ESRD on HD. History Obtained From:     patient, electronic medical record     HD Access:     previous  Left AV graft     HD Unit:     [facility]     Nephrologist:     Dr. Riggs Her     Dry Weight:     74.2 kgs     History of Present Illness:     Tello Boy; 57 y.o. male with past medical history as mentioned below presented to the hospital with the chief complaint of chest pain and shortness of breath. Patient mentioned that he has been having some left-sided chest pain, seems reproducible. He did mention that the pain was radiating to left shoulder and left neck worsened with exertion. He does have past medical history of coronary artery disease with stent placement. Concern for NSTEMI and patient was admitted under medicine service. Cardiology was consulted and likely will be getting a cardiac catheterization in AM.  Nephrology is consulted due to ESRD on HD. Patient was seen and examined on dialysis, tolerating the procedure well.     OBJECTIVE      CURRENT TEMPERATURE:  Temp: 97.4 °F (36.3 °C)  MAXIMUM TEMPERATURE OVER 24HRS:  Temp (24hrs), Av °F (36.7 °C), Min:97.4 °F (36.3 °C), Max:98.1 °F (36.7 °C)    CURRENT RESPIRATORY RATE:  Resp: 20  CURRENT PULSE:  Heart Rate: (!) 116  CURRENT BLOOD PRESSURE:  BP: 116/69  24HR BLOOD PRESSURE RANGE:  Systolic (00UYA), AYA:669 , Min:103 , FOT:323   ; Diastolic (11EPC), PYR:57, Min:69, Max:94    24HR INTAKE/OUTPUT:    Intake/Output Summary (Last 24 hours) at 1/25/2023 0739  Last data filed at 1/24/2023 1822  Gross per 24 hour   Intake 540 ml   Output 2400 ml   Net -1860 ml     WEIGHT :Patient Vitals for the past 96 hrs (Last 3 readings):   Weight   01/25/23 0600 241 lb 6.4 oz (109.5 kg)   01/24/23 1312 242 lb 11.6 oz (110.1 kg)   01/24/23 0930 247 lb 9.2 oz (112.3 kg)     PHYSICAL EXAM      General:          AAO x 3, speaking in full sentences, no accessory muscle use. HEENT:           Atraumatic, normocephalic, no throat congestion, moist mucosa. Eyes:               Pupils equal, round and reactive to light, EOMI. Neck:               Supple  Chest:              Bilateral vesicular breath sounds, no rales or wheezes. Positive some chest tenderness on palpitation. Cardiac:           S1 S2 RR, no murmurs, gallops or rubs. Abdomen:        Soft, non-tender, no masses or organomegaly, BS audible. :                  No suprapubic or flank tenderness. Neuro:             AAO x 3, No FND. SKIN:               No rashes, good skin turgor. Extremities:     No edema.     CURRENT MEDICATIONS      aspirin EC tablet 81 mg, Daily  carvedilol (COREG) tablet 6.25 mg, BID   therapeutic multivitamin-minerals 1 tablet, Every Other Day  sevelamer (RENVELA) packet 2.4 g, TID WC  glucose chewable tablet 16 g, PRN  dextrose bolus 10% 125 mL, PRN   Or  dextrose bolus 10% 250 mL, PRN  glucagon (rDNA) injection 1 mg, PRN  dextrose 10 % infusion, Continuous PRN  sodium chloride flush 0.9 % injection 5-40 mL, 2 times per day  sodium chloride flush 0.9 % injection 10 mL, PRN  0.9 % sodium chloride infusion, PRN  ondansetron (ZOFRAN-ODT) disintegrating tablet 4 mg, Q8H PRN   Or  ondansetron (ZOFRAN) injection 4 mg, Q6H PRN  acetaminophen (TYLENOL) tablet 650 mg, Q6H PRN Or  acetaminophen (TYLENOL) suppository 650 mg, Q6H PRN  albuterol (PROVENTIL) nebulizer solution 2.5 mg, Q2H PRN  ipratropium-albuterol (DUONEB) nebulizer solution 1 ampule, Q4H WA  cefTRIAXone (ROCEPHIN) 1,000 mg in sterile water 10 mL IV syringe, Q24H   And  azithromycin (ZITHROMAX) tablet 500 mg, Q24H  polyethylene glycol (GLYCOLAX) packet 17 g, Daily PRN  insulin lispro (HUMALOG) injection vial 0-4 Units, TID WC  insulin lispro (HUMALOG) injection vial 0-4 Units, Nightly  atorvastatin (LIPITOR) tablet 80 mg, Nightly  traMADol (ULTRAM) tablet 50 mg, Q6H PRN  fentaNYL (SUBLIMAZE) injection 25 mcg, Q1H PRN  heparin (porcine) injection 4,000 Units, PRN  heparin (porcine) injection 2,000 Units, PRN  heparin 25,000 units in dextrose 5 % 250 mL infusion (rate based), Continuous          LABS      CBC:   Recent Labs     01/23/23 2237 01/24/23  0519 01/25/23  0611   WBC 12.3* 10.9 11.5*   RBC 3.40* 3.48* 3.41*   HGB 10.5* 10.7* 10.5*   HCT 35.0* 38.9* 35.0*   .9 111.8* 102.6   MCH 30.9 30.7 30.8   MCHC 30.0 27.5* 30.0   RDW 16.4* 16.7* 16.6*    147 179   MPV 10.1 10.0 9.3      BMP:   Recent Labs     01/23/23 2008 01/24/23  0519    137   K 4.7 5.3   CL 88* 91*   CO2 29 17*   BUN 74* 80*   CREATININE 10.06* 10.41*   GLUCOSE 196* 160*   CALCIUM 9.1 9.0      BNP:  Lab Results   Component Value Date/Time     09/08/2018 08:04 PM     PHOSPHORUS:  No results for input(s): PHOS in the last 72 hours. MAGNESIUM: No results for input(s): MG in the last 72 hours. ALBUMIN: No results for input(s): LABALBU in the last 72 hours.   IRON:    Lab Results   Component Value Date/Time    IRON 13 11/25/2018 03:41 AM     IRON SATURATION:    Lab Results   Component Value Date/Time    LABIRON 8 11/25/2018 03:41 AM     TIBC:    Lab Results   Component Value Date/Time    TIBC 156 11/25/2018 03:41 AM     FERRITIN:    Lab Results   Component Value Date/Time    FERRITIN 363 11/25/2018 03:41 AM     TATIANA: No results found for: TATIANA    SPEP:   Lab Results   Component Value Date/Time    PROT 8.0 11/12/2021 07:27 AM     UPEP: No results found for: TPU   HEPBSAG:  Lab Results   Component Value Date/Time    HEPBSAG NONREACTIVE 11/13/2021 12:04 PM     HEPCAB:  Lab Results   Component Value Date/Time    HEPCAB NONREACTIVE 11/13/2021 12:04 PM     C3: No results found for: C3  C4: No results found for: C4  MPO ANCA:   Lab Results   Component Value Date/Time    MPO 25 09/14/2016 06:15 PM    .  PR3 ANCA:    Lab Results   Component Value Date/Time    PR3 51 09/14/2016 06:15 PM     URINE SODIUM:  No results found for: JEFFERSON   URINE CREATININE:    Lab Results   Component Value Date/Time    LABCREA 73.8 11/25/2018 01:36 AM     URINE EOSINOPHILS: No results found for: UREO  URINE PROTEIN:  No results found for: TPU  URINALYSIS:  U/A:   Lab Results   Component Value Date/Time    NITRU NEGATIVE 04/01/2022 11:27 AM    COLORU Yellow 04/01/2022 11:27 AM    PHUR 6.0 04/01/2022 11:27 AM    WBCUA 5 TO 10 04/01/2022 11:27 AM    RBCUA 20 TO 50 04/01/2022 11:27 AM    MUCUS NOT REPORTED 12/31/2021 04:23 PM    TRICHOMONAS NOT REPORTED 12/31/2021 04:23 PM    YEAST FEW 12/31/2021 04:23 PM    BACTERIA NOT REPORTED 12/31/2021 04:23 PM    SPECGRAV 1.010 04/01/2022 11:27 AM    LEUKOCYTESUR NEGATIVE 04/01/2022 11:27 AM    UROBILINOGEN Normal 04/01/2022 11:27 AM    BILIRUBINUR NEGATIVE 04/01/2022 11:27 AM    GLUCOSEU NEGATIVE 04/01/2022 11:27 AM    KETUA NEGATIVE 04/01/2022 11:27 AM    AMORPHOUS NOT REPORTED 12/31/2021 04:23 PM     ANTIGBM:  Lab Results   Component Value Date/Time    GBMABIGG 13 09/14/2016 06:15 PM         RADIOLOGY      XR SHOULDER RIGHT (MIN 2 VIEWS)    Result Date: 1/24/2023  EXAMINATION: 3 XRAY VIEWS OF THE RIGHT SHOULDER 1/24/2023 3:56 pm COMPARISON: None. HISTORY: ORDERING SYSTEM PROVIDED HISTORY: pain, limited rom TECHNOLOGIST PROVIDED HISTORY: pain, limited rom FINDINGS: Glenohumeral joint is normally aligned.   No evidence of acute fracture or dislocation. No abnormal periarticular calcifications. AC joint and glenohumeral joint degenerative changes. Right basilar opacity     No acute abnormality. AC joint and glenohumeral joint degenerative changes Right lung base opacity could represent atelectasis or infection     XR SHOULDER LEFT (MIN 2 VIEWS)    Result Date: 1/24/2023  EXAMINATION: THREE XRAY VIEWS OF THE LEFT SHOULDER 1/24/2023 3:56 pm COMPARISON: None. HISTORY: ORDERING SYSTEM PROVIDED HISTORY: pain, limited rom TECHNOLOGIST PROVIDED HISTORY: pain, limited rom FINDINGS: No acute fracture, dislocation or suspicious osseous lesions. There is moderate osteoarthritis of the glenohumeral joint and mild osteoarthritis of the acromioclavicular joint. There is marked narrowing of the subacromial space which can be seen in chronic rotator cuff tearing. A vascular stent overlies the medial left upper extremity. No appreciable soft tissue abnormality. 1.  No evidence of acute fracture involving the left shoulder. 2.  There is significant narrowing of the subacromial space, which can be seen in chronic rotator cuff disease. 3.  Moderate left shoulder osteoarthritis. XR CHEST PORTABLE    Result Date: 1/23/2023  EXAMINATION: ONE XRAY VIEW OF THE CHEST 1/23/2023 5:18 pm COMPARISON: 06/15/2022 HISTORY: ORDERING SYSTEM PROVIDED HISTORY: chest pain TECHNOLOGIST PROVIDED HISTORY: chest pain Reason for Exam: port Upright FINDINGS: Suboptimal inspiration. Cardiac size is enlarged. Bibasal opacities. The bibasal pulmonary vascularity is hazy and indistinct. No pneumothorax. No pleural effusions identified . Bonne Major Cardiomegaly with probable bibasal pulmonary vascular congestion. Underlying pneumonia in the right base cannot be excluded. ASSESSMENT      ESRD on Hemodialysis. His regular HD days are TTS at [facility] hemodialysis facility using left AV graft under Dr. Rashid Valente. His dry weight is 74.2 daily. Chest pain.   Metabolic acidosis  Coronary artery disease history of stent in the past.  Anemia of chronic disease  Secondary hyperparathyroidism  Hypertension      PLAN      Patient was seen and examined at bedside. Strict Input and Output, Daily weigh and document in the chart. Low Potassium, Low phosphorus and low salt diet. Fluids to be restricted to 1500ml/day. IV Aranesp/Epogen for anemia of chronic disease with HD weekly. IV Zemplar per protocol for secondary hyperparathyroidism with HD thrice a week. Chest pain management as per cardiology. Will likely be getting a cardiac catheterization in AM.  Will follow. Please do not hesitate to call with questions.     This note is created with the assistance of a speech-recognition program. While intending to generate a document that actually reflects the content of the visit, no guarantees can be provided that every mistake has been identified and corrected by editing    Osmel West MD  Internal medicine resident, PGY 2  Nephrology service  [address] [place] [address], [facility]  1/25/2023

## 2023-01-25 NOTE — PROGRESS NOTES
Physical Therapy        Physical Therapy Cancel Note      DATE: 2023    NAME: Mini Duff  MRN: [id number]   : 1960      Patient not seen this date for Physical Therapy due to:    Surgery/Procedure: cardiac cath      Electronically signed by Pricila Perez PT on  at 11:18 AM

## 2023-01-25 NOTE — PLAN OF CARE
Problem: Discharge Planning  Goal: Discharge to home or other facility with appropriate resources  1/25/2023 1850 by Les Ford RN  Outcome: Progressing  Flowsheets (Taken 1/25/2023 1210 by Jamila More RN)  Discharge to home or other facility with appropriate resources: Identify barriers to discharge with patient and caregiver     Problem: Chronic Conditions and Co-morbidities  Goal: Patient's chronic conditions and co-morbidity symptoms are monitored and maintained or improved  1/25/2023 1850 by Les Ford RN  Outcome: Progressing  Flowsheets (Taken 1/25/2023 1210 by Jamila More RN)  Care Plan - Patient's Chronic Conditions and Co-Morbidity Symptoms are Monitored and Maintained or Improved: Monitor and assess patient's chronic conditions and comorbid symptoms for stability, deterioration, or improvement     Problem: Pain  Goal: Verbalizes/displays adequate comfort level or baseline comfort level  1/25/2023 1850 by Les Ford RN  Outcome: Progressing

## 2023-01-26 ENCOUNTER — APPOINTMENT (OUTPATIENT)
Dept: DIALYSIS | Age: 63
End: 2023-01-26
Payer: MEDICARE

## 2023-01-26 VITALS
TEMPERATURE: 98.1 F | BODY MASS INDEX: 33.88 KG/M2 | RESPIRATION RATE: 24 BRPM | HEART RATE: 114 BPM | DIASTOLIC BLOOD PRESSURE: 75 MMHG | SYSTOLIC BLOOD PRESSURE: 113 MMHG | WEIGHT: 236.11 LBS | OXYGEN SATURATION: 100 %

## 2023-01-26 LAB
ANION GAP SERPL CALCULATED.3IONS-SCNC: 18 MMOL/L (ref 9–17)
BUN BLDV-MCNC: 70 MG/DL (ref 8–23)
CALCIUM SERPL-MCNC: 9 MG/DL (ref 8.6–10.4)
CHLORIDE BLD-SCNC: 90 MMOL/L (ref 98–107)
CO2: 24 MMOL/L (ref 20–31)
CREAT SERPL-MCNC: 8.92 MG/DL (ref 0.7–1.2)
GFR SERPL CREATININE-BSD FRML MDRD: 6 ML/MIN/1.73M2
GLUCOSE BLD-MCNC: 131 MG/DL (ref 70–99)
GLUCOSE BLD-MCNC: 133 MG/DL (ref 75–110)
GLUCOSE BLD-MCNC: 141 MG/DL (ref 75–110)
POTASSIUM SERPL-SCNC: 5 MMOL/L (ref 3.7–5.3)
SODIUM BLD-SCNC: 132 MMOL/L (ref 135–144)

## 2023-01-26 PROCEDURE — 36415 COLL VENOUS BLD VENIPUNCTURE: CPT

## 2023-01-26 PROCEDURE — 97166 OT EVAL MOD COMPLEX 45 MIN: CPT

## 2023-01-26 PROCEDURE — 82947 ASSAY GLUCOSE BLOOD QUANT: CPT

## 2023-01-26 PROCEDURE — 6360000002 HC RX W HCPCS: Performed by: STUDENT IN AN ORGANIZED HEALTH CARE EDUCATION/TRAINING PROGRAM

## 2023-01-26 PROCEDURE — 2580000003 HC RX 258: Performed by: STUDENT IN AN ORGANIZED HEALTH CARE EDUCATION/TRAINING PROGRAM

## 2023-01-26 PROCEDURE — 2700000000 HC OXYGEN THERAPY PER DAY

## 2023-01-26 PROCEDURE — 90935 HEMODIALYSIS ONE EVALUATION: CPT

## 2023-01-26 PROCEDURE — 97535 SELF CARE MNGMENT TRAINING: CPT

## 2023-01-26 PROCEDURE — 97530 THERAPEUTIC ACTIVITIES: CPT

## 2023-01-26 PROCEDURE — 99239 HOSP IP/OBS DSCHRG MGMT >30: CPT | Performed by: HOSPITALIST

## 2023-01-26 PROCEDURE — 94640 AIRWAY INHALATION TREATMENT: CPT

## 2023-01-26 PROCEDURE — 6370000000 HC RX 637 (ALT 250 FOR IP): Performed by: STUDENT IN AN ORGANIZED HEALTH CARE EDUCATION/TRAINING PROGRAM

## 2023-01-26 PROCEDURE — 97161 PT EVAL LOW COMPLEX 20 MIN: CPT

## 2023-01-26 PROCEDURE — 94761 N-INVAS EAR/PLS OXIMETRY MLT: CPT

## 2023-01-26 PROCEDURE — 90935 HEMODIALYSIS ONE EVALUATION: CPT | Performed by: INTERNAL MEDICINE

## 2023-01-26 PROCEDURE — 80048 BASIC METABOLIC PNL TOTAL CA: CPT

## 2023-01-26 RX ORDER — ALBUTEROL SULFATE 90 UG/1
2 AEROSOL, METERED RESPIRATORY (INHALATION) 4 TIMES DAILY PRN
Qty: 54 G | Refills: 1 | Status: SHIPPED | OUTPATIENT
Start: 2023-01-26

## 2023-01-26 RX ADMIN — Medication 1 TABLET: at 14:13

## 2023-01-26 RX ADMIN — SODIUM CHLORIDE, PRESERVATIVE FREE 10 ML: 5 INJECTION INTRAVENOUS at 08:34

## 2023-01-26 RX ADMIN — IPRATROPIUM BROMIDE AND ALBUTEROL SULFATE 1 AMPULE: 2.5; .5 SOLUTION RESPIRATORY (INHALATION) at 07:33

## 2023-01-26 RX ADMIN — SODIUM CHLORIDE, PRESERVATIVE FREE 10 ML: 5 INJECTION INTRAVENOUS at 08:33

## 2023-01-26 RX ADMIN — TRAMADOL HYDROCHLORIDE 50 MG: 50 TABLET, COATED ORAL at 14:46

## 2023-01-26 RX ADMIN — TRAMADOL HYDROCHLORIDE 50 MG: 50 TABLET, COATED ORAL at 05:45

## 2023-01-26 RX ADMIN — Medication 81 MG: at 14:13

## 2023-01-26 RX ADMIN — FENTANYL CITRATE 25 MCG: 50 INJECTION INTRAMUSCULAR; INTRAVENOUS at 06:59

## 2023-01-26 ASSESSMENT — PAIN SCALES - GENERAL
PAINLEVEL_OUTOF10: 6
PAINLEVEL_OUTOF10: 8

## 2023-01-26 ASSESSMENT — PAIN DESCRIPTION - LOCATION: LOCATION: CHEST;NECK;ARM

## 2023-01-26 ASSESSMENT — PAIN DESCRIPTION - PAIN TYPE: TYPE: ACUTE PAIN

## 2023-01-26 ASSESSMENT — PAIN DESCRIPTION - DESCRIPTORS: DESCRIPTORS: HEAVINESS

## 2023-01-26 ASSESSMENT — PAIN DESCRIPTION - ORIENTATION: ORIENTATION: LEFT

## 2023-01-26 NOTE — CARE COORDINATION
SW consulted for HD transportation. Met with patient who states he has been having family help with transportation as his address is not within the area for [facility]. SW reviewed information regarding other resources such as [employer] senior program.   [facility] who states she assisted patient in completing [facility] has new program that has expanded service area. Patient is over income for any other transportation resources per Orquidea Andrade. Recommended patient contact [employer] to attempt to schedule. SW left information for patient at bedside.

## 2023-01-26 NOTE — DISCHARGE SUMMARY
[email or web address]@    [facility]     Discharge Summary     Patient ID: Emiliano Shepherd  :  1960   MRN: [id number]     ACCOUNT:  [de-identified]   Patient's PCP: Jennifer Terry Date: 2023   Discharge Date: 2023     Length of Stay: 3  Code Status:  Full Code  Admitting Physician: No admitting provider for patient encounter. Discharge Physician: Umberto Field,      Active Discharge Diagnoses:     Hospital Problem Lists:  Principal Problem:    Chest pain  Active Problems:    Chronic respiratory failure with hypoxia (HCC)    Pain of left upper extremity    Metabolic acidosis    Type 2 diabetes mellitus with chronic kidney disease on chronic dialysis, without long-term current use of insulin (HCC)    Chest pain at rest    Anemia of chronic disease    Hypertension, essential    BATSHEVA (obstructive sleep apnea)    [facility]ox[facility] ([facility]    ESRD on dialysis [facility])  Resolved Problems:    * No resolved hospital problems. *      Admission Condition:  serious     Discharged Condition: stable    Hospital Stay:     Hospital Course:  Emiliano Shepherd is a 57 y.o. male who was admitted for the management of   Chest pain , presented to ER with Chest Pain and Shortness of Breath    Patient was admitted to the hospital with suspected NSTEMI. Patient was started on heparin drip and had cardiac cath on following day. Patient had negative cardiac cath and patient was deemed stable for discharge. Patient with dialysis prior to discharge. No complications noted during hospitalization. Although medically stable, patient was feeling reluctant to go back to home because he was feeling uncomfortable. Patient claimed that he is still having chest pain and shortness of breath. He was not sure if he would be safe at home. Home care consult will stent placed and patient will be getting home care upon discharge.   Patient is stable for discharge to home.       Significant therapeutic interventions: Cardiac catheterization    Significant Diagnostic Studies:   Labs / Micro:  CBC:   Lab Results   Component Value Date/Time    WBC 11.5 01/25/2023 06:11 AM    RBC 3.41 01/25/2023 06:11 AM    RBC 3.62 09/08/2018 08:04 PM    HGB 10.5 01/25/2023 06:11 AM    HCT 35.0 01/25/2023 06:11 AM    .6 01/25/2023 06:11 AM    MCH 30.8 01/25/2023 06:11 AM    MCHC 30.0 01/25/2023 06:11 AM    RDW 16.6 01/25/2023 06:11 AM     01/25/2023 06:11 AM     BMP:    Lab Results   Component Value Date/Time    GLUCOSE 131 01/26/2023 08:02 AM    GLUCOSE 147 09/08/2018 08:04 PM     01/26/2023 08:02 AM    K 5.0 01/26/2023 08:02 AM    CL 90 01/26/2023 08:02 AM    CO2 24 01/26/2023 08:02 AM    ANIONGAP 18 01/26/2023 08:02 AM    BUN 70 01/26/2023 08:02 AM    CREATININE 8.92 01/26/2023 08:02 AM    BUNCRER 7 02/02/2022 12:42 PM    CALCIUM 9.0 01/26/2023 08:02 AM    LABGLOM 6 01/26/2023 08:02 AM    GFRAA 7 06/16/2022 08:13 AM    GFR      06/16/2022 08:13 AM     HFP:    Lab Results   Component Value Date/Time    PROT 8.0 11/12/2021 07:27 AM     CMP:    Lab Results   Component Value Date/Time    GLUCOSE 131 01/26/2023 08:02 AM    GLUCOSE 147 09/08/2018 08:04 PM     01/26/2023 08:02 AM    K 5.0 01/26/2023 08:02 AM    CL 90 01/26/2023 08:02 AM    CO2 24 01/26/2023 08:02 AM    BUN 70 01/26/2023 08:02 AM    CREATININE 8.92 01/26/2023 08:02 AM    ANIONGAP 18 01/26/2023 08:02 AM    ALKPHOS 181 11/12/2021 07:27 AM    ALT 15 11/12/2021 07:27 AM    AST 14 11/12/2021 07:27 AM    BILITOT 1.55 11/12/2021 07:27 AM    LABALBU 4.1 11/12/2021 07:27 AM    ALBUMIN 1.1 11/12/2021 07:27 AM    LABGLOM 6 01/26/2023 08:02 AM    GFRAA 7 06/16/2022 08:13 AM    GFR      06/16/2022 08:13 AM    PROT 8.0 11/12/2021 07:27 AM    CALCIUM 9.0 01/26/2023 08:02 AM     PT/INR:    Lab Results   Component Value Date/Time    PROTIME 14.2 01/03/2022 09:21 AM    INR 1.4 01/03/2022 09:21 AM     PTT:   Lab Results Component Value Date/Time    APTT 50.3 01/25/2023 06:11 AM     FLP:    Lab Results   Component Value Date/Time    CHOL 127 11/12/2021 07:27 AM    TRIG 107 11/12/2021 07:27 AM    HDL 45 11/12/2021 07:27 AM     U/A:    Lab Results   Component Value Date/Time    COLORU Yellow 04/01/2022 11:27 AM    TURBIDITY Clear 04/01/2022 11:27 AM    SPECGRAV 1.010 04/01/2022 11:27 AM    HGBUR 2+ 04/01/2022 11:27 AM    PHUR 6.0 04/01/2022 11:27 AM    PROTEINU NEGATIVE 04/01/2022 11:27 AM    GLUCOSEU NEGATIVE 04/01/2022 11:27 AM    KETUA NEGATIVE 04/01/2022 11:27 AM    BILIRUBINUR NEGATIVE 04/01/2022 11:27 AM    UROBILINOGEN Normal 04/01/2022 11:27 AM    NITRU NEGATIVE 04/01/2022 11:27 AM    LEUKOCYTESUR NEGATIVE 04/01/2022 11:27 AM     TSH:    Lab Results   Component Value Date/Time    TSH 1.90 12/24/2021 05:58 AM        Radiology:  XR SHOULDER RIGHT (MIN 2 VIEWS)    Result Date: 1/24/2023  No acute abnormality. AC joint and glenohumeral joint degenerative changes Right lung base opacity could represent atelectasis or infection     XR SHOULDER LEFT (MIN 2 VIEWS)    Result Date: 1/24/2023  1. No evidence of acute fracture involving the left shoulder. 2.  There is significant narrowing of the subacromial space, which can be seen in chronic rotator cuff disease. 3.  Moderate left shoulder osteoarthritis. XR CHEST PORTABLE    Result Date: 1/23/2023  Cardiomegaly with probable bibasal pulmonary vascular congestion. Underlying pneumonia in the right base cannot be excluded. Consultations:    Consults:     Final Specialist Recommendations/Findings:   IP CONSULT TO HOSPITALIST  IP CONSULT TO CARDIOLOGY  IP CONSULT TO NEPHROLOGY      The patient was seen and examined on day of discharge and this discharge summary is in conjunction with any daily progress note from day of discharge.     Discharge plan:     Disposition: Home    Physician Follow Up:     Kailyn Malagon, ROB - CNP  [address] [address]  [phone]    Follow up on 1/30/2023  at 3:30pm for hospital f/u with cardiology       Requiring Further Evaluation/Follow Up POST HOSPITALIZATION/Incidental Findings: Follow-up with primary care physician and nephrology as outpatient. Diet: cardiac diet and renal diet    Activity: As tolerated    Instructions to Patient: Stay compliant with the medications. Follow-up with primary care physician and nephrology as outpatient. Discharge Medications:      Medication List        START taking these medications      albuterol sulfate  (90 Base) MCG/ACT inhaler  Commonly known as: Ventolin HFA  Inhale 2 puffs into the lungs 4 times daily as needed for Wheezing     apixaban 5 MG Tabs tablet  Commonly known as: Eliquis  Take 1 tablet by mouth 2 times daily     aspirin EC 81 MG EC tablet  Take 1 tablet by mouth daily            CONTINUE taking these medications      atorvastatin 40 MG tablet  Commonly known as: LIPITOR  Take 1 tablet by mouth nightly     carvedilol 6.25 MG tablet  Commonly known as: COREG  Take 1 tablet by mouth 2 times daily (with meals)     glimepiride 1 MG tablet  Commonly known as: AMARYL  Take 1 tablet by mouth every morning (before breakfast) Do not take if blood sugars are less than 110     lidocaine-prilocaine 2.5-2.5 % cream  Commonly known as: EMLA     RenaPlex-D Tabs     sevelamer 2.4 g Pack packet  Commonly known as: RENVELA     traMADol 50 MG tablet  Commonly known as: ULTRAM               Where to Get Your Medications        These medications were sent to [facility] [address] [facility], [address] [place] [address]  [address] [facility], [address]  [address]      Phone: [phone]   albuterol sulfate  (90 Base) MCG/ACT inhaler  apixaban 5 MG Tabs tablet         No discharge procedures on file.     Time Spent on discharge is  38 mins in patient examination, evaluation, counseling as well as medication reconciliation, prescriptions for required medications, discharge plan and follow up. Electronically signed by   Miguel A Hannon DO  1/26/2023  1:15 PM      Thank you Dr. Maricel Infante for the opportunity to be involved in this patient's care.

## 2023-01-26 NOTE — PROGRESS NOTES
Dialysis Time Out  To be done by RN and tech or 2 RNs  Staff Names Adeola GREWAL RN and Lazaro Gautam RN    [x]  Identity of the patient using 2 patient identifiers  [x]  Consent for treatment  [x]  Equipment-proper machine and dialyzer  [x]  B-Hep B status  [x]  Orders- to include bath, blood flow, dialyzer, time and fluid removal  [x]  Access-Correct site and in working order  [x]  Time for patient to ask questions.

## 2023-01-26 NOTE — PROGRESS NOTES
Dialysis Post Treatment Note  Vitals:    01/26/23 1334   BP: (!) 137/93   Pulse: (!) 115   Resp: 18   Temp: 97.3 °F (36.3 °C)   SpO2: 99%     Pre-Weight = 107.9kg  Post-weight = Weight: 236 lb 1.8 oz (107.1 kg)  Total Liters Processed = Blood Volume Processed (Liters): 84.7 l/min  Rinseback Volume (mL) = Rinseback Volume (ml): 300 ml  Net Removal (mL) =  1000mL  Patient's dry weight= 1-2L off as tolerated  Type of access used= LAVF  Length of treatment=210    Pt tolerated treatment fair. HR increased with some increased SOB. Pt had a very short run od V-tach. Fluid removal decreased and oxygen increased with good effect. Tachy throughout. No other concerns. Both sites clotted  <10 minutes. Taken back to room via stretcher.

## 2023-01-26 NOTE — PROGRESS NOTES
Physical Therapy  Facility/Department: [address] NEURO ICU  Physical Therapy Initial Assessment    Name: Maame Shrestha  : 1960  MRN: [id number]  Date of Service: 2023  Chief Complaint   Patient presents with    Chest Pain    Shortness of Breath       Discharge Recommendations:    Further therapy recommended at discharge. PT Equipment Recommendations  Equipment Needed: No      Patient Diagnosis(es): The encounter diagnosis was Pneumonia of right lower lobe due to infectious organism. Past Medical History:  has a past medical history of Acute congestive heart failure (HCC), Acute on chronic diastolic congestive heart failure ([address] Anemia, Anemia of chronic renal failure, Atrial fibrillation ([facility]), AVF (arteriovenous fistula) ([address] Bowel obstruction ([facility]), CAD (coronary artery disease), Chest pain at rest, CHF (congestive heart failure) ([address] CHF (congestive heart failure), NYHA class I, acute on chronic, combined ([address] Chronic kidney disease, CKD (chronic kidney disease) stage 4, GFR 15-29 ml/min ([facility]), Coronary artery disease involving native coronary artery of native heart without angina pectoris, Diabetes mellitus ([address] Dialysis patient ([address] ESRD (end stage renal disease) ([address] Essential hypertension, Groin swelling, Hemodialysis patient ([address] Hydrocele, Hyperlipidemia, Hypertension, Inguinal hernia, MI, old, NSTEMI (non-ST elevated myocardial infarction) ([address] On home O2, BATSHEVA (obstructive sleep apnea), Patient in clinical research study, Pneumonia, Poor historian, Prostatism, Respiratory distress, Rising PSA level, S/P arteriovenous (AV) graft placement, S/P PTCA - TIM distal LAD - Dr. Benji Zhao 19, Sleep apnea, Small bowel obstruction (HCC), SOB (shortness of breath), and Type 2 diabetes mellitus with chronic kidney disease on chronic dialysis ([address] Past Surgical History:  has a past surgical history that includes Foot surgery (Left, ); Colonoscopy; Cystoscopy (2017);  Prostate biopsy (N/A, 11/17/2017); Abdomen surgery (2013); Prostate Biopsy (02/16/2018); pr unlisted procedure male genital system (N/A, 02/16/2018); Tonsillectomy (1968); Vasectomy (1983); Middle ear surgery (1966); Nose surgery (1968); Mouth surgery (2007); AV fistula creation (Left, 02/20/2019); Dialysis fistula creation (Left, 02/20/2019); other surgical history (Left, 03/06/2019); AV fistula repair (07/17/2019); vascular surgery (09/20/2019); Coronary angioplasty with stent (03/31/2019); Dialysis fistula creation (Left, 10/23/2019); Cardiac catheterization (02/09/2021); Upper gastrointestinal endoscopy (N/A, 09/27/2021); IR THROMBECTOMY AVF PERC (01/03/2022); Cystoscopy (N/A, 04/01/2022); Tunneled venous catheter placement (Right, 06/15/2022); vascular surgery (Left, 06/15/2022); Dialysis fistula creation (Left, 06/15/2022); vascular surgery (Left, 07/18/2022); and Fistulagram (Left, 7/18/2022). Assessment   Body Structures, Functions, Activity Limitations Requiring Skilled Therapeutic Intervention: Decreased functional mobility ; Decreased ADL status; Decreased strength;Decreased endurance;Decreased balance  Assessment: Pt amb 10' Cele w/ RW. Cele for bed mobility. Pt would benefit from continued acute care PT to address deficits. Therapy Prognosis: Fair  Decision Making: Low Complexity  Requires PT Follow-Up: Yes  Activity Tolerance  Activity Tolerance: Other (comment); Patient limited by fatigue  Activity Tolerance Comments: Activity tolerance limited temporally by need for transport to dialysis, potential fatigue limiting compenent by pt requesting O2 to be raised to 4L upon assuming supine on stretcher.      Plan   Physcial Therapy Plan  General Plan:  (5-6x/wk)  Current Treatment Recommendations: Strengthening, Balance training, Endurance training, Transfer training, Gait training, Stair training, Neuromuscular re-education, Home exercise program, Safety education & training, Patient/Caregiver education & training, Equipment evaluation, education, & procurement, Therapeutic activities  Safety Devices  Type of Devices: All fall risk precautions in place, Gait belt, Patient at risk for falls, Nurse notified (pt on transport stretcher upon end of evaluation)  Restraints  Restraints Initially in Place: No     Restrictions  Restrictions/Precautions  Restrictions/Precautions: Fall Risk, Up as Tolerated  Required Braces or Orthoses?: No  Position Activity Restriction  Other position/activity restrictions: 3 L O2 at home, s/p cardiac cath 1/25/23     Subjective   General  Chart Reviewed: Yes  Patient assessed for rehabilitation services?: Yes  Response To Previous Treatment: Not applicable  Family / Caregiver Present: Yes  Follows Commands: Within Functional Limits  General Comment  Comments: RN and pt agreeable to PT. pt alert in bed upon arrival. Co-Eval w/ OT   Subjective  Subjective: Pt denies pain, chronic n/t in hand and feet          Social/Functional History  Social/Functional History  Lives With: Alone  Type of Home: House  Home Layout: Two level, Able to Live on Main level with bedroom/bathroom  Home Access: Stairs to enter without rails  Entrance Stairs - Number of Steps: 1  Bathroom Shower/Tub: Walk-in shower  Bathroom Toilet: Standard  Bathroom Equipment: Shower chair  Home Equipment: Rollator, Wheelchair-electric, Oxygen  Has the patient had two or more falls in the past year or any fall with injury in the past year?: Yes  ADL Assistance: Independent  Homemaking Assistance: Independent  Homemaking Responsibilities: Yes  Meal Prep Responsibility: Primary  Laundry Responsibility: Primary  Cleaning Responsibility: Primary  Ambulation Assistance: Independent  Transfer Assistance: Independent  Active : No  Patient's  Info: family drives  Occupation: Retired  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  pt reported just graduated from OP therapy on Friday  Vision/Hearing  Vision  Vision: Within Functional Limits  Hearing  Hearing: Within functional limits    Cognition   Orientation  Overall Orientation Status: Within Functional Limits  Cognition  Overall Cognitive Status: WFL     Objective   AROM RLE (degrees)  RLE AROM: WFL  AROM LLE (degrees)  LLE AROM : WFL  AROM RUE (degrees)  RUE General AROM: See OT  AROM LUE (degrees)  LUE General AROM: See OT  Strength RLE  Strength RLE: WFL  Comment: 5/5 grossly, 4/5 hip flexion  Strength LLE  Strength LLE: WFL  Comment: 5/5 grossly, 4/5 hip flexion  Strength RUE  Comment: See OT  Strength LUE  Comment: See OT        Bed mobility  Sit to Supine: Minimal assistance (Peterson at LLE)  Bed Mobility Comments: Pt sitting EOB upon arrival putting vasoline on feet. Pt put onto transport stretcher at end of session for dialysis. Transfers  Sit to Stand: Minimal Assistance;2 Person Assistance  Stand to Sit: Contact guard assistance  Comment: Transfers performed w/ RW  Ambulation  Surface: Level tile  Device: Rolling Walker  Assistance: Minimal assistance  Quality of Gait: Pt ambulates with decreased step length CHANDLER, decreased gait speed. No LOB in ambulation. Pt required assist with amb as well as oxygen tank navigation. Distance: 10'  Comments: Pt ambulated w/ RW to transport stretcher  More Ambulation?: No  Stairs/Curb  Stairs?: No     Balance  Posture: Good  Sitting - Static: Good;-  Sitting - Dynamic: -;Good  Standing - Static: Fair  Standing - Dynamic: Fair;-  Comments: Standing Balance assessed w/ RW, sitting dynamic balance observed while patient performed distal BLE skin care.          AM-PAC Score  AM-PAC Inpatient Mobility Raw Score : 15 (01/26/23 1120)  AM-PAC Inpatient T-Scale Score : 39.45 (01/26/23 1120)  Mobility Inpatient CMS 0-100% Score: 57.7 (01/26/23 1120)  Mobility Inpatient CMS G-Code Modifier : CK (01/26/23 1120)       Goals  Short Term Goals  Time Frame for Short Term Goals: 14 visits  Short Term Goal 1: Pt will amb 300' Cathie w/ RW or least restrictive device  Short Term Goal 2: Pt will be Cathie in all bed mobility tasks  Short Term Goal 3: Pt will be Cathie with transfers  Short Term Goal 4: Pt will be CGA for navigation of 2 steps with unilateral UE assist       Education  Patient Education  Education Given To: Patient  Education Provided: Role of Therapy;Plan of Care  Education Method: Demonstration  Barriers to Learning: None  Education Outcome: Verbalized understanding;Demonstrated understanding      Therapy Time   Individual Concurrent Group Co-treatment   Time In 0858         Time Out 0916         Minutes 18         Timed Code Treatment Minutes: 8 Minutes       REAGAN Silverio   This treatment/evaluation completed by signing SPT. Signing PT agrees with treatment and documentation.

## 2023-01-26 NOTE — PROGRESS NOTES
[facility]  Progress Note                   Date:   1/26/2023  Patient name: Tash Bansal  Date of admission:  1/23/2023  6:57 PM  MRN:   [id number]  YOB: 1960  PCP: Adrien Benedict    Reason for Admission: Pneumonia of right lower lobe due to infectious organism [J18.9]  Pneumonia due to infectious organism [J18.9]    Subjective:       Clinical Changes /Abnormalities: Patient seen and examined in HD. He has continued complaints of chest pain in the left chest, left shoulder, and left neck. States it is a constant pressure for at least the last week. Denies any change in the pain. No post cath issues/concerns. Review of Systems    Medications:   Scheduled Meds:   sodium chloride flush  5-40 mL IntraVENous 2 times per day    aspirin EC  81 mg Oral Daily    carvedilol  6.25 mg Oral BID WC    therapeutic multivitamin-minerals  1 tablet Oral Every Other Day    sevelamer  2.4 g Oral TID WC    sodium chloride flush  5-40 mL IntraVENous 2 times per day    ipratropium-albuterol  1 ampule Inhalation Q4H WA    cefTRIAXone (ROCEPHIN) IV  1,000 mg IntraVENous Q24H    And    azithromycin  500 mg Oral Q24H    insulin lispro  0-4 Units SubCUTAneous TID WC    insulin lispro  0-4 Units SubCUTAneous Nightly    atorvastatin  80 mg Oral Nightly     Continuous Infusions:   sodium chloride      dextrose      sodium chloride       CBC:   Recent Labs     01/23/23  2237 01/24/23  0519 01/25/23  0611   WBC 12.3* 10.9 11.5*   HGB 10.5* 10.7* 10.5*    147 179       BMP:    Recent Labs     01/24/23  0519 01/25/23  0929 01/26/23  0802    133* 132*   K 5.3 4.9 5.0   CL 91* 90* 90*   CO2 17* 26 24   BUN 80* 56* 70*   CREATININE 10.41* 8.26* 8.92*   GLUCOSE 160* 134* 131*       Hepatic:No results for input(s): AST, ALT, ALB, BILITOT, ALKPHOS in the last 72 hours.   Troponin:   Recent Labs     01/24/23  0519 01/24/23  1106 01/24/23  1524   TROPHS 242* 247* 246*       BNP: No results for input(s): BNP in the last 72 hours. Lipids: No results for input(s): CHOL, HDL in the last 72 hours. Invalid input(s): LDLCALCU  INR: No results for input(s): INR in the last 72 hours. EKG  1/3/22  Atrial fibrillation, RBBB     MUGA 3/11/22 ([facility] office)  LVEF 40%     ECHO  1/4/22  Summary  Left ventricle is moderately enlarged, global left ventricular systolic  function is moderately reduced, Visually estimated EF 30%. Global dysfunction is noted. Grade III (severe) left ventricular diastolic dysfunction. Left atrium is moderately dilated. Right atrial dilatation. Prominent Eustachian valve. Dialysis catheter  noted. Right ventricular dilatation with reduced systolic function. Aortic valve is sclerotic but opens well. Trivial aortic insufficiency. Mitral valve sclerosis without stenosis. Trivial mitral regurgitation. Moderate to severe tricuspid regurgitation. Estimated right ventricular  systolic pressure is 39 mmHg. Trivial pulmonic insufficiency. CATH  2/9/21  Indications:    - CHF       - Coronary risk factors       - Previous stent placement      Conclusions      Procedure Summary      Patent LAD stent      Recommendations      Medical treatments   Risk factor modification. Signature      ----------------------------------------------------------------   Electronically signed by David Orozco(Performing Physician) on   02/09/2021 13:09   ----------------------------------------------------------------      Angiographic Findings      Cardiac Arteries and Lesion Findings     LMCA: Normal 0% stenosis. LAD: Patent distal stent with 30-40% stenosis     LCx: Normal 0% stenosis. RCA: Normal 0% stenosis. Small, non dominant      Coronary Tree      Dominance: Right    Cath 1/25/23  Cardiac Arteries and Lesion Findings       LMCA: Mild irregularities 10-20%. LAD: Mild irregularities 10-20%. patent mid stent     LCx: Mild irregularities 10-20%. RCA: Mild irregularities 10-20%. small nondominant      Coronary Tree        Dominance: Left   Conclusions        Procedure Summary        patent mid LAD stent with nonobstructive disease. Recommendations        Medical therapy as needed. Objective:   Vitals: BP (!) 155/98   Pulse (!) 114   Temp (!) 68.3 °F (20.2 °C)   Resp 22   Wt 237 lb 14 oz (107.9 kg)   SpO2 97%   BMI 34.13 kg/m²   General appearance: alert and cooperative with exam  HEENT: Head: Normocephalic, no lesions, without obvious abnormality. Neck:no JVD, trachea midline, no adenopathy  Lungs: Clear to auscultation, dim throughout. No distress  Heart: Irregular rate and rhythm, s1/s2 auscultated, no murmurs, Afib, HR 70-110s  Abdomen: soft, non-tender, bowel sounds active  Extremities: no edema  Neurologic: not done        Assessment / Acute Cardiac Problems:   NSTEMI, type II  A. fib on EKG. [facility] of 4 on heparin drip, (on home eliquis, non compliance history)  Ischemic cardiomyopathy 40% (recent MUGA scan)   CAD with previous TIM to distal LAD in 2019 April. Patent stents on cath 1/25/23 as above  ESRD on hemodialysis. Hypertension.    DM  Hyperlipidemia  Suspected pneumonia  On Home 3L NC    Patient Active Problem List:     CKD (chronic kidney disease) stage 4, GFR 15-29 ml/min ([facility])     Anemia of chronic renal failure     Type 2 diabetes mellitus with chronic kidney disease on chronic dialysis, without long-term current use of insulin ([facility])     Chest pain at rest     Pneumonia     Rising PSA level     Prostatism     Acute on chronic diastolic congestive heart failure ([facility])     Anemia of chronic disease     Hypertension, essential     Respiratory distress     AVF (arteriovenous fistula) ([facility])     Precordial chest pain     S/P PTCA - TIM distal LAD - Dr. Derrek Coronel 4/1/19     NSTEMI (non-ST elevated myocardial infarction) ([address]     S/P arteriovenous (AV) graft placement     Acute on chronic combined systolic (congestive) and diastolic (congestive) heart failure ([address] Encounter regarding vascular access for dialysis for ESRD [facility])     Coronary artery disease involving native coronary artery of native heart without angina pectoris     BATSHEVA (obstructive sleep apnea)     Acute congestive heart failure (HCC)     Small bowel obstruction (HCC)     Chest pain     Acute on chronic combined systolic and diastolic CHF (congestive heart failure) (HCC)     Chronic abdominal pain     Hypoxemia-due to CHF/fluid overload     Paroxysmal atrial fibrillation (HCC)     Hyponatremia     ESRD on dialysis [facility])     Dialysis AV fistula malfunction (HCC)     Arteriovenous fistula occlusion (HCC)     AV graft malfunction, initial encounter ([facility]     Lymphedema due to venous insufficiency     S/P arteriovenous (AV) graft repair     Chronic respiratory failure with hypoxia (HCC)     Pain of left upper extremity     Metabolic encephalopathy     Leg swelling     Pneumonia due to infectious organism     Metabolic acidosis      Plan of Treatment:   Cath reveiwed with patient as above. Continue med management with PO ASA, statin, BB. Resume ARB on discharge if OK with nephro. Consider nitrate as OP pending BP re-evaluation   ESRD on HD. Nephrology following. Appreciate assistance with volume management. OK for discharge from CV standpoint with OP f/u in clinic as scheduled.      Electronically signed by ROB Astorga CNP on 1/26/2023 at 10:44 AM  [address]  [phone]

## 2023-01-26 NOTE — CARE COORDINATION
CM spoke with patient at bedside to discuss transitional planning. Patient is requesting C at discharge and provided CM with [address] [place] [address] as agency of choice. Referral sent. CM called and spoke with Katereyes Chappell from [address] [place] [address] and she confirmed that they are able to accept patient and will start care tomorrow.     Discharge [facility] Case Management Department  Written by: Phil Davidson RN    Patient Name: Trenton Cameron  Attending Provider: Rahat Wall DO  Admit Date: 2023  6:57 PM  MRN: [id number]  Account: [de-identified]                     : 1960  Discharge Date: 23      Disposition: home w/ Sadaf Teran RN

## 2023-01-26 NOTE — PROGRESS NOTES
Discharge instructions reviewed with patient and all questions answered at this time. ID band, IV and telemetry removed. Patient belongings and patient transported to discharge door at this time.

## 2023-01-26 NOTE — PROGRESS NOTES
Occupational Therapy  Facility/Department: [address] NEURO ICU  Occupational Therapy Initial Assessment    Name: Mitesh Ledesma  : 1960  MRN: [id number]  Date of Service: 2023    Discharge Recommendations:   Further therapy recommended at discharge. Patient Diagnosis(es): The encounter diagnosis was Pneumonia of right lower lobe due to infectious organism. Past Medical History:  has a past medical history of Acute congestive heart failure (HCC), Acute on chronic diastolic congestive heart failure ([address] Anemia, Anemia of chronic renal failure, Atrial fibrillation (HCC), AVF (arteriovenous fistula) ([address] Bowel obstruction ([facility]), CAD (coronary artery disease), Chest pain at rest, CHF (congestive heart failure) ([address] CHF (congestive heart failure), NYHA class I, acute on chronic, combined ([address] Chronic kidney disease, CKD (chronic kidney disease) stage 4, GFR 15-29 ml/min ([facility]), Coronary artery disease involving native coronary artery of native heart without angina pectoris, Diabetes mellitus ([address] Dialysis patient ([address] ESRD (end stage renal disease) ([address] Essential hypertension, Groin swelling, Hemodialysis patient ([address] Hydrocele, Hyperlipidemia, Hypertension, Inguinal hernia, MI, old, NSTEMI (non-ST elevated myocardial infarction) ([address] On home O2, BATSHEVA (obstructive sleep apnea), Patient in clinical research study, Pneumonia, Poor historian, Prostatism, Respiratory distress, Rising PSA level, S/P arteriovenous (AV) graft placement, S/P PTCA - TIM distal LAD - Dr. Barrera [address], Sleep apnea, Small bowel obstruction (HCC), SOB (shortness of breath), and Type 2 diabetes mellitus with chronic kidney disease on chronic dialysis ([address] Past Surgical History:  has a past surgical history that includes Foot surgery (Left, ); Colonoscopy; Cystoscopy (2017); Prostate biopsy (N/A, 2017); Abdomen surgery ();  Prostate Biopsy (2018); pr unlisted procedure male genital system (N/A, 02/16/2018); Tonsillectomy (1968); Vasectomy (1983); Middle ear surgery (1966); Nose surgery (1968); Mouth surgery (2007); AV fistula creation (Left, 02/20/2019); Dialysis fistula creation (Left, 02/20/2019); other surgical history (Left, 03/06/2019); AV fistula repair (07/17/2019); vascular surgery (09/20/2019); Coronary angioplasty with stent (03/31/2019); Dialysis fistula creation (Left, 10/23/2019); Cardiac catheterization (02/09/2021); Upper gastrointestinal endoscopy (N/A, 09/27/2021); IR THROMBECTOMY AVF PERC (01/03/2022); Cystoscopy (N/A, 04/01/2022); Tunneled venous catheter placement (Right, 06/15/2022); vascular surgery (Left, 06/15/2022); Dialysis fistula creation (Left, 06/15/2022); vascular surgery (Left, 07/18/2022); and Fistulagram (Left, 7/18/2022). Assessment   Performance deficits / Impairments: Decreased functional mobility ; Decreased ADL status; Decreased endurance;Decreased high-level IADLs;Decreased ROM  Assessment: pt would benefit from further therapy at discharge in order to increase safety and independence with ADLs and functional transfers/functional mobility. Prognosis: Good  Decision Making: Medium Complexity  REQUIRES OT FOLLOW-UP: Yes  Activity Tolerance  Activity Tolerance: Patient Tolerated treatment well        Plan   Occupational Therapy Plan  Times Per Week: 3x/wk     Restrictions  Restrictions/Precautions  Restrictions/Precautions: Fall Risk  Required Braces or Orthoses?: No  Position Activity Restriction  Other position/activity restrictions: up as tolerated    Subjective   General  Patient assessed for rehabilitation services?: Yes  Family / Caregiver Present: No  General Comment  Comments: RN ok'd for therapy this morning. pt agreeable to participate in session and cooperative throughout. pt reported \" alot\" of pain in L shoulder, pt declined ROM assess d/t pain.      Social/Functional History  Social/Functional History  Lives With: Alone  Type of Home: [facility] Layout: Two level, Able to Live on Main level with bedroom/bathroom  Home Access: Stairs to enter without rails  Entrance Stairs - Number of Steps: 1  Bathroom Shower/Tub: Walk-in shower  Bathroom Toilet: Standard  Bathroom Equipment: Shower chair  Home Equipment: Rollator, Wheelchair-electric, Oxygen  Has the patient had two or more falls in the past year or any fall with injury in the past year?: Yes  ADL Assistance: [address]: Independent  Homemaking Responsibilities: Yes  Meal Prep Responsibility: Primary  Laundry Responsibility: Primary  Cleaning Responsibility: Primary  Ambulation Assistance: Independent  Transfer Assistance: Independent  Active : No  Patient's  Info: family drives  Occupation: Retired  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 pt reported just graduated from OP therapy on Friday       Objective                Safety Devices  Type of Devices: Gait belt;Nurse notified (pt on stretcher going for HD at end of session)  Restraints  Restraints Initially in Place: No    Bed Mobility Training  Bed Mobility Training: Yes  Supine to Sit:  (pt sitting on EOB upon arrival)  Sit to Supine: Minimum assistance (for B LE progression onto stretcher)  Balance  Sitting: Intact (~16 minutes on EOB with supervision)  Standing: Intact (~1 minute.  pt completed functional mobility to stretcher in hallway d/t going to HD. pt provided with CGA and RW.)  Transfer Training  Transfer Training: Yes  Overall Level of Assistance: Minimum assistance (with RW)  Stand to Sit: Minimum assistance  Stand Pivot Transfers: Contact-guard assistance  Gait  Overall Level of Assistance: Contact-guard assistance (with RW)       AROM: Generally decreased, functional (pt exhibited decreased L shoulder ROM d/t pain and declined full assess d/t pain)  Strength: Generally decreased, functional (B  4/5)  Coordination: Within functional limits  Tone: Normal  Sensation: Intact    ADL  Feeding: Independent  Grooming: Independent  UE Bathing: Stand by assistance  LE Bathing: Contact guard assistance  UE Dressing: Stand by assistance  LE Dressing: Contact guard assistance  Toileting: Contact guard assistance  Additional Comments: Pt putting vasoline on feet upon arrival, pt able to don R sock utilizing figure four tech while sitting on EOB. above assist level based on clinical reasoning.              Vision  Vision: Within Functional Limits  Hearing  Hearing: Within functional limits    Cognition  Overall Cognitive Status: WFL  Orientation  Overall Orientation Status: Within Functional Limits                    Education Given To: Patient  Education Provided: Role of Therapy;Plan of Care;Energy Conservation  Education Method: Verbal  Barriers to Learning: None  Education Outcome: Verbalized understanding    Left Hand AROM (degrees)  Left Hand AROM: WFL  RUE AROM (degrees)  RUE AROM : WFL  Right Hand AROM (degrees)  Right Hand AROM: WFL        Hand Dominance  Hand Dominance: Right                                                          AM-PAC Score        AM-PAC Inpatient Daily Activity Raw Score: 20 (01/26/23 1200)  AM-PAC Inpatient ADL T-Scale Score : 42.03 (01/26/23 1200)  ADL Inpatient CMS 0-100% Score: 38.32 (01/26/23 1200)  ADL Inpatient CMS G-Code Modifier : CJ (01/26/23 1200)           Goals  Short Term Goals  Time Frame for Short Term Goals: pt will, by discharge  Short Term Goal 1: complete LB ADLs and toileting tasks with supervision  Short Term Goal 2: complete UB ADLs with mod I  Short Term Goal 3: increase activity tolerance to 20+ minutes in order to participate in daily tasks  Short Term Goal 4: dem supervision during functional transfers/functional mobility with LRd, as needed  Short Term Goal 5: dem ~6 minutes dynamic standing tolerance with supervision in order to complete functional tasks       Therapy Time   Individual Concurrent Group Co-treatment   Time In 0293         Time Out 0916         Minutes 19      Co-eval with PT    Timed Code Treatment Minutes: [address]65, OTR/L

## 2023-01-26 NOTE — PROGRESS NOTES
Nephrology Progress Note      SUBJECTIVE      Patient was seen and examined. No new issues reported overnight. Stable hemodynamics. Patient is mildly tachycardic with . Most recent blood pressure is***mmHg. He is saturating 99% on 3 L O2 through nasal cannula. Patient had dialysis 2023 with 2.1 L removal.  He tolerated the treatment well without any issues. Length of treatment is 3.5 hours. BMP results from 2023 shows sodium 133, potassium 4.1, bicarbonate 6 and calcium 8.8. BUN 56, creatinine 8.26 mg/dl. CBC shows leukocytosis with WBC of 11.5, hemoglobin 10.5 and platelet count 025. Status post cardiac catheterization done today 2023, patent mid LAD stent with nonobstructive disease. OBJECTIVE      CURRENT TEMPERATURE:  Temp: (P) 97.9 °F (36.6 °C)  MAXIMUM TEMPERATURE OVER 24HRS:  Temp (24hrs), Av.7 °F (36.5 °C), Min:97.4 °F (36.3 °C), Max:97.9 °F (36.6 °C)    CURRENT RESPIRATORY RATE:  Resp: 19  CURRENT PULSE:  Heart Rate: (!) 111  CURRENT BLOOD PRESSURE:  BP: (!) 119/90  24HR BLOOD PRESSURE RANGE:  Systolic (08XVL), PUF:793 , Min:99 , YC   ; Diastolic (60BEE), TSR:41, Min:61, Max:94    24HR INTAKE/OUTPUT:  No intake or output data in the 24 hours ending 23 0823    WEIGHT :Patient Vitals for the past 96 hrs (Last 3 readings):   Weight   23 0600 241 lb 6.4 oz (109.5 kg)   23 1312 242 lb 11.6 oz (110.1 kg)   23 0930 247 lb 9.2 oz (112.3 kg)       PHYSICAL EXAM      General:          AAO x 3, speaking in full sentences, no accessory muscle use. HEENT:           Atraumatic, normocephalic, no throat congestion, moist mucosa. Eyes:               Pupils equal, round and reactive to light, EOMI. Neck:               Supple  Chest:              Bilateral vesicular breath sounds, no rales or wheezes. Cardiac:           S1 S2 RR, no murmurs, gallops or rubs. Abdomen:        Soft, non-tender, no masses or organomegaly, BS audible.   : No suprapubic or flank tenderness. Neuro:             AAO x 3, No FND. SKIN:               No rashes, good skin turgor. Extremities:     No edema.     CURRENT MEDICATIONS      sodium chloride flush 0.9 % injection 5-40 mL, 2 times per day  sodium chloride flush 0.9 % injection 5-40 mL, PRN  0.9 % sodium chloride infusion, PRN  acetaminophen (TYLENOL) tablet 650 mg, Q4H PRN  aspirin EC tablet 81 mg, Daily  carvedilol (COREG) tablet 6.25 mg, BID WC  therapeutic multivitamin-minerals 1 tablet, Every Other Day  sevelamer (RENVELA) packet 2.4 g, TID WC  glucose chewable tablet 16 g, PRN  dextrose bolus 10% 125 mL, PRN   Or  dextrose bolus 10% 250 mL, PRN  glucagon (rDNA) injection 1 mg, PRN  dextrose 10 % infusion, Continuous PRN  sodium chloride flush 0.9 % injection 5-40 mL, 2 times per day  sodium chloride flush 0.9 % injection 10 mL, PRN  0.9 % sodium chloride infusion, PRN  ondansetron (ZOFRAN-ODT) disintegrating tablet 4 mg, Q8H PRN   Or  ondansetron (ZOFRAN) injection 4 mg, Q6H PRN  acetaminophen (TYLENOL) tablet 650 mg, Q6H PRN   Or  acetaminophen (TYLENOL) suppository 650 mg, Q6H PRN  albuterol (PROVENTIL) nebulizer solution 2.5 mg, Q2H PRN  ipratropium-albuterol (DUONEB) nebulizer solution 1 ampule, Q4H WA  cefTRIAXone (ROCEPHIN) 1,000 mg in sterile water 10 mL IV syringe, Q24H   And  azithromycin (ZITHROMAX) tablet 500 mg, Q24H  polyethylene glycol (GLYCOLAX) packet 17 g, Daily PRN  insulin lispro (HUMALOG) injection vial 0-4 Units, TID WC  insulin lispro (HUMALOG) injection vial 0-4 Units, Nightly  atorvastatin (LIPITOR) tablet 80 mg, Nightly  traMADol (ULTRAM) tablet 50 mg, Q6H PRN  fentaNYL (SUBLIMAZE) injection 25 mcg, Q1H PRN        LABS      CBC:   Recent Labs     01/23/23  2237 01/24/23  0519 01/25/23  0611   WBC 12.3* 10.9 11.5*   RBC 3.40* 3.48* 3.41*   HGB 10.5* 10.7* 10.5*   HCT 35.0* 38.9* 35.0*   .9 111.8* 102.6   MCH 30.9 30.7 30.8   MCHC 30.0 27.5* 30.0   RDW 16.4* 16.7* 16.6*  147 179   MPV 10.1 10.0 9.3        BMP:   Recent Labs     01/23/23  2008 01/24/23  0519 01/25/23  0929    137 133*   K 4.7 5.3 4.9   CL 88* 91* 90*   CO2 29 17* 26   BUN 74* 80* 56*   CREATININE 10.06* 10.41* 8.26*   GLUCOSE 196* 160* 134*   CALCIUM 9.1 9.0 8.8        BNP:  Lab Results   Component Value Date/Time     09/08/2018 08:04 PM     IRON:    Lab Results   Component Value Date/Time    IRON 13 11/25/2018 03:41 AM     IRON SATURATION:    Lab Results   Component Value Date/Time    LABIRON 8 11/25/2018 03:41 AM     TIBC:    Lab Results   Component Value Date/Time    TIBC 156 11/25/2018 03:41 AM     FERRITIN:    Lab Results   Component Value Date/Time    FERRITIN 363 11/25/2018 03:41 AM       SPEP:   Lab Results   Component Value Date/Time    PROT 8.0 11/12/2021 07:27 AM      HEPBSAG:  Lab Results   Component Value Date/Time    HEPBSAG NONREACTIVE 11/13/2021 12:04 PM     HEPCAB:  Lab Results   Component Value Date/Time    HEPCAB NONREACTIVE 11/13/2021 12:04 PM     MPO ANCA:   Lab Results   Component Value Date/Time    MPO 25 09/14/2016 06:15 PM    .  PR3 ANCA:    Lab Results   Component Value Date/Time    PR3 51 09/14/2016 06:15 PM      URINE CREATININE:    Lab Results   Component Value Date/Time    LABCREA 73.8 11/25/2018 01:36 AM     URINALYSIS:  U/A:   Lab Results   Component Value Date/Time    NITRU NEGATIVE 04/01/2022 11:27 AM    COLORU Yellow 04/01/2022 11:27 AM    PHUR 6.0 04/01/2022 11:27 AM    WBCUA 5 TO 10 04/01/2022 11:27 AM    RBCUA 20 TO 50 04/01/2022 11:27 AM    MUCUS NOT REPORTED 12/31/2021 04:23 PM    TRICHOMONAS NOT REPORTED 12/31/2021 04:23 PM    YEAST FEW 12/31/2021 04:23 PM    BACTERIA NOT REPORTED 12/31/2021 04:23 PM    SPECGRAV 1.010 04/01/2022 11:27 AM    LEUKOCYTESUR NEGATIVE 04/01/2022 11:27 AM    UROBILINOGEN Normal 04/01/2022 11:27 AM    BILIRUBINUR NEGATIVE 04/01/2022 11:27 AM    GLUCOSEU NEGATIVE 04/01/2022 11:27 AM    KETUA NEGATIVE 04/01/2022 11:27 AM AMORPHOUS NOT REPORTED 12/31/2021 04:23 PM     ANTIGBM:  Lab Results   Component Value Date/Time    GBMABIGG 13 09/14/2016 06:15 PM         RADIOLOGY      XR SHOULDER RIGHT (MIN 2 VIEWS)    Result Date: 1/24/2023  EXAMINATION: 3 XRAY VIEWS OF THE RIGHT SHOULDER 1/24/2023 3:56 pm COMPARISON: None. HISTORY: ORDERING SYSTEM PROVIDED HISTORY: pain, limited rom TECHNOLOGIST PROVIDED HISTORY: pain, limited rom FINDINGS: Glenohumeral joint is normally aligned. No evidence of acute fracture or dislocation. No abnormal periarticular calcifications. AC joint and glenohumeral joint degenerative changes. Right basilar opacity     No acute abnormality. AC joint and glenohumeral joint degenerative changes Right lung base opacity could represent atelectasis or infection     XR SHOULDER LEFT (MIN 2 VIEWS)    Result Date: 1/24/2023  EXAMINATION: THREE XRAY VIEWS OF THE LEFT SHOULDER 1/24/2023 3:56 pm COMPARISON: None. HISTORY: ORDERING SYSTEM PROVIDED HISTORY: pain, limited rom TECHNOLOGIST PROVIDED HISTORY: pain, limited rom FINDINGS: No acute fracture, dislocation or suspicious osseous lesions. There is moderate osteoarthritis of the glenohumeral joint and mild osteoarthritis of the acromioclavicular joint. There is marked narrowing of the subacromial space which can be seen in chronic rotator cuff tearing. A vascular stent overlies the medial left upper extremity. No appreciable soft tissue abnormality. 1.  No evidence of acute fracture involving the left shoulder. 2.  There is significant narrowing of the subacromial space, which can be seen in chronic rotator cuff disease. 3.  Moderate left shoulder osteoarthritis. XR CHEST PORTABLE    Result Date: 1/23/2023  EXAMINATION: ONE XRAY VIEW OF THE CHEST 1/23/2023 5:18 pm COMPARISON: 06/15/2022 HISTORY: ORDERING SYSTEM PROVIDED HISTORY: chest pain TECHNOLOGIST PROVIDED HISTORY: chest pain Reason for Exam: port Upright FINDINGS: Suboptimal inspiration.   Cardiac size is enlarged. Bibasal opacities. The bibasal pulmonary vascularity is hazy and indistinct. No pneumothorax. No pleural effusions identified . Erleen Craig Cardiomegaly with probable bibasal pulmonary vascular congestion. Underlying pneumonia in the right base cannot be excluded. ASSESSMENT      ESRD on Hemodialysis. His regular HD days are TTS at [facility] using left AV graft under Dr. Jd Bonilla. His dry weight is 74.2 daily. Chest pain. Status post cardiac catheterization done today 1/25/2023, patent mid LAD stent with nonobstructive disease. Metabolic acidosis  Coronary artery disease history of stent in the past.  Anemia of chronic disease  Secondary hyperparathyroidism  Hypertension    PLAN        Will get regular dialysis today as per TTS schedule. Strict Input and Output, Daily weigh and document in the chart. Low Potassium, Low phosphorus and low salt diet. Fluids to be restricted to 1500ml/day. IV Aranesp/Epogen for anemia of chronic disease with HD weekly. IV Zemplar per protocol for secondary hyperparathyroidism with HD thrice a week. Okay to discharge from nephrology standpoint. Please do not hesitate to call with questions.     This note is created with the assistance of a speech-recognition program. While intending to generate a document that actually reflects the content of the visit, no guarantees can be provided that every mistake has been identified and corrected by editing    Miesha Wei MD  Internal medicine resident, PGY 2  Nephrology service  [address] [place] [address], [employer]  1/26/2023

## 2023-01-26 NOTE — PROGRESS NOTES
Nephrology Progress Note        Subjective:   patient evaluated on dialysis. Access cannulated without problems. No new issues overnite. Stable hemodynamics. He had a cardiac cath yesterday showing patent mid LAD stent and otherwise nonocclusive disease. On hemodialysis he continues to be mildly tachycardic and actually goes in and out of A. fib. Cardiology is following. Patient is not having any chest pain, or orthopnea. Objective:  CURRENT TEMPERATURE:  Temp: (!) 68.3 °F (20.2 °C)  MAXIMUM TEMPERATURE OVER 24HRS:  Temp (24hrs), Av.4 °F (32.4 °C), Min:68.3 °F (20.2 °C), Max:98.1 °F (36.7 °C)    CURRENT RESPIRATORY RATE:  Resp: 22  CURRENT PULSE:  Heart Rate: (!) 117  CURRENT BLOOD PRESSURE:  BP: (!) 144/100  24HR BLOOD PRESSURE RANGE:  Systolic (24LCM), YCH:090 , Min:99 , QRK:463   ; Diastolic (08BYP), VDT:55, Min:61, Max:100    24HR INTAKE/OUTPUT:    Intake/Output Summary (Last 24 hours) at 2023 1130  Last data filed at 2023 0800  Gross per 24 hour   Intake 240 ml   Output 0 ml   Net 240 ml     Patient Vitals for the past 96 hrs (Last 3 readings):   Weight   23 0932 237 lb 14 oz (107.9 kg)   23 0600 241 lb 6.4 oz (109.5 kg)   23 1312 242 lb 11.6 oz (110.1 kg)         Physical Exam:  General appearance:Awake, alert, in no acute distress  Skin: warm and dry, no rash or erythema  Eyes: conjunctivae normal and sclera anicteric  ENT:no thrush no pharyngeal congestion   Neck:  No JVD, No Thyromegaly  Pulmonary: Somewhat diminished breath sounds on auscultation  Cardiovascular: Tachycardic S1 and S2. NO rubs and NO murmur.  No S3 OR S4   Abdomen: soft nontender, bowel sounds present, no organomegaly,  no ascites   Extremities: + mild edema     Access:  previous  Left AV graft    Current Medications:    sodium chloride flush 0.9 % injection 5-40 mL, 2 times per day  sodium chloride flush 0.9 % injection 5-40 mL, PRN  0.9 % sodium chloride infusion, PRN  acetaminophen (TYLENOL) tablet 650 mg, Q4H PRN  aspirin EC tablet 81 mg, Daily  carvedilol (COREG) tablet 6.25 mg, BID WC  therapeutic multivitamin-minerals 1 tablet, Every Other Day  sevelamer (RENVELA) packet 2.4 g, TID WC  glucose chewable tablet 16 g, PRN  dextrose bolus 10% 125 mL, PRN   Or  dextrose bolus 10% 250 mL, PRN  glucagon (rDNA) injection 1 mg, PRN  dextrose 10 % infusion, Continuous PRN  sodium chloride flush 0.9 % injection 5-40 mL, 2 times per day  sodium chloride flush 0.9 % injection 10 mL, PRN  0.9 % sodium chloride infusion, PRN  ondansetron (ZOFRAN-ODT) disintegrating tablet 4 mg, Q8H PRN   Or  ondansetron (ZOFRAN) injection 4 mg, Q6H PRN  acetaminophen (TYLENOL) tablet 650 mg, Q6H PRN   Or  acetaminophen (TYLENOL) suppository 650 mg, Q6H PRN  albuterol (PROVENTIL) nebulizer solution 2.5 mg, Q2H PRN  ipratropium-albuterol (DUONEB) nebulizer solution 1 ampule, Q4H WA  cefTRIAXone (ROCEPHIN) 1,000 mg in sterile water 10 mL IV syringe, Q24H   And  azithromycin (ZITHROMAX) tablet 500 mg, Q24H  polyethylene glycol (GLYCOLAX) packet 17 g, Daily PRN  insulin lispro (HUMALOG) injection vial 0-4 Units, TID WC  insulin lispro (HUMALOG) injection vial 0-4 Units, Nightly  atorvastatin (LIPITOR) tablet 80 mg, Nightly  traMADol (ULTRAM) tablet 50 mg, Q6H PRN  fentaNYL (SUBLIMAZE) injection 25 mcg, Q1H PRN      Labs:   CBC:   Recent Labs     01/23/23 2237 01/24/23  0519 01/25/23  0611   WBC 12.3* 10.9 11.5*   RBC 3.40* 3.48* 3.41*   HGB 10.5* 10.7* 10.5*   HCT 35.0* 38.9* 35.0*    147 179   MPV 10.1 10.0 9.3      BMP:   Recent Labs     01/24/23  0519 01/25/23  0929 01/26/23  0802    133* 132*   K 5.3 4.9 5.0   CL 91* 90* 90*   CO2 17* 26 24   BUN 80* 56* 70*   CREATININE 10.41* 8.26* 8.92*   GLUCOSE 160* 134* 131*   CALCIUM 9.0 8.8 9.0        Dialysis bath: Dialysis Bath  K+ (Potassium): 2  Ca+ (Calcium): 2.25  Na+ (Sodium): 138  HCO3 (Bicarb): 30        Assessment:  1 ESRD:dialysis bath reviewed with nurse.   Overall weight has been challenge down and he is actually below his outpatient dry weight. 2.HTN: Blood pressures are stable  3 DM:  4 EDEMA : His edema although present is actually better  5. atrial fib: Cardiology to address    Plan:  1. Wt removal and dialysis orders reviewed. 2. ?  Amiodarone  3. Cont pt on aranesp and zemplar per protocol. 4. Follow up labs ordered. Please do not hesitate to call with questions.     Electronically signed by Jared Kumar MD on 1/26/2023 at 11:30 AM

## 2023-01-26 NOTE — DISCHARGE INSTR - COC
Continuity of Care Form    Patient Name: Tello Powell   :  1960  MRN:  [id number]    [facility] date:  2023  Discharge date:  23    Code Status Order: Full Code   Advance Directives:     Admitting Physician:  No admitting provider for patient encounter.   PCP: Nickie Reyes    Discharging Nurse: Magdalena Gaffney [facility] Unit/Room#: 0120/0120-01  Discharging Unit Phone Number: [phone]    Emergency Contact:   Extended Emergency Contact Information  Primary Emergency Contact: Mariana Mosher  Home Phone: [phone]  Work Phone: [phone]  Mobile Phone: [phone]  Relation: Spouse  Secondary Emergency Contact: [facility]  Address: [address] [place] [address], [address] [place] [address] Phone: [phone]  Work Phone: [phone]  Mobile Phone: [phone]  Relation: Brother/Sister    Past Surgical History:  Past Surgical History:   Procedure Laterality Date    ABDOMEN SURGERY  2013    BOWEL OBSTRUCTION    AV FISTULA CREATION Left 2019    AV FISTULA REPAIR  2019    AV fistula revision, branch ligation / Percutaneous angioplasty of proximal anastomosis and outflow tract of dialysis circuit with drug coated balloon  /  Dr Silvino Luna  2021    Dr. Cheikh Jackson, [address]  2019    TIM LAD    CYSTOSCOPY  2017    CYSTOSCOPY N/A 2022    CYSTOSCOPY DILATION performed by Frederico Boxer, MD at [facility] 2019    AV FISTULA CREATION ARM performed by Karime Guerra MD at [facility] 10/23/2019    LEFT UPPER EXTREMITY AV GRAFT CREATION WITH [id number] ARTEGRAFT, LEFT UPPER EXTREMITY AV FISTULA LIGATION performed by Karime Guerra MD at [facility] 06/15/2022    REVISION OF UPPER EXTREMITY AV FISTULA GRAFT, CENTRAL VENOUS CATHETER PLACEMENT (C-ARM) performed by Lexi Zaman MD at [facility] Left 7/18/2022    UPPER EXTREMITY FISTULAGRAM performed by Lexi Zaman MD at [facility] Left 2005    FRADCTURE HEEL    IR THROMBECTOMY PERCUT AVF  01/03/2022    IR THROMBECTOMY PERCUT AVF 1/3/2022 [facility]    MIDDLE EAR SURGERY  1966    EAR DRUM REPAIRED    MOUTH SURGERY  2007    1 WISDOM TOOTH REMOVED    NOSE SURGERY  1968    CAUTERY TO STOP NOSE BEEDS    OTHER SURGICAL HISTORY Left 03/06/2019    fistulagram    [place] UNLISTED PROCEDURE MALE GENITAL SYSTEM N/A 02/16/2018    US  PROSTATE BIOPSY WITH SATURATION performed by Mariel Schwartz MD at [address] 11/17/2017    CYSTOSCOPY PROSTATE US BIOPSY performed by Mariel Schwartz MD at [address] [facility]  02/16/2018    TONSILLECTOMY  1968    TUNNELED VENOUS CATHETER PLACEMENT Right 06/15/2022    Placement of tunneled central venous hemodialysis catheter  /  Dr Louise Lewis 09/27/2021    EGD BIOPSY performed by Hans Mueller MD at [facility]  09/20/2019    LUE FISTULAGRAM  /  DR Shae Rodriguez  /  Findings: Severe stenosis of proximal cephalic vein. / Will plan for LUE AVG placement.     VASCULAR SURGERY Left 06/15/2022    Open revision of left upper extremity AV graft   /  Dr William Johnson Left 07/18/2022    LUE FISTULAGRAM / DR Shae Rodriguez    VASECTOMY  1983       Immunization History:   Immunization History   Administered Date(s) Administered    COVID-19, MODERNA BLUE border, Primary or Immunocompromised, (age 12y+), IM, 100 mcg/0.5mL 03/12/2021, 04/09/2021       Active Problems:  Patient Active Problem List   Diagnosis Code    CKD (chronic kidney disease) stage 4, GFR 15-29 ml/min ([facility]) N18.4    Anemia of chronic renal failure N18.9, D63.1    Type 2 diabetes mellitus with chronic kidney disease on chronic dialysis, without long-term current use of insulin ([facility]) E11.22, N18.6, Z99.2    Chest pain at rest R07.9    Pneumonia J18.9    Rising PSA level R97.20    Prostatism N40.0    Acute on chronic diastolic congestive heart failure ([facility]) I50.33    Anemia of chronic disease D63.8    Hypertension, essential I10    Respiratory distress R06.03    AVF (arteriovenous fistula) ([facility]) I77.0    Precordial chest pain R07.2    S/P PTCA - TIM distal LAD - Dr. Hensley Agent 4/1/19 Z98.61    NSTEMI (non-ST elevated myocardial infarction) ([facility]) I21.4    S/P arteriovenous (AV) graft placement Z95.828    Acute on chronic combined systolic (congestive) and diastolic (congestive) heart failure ([facility] I50.43    Encounter regarding vascular access for dialysis for ESRD ([facility] N18.6, Z99.2    Coronary artery disease involving native coronary artery of native heart without angina pectoris I25.10    BATSHEVA (obstructive sleep apnea) G47.33    Acute congestive heart failure ([facility]) I50.9    Small bowel obstruction ([facility] K56.609    Chest pain R07.9    Acute on chronic combined systolic and diastolic CHF (congestive heart failure) ([facility]) I50.43    Chronic abdominal pain R10.9, G89.29    Hypoxemia-due to CHF/fluid overload R09.02    Paroxysmal atrial fibrillation ([facility]) I48.0    Hyponatremia E87.1    ESRD on dialysis ([facility] N18.6, Z99.2    Dialysis AV fistula malfunction ([facility]) T82.590A    Arteriovenous fistula occlusion ([facility]) T82.898A    AV graft malfunction, initial encounter ([facility] T82.590A    Lymphedema due to venous insufficiency I89.0, I87.2    S/P arteriovenous (AV) graft repair Z98.890    Chronic respiratory failure with hypoxia ([facility]) J96.11    Pain of left upper extremity C42.904    Metabolic encephalopathy H72.53    Leg swelling M79.89    Pneumonia due to infectious organism G67.4    Metabolic acidosis Y39.86       Isolation/Infection:   Isolation            No Isolation          Patient Infection Status       Infection Onset Added Last Indicated Last Indicated By Review Planned Expiration Resolved Resolved By    None active Resolved    COVID-19 (Rule Out) 11/11/21 11/11/21 11/11/21 COVID-19, Rapid (Ordered)   11/11/21 Rule-Out Test Resulted    COVID-19 (Rule Out) 05/10/21 05/11/21 05/10/21 COVID-19 (Ordered)   05/11/21 Rule-Out Test Resulted    COVID-19 (Rule Out) 05/01/21 05/01/21 05/01/21 COVID-19, Rapid (Ordered)   05/01/21 Rule-Out Test Resulted            Nurse Assessment:  Last Vital Signs: BP 92/61   Pulse (!) 113   Temp 97.3 °F (36.3 °C)   Resp 18   Wt 236 lb 1.8 oz (107.1 kg)   SpO2 99%   BMI 33.88 kg/m²     Last documented pain score (0-10 scale): Pain Level: 8  Last Weight:   Wt Readings from Last 1 Encounters:   01/26/23 236 lb 1.8 oz (107.1 kg)     Mental Status:  oriented, alert, coherent, logical, thought processes intact, and able to concentrate and follow conversation    IV Access:  - Left Upper Arm AV Fistula    Nursing Mobility/ADLs:  Walking   Independent  Transfer  Independent  Bathing  Independent  Dressing  Independent  [address]  Med Delivery   whole    Wound Care Documentation and Therapy:  Incision 06/15/22 Brachial Anterior; Left (Active)   Number of days: 225        Elimination:  Continence: Bowel: Yes  Bladder: Yes  Urinary Catheter: None   Colostomy/Ileostomy/Ileal Conduit: No       Date of Last BM: 1/24/23    Intake/Output Summary (Last 24 hours) at 1/26/2023 1511  Last data filed at 1/26/2023 1334  Gross per 24 hour   Intake 540 ml   Output 1300 ml   Net -760 ml     No intake/output data recorded. Safety Concerns:     None    Impairments/Disabilities:      None    Nutrition Therapy:  Current Nutrition Therapy:   - Oral Diet:  Renal    Routes of Feeding: Oral  Liquids:  Thin Liquids  Daily Fluid Restriction: yes - amount 1500 mL  Last Modified Barium Swallow with Video (Video Swallowing Test): not done    Treatments at the Time of Hospital Discharge:   Respiratory Treatments: Patient refused Albuterol inhaler from Pharmacy (Meds to Bed)  Oxygen Therapy:  is on oxygen at 3 L/min per nasal cannula. Ventilator:    - No ventilator support    Rehab Therapies: N/A  Weight Bearing Status/Restrictions: No weight bearing restrictions  Other Medical Equipment (for information only, NOT a DME order):  none  Other Treatments: N/A    Patient's personal belongings (please select all that are sent with patient):  Clothing, cell phone,     RN SIGNATURE:  Electronically signed by Pancho Linares RN on 1/26/23 at 3:29 PM EST    CASE MANAGEMENT/SOCIAL WORK SECTION    Inpatient Status Date: 1/26/2023    Readmission Risk Assessment Score:  Readmission Risk              Risk of Unplanned Readmission:  20           Discharging to Facility/ [address] [place] [address]    / signature: Electronically signed by Brad Gayle RN on 1/26/23 at 3:47 PM EST    PHYSICIAN SECTION    Prognosis: Fair    Condition at Discharge: Stable    Rehab Potential (if transferring to Rehab): Fair    Recommended Labs or Other Treatments After Discharge: Home care, visiting nurse, PT and OT    Physician Certification: I certify the above information and transfer of Jerson Angel  is necessary for the continuing treatment of the diagnosis listed and that he requires Home Care for greater 30 days.      Update Admission H&P: No change in H&P    PHYSICIAN SIGNATURE:  Electronically signed by Amy Hodge DO on 1/26/23 at 3:12 PM EST

## 2023-01-26 NOTE — PLAN OF CARE
Problem: Discharge Planning  Goal: Discharge to home or other facility with appropriate resources  1/26/2023 0626 by Jesica Silva RN  Outcome: Progressing  1/25/2023 1850 by Roxanne Cast RN  Outcome: Progressing  Flowsheets (Taken 1/25/2023 1210 by Kalli Jacome, RN)  Discharge to home or other facility with appropriate resources: Identify barriers to discharge with patient and caregiver     Problem: Chronic Conditions and Co-morbidities  Goal: Patient's chronic conditions and co-morbidity symptoms are monitored and maintained or improved  1/26/2023 0626 by Jesica Silva RN  Outcome: Progressing  1/25/2023 1850 by Roxanne Cast RN  Outcome: Progressing  Flowsheets (Taken 1/25/2023 1210 by Kalli Jacome, RN)  Care Plan - Patient's Chronic Conditions and Co-Morbidity Symptoms are Monitored and Maintained or Improved: Monitor and assess patient's chronic conditions and comorbid symptoms for stability, deterioration, or improvement     Problem: Pain  Goal: Verbalizes/displays adequate comfort level or baseline comfort level  1/26/2023 0626 by Jesica Silva RN  Outcome: Progressing  1/25/2023 1850 by Roxanne Cast RN  Outcome: Progressing

## 2023-02-09 NOTE — PROGRESS NOTES
Physician Progress Note      PATIENT:               Irving Matias  CSN #:                  [id number]  :                       1960  ADMIT DATE:       2023 6:57 PM  [address] [place] DATE:        2023 5:00 PM  RESPONDING  PROVIDER #:        Yamil Peters BLOOD DO          QUERY TEXT:    Pt admitted with Chest pain. Pt noted to have Trop 278> 273>275 and per   cardiology baseline 177 . If possible, please document in progress notes and   discharge summary if you are evaluating and/or treating any of the following: The medical record reflects the following:  Risk Factors: CAD, CHF, Chronic respiratory failure with hypoxia, ESRD  Clinical Indicators: BNP 51274, K 5. 0WBC 12.3, Neutrophil 86,  Trop 278>   273>275, myoglobin 298, EKG; Atrial fibrillation, Left axis deviation,   Non-specific intra-ventricular conduction block, Inferior infarct cited on or   before 01-MAY-2021, Anterolateral infarct cited on or before   26-OCT-2021,Abnormal ECG When compared with ECG of 2023 19:20, No   significant change was found. per cardiology consult; reporting chest pain,   left sided, 7/10, sharp, radiating to left shoulder and left neck, worse on   exertion and relieved on rest, lasting a few minutes. Patient also repor  Treatment:  1g  calcium, azithromycin IV, cefTRIAXone IV,   ipratropium-albuterol nebulizer , monitoring    Thank you, Please call if questions  Ariela La RN [facility]  [phone]  Options provided:  -- Chest pain due to NSTEMI type 2 due to pneumonia  -- Chest pain due to CAD with unstable angina  -- Chest pain due to Paroxismal A Fib  -- Chest pain due to due to other etiology. , Please document other etiology. .  -- Other - I will add my own diagnosis  -- Disagree - Not applicable / Not valid  -- Disagree - Clinically unable to determine / Unknown  -- Refer to Clinical Documentation Reviewer    PROVIDER RESPONSE TEXT:    This patient has chest pain due to Paroxismal A Fib.     Query created by: Ariela Swanson on 1/31/2023 9:26 AM      QUERY TEXT:    Pt admitted with chest pain. Noted documentation of NSTEMI TYPE 2  on 1/26 by    Cardiology consultant. If possible, please document in progress notes and   discharge summary:    The medical record reflects the following:  Risk Factors: CAD, CHF, Chronic respiratory failure with hypoxia, ESRD  Clinical Indicators: BNP 18646, K 5. 0WBC 12.3, Neutrophil 86,  Trop 278>   273>275, myoglobin 298, EKG; Atrial fibrillation, Left axis deviation,   Non-specific intra-ventricular conduction block, Inferior infarct cited on or   before 01-MAY-2021, Anterolateral infarct cited on or before   26-OCT-2021,Abnormal ECG When compared with ECG of 23-JAN-2023 19:20, No   significant change was found. per cardiology consult; reporting chest pain,   left sided, 7/10, sharp, radiating to left shoulder and left neck, worse on   exertion and relieved on rest, lasting a few minutes. Patient also repor  Treatment:  1g  calcium,ASA, azithromycin IV, cefTRIAXone IV,   ipratropium-albuterol nebulizer , monitoring    Thank you, Please call if questions  Ariela Myao RN [facility]  [phone]  Options provided:  -- NSTEMI TYPE 2 confirmed present on admission  -- NSTEMI TYPE 2 ruled out  -- Defer to cardiology consultant documentation regarding NSTEMI TYPE 2  -- Other - I will add my own diagnosis  -- Disagree - Not applicable / Not valid  -- Disagree - Clinically unable to determine / Unknown  -- Refer to Clinical Documentation Reviewer    PROVIDER RESPONSE TEXT:    The diagnosis of NSTEMI TYPE 2 was confirmed as present on admission.     Query created by: Jakc Peters on 1/31/2023 10:05 AM      Electronically signed by:  Suzanne Sauceda BLOOD DO 2/9/2023 6:00 PM

## 2023-03-02 ENCOUNTER — ANESTHESIA EVENT (OUTPATIENT)
Dept: OPERATING ROOM | Age: 63
DRG: 252 | End: 2023-03-02
Payer: MEDICARE

## 2023-03-02 ENCOUNTER — ANESTHESIA (OUTPATIENT)
Dept: OPERATING ROOM | Age: 63
DRG: 252 | End: 2023-03-02
Payer: MEDICARE

## 2023-03-02 ENCOUNTER — HOSPITAL ENCOUNTER (INPATIENT)
Age: 63
LOS: 3 days | Discharge: HOME | DRG: 252 | End: 2023-03-05
Attending: SURGERY | Admitting: SURGERY
Payer: MEDICARE

## 2023-03-02 PROBLEM — T82.868A CLOTTED RENAL DIALYSIS AV GRAFT, INITIAL ENCOUNTER (HCC): Status: ACTIVE | Noted: 2023-03-02

## 2023-03-02 LAB
ACTION: NORMAL
DATE AND TIME: NORMAL
EGFR, POC: 4 ML/MIN/1.73M2
GLUCOSE BLD-MCNC: 154 MG/DL (ref 74–100)
NOTIFY: NORMAL
POC BUN: 112 MG/DL (ref 8–26)
POC CHLORIDE: 99 MMOL/L (ref 98–107)
POC CREATININE: 12.05 MG/DL (ref 0.51–1.19)
POC HEMATOCRIT: 34 % (ref 41–53)
POC HEMOGLOBIN: 11.6 G/DL (ref 13.5–17.5)
POC POTASSIUM: 4.9 MMOL/L (ref 3.5–4.5)
POC SODIUM: 136 MMOL/L (ref 138–146)
READ BACK: YES

## 2023-03-02 PROCEDURE — 82947 ASSAY GLUCOSE BLOOD QUANT: CPT

## 2023-03-02 PROCEDURE — C1769 GUIDE WIRE: HCPCS | Performed by: SURGERY

## 2023-03-02 PROCEDURE — 2500000003 HC RX 250 WO HCPCS: Performed by: SURGERY

## 2023-03-02 PROCEDURE — 6360000002 HC RX W HCPCS: Performed by: NURSE ANESTHETIST, CERTIFIED REGISTERED

## 2023-03-02 PROCEDURE — B50WYZZ PLAIN RADIOGRAPHY OF DIALYSIS SHUNT/FISTULA USING OTHER CONTRAST: ICD-10-PCS | Performed by: SURGERY

## 2023-03-02 PROCEDURE — 84520 ASSAY OF UREA NITROGEN: CPT

## 2023-03-02 PROCEDURE — 3700000001 HC ADD 15 MINUTES (ANESTHESIA): Performed by: SURGERY

## 2023-03-02 PROCEDURE — 03780ZZ DILATION OF LEFT BRACHIAL ARTERY, OPEN APPROACH: ICD-10-PCS | Performed by: SURGERY

## 2023-03-02 PROCEDURE — C1894 INTRO/SHEATH, NON-LASER: HCPCS | Performed by: SURGERY

## 2023-03-02 PROCEDURE — 3600000017 HC SURGERY HYBRID ADDL 15MIN: Performed by: SURGERY

## 2023-03-02 PROCEDURE — C1769 GUIDE WIRE: HCPCS

## 2023-03-02 PROCEDURE — C1757 CATH, THROMBECTOMY/EMBOLECT: HCPCS | Performed by: SURGERY

## 2023-03-02 PROCEDURE — 84295 ASSAY OF SERUM SODIUM: CPT

## 2023-03-02 PROCEDURE — 6360000002 HC RX W HCPCS: Performed by: SURGERY

## 2023-03-02 PROCEDURE — 6370000000 HC RX 637 (ALT 250 FOR IP): Performed by: NURSE ANESTHETIST, CERTIFIED REGISTERED

## 2023-03-02 PROCEDURE — 2709999900 HC NON-CHARGEABLE SUPPLY: Performed by: SURGERY

## 2023-03-02 PROCEDURE — 7100000001 HC PACU RECOVERY - ADDTL 15 MIN: Performed by: SURGERY

## 2023-03-02 PROCEDURE — 03C80ZZ EXTIRPATION OF MATTER FROM LEFT BRACHIAL ARTERY, OPEN APPROACH: ICD-10-PCS | Performed by: SURGERY

## 2023-03-02 PROCEDURE — 99222 1ST HOSP IP/OBS MODERATE 55: CPT | Performed by: SURGERY

## 2023-03-02 PROCEDURE — 2500000003 HC RX 250 WO HCPCS: Performed by: NURSE ANESTHETIST, CERTIFIED REGISTERED

## 2023-03-02 PROCEDURE — 85014 HEMATOCRIT: CPT

## 2023-03-02 PROCEDURE — 7100000010 HC PHASE II RECOVERY - FIRST 15 MIN

## 2023-03-02 PROCEDURE — 6370000000 HC RX 637 (ALT 250 FOR IP): Performed by: SURGERY

## 2023-03-02 PROCEDURE — 1200000000 HC SEMI PRIVATE

## 2023-03-02 PROCEDURE — 84132 ASSAY OF SERUM POTASSIUM: CPT

## 2023-03-02 PROCEDURE — 36833 AV FISTULA REVISION: CPT | Performed by: SURGERY

## 2023-03-02 PROCEDURE — 82435 ASSAY OF BLOOD CHLORIDE: CPT

## 2023-03-02 PROCEDURE — 05780ZZ DILATION OF LEFT AXILLARY VEIN, OPEN APPROACH: ICD-10-PCS | Performed by: SURGERY

## 2023-03-02 PROCEDURE — 10701625 HC MISC GUIDEWIRE

## 2023-03-02 PROCEDURE — 05C80ZZ EXTIRPATION OF MATTER FROM LEFT AXILLARY VEIN, OPEN APPROACH: ICD-10-PCS | Performed by: SURGERY

## 2023-03-02 PROCEDURE — 2580000003 HC RX 258: Performed by: NURSE ANESTHETIST, CERTIFIED REGISTERED

## 2023-03-02 PROCEDURE — 94660 CPAP INITIATION&MGMT: CPT

## 2023-03-02 PROCEDURE — 7100000011 HC PHASE II RECOVERY - ADDTL 15 MIN

## 2023-03-02 PROCEDURE — 2580000003 HC RX 258: Performed by: SURGERY

## 2023-03-02 PROCEDURE — 82565 ASSAY OF CREATININE: CPT

## 2023-03-02 PROCEDURE — 3600000007 HC SURGERY HYBRID BASE: Performed by: SURGERY

## 2023-03-02 PROCEDURE — 3700000000 HC ANESTHESIA ATTENDED CARE: Performed by: SURGERY

## 2023-03-02 PROCEDURE — 7100000000 HC PACU RECOVERY - FIRST 15 MIN: Performed by: SURGERY

## 2023-03-02 RX ORDER — CARVEDILOL 6.25 MG/1
6.25 TABLET ORAL 2 TIMES DAILY WITH MEALS
Status: CANCELLED | OUTPATIENT
Start: 2023-03-02

## 2023-03-02 RX ORDER — BISACODYL 10 MG
10 SUPPOSITORY, RECTAL RECTAL DAILY
Status: CANCELLED | OUTPATIENT
Start: 2023-03-02

## 2023-03-02 RX ORDER — SODIUM CHLORIDE 0.9 % (FLUSH) 0.9 %
5-40 SYRINGE (ML) INJECTION PRN
Status: CANCELLED | OUTPATIENT
Start: 2023-03-02

## 2023-03-02 RX ORDER — SODIUM CHLORIDE 9 MG/ML
INJECTION, SOLUTION INTRAVENOUS PRN
Status: DISCONTINUED | OUTPATIENT
Start: 2023-03-02 | End: 2023-03-02 | Stop reason: HOSPADM

## 2023-03-02 RX ORDER — ONDANSETRON 4 MG/1
4 TABLET, ORALLY DISINTEGRATING ORAL EVERY 8 HOURS PRN
Status: CANCELLED | OUTPATIENT
Start: 2023-03-02

## 2023-03-02 RX ORDER — SODIUM CHLORIDE 0.9 % (FLUSH) 0.9 %
5-40 SYRINGE (ML) INJECTION PRN
Status: DISCONTINUED | OUTPATIENT
Start: 2023-03-02 | End: 2023-03-02 | Stop reason: HOSPADM

## 2023-03-02 RX ORDER — SODIUM CHLORIDE 0.9 % (FLUSH) 0.9 %
5-40 SYRINGE (ML) INJECTION EVERY 12 HOURS SCHEDULED
Status: CANCELLED | OUTPATIENT
Start: 2023-03-02

## 2023-03-02 RX ORDER — B,C/FOLIC/ZINC/SELENOMETH/D3/E 0.8-12.5MG
1 TABLET ORAL EVERY OTHER DAY
Status: CANCELLED | OUTPATIENT
Start: 2023-03-02

## 2023-03-02 RX ORDER — PROPOFOL 10 MG/ML
INJECTION, EMULSION INTRAVENOUS PRN
Status: DISCONTINUED | OUTPATIENT
Start: 2023-03-02 | End: 2023-03-02 | Stop reason: SDUPTHER

## 2023-03-02 RX ORDER — ATORVASTATIN CALCIUM 40 MG/1
40 TABLET, FILM COATED ORAL NIGHTLY
Status: DISCONTINUED | OUTPATIENT
Start: 2023-03-02 | End: 2023-03-05 | Stop reason: HOSPADM

## 2023-03-02 RX ORDER — SODIUM CHLORIDE 9 MG/ML
INJECTION, SOLUTION INTRAVENOUS PRN
Status: DISCONTINUED | OUTPATIENT
Start: 2023-03-02 | End: 2023-03-05 | Stop reason: HOSPADM

## 2023-03-02 RX ORDER — SEVELAMER CARBONATE FOR ORAL SUSPENSION 800 MG/1
2.4 POWDER, FOR SUSPENSION ORAL
Status: DISCONTINUED | OUTPATIENT
Start: 2023-03-03 | End: 2023-03-05 | Stop reason: HOSPADM

## 2023-03-02 RX ORDER — ONDANSETRON 2 MG/ML
INJECTION INTRAMUSCULAR; INTRAVENOUS PRN
Status: DISCONTINUED | OUTPATIENT
Start: 2023-03-02 | End: 2023-03-02 | Stop reason: SDUPTHER

## 2023-03-02 RX ORDER — SODIUM CHLORIDE 0.9 % (FLUSH) 0.9 %
5-40 SYRINGE (ML) INJECTION EVERY 12 HOURS SCHEDULED
Status: DISCONTINUED | OUTPATIENT
Start: 2023-03-03 | End: 2023-03-05 | Stop reason: HOSPADM

## 2023-03-02 RX ORDER — FENTANYL CITRATE 50 UG/ML
25 INJECTION, SOLUTION INTRAMUSCULAR; INTRAVENOUS EVERY 5 MIN PRN
Status: DISCONTINUED | OUTPATIENT
Start: 2023-03-02 | End: 2023-03-02 | Stop reason: HOSPADM

## 2023-03-02 RX ORDER — POLYETHYLENE GLYCOL 3350 17 G/17G
17 POWDER, FOR SOLUTION ORAL DAILY
Status: CANCELLED | OUTPATIENT
Start: 2023-03-02

## 2023-03-02 RX ORDER — HEPARIN SODIUM 1000 [USP'U]/ML
INJECTION, SOLUTION INTRAVENOUS; SUBCUTANEOUS PRN
Status: DISCONTINUED | OUTPATIENT
Start: 2023-03-02 | End: 2023-03-02 | Stop reason: SDUPTHER

## 2023-03-02 RX ORDER — ALBUTEROL SULFATE 90 UG/1
AEROSOL, METERED RESPIRATORY (INHALATION) PRN
Status: DISCONTINUED | OUTPATIENT
Start: 2023-03-02 | End: 2023-03-02 | Stop reason: SDUPTHER

## 2023-03-02 RX ORDER — CEFAZOLIN SODIUM 1 G/3ML
INJECTION, POWDER, FOR SOLUTION INTRAMUSCULAR; INTRAVENOUS PRN
Status: DISCONTINUED | OUTPATIENT
Start: 2023-03-02 | End: 2023-03-02 | Stop reason: SDUPTHER

## 2023-03-02 RX ORDER — HEPARIN SODIUM 1000 [USP'U]/ML
INJECTION, SOLUTION INTRAVENOUS; SUBCUTANEOUS
Status: DISPENSED
Start: 2023-03-02 | End: 2023-03-03

## 2023-03-02 RX ORDER — ALBUTEROL SULFATE 90 UG/1
2 AEROSOL, METERED RESPIRATORY (INHALATION) 4 TIMES DAILY PRN
Status: CANCELLED | OUTPATIENT
Start: 2023-03-02

## 2023-03-02 RX ORDER — IODIXANOL 320 MG/ML
INJECTION, SOLUTION INTRAVASCULAR
Status: DISPENSED
Start: 2023-03-02 | End: 2023-03-03

## 2023-03-02 RX ORDER — PHENYLEPHRINE HCL IN 0.9% NACL 1 MG/10 ML
SYRINGE (ML) INTRAVENOUS PRN
Status: DISCONTINUED | OUTPATIENT
Start: 2023-03-02 | End: 2023-03-02 | Stop reason: SDUPTHER

## 2023-03-02 RX ORDER — ONDANSETRON 2 MG/ML
4 INJECTION INTRAMUSCULAR; INTRAVENOUS EVERY 6 HOURS PRN
Status: CANCELLED | OUTPATIENT
Start: 2023-03-02

## 2023-03-02 RX ORDER — SODIUM CHLORIDE 9 MG/ML
INJECTION, SOLUTION INTRAVENOUS PRN
Status: CANCELLED | OUTPATIENT
Start: 2023-03-02

## 2023-03-02 RX ORDER — MORPHINE SULFATE 1 MG/ML
2 INJECTION, SOLUTION EPIDURAL; INTRATHECAL; INTRAVENOUS EVERY 5 MIN PRN
Status: DISCONTINUED | OUTPATIENT
Start: 2023-03-02 | End: 2023-03-02 | Stop reason: HOSPADM

## 2023-03-02 RX ORDER — SODIUM CHLORIDE 0.9 % (FLUSH) 0.9 %
5-40 SYRINGE (ML) INJECTION PRN
Status: DISCONTINUED | OUTPATIENT
Start: 2023-03-02 | End: 2023-03-05 | Stop reason: HOSPADM

## 2023-03-02 RX ORDER — ROCURONIUM BROMIDE 10 MG/ML
INJECTION, SOLUTION INTRAVENOUS PRN
Status: DISCONTINUED | OUTPATIENT
Start: 2023-03-02 | End: 2023-03-02 | Stop reason: SDUPTHER

## 2023-03-02 RX ORDER — CARVEDILOL 6.25 MG/1
6.25 TABLET ORAL 2 TIMES DAILY WITH MEALS
Status: DISCONTINUED | OUTPATIENT
Start: 2023-03-03 | End: 2023-03-04

## 2023-03-02 RX ORDER — FENTANYL CITRATE 50 UG/ML
INJECTION, SOLUTION INTRAMUSCULAR; INTRAVENOUS PRN
Status: DISCONTINUED | OUTPATIENT
Start: 2023-03-02 | End: 2023-03-02 | Stop reason: SDUPTHER

## 2023-03-02 RX ORDER — ONDANSETRON 2 MG/ML
4 INJECTION INTRAMUSCULAR; INTRAVENOUS EVERY 6 HOURS PRN
Status: DISCONTINUED | OUTPATIENT
Start: 2023-03-02 | End: 2023-03-05 | Stop reason: HOSPADM

## 2023-03-02 RX ORDER — ALBUTEROL SULFATE 90 UG/1
2 AEROSOL, METERED RESPIRATORY (INHALATION) 4 TIMES DAILY PRN
Status: DISCONTINUED | OUTPATIENT
Start: 2023-03-02 | End: 2023-03-05 | Stop reason: HOSPADM

## 2023-03-02 RX ORDER — SEVELAMER CARBONATE FOR ORAL SUSPENSION 2400 MG/1
1 POWDER, FOR SUSPENSION ORAL
Status: CANCELLED | OUTPATIENT
Start: 2023-03-03

## 2023-03-02 RX ORDER — ACETAMINOPHEN 325 MG/1
650 TABLET ORAL EVERY 4 HOURS PRN
Status: DISCONTINUED | OUTPATIENT
Start: 2023-03-02 | End: 2023-03-05 | Stop reason: HOSPADM

## 2023-03-02 RX ORDER — ATORVASTATIN CALCIUM 40 MG/1
40 TABLET, FILM COATED ORAL NIGHTLY
Status: CANCELLED | OUTPATIENT
Start: 2023-03-02

## 2023-03-02 RX ORDER — SODIUM CHLORIDE 0.9 % (FLUSH) 0.9 %
5-40 SYRINGE (ML) INJECTION EVERY 12 HOURS SCHEDULED
Status: DISCONTINUED | OUTPATIENT
Start: 2023-03-02 | End: 2023-03-02 | Stop reason: HOSPADM

## 2023-03-02 RX ORDER — SODIUM PHOSPHATE, DIBASIC AND SODIUM PHOSPHATE, MONOBASIC 7; 19 G/133ML; G/133ML
1 ENEMA RECTAL DAILY PRN
Status: CANCELLED | OUTPATIENT
Start: 2023-03-02

## 2023-03-02 RX ORDER — HEPARIN SODIUM 1000 [USP'U]/ML
INJECTION, SOLUTION INTRAVENOUS; SUBCUTANEOUS PRN
Status: DISCONTINUED | OUTPATIENT
Start: 2023-03-02 | End: 2023-03-02 | Stop reason: ALTCHOICE

## 2023-03-02 RX ORDER — LIDOCAINE HYDROCHLORIDE 10 MG/ML
INJECTION, SOLUTION EPIDURAL; INFILTRATION; INTRACAUDAL; PERINEURAL PRN
Status: DISCONTINUED | OUTPATIENT
Start: 2023-03-02 | End: 2023-03-02 | Stop reason: SDUPTHER

## 2023-03-02 RX ORDER — SODIUM CHLORIDE 9 MG/ML
INJECTION, SOLUTION INTRAVENOUS CONTINUOUS
Status: DISCONTINUED | OUTPATIENT
Start: 2023-03-02 | End: 2023-03-03

## 2023-03-02 RX ORDER — OXYCODONE HYDROCHLORIDE 5 MG/1
5 TABLET ORAL EVERY 6 HOURS PRN
Status: DISCONTINUED | OUTPATIENT
Start: 2023-03-02 | End: 2023-03-03

## 2023-03-02 RX ORDER — MAGNESIUM HYDROXIDE 1200 MG/15ML
LIQUID ORAL CONTINUOUS PRN
Status: DISCONTINUED | OUTPATIENT
Start: 2023-03-02 | End: 2023-03-02 | Stop reason: ALTCHOICE

## 2023-03-02 RX ORDER — ONDANSETRON 4 MG/1
4 TABLET, ORALLY DISINTEGRATING ORAL EVERY 8 HOURS PRN
Status: DISCONTINUED | OUTPATIENT
Start: 2023-03-02 | End: 2023-03-05 | Stop reason: HOSPADM

## 2023-03-02 RX ADMIN — SUGAMMADEX 200 MG: 100 INJECTION, SOLUTION INTRAVENOUS at 21:23

## 2023-03-02 RX ADMIN — ONDANSETRON 4 MG: 2 INJECTION INTRAMUSCULAR; INTRAVENOUS at 21:25

## 2023-03-02 RX ADMIN — SODIUM CHLORIDE: 9 INJECTION, SOLUTION INTRAVENOUS at 13:58

## 2023-03-02 RX ADMIN — ROCURONIUM BROMIDE 50 MG: 10 INJECTION, SOLUTION INTRAVENOUS at 19:53

## 2023-03-02 RX ADMIN — LIDOCAINE HYDROCHLORIDE 50 MG: 10 INJECTION, SOLUTION EPIDURAL; INFILTRATION; INTRACAUDAL; PERINEURAL at 19:53

## 2023-03-02 RX ADMIN — CEFAZOLIN 2 G: 1 INJECTION, POWDER, FOR SOLUTION INTRAMUSCULAR; INTRAVENOUS at 20:05

## 2023-03-02 RX ADMIN — PROPOFOL 150 MG: 10 INJECTION, EMULSION INTRAVENOUS at 19:53

## 2023-03-02 RX ADMIN — Medication 200 MCG: at 20:03

## 2023-03-02 RX ADMIN — FENTANYL CITRATE 50 MCG: 50 INJECTION, SOLUTION INTRAMUSCULAR; INTRAVENOUS at 19:53

## 2023-03-02 RX ADMIN — Medication 4 PUFF: at 21:37

## 2023-03-02 RX ADMIN — HEPARIN SODIUM 8000 UNITS: 1000 INJECTION INTRAVENOUS; SUBCUTANEOUS at 20:32

## 2023-03-02 RX ADMIN — PHENYLEPHRINE HYDROCHLORIDE 75 MCG/MIN: 10 INJECTION INTRAVENOUS at 20:10

## 2023-03-02 RX ADMIN — FENTANYL CITRATE 50 MCG: 50 INJECTION, SOLUTION INTRAMUSCULAR; INTRAVENOUS at 21:09

## 2023-03-02 RX ADMIN — Medication 200 MCG: at 19:54

## 2023-03-02 ASSESSMENT — ENCOUNTER SYMPTOMS: SHORTNESS OF BREATH: 1

## 2023-03-02 NOTE — H&P
VASCULAR SURGERY H&P  [address]      Patient's Name/ Date of Birth/ Gender: Go Zacarias / 1960 (57 y.o.) / male     Surgeon: Dr. Katlyn Rodriguez    History of present Illness: Pt is a 57 y.o. male who presents for evaluation for open left upper extremity brachiocephalic artegraft thrombectomy due to dysfunctional fistula. Patient was attempted HD cannulation today that was unsuccessful, evaluation at HD center noted thrombosis of the LUE graft. Patient denies CP, does endorse persistent SOB due to COPD hx on 2-3L NC oxygen at home, denies fevers, sweats, chills, N/V. Past Medical History:  has a past medical history of Acute congestive heart failure ([facility]), Acute on chronic diastolic congestive heart failure ([address] Anemia, Anemia of chronic renal failure, Atrial fibrillation ([facility]), AVF (arteriovenous fistula) ([address] Bowel obstruction ([facility]), CAD (coronary artery disease), Chest pain at rest, CHF (congestive heart failure) ([address] CHF (congestive heart failure), NYHA class I, acute on chronic, combined ([address] Chronic kidney disease, CKD (chronic kidney disease) stage 4, GFR 15-29 ml/min ([facility]), Coronary artery disease involving native coronary artery of native heart without angina pectoris, Diabetes mellitus ([address] Dialysis patient ([address] ESRD (end stage renal disease) ([address] Essential hypertension, Groin swelling, Hemodialysis patient ([address] Hydrocele, Hyperlipidemia, Hypertension, Inguinal hernia, MI, old, NSTEMI (non-ST elevated myocardial infarction) ([address] On home O2, BATSHEVA (obstructive sleep apnea), Patient in clinical research study, Pneumonia, Poor historian, Prostatism, Respiratory distress, Rising PSA level, S/P arteriovenous (AV) graft placement, S/P PTCA - TIM distal LAD - Dr. Lainez Factor 4/1/19, Sleep apnea, Small bowel obstruction (HCC), SOB (shortness of breath), and Type 2 diabetes mellitus with chronic kidney disease on chronic dialysis ([address]     Past Surgical History:   Past Surgical History:   Procedure Laterality Date    ABDOMEN SURGERY  2013    BOWEL OBSTRUCTION    AV FISTULA CREATION Left 02/20/2019    AV FISTULA REPAIR  07/17/2019    AV fistula revision, branch ligation / Percutaneous angioplasty of proximal anastomosis and outflow tract of dialysis circuit with drug coated balloon  /  Dr Chantel Zhang  02/09/2021    Dr. Iggy Jacobo, [address]  03/31/2019    TIM LAD    CYSTOSCOPY  11/17/2017    CYSTOSCOPY N/A 04/01/2022    CYSTOSCOPY DILATION performed by Elzbieta Umaña MD at [place] [address] Left 02/20/2019    AV FISTULA CREATION ARM performed by Nini Santo MD at [address] 10/23/2019    LEFT UPPER EXTREMITY AV GRAFT CREATION WITH [id number] ARTEGRAFT, LEFT UPPER EXTREMITY AV FISTULA LIGATION performed by Nini Santo MD at [address] 06/15/2022    REVISION OF UPPER EXTREMITY AV FISTULA GRAFT, CENTRAL VENOUS CATHETER PLACEMENT (C-ARM) performed by Nini Santo MD at [facility] 07/18/2022    UPPER EXTREMITY FISTULAGRAM performed by Nini Santo MD at [facility] 2005    [address] Left 03/02/2023    IR THROMBECTOMY PERCUT AVF  01/03/2022    IR THROMBECTOMY PERCUT AVF 1/3/2022 [facility] SPECIAL PROCEDURES    MIDDLE EAR SURGERY  1966    EAR DRUM REPAIRED    MOUTH SURGERY  2007    1 WISDOM TOOTH REMOVED    NOSE SURGERY  1968    CAUTERY TO STOP NOSE BEEDS    OTHER SURGICAL HISTORY Left 03/06/2019    fistulagram    CT UNLISTED PROCEDURE MALE GENITAL SYSTEM N/A 02/16/2018    US  PROSTATE BIOPSY WITH SATURATION performed by Elzbieta Umaña MD at [address] [place] [address] 11/17/2017    CYSTOSCOPY PROSTATE US BIOPSY performed by Elzbieta Umaña MD at [place] [address]  02/16/2018    [facility] TUNNELED VENOUS CATHETER PLACEMENT Right 06/15/2022    Placement of tunneled central venous hemodialysis catheter  /  Dr Kerwin Chun N/A 09/27/2021    EGD BIOPSY performed by Keira Palomares MD at [facility]  09/20/2019    LUE FISTULAGRAM  /  DR Omi Gasca  /  Findings: Severe stenosis of proximal cephalic vein. / Will plan for LUE AVG placement. VASCULAR SURGERY Left 06/15/2022    Open revision of left upper extremity AV graft   /  Dr Emmalene Denver Left 07/18/2022    LUE FISTULAGRAM / DR Omi Gasca    VASECTOMY  1983       Social History:  reports that he has never smoked. He has never used smokeless tobacco. He reports that he does not drink alcohol and does not use drugs. Family History: family history includes Asthma in his brother; Diabetes in his brother, brother, and brother; Early Death in his mother; Heart Disease in his mother; High Blood Pressure in his brother, brother, and brother. Review of Systems:   General: Denies fever, chills, night sweats, weight loss, malaise, fatigue  HEENT: Denies sore throat, sinus problems, allergic rhinosinusitis  Card: hx CAD w/ PCI and stenting on plavix, hx a.fib on eliquis  Pulm: hx COPD on home o2  GI: denies history of constipation, diarrhea, hematochezia or melena  : esrd on HD since 2019  Endo: DM-II  Heme: Denies anemia, h/o bleeding or clotting problems. Neuro: Denies h/o CVA, TIA  Skin: Denies rashes, ulcers  Musculoskeletal: Denies muscle, joint, back pain.     Allergies: Lisinopril    Current Meds:  Current Facility-Administered Medications:     0.9 % sodium chloride infusion, , IntraVENous, Continuous, Beverley Ibarra MD, Last Rate: 25 mL/hr at 03/02/23 1358, New Bag at 03/02/23 1358    Vital Signs:  Vitals:    03/02/23 1320   BP: 122/80   Pulse: (!) 114   Resp: 21   SpO2: 99%       Physical Exam:  Vital signs and Nurse's note reviewed  Gen:  A&Ox3, NAD  HEENT: PERRLA, EOMI, no scleral icterus, oral mucosa moist  Neck: Supple  Chest: Symmetric rise with inhalation, no evidence of trauma  CVS: Regular rate and rhythm on monitor  Resp: Good bilateral air entry, no stridor, no increased wob  Abd: soft, non-tender, non-distended, without guarding or rebound  Ext: No clubbing, cyanosis, edema, peripheral pulses 1+ DP bilaterally, left upper extremity AV graft without thrill or pulsation  CNS: Moves all extremities, stocking glove neuropathy    Labs:   Lab Results   Component Value Date/Time    WBC 11.5 01/25/2023 06:11 AM    HGB 10.5 01/25/2023 06:11 AM    HCT 35.0 01/25/2023 06:11 AM    .6 01/25/2023 06:11 AM     01/25/2023 06:11 AM     Lab Results   Component Value Date/Time     01/26/2023 08:02 AM    K 5.0 01/26/2023 08:02 AM    CL 90 01/26/2023 08:02 AM    CO2 24 01/26/2023 08:02 AM    BUN 70 01/26/2023 08:02 AM    CREATININE 12.05 03/02/2023 01:54 PM    CREATININE 8.92 01/26/2023 08:02 AM    GLUCOSE 131 01/26/2023 08:02 AM    GLUCOSE 147 09/08/2018 08:04 PM    CALCIUM 9.0 01/26/2023 08:02 AM     Lab Results   Component Value Date/Time    ALKPHOS 181 11/12/2021 07:27 AM    ALT 15 11/12/2021 07:27 AM    AST 14 11/12/2021 07:27 AM    PROT 8.0 11/12/2021 07:27 AM    BILITOT 1.55 11/12/2021 07:27 AM    BILIDIR 0.28 05/01/2021 07:43 PM    LABALBU 4.1 11/12/2021 07:27 AM     No results found for: LACTA  No results found for: AMYLASE  Lab Results   Component Value Date/Time    LIPASE 28 11/11/2021 06:53 PM     Lab Results   Component Value Date/Time    INR 1.4 01/03/2022 09:21 AM       Imaging: No results found. Impression:  LUE AV graft malfunction    Recommendation:  Will plan to perform open thrombectomy and fistulography of the AV graft with goal of patient receiving HD tonight or in AM. Informed consent obtained by attending and witnessed in chart.        Electronically signed by Leroy Mccormick DO on 3/2/2023 at 2:12 PM

## 2023-03-02 NOTE — ANESTHESIA PRE PROCEDURE
Department of Anesthesiology  Preprocedure Note       Name:  Tristian Villatoro   Age:  57 y.o.  :  1960                                          MRN:  [id number]         Date:  3/2/2023      Surgeon: Dione Rubio):  Jacki Guerrero MD    Procedure: left open AV graft thrombectomy      Medications prior to admission:   Prior to Admission medications    Medication Sig Start Date End Date Taking? Authorizing Provider   apixaban (ELIQUIS) 5 MG TABS tablet Take 1 tablet by mouth 2 times daily 23   Erven Comp, DO   albuterol sulfate HFA (VENTOLIN HFA) 108 (90 Base) MCG/ACT inhaler Inhale 2 puffs into the lungs 4 times daily as needed for Wheezing 23   Erven Comp, DO   sevelamer (RENVELA) 2.4 g PACK packet Take 1 packet by mouth 3 times daily (with meals) 22   Historical Provider, MD   carvedilol (COREG) 6.25 MG tablet Take 1 tablet by mouth 2 times daily (with meals) 22   Fausto Burch MD   aspirin EC 81 MG EC tablet Take 1 tablet by mouth daily 21   Suzanne Ragsdale DO   glimepiride (AMARYL) 1 MG tablet Take 1 tablet by mouth every morning (before breakfast) Do not take if blood sugars are less than 110 21   Ari Escalante MD   atorvastatin (LIPITOR) 40 MG tablet Take 1 tablet by mouth nightly 21   Chi Garza MD   traMADol Page Therese) 50 MG tablet take 1 tablet by mouth every 6 hours if needed 10/21/20   Historical Provider, MD   Multiple Vitamins-Minerals (RENAPLEX-D) TABS Take 1 tablet by mouth every other day Pt takes occasionally 19   Historical Provider, MD   lidocaine-prilocaine (EMLA) 2.5-2.5 % cream  3/18/19   Historical Provider, MD       Current medications:    Current Facility-Administered Medications   Medication Dose Route Frequency Provider Last Rate Last Admin    0.9 % sodium chloride infusion   IntraVENous Continuous Jacki Guerrero MD 25 mL/hr at 23 1358 New Bag at 23 1358       Allergies:     Allergies   Allergen Reactions  Lisinopril Swelling       Problem List:    Patient Active Problem List   Diagnosis Code    CKD (chronic kidney disease) stage 4, GFR 15-29 ml/min ([facility]) N18.4    Anemia of chronic renal failure N18.9, D63.1    Type 2 diabetes mellitus with chronic kidney disease on chronic dialysis, without long-term current use of insulin ([facility]) E11.22, N18.6, Z99.2    Chest pain at rest R07.9    Pneumonia J18.9    Rising PSA level R97.20    Prostatism N40.0    Acute on chronic diastolic congestive heart failure ([facility]) I50.33    Anemia of chronic disease D63.8    Hypertension, essential I10    Respiratory distress R06.03    AVF (arteriovenous fistula) ([facility]) I77.0    Precordial chest pain R07.2    S/P PTCA - TIM distal LAD - Dr. [place] 4/1/19 Z98.61    NSTEMI (non-ST elevated myocardial infarction) ([facility] I21.4    S/P arteriovenous (AV) graft placement L91.968    Acute on chronic combined systolic (congestive) and diastolic (congestive) heart failure ([facility] I50.43    Encounter regarding vascular access for dialysis for ESRD ([facility] N18.6, Z99.2    Coronary artery disease involving native coronary artery of native heart without angina pectoris I25.10    BATSHEVA (obstructive sleep apnea) G47.33    Acute congestive heart failure ([facility]) I50.9    Small bowel obstruction ([facility] K56.609    Chest pain R07.9    Acute on chronic combined systolic and diastolic CHF (congestive heart failure) ([facility]) I50.43    Chronic abdominal pain R10.9, G89.29    Hypoxemia-due to CHF/fluid overload R09.02    Paroxysmal atrial fibrillation ([facility]) I48.0    Hyponatremia E87.1    ESRD on dialysis ([facility] N18.6, Z99.2    Dialysis AV fistula malfunction ([facility]) T82.590A    Arteriovenous fistula occlusion ([facility]) T82.898A    AV graft malfunction, initial encounter ([facility]) T82.590A    Lymphedema due to venous insufficiency I89.0, I87.2    S/P arteriovenous (AV) graft repair E59.319    Chronic respiratory failure with hypoxia ([facility]) J96.11    Pain of left upper extremity F09.007    Metabolic encephalopathy X94.70    Leg swelling M79.89    Pneumonia due to infectious organism Y32.1    Metabolic acidosis G02.68       Past Medical History:        Diagnosis Date    Acute congestive heart failure ([facility] 12/21/2016    Acute on chronic diastolic congestive heart failure ([address] 11/24/2018    Anemia 11/25/2018    Anemia of chronic renal failure 10/17/2016    Atrial fibrillation ([facility] 01/03/2022    AVF [facility]arteriovenous fistula) ([facility]     Bowel obstruction ([facility] [facility] 12/26/2013    CAD (coronary artery disease) 2013    ?     Chest pain at rest 12/21/2016    CHF (congestive heart failure) ([facility] 2013    last episode 11/2018    CHF (congestive heart failure), NYHA class I, acute on chronic, combined ([facility] [facility] 2/9/2021    Chronic kidney disease     CKD (chronic kidney disease) stage 4, GFR 15-29 ml/min ([facility] 10/17/2016    Coronary artery disease involving native coronary artery of native heart without angina pectoris     Diabetes mellitus ([facility] 2012    NIDDM    Dialysis patient ([facility]     Shayy Nielson  /  Radha Elizabeth  /  Jed Ndiaye    ESRD (end stage renal disease) ([facility]     Essential hypertension 11/25/2018    Groin swelling 07/17/2019    Hemodialysis patient ([facility] 11/28/2018    TUNNELD CATHETER RIGHT DIALYSIS ACCESSTUES THURS AND AT [place]    Hydrocele 07/17/2019    Hyperlipidemia 2013    ON RX    Hypertension 2013    ON RX    Inguinal hernia     BILATERAL- NEEDS REPAIRED, UMBILICAL HERNIA  ALSO    MI, old 12/26/2013    NSTEMI (non-ST elevated myocardial infarction) ([facility] 6/17/2019    On home O2     2 L    BATSHEVA (obstructive sleep apnea)     Patient in clinical research study 04/01/2019    XIENCE SHORT DAPT    Pneumonia     Poor historian     Prostatism 11/17/2017    Respiratory distress 11/25/2018    Rising PSA level 11/17/2017    S/P arteriovenous (AV) graft placement     S/P PTCA - TIM distal LAD - Dr. Derrek Coronel 4/1/19 4/1/2019    Sleep apnea 2015    pt has machine and does not use it    Small bowel obstruction ([facility] 5/1/2021    SOB (shortness of breath) 02/02/2022    noted walking from waiting room to pre op testing    Type 2 diabetes mellitus with chronic kidney disease on chronic dialysis ([facility] 10/17/2016       Past Surgical History:        Procedure Laterality Date    ABDOMEN SURGERY  2013    BOWEL OBSTRUCTION    AV FISTULA CREATION Left 02/20/2019    AV FISTULA REPAIR  07/17/2019    AV fistula revision, branch ligation / Percutaneous angioplasty of proximal anastomosis and outflow tract of dialysis circuit with drug coated balloon  /  Dr Edgar Ellison  02/09/2021    Dr. Aura Briseno, [address]  03/31/2019    TIM LAD    CYSTOSCOPY  11/17/2017    CYSTOSCOPY N/A 04/01/2022    CYSTOSCOPY DILATION performed by Patrick Covington MD at [facility] Left 02/20/2019    AV FISTULA CREATION ARM performed by Sonya Jacob MD at [facility] Left 10/23/2019    LEFT UPPER EXTREMITY AV GRAFT CREATION WITH [id number] ARTEGRAFT, LEFT UPPER EXTREMITY AV FISTULA LIGATION performed by Sonya Jacob MD at [facility] Left 06/15/2022    REVISION OF UPPER EXTREMITY AV FISTULA GRAFT, CENTRAL VENOUS CATHETER PLACEMENT (C-ARM) performed by Sonya Jacob MD at  [address] Left 07/18/2022    UPPER EXTREMITY FISTULAGRAM performed by Sonya Jacob MD at [facility] 2005    [place] [address] Left 03/02/2023    IR THROMBECTOMY PERCUT AVF  01/03/2022    IR THROMBECTOMY PERCUT AVF 1/3/2022 [facility]    MIDDLE EAR SURGERY  1966    EAR DRUM REPAIRED    MOUTH SURGERY  2007    1 WISDOM TOOTH REMOVED    NOSE SURGERY  1968    CAUTERY TO STOP NOSE BEEDS    OTHER SURGICAL HISTORY Left 03/06/2019    fistulagram    OH UNLISTED PROCEDURE MALE GENITAL SYSTEM N/A 02/16/2018    US  PROSTATE BIOPSY WITH SATURATION performed by Layla Prader, MD at [facility] 11/17/2017    CYSTOSCOPY PROSTATE US BIOPSY performed by Layla Prader, MD at [address]  02/16/2018    TONSILLECTOMY  1968    TUNNELED VENOUS CATHETER PLACEMENT Right 06/15/2022    Placement of tunneled central venous hemodialysis catheter  /  Dr Zazueta Plants N/A 09/27/2021    EGD BIOPSY performed by Nazario Rai MD at [place] [id number]  09/20/2019    LUE FISTULAGRAM  /  DR Jeff Maciel  /  Findings: Severe stenosis of proximal cephalic vein. / Will plan for LUE AVG placement.  VASCULAR SURGERY Left 06/15/2022    Open revision of left upper extremity AV graft   /  Dr Andres Nair Left 07/18/2022    LUE FISTULAGRAM / DR Jeff Maciel    VASECTOMY  1983       Social History:    Social History     Tobacco Use    Smoking status: Never    Smokeless tobacco: Never   Substance Use Topics    Alcohol use:  No                                Counseling given: Not Answered      Vital Signs (Current):   Vitals:    03/02/23 1320   BP: 122/80   Pulse: (!) 114   Resp: 21   Temp: 97.7 °F (36.5 °C)   TempSrc: Oral   SpO2: 99%                                              BP Readings from Last 3 Encounters:   03/02/23 122/80   01/26/23 113/75   08/04/22 110/72       NPO Status: Time of last liquid consumption: 0500                        Time of last solid consumption: 0100                        Date of last liquid consumption: 03/02/23                        Date of last solid food consumption: 03/02/23    BMI:   Wt Readings from Last 3 Encounters:   01/26/23 236 lb 1.8 oz (107.1 kg)   08/04/22 255 lb (115.7 kg)   07/18/22 255 lb 1.2 oz (115.7 kg)     There is no height or weight on file to calculate BMI.    CBC:   Lab Results   Component Value Date/Time    WBC 11.5 01/25/2023 06:11 AM    RBC 3.41 01/25/2023 06:11 AM    RBC 3.62 09/08/2018 08:04 PM    HGB 10.5 01/25/2023 06:11 AM    HCT 35.0 01/25/2023 06:11 AM    .6 01/25/2023 06:11 AM    RDW 16.6 01/25/2023 06:11 AM     01/25/2023 06:11 AM       CMP:   Lab Results   Component Value Date/Time     01/26/2023 08:02 AM    K 5.0 01/26/2023 08:02 AM    CL 90 01/26/2023 08:02 AM    CO2 24 01/26/2023 08:02 AM    BUN 70 01/26/2023 08:02 AM    CREATININE 12.05 03/02/2023 01:54 PM    CREATININE 8.92 01/26/2023 08:02 AM    GFRAA 7 06/16/2022 08:13 AM    LABGLOM 6 01/26/2023 08:02 AM    GLUCOSE 131 01/26/2023 08:02 AM    GLUCOSE 147 09/08/2018 08:04 PM    PROT 8.0 11/12/2021 07:27 AM    CALCIUM 9.0 01/26/2023 08:02 AM    BILITOT 1.55 11/12/2021 07:27 AM    ALKPHOS 181 11/12/2021 07:27 AM    AST 14 11/12/2021 07:27 AM    ALT 15 11/12/2021 07:27 AM       POC Tests:   Recent Labs     03/02/23  1354   POCGLU 154*   POCNA 136*   POCK 4.9*   POCCL 99   POCBUN 112*   POCHEMO 11.6*   POCHCT 34*       Coags:   Lab Results   Component Value Date/Time    PROTIME 14.2 01/03/2022 09:21 AM    INR 1.4 01/03/2022 09:21 AM    APTT 50.3 01/25/2023 06:11 AM       HCG (If Applicable): No results found for: PREGTESTUR, PREGSERUM, HCG, HCGQUANT     ABGs: No results found for: PHART, PO2ART, BOE5MAN, WTW3OBC, BEART, D3XXTNDS     Type & Screen (If Applicable):  No results found for: LABABO, [address]    Anesthesia Evaluation  Patient summary reviewed no history of anesthetic complications:   Airway: Mallampati: III  TM distance: >3 FB   Neck ROM: full  Mouth opening: > = 3 FB   Dental:    (+) poor dentition      Pulmonary:normal exam    (+) pneumonia:  shortness of breath:  sleep apnea: on CPAP and noncompliant,                            ROS comment: On home oxygen 3L nasal cannula   Cardiovascular:    (+) hypertension:, past MI: > 6 months, CAD:, CABG/stent:, CHF:, hyperlipidemia            Echocardiogram reviewed         Beta Blocker:  Not on Beta Blocker Neuro/Psych:   Negative Neuro/Psych ROS              GI/Hepatic/Renal:   (+) renal disease: CRI and dialysis,           Endo/Other:    (+) DiabetesType II DM, poorly controlled, , .                 Abdominal:             Vascular: negative vascular ROS. Other Findings:             Anesthesia Plan      general and MAC     ASA 3       Induction: intravenous. MIPS: Postoperative opioids intended, Prophylactic antiemetics administered and Postoperative trial extubation. Anesthetic plan and risks discussed with patient. Use of blood products discussed with patient whom consented to blood products. Plan discussed with CRNA. Narrative & Impression    Atrial fibrillation  Left axis deviation  Right bundle branch block  Inferior infarct , age undetermined  Anterior infarct , age undetermined  Abnormal ECG  When compared with ECG of 31-DEC-2021 11:58,  Atrial fibrillation has replaced Wide QRS rhythm      Specimen Collected: 01/03/22 10:22          CONCLUSIONS     Summary  Left ventricle is moderately enlarged, global left ventricular systolic  function is moderately reduced, Visually estimated EF 30%. Global dysfunction is noted. Grade III (severe) left ventricular diastolic dysfunction. Left atrium is moderately dilated. Right atrial dilatation. Prominent Eustachian valve. Dialysis catheter  noted. Right ventricular dilatation with reduced systolic function. Aortic valve is sclerotic but opens well. Trivial aortic insufficiency. Mitral valve sclerosis without stenosis. Trivial mitral regurgitation. Moderate to severe tricuspid regurgitation. Estimated right ventricular  systolic pressure is 39 mmHg.   Trivial pulmonic insufficiency.     K 4.9  Gluc 154  H/h 11/34    Maya Hahn MD   3/2/2023

## 2023-03-02 NOTE — PROGRESS NOTES
Patient admitted, consent signed and questions answered. Patient ready for procedure. Call light to reach with side rails up 2 of 2. Left upper arm wiped with chlorhexidine. No family at bedside with patient. History and physical needs completed.

## 2023-03-03 PROBLEM — T82.898A AV FISTULA OCCLUSION, INITIAL ENCOUNTER (HCC): Status: ACTIVE | Noted: 2023-03-03

## 2023-03-03 LAB
ABSOLUTE EOS #: 0 K/UL (ref 0–0.44)
ABSOLUTE IMMATURE GRANULOCYTE: 0.21 K/UL (ref 0–0.3)
ABSOLUTE LYMPH #: 0.52 K/UL (ref 1.1–3.7)
ABSOLUTE MONO #: 2.08 K/UL (ref 0.1–1.2)
ANION GAP SERPL CALCULATED.3IONS-SCNC: 16 MMOL/L (ref 9–17)
ANION GAP SERPL CALCULATED.3IONS-SCNC: 22 MMOL/L (ref 9–17)
BASOPHILS # BLD: 1 % (ref 0–2)
BASOPHILS ABSOLUTE: 0.1 K/UL (ref 0–0.2)
BUN SERPL-MCNC: 114 MG/DL (ref 8–23)
BUN SERPL-MCNC: 72 MG/DL (ref 8–23)
CALCIUM SERPL-MCNC: 8.3 MG/DL (ref 8.6–10.4)
CALCIUM SERPL-MCNC: 8.3 MG/DL (ref 8.6–10.4)
CHLORIDE SERPL-SCNC: 91 MMOL/L (ref 98–107)
CHLORIDE SERPL-SCNC: 93 MMOL/L (ref 98–107)
CO2 SERPL-SCNC: 22 MMOL/L (ref 20–31)
CO2 SERPL-SCNC: 27 MMOL/L (ref 20–31)
CREAT SERPL-MCNC: 12.38 MG/DL (ref 0.7–1.2)
CREAT SERPL-MCNC: 8.85 MG/DL (ref 0.7–1.2)
EOSINOPHILS RELATIVE PERCENT: 0 % (ref 1–4)
GFR SERPL CREATININE-BSD FRML MDRD: 4 ML/MIN/1.73M2
GFR SERPL CREATININE-BSD FRML MDRD: 6 ML/MIN/1.73M2
GLUCOSE BLD-MCNC: 100 MG/DL (ref 75–110)
GLUCOSE SERPL-MCNC: 115 MG/DL (ref 70–99)
GLUCOSE SERPL-MCNC: 121 MG/DL (ref 70–99)
HCT VFR BLD AUTO: 34.1 % (ref 40.7–50.3)
HCT VFR BLD AUTO: 37.7 % (ref 40.7–50.3)
HGB BLD-MCNC: 10 G/DL (ref 13–17)
HGB BLD-MCNC: 10.5 G/DL (ref 13–17)
IMMATURE GRANULOCYTES: 2 %
LYMPHOCYTES # BLD: 5 % (ref 24–43)
MCH RBC QN AUTO: 29.2 PG (ref 25.2–33.5)
MCH RBC QN AUTO: 29.5 PG (ref 25.2–33.5)
MCHC RBC AUTO-ENTMCNC: 27.9 G/DL (ref 28.4–34.8)
MCHC RBC AUTO-ENTMCNC: 29.3 G/DL (ref 28.4–34.8)
MCV RBC AUTO: 100.6 FL (ref 82.6–102.9)
MCV RBC AUTO: 105 FL (ref 82.6–102.9)
MONOCYTES # BLD: 20 % (ref 3–12)
MORPHOLOGY: ABNORMAL
NRBC AUTOMATED: 0 PER 100 WBC
NRBC AUTOMATED: 0 PER 100 WBC
PDW BLD-RTO: 17.1 % (ref 11.8–14.4)
PDW BLD-RTO: 17.2 % (ref 11.8–14.4)
PLATELET # BLD AUTO: 169 K/UL (ref 138–453)
PLATELET # BLD AUTO: 178 K/UL (ref 138–453)
PMV BLD AUTO: 8.8 FL (ref 8.1–13.5)
PMV BLD AUTO: 9.1 FL (ref 8.1–13.5)
POTASSIUM SERPL-SCNC: 4.3 MMOL/L (ref 3.7–5.3)
POTASSIUM SERPL-SCNC: 5.5 MMOL/L (ref 3.7–5.3)
RBC # BLD: 3.39 M/UL (ref 4.21–5.77)
RBC # BLD: 3.59 M/UL (ref 4.21–5.77)
SEG NEUTROPHILS: 72 % (ref 36–65)
SEGMENTED NEUTROPHILS ABSOLUTE COUNT: 7.49 K/UL (ref 1.5–8.1)
SODIUM SERPL-SCNC: 134 MMOL/L (ref 135–144)
SODIUM SERPL-SCNC: 137 MMOL/L (ref 135–144)
TROPONIN I SERPL DL<=0.01 NG/ML-MCNC: 229 NG/L (ref 0–22)
TROPONIN I SERPL DL<=0.01 NG/ML-MCNC: 267 NG/L (ref 0–22)
WBC # BLD AUTO: 10.4 K/UL (ref 3.5–11.3)
WBC # BLD AUTO: 8.8 K/UL (ref 3.5–11.3)

## 2023-03-03 PROCEDURE — 6370000000 HC RX 637 (ALT 250 FOR IP)

## 2023-03-03 PROCEDURE — 85027 COMPLETE CBC AUTOMATED: CPT

## 2023-03-03 PROCEDURE — 5A1D70Z PERFORMANCE OF URINARY FILTRATION, INTERMITTENT, LESS THAN 6 HOURS PER DAY: ICD-10-PCS | Performed by: SURGERY

## 2023-03-03 PROCEDURE — 2580000003 HC RX 258

## 2023-03-03 PROCEDURE — 93005 ELECTROCARDIOGRAM TRACING: CPT

## 2023-03-03 PROCEDURE — 80048 BASIC METABOLIC PNL TOTAL CA: CPT

## 2023-03-03 PROCEDURE — 82947 ASSAY GLUCOSE BLOOD QUANT: CPT

## 2023-03-03 PROCEDURE — 1200000000 HC SEMI PRIVATE

## 2023-03-03 PROCEDURE — 36415 COLL VENOUS BLD VENIPUNCTURE: CPT

## 2023-03-03 PROCEDURE — 99233 SBSQ HOSP IP/OBS HIGH 50: CPT | Performed by: INTERNAL MEDICINE

## 2023-03-03 PROCEDURE — 90935 HEMODIALYSIS ONE EVALUATION: CPT

## 2023-03-03 PROCEDURE — 84484 ASSAY OF TROPONIN QUANT: CPT

## 2023-03-03 PROCEDURE — 85025 COMPLETE CBC W/AUTO DIFF WBC: CPT

## 2023-03-03 PROCEDURE — 6370000000 HC RX 637 (ALT 250 FOR IP): Performed by: STUDENT IN AN ORGANIZED HEALTH CARE EDUCATION/TRAINING PROGRAM

## 2023-03-03 PROCEDURE — G0378 HOSPITAL OBSERVATION PER HR: HCPCS

## 2023-03-03 RX ORDER — METOPROLOL TARTRATE 5 MG/5ML
2.5 INJECTION INTRAVENOUS ONCE
Status: DISCONTINUED | OUTPATIENT
Start: 2023-03-03 | End: 2023-03-04

## 2023-03-03 RX ORDER — DEXTROSE MONOHYDRATE 100 MG/ML
INJECTION, SOLUTION INTRAVENOUS CONTINUOUS PRN
Status: DISCONTINUED | OUTPATIENT
Start: 2023-03-03 | End: 2023-03-03

## 2023-03-03 RX ORDER — OXYCODONE HYDROCHLORIDE 5 MG/1
5 TABLET ORAL EVERY 8 HOURS PRN
Status: DISCONTINUED | OUTPATIENT
Start: 2023-03-03 | End: 2023-03-05 | Stop reason: HOSPADM

## 2023-03-03 RX ORDER — SODIUM POLYSTYRENE SULFONATE 15 G/60ML
15 SUSPENSION ORAL; RECTAL ONCE
Status: DISCONTINUED | OUTPATIENT
Start: 2023-03-03 | End: 2023-03-03

## 2023-03-03 RX ADMIN — CARVEDILOL 6.25 MG: 6.25 TABLET, FILM COATED ORAL at 16:54

## 2023-03-03 RX ADMIN — OXYCODONE 5 MG: 5 TABLET ORAL at 16:54

## 2023-03-03 RX ADMIN — OXYCODONE 5 MG: 5 TABLET ORAL at 00:17

## 2023-03-03 RX ADMIN — APIXABAN 5 MG: 5 TABLET, FILM COATED ORAL at 00:17

## 2023-03-03 RX ADMIN — DESMOPRESSIN ACETATE 40 MG: 0.2 TABLET ORAL at 20:58

## 2023-03-03 RX ADMIN — DESMOPRESSIN ACETATE 40 MG: 0.2 TABLET ORAL at 00:17

## 2023-03-03 RX ADMIN — APIXABAN 5 MG: 5 TABLET, FILM COATED ORAL at 20:58

## 2023-03-03 RX ADMIN — APIXABAN 5 MG: 5 TABLET, FILM COATED ORAL at 08:16

## 2023-03-03 RX ADMIN — SODIUM CHLORIDE, PRESERVATIVE FREE 10 ML: 5 INJECTION INTRAVENOUS at 08:16

## 2023-03-03 RX ADMIN — SODIUM CHLORIDE, PRESERVATIVE FREE 10 ML: 5 INJECTION INTRAVENOUS at 21:02

## 2023-03-03 ASSESSMENT — PAIN SCALES - GENERAL
PAINLEVEL_OUTOF10: 8
PAINLEVEL_OUTOF10: 6
PAINLEVEL_OUTOF10: 0
PAINLEVEL_OUTOF10: 8

## 2023-03-03 ASSESSMENT — PAIN - FUNCTIONAL ASSESSMENT
PAIN_FUNCTIONAL_ASSESSMENT: PREVENTS OR INTERFERES SOME ACTIVE ACTIVITIES AND ADLS
PAIN_FUNCTIONAL_ASSESSMENT: PREVENTS OR INTERFERES SOME ACTIVE ACTIVITIES AND ADLS

## 2023-03-03 ASSESSMENT — PAIN DESCRIPTION - LOCATION
LOCATION: ARM
LOCATION: ARM

## 2023-03-03 ASSESSMENT — PAIN DESCRIPTION - ORIENTATION
ORIENTATION: LEFT
ORIENTATION: LEFT

## 2023-03-03 ASSESSMENT — PAIN DESCRIPTION - DESCRIPTORS: DESCRIPTORS: THROBBING

## 2023-03-03 NOTE — PROGRESS NOTES
Dialysis Post Treatment Note  Vitals:    03/03/23 1319   BP: (!) 141/77   Pulse: (!) 110   Resp: 16   Temp: 98.1 °F (36.7 °C)   SpO2:      Pre-Weight = 110.7kg  Post-weight = Weight: 237 lb 14 oz (107.9 kg)  Total Liters Processed = Blood Volume Processed (Liters): 70.91 l/min  Rinseback Volume (mL) = Rinseback Volume (ml): 300 ml  Net Removal (mL) =  2000  Patient's dry weight=108.7kg  Type of access used=AVG  Length of treatment=210    Patient tolerated treatment well.

## 2023-03-03 NOTE — CARE COORDINATION
Pt in Dialysis all day, has discharge order, no needs reported, will defer initial transitional assessment.  Home independent

## 2023-03-03 NOTE — DISCHARGE INSTR - COC
Continuity of Care Form    Patient Name: Berenice Johnson   :  1960  MRN:  [id number]    [facility] date:  3/2/2023  Discharge date:  ***    Code Status Order: Full Code   Advance Directives:   Advance Care Flowsheet Documentation       Date/Time Healthcare Directive Type of Healthcare Directive Copy in [facility] [address] Agent's Name Healthcare Agent's Phone Number    23 1327 No, patient does not have an advance directive for healthcare treatment -- -- -- -- --            Admitting Physician:  Nupur Worley MD  PCP: Cristina Mattson    Discharging Nurse: [facility] Unit/Room#: 6187/4197-45  Discharging Unit Phone Number: ***    Emergency Contact:   Extended Emergency Contact Information  Primary Emergency Contact: Mariana Mosher  [place] Phone: [phone]  Work Phone: [phone]  Mobile Phone: [phone]  Relation: Spouse  Secondary Emergency Contact: [facility]  Address: [address] [place] [address] [address] Louis Clement of [facility] Phone: [phone]  Work Phone: [phone]  Mobile Phone: [phone]  Relation: Brother/Sister    Past Surgical History:  Past Surgical History:   Procedure Laterality Date    ABDOMEN SURGERY  2013    BOWEL OBSTRUCTION    AV FISTULA CREATION Left 2019    AV FISTULA REPAIR  2019    AV fistula revision, branch ligation / Percutaneous angioplasty of proximal anastomosis and outflow tract of dialysis circuit with drug coated balloon  /  Dr Ty Randle  2021    Dr. Mercado Files, [address] [place] [address] Rd  2019    TIM LAD    CYSTOSCOPY  2017    CYSTOSCOPY N/A 2022    CYSTOSCOPY DILATION performed by Miki Kelly MD at [place] [address] Left 2019    AV FISTULA CREATION ARM performed by Nimo Ron MD at [place] [address] Left 10/23/2019    LEFT UPPER EXTREMITY AV GRAFT CREATION WITH [id number] ARTEGRAFT, LEFT UPPER EXTREMITY AV FISTULA LIGATION performed by Shiela Osei MD at [facility] Left 06/15/2022    REVISION OF UPPER EXTREMITY AV FISTULA GRAFT, CENTRAL VENOUS CATHETER PLACEMENT (C-ARM) performed by Shiela Osei MD at [facility] Left 07/18/2022    UPPER EXTREMITY FISTULAGRAM performed by Shiela Osei MD at [facility] 2005    [address] Left 03/02/2023    IR THROMBECTOMY PERCUT AVF  01/03/2022    IR THROMBECTOMY PERCUT AVF 1/3/2022 [facility] SPECIAL PROCEDURES    MIDDLE EAR SURGERY  1966    EAR DRUM REPAIRED    MOUTH SURGERY  2007    1 WISDOM TOOTH REMOVED    NOSE SURGERY  1968    CAUTERY TO STOP NOSE BEEDS    OTHER SURGICAL HISTORY Left 03/06/2019    fistulagram    OR UNLISTED PROCEDURE MALE GENITAL SYSTEM N/A 02/16/2018    US  PROSTATE BIOPSY WITH SATURATION performed by Carol Ann Mackenzie MD at [address] 11/17/2017    CYSTOSCOPY PROSTATE US BIOPSY performed by Carol Ann Mackenzie MD at [facility]  02/16/2018    TONSILLECTOMY  1968    T[address]EL[address] Right 06/15/2022    Placement of tunneled central venous hemodialysis catheter  /  Dr Romie Ponce 09/27/2021    EGD BIOPSY performed by Catina Florez MD at [address] [place] [address]  09/20/2019    LUE FISTULAGRAM  /  DR Richie Mc  /  Findings: Severe stenosis of proximal cephalic vein. / Will plan for LUE AVG placement.     VASCULAR SURGERY Left 06/15/2022    Open revision of left upper extremity AV graft   /  Dr Cheyenne Verde Left 07/18/2022    LUE FISTULAGRAM / DR Richie Mc    VASCULAR SURGERY Left 3/2/2023    LEFT OPEN GRAFT THROMBECTOMY performed by Tess Subramanian MD at [address] [facility] [address]       Immunization History:   Immunization History   Administered Date(s) Administered COVID-19, MODERNA BLUE border, Primary or Immunocompromised, (age 12y+), IM, 100 mcg/0.5mL 03/12/2021, 04/09/2021       Active Problems:  Patient Active Problem List   Diagnosis Code    CKD (chronic kidney disease) stage 4, GFR 15-29 ml/min ([facility]) N18.4    Anemia of chronic renal failure N18.9, D63.1    Type 2 diabetes mellitus with chronic kidney disease on chronic dialysis, without long-term current use of insulin ([facility]) E11.22, N18.6, Z99.2    Chest pain at rest R07.9    Pneumonia J18.9    Rising PSA level R97.20    Prostatism N40.0    Acute on chronic diastolic congestive heart failure ([facility]) I50.33    Anemia of chronic disease D63.8    Hypertension, essential I10    Respiratory distress R06.03    AVF (arteriovenous fistula) ([facility]) I77.0    Precordial chest pain R07.2    S/P PTCA - TIM distal LAD - Dr. Matt Lamas 4/1/19 Z98.61    NSTEMI (non-ST elevated myocardial infarction) ([facility] I21.4    S/P arteriovenous (AV) graft placement Z95.828    Acute on chronic combined systolic (congestive) and diastolic (congestive) heart failure ([address] I50.43    Encounter regarding vascular access for dialysis for ESRD ([facility] N18.6, Z99.2    Coronary artery disease involving native coronary artery of native heart without angina pectoris I25.10    BATSHEVA (obstructive sleep apnea) G47.33    Acute congestive heart failure ([facility]) I50.9    Small bowel obstruction ([facility] K56.609    Chest pain R07.9    Acute on chronic combined systolic and diastolic CHF (congestive heart failure) ([facility]) I50.43    Chronic abdominal pain R10.9, G89.29    Hypoxemia-due to CHF/fluid overload R09.02    Paroxysmal atrial fibrillation ([facility]) I48.0    Hyponatremia E87.1    ESRD on dialysis ([facility] N18.6, Z99.2    Dialysis AV fistula malfunction ([facility]) T82.590A    Arteriovenous fistula occlusion ([facility]) T82.898A    AV graft malfunction, initial encounter ([facility] T82.590A    Lymphedema due to venous insufficiency I89.0, I87.2    S/P arteriovenous (AV) graft repair J34.772    Chronic respiratory failure with hypoxia ([facility]) J96.11    Pain of left upper extremity S44.487    Metabolic encephalopathy U65.63    Leg swelling M79.89    Pneumonia due to infectious organism J29.5    Metabolic acidosis P95.44    Clotted renal dialysis AV graft, initial encounter ([facility] T82.868A    AV fistula occlusion, initial encounter ([facility] T82.898A       Isolation/Infection:   Isolation            No Isolation          Patient Infection Status       Infection Onset Added Last Indicated Last Indicated By Review Planned Expiration Resolved Resolved By    None active    Resolved    COVID-19 (Rule Out) 11/11/21 11/11/21 11/11/21 COVID-19, Rapid (Ordered)   11/11/21 Rule-Out Test Resulted    COVID-19 (Rule Out) 05/10/21 05/11/21 05/10/21 COVID-19 (Ordered)   05/11/21 Rule-Out Test Resulted    COVID-19 (Rule Out) 05/01/21 05/01/21 05/01/21 COVID-19, Rapid (Ordered)   05/01/21 Rule-Out Test Resulted            Nurse Assessment:  Last Vital Signs: /65   Pulse (!) 115   Temp 98.1 °F (36.7 °C)   Resp 29   Wt 237 lb 14 oz (107.9 kg)   SpO2 100%   BMI 34.13 kg/m²     Last documented pain score (0-10 scale): Pain Level: 8  Last Weight:   Wt Readings from Last 1 Encounters:   03/03/23 237 lb 14 oz (107.9 kg)     Mental Status:  {IP PT MENTAL STATUS:36595}    IV Access:  { KULWINDER IV ACCESS:051720828}    Nursing Mobility/ADLs:  Walking   {CHP DME FUBR:358969339}  Transfer  {CHP DME TYKB:662194555}  Bathing  {CHP DME HTYZ:103258829}  Dressing  {CHP DME MMGQ:375290074}  Toileting  {CHP DME UUBU:369469794}  Feeding  {CHP DME USIB:166407446}  Med Admin  {CHP DME OIAT:293385340}  Med Delivery   { KULWINDER MED Delivery:924979173}    Wound Care Documentation and Therapy:  Incision 06/15/22 Brachial Anterior; Left (Active)   Number of days: 261       Incision 03/02/23 Brachial Anterior;Distal;Left (Active)   Number of days: 0        Elimination:  Continence:    Bowel: {YES / QS:84168}  Bladder: {YES / NP:63507}  Urinary Catheter: {Urinary Catheter:215679589}   Colostomy/Ileostomy/Ileal Conduit: {YES / NK:64090}       Date of Last BM: ***    Intake/Output Summary (Last 24 hours) at 3/3/2023 1704  Last data filed at 3/3/2023 1319  Gross per 24 hour   Intake 700 ml   Output 2300 ml   Net -1600 ml     I/O last 3 completed shifts:   In: 400 [I.V.:400]  Out: -     Safety Concerns:     [address] KULWINDER Safety Concerns:013697217}    Impairments/Disabilities:      [facility] Impairments/Disabilities:621145195}    Nutrition Therapy:  Current Nutrition Therapy:   [facility] Diet List:030032296}    Routes of Feeding: {[facility] DME Other Feedings:020480777}  Liquids: {Slp liquid thickness:49383}  Daily Fluid Restriction: {CHP DME Yes amt example:359017918}  Last Modified Barium Swallow with Video (Video Swallowing Test): {Done Not Done VRYF:620602253}    Treatments at the Time of Hospital Discharge:   Respiratory Treatments: ***  Oxygen Therapy:  {Therapy; copd oxygen:72031}  Ventilator:    {[facility] Vent PTX}    Rehab Therapies: {THERAPEUTIC INTERVENTION:9065919394}  Weight Bearing Status/Restrictions: [address] [place] [address] Weight Bearin}  Other Medical Equipment (for information only, NOT a DME order):  {EQUIPMENT:657602351}  Other Treatments: ***    Patient's personal belongings (please select all that are sent with patient):  {[facility] DME Belongings:781390883}    RN SIGNATURE:  {Esignature:746679625}    CASE MANAGEMENT/SOCIAL WORK SECTION    Observation Status Date:     Readmission Risk Assessment Score:  Readmission Risk              Risk of Unplanned Readmission:  20           Discharging to Facility/ Agency   Name: [facility]  Address:  Phone: [phone]  Fax:    Dialysis Facility (if applicable)   Name:  Address:  Dialysis Schedule:  Phone:  Fax:    / signature: {Esignature:224028682}    PHYSICIAN SECTION    Prognosis: {Prognosis:0909076007}    Condition at Discharge: [address] Myron Patient Condition:685443705}    Rehab Potential (if transferring to Rehab): {Prognosis:5128080560}    Recommended Labs or Other Treatments After Discharge: ***    Physician Certification: I certify the above information and transfer of Tati Carrasco  is necessary for the continuing treatment of the diagnosis listed and that he requires {Admit to Appropriate Level of Care:65118} for {GREATER/LESS:216732027} 30 days.      Update Admission H&P: {CHP DME Changes in LGYDZ:441668090}    PHYSICIAN SIGNATURE:  {Esignature:335534926}

## 2023-03-03 NOTE — CONSULTS
Renal Consult Note    Patient :  Tello Boy; 57 y.o. MRN# [id number]  Location:  5263/2975-47  Attending:  Beverley Ibarra MD  Admit Date:  3/2/2023   Hospital Day: 1    Reason for Consult:     Asked by Dr Beverley Ibarra MD to see for JAGRUTI/Elevated Creatinine. History Obtained From:     patient, electronic medical record    HD Access:     previous  Left AV fistula    HD Unit:     [facility] [place] hemodialysis [facility]     Nephrologist:     Dr. Lani Severe Weight:     108.7 kg    Admission Weight:     110.7 kg    History of Present Illness:     Tello Boy; 57 y.o. male with past medical history as mentioned below presented to the hospital with the chief complaint of dysfunctional AV fistula. 57 y.o. male with end stage renal disease on hemodialysis [facility] who presents with dysfunctional fistula. Patient follows up with Dr. Oneil Camargo as outpatient and his dry weight is 108.7 kg. Patient follows up at [facility] hemodialysis facility using left AV graft under Dr. Oneil Camargo. Patient also has a PMH significant of CAD s/p stenting in past, anemia of chronic disease, essential hypertension and secondary hyperparathyroidism. Presented with dysfunctional AV fistula and admitted by vascular surgery for evaluation of open left upper extremity brachiocephalic artery graft thrombectomy. Patient was admitted yesterday by vascular surgery. He was attempted hemodialysis cannulation yesterday that was unsuccessful. Evaluation at his hemodialysis center noted to have thrombosis of left upper extremity graft. Last HD on last Tuesday- 2/28/23. Was unable to get dialysis on Thursday due to dysfunctional fistula.  Patient underwent  Open thrombectomy left upper extremity brachioaxillary graft, Left upper extremity brachioaxillary fistulogram including the central veins and periarterial anastomotic region and Balloon angioplasty venous anastomosis and mid body of the graft with an 8 x 40 balloon by vascular surgery yesterday. Patient was seen at dialysis. He complained of chest pain. EKG and troponins were sent. EKG showed sinus tachycardia with heart rate 114, QTc is prolonged at 518. Troponins sent, will follow up. Patient denies any other complaints currently. Labs reviewed,  K5.5, chloride 93, , creatinine 12.38, anion gap 22, calcium 8.3,   WBC 10.4, hemoglobin 10.5, platelets 760     Nephrology is consulted due to patient being ESRD on hemodialysis. Past History/Allergies? Social History:     Past Medical History:   Diagnosis Date    Acute congestive heart failure ([address] 12/21/2016    Acute on chronic diastolic congestive heart failure ([address] 11/24/2018    Anemia 11/25/2018    Anemia of chronic renal failure 10/17/2016    Atrial fibrillation ([address] 01/03/2022    AVF (arteriovenous fistula) ([facility])     Bowel obstruction ([address] 12/26/2013    CAD (coronary artery disease) 2013    ?     Chest pain at rest 12/21/2016    CHF (congestive heart failure) ([address] 2013    last episode 11/2018    CHF (congestive heart failure), NYHA class I, acute on chronic, combined ([address] 2/9/2021    Chronic kidney disease     CKD (chronic kidney disease) stage 4, GFR 15-29 ml/min ([address] 10/17/2016    Coronary artery disease involving native coronary artery of native heart without angina pectoris     Diabetes mellitus ([address] 2012    NIDDM    Dialysis patient ([facility]     Dari Marc  /  Kong Cullen  /  Sigifredo Ayala    ESRD (end stage renal disease) ([address]     Essential hypertension 11/25/2018    Groin swelling 07/17/2019    Hemodialysis patient ([address] 11/28/2018    [facility] RIGHT [facility]YS[facility] ACCESSTUES THURS AND AT [place]    Hydrocele 07/17/2019    Hyperlipidemia 2013    ON RX    Hypertension 2013    ON RX    Inguinal hernia     BILATERAL- NEEDS REPAIRED, UMBILICAL HERNIA  ALSO    MI, old 12/26/2013    NSTEMI (non-ST elevated myocardial infarction) ([address] 6/17/2019    On home O2     2 L    BATSHEVA (obstructive sleep apnea)     Patient in clinical research study 04/01/2019    XIENCE SHORT DAPT    Pneumonia     Poor historian     Prostatism 11/17/2017    Respiratory distress 11/25/2018    Rising PSA level 11/17/2017    S/P arteriovenous (AV) graft placement     S/P PTCA - TIM distal LAD - Dr. Yaquelin Kendall 4/1/19 4/1/2019    Sleep apnea 2015    pt has machine and does not use it    Small bowel obstruction ([address] 5/1/2021    SOB (shortness of breath) 02/02/2022    noted walking from waiting room to pre op testing    Type 2 diabetes mellitus with chronic kidney disease on chronic dialysis ([facility] 10/17/2016       Allergies   Allergen Reactions    Lisinopril Swelling       Social History     Socioeconomic History    Marital status: Single     Spouse name: Not on file    Number of children: Not on file    Years of education: Not on file    Highest education level: Not on file   Occupational History    Not on file   Tobacco Use    Smoking status: Never    Smokeless tobacco: Never   Vaping Use    Vaping Use: Never used   Substance and Sexual Activity    Alcohol use: No    Drug use: No    Sexual activity: Not on file   Other Topics Concern    Not on file   Social History Narrative    Not on file     Social Determinants of Health     Financial Resource Strain: Not on file   Food Insecurity: Not on file   Transportation Needs: Not on file   Physical Activity: Not on file   Stress: Not on file   Social Connections: Not on file   Intimate Partner Violence: Not on file   Housing Stability: Not on file       Family History:        Family History   Problem Relation Age of Onset    Heart Disease Mother         CHF    Early Death Mother     High Blood Pressure Brother     Diabetes Brother     Asthma Brother     High Blood Pressure Brother     Diabetes Brother     High Blood Pressure Brother     Diabetes Brother        Outpatient Medications:     Medications Prior to Admission: apixaban (ELIQUIS) 5 MG TABS tablet, Take 1 tablet by mouth 2 times daily  albuterol sulfate HFA (VENTOLIN HFA) 108 (90 Base) MCG/ACT inhaler, Inhale 2 puffs into the lungs 4 times daily as needed for Wheezing  sevelamer (RENVELA) 2.4 g PACK packet, Take 1 packet by mouth 3 times daily (with meals)  carvedilol (COREG) 6.25 MG tablet, Take 1 tablet by mouth 2 times daily (with meals)  aspirin EC 81 MG EC tablet, Take 1 tablet by mouth daily  glimepiride (AMARYL) 1 MG tablet, Take 1 tablet by mouth every morning (before breakfast) Do not take if blood sugars are less than 110  atorvastatin (LIPITOR) 40 MG tablet, Take 1 tablet by mouth nightly  traMADol (ULTRAM) 50 MG tablet, take 1 tablet by mouth every 6 hours if needed  Multiple Vitamins-Minerals (RENAPLEX-D) TABS, Take 1 tablet by mouth every other day Pt takes occasionally  lidocaine-prilocaine (EMLA) 2.5-2.5 % cream,     Current Medications:     Scheduled Meds:    metoprolol  2.5 mg IntraVENous Once    sodium chloride flush  5-40 mL IntraVENous 2 times per day    apixaban  5 mg Oral BID    atorvastatin  40 mg Oral Nightly    carvedilol  6.25 mg Oral BID WC    sevelamer  2.4 g Oral TID WC     Continuous Infusions:    sodium chloride       PRN Meds:  oxyCODONE, sodium chloride flush, sodium chloride, ondansetron **OR** ondansetron, acetaminophen, albuterol sulfate HFA    Review of Systems:     Constitutional: No fever, no chills, no lethargy, no weakness. HEENT:  No headache, otalgia, itchy eyes, nasal discharge or sore throat. Cardiac:  No chest pain, dyspnea, orthopnea or PND. Chest:  No cough, phlegm or wheezing. Abdomen:  No abdominal pain, nausea or vomiting. Neuro:  No focal weakness, abnormal movements orseizure like activity. Skin:   No rashes, no itching. :   No hematuria, no pyuria, no dysuria, no flank pain. Extremities:  No swelling or joint pains. ROS was otherwise negative except as mentioned in the [address] Input/Output:     I/O last 3 completed shifts:   In: 400 [I.V.:400]  Out: -       Vital Signs: Temperature:  Temp: 97.5 °F (36.4 °C)  TMax:   Temp (24hrs), Av.8 °F (36.6 °C), Min:97.5 °F (36.4 °C), Max:98 °F (36.7 °C)    Respirations:  Resp: 18  Pulse:   Heart Rate: (!) 114  BP:    BP: 130/74  BP Range: Systolic (53ZLA), MZZ:705 , Min:104 , AES:151       Diastolic (26MUH), HN, Min:59, Max:87      Physical Examination:     General:  AAO x 3, speaking in full sentences, no accessory muscle use. HEENT: Atraumatic, normocephalic, no throat congestion, moist mucosa. Eyes:   Pupils equal, round and reactive to light, EOMI. Neck:   No JVD, no thyromegaly, no lymphadenopathy. Chest:   Bilateral vesicular breath sounds, no rales or wheezes. Cardiac:  S1 S2 RR, no murmurs, gallops or rubs, JVP not raised. Abdomen: Soft, non-tender, no masses or organomegaly, BS audible. :   No suprapubic or flank tenderness. Neuro:   AAO x 3, No FND. SKIN:  No rashes, good skin turgor. Extremities:  Trace edema, palpable peripheral pulses, no calf tenderness. Labs:       Recent Labs     23  0254   WBC 10.4   RBC 3.59*   HGB 10.5*   HCT 37.7*   .0*   MCH 29.2   MCHC 27.9*   RDW 17.1*      MPV 9.1      BMP:   Recent Labs     23  1354 23  0254   NA  --  137   K  --  5.5*   CL  --  93*   CO2  --  22   BUN  --  114*   CREATININE 12.05* 12.38*   GLUCOSE  --  115*   CALCIUM  --  8.3*      Phosphorus:   No results for input(s): PHOS in the last 72 hours. Magnesium:  No results for input(s): MG in the last 72 hours. Albumin:  No results for input(s): LABALBU in the last 72 hours.   BNP:      Lab Results   Component Value Date/Time     2018 08:04 PM     PTH:     Lab Results   Component Value Date/Time    IPTH 144.1 2017 12:50 PM     Blood cultures:  No results found for: [facility]    Urinalysis/Chemistries:      Lab Results   Component Value Date/Time    NITRU NEGATIVE 2022 11:27 AM    COLORU Yellow 2022 11:27 AM    PHUR 6.0 2022 11:27 AM    WBCUA 5 TO 10 04/01/2022 11:27 AM    RBCUA 20 TO 50 04/01/2022 11:27 AM    MUCUS NOT REPORTED 12/31/2021 04:23 PM    TRICHOMONAS NOT REPORTED 12/31/2021 04:23 PM    YEAST FEW 12/31/2021 04:23 PM    BACTERIA NOT REPORTED 12/31/2021 04:23 PM    SPECGRAV 1.010 04/01/2022 11:27 AM    LEUKOCYTESUR NEGATIVE 04/01/2022 11:27 AM    UROBILINOGEN Normal 04/01/2022 11:27 AM    BILIRUBINUR NEGATIVE 04/01/2022 11:27 AM    GLUCOSEU NEGATIVE 04/01/2022 11:27 AM    KETUA NEGATIVE 04/01/2022 11:27 AM    AMORPHOUS NOT REPORTED 12/31/2021 04:23 PM       Radiology:     CXR:     Assessment:     1. ESRD on Hemodialysis. His regular HD days are TTS at [facility] Hemodialysis facility using Left AV fistula under Dr. Huong Edmonds. His dry weight is 108.7 kg. Admitted with 110.7 kg  2. Thrombosed left upper extremity brachial axillary graft status post open thrombectomy, dilatation of venous anastomosis and stenosis within the graft. Procedure was successful has been used successfully today. 2. HTN blood pressure stable  3. CAD s/p stent in past patient did have mild chest pain today on dialysis which is now resolved. 4.  Anemia of chronic disease  5. Secondary hyperparathyroidism  6. Chest pain- rule out ischemia  7. Qtc prolongation- 518    Plan:   1. Dialysis [facility]. Last HD on last Tuesday- 2/28/23. Was unable to get dialysis on Thursday due to thrombosed AV fistula  2. Antihypertensives: Coreg 6.25 twice daily. 3.  Patient is being dialyzed today due to hyperkalemia and 1 missed HD sessions. 4. Hemodialysis orders reviewed. 5. Follow up on troponin level. EKG showed sinus tachycardia with Qtc prolongation. 6. Will give Mag 2 g IV x1.   2. Strict Input and Output, Daily weigh and document in the chart. 3. Low Potassium, Low phosphorus and low salt diet. Fluids to be restricted to 1500ml/day. 4. IV Aranesp/Epogen for anemia of chronic disease with HD weekly.   5. IV Zemplar per protocol for secondary hyperparathyroidism with HD thrice a week. Nutrition   Please ensure that patient is on a renal diet/TF. Thank you for the consultation. Please do not hesitate to contact us for any further questions/concerns. We will continue to follow along with you. Roque Henley MD, PGY-2  Internal Medicine Resident  [place], [place]    Patient seen with resident. Known history of ESRD on hemodialysis Tuesday Thursday Saturday at the [address] [place] [address] unit via left upper extremity AV graft under Dr. Nasreen Rivera. He also has known history of type 2 diabetes, hypertension, hyperphosphatemia. HPI understanding he had his last dialysis treatment on Tuesday, 2/2/2023. Subsequently he was found to have a thrombosed AV fistula on Thursday, 3/2/2023. Dialysis could not be completed at that time. Thereafter he was admitted to the hospital by vascular for open thrombectomy which was done successfully last night. This morning potassium was 5.5. He is about 2 kg over his weight which was successfully removed today. Patient still feels that he has extra fluid to come off. Blood pressures remained stable. Did have it by chest pain today EKG showed right bundle branch block which is likely chronic. Troponins have been ordered. Clinical exam unremarkable. Plan  1. Stable for discharge from nephrology standpoint  2. If patient stays in hospital we will dialyze him again tomorrow and aim for another 2 L of fluid removal  3. We will leave it up to primary team to trend troponins  4. Fluid restriction as before  5.  2 g potassium diet  6. We will follow if he stays    Attending Physician Statement  I have discussed the care of Pillo Cast, including pertinent history and exam findings with the resident/fellow. I have reviewed the key elements of all parts of the encounter with the resident/fellow. I have seen and examined the patient with the resident/fellow.   I agree with the assessment and plan and status of the problem list as documented.       .  Electronically signed by Ty Cartagena MD on 3/3/2023 at 2:12 PM

## 2023-03-03 NOTE — PROGRESS NOTES
Received post procedure to [address] [place] [address] to room 5. Assessment obtained. Restrictions reviewed with patient. Post procedure pathway initiated. Incision site soft , dressingdry and intact. No hematoma noted. Left arm fistula incision site dressing has small amount of drainage.

## 2023-03-03 NOTE — PLAN OF CARE
Problem: Discharge Planning  Goal: Discharge to home or other facility with appropriate resources  3/3/2023 1756 by Brittany Hu RN  Outcome: Progressing  3/3/2023 1756 by Brittany Hu RN  Outcome: Progressing  3/3/2023 0611 by Unruly Cameron RN  Outcome: Progressing     Problem: Chronic Conditions and Co-morbidities  Goal: Patient's chronic conditions and co-morbidity symptoms are monitored and maintained or improved  3/3/2023 1756 by Brittany Hu RN  Outcome: Progressing  3/3/2023 1756 by Brittany Hu RN  Outcome: Progressing  3/3/2023 0611 by Unruly Cameron RN  Outcome: Progressing

## 2023-03-03 NOTE — PROGRESS NOTES
Pt transported to [facility] with two RN's. All belongings transferred with pt, all questions answered.

## 2023-03-03 NOTE — CARE COORDINATION
Pt relates he does not want to go home, states he is not leaving the hospital. Pt given Medicare IMM and notified he will need to appeal discharge if he does not want to leave as he has medical clearance along with a discharge order. Pt states he does not want to go home to his apartment as he is alone. Pt calls his girlfriend who relates she is a aide and has been doing all of pts care herself, she relates she would like him to have outpatient physical therapy when home care was offered. She still would like to have a home care come out for nursing and aid services. Notified we can get nursing but could not guarantee aid service would be a covered service. She is agreeable to [facility]. Pt's girlfriend relates \"so with this weather if he gets into a cab and it has a accident whose fault is is\" she also is questioning if he goes to dialysis in bad weather and gets into a cab and there is a accident tomorrow whose fault is it? She relates she has a relative who is a  if needed. She relates she will call Ken herself for the appeal. Pt was requesting Nursing home placement r/t living by himself, notified that was not a appropriate reason to go to a nursing home as he has no IV, wound care and he is ambulating on his own from stretcher to bed.     It was noted pt is observation status not inpatient status, not eligible to appeal discharge. Pt notified. Along with girlfriend Mariana    [id number] Call to [facility], pt is current with [facility] for OT/Nursing and PT, notified pt would like aide service, Taylor proctor will send message requesting aide services. Will need cab ride home

## 2023-03-03 NOTE — PROGRESS NOTES
Report called to [facility] RN, all questions answered. Will transport when out of recovery at 2307.

## 2023-03-03 NOTE — DISCHARGE INSTRUCTIONS
Vascular Surgery Patient Discharge Instructions    Discharge Date:  3/3/2023    Discharged To: Home  Surgery Performed : open LUE AV graft thrombectomy and balloon angioplasty on 3/2/2023    WOUND CARE:   Do not remove top dressing for 24 hrs. Skin glue used, do not peel off, allow to fall off naturally. Leave sutures in place for 2 weeks until office visit. RESUME ACTIVITY:     BATHING:  Ok to shower in 24 hrs.   -No bath tubs, soaks, hot tubs, or swimming until cleared at post-operative office visit. DRIVING: No driving for while on pain medication    WALKING:    Yes    LIFTING: do not exert yourself    DIET:   Eureka Morteza to resume regular pre-hospital diet. MEDICATIONS:  Prior to Admission medications    Medication Sig Start Date End Date Taking?  Authorizing Provider   apixaban (ELIQUIS) 5 MG TABS tablet Take 1 tablet by mouth 2 times daily 1/26/23   Rahul Salazar DO   albuterol sulfate HFA (VENTOLIN HFA) 108 (90 Base) MCG/ACT inhaler Inhale 2 puffs into the lungs 4 times daily as needed for Wheezing 1/26/23   Rahul Salazar DO   sevelamer (RENVELA) 2.4 g PACK packet Take 1 packet by mouth 3 times daily (with meals) 1/18/22   Historical Provider, MD   carvedilol (COREG) 6.25 MG tablet Take 1 tablet by mouth 2 times daily (with meals) 1/5/22   Fausto Aguero MD   aspirin EC 81 MG EC tablet Take 1 tablet by mouth daily 12/24/21   [facility], DO   glimepiride (AMARYL) 1 MG tablet Take 1 tablet by mouth every morning (before breakfast) Do not take if blood sugars are less than 110 11/14/21   Uzma Layton MD   atorvastatin (LIPITOR) 40 MG tablet Take 1 tablet by mouth nightly 2/9/21   Akash Arriola MD   traMADol Zahra Marker) 50 MG tablet take 1 tablet by mouth every 6 hours if needed 10/21/20   Historical Provider, MD   Multiple Vitamins-Minerals (RENAPLEX-D) TABS Take 1 tablet by mouth every other day Pt takes occasionally 5/14/19   Historical Provider, MD   lidocaine-prilocaine (EMLA) 2.5-2.5 % cream  3/18/19   Historical Provider, MD   AV fistula can be used for dialysis  Start eliquis as prescribed  FOLLOW UP:   Call Vascular Surgery Office at  [phone] for follow up appointment with Dr. Cat Pitt in 3 weeks. Electronically signed by Dasia Bah MD on 3/3/2023 at 11:38 AM      As per Internal medicine :  - Please take all your prescribe mediations.   - Pleae follow up with your PCP after discharge for medicine reconciliation and optimization  - Please follow up with your dialyisis schedules   - if you experience chest pain, dizziness, and shortness of breath, please come to ED as soon as possible

## 2023-03-03 NOTE — PROGRESS NOTES
Vascular Surgery Progress Note         PATIENT NAME: Sanjay Banegas     TODAY'S DATE: 3/3/2023, 7:38 AM    CC: I'm doing better    SUBJECTIVE:    Pt seen and examined. MEGAN, s/p LUE AV graft open thrombectomy, denies N/V, CP, SOB, NWT LUE. Patient does have persistent mild sinus tachycardia with stable EKG from pre op, good thrill LUE AV graft, plan HD today    OBJECTIVE:   Vitals:  BP (!) 104/59   Pulse (!) 111   Temp 98 °F (36.7 °C) (Axillary)   Resp 18   SpO2 99%      INTAKE/OUTPUT:      Intake/Output Summary (Last 24 hours) at 3/3/2023 0738  Last data filed at 3/2/2023 2127  Gross per 24 hour   Intake 400 ml   Output --   Net 400 ml                 GENERAL: AOx3, NAD  HEENT: EOMI bilaterally  CARDIAC: Regular rate and rhythm.   ABDOMEN: Soft, NT, ND, Active Bowel Sounds  EXTREMITY: moves all extremities, 2+ radial pulses, LUE AV graft w/ good thrill  NEURO: Gross motor intact.  INCISION: Dressing clean, dry, intact.    Data:  CBC with Differential:    Lab Results   Component Value Date/Time    WBC 10.4 03/03/2023 02:54 AM    RBC 3.59 03/03/2023 02:54 AM    RBC 3.62 09/08/2018 08:04 PM    HGB 10.5 03/03/2023 02:54 AM    HCT 37.7 03/03/2023 02:54 AM     03/03/2023 02:54 AM    .0 03/03/2023 02:54 AM    MCH 29.2 03/03/2023 02:54 AM    MCHC 27.9 03/03/2023 02:54 AM    RDW 17.1 03/03/2023 02:54 AM    NRBC 0 09/08/2018 08:04 PM    LYMPHOPCT 5 03/03/2023 02:54 AM    LYMPHOPCT 5.0 09/08/2018 08:04 PM    MONOPCT 20 03/03/2023 02:54 AM    MONOPCT 13.7 09/08/2018 08:04 PM    BASOPCT 1 03/03/2023 02:54 AM    BASOPCT 0.2 09/08/2018 08:04 PM    MONOSABS 2.08 03/03/2023 02:54 AM    MONOSABS 1.5 09/08/2018 08:04 PM    LYMPHSABS 0.52 03/03/2023 02:54 AM    LYMPHSABS 0.5 09/08/2018 08:04 PM    EOSABS 0.00 03/03/2023 02:54 AM    EOSABS 0.0 09/08/2018 08:04 PM    BASOSABS 0.10 03/03/2023 02:54 AM    DIFFTYPE NOT REPORTED 02/02/2022 12:42 PM     BMP:    Lab Results   Component Value Date/Time     03/03/2023  02:54 AM    K 5.5 03/03/2023 02:54 AM    CL 93 03/03/2023 02:54 AM    CO2 22 03/03/2023 02:54 AM     03/03/2023 02:54 AM    LABALBU 4.1 11/12/2021 07:27 AM    CREATININE 12.38 03/03/2023 02:54 AM    CALCIUM 8.3 03/03/2023 02:54 AM    GFRAA 7 06/16/2022 08:13 AM    LABGLOM 4 03/03/2023 02:54 AM    GLUCOSE 115 03/03/2023 02:54 AM    GLUCOSE 147 09/08/2018 08:04 PM         ASSESSMENT   LUE AV graft malfunction s/p open LUE AV graft thrombectomy and balloon angioplasty    PLAN  Plan HD today  Administer 2.5mg lopressor for  this AM and resume home betablocker  Dressing change this morning  Plan discharge home post HD if doing well        Electronically signed by Carol Vargas DO  on 3/3/2023 at 7:38 AM

## 2023-03-03 NOTE — PROGRESS NOTES
Speech Language Pathology  [address]  Speech Language Pathology    SPEECH/COGNITIVE ASSESSMENT    NO LOC,CHI OR CVA/TIA - ST TO DEFER AT THIS TIME      Date: 3/3/2023  Patient Name: Radha Ashraf  YOB: 1960   AGE: 57 y.o. MRN: [id number]        PT NOT SEEN FOR SPEECH OR COGNITIVE ASSESSMENT AT THIS TIME AS NO LOC, CHI OR CVA/TIA IS DOCUMENTED. ST TO DEFER AT THIS TIME. PLEASE RE-COSULT AS NEEDED.       Melissa Rodriguez, SLP  3/3/2023  7:01 AM

## 2023-03-03 NOTE — PLAN OF CARE
Informed nephrology regarding the most recent BMP result, and ask for possible dialysis tonight. Nephrology would like to wait for morning lab and consider dialysis morning.     Electronically signed by Kurt Magallanes DO on 3/3/2023 at 3:56 AM

## 2023-03-03 NOTE — H&P
[address]     Department of Internal Medicine - Staff Internal Medicine Teaching Service          ADMISSION NOTE/HISTORY AND PHYSICAL EXAMINATION   Date: 3/3/2023  Patient Name: Cherisse Denver  Date of admission: 3/2/2023 12:47 PM  YOB: 1960  PCP: Patricia Damon  History Obtained From:  patient    CHIEF COMPLAINT     Chief complaint: Chest pain post hemodialysis    HISTORY OF PRESENTING ILLNESS     The patient is a pleasant 57 y.o. male presents with a chief complaint of chest pain post hemodialysis. Patient was initially admitted on 3/2/2023 for evaluation of open left upper extremity brachiocephalic artery graft thrombectomy due to dysfunctional fistula. Patient was attempted HD cannulation that was unsuccessful and there was thrombosis of left upper extremity graft. He underwent open thrombectomy left upper extremity brachioaxillary graft and left upper extremity brachial axillary fistulogram including the central veins and periarterial anastomotic. Patient underwent hemodialysis today with 2 L of net removal of fluid was done. After hemodialysis, the patient started having chest pain. He also had increased troponins measuring 229 with previous troponin 246 on 1/24/2023. EKG was done and it showed A-fib with RVR. Latest labs showed sodium 134, glucose 121, troponin 267, hemoglobin 10.0. After dialysis, the patient developed chest pain which was more on the left side radiating to left shoulder, back, and the sternal area. The patient reports that he also had tenderness in the sternal area. The patient was having shortness of breath but reported that it has been there since long and even before the admission. As per documentation, the patient also developed atrial fibrillation with RVR after hemodialysis. Currently, the patient is awake, alert, oriented X4.   The patient still had left-sided chest pain but reports that it is mostly started from his arms rather than his chest.  It radiates to rotator cuff of the left shoulder and goes to his back. The patient has extreme tenderness in the sternal area. Patient was tachypneic with respiratory rate 28 and was in sinus tachycardia with heart rate 113. The patient reports that his pain is aggravated by movement and goes away when he is resting. Review of Systems:  Review of Systems   Constitutional:  Negative for chills and fever. Eyes:  Negative for visual disturbance. Respiratory:  Positive for shortness of breath. Cardiovascular:  Positive for chest pain. Gastrointestinal:  Negative for abdominal pain, constipation, diarrhea and nausea. Genitourinary:  Negative for dysuria and hematuria. Musculoskeletal:  Negative for arthralgias. Neurological:  Negative for numbness and headaches. Hematological: Negative. Psychiatric/Behavioral: Negative. PAST MEDICAL HISTORY     Past Medical History:   Diagnosis Date    Acute congestive heart failure ([address] 12/21/2016    Acute on chronic diastolic congestive heart failure ([address] 11/24/2018    Anemia 11/25/2018    Anemia of chronic renal failure 10/17/2016    Atrial fibrillation ([address] 01/03/2022    AVF (arteriovenous fistula) ([facility])     Bowel obstruction ([address] 12/26/2013    CAD (coronary artery disease) 2013    ?     Chest pain at rest 12/21/2016    CHF (congestive heart failure) ([address] 2013    last episode 11/2018    CHF (congestive heart failure), NYHA class I, acute on chronic, combined ([address] 2/9/2021    Chronic kidney disease     CKD (chronic kidney disease) stage 4, GFR 15-29 ml/min ([address] 10/17/2016    Coronary artery disease involving native coronary artery of native heart without angina pectoris     Diabetes mellitus ([address] 2012    NIDDM    Dialysis patient ([address]     Lizz Cifuentes  /  Adam Lerma  /  Bruce Acosta    ESRD (end stage renal disease) ([address]     Essential hypertension 11/25/2018    Groin swelling 07/17/2019    Hemodialysis patient ([address] 11/28/2018    TUNNELD CATHETER RIGHT DIALYSIS ACCESSTUES THURS AND AT [place]    Hydrocele 07/17/2019    Hyperlipidemia 2013    ON RX    Hypertension 2013    ON RX    Inguinal hernia     BILATERAL- NEEDS REPAIRED, UMBILICAL HERNIA  ALSO    MI, old 12/26/2013    NSTEMI (non-ST elevated myocardial infarction) ([facility] 6/17/2019    On home O2     2 L    BATSHEVA (obstructive sleep apnea)     Patient in clinical research study 04/01/2019    XIENCE SHORT DAPT    Pneumonia     Poor historian     Prostatism 11/17/2017    Respiratory distress 11/25/2018    Rising PSA level 11/17/2017    S/P arteriovenous (AV) graft placement     S/P PTCA - TIM distal LAD - Dr. Lewis Sammi 4/1/19 4/1/2019    Sleep apnea 2015    pt has machine and does not use it    Small bowel obstruction ([facility] 5/1/2021    SOB (shortness of breath) 02/02/2022    noted walking from waiting room to pre op testing    Type 2 diabetes mellitus with chronic kidney disease on chronic dialysis ([facility] 10/17/2016       PAST SURGICAL HISTORY     Past Surgical History:   Procedure Laterality Date    ABDOMEN SURGERY  2013    BOWEL OBSTRUCTION    AV FISTULA CREATION Left 02/20/2019    AV FISTULA REPAIR  07/17/2019    AV fistula revision, branch ligation / Percutaneous angioplasty of proximal anastomosis and outflow tract of dialysis circuit with drug coated balloon  /  Dr Scarlett Faith  02/09/2021    Dr. Bustillo [facility], [address]  03/31/2019    TIM LAD    CYSTOSCOPY  11/17/2017    CYSTOSCOPY N/A 04/01/2022    CYSTOSCOPY DILATION performed by Carrol Max MD at [address] 02/20/2019    AV FISTULA CREATION ARM performed by Jenniffer Gracia MD at [address] 10/23/2019    LEFT UPPER EXTREMITY AV GRAFT CREATION WITH [id number] ARTEGRAFT, LEFT UPPER EXTREMITY AV FISTULA LIGATION performed by Jenniffer Gracia MD at [facility] [address] FISTULA CREATION Left 06/15/2022    REVISION OF UPPER EXTREMITY AV FISTULA GRAFT, CENTRAL VENOUS CATHETER PLACEMENT (C-ARM) performed by Ruby Beltrán MD at [facility] 07/18/2022    UPPER EXTREMITY FISTULAGRAM performed by Ruyb Beltrán MD at [facility] 2005    [address] Left 03/02/2023    IR THROMBECTOMY PERCUT AVF  01/03/2022    IR THROMBECTOMY PERCUT AVF 1/3/2022 [facility] PROCEDURES    MIDDLE EAR SURGERY  1966    EAR DRUM REPAIRED    MOUTH SURGERY  2007    1 WISDOM TOOTH REMOVED    NOSE SURGERY  1968    CAUTERY TO STOP NOSE BEEDS    OTHER SURGICAL HISTORY Left 03/06/2019    fistulagram    VT UNLISTED PROCEDURE MALE GENITAL SYSTEM N/A 02/16/2018    US  PROSTATE BIOPSY WITH SATURATION performed by Aubrey Lewis MD at [facility] 11/17/2017    CYSTOSCOPY PROSTATE US BIOPSY performed by Aubrey Lewis MD at [address] [place] [address]  02/16/2018    TONSILLECTOMY  1968    [address]ELED [address] Right 06/15/2022    Placement of tunneled central venous hemodialysis catheter  /  Dr eLnin Ferreira 09/27/2021    EGD BIOPSY performed by Jeanne Lockwood MD at [facility]  09/20/2019    LUE FISTULAGRAM  /  DR Jaison Ventura  /  Findings: Severe stenosis of proximal cephalic vein. / Will plan for LUE AVG placement. VASCULAR SURGERY Left 06/15/2022    Open revision of left upper extremity AV graft   /  Dr Brandy Montes Left 07/18/2022    LUE FISTULAGRAM / DR Jaison Ventura    VASCULAR SURGERY Left 3/2/2023    LEFT OPEN GRAFT THROMBECTOMY performed by Concepción De Leon MD at [address] [place] [address]     Prior to Admission medications    Medication Sig Start Date End Date Taking?  Authorizing Provider   apixaban (ELIQUIS) 5 MG TABS tablet Take 1 tablet by mouth 2 times daily 23   Mikaela Burt DO   albuterol sulfate HFA (VENTOLIN HFA) 108 (90 Base) MCG/ACT inhaler Inhale 2 puffs into the lungs 4 times daily as needed for Wheezing 23   Mikaela Burt DO   sevelamer (RENVELA) 2.4 g PACK packet Take 1 packet by mouth 3 times daily (with meals) 22   Historical Provider, MD   carvedilol (COREG) 6.25 MG tablet Take 1 tablet by mouth 2 times daily (with meals) 22   Fausto Vargas MD   aspirin EC 81 MG EC tablet Take 1 tablet by mouth daily 21   Margaret Moncada DO   glimepiride (AMARYL) 1 MG tablet Take 1 tablet by mouth every morning (before breakfast) Do not take if blood sugars are less than 110 21   Ivet Bull MD   atorvastatin (LIPITOR) 40 MG tablet Take 1 tablet by mouth nightly 21   Vipin Padilla MD   traMADol Robert Nataliya) 50 MG tablet take 1 tablet by mouth every 6 hours if needed 10/21/20   Historical Provider, MD   Multiple Vitamins-Minerals (RENAPLEX-D) TABS Take 1 tablet by mouth every other day Pt takes occasionally 19   Historical Provider, MD   lidocaine-prilocaine (EMLA) 2.5-2.5 % cream  3/18/19   Historical Provider, MD       SOCIAL HISTORY     Tobacco:   Tobacco Use: Low Risk     Smoking Tobacco Use: Never    Smokeless Tobacco Use: Never    Passive Exposure: Not on file      Alcohol:   Alcohol Use: Not on file        FAMILY HISTORY     Family History   Problem Relation Age of Onset    Heart Disease Mother         CHF    Early Death Mother     High Blood Pressure Brother     Diabetes Brother     Asthma Brother     High Blood Pressure Brother     Diabetes Brother     High Blood Pressure Brother     Diabetes Brother        PHYSICAL EXAM     Vitals: /65   Pulse (!) 115   Temp 97.4 °F (36.3 °C) (Temporal)   Resp (!) 35   Wt 237 lb 14 oz (107.9 kg)   SpO2 100%   BMI 34.13 kg/m²   Tmax: Temp (24hrs), Av.8 °F (36.6 °C), Min:97.4 °F (36.3 °C), Max:98.1 °F (36.7 °C)    Last Body weight:   Wt Readings from Last 3 Encounters:   03/03/23 237 lb 14 oz (107.9 kg)   01/26/23 236 lb 1.8 oz (107.1 kg)   08/04/22 255 lb (115.7 kg)     Body Mass Index : Body mass index is 34.13 kg/m². PHYSICAL EXAMINATION:  Physical Exam  Constitutional:       Appearance: Normal appearance. He is not ill-appearing. HENT:      Head: Normocephalic and atraumatic. Nose: Nose normal.      Mouth/Throat:      Mouth: Mucous membranes are moist.   Eyes:      General: No scleral icterus. Extraocular Movements: Extraocular movements intact. Pupils: Pupils are equal, round, and reactive to light. Cardiovascular:      Rate and Rhythm: Regular rhythm. Tachycardia present. Heart sounds: No murmur heard. Pulmonary:      Effort: Pulmonary effort is normal.      Breath sounds: No wheezing. Comments: Tachypnea present  Abdominal:      General: Abdomen is flat. Palpations: Abdomen is soft. Tenderness: There is no abdominal tenderness. Musculoskeletal:         General: No swelling. Right lower leg: No edema. Left lower leg: No edema. Skin:     Coloration: Skin is not jaundiced. Neurological:      Mental Status: He is alert and oriented to person, place, and time.    Psychiatric:         Mood and Affect: Mood normal.             INVESTIGATIONS     Laboratory Testing:     Recent Results (from the past 24 hour(s))   Basic Metabolic Panel w/ Reflex to MG    Collection Time: 03/03/23  2:54 AM   Result Value Ref Range    Glucose 115 (H) 70 - 99 mg/dL     (HH) 8 - 23 mg/dL    Creatinine 12.38 (HH) 0.70 - 1.20 mg/dL    Est, Glom Filt Rate 4 (L) >60 mL/min/1.73m2    Calcium 8.3 (L) 8.6 - 10.4 mg/dL    Sodium 137 135 - 144 mmol/L    Potassium 5.5 (H) 3.7 - 5.3 mmol/L    Chloride 93 (L) 98 - 107 mmol/L    CO2 22 20 - 31 mmol/L    Anion Gap 22 (H) 9 - 17 mmol/L   CBC with Auto Differential    Collection Time: 03/03/23  2:54 AM   Result Value Ref Range    WBC 10.4 3.5 - 11.3 k/uL    RBC 3.59 (L) 4.21 - 5.77 m/uL    Hemoglobin 10.5 (L) 13.0 - 17.0 g/dL    Hematocrit 37.7 (L) 40.7 - 50.3 %    .0 (H) 82.6 - 102.9 fL    MCH 29.2 25.2 - 33.5 pg    MCHC 27.9 (L) 28.4 - 34.8 g/dL    RDW 17.1 (H) 11.8 - 14.4 %    Platelets 870 690 - 758 k/uL    MPV 9.1 8.1 - 13.5 fL    NRBC Automated 0.0 0.0 per 100 WBC    Immature Granulocytes 2 (H) 0 %    Seg Neutrophils 72 (H) 36 - 65 %    Lymphocytes 5 (L) 24 - 43 %    Monocytes 20 (H) 3 - 12 %    Eosinophils % 0 (L) 1 - 4 %    Basophils 1 0 - 2 %    Absolute Immature Granulocyte 0.21 0.00 - 0.30 k/uL    Segs Absolute 7.49 1.50 - 8.10 k/uL    Absolute Lymph # 0.52 (L) 1.10 - 3.70 k/uL    Absolute Mono # 2.08 (H) 0.10 - 1.20 k/uL    Absolute Eos # 0.00 0.00 - 0.44 k/uL    Basophils Absolute 0.10 0.00 - 0.20 k/uL    Morphology ANISOCYTOSIS PRESENT     Morphology MACROCYTOSIS PRESENT     Morphology HYPOCHROMIA PRESENT    Troponin    Collection Time: 03/03/23  2:54 AM   Result Value Ref Range    Troponin, High Sensitivity 229 (HH) 0 - 22 ng/L   POC Glucose Fingerstick    Collection Time: 03/03/23  8:19 AM   Result Value Ref Range    POC Glucose 100 75 - 110 mg/dL   CBC    Collection Time: 03/03/23  4:38 PM   Result Value Ref Range    WBC 8.8 3.5 - 11.3 k/uL    RBC 3.39 (L) 4.21 - 5.77 m/uL    Hemoglobin 10.0 (L) 13.0 - 17.0 g/dL    Hematocrit 34.1 (L) 40.7 - 50.3 %    .6 82.6 - 102.9 fL    MCH 29.5 25.2 - 33.5 pg    MCHC 29.3 28.4 - 34.8 g/dL    RDW 17.2 (H) 11.8 - 14.4 %    Platelets 311 147 - 236 k/uL    MPV 8.8 8.1 - 13.5 fL    NRBC Automated 0.0 0.0 per 100 WBC   Basic Metabolic Panel w/ Reflex to MG    Collection Time: 03/03/23  4:38 PM   Result Value Ref Range    Glucose 121 (H) 70 - 99 mg/dL    BUN 72 (H) 8 - 23 mg/dL    Creatinine 8.85 (HH) 0.70 - 1.20 mg/dL    Est, Glom Filt Rate 6 (L) >60 mL/min/1.73m2    Calcium 8.3 (L) 8.6 - 10.4 mg/dL    Sodium 134 (L) 135 - 144 mmol/L    Potassium 4.3 3.7 - 5.3 mmol/L    Chloride 91 (L) 98 - 107 mmol/L    CO2 27 20 - 31 mmol/L Anion Gap 16 9 - 17 mmol/L   Troponin    Collection Time: 03/03/23  4:38 PM   Result Value Ref Range    Troponin, High Sensitivity 267 (HH) 0 - 22 ng/L       Imaging:   No results found. ASSESSMENT & PLAN     ASSESSMENT / PLAN:     Assessment:  -Atypical chest pain s/p hemodialysis  -Atrial fibrillation with RVR  -ESRD on hemodialysis following TTS schedule  -Thrombosed left upper extremity brachial axillary graft s/p open thrombectomy, dilatation of venous anastomosis and stenosis within the graft  -Essential hypertension  -Coronary artery disease s/p stent placement in 2019 and recent cath on 1/25/2023  -QTc prolongation  -Secondary hyperparathyroidism  -Hyperlipidemia    Plan:  -Patient's troponin level of 229 increased to 267. Patient's troponin on 1/23/2023 was 275. We will continue to monitor troponins.  -Follows TTS schedule of hemodialysis. Nephrology on board. We will continue to follow nephrology recommendations.  -On Coreg 6.25 mg twice daily for hypertension. We will continue to monitor blood pressure with regular BMPs.  -Started on Eliquis 5 mg twice daily for atrial fibrillation.  -Patient on sevelamer 2.5 g oral 3 times daily.  -Continue to monitor electrolytes. K+> 4 and Mg 2+> 2.  -Lipitor 40 mg oral nightly continue        DVT ppx: Eliquis  GI ppx: None    PT/OT/SW consulted  Discharge Planning: In process    Oneil Hallman MD  Internal Medicine Resident, PGY-1  [address], [place]  3/3/2023, 6:23 PM

## 2023-03-03 NOTE — PLAN OF CARE
Problem: Discharge Planning  Goal: Discharge to home or other facility with appropriate resources  3/3/2023 1757 by Diane Méndez RN  Outcome: Progressing  3/3/2023 1756 by Diane Méndez RN  Outcome: Progressing  3/3/2023 0611 by Bertha Arellano RN  Outcome: Progressing     Problem: Chronic Conditions and Co-morbidities  Goal: Patient's chronic conditions and co-morbidity symptoms are monitored and maintained or improved  3/3/2023 1757 by Diane Méndez RN  Outcome: Progressing  3/3/2023 1756 by Diane Méndez RN  Outcome: Progressing  3/3/2023 0611 by Bertha Arellano RN  Outcome: Progressing

## 2023-03-03 NOTE — CARE COORDINATION
Case Management Assessment  Initial Evaluation    Date/Time of Evaluation: 3/3/2023 4:36 PM  Assessment Completed by: Nhung Hylton RN    If patient is discharged prior to next notation, then this note serves as note for discharge by case management. Patient Name: Nai Black                   YOB: 1960  Diagnosis: Malfunction of arteriovenous graft, initial encounter [facility]) Kellie Greenhouse  Clotted renal dialysis AV graft, initial encounter ([facility] Khalida Mirandaball  AV fistula occlusion, initial encounter [facility]) Wesly Goel                   Date / Time: 3/2/2023 12:47 PM    Patient Admission Status: Observation   Readmission Risk (Low < 19, Mod (19-27), High > 27): Readmission Risk Score: 20    Current PCP: Glenard Boeck  PCP verified by CM? (P) Yes (seen 2 weeks ago)    Chart Reviewed: Yes      History Provided by: (P) Patient  Patient Orientation: (P) Alert and Oriented    Patient Cognition: (P) Alert    Hospitalization in the last 30 days (Readmission): If yes, Readmission Assessment in CM Navigator will be completed.     Advance Directives:      Code Status: Full Code   Patient's Primary Decision Maker is: Patient Declined (Legal Next of Kin Remains as Decision Maker)      Discharge Planning:    Patient lives with: (P) Alone Type of Home: (P) Apartment  Primary Care Giver: (P) Self  Patient Support Systems include: (P) None   Current Financial resources: (P) Medicare  Current community resources:    Current services prior to admission: (P) C-pap            Current DME:              Type of Home Care services:  (P)  (states current with [facility])    ADLS  Prior functional level: (P) Independent in ADLs/IADLs  Current functional level: (P) Independent in ADLs/IADLs    PT AM-PAC:   /24  OT AM-PAC:   /24    Family can provide assistance at DC: (P) No  Would you like Case Management to discuss the discharge plan with any other family members/significant others, and if so, who? (P) No  Plans to Return to Present Housing: (P) Yes  Other Identified Issues/Barriers to RETURNING to current housing: none  Potential Assistance needed at discharge: (P) N/A            Potential DME:    Patient expects to discharge to: (P) [facility] for transportation at discharge:      Financial    Payor: Bunny Mackey / Plan: MEDICARE PART A AND B / Product Type: *No Product type* /     Does insurance require precert for SNF: No    Potential assistance Purchasing Medications: (P) No  Meds-to-Beds request: Yes      [address] [facility] [id number] Vasiliy Michaels, [address] [place] [address],[address] [address]  Phone: [phone] Fax: [phone] [address] [phone] - f [phone]  [facility] [address]  Phone: [phone] Fax: [phone]      Notes:    Factors facilitating achievement of predicted outcomes: Pleasant    Barriers to discharge: pt requesting SNF placement, with no needs for SNF    Additional Case Management Notes: goal is home with [facility] as previous    The Plan for Transition of Care is related to the following treatment goals of Malfunction of arteriovenous graft, initial encounter ([facility] Mikeyto LiFlavio  Clotted renal dialysis AV graft, initial encounter ([facility] [T82.868A]  AV fistula occlusion, [facility] [J03.884I]    IF APPLICABLE: The Patient and/or patient representative Bennett Contreras and his family were provided with a choice of provider and agrees with the discharge plan. Freedom of choice list with basic dialogue that supports the patient's individualized plan of care/goals and shares the quality data associated with the providers was provided to: (P) Patient   Patient Representative Name:       The Patient and/or Patient Representative Agree with the Discharge Plan?  (P) No (wants to stay one more night, requesting SNF placement)    Artem Parada RN  Case Management Department  Ph: [phone]

## 2023-03-03 NOTE — OP NOTE
Operative Note      Patient: Mini Duff  YOB: 1960  MRN: [id number]    Date of Procedure: 3/2/2023    Pre-Op Diagnosis: Thrombosed AV graft left upper extremity    Post-Op Diagnosis: Same       Procedure:  1. Open thrombectomy left upper extremity brachioaxillary graft. 2.  Left upper extremity brachioaxillary fistulogram including the central veins and periarterial anastomotic region. 3.  Balloon angioplasty venous anastomosis and mid body of the graft with an 8 x 40 balloon. Surgeon(s):  Amalia Kerr MD    Assistant:   Dneton Mathsi    Anesthesia: General    Estimated Blood Loss (mL): 52DN    Complications: None    Specimens:   None    Implants:  None      Drains: None    Findings: The brachioaxillary graft was thrombosed. Thrombectomy revealed acute thrombus. Fistulography revealed 2 stenoses 1 in the mid body of the graft and 1 at the venous anastomosis. The central veins were widely patent without significant stenosis. The graft had a palpable thrill at the conclusion of the case following thrombectomy and balloon angioplasty in the above-mentioned areas. The hand was warm and well-perfused at the conclusion of the case. I cannot palpate a distal radial pulse either before or after the procedure. Detailed Description of Procedure: The patient was brought to the operating room and placed in a supine position with his arm outstretched at a 90 degree angle to his body on the left. He was given general anesthetic with endotracheal airway establishment. The left upper extremity was prepped and draped in a sterile fashion in its entirety. Timeout was held before a transverse type incision was made in the distal arm directly overlying the graft and the artery. Bovie cautery was used to dissect down to and around the graft to expose the graft circumferentially and to get to the artery to expose the artery above and below the anastomosis for arterial control.   Vesseloops were PHQ Scores 12/21/2020 8/10/2020 5/27/2020 11/21/2018 6/4/2018 5/7/2018 3/21/2018   PHQ2 Score 2 4 0 0 3 0 4   PHQ9 Score 2 14 0 0 6 5 14     Interpretation of Total Score Depression Severity: 1-4 = Minimal depression, 5-9 = Mild depression, 10-14 = Moderate depression, 15-19 = Moderately severe depression, 20-27 = Severe depression placed around the brachial artery both distally and proximally. The patient was given 8000 units of heparin. The graft was opened in a transverse fashion where it had been previously opened and thrombectomy was performed with a 4 Briana on the venous and first.  Once venous return was achieved at that end of the graft was clamped with a profunda clamp. The proximal thrombus was removed by direct thrombectomy with forceps. Arterial inflow was outstanding. The graft was then reanastomosed with 6-0 Prolene in a running fashion from either apex and tied in the middle. A 7 Kyrgyz sheath was then placed in the graft distal to the graftotomy with a single needle poke and Seldinger technique. Fistulography was performed of the entirety of the fistula including the arterial anastomosis all of the graft and all of the central veins to the right atrium. There were 2 areas of stenosis as described above both treated with an 8 mm balloon inflated to approximately 7 to 8 giovani in both locations. This produced an outstanding result with no residual stenosis. There was now a palpable thrill in the graft which did not occur after I opened it with thrombectomy. He still had no palpable distal radial pulse but his hand was warm and well perfused with adequate capillary refill. The sheath was removed and a single simple 6-0 Prolene suture was placed at the entry site for hemostasis. There was no bleeding or hematoma at the conclusion of the case. The skin was closed in 2 layers using 4-0 Vicryl in an interrupted simple style at the level of the subcutaneous tissue and 4-0 nylon in an interrupted vertical mattress style at the level of the skin. All sponge needle and instrument counts were correct. He was awakened extubated and returned to recovery room in stable condition. He tolerated the procedure well. There were no complications. He can use the graft for dialysis at any time.   He will be admitted to be dialyzed and hopefully he can be discharged home tomorrow.     Electronically signed by Ed Vargas MD on 3/2/2023 at 9:24 PM

## 2023-03-03 NOTE — PROGRESS NOTES
Dialysis Time Out  To be done by RN and tech or 2 RNs  Staff Names [address] [place] [address]    [x]  Identity of the patient using 2 patient identifiers  [x]  Consent for treatment  [x]  Equipment-proper machine and dialyzer  [x]  B-Hep B status  [x]  Orders- to include bath, blood flow, dialyzer, time and fluid removal  [x]  Access-Correct site and in working order  [x]  Time for patient to ask questions.

## 2023-03-04 ENCOUNTER — APPOINTMENT (OUTPATIENT)
Dept: DIALYSIS | Age: 63
DRG: 252 | End: 2023-03-04
Attending: SURGERY
Payer: MEDICARE

## 2023-03-04 PROBLEM — T82.868A AV GRAFT THROMBOSIS, INITIAL ENCOUNTER (HCC): Status: ACTIVE | Noted: 2023-03-04

## 2023-03-04 LAB
ABSOLUTE EOS #: 0.09 K/UL (ref 0–0.44)
ABSOLUTE IMMATURE GRANULOCYTE: 0.17 K/UL (ref 0–0.3)
ABSOLUTE LYMPH #: 0.51 K/UL (ref 1.1–3.7)
ABSOLUTE MONO #: 1.36 K/UL (ref 0.1–1.2)
ANION GAP SERPL CALCULATED.3IONS-SCNC: 18 MMOL/L (ref 9–17)
BASOPHILS # BLD: 1 % (ref 0–2)
BASOPHILS ABSOLUTE: 0.09 K/UL (ref 0–0.2)
BUN SERPL-MCNC: 76 MG/DL (ref 8–23)
CALCIUM SERPL-MCNC: 8.4 MG/DL (ref 8.6–10.4)
CHLORIDE SERPL-SCNC: 91 MMOL/L (ref 98–107)
CO2 SERPL-SCNC: 25 MMOL/L (ref 20–31)
CREAT SERPL-MCNC: 9.02 MG/DL (ref 0.7–1.2)
EOSINOPHILS RELATIVE PERCENT: 1 % (ref 1–4)
GFR SERPL CREATININE-BSD FRML MDRD: 6 ML/MIN/1.73M2
GLUCOSE BLD-MCNC: 118 MG/DL (ref 75–110)
GLUCOSE BLD-MCNC: 118 MG/DL (ref 75–110)
GLUCOSE SERPL-MCNC: 138 MG/DL (ref 70–99)
HCT VFR BLD AUTO: 32.5 % (ref 40.7–50.3)
HGB BLD-MCNC: 9.3 G/DL (ref 13–17)
IMMATURE GRANULOCYTES: 2 %
LYMPHOCYTES # BLD: 6 % (ref 24–43)
MCH RBC QN AUTO: 29.1 PG (ref 25.2–33.5)
MCHC RBC AUTO-ENTMCNC: 28.6 G/DL (ref 28.4–34.8)
MCV RBC AUTO: 101.6 FL (ref 82.6–102.9)
MONOCYTES # BLD: 16 % (ref 3–12)
MORPHOLOGY: ABNORMAL
NRBC AUTOMATED: 0 PER 100 WBC
PDW BLD-RTO: 17.2 % (ref 11.8–14.4)
PLATELET # BLD AUTO: 209 K/UL (ref 138–453)
PMV BLD AUTO: 9.2 FL (ref 8.1–13.5)
POTASSIUM SERPL-SCNC: 4.6 MMOL/L (ref 3.7–5.3)
RBC # BLD: 3.2 M/UL (ref 4.21–5.77)
SEG NEUTROPHILS: 74 % (ref 36–65)
SEGMENTED NEUTROPHILS ABSOLUTE COUNT: 6.28 K/UL (ref 1.5–8.1)
SODIUM SERPL-SCNC: 134 MMOL/L (ref 135–144)
TROPONIN I SERPL DL<=0.01 NG/ML-MCNC: 265 NG/L (ref 0–22)
TROPONIN I SERPL DL<=0.01 NG/ML-MCNC: 286 NG/L (ref 0–22)
WBC # BLD AUTO: 8.5 K/UL (ref 3.5–11.3)

## 2023-03-04 PROCEDURE — 2580000003 HC RX 258

## 2023-03-04 PROCEDURE — 6370000000 HC RX 637 (ALT 250 FOR IP): Performed by: STUDENT IN AN ORGANIZED HEALTH CARE EDUCATION/TRAINING PROGRAM

## 2023-03-04 PROCEDURE — 6370000000 HC RX 637 (ALT 250 FOR IP)

## 2023-03-04 PROCEDURE — 80048 BASIC METABOLIC PNL TOTAL CA: CPT

## 2023-03-04 PROCEDURE — 82947 ASSAY GLUCOSE BLOOD QUANT: CPT

## 2023-03-04 PROCEDURE — 90935 HEMODIALYSIS ONE EVALUATION: CPT | Performed by: INTERNAL MEDICINE

## 2023-03-04 PROCEDURE — 90935 HEMODIALYSIS ONE EVALUATION: CPT

## 2023-03-04 PROCEDURE — 84484 ASSAY OF TROPONIN QUANT: CPT

## 2023-03-04 PROCEDURE — 36415 COLL VENOUS BLD VENIPUNCTURE: CPT

## 2023-03-04 PROCEDURE — 1200000000 HC SEMI PRIVATE

## 2023-03-04 PROCEDURE — 85025 COMPLETE CBC W/AUTO DIFF WBC: CPT

## 2023-03-04 RX ORDER — CARVEDILOL 12.5 MG/1
12.5 TABLET ORAL 2 TIMES DAILY WITH MEALS
Status: DISCONTINUED | OUTPATIENT
Start: 2023-03-04 | End: 2023-03-04

## 2023-03-04 RX ORDER — METOPROLOL TARTRATE 5 MG/5ML
5 INJECTION INTRAVENOUS ONCE
Status: DISCONTINUED | OUTPATIENT
Start: 2023-03-04 | End: 2023-03-05 | Stop reason: HOSPADM

## 2023-03-04 RX ORDER — TRAMADOL HYDROCHLORIDE 50 MG/1
12.5 TABLET ORAL ONCE
Status: COMPLETED | OUTPATIENT
Start: 2023-03-04 | End: 2023-03-04

## 2023-03-04 RX ADMIN — CARVEDILOL 6.25 MG: 6.25 TABLET, FILM COATED ORAL at 08:57

## 2023-03-04 RX ADMIN — DESMOPRESSIN ACETATE 40 MG: 0.2 TABLET ORAL at 21:02

## 2023-03-04 RX ADMIN — ACETAMINOPHEN 650 MG: 325 TABLET ORAL at 14:17

## 2023-03-04 RX ADMIN — APIXABAN 5 MG: 5 TABLET, FILM COATED ORAL at 08:57

## 2023-03-04 RX ADMIN — METOPROLOL TARTRATE 25 MG: 25 TABLET ORAL at 21:03

## 2023-03-04 RX ADMIN — TRAMADOL HYDROCHLORIDE 12.5 MG: 50 TABLET, COATED ORAL at 22:51

## 2023-03-04 RX ADMIN — OXYCODONE 5 MG: 5 TABLET ORAL at 02:25

## 2023-03-04 RX ADMIN — SODIUM CHLORIDE, PRESERVATIVE FREE 10 ML: 5 INJECTION INTRAVENOUS at 08:57

## 2023-03-04 RX ADMIN — SODIUM CHLORIDE, PRESERVATIVE FREE 10 ML: 5 INJECTION INTRAVENOUS at 21:02

## 2023-03-04 RX ADMIN — OXYCODONE 5 MG: 5 TABLET ORAL at 14:18

## 2023-03-04 RX ADMIN — APIXABAN 5 MG: 5 TABLET, FILM COATED ORAL at 21:02

## 2023-03-04 ASSESSMENT — ENCOUNTER SYMPTOMS
SHORTNESS OF BREATH: 1
ABDOMINAL PAIN: 0
DIARRHEA: 0
NAUSEA: 0
CONSTIPATION: 0

## 2023-03-04 ASSESSMENT — PAIN DESCRIPTION - FREQUENCY: FREQUENCY: CONTINUOUS

## 2023-03-04 ASSESSMENT — PAIN DESCRIPTION - PAIN TYPE
TYPE: CHRONIC PAIN
TYPE: CHRONIC PAIN

## 2023-03-04 ASSESSMENT — PAIN - FUNCTIONAL ASSESSMENT
PAIN_FUNCTIONAL_ASSESSMENT: ACTIVITIES ARE NOT PREVENTED
PAIN_FUNCTIONAL_ASSESSMENT: PREVENTS OR INTERFERES SOME ACTIVE ACTIVITIES AND ADLS

## 2023-03-04 ASSESSMENT — PAIN DESCRIPTION - LOCATION
LOCATION: ARM
LOCATION: SHOULDER
LOCATION: SHOULDER

## 2023-03-04 ASSESSMENT — PAIN DESCRIPTION - DESCRIPTORS
DESCRIPTORS: ACHING;DISCOMFORT
DESCRIPTORS: DISCOMFORT
DESCRIPTORS: ACHING;CRAMPING;DISCOMFORT

## 2023-03-04 ASSESSMENT — PAIN SCALES - GENERAL
PAINLEVEL_OUTOF10: 8
PAINLEVEL_OUTOF10: 7
PAINLEVEL_OUTOF10: 5
PAINLEVEL_OUTOF10: 5
PAINLEVEL_OUTOF10: 8

## 2023-03-04 ASSESSMENT — PAIN DESCRIPTION - ORIENTATION: ORIENTATION: RIGHT

## 2023-03-04 NOTE — PROGRESS NOTES
Dialysis Time Out  To be done by RN and tech or 2 RNs  Staff Names [id number] [facility] RN    [x]  Identity of the patient using 2 patient identifiers  [x]  Consent for treatment  [x]  Equipment-proper machine and dialyzer  [x]  B-Hep B status  [x]  Orders- to include bath, blood flow, dialyzer, time and fluid removal  [x]  Access-Correct site and in working order  [x]  Time for patient to ask questions.

## 2023-03-04 NOTE — PLAN OF CARE
Problem: Discharge Planning  Goal: Discharge to home or other facility with appropriate resources  3/3/2023 2318 by Almedia Crigler, RN  Outcome: Progressing  3/3/2023 1757 by Le Rausch RN  Outcome: Progressing  3/3/2023 1756 by Le Rausch RN  Outcome: Progressing     Problem: Chronic Conditions and Co-morbidities  Goal: Patient's chronic conditions and co-morbidity symptoms are monitored and maintained or improved  3/3/2023 2318 by Almedia Crigler, RN  Outcome: Progressing  3/3/2023 1757 by Le Rasuch RN  Outcome: Progressing  3/3/2023 1756 by Le Rausch RN  Outcome: Progressing

## 2023-03-04 NOTE — PROGRESS NOTES
[facility]  Internal Medicine Teaching Residency Program  Inpatient Daily Progress Note  ______________________________________________________________________________    Patient: Blanche Salcido  YOB: 1960   ROW:[id number]    Acct: [de-identified]     Room: 5/1-1  Admit date: 3/2/2023  Today's date: 03/04/23  Number of days in the hospital: 1    SUBJECTIVE   Admitting Diagnosis: Cl[facility] renal dialysis [facility]  CC: Chest pain post hemodialysis  Pt examined at bedside. Chart & results reviewed. -Patient awake, alert, oriented X4. And in no acute distress. -Breathing comfortably on 2 L nasal cannula oxygen  -Reports that his chest pain has significantly improved but is still reproducible when pressing sternum  -Patient has sinus tachycardia. Patient reports that his tachypnea is chronic. ROS:  Constitutional:  negative for chills, fevers, sweats  Respiratory: Patient has increased rate of respiration chronically. Cardiovascular: Decreased chest pain than yesterday. Gastrointestinal:  negative for abdominal pain, constipation, diarrhea, nausea, vomiting  Neurological:  negative for dizziness, headache  BRIEF HISTORY     The patient is a pleasant 57 y.o. male presents with a chief complaint of chest pain post hemodialysis. Patient was initially admitted on 3/2/2023 for evaluation of open left upper extremity brachiocephalic artery graft thrombectomy due to dysfunctional fistula. Patient was attempted HD cannulation that was unsuccessful and there was thrombosis of left upper extremity graft. He underwent open thrombectomy left upper extremity brachioaxillary graft and left upper extremity brachial axillary fistulogram including the central veins and periarterial anastomotic. Patient underwent hemodialysis today with 2 L of net removal of fluid was done. After hemodialysis, the patient started having chest pain. He also had increased troponins measuring 229 with previous troponin 246 on 2023. EKG was done and it showed sinus tachycardia. Latest labs showed sodium 134, glucose 121, troponin 267, hemoglobin 10.0. After dialysis, the patient developed chest pain which was more on the left side radiating to left shoulder, back, and the sternal area. The patient reports that he also had tenderness in the sternal area. The patient was having shortness of breath but reported that it has been there since long and even before the admission. As per documentation, the patient also developed atrial fibrillation with RVR after hemodialysis. Currently, the patient is awake, alert, oriented X4. The patient still had left-sided chest pain but reports that it is mostly started from his arms rather than his chest.  It radiates to rotator cuff of the left shoulder and goes to his back. The patient has extreme tenderness in the sternal area. Patient was tachypneic with respiratory rate 28 and was in sinus tachycardia with heart rate 113. The patient reports that his pain is aggravated by movement and goes away when he is resting. OBJECTIVE     Vital Signs:  /79   Pulse (!) 114   Temp 97.7 °F (36.5 °C) (Oral)   Resp 27   Wt 237 lb 14 oz (107.9 kg)   SpO2 97%   BMI 34.13 kg/m²     Temp (24hrs), Av.7 °F (36.5 °C), Min:97.4 °F (36.3 °C), Max:98.1 °F (36.7 °C)    In: 700   Out: 2300     Physical Exam:  Physical Exam  Constitutional:       Appearance: Normal appearance. He is not ill-appearing. HENT:      Head: Normocephalic and atraumatic. Nose: Nose normal.      Mouth/Throat:      Mouth: Mucous membranes are moist.   Eyes:      General: No scleral icterus. Extraocular Movements: Extraocular movements intact. Pupils: Pupils are equal, round, and reactive to light. Cardiovascular:      Rate and Rhythm: Regular rhythm. Tachycardia present. Heart sounds: No murmur heard.      Comments: Tenderness in sternum  Pulmonary:      Effort: Pulmonary effort is normal.      Breath sounds: Normal breath sounds. Abdominal:      General: Abdomen is flat. Tenderness: There is no abdominal tenderness. Musculoskeletal:      Cervical back: Normal range of motion. Right lower leg: No edema. Left lower leg: No edema. Skin:     Coloration: Skin is not jaundiced. Neurological:      Mental Status: He is alert and oriented to person, place, and time. Psychiatric:         Mood and Affect: Mood normal.        Medications:  Scheduled Medications:    metoprolol  2.5 mg IntraVENous Once    sodium chloride flush  5-40 mL IntraVENous 2 times per day    apixaban  5 mg Oral BID    atorvastatin  40 mg Oral Nightly    carvedilol  6.25 mg Oral BID WC    sevelamer  2.4 g Oral TID WC     Continuous Infusions:    sodium chloride       PRN MedicationsoxyCODONE, 5 mg, Q8H PRN  sodium chloride flush, 5-40 mL, PRN  sodium chloride, , PRN  ondansetron, 4 mg, Q8H PRN   Or  ondansetron, 4 mg, Q6H PRN  acetaminophen, 650 mg, Q4H PRN  albuterol sulfate HFA, 2 puff, 4x Daily PRN        Diagnostic Labs:  CBC:   Recent Labs     03/03/23  0254 03/03/23  1638 03/04/23  0425   WBC 10.4 8.8 8.5   RBC 3.59* 3.39* 3.20*   HGB 10.5* 10.0* 9.3*   HCT 37.7* 34.1* 32.5*   .0* 100.6 101.6   RDW 17.1* 17.2* 17.2*    178 209     BMP:   Recent Labs     03/03/23  0254 03/03/23  1638 03/04/23  0425    134* 134*   K 5.5* 4.3 4.6   CL 93* 91* 91*   CO2 22 27 25   * 72* 76*   CREATININE 12.38* 8.85* 9.02*     BNP: No results for input(s): BNP in the last 72 hours. PT/INR: No results for input(s): PROTIME, INR in the last 72 hours. APTT: No results for input(s): APTT in the last 72 hours. CARDIAC ENZYMES: No results for input(s): CKMB, CKMBINDEX, TROPONINI in the last 72 hours.     Invalid input(s): CKTOTAL;3  FASTING LIPID PANEL:  Lab Results   Component Value Date    CHOL 127 11/12/2021    HDL 45 11/12/2021 TRIG 107 11/12/2021     LIVER PROFILE: No results for input(s): AST, ALT, ALB, BILIDIR, BILITOT, ALKPHOS in the last 72 hours. MICROBIOLOGY:   Lab Results   Component Value Date/Time    CULTURE NO SIGNIFICANT GROWTH 12/31/2021 04:23 PM       Imaging:    No results found. ASSESSMENT & PLAN     ASSESSMENT / PLAN:     Assessment:  -Atypical chest pain s/p hemodialysis  -Sinus tachycardia  -ESRD on hemodialysis following TTS schedule  -Thrombosed left upper extremity brachial axillary graft s/p open thrombectomy, dilatation of venous anastomosis and stenosis within the graft  -Essential hypertension  -Coronary artery disease s/p stent placement in 2019 and recent cath on 1/25/2023  -QTc prolongation  -Secondary hyperparathyroidism  -Hyperlipidemia     Plan:  -Patient's troponin level of 229 increased to 267. Patient's troponin on 1/23/2023 was 275. We will continue to monitor troponins.  -Follows TTS schedule of hemodialysis. Nephrology on board. We will continue to follow nephrology recommendations.  -On Coreg 6.25 mg twice daily for hypertension. We will continue to monitor blood pressure with regular BMPs.  -Started on Eliquis 5 mg twice daily for atrial fibrillation.  -Patient on sevelamer 2.5 g oral 3 times daily.  -Continue to monitor electrolytes. K+> 4 and Mg 2+> 2.  -Lipitor 40 mg oral nightly continue       DVT ppx : Eliquis  GI ppx: None    PT/OT:  consulted  Discharge Planning / SW: In process    Landon Kraft MD  Internal Medicine Resident, PGY-1  1 [facility];  [facility]  3/4/2023, 6:27 AM

## 2023-03-04 NOTE — PLAN OF CARE
Seen patient during rounds. Patient was complaining of reproducible chest pain during dialysis today. EKG was done that showed Atrial fibrillation with HR 95. QTC prolongation at 573. Patient has a history of paroxysmal Atrial fibrillation and he is on Eliquis 5 mg BID. He is also on Coreg 6.25 mg BID. Will change to Lopressor 25 mg BID for better rate control. Patient's HR was in 110s after dialysis. Will give 1 dose of Lopressor 5 mg IV and monitor. Will monitor patient overnight and see any changes and improvement on HR. Patient agrees to stay overnight and re-evaluated tomorrow morning. Discussed with patient and RN regarding the same. Patient verbalized understanding.        Erlinda العراقي MD, MPH, PGY-2  Internal Medicine Resident  [place], [place]

## 2023-03-04 NOTE — PROGRESS NOTES
Nephrology Progress Note        Subjective: patient evaluated on dialysis. Pt is stable on dialysis. Access cannulated without problems. No new issues overnite. Stable hemodynamics. Had hemodialysis yesterday. Ran for 210 minutes removed 2 L dry weight 108.7 kg post weight 107.9 kg. Patient doing of reproducible chest pain in the retrosternal area on dialysis. EKG reviewed showing right bundle branch block and atrial fibrillation. EKG from yesterday showed irregular rhythm as well although there was some P waves. .  No acute complaints. Discharge held yesterday as patient had chest pain now transferred to the medical service. Cardiology consult obtained. He has had PCI to LAD in the past by Dr. Nida Manuel. Cardiac catheterization in January did not require any intervention, LAD left main left circumflex and RCA had mild irregularities. Mid LAD stent and nonobstructive disease. Ejection fraction at that time was 30%. .    Labs reviewed stable. Potassium 4.6 hemoglobin 9.3. Chart reviewed as available.     Objective:  CURRENT TEMPERATURE:  Temp: 97.5 °F (36.4 °C)  MAXIMUM TEMPERATURE OVER 24HRS:  Temp (24hrs), Av.7 °F (36.5 °C), Min:97.4 °F (36.3 °C), Max:98.1 °F (36.7 °C)    CURRENT RESPIRATORY RATE:  Resp: 16  CURRENT PULSE:  Heart Rate: (!) 114  CURRENT BLOOD PRESSURE:  BP: 107/71  24HR BLOOD PRESSURE RANGE:  Systolic (45KVV), MGL:146 , Min:100 , HF   ; Diastolic (20EWP), SIR:37, Min:65, Max:87    24HR INTAKE/OUTPUT:    Intake/Output Summary (Last 24 hours) at 3/4/2023 1130  Last data filed at 3/3/2023 1319  Gross per 24 hour   Intake 300 ml   Output 2300 ml   Net -2000 ml     Patient Vitals for the past 96 hrs (Last 3 readings):   Weight   23 0936 240 lb 15.4 oz (109.3 kg)   23 1319 237 lb 14 oz (107.9 kg)   23 0938 244 lb 0.8 oz (110.7 kg)         Physical Exam:  General appearance:Awake, alert, in no acute distress  Skin: warm and dry, no rash or erythema  Eyes: conjunctivae normal and sclera anicteric  ENT:no thrush no pharyngeal congestion   Neck:  No JVD, No Thyromegaly  Pulmonary: clear to auscultation and no wheezing or rhonchi   Cardiovascular: normal S1 and S2. NO rubs and NO murmur. No S3 OR S4   Abdomen: soft nontender, bowel sounds present, no organomegaly,  no ascites   Extremities: no cyanosis, clubbing or edema     Access:  previous  Left AV graft status post thrombectomy functioning well    Current Medications:    metoprolol (LOPRESSOR) injection 2.5 mg, Once  oxyCODONE (ROXICODONE) immediate release tablet 5 mg, Q8H PRN  sodium chloride flush 0.9 % injection 5-40 mL, 2 times per day  sodium chloride flush 0.9 % injection 5-40 mL, PRN  0.9 % sodium chloride infusion, PRN  ondansetron (ZOFRAN-ODT) disintegrating tablet 4 mg, Q8H PRN   Or  ondansetron (ZOFRAN) injection 4 mg, Q6H PRN  acetaminophen (TYLENOL) tablet 650 mg, Q4H PRN  apixaban (ELIQUIS) tablet 5 mg, BID  albuterol sulfate HFA (PROVENTIL;VENTOLIN;PROAIR) 108 (90 Base) MCG/ACT inhaler 2 puff, 4x Daily PRN  atorvastatin (LIPITOR) tablet 40 mg, Nightly  carvedilol (COREG) tablet 6.25 mg, BID WC  sevelamer (RENVELA) packet 2.4 g, TID WC      Labs:   CBC:   Recent Labs     03/03/23  0254 03/03/23  1638 03/04/23  0425   WBC 10.4 8.8 8.5   RBC 3.59* 3.39* 3.20*   HGB 10.5* 10.0* 9.3*   HCT 37.7* 34.1* 32.5*    178 209   MPV 9.1 8.8 9.2      BMP:   Recent Labs     03/03/23  0254 03/03/23  1638 03/04/23  0425    134* 134*   K 5.5* 4.3 4.6   CL 93* 91* 91*   CO2 22 27 25   * 72* 76*   CREATININE 12.38* 8.85* 9.02*   GLUCOSE 115* 121* 138*   CALCIUM 8.3* 8.3* 8.4*        Dialysis bath: Dialysis Bath  K+ (Potassium): 2  Ca+ (Calcium): 2.25  Na+ (Sodium): 138  HCO3 (Bicarb): 35    Radiology:  Reviewed as available. Assessment:  1. ESRD on Hemodialysis. His regular HD days are TTS at [facility] facility using Left AV fistula under Dr. Mary Jo Rocha. His dry weight is 108.7 kg.  Admitted with 110.7 kg, almost 3 L of fluid removed today  2. Thrombosed left upper extremity brachial axillary graft status post open thrombectomy, dilatation of venous anastomosis and stenosis within the graft. Procedure was successful has been used successfully yesterday. 2. HTN blood pressure stable  3. CAD s/p stent in past patient did have mild chest pain today on dialysis which is now resolved. Cardiology consult pending. Last cardiac catheterization in January did not require any intervention  4. Anemia of chronic disease  5. Secondary hyperparathyroidism  6. Chest pain- rule out ischemia  7. Qtc prolongation- 518  8. Ischemic cardiomyopathy ejection fraction 30% with compensated CHF    Plan:  1. Dialysis [id number]. Last HD on last Tuesday- 2/28/23. Was unable to get dialysis on Thursday due to thrombosed AV fistula. Due again on Tuesday. 2. Antihypertensives: Coreg 6.25 twice daily. 3. Hemodialysis orders reviewed. 5. Follow up on troponin level. EKG showed atrial fibrillation does have proximal paroxysmal atrial fibrillation even in the past.    6. Strict Input and Output, Daily weigh and document in the chart. 7. Low Potassium, Low phosphorus and low salt diet. Fluids to be restricted to 1500ml/day. 8. IV Aranesp/Epogen for anemia of chronic disease with HD weekly. 9. IV Zemplar per protocol for secondary hyperparathyroidism with HD thrice a week. 10.  Patient stable for nephrology standpoint      Please do not hesitate to call with questions. Electronically signed by ROB Iglesias CNP on 3/4/2023 at 11:30 AM    Patient seen with nurse practitioner. Known history of ESRD on hemodialysis Tuesday Thursday Saturday at the [facility] unit via left AV graft under Dr. Huong Edmonds. Came into the hospital for occluded graft which was treated by open thrombectomy. Was planned for discharge. Chest pain now admitted to medical service.   Known history of coronary artery disease, history of stent placement to the LAD in the past and cardiomyopathy. Previous cardiac catheterization in January did not require any intervention stent was felt to be patent. EKG today showed atrial fibrillation which has had in the past as well. Does have right bundle branch block. Cardiology consult pending. Plan  1. Hemodialysis today as prescribed  2. Stable for discharge from nephrology standpoint  3. Await cardiology recommendation  4. Fluid restriction as before  5. Next dialysis on Tuesday per schedule  6. We will follow    6Attending Physician Statement  I have discussed the care of Leonid Rutledge, including pertinent history and exam findings with the resident/fellow. I have reviewed the key elements of all parts of the encounter with the resident/fellow. I have seen and examined the patient with the resident/fellow. I agree with the assessment and plan and status of the problem list as documented.       .  Electronically signed by Carlos Stewart MD on 3/4/2023 at 1:01 PM

## 2023-03-04 NOTE — DIALYSIS
Dialysis Time Out  To be done by RN and tech or 2 RNs  Staff Names [id number] [facility] RN    [x]  Identity of the patient using 2 patient identifiers  [x]  Consent for treatment  [x]  Equipment-proper machine and dialyzer  [x]  B-Hep B status  [x]  Orders- to include bath, blood flow, dialyzer, time and fluid removal  [x]  Access-Correct site and in working order  [x]  Time for patient to ask questions. Declines Yes

## 2023-03-04 NOTE — PROGRESS NOTES
Dialysis Post Treatment Note  Vitals:    03/04/23 1249   BP: 132/74   Pulse: (!) 114   Resp: 16   Temp:    SpO2: 97%     Pre-Weight = 109.3kg  Post-weight = Weight: 232 lb 2.3 oz (105.3 kg)  Total Liters Processed = Blood Volume Processed (Liters): 59.29 l/min  Rinseback Volume (mL) = Rinseback Volume (ml): 10 ml  Net Removal (mL) =  2.5  Patient's dry weight=108.7kg  Type of access used=left upper fist  Length of treatment=180kg

## 2023-03-05 VITALS
DIASTOLIC BLOOD PRESSURE: 68 MMHG | RESPIRATION RATE: 32 BRPM | HEART RATE: 90 BPM | TEMPERATURE: 98.4 F | OXYGEN SATURATION: 92 % | BODY MASS INDEX: 33.31 KG/M2 | WEIGHT: 232.14 LBS | SYSTOLIC BLOOD PRESSURE: 113 MMHG

## 2023-03-05 PROBLEM — I48.11 LONGSTANDING PERSISTENT ATRIAL FIBRILLATION (HCC): Status: ACTIVE | Noted: 2023-03-05

## 2023-03-05 LAB
ABSOLUTE EOS #: 0.09 K/UL (ref 0–0.44)
ABSOLUTE IMMATURE GRANULOCYTE: 0.17 K/UL (ref 0–0.3)
ABSOLUTE LYMPH #: 0.43 K/UL (ref 1.1–3.7)
ABSOLUTE MONO #: 1.28 K/UL (ref 0.1–1.2)
ANION GAP SERPL CALCULATED.3IONS-SCNC: 16 MMOL/L (ref 9–17)
BASOPHILS # BLD: 1 % (ref 0–2)
BASOPHILS ABSOLUTE: 0.09 K/UL (ref 0–0.2)
BUN SERPL-MCNC: 61 MG/DL (ref 8–23)
CALCIUM SERPL-MCNC: 8.6 MG/DL (ref 8.6–10.4)
CHLORIDE SERPL-SCNC: 89 MMOL/L (ref 98–107)
CO2 SERPL-SCNC: 27 MMOL/L (ref 20–31)
CREAT SERPL-MCNC: 6.98 MG/DL (ref 0.7–1.2)
EKG ATRIAL RATE: 114 BPM
EKG P-R INTERVAL: 184 MS
EKG Q-T INTERVAL: 376 MS
EKG Q-T INTERVAL: 434 MS
EKG QRS DURATION: 154 MS
EKG QRS DURATION: 160 MS
EKG QTC CALCULATION (BAZETT): 518 MS
EKG QTC CALCULATION (BAZETT): 598 MS
EKG R AXIS: -71 DEGREES
EKG R AXIS: -78 DEGREES
EKG T AXIS: 87 DEGREES
EKG T AXIS: 88 DEGREES
EKG VENTRICULAR RATE: 114 BPM
EKG VENTRICULAR RATE: 114 BPM
EOSINOPHILS RELATIVE PERCENT: 1 % (ref 1–4)
GFR SERPL CREATININE-BSD FRML MDRD: 8 ML/MIN/1.73M2
GLUCOSE SERPL-MCNC: 134 MG/DL (ref 70–99)
HCT VFR BLD AUTO: 33.3 % (ref 40.7–50.3)
HGB BLD-MCNC: 9.7 G/DL (ref 13–17)
IMMATURE GRANULOCYTES: 2 %
LYMPHOCYTES # BLD: 5 % (ref 24–43)
MCH RBC QN AUTO: 29 PG (ref 25.2–33.5)
MCHC RBC AUTO-ENTMCNC: 29.1 G/DL (ref 28.4–34.8)
MCV RBC AUTO: 99.7 FL (ref 82.6–102.9)
MONOCYTES # BLD: 15 % (ref 3–12)
MORPHOLOGY: ABNORMAL
NRBC AUTOMATED: 0 PER 100 WBC
PDW BLD-RTO: 17.1 % (ref 11.8–14.4)
PLATELET # BLD AUTO: 207 K/UL (ref 138–453)
PMV BLD AUTO: 9.2 FL (ref 8.1–13.5)
POTASSIUM SERPL-SCNC: 4.5 MMOL/L (ref 3.7–5.3)
RBC # BLD: 3.34 M/UL (ref 4.21–5.77)
SEG NEUTROPHILS: 76 % (ref 36–65)
SEGMENTED NEUTROPHILS ABSOLUTE COUNT: 6.44 K/UL (ref 1.5–8.1)
SODIUM SERPL-SCNC: 132 MMOL/L (ref 135–144)
WBC # BLD AUTO: 8.5 K/UL (ref 3.5–11.3)

## 2023-03-05 PROCEDURE — 93010 ELECTROCARDIOGRAM REPORT: CPT | Performed by: INTERNAL MEDICINE

## 2023-03-05 PROCEDURE — 36415 COLL VENOUS BLD VENIPUNCTURE: CPT

## 2023-03-05 PROCEDURE — 6370000000 HC RX 637 (ALT 250 FOR IP)

## 2023-03-05 PROCEDURE — 99232 SBSQ HOSP IP/OBS MODERATE 35: CPT | Performed by: INTERNAL MEDICINE

## 2023-03-05 PROCEDURE — 85025 COMPLETE CBC W/AUTO DIFF WBC: CPT

## 2023-03-05 PROCEDURE — 80048 BASIC METABOLIC PNL TOTAL CA: CPT

## 2023-03-05 PROCEDURE — 2580000003 HC RX 258

## 2023-03-05 PROCEDURE — 6370000000 HC RX 637 (ALT 250 FOR IP): Performed by: STUDENT IN AN ORGANIZED HEALTH CARE EDUCATION/TRAINING PROGRAM

## 2023-03-05 RX ORDER — METOPROLOL TARTRATE 50 MG/1
50 TABLET, FILM COATED ORAL 2 TIMES DAILY
Qty: 60 TABLET | Refills: 3 | Status: CANCELLED | OUTPATIENT
Start: 2023-03-05

## 2023-03-05 RX ORDER — METOPROLOL TARTRATE 50 MG/1
50 TABLET, FILM COATED ORAL 2 TIMES DAILY
Qty: 60 TABLET | Refills: 3 | Status: SHIPPED | OUTPATIENT
Start: 2023-03-05

## 2023-03-05 RX ORDER — METOPROLOL TARTRATE 50 MG/1
50 TABLET, FILM COATED ORAL 2 TIMES DAILY
Status: DISCONTINUED | OUTPATIENT
Start: 2023-03-05 | End: 2023-03-05 | Stop reason: HOSPADM

## 2023-03-05 RX ADMIN — APIXABAN 5 MG: 5 TABLET, FILM COATED ORAL at 08:28

## 2023-03-05 RX ADMIN — ACETAMINOPHEN 650 MG: 325 TABLET ORAL at 10:49

## 2023-03-05 RX ADMIN — DICLOFENAC 4 G: 10 GEL TOPICAL at 10:51

## 2023-03-05 RX ADMIN — OXYCODONE 5 MG: 5 TABLET ORAL at 10:49

## 2023-03-05 RX ADMIN — OXYCODONE 5 MG: 5 TABLET ORAL at 04:10

## 2023-03-05 RX ADMIN — METOPROLOL TARTRATE 25 MG: 25 TABLET ORAL at 08:28

## 2023-03-05 RX ADMIN — SODIUM CHLORIDE, PRESERVATIVE FREE 10 ML: 5 INJECTION INTRAVENOUS at 08:28

## 2023-03-05 RX ADMIN — METOPROLOL TARTRATE 25 MG: 25 TABLET ORAL at 12:43

## 2023-03-05 ASSESSMENT — PAIN SCALES - GENERAL
PAINLEVEL_OUTOF10: 7
PAINLEVEL_OUTOF10: 7

## 2023-03-05 ASSESSMENT — PAIN DESCRIPTION - DESCRIPTORS
DESCRIPTORS: ACHING;CRAMPING;DISCOMFORT
DESCRIPTORS: ACHING;DISCOMFORT

## 2023-03-05 ASSESSMENT — PAIN DESCRIPTION - LOCATION
LOCATION: SHOULDER
LOCATION: SHOULDER

## 2023-03-05 ASSESSMENT — PAIN DESCRIPTION - ORIENTATION: ORIENTATION: RIGHT

## 2023-03-05 ASSESSMENT — PAIN - FUNCTIONAL ASSESSMENT
PAIN_FUNCTIONAL_ASSESSMENT: PREVENTS OR INTERFERES SOME ACTIVE ACTIVITIES AND ADLS
PAIN_FUNCTIONAL_ASSESSMENT: ACTIVITIES ARE NOT PREVENTED

## 2023-03-05 NOTE — PROGRESS NOTES
[facility]  Internal Medicine Teaching Residency Program  Inpatient Daily Progress Note  ______________________________________________________________________________    Patient: Jaiden Montague  YOB: 1960   FHE:[id number]    Acct: [de-identified]     Room: 5/1-1  Admit date: 3/2/2023  Today's date: 03/05/23  Number of days in the hospital: 2    SUBJECTIVE   Admitting Diagnosis: Cl[facility] renal dialysis AV graft, [facility]  CC: Chest pain post hemodialysis  Pt examined at bedside. Chart & results reviewed. -Patient awake, alert, oriented X4. And in no acute distress. -Breathing comfortably on 2 L nasal cannula oxygen  -Reports that his chest pain has significantly improved   -Patient has sinus tachycardia. ROS:  Constitutional:  negative for chills, fevers, sweats  Respiratory: Patient has increased rate of respiration chronically. Cardiovascular: Decreased chest pain than yesterday. Gastrointestinal:  negative for abdominal pain, constipation, diarrhea, nausea, vomiting  Neurological:  negative for dizziness, headache  BRIEF HISTORY     The patient is a pleasant 57 y.o. male presents with a chief complaint of chest pain post hemodialysis. Patient was initially admitted on 3/2/2023 for evaluation of open left upper extremity brachiocephalic artery graft thrombectomy due to dysfunctional fistula. Patient was attempted HD cannulation that was unsuccessful and there was thrombosis of left upper extremity graft. He underwent open thrombectomy left upper extremity brachioaxillary graft and left upper extremity brachial axillary fistulogram including the central veins and periarterial anastomotic. Patient underwent hemodialysis today with 2 L of net removal of fluid was done. After hemodialysis, the patient started having chest pain. He also had increased troponins measuring 229 with previous troponin 246 on 1/24/2023.   EKG was done and it showed sinus tachycardia. Latest labs showed sodium 134, glucose 121, troponin 267, hemoglobin 10.0. After dialysis, the patient developed chest pain which was more on the left side radiating to left shoulder, back, and the sternal area. The patient reports that he also had tenderness in the sternal area. The patient was having shortness of breath but reported that it has been there since long and even before the admission. As per documentation, the patient also developed atrial fibrillation with RVR after hemodialysis. Currently, the patient is awake, alert, oriented X4. The patient still had left-sided chest pain but reports that it is mostly started from his arms rather than his chest.  It radiates to rotator cuff of the left shoulder and goes to his back. The patient has extreme tenderness in the sternal area. Patient was tachypneic with respiratory rate 28 and was in sinus tachycardia with heart rate 113. The patient reports that his pain is aggravated by movement and goes away when he is resting. OBJECTIVE     Vital Signs:  /68   Pulse 90   Temp 98.4 °F (36.9 °C) (Temporal)   Resp (!) 32   Wt 232 lb 2.3 oz (105.3 kg)   SpO2 92%   BMI 33.31 kg/m²     Temp (24hrs), Av.6 °F (36.4 °C), Min:96.8 °F (36 °C), Max:98.4 °F (36.9 °C)    In: 450   Out: 2800     Physical Exam:  Physical Exam  Constitutional:       Appearance: Normal appearance. He is not ill-appearing. HENT:      Head: Normocephalic and atraumatic. Nose: Nose normal.      Mouth/Throat:      Mouth: Mucous membranes are moist.   Eyes:      General: No scleral icterus. Extraocular Movements: Extraocular movements intact. Pupils: Pupils are equal, round, and reactive to light. Cardiovascular:      Rate and Rhythm: Regular rhythm. Tachycardia present. Heart sounds: No murmur heard.      Comments: Tenderness in sternum  Pulmonary:      Effort: Pulmonary effort is normal.      Breath sounds: Normal breath sounds. Abdominal:      General: Abdomen is flat. Tenderness: There is no abdominal tenderness. Musculoskeletal:      Cervical back: Normal range of motion. Right lower leg: No edema. Left lower leg: No edema. Skin:     Coloration: Skin is not jaundiced. Neurological:      Mental Status: He is alert and oriented to person, place, and time. Psychiatric:         Mood and Affect: Mood normal.        Medications:  Scheduled Medications:    diclofenac sodium  4 g Topical BID    metoprolol tartrate  50 mg Oral BID    metoprolol  5 mg IntraVENous Once    sodium chloride flush  5-40 mL IntraVENous 2 times per day    apixaban  5 mg Oral BID    atorvastatin  40 mg Oral Nightly    sevelamer  2.4 g Oral TID WC     Continuous Infusions:    sodium chloride       PRN MedicationsoxyCODONE, 5 mg, Q8H PRN  sodium chloride flush, 5-40 mL, PRN  sodium chloride, , PRN  ondansetron, 4 mg, Q8H PRN   Or  ondansetron, 4 mg, Q6H PRN  acetaminophen, 650 mg, Q4H PRN  albuterol sulfate HFA, 2 puff, 4x Daily PRN      Diagnostic Labs:  CBC:   Recent Labs     03/03/23  1638 03/04/23 0425 03/05/23  0707   WBC 8.8 8.5 8.5   RBC 3.39* 3.20* 3.34*   HGB 10.0* 9.3* 9.7*   HCT 34.1* 32.5* 33.3*   .6 101.6 99.7   RDW 17.2* 17.2* 17.1*    209 207       BMP:   Recent Labs     03/03/23  1638 03/04/23  0425 03/05/23  0707   * 134* 132*   K 4.3 4.6 4.5   CL 91* 91* 89*   CO2 27 25 27   BUN 72* 76* 61*   CREATININE 8.85* 9.02* 6.98*       BNP: No results for input(s): BNP in the last 72 hours. PT/INR: No results for input(s): PROTIME, INR in the last 72 hours. APTT: No results for input(s): APTT in the last 72 hours. CARDIAC ENZYMES: No results for input(s): CKMB, CKMBINDEX, TROPONINI in the last 72 hours.     Invalid input(s): CKTOTAL;3  FASTING LIPID PANEL:  Lab Results   Component Value Date    CHOL 127 11/12/2021    HDL 45 11/12/2021    TRIG 107 11/12/2021     LIVER PROFILE: No results for input(s): AST, ALT, ALB, BILIDIR, BILITOT, ALKPHOS in the last 72 hours. MICROBIOLOGY:   Lab Results   Component Value Date/Time    CULTURE NO SIGNIFICANT GROWTH 12/31/2021 04:23 PM       Imaging:    No results found. ASSESSMENT & PLAN     ASSESSMENT / PLAN:     Assessment:  -Atypical chest pain s/p hemodialysis  -Sinus tachycardia  -ESRD on hemodialysis following TTS schedule  -Thrombosed left upper extremity brachial axillary graft s/p open thrombectomy, dilatation of venous anastomosis and stenosis within the graft  -Essential hypertension  -Coronary artery disease s/p stent placement in 2019 and recent cath on 1/25/2023  -QTc prolongation  -Secondary hyperparathyroidism  -Hyperlipidemia     Plan:  -Follows TTS schedule of hemodialysis.    -On lopressor 50 mg for hypertension. We will continue to monitor blood pressure with regular BMPs.  -Started on Eliquis 5 mg twice daily for atrial fibrillation.  -Patient on sevelamer 2.4 g oral 3 times daily.  -Continue to monitor electrolytes. K+> 4 and Mg 2+> 2.  -Lipitor 40 mg oral nightly continue       DVT ppx : Eliquis  GI ppx: None    PT/OT:  consulted  Discharge Planning / SW: In process    Mauro Govea MD  Internal Medicine Resident, PGY-1  [facility];  [facility]  3/5/2023, 2:59 PM

## 2023-03-05 NOTE — PROGRESS NOTES
[address] [facility] [address]  PROGRESS NOTE    Shift date: 03/05/2023  Shift day: Sunday   Shift # 1    Room # 5222/8945-98   Name: Salma Whitley                Rastafarian: [facility]: unknown    Referral: Routine Visit    Admit Date & Time: 3/2/2023 12:47 PM    Assessment:  Salma Whitley is a 57 y.o. male in the hospital because of end stage renal disease. Upon entering the room writer observes seated by bed in chair. Intervention:  Writer introduced self and title as spiritual care provider and . Writer offered space for Patient  to express feelings, needs, and concerns and provided a ministry presence. Patient reported doing better physically. When asked about his support, patient expressed a sense of being isolated and by himself. (Chart lists wife and children)   asked what that meant; patient replied, \"This isn't the time to go into that. \"   Marya Gilford offered topray which was accepted. Outcome:  Patient was positively impacted by prayer    Plan:  Chaplains will remain available to offer spiritual and emotional support as needed. Electronically signed by Kristin Pedroza on 3/5/2023 at 1:30 PM.  [facility]  [phone]                 03/05/23 1228   Encounter Summary   Service Provided For: Patient   Referral/Consult From: [facility]   Support System Unknown  (family listed but patieint reported being isolated)   Last Encounter  03/05/23   Complexity of Encounter Moderate   Begin Time 1228   End Time  1231   Total Time Calculated 3 min   Spiritual/Emotional needs   Type Spiritual Distress;Spiritual Support   Assessment/Intervention/Outcome   Assessment Anxious; Interrupted family processes; Compromised coping   Intervention Active listening;Nurtured Hope;Prayer (assurance of)/[place];Sustaining Presence/Ministry of presence   Outcome Connection/Belonging;Encouraged;Expressed feelings, needs, and concerns; Less anxious, Less agitated

## 2023-03-05 NOTE — PROGRESS NOTES
Renal Progress Note    Patient :  Go Zacarias; 57 y.o. MRN# [id number]  Location:  9772/883  Attending:  Nitza Sanchez MD  Admit Date:  3/2/2023   Hospital Day: 2    Subjective   Patient was seen and examined. No acute events overnight. Vital signs stable   HD yesterday. Removed 2.5 L over 180 mins. Post weight 105.3 kg and dry weight 108.7 kg. Labs reviewed. Stable     Objective     VS: /73   Pulse 99   Temp 98.4 °F (36.9 °C) (Temporal)   Resp 23   Wt 232 lb 2.3 oz (105.3 kg)   SpO2 92%   BMI 33.31 kg/m²   MAXIMUM TEMPERATURE OVER 24HRS:  Temp (24hrs), Av.6 °F (36.4 °C), Min:96.8 °F (36 °C), Max:98.4 °F (36.9 °C)    24HR BLOOD PRESSURE RANGE:  Systolic (54SRS), MKZ:140 , Min:95 , YKS:375   ; Diastolic (70ODD), IZT:04, Min:46, Max:76    24HR INTAKE/OUTPUT:    Intake/Output Summary (Last 24 hours) at 3/5/2023 0954  Last data filed at 3/4/2023 2251  Gross per 24 hour   Intake 450 ml   Output 2800 ml   Net -2350 ml     WEIGHT :Patient Vitals for the past 96 hrs (Last 3 readings):   Weight   23 1249 232 lb 2.3 oz (105.3 kg)   23 0936 240 lb 15.4 oz (109.3 kg)   23 1319 237 lb 14 oz (107.9 kg)       Current Medications:     Scheduled Meds:    metoprolol  5 mg IntraVENous Once    metoprolol tartrate  25 mg Oral BID    sodium chloride flush  5-40 mL IntraVENous 2 times per day    apixaban  5 mg Oral BID    atorvastatin  40 mg Oral Nightly    sevelamer  2.4 g Oral TID WC     Continuous Infusions:    sodium chloride         Physical Examination:     General:  AAO x 3, speaking in full sentences, no accessory muscle use. Chest:   Bilateral vesicular breath sounds, no rales or wheezes. Cardiac:  S1 S2 RR, no murmurs, gallops or rubs, JVP not raised. Abdomen: Soft, non-tender, non distended, BS audible. SKIN:  No rashes, good skin turgor. Extremities:  No edema, no clubbing, No cyanosis  Neuro:   AAO x 3, No FND.      Labs:       Recent Labs     23  1638 23  0428 03/05/23  0707   WBC 8.8 8.5 8.5   RBC 3.39* 3.20* 3.34*   HGB 10.0* 9.3* 9.7*   HCT 34.1* 32.5* 33.3*   .6 101.6 99.7   MCH 29.5 29.1 29.0   MCHC 29.3 28.6 29.1   RDW 17.2* 17.2* 17.1*    209 207   MPV 8.8 9.2 9.2      BMP:   Recent Labs     03/03/23  1638 03/04/23  0425 03/05/23  0707   * 134* 132*   K 4.3 4.6 4.5   CL 91* 91* 89*   CO2 27 25 27   BUN 72* 76* 61*   CREATININE 8.85* 9.02* 6.98*   GLUCOSE 121* 138* 134*   CALCIUM 8.3* 8.4* 8.6      BNP:      Lab Results   Component Value Date/Time     09/08/2018 08:04 PM     PTH:     Lab Results   Component Value Date/Time    IPTH 144.1 11/29/2017 12:50 PM       Lab Results   Component Value Date/Time    NITRU NEGATIVE 04/01/2022 11:27 AM    COLORU Yellow 04/01/2022 11:27 AM    PHUR 6.0 04/01/2022 11:27 AM    WBCUA 5 TO 10 04/01/2022 11:27 AM    RBCUA 20 TO 50 04/01/2022 11:27 AM    MUCUS NOT REPORTED 12/31/2021 04:23 PM    TRICHOMONAS NOT REPORTED 12/31/2021 04:23 PM    YEAST FEW 12/31/2021 04:23 PM    BACTERIA NOT REPORTED 12/31/2021 04:23 PM    SPECGRAV 1.010 04/01/2022 11:27 AM    LEUKOCYTESUR NEGATIVE 04/01/2022 11:27 AM    UROBILINOGEN Normal 04/01/2022 11:27 AM    BILIRUBINUR NEGATIVE 04/01/2022 11:27 AM    GLUCOSEU NEGATIVE 04/01/2022 11:27 AM    KETUA NEGATIVE 04/01/2022 11:27 AM    AMORPHOUS NOT REPORTED 12/31/2021 04:23 PM       Radiology:     CXR:     Assessment:     1. ESRD on Hemodialysis. His regular HD days are TTS at [facility] facility using Left AV fistula under Dr. Brain Guerrero. His dry weight is 108.7 kg. Admitted with 110.7 kg, almost 3 L of fluid removed yesterday  2. Thrombosed left upper extremity brachial axillary graft status post open thrombectomy, dilatation of venous anastomosis and stenosis within the graft. Procedure was successful has been used successfully yesterday. 2. HTN blood pressure stable  3.   CAD s/p stent in past patient did have mild chest pain today on dialysis which is now resolved. Cardiology consult pending. Last cardiac catheterization in January did not require any intervention  4. Anemia of chronic disease  5. Secondary hyperparathyroidism  6. Chest pain- rule out ischemia  7. Qtc prolongation- 518  8. Ischemic cardiomyopathy ejection fraction 30% with compensated CHF    Plan:   1. Dialysis [facility]. Last HD on last Tuesday- 2/28/23. Was unable to get dialysis on Thursday due to thrombosed AV fistula. Due again on Tuesday. 2. Antihypertensives: Coreg 6.25 twice daily. 3. Follow up on troponin level. EKG showed atrial fibrillation does have proximal paroxysmal atrial fibrillation even in the past.    4. Strict Input and Output, Daily weigh and document in the chart. 5. Low Potassium, Low phosphorus and low salt diet. Fluids to be restricted to 1500ml/day. 6. IV Aranesp/Epogen for anemia of chronic disease with HD weekly. 7. IV Zemplar per protocol for secondary hyperparathyroidism with HD thrice a week. 8.  Patient stable for nephrology standpoint        Nutrition   Renal Diet/TF      Electronically signed by ROB Barnett CNP on 3/5/2023 at 9:54 AM   [facility]. Doing well. Denies any complaints. Heart rate well controlled. Currently on beta-blockers. Did have atrial fibrillation yesterday currently on Eliquis. Rate is controlled with beta-blockers. Patient had originally coming to the hospital for declot of left AV graft. Subsequently developed tachyarrhythmia discharge was held. Cardiology clearance obtained. Last dialysis yesterday. Hemodynamically stable  Undergoes hemodialysis Tuesday Thursday Saturday at the [address] [facility] unit via left AV graft under Dr. Carlos Singer  1. Stable for discharge  2. Beta-blockers per cardiology  3. Fluid restriction as before  4. Next dialysis on Tuesday  5.   We will follow if he stays    Attending Physician Statement  I have discussed the care of Yumi Meyer including pertinent history and exam findings with the resident/fellow. I have reviewed the key elements of all parts of the encounter with the resident/fellow. I have seen and examined the patient with the resident/fellow. I agree with the assessment and plan and status of the problem list as documented.       .  Electronically signed by Ashok Almazan MD on 3/5/2023 at 4:54 PM

## 2023-03-05 NOTE — CARE COORDINATION
Transitional planning:  Met with unit RN. States pt is an HD pt. Possible discharge today. Pt is current with [facility], but not sure if he wants to continue. He attempted to call his girlfriend at home to ask, but she didn't answer. HD is on T/TH/Sat at [facility].

## 2023-03-05 NOTE — CONSULTS
[facility]    Consult / H&P               Today's Date: 3/5/2023  Patient Name: Aguila Bourgeois  Date of admission: 3/2/2023 12:47 PM  Patient's age: 57 y.o., 1960  Admission Dx: Malfunction of arteriovenous graft, initial encounter ([facility] [T82.590A]  Clotted renal dialysis AV graft, initial encounter ([facility] [T82.868A]  AV fistula occlusion, initial encounter ([facility] [T82.898A]  AV graft thrombosis, initial encounter ([facility] [N00.122A]    Reason for Consult:  Cardiac evaluation    Requesting Physician: Severiano Cake, MD    REASON FOR CONSULT:  Tachycardia    History Obtained From:  patient, electronic medical record    HISTORY OF PRESENT ILLNESS:      The patient is a 57 y.o. male who was admitted for malfunctioning AV fistula. Patient does have a history of ischemic cardiomyopathy with last LVEF 40%, recent cardiac cath in January 2023 with patent stents and A-fib on Eliquis at home and Coreg. Cardiology was consulted after he developed tachycardia during dialysis with EKG showing A-fib with heart rate in the 120s and atypical sharp chest pain.         Past Medical History:   has a past medical history of Acute congestive heart failure (HCC), Acute on chronic diastolic congestive heart failure ([address] Anemia, Anemia of chronic renal failure, Atrial fibrillation (HCC), AVF (arteriovenous fistula) ([facility] Bowel obstruction ([facility]), CAD (coronary artery disease), Chest pain at rest, CHF (congestive heart failure) ([address] CHF (congestive heart failure), NYHA class I, acute on chronic, combined ([address] Chronic kidney disease, CKD (chronic kidney disease) stage 4, GFR 15-29 ml/min ([facility] Coronary artery disease involving native coronary artery of native heart without angina pectoris, Diabetes mellitus ([address] Dialysis patient ([facility] ESRD (end stage renal disease) ([facility] Essential hypertension, Groin swelling, Hemodialysis patient ([facility] Hydrocele, Hyperlipidemia, Hypertension, Inguinal hernia, MI, old, NSTEMI (non-ST elevated myocardial infarction) ([facility] On home O2, BATSHEVA (obstructive sleep apnea), Patient in clinical research study, Pneumonia, Poor historian, Prostatism, Respiratory distress, Rising PSA level, S/P arteriovenous (AV) graft placement, S/P PTCA - TIM distal LAD - Dr. Patton [place] 4/1/19, Sleep apnea, Small bowel obstruction (HCC), SOB (shortness of breath), and Type 2 diabetes mellitus with chronic kidney disease on chronic dialysis ([facility] Past Surgical History:   has a past surgical history that includes Foot surgery (Left, 2005); Colonoscopy; Cystoscopy (11/17/2017); Prostate biopsy (N/A, 11/17/2017); Abdomen surgery (2013); Prostate Biopsy (02/16/2018); pr unlisted procedure male genital system (N/A, 02/16/2018); Tonsillectomy (1968); Vasectomy (1983); Middle ear surgery (1966); Nose surgery (1968); Mouth surgery (2007); AV fistula creation (Left, 02/20/2019); Dialysis fistula creation (Left, 02/20/2019); other surgical history (Left, 03/06/2019); AV fistula repair (07/17/2019); vascular surgery (09/20/2019); Coronary angioplasty with stent (03/31/2019); Dialysis fistula creation (Left, 10/23/2019); Cardiac catheterization (02/09/2021); Upper gastrointestinal endoscopy (N/A, 09/27/2021); IR THROMBECTOMY AVF PERC (01/03/2022); Cystoscopy (N/A, 04/01/2022); Tunneled venous catheter placement (Right, 06/15/2022); vascular surgery (Left, 06/15/2022); Dialysis fistula creation (Left, 06/15/2022); vascular surgery (Left, 07/18/2022); Fistulagram (Left, 07/18/2022); HEMODIALYSIS ACCESS OPEN THROMBECTOMY (Left, 03/02/2023); and vascular surgery (Left, 3/2/2023). Home Medications:    Prior to Admission medications    Medication Sig Start Date End Date Taking?  Authorizing Provider   apixaban (ELIQUIS) 5 MG TABS tablet Take 1 tablet by mouth 2 times daily 1/26/23   Price Formbatsheva,    albuterol sulfate HFA (VENTOLIN HFA) 108 (90 Base) MCG/ACT inhaler Inhale 2 puffs into the lungs 4 times daily as needed for Wheezing 1/26/23   Odalis Porras DO   sevelamer (RENVELA) 2.4 g PACK packet Take 1 packet by mouth 3 times daily (with meals) 1/18/22   Historical Provider, MD   carvedilol (COREG) 6.25 MG tablet Take 1 tablet by mouth 2 times daily (with meals) 1/5/22   Fausto Vieira MD   aspirin EC 81 MG EC tablet Take 1 tablet by mouth daily 12/24/21   Fatmata Junior DO   glimepiride (AMARYL) 1 MG tablet Take 1 tablet by mouth every morning (before breakfast) Do not take if blood sugars are less than 110 11/14/21   Laly Markham MD   atorvastatin (LIPITOR) 40 MG tablet Take 1 tablet by mouth nightly 2/9/21   Celia Espinal MD   traMADol Cherylle Gary) 50 MG tablet take 1 tablet by mouth every 6 hours if needed 10/21/20   Historical Provider, MD   Multiple Vitamins-Minerals (RENAPLEX-D) TABS Take 1 tablet by mouth every other day Pt takes occasionally 5/14/19   Historical Provider, MD   lidocaine-prilocaine (EMLA) 2.5-2.5 % cream  3/18/19   Historical Provider, MD      Current Facility-Administered Medications: diclofenac sodium (VOLTAREN) 1 % gel 4 g, 4 g, Topical, BID  metoprolol tartrate (LOPRESSOR) tablet 50 mg, 50 mg, Oral, BID  metoprolol (LOPRESSOR) injection 5 mg, 5 mg, IntraVENous, Once  oxyCODONE (ROXICODONE) immediate release tablet 5 mg, 5 mg, Oral, Q8H PRN  sodium chloride flush 0.9 % injection 5-40 mL, 5-40 mL, IntraVENous, 2 times per day  sodium chloride flush 0.9 % injection 5-40 mL, 5-40 mL, IntraVENous, PRN  0.9 % sodium chloride infusion, , IntraVENous, PRN  ondansetron (ZOFRAN-ODT) disintegrating tablet 4 mg, 4 mg, Oral, Q8H PRN **OR** ondansetron (ZOFRAN) injection 4 mg, 4 mg, IntraVENous, Q6H PRN  acetaminophen (TYLENOL) tablet 650 mg, 650 mg, Oral, Q4H PRN  apixaban (ELIQUIS) tablet 5 mg, 5 mg, Oral, BID  albuterol sulfate HFA (PROVENTIL;VENTOLIN;PROAIR) 108 (90 Base) MCG/ACT inhaler 2 puff, 2 puff, Inhalation, 4x Daily PRN  atorvastatin (LIPITOR) tablet 40 mg, 40 mg, Oral, Nightly  sevelamer (RENVELA) packet 2.4 g, 2.4 g, Oral, TID WC 200s    Allergies:  Lisinopril    Social History:   reports that he has never smoked. He has never used smokeless tobacco. He reports that he does not drink alcohol and does not use drugs. Family History: family history includes Asthma in his brother; Diabetes in his brother, brother, and brother; Early Death in his mother; Heart Disease in his mother; High Blood Pressure in his brother, brother, and brother. REVIEW OF SYSTEMS:    Constitutional: there has been no unanticipated weight loss. There's been No change in energy level, No change in activity level. Eyes: No visual changes or diplopia. No scleral icterus. ENT: No Headaches  Cardiovascular: As above. Respiratory: No previous pulmonary problems, No cough  Gastrointestinal: No abdominal pain. No change in bowel or bladder habits. Genitourinary: No dysuria, trouble voiding, or hematuria. Musculoskeletal:  No gait disturbance, No weakness or joint complaints. Integumentary: No rash or pruritis. Neurological: No headache, diplopia, change in muscle strength, numbness or tingling. No change in gait, balance, coordination, mood, affect, memory, mentation, behavior. Psychiatric: No anxiety, or depression. Endocrine: No temperature intolerance. No excessive thirst, fluid intake, or urination. No tremor. Hematologic/Lymphatic: No abnormal bruising or bleeding, blood clots or swollen lymph nodes. Allergic/Immunologic: No nasal congestion or hives. PHYSICAL EXAM:      /73   Pulse 99   Temp 98.4 °F (36.9 °C) (Temporal)   Resp 23   Wt 232 lb 2.3 oz (105.3 kg)   SpO2 92%   BMI 33.31 kg/m²    Constitutional and General Appearance: alert, cooperative, no distress and appears stated age  HEENT: PERRL, no cervical lymphadenopathy. No masses palpable. Normal oral mucosa  Respiratory:  Normal excursion and expansion without use of accessory muscles  Resp Auscultation: Good respiratory effort. No for increased work of breathing. On auscultation: clear to auscultation bilaterally  Cardiovascular: The apical impulse is not displaced  Heart tones are crisp and normal. regular S1 and S2.  Jugular venous pulsation Normal  The carotid upstroke is normal in amplitude and contour without delay or bruit  Peripheral pulses are symmetrical and full   Abdomen:   No masses or tenderness  Bowel sounds present  Extremities:   No Cyanosis or Clubbing   Lower extremity edema: No   Skin: Warm and dry  Neurological:  Alert and oriented. Moves all extremities well  No abnormalities of mood, affect, memory, mentation, or behavior are noted    Labs:     CBC:   Recent Labs     03/04/23 0425 03/05/23  0707   WBC 8.5 8.5   HGB 9.3* 9.7*   HCT 32.5* 33.3*    207     BMP:   Recent Labs     03/04/23 0425 03/05/23  0707   * 132*   K 4.6 4.5   CO2 25 27   BUN 76* 61*   CREATININE 9.02* 6.98*   LABGLOM 6* 8*   GLUCOSE 138* 134*     BNP: No results for input(s): BNP in the last 72 hours. PT/INR: No results for input(s): PROTIME, INR in the last 72 hours. APTT:No results for input(s): APTT in the last 72 hours. CARDIAC ENZYMES:No results for input(s): CKTOTAL, CKMB, CKMBINDEX, TROPONINI in the last 72 hours. FASTING LIPID PANEL:  Lab Results   Component Value Date/Time    HDL 45 11/12/2021 07:27 AM    TRIG 107 11/12/2021 07:27 AM     LIVER PROFILE:No results for input(s): AST, ALT, LABALBU in the last 72 hours. IMPRESSION:    NSTEMI, type II with no acute EKG ischemic changes. A. fib on EKG. [facility] of 4 on Eliquis. Ischemic cardiomyopathy 40% (recent MUGA scan)   CAD with previous TIM to distal LAD in 2019 April. Patent stents on cath 1/25/23 as above  ESRD on hemodialysis. Hypertension. DM  Hyperlipidemia  On Home 3L NC    RECOMMENDATIONS:  Continue med management with PO ASA, statin, BB. Lopressor up titrated. Will need to evaluate for starting Entresto and aldactone as an OP. ESRD on HD.  Nephrology following. Appreciate assistance with volume management. OK for discharge from CV standpoint with OP f/u in clinic as scheduled.       Vero Johnson MD       Cardiovascular Fellow

## 2023-03-06 ENCOUNTER — CARE COORDINATION (OUTPATIENT)
Dept: CARE COORDINATION | Age: 63
End: 2023-03-06

## 2023-03-06 NOTE — DISCHARGE SUMMARY
Pt was discharged @ shift change by Day shift RN. Communication was sent about prescriptions being sent to the pharmacy of pt's choice since med's to beds was closed. [facility] Internal medicine resident responded back they will send scripts for pt. He was transferred per W/C with his portable O2 & his personal belongings to private vehicle accompanied by RN.

## 2023-03-06 NOTE — CARE COORDINATION
Care Transitions Outreach Attempt    Call within 2 business days of discharge: Yes   Attempted to reach patient for transitions of care follow up. Unable to reach patient. Patient: Nathan Engel Patient : 1960 MRN: [id number]    Last Discharge  Street       Date Complaint Diagnosis Description Type Department Provider    3/2/23   Admission (Discharged) Ishan Soria MD          # 1 attempt-Attempted initial 24 hour hospital follow up call. Left a Hipaa compliant message with name and call back information. Requested return call to [phone]. Was this an external facility discharge? No Discharge Facility: [facility]    Noted following upcoming appointments from discharge chart review:   [facility] follow up appointment(s): No future appointments.   Non-[facility] follow up appointment(s):

## 2023-03-07 ENCOUNTER — CARE COORDINATION (OUTPATIENT)
Dept: CASE MANAGEMENT | Age: 63
End: 2023-03-07

## 2023-03-07 NOTE — CARE COORDINATION
[address] Care Transitions Initial Follow Up Call    Call within 2 business days of discharge: Yes          Patient: Radha Ashraf Patient : 1960   MRN: [id number]  Reason for Admission:   LEFT OPEN GRAFT THROMBECTOMYClotted renal dialysis AV graft,   Discharge Date: 3/5/23 RARS: Readmission Risk Score: 20.7      Last Discharge  [address]       Date Complaint Diagnosis Description Type Department Provider    3/2/23   Admission (Discharged) BLUE 1D Genia Carter MD                2nd Attempt to contact patient for 24 Hour Transition Call - (Discharged from Hospital to Home ) Patient was not reached. Left HIPAA Compliant message on voice mail for Patient that this is the Final Transition Call and to call their PCP for any problems or issues that may occur. This writer left name and contact number for return call. No return call. Episode resolved. Non mercy PCP  No Mychart. Non-face-to-face services provided:  Obtained and reviewed discharge summary and/or continuity of care documents  Communication with home health agencies or other community services the patient is currently using-MED [facility] Transitions 24 Hour Call    Care Transitions Interventions           Follow Up  No future appointments.     KATRINA Shelton LPN Care Transitions Nurse   [phone]

## 2023-03-13 NOTE — ANESTHESIA POSTPROCEDURE EVALUATION
Department of Anesthesiology  Postprocedure Note    Patient: Isreal Neely  MRN: [id number]  [place]: 1960  Date of evaluation: 3/13/2023      Procedure Summary     Date: 03/02/23 Room / Location: [address]    Anesthesia Start: Ashly Hunt Anesthesia Stop: 2214    Procedure: LEFT OPEN GRAFT THROMBECTOMY (Left) Diagnosis:       Malfunction of arteriovenous graft, initial encounter ([facility]      (Malfunction of arteriovenous graft, initial encounter ([facility] Jem Hurt)    Surgeons: Marline Corrigan MD Responsible Provider: Jessica Govea MD    Anesthesia Type: general, MAC ASA Status: 3          Anesthesia Type: No value filed.     Ken[place] Phase I: [place] Score: 8    [place] Phase II:        Anesthesia Post Evaluation    Patient location during evaluation: PACU  Patient participation: complete - patient participated  Level of consciousness: awake  Pain score: 1  Airway patency: patent  Nausea & Vomiting: no nausea and no vomiting  Complications: no  Cardiovascular status: blood pressure returned to baseline and hemodynamically stable  Respiratory status: acceptable  Hydration status: euvolemic

## 2023-06-02 ENCOUNTER — TELEPHONE (OUTPATIENT)
Dept: CARDIOTHORACIC SURGERY | Age: 63
End: 2023-06-02

## 2023-06-09 ENCOUNTER — HOSPITAL ENCOUNTER (OUTPATIENT)
Age: 63
Setting detail: SPECIMEN
Discharge: HOME | End: 2023-06-09

## 2023-06-10 ENCOUNTER — HOSPITAL ENCOUNTER (OUTPATIENT)
Age: 63
Setting detail: SPECIMEN
Discharge: HOME | End: 2023-06-10

## 2023-06-10 LAB
ANION GAP SERPL CALCULATED.3IONS-SCNC: 16 MMOL/L (ref 9–17)
BUN SERPL-MCNC: 49 MG/DL (ref 8–23)
BUN/CREAT SERPL: 8 (ref 9–20)
CALCIUM SERPL-MCNC: 8.6 MG/DL (ref 8.6–10.4)
CHLORIDE SERPL-SCNC: 94 MMOL/L (ref 98–107)
CO2 SERPL-SCNC: 24 MMOL/L (ref 20–31)
CREAT SERPL-MCNC: 6.46 MG/DL (ref 0.7–1.2)
CRP SERPL HS-MCNC: 33.8 MG/L (ref 0–5)
ERYTHROCYTE [DISTWIDTH] IN BLOOD BY AUTOMATED COUNT: 17 % (ref 11.8–14.4)
GFR SERPL CREATININE-BSD FRML MDRD: 9 ML/MIN/1.73M2
GLUCOSE SERPL-MCNC: 99 MG/DL (ref 70–99)
HCT VFR BLD AUTO: 26.4 % (ref 40.7–50.3)
HGB BLD-MCNC: 7.4 G/DL (ref 13–17)
MCH RBC QN AUTO: 28.2 PG (ref 25.2–33.5)
MCHC RBC AUTO-ENTMCNC: 28 G/DL (ref 28.4–34.8)
MCV RBC AUTO: 100.8 FL (ref 82.6–102.9)
NRBC AUTOMATED: 0.3 PER 100 WBC
PLATELET # BLD AUTO: 191 K/UL (ref 138–453)
PMV BLD AUTO: 8.8 FL (ref 8.1–13.5)
POTASSIUM SERPL-SCNC: 4.8 MMOL/L (ref 3.7–5.3)
RBC # BLD AUTO: 2.62 M/UL (ref 4.21–5.77)
SODIUM SERPL-SCNC: 134 MMOL/L (ref 135–144)
WBC OTHER # BLD: 7.7 K/UL (ref 3.5–11.3)

## 2023-06-10 PROCEDURE — 85027 COMPLETE CBC AUTOMATED: CPT

## 2023-06-10 PROCEDURE — 80048 BASIC METABOLIC PNL TOTAL CA: CPT

## 2023-06-10 PROCEDURE — 86140 C-REACTIVE PROTEIN: CPT

## 2023-06-20 ENCOUNTER — HOSPITAL ENCOUNTER (OUTPATIENT)
Age: 63
Setting detail: SPECIMEN
Discharge: HOME | End: 2023-06-20

## 2023-06-20 LAB
ANION GAP SERPL CALCULATED.3IONS-SCNC: 19 MMOL/L (ref 9–17)
BUN SERPL-MCNC: 74 MG/DL (ref 8–23)
BUN/CREAT SERPL: 10 (ref 9–20)
CALCIUM SERPL-MCNC: 9.2 MG/DL (ref 8.6–10.4)
CHLORIDE SERPL-SCNC: 92 MMOL/L (ref 98–107)
CO2 SERPL-SCNC: 22 MMOL/L (ref 20–31)
CREAT SERPL-MCNC: 7.68 MG/DL (ref 0.7–1.2)
CRP SERPL HS-MCNC: 13.5 MG/L (ref 0–5)
ERYTHROCYTE [DISTWIDTH] IN BLOOD BY AUTOMATED COUNT: 19.7 % (ref 11.8–14.4)
GFR SERPL CREATININE-BSD FRML MDRD: 7 ML/MIN/1.73M2
GLUCOSE SERPL-MCNC: 134 MG/DL (ref 70–99)
HCT VFR BLD AUTO: 25.9 % (ref 40.7–50.3)
HGB BLD-MCNC: 7.3 G/DL (ref 13–17)
MCH RBC QN AUTO: 30 PG (ref 25.2–33.5)
MCHC RBC AUTO-ENTMCNC: 28.2 G/DL (ref 28.4–34.8)
MCV RBC AUTO: 106.6 FL (ref 82.6–102.9)
NRBC AUTOMATED: 0.5 PER 100 WBC
PLATELET # BLD AUTO: 175 K/UL (ref 138–453)
PMV BLD AUTO: 9.6 FL (ref 8.1–13.5)
POTASSIUM SERPL-SCNC: 6.1 MMOL/L (ref 3.7–5.3)
RBC # BLD AUTO: 2.43 M/UL (ref 4.21–5.77)
SODIUM SERPL-SCNC: 133 MMOL/L (ref 135–144)
WBC OTHER # BLD: 8.1 K/UL (ref 3.5–11.3)

## 2023-06-20 PROCEDURE — 80048 BASIC METABOLIC PNL TOTAL CA: CPT

## 2023-06-20 PROCEDURE — 86140 C-REACTIVE PROTEIN: CPT

## 2023-06-20 PROCEDURE — 85027 COMPLETE CBC AUTOMATED: CPT

## 2023-06-21 ENCOUNTER — HOSPITAL ENCOUNTER (OUTPATIENT)
Age: 63
Setting detail: SPECIMEN
Discharge: HOME | End: 2023-06-21

## 2023-06-21 LAB
ANION GAP SERPL CALCULATED.3IONS-SCNC: 15 MMOL/L (ref 9–17)
BUN SERPL-MCNC: 46 MG/DL (ref 8–23)
CALCIUM SERPL-MCNC: 9.3 MG/DL (ref 8.6–10.4)
CHLORIDE SERPL-SCNC: 93 MMOL/L (ref 98–107)
CO2 SERPL-SCNC: 26 MMOL/L (ref 20–31)
CREAT SERPL-MCNC: 5.6 MG/DL (ref 0.7–1.2)
GFR SERPL CREATININE-BSD FRML MDRD: 11 ML/MIN/1.73M2
GLUCOSE SERPL-MCNC: 98 MG/DL (ref 70–99)
POTASSIUM SERPL-SCNC: 5 MMOL/L (ref 3.7–5.3)
SODIUM SERPL-SCNC: 134 MMOL/L (ref 135–144)

## 2023-06-21 PROCEDURE — 36415 COLL VENOUS BLD VENIPUNCTURE: CPT

## 2023-06-21 PROCEDURE — P9603 ONE-WAY ALLOW PRORATED MILES: HCPCS

## 2023-06-21 PROCEDURE — 80048 BASIC METABOLIC PNL TOTAL CA: CPT

## 2023-06-24 ENCOUNTER — HOSPITAL ENCOUNTER (OUTPATIENT)
Age: 63
Setting detail: SPECIMEN
Discharge: HOME | End: 2023-06-24

## 2023-06-24 LAB
ANION GAP SERPL CALCULATED.3IONS-SCNC: 19 MMOL/L (ref 9–17)
BUN SERPL-MCNC: 72 MG/DL (ref 8–23)
BUN/CREAT SERPL: 10 (ref 9–20)
CALCIUM SERPL-MCNC: 9.1 MG/DL (ref 8.6–10.4)
CHLORIDE SERPL-SCNC: 92 MMOL/L (ref 98–107)
CO2 SERPL-SCNC: 22 MMOL/L (ref 20–31)
CREAT SERPL-MCNC: 7.32 MG/DL (ref 0.7–1.2)
CRP SERPL HS-MCNC: 9.3 MG/L (ref 0–5)
ERYTHROCYTE [DISTWIDTH] IN BLOOD BY AUTOMATED COUNT: 20.4 % (ref 11.8–14.4)
GFR SERPL CREATININE-BSD FRML MDRD: 8 ML/MIN/1.73M2
GLUCOSE SERPL-MCNC: 140 MG/DL (ref 70–99)
HCT VFR BLD AUTO: 25.6 % (ref 40.7–50.3)
HGB BLD-MCNC: 7.3 G/DL (ref 13–17)
MCH RBC QN AUTO: 30.5 PG (ref 25.2–33.5)
MCHC RBC AUTO-ENTMCNC: 28.5 G/DL (ref 28.4–34.8)
MCV RBC AUTO: 107.1 FL (ref 82.6–102.9)
NRBC BLD-RTO: 0.5 PER 100 WBC
PLATELET # BLD AUTO: 166 K/UL (ref 138–453)
PMV BLD AUTO: 9.7 FL (ref 8.1–13.5)
POTASSIUM SERPL-SCNC: 5.3 MMOL/L (ref 3.7–5.3)
RBC # BLD AUTO: 2.39 M/UL (ref 4.21–5.77)
SODIUM SERPL-SCNC: 133 MMOL/L (ref 135–144)
WBC OTHER # BLD: 6.5 K/UL (ref 3.5–11.3)

## 2023-06-24 PROCEDURE — 86140 C-REACTIVE PROTEIN: CPT

## 2023-06-24 PROCEDURE — 85027 COMPLETE CBC AUTOMATED: CPT

## 2023-06-24 PROCEDURE — 80048 BASIC METABOLIC PNL TOTAL CA: CPT

## 2023-06-26 ENCOUNTER — HOSPITAL ENCOUNTER (OUTPATIENT)
Age: 63
Setting detail: SPECIMEN
Discharge: HOME | End: 2023-06-26

## 2023-06-27 ENCOUNTER — HOSPITAL ENCOUNTER (OUTPATIENT)
Age: 63
Setting detail: SPECIMEN
Discharge: HOME | End: 2023-06-27

## 2023-06-27 LAB
ERYTHROCYTE [DISTWIDTH] IN BLOOD BY AUTOMATED COUNT: 20.4 % (ref 11.8–14.4)
HCT VFR BLD AUTO: 26.1 % (ref 40.7–50.3)
HGB BLD-MCNC: 7.4 G/DL (ref 13–17)
MCH RBC QN AUTO: 30.7 PG (ref 25.2–33.5)
MCHC RBC AUTO-ENTMCNC: 28.4 G/DL (ref 28.4–34.8)
MCV RBC AUTO: 108.3 FL (ref 82.6–102.9)
NRBC BLD-RTO: 0.3 PER 100 WBC
PLATELET # BLD AUTO: 174 K/UL (ref 138–453)
PMV BLD AUTO: 9.8 FL (ref 8.1–13.5)
RBC # BLD AUTO: 2.41 M/UL (ref 4.21–5.77)
WBC OTHER # BLD: 6.5 K/UL (ref 3.5–11.3)

## 2023-06-27 PROCEDURE — P9603 ONE-WAY ALLOW PRORATED MILES: HCPCS

## 2023-06-27 PROCEDURE — 85027 COMPLETE CBC AUTOMATED: CPT

## 2023-06-29 ENCOUNTER — HOSPITAL ENCOUNTER (OUTPATIENT)
Age: 63
Setting detail: SPECIMEN
Discharge: HOME | End: 2023-06-29

## 2023-06-29 LAB
ANION GAP SERPL CALCULATED.3IONS-SCNC: 18 MMOL/L (ref 9–17)
BUN SERPL-MCNC: 68 MG/DL (ref 8–23)
BUN/CREAT SERPL: 9 (ref 9–20)
CALCIUM SERPL-MCNC: 8.9 MG/DL (ref 8.6–10.4)
CHLORIDE SERPL-SCNC: 93 MMOL/L (ref 98–107)
CO2 SERPL-SCNC: 23 MMOL/L (ref 20–31)
CREAT SERPL-MCNC: 7.91 MG/DL (ref 0.7–1.2)
CRP SERPL HS-MCNC: 9.6 MG/L (ref 0–5)
ERYTHROCYTE [DISTWIDTH] IN BLOOD BY AUTOMATED COUNT: 20.6 % (ref 11.8–14.4)
GFR SERPL CREATININE-BSD FRML MDRD: 7 ML/MIN/1.73M2
GLUCOSE SERPL-MCNC: 131 MG/DL (ref 70–99)
HCT VFR BLD AUTO: 26.3 % (ref 40.7–50.3)
HGB BLD-MCNC: 7.6 G/DL (ref 13–17)
MCH RBC QN AUTO: 31.5 PG (ref 25.2–33.5)
MCHC RBC AUTO-ENTMCNC: 28.9 G/DL (ref 28.4–34.8)
MCV RBC AUTO: 109.1 FL (ref 82.6–102.9)
NRBC BLD-RTO: 0.4 PER 100 WBC
PLATELET # BLD AUTO: 160 K/UL (ref 138–453)
PMV BLD AUTO: 9.2 FL (ref 8.1–13.5)
POTASSIUM SERPL-SCNC: 5.5 MMOL/L (ref 3.7–5.3)
RBC # BLD AUTO: 2.41 M/UL (ref 4.21–5.77)
SODIUM SERPL-SCNC: 134 MMOL/L (ref 135–144)
WBC OTHER # BLD: 6.8 K/UL (ref 3.5–11.3)

## 2023-06-29 PROCEDURE — 87040 BLOOD CULTURE FOR BACTERIA: CPT

## 2023-06-29 PROCEDURE — 36415 COLL VENOUS BLD VENIPUNCTURE: CPT

## 2023-06-29 PROCEDURE — 80048 BASIC METABOLIC PNL TOTAL CA: CPT

## 2023-06-29 PROCEDURE — 85027 COMPLETE CBC AUTOMATED: CPT

## 2023-06-29 PROCEDURE — 86140 C-REACTIVE PROTEIN: CPT

## 2023-07-02 LAB
MICROORGANISM SPEC CULT: NORMAL
MICROORGANISM SPEC CULT: NORMAL
SERVICE CMNT-IMP: NORMAL
SERVICE CMNT-IMP: NORMAL
SPECIMEN DESCRIPTION: NORMAL
SPECIMEN DESCRIPTION: NORMAL

## 2023-07-06 ENCOUNTER — HOSPITAL ENCOUNTER (OUTPATIENT)
Age: 63
Setting detail: SPECIMEN
Discharge: HOME | End: 2023-07-06

## 2023-07-06 LAB
HCT VFR BLD AUTO: 27.1 % (ref 40.7–50.3)
HGB BLD-MCNC: 7.9 G/DL (ref 13–17)

## 2023-07-06 PROCEDURE — P9603 ONE-WAY ALLOW PRORATED MILES: HCPCS

## 2023-07-06 PROCEDURE — 85018 HEMOGLOBIN: CPT

## 2023-07-06 PROCEDURE — 85014 HEMATOCRIT: CPT

## 2023-08-09 ENCOUNTER — TELEPHONE (OUTPATIENT)
Dept: VASCULAR SURGERY | Age: 63
End: 2023-08-09

## 2023-08-09 NOTE — TELEPHONE ENCOUNTER
Are you having issues cannulating? Yes       If so which needle are you having issues with?   15    Is the bruit and thrill present? Yes     Is the patient running at their prescribed Blood Flow Rate? Yes    Does the patient have a good cleaning number? No     Is there any prolonged bleeding after treatment? Yes    What days does the patient run dialysis so we can ensure if we do schedule the Fistulagram its not on a Dialysis day.     [facility] [phone]

## 2023-08-14 ENCOUNTER — TELEPHONE (OUTPATIENT)
Dept: VASCULAR SURGERY | Age: 63
End: 2023-08-14

## 2023-08-14 ENCOUNTER — OFFICE VISIT (OUTPATIENT)
Dept: OPERATING ROOM | Age: 63
End: 2023-08-14
Attending: SURGERY

## 2023-08-14 ENCOUNTER — HOSPITAL ENCOUNTER (OUTPATIENT)
Age: 63
Setting detail: OUTPATIENT SURGERY
Discharge: HOME | End: 2023-08-14
Attending: SURGERY | Admitting: SURGERY
Payer: MEDICARE

## 2023-08-14 VITALS
OXYGEN SATURATION: 99 % | HEART RATE: 87 BPM | DIASTOLIC BLOOD PRESSURE: 75 MMHG | SYSTOLIC BLOOD PRESSURE: 111 MMHG | RESPIRATION RATE: 21 BRPM

## 2023-08-14 DIAGNOSIS — T82.868A: ICD-10-CM

## 2023-08-14 DIAGNOSIS — I87.2 LYMPHEDEMA DUE TO VENOUS INSUFFICIENCY: Primary | ICD-10-CM

## 2023-08-14 DIAGNOSIS — T82.590A DIALYSIS AV FISTULA MALFUNCTION (HCC): ICD-10-CM

## 2023-08-14 DIAGNOSIS — I89.0 LYMPHEDEMA DUE TO VENOUS INSUFFICIENCY: Primary | ICD-10-CM

## 2023-08-14 DIAGNOSIS — T82.898A AV FISTULA OCCLUSION, INITIAL ENCOUNTER (HCC): Primary | ICD-10-CM

## 2023-08-14 DIAGNOSIS — T82.590A AV GRAFT MALFUNCTION, INITIAL ENCOUNTER (HCC): ICD-10-CM

## 2023-08-14 PROCEDURE — 3600000007 HC SURGERY HYBRID BASE: Performed by: SURGERY

## 2023-08-14 PROCEDURE — 2580000003 HC RX 258: Performed by: SURGERY

## 2023-08-14 PROCEDURE — 2709999900 HC NON-CHARGEABLE SUPPLY: Performed by: SURGERY

## 2023-08-14 PROCEDURE — 3600000017 HC SURGERY HYBRID ADDL 15MIN: Performed by: SURGERY

## 2023-08-14 PROCEDURE — C1892 INTRO/SHEATH,FIXED,PEEL-AWAY: HCPCS | Performed by: SURGERY

## 2023-08-14 PROCEDURE — 2500000003 HC RX 250 WO HCPCS: Performed by: SURGERY

## 2023-08-14 PROCEDURE — 36902 INTRO CATH DIALYSIS CIRCUIT: CPT | Performed by: SURGERY

## 2023-08-14 PROCEDURE — 7100000010 HC PHASE II RECOVERY - FIRST 15 MIN: Performed by: SURGERY

## 2023-08-14 PROCEDURE — 99214 OFFICE O/P EST MOD 30 MIN: CPT | Performed by: SURGERY

## 2023-08-14 PROCEDURE — 6360000002 HC RX W HCPCS: Performed by: SURGERY

## 2023-08-14 PROCEDURE — C1725 CATH, TRANSLUMIN NON-LASER: HCPCS | Performed by: SURGERY

## 2023-08-14 PROCEDURE — A4217 STERILE WATER/SALINE, 500 ML: HCPCS | Performed by: SURGERY

## 2023-08-14 PROCEDURE — 7100000011 HC PHASE II RECOVERY - ADDTL 15 MIN: Performed by: SURGERY

## 2023-08-14 PROCEDURE — 76937 US GUIDE VASCULAR ACCESS: CPT | Performed by: SURGERY

## 2023-08-14 PROCEDURE — C1894 INTRO/SHEATH, NON-LASER: HCPCS | Performed by: SURGERY

## 2023-08-14 PROCEDURE — 6360000004 HC RX CONTRAST MEDICATION: Performed by: SURGERY

## 2023-08-14 RX ORDER — LIDOCAINE HYDROCHLORIDE 10 MG/ML
INJECTION, SOLUTION EPIDURAL; INFILTRATION; INTRACAUDAL; PERINEURAL PRN
Status: DISCONTINUED | OUTPATIENT
Start: 2023-08-14 | End: 2023-08-14 | Stop reason: ALTCHOICE

## 2023-08-14 RX ORDER — IODIXANOL 320 MG/ML
INJECTION, SOLUTION INTRAVASCULAR PRN
Status: DISCONTINUED | OUTPATIENT
Start: 2023-08-14 | End: 2023-08-14 | Stop reason: ALTCHOICE

## 2023-08-14 RX ORDER — IODIXANOL 320 MG/ML
INJECTION, SOLUTION INTRAVASCULAR
Status: DISCONTINUED
Start: 2023-08-14 | End: 2023-08-14 | Stop reason: HOSPADM

## 2023-08-14 RX ORDER — LIDOCAINE HYDROCHLORIDE 10 MG/ML
INJECTION, SOLUTION INFILTRATION; PERINEURAL
Status: DISCONTINUED
Start: 2023-08-14 | End: 2023-08-14 | Stop reason: HOSPADM

## 2023-08-14 ASSESSMENT — ENCOUNTER SYMPTOMS
ALLERGIC/IMMUNOLOGIC NEGATIVE: 1
COLOR CHANGE: 1
CHEST TIGHTNESS: 0
ABDOMINAL PAIN: 0
SHORTNESS OF BREATH: 0

## 2023-08-14 NOTE — OP NOTE
Operative Note      Patient: Fauzia Edwards  YOB: 1960  MRN: [id number]    Date of Procedure: 8/14/2023    Pre-Op Diagnosis Codes:     * Malfunction of arteriovenous dialysis fistula, initial encounter ([address]) Henretta Look    Post-Op Diagnosis: Same       Procedure:  1) US guided vascular access of left upper extremity AV graft  2) Fistulagram  3) Angioplasty of AV graft and venous anastomosis to axillary vein with 8 x 80 mm Evercross balloon    Surgeon(s): Hong Sarkar MD    Anesthesia: Local    Estimated Blood Loss (mL): 11 ml    Complications: None    Specimens: none    Implants: none      Drains: None    Findings: This is a left brachial artery to axillary vein AV graft. There is no inflow or central venous stenosis. There is severe stenosis of the mid portion of the AV graft near cannulation zones, and there is likely mild stenosis of the axillary venous anastomosis of the AV graft. After angioplasty there was brisk flow through the graft without any significant rebound stenosis. There was a nice thrill at completion over the graft. Detailed Description of Procedure:     Nini Milligan was brought to the catheterization suite for evaluation of left upper extremity AV graft. After appropriate timeout performed the left upper extremity was prepped and draped in standard sterile fashion. I began by evaluating the AV graft with ultrasound, it was patent and compressible. Local anesthesia delivered, under ultrasound guidance using a micropuncture needle the AV graft was accessed, permanent ultrasound images saved. A 4-Martiniquais micropuncture sheath inserted and flushed and then exchanged for a short 6-Martiniquais sheath. Fistulagram performed. Revealing severe stenosis within the graft and mild stenosis at the axillary vein anastomosis. Angioplasty performed with a 8 x 80 mm Evercross balloon, several insufflations performed along the graft and the axillary venous anastomosis of the graft.

## 2023-08-14 NOTE — PROGRESS NOTES
Received post LUE fistulagram procedure to [facility] room 9. Assessment obtained. Restrictions reviewed with patient. Post procedure pathway initiated. Left arm site soft , dressing dry and intact. No hematoma noted.       Bruit and thrill present

## 2023-08-14 NOTE — PROGRESS NOTES
All discharge instructions reviewed, questions answered, paper signed and given copy. Patient discharged per own wheelchair with family and belongings.

## 2023-08-14 NOTE — TELEPHONE ENCOUNTER
----- Message from Johanne Hay MA sent at 8/14/2023  1:30 PM EDT -----  Spoke with ADRIAN at dialysis and she verbalized understanding.   ----- Message -----  From: Jadyen Ramos MD  Sent: 8/14/2023  12:25 PM EDT  To: [facility] Heart/Vascular Clinical Staff    Fistulagram done and area of stenosis treated    He was set up with lymphedema clinic but he needs to get back in with them, diagnosis lymphedema secondary to venous insufficiency.   Thank you

## 2023-08-14 NOTE — PROGRESS NOTES
Patient admitted, consent signed and questions answered. Patient ready for procedure. Call light to reach with side rails up 2 of 2. No family at bedside with patient. History and physical needs updated.      2% Chlorhexidine cloths used to prep site

## 2023-10-19 ENCOUNTER — TELEPHONE (OUTPATIENT)
Dept: VASCULAR SURGERY | Age: 63
End: 2023-10-19

## 2023-10-19 NOTE — TELEPHONE ENCOUNTER
Received a fax from dialysis that patient needs a fistulagram - lucio.   Called and left message for patient to call and schedule fistulagram

## 2023-10-27 ENCOUNTER — OFFICE VISIT (OUTPATIENT)
Dept: OPERATING ROOM | Age: 63
End: 2023-10-27
Attending: SURGERY

## 2023-10-27 ENCOUNTER — HOSPITAL ENCOUNTER (OUTPATIENT)
Age: 63
Setting detail: OUTPATIENT SURGERY
Discharge: HOME | End: 2023-10-27
Attending: SURGERY | Admitting: SURGERY
Payer: MEDICARE

## 2023-10-27 VITALS
OXYGEN SATURATION: 100 % | WEIGHT: 260.14 LBS | TEMPERATURE: 97.3 F | DIASTOLIC BLOOD PRESSURE: 67 MMHG | BODY MASS INDEX: 37.24 KG/M2 | SYSTOLIC BLOOD PRESSURE: 95 MMHG | HEART RATE: 88 BPM | HEIGHT: 70 IN

## 2023-10-27 DIAGNOSIS — T82.868A AV GRAFT THROMBOSIS, INITIAL ENCOUNTER (HCC): Primary | ICD-10-CM

## 2023-10-27 LAB — ECHO BSA: 2.41 M2

## 2023-10-27 PROCEDURE — 2580000003 HC RX 258: Performed by: SURGERY

## 2023-10-27 PROCEDURE — C1725 CATH, TRANSLUMIN NON-LASER: HCPCS | Performed by: SURGERY

## 2023-10-27 PROCEDURE — 7100000010 HC PHASE II RECOVERY - FIRST 15 MIN: Performed by: SURGERY

## 2023-10-27 PROCEDURE — 36902 INTRO CATH DIALYSIS CIRCUIT: CPT | Performed by: SURGERY

## 2023-10-27 PROCEDURE — 76937 US GUIDE VASCULAR ACCESS: CPT | Performed by: SURGERY

## 2023-10-27 PROCEDURE — 3600000017 HC SURGERY HYBRID ADDL 15MIN: Performed by: SURGERY

## 2023-10-27 PROCEDURE — 2500000003 HC RX 250 WO HCPCS: Performed by: SURGERY

## 2023-10-27 PROCEDURE — 99214 OFFICE O/P EST MOD 30 MIN: CPT | Performed by: SURGERY

## 2023-10-27 PROCEDURE — 3600000007 HC SURGERY HYBRID BASE: Performed by: SURGERY

## 2023-10-27 PROCEDURE — 2709999900 HC NON-CHARGEABLE SUPPLY: Performed by: SURGERY

## 2023-10-27 PROCEDURE — C1894 INTRO/SHEATH, NON-LASER: HCPCS | Performed by: SURGERY

## 2023-10-27 PROCEDURE — 6360000004 HC RX CONTRAST MEDICATION: Performed by: SURGERY

## 2023-10-27 PROCEDURE — 6360000002 HC RX W HCPCS: Performed by: SURGERY

## 2023-10-27 PROCEDURE — 7100000011 HC PHASE II RECOVERY - ADDTL 15 MIN: Performed by: SURGERY

## 2023-10-27 PROCEDURE — A4217 STERILE WATER/SALINE, 500 ML: HCPCS | Performed by: SURGERY

## 2023-10-27 PROCEDURE — C1892 INTRO/SHEATH,FIXED,PEEL-AWAY: HCPCS | Performed by: SURGERY

## 2023-10-27 RX ORDER — LIDOCAINE HYDROCHLORIDE 10 MG/ML
INJECTION, SOLUTION EPIDURAL; INFILTRATION; INTRACAUDAL; PERINEURAL PRN
Status: DISCONTINUED | OUTPATIENT
Start: 2023-10-27 | End: 2023-10-27 | Stop reason: ALTCHOICE

## 2023-10-27 RX ORDER — IODIXANOL 320 MG/ML
INJECTION, SOLUTION INTRAVASCULAR
Status: DISCONTINUED
Start: 2023-10-27 | End: 2023-10-27 | Stop reason: HOSPADM

## 2023-10-27 RX ORDER — IODIXANOL 320 MG/ML
INJECTION, SOLUTION INTRAVASCULAR PRN
Status: DISCONTINUED | OUTPATIENT
Start: 2023-10-27 | End: 2023-10-27 | Stop reason: ALTCHOICE

## 2023-10-27 NOTE — PROGRESS NOTES
Received post left upper extremtiy fistulagram procedure to [facility] room 8. Assessment obtained. Restrictions reviewed with patient. Post procedure pathway initiated. Left arm site soft , bandaid dry and intact.       Bruit and thrill present

## 2023-10-27 NOTE — PROGRESS NOTES
All discharge instructions reviewed, questions answered, paper signed and given copy. Patient discharged per wheelchair with grandson and belongings.

## 2023-10-27 NOTE — H&P
[address]  Vascular Surgery  History and Physical      PATIENT NAME: Bernadine Arthur OF BIRTH: 1960  10/27/2023  7:43 AM    ADMISSION DATE: 10/27/2023  6:42 AM      TODAY'S DATE: 10/27/2023    CHIEF COMPLAINT:  malfunctioning AV graft LUE       HISTORY OF PRESENT ILLNESS:  The patient is a 70 y.o. male with medical history of CKD and left upper extremity AV graft placement in June 2022. Patient has undergone fistulogram in August 2023 where balloon angioplasty was done who presents with complaint of malfunctioning AV graft of left upper extremity. Patient gives history that there is a whistling noise coming during dialysis. Although he received dialysis yesterday and it went well, he wanted the AV graft to be looked at. There is no complaint of any fever, chest pain, shortness of breath, numbness or tingling in left upper extremity. There is no complaint of any weakness in left upper extremity. Past Medical History:        Diagnosis Date    Acute congestive heart failure ([address]) 12/21/2016    Acute on chronic diastolic congestive heart failure ([address]) 11/24/2018    Anemia 11/25/2018    Anemia of chronic renal failure 10/17/2016    Atrial fibrillation ([address]) 01/03/2022    AVF (arteriovenous fistula) (HCC)     Bowel obstruction ([address]) 12/26/2013    CAD (coronary artery disease) 2013    ?     Chest pain at rest 12/21/2016    CHF (congestive heart failure) ([address]) 2013    last episode 11/2018    CHF (congestive heart failure), NYHA class I, acute on chronic, combined ([address]) 2/9/2021    Chronic kidney disease     CKD (chronic kidney disease) stage 4, GFR 15-29 ml/min ([address]) 10/17/2016    Coronary artery disease involving native coronary artery of native heart without angina pectoris     Diabetes mellitus ([address]) 2012    NIDDM    Dialysis patient ([address])     Rossy Mccarthy  /  Shorty Bliss  /  Malorie Posada    ESRD (end stage renal disease) ([address])     Essential hypertension 11/25/2018    Groin swelling 07/17/2019

## 2023-10-27 NOTE — OP NOTE
Operative Note      Patient: Pancho Ortiz  YOB: 1960  MRN: [id number]    Date of Procedure: 10/27/2023    Preoperative diagnosis:  malfunctioning left upper extremity AV graft    Post-Op Diagnosis: Same       Procedure:  1) US guided vascular access of right upper extremity AV graft  2) Fistulagram and angioplasty of Av graft with 7 x 30 mm Evercross balloon    Surgeon(s):  Kaylah Botello MD    Assistant: Dr. Aiden Jackson    Anesthesia: Local    Estimated Blood Loss (mL): 13 ml    Complications: None    Specimens: none    Implants: none      Drains: None    Findings: This is a left brachial artery to axillary vein Av graft. There is no central venous stenosis. There is moderate to severe stenosis near cannulation zone. There is stenosis of arterial connection to brachial artery but not flow limiting. After angioplasty there was improved flow through the graft, no rebound stenosis and improved thrill over the graft at completion. Plan:  Ok to use AV graft, avoid area that has been marked, area where the first needle goes in for the next few weeks. Detailed Description of Procedure:     Cameron Mcgowan was brought to the hybrid catheterization suite for evaluation of left upper extremity AV graft. After appropriate timeout performed the left upper extremity was prepped and draped in standard sterile fashion. I began by evaluating the AV graft with ultrasound, it was patent and compressible. Local anesthesia delivered, under ultrasound guidance using a micropuncture needle the AV graft was accessed, permanent ultrasound images saved. A 4-Spanish micropuncture sheath inserted and flushed. Fistulagram performed. I up-sized to a short 6-Spanish sheath and performed angioplasty of the AV graft with a 7x 30 mm Medtronic Evercross balloon, several insufflations performed, specifically near the cannulation zone where severe stenosis was noted.   Completion fistulagram performed revealing

## 2023-12-14 ENCOUNTER — HOSPITAL ENCOUNTER (OUTPATIENT)
Age: 63
Setting detail: SPECIMEN
Discharge: HOME | End: 2023-12-14

## 2023-12-15 ENCOUNTER — HOSPITAL ENCOUNTER (OUTPATIENT)
Age: 63
Setting detail: SPECIMEN
Discharge: HOME | End: 2023-12-15

## 2023-12-15 LAB
ANION GAP SERPL CALCULATED.3IONS-SCNC: 16 MMOL/L (ref 9–16)
BUN SERPL-MCNC: 62 MG/DL (ref 8–23)
CALCIUM SERPL-MCNC: 9.3 MG/DL (ref 8.6–10.4)
CHLORIDE SERPL-SCNC: 93 MMOL/L (ref 98–107)
CO2 SERPL-SCNC: 25 MMOL/L (ref 20–31)
CREAT SERPL-MCNC: 9 MG/DL (ref 0.7–1.2)
ERYTHROCYTE [DISTWIDTH] IN BLOOD BY AUTOMATED COUNT: 17.2 % (ref 11.8–14.4)
EST. AVERAGE GLUCOSE BLD GHB EST-MCNC: 100 MG/DL
GFR SERPL CREATININE-BSD FRML MDRD: 6 ML/MIN/1.73M2
GLUCOSE SERPL-MCNC: 129 MG/DL (ref 74–99)
HBA1C MFR BLD: 5.1 % (ref 4–6)
HCT VFR BLD AUTO: 34.2 % (ref 40.7–50.3)
HGB BLD-MCNC: 10.2 G/DL (ref 13–17)
MCH RBC QN AUTO: 32.5 PG (ref 25.2–33.5)
MCHC RBC AUTO-ENTMCNC: 29.8 G/DL (ref 28.4–34.8)
MCV RBC AUTO: 108.9 FL (ref 82.6–102.9)
NRBC BLD-RTO: 0 PER 100 WBC
PLATELET # BLD AUTO: 102 K/UL (ref 138–453)
PMV BLD AUTO: 10 FL (ref 8.1–13.5)
POTASSIUM SERPL-SCNC: 5.1 MMOL/L (ref 3.7–5.3)
RBC # BLD AUTO: 3.14 M/UL (ref 4.21–5.77)
SODIUM SERPL-SCNC: 134 MMOL/L (ref 136–145)
WBC OTHER # BLD: 6.7 K/UL (ref 3.5–11.3)

## 2023-12-15 PROCEDURE — 83036 HEMOGLOBIN GLYCOSYLATED A1C: CPT

## 2023-12-15 PROCEDURE — 36415 COLL VENOUS BLD VENIPUNCTURE: CPT

## 2023-12-15 PROCEDURE — 80048 BASIC METABOLIC PNL TOTAL CA: CPT

## 2023-12-15 PROCEDURE — P9603 ONE-WAY ALLOW PRORATED MILES: HCPCS

## 2023-12-15 PROCEDURE — 85027 COMPLETE CBC AUTOMATED: CPT

## 2023-12-18 ENCOUNTER — CLINICAL DOCUMENTATION ONLY (OUTPATIENT)
Facility: CLINIC | Age: 63
End: 2023-12-18

## 2024-01-04 ENCOUNTER — HOSPITAL ENCOUNTER (INPATIENT)
Age: 64
LOS: 5 days | Discharge: HOME | DRG: 811 | End: 2024-01-09
Attending: EMERGENCY MEDICINE | Admitting: INTERNAL MEDICINE
Payer: MEDICARE

## 2024-01-04 DIAGNOSIS — D64.9 ANEMIA, UNSPECIFIED TYPE: Primary | ICD-10-CM

## 2024-01-04 DIAGNOSIS — D53.9 MACROCYTIC ANEMIA: ICD-10-CM

## 2024-01-04 LAB
ANION GAP SERPL CALCULATED.3IONS-SCNC: 16 MMOL/L (ref 9–17)
BASOPHILS # BLD: 0 K/UL (ref 0–0.2)
BASOPHILS NFR BLD: 0 % (ref 0–2)
BNP SERPL-MCNC: ABNORMAL PG/ML
BUN SERPL-MCNC: 69 MG/DL (ref 8–23)
CALCIUM SERPL-MCNC: 8.8 MG/DL (ref 8.6–10.4)
CHLORIDE SERPL-SCNC: 93 MMOL/L (ref 98–107)
CO2 SERPL-SCNC: 25 MMOL/L (ref 20–31)
CREAT SERPL-MCNC: 6.6 MG/DL (ref 0.7–1.2)
EOSINOPHIL # BLD: 0 K/UL (ref 0–0.4)
EOSINOPHILS RELATIVE PERCENT: 0 % (ref 0–4)
ERYTHROCYTE [DISTWIDTH] IN BLOOD BY AUTOMATED COUNT: 20.9 % (ref 11.5–14.9)
EST. AVERAGE GLUCOSE BLD GHB EST-MCNC: 85 MG/DL
FERRITIN SERPL-MCNC: 1087 NG/ML (ref 30–400)
GFR SERPL CREATININE-BSD FRML MDRD: 9 ML/MIN/1.73M2
GLUCOSE BLD-MCNC: 129 MG/DL (ref 75–110)
GLUCOSE SERPL-MCNC: 140 MG/DL (ref 70–99)
HAPTOGLOB SERPL-MCNC: 121 MG/DL (ref 30–200)
HBA1C MFR BLD: 4.6 % (ref 4–6)
HCT VFR BLD AUTO: 17.2 % (ref 41–53)
HGB BLD-MCNC: 5.5 G/DL (ref 13.5–17.5)
INR PPP: 1.7
IRON SATN MFR SERPL: 33 % (ref 20–55)
IRON SERPL-MCNC: 84 UG/DL (ref 59–158)
LDH SERPL-CCNC: 186 U/L (ref 135–225)
LYMPHOCYTES NFR BLD: 0.78 K/UL (ref 1–4.8)
LYMPHOCYTES RELATIVE PERCENT: 13 % (ref 24–44)
MCH RBC QN AUTO: 34.6 PG (ref 26–34)
MCHC RBC AUTO-ENTMCNC: 31.8 G/DL (ref 31–37)
MCV RBC AUTO: 108.8 FL (ref 80–100)
MONOCYTES NFR BLD: 0.6 K/UL (ref 0.1–1.3)
MONOCYTES NFR BLD: 10 % (ref 1–7)
MORPHOLOGY: ABNORMAL
NEUTROPHILS NFR BLD: 77 % (ref 36–66)
NEUTS SEG NFR BLD: 4.62 K/UL (ref 1.3–9.1)
PLATELET # BLD AUTO: 186 K/UL (ref 150–450)
PMV BLD AUTO: 6.8 FL (ref 6–12)
POTASSIUM SERPL-SCNC: 4.5 MMOL/L (ref 3.7–5.3)
PROTHROMBIN TIME: 20.5 SEC (ref 11.8–14.6)
RBC # BLD AUTO: 1.58 M/UL (ref 4.5–5.9)
RETICS # AUTO: 0.18 M/UL (ref 0.02–0.1)
RETICS/RBC NFR AUTO: 13.4 % (ref 0.5–2)
SODIUM SERPL-SCNC: 134 MMOL/L (ref 135–144)
TIBC SERPL-MCNC: 258 UG/DL (ref 250–450)
UNSATURATED IRON BINDING CAPACITY: 174 UG/DL (ref 112–347)
WBC OTHER # BLD: 6 K/UL (ref 3.5–11)

## 2024-01-04 PROCEDURE — 83540 ASSAY OF IRON: CPT

## 2024-01-04 PROCEDURE — 85045 AUTOMATED RETICULOCYTE COUNT: CPT

## 2024-01-04 PROCEDURE — 82947 ASSAY GLUCOSE BLOOD QUANT: CPT

## 2024-01-04 PROCEDURE — 6360000002 HC RX W HCPCS: Performed by: INTERNAL MEDICINE

## 2024-01-04 PROCEDURE — 86920 COMPATIBILITY TEST SPIN: CPT

## 2024-01-04 PROCEDURE — 83010 ASSAY OF HAPTOGLOBIN QUANT: CPT

## 2024-01-04 PROCEDURE — 99285 EMERGENCY DEPT VISIT HI MDM: CPT

## 2024-01-04 PROCEDURE — 85025 COMPLETE CBC W/AUTO DIFF WBC: CPT

## 2024-01-04 PROCEDURE — 30233N1 TRANSFUSION OF NONAUTOLOGOUS RED BLOOD CELLS INTO PERIPHERAL VEIN, PERCUTANEOUS APPROACH: ICD-10-PCS | Performed by: INTERNAL MEDICINE

## 2024-01-04 PROCEDURE — 2580000003 HC RX 258: Performed by: INTERNAL MEDICINE

## 2024-01-04 PROCEDURE — A4216 STERILE WATER/SALINE, 10 ML: HCPCS | Performed by: INTERNAL MEDICINE

## 2024-01-04 PROCEDURE — 86850 RBC ANTIBODY SCREEN: CPT

## 2024-01-04 PROCEDURE — P9016 RBC LEUKOCYTES REDUCED: HCPCS

## 2024-01-04 PROCEDURE — 85610 PROTHROMBIN TIME: CPT

## 2024-01-04 PROCEDURE — 86901 BLOOD TYPING SEROLOGIC RH(D): CPT

## 2024-01-04 PROCEDURE — 83880 ASSAY OF NATRIURETIC PEPTIDE: CPT

## 2024-01-04 PROCEDURE — 82746 ASSAY OF FOLIC ACID SERUM: CPT

## 2024-01-04 PROCEDURE — C9113 INJ PANTOPRAZOLE SODIUM, VIA: HCPCS | Performed by: INTERNAL MEDICINE

## 2024-01-04 PROCEDURE — 82728 ASSAY OF FERRITIN: CPT

## 2024-01-04 PROCEDURE — 6370000000 HC RX 637 (ALT 250 FOR IP): Performed by: INTERNAL MEDICINE

## 2024-01-04 PROCEDURE — 86900 BLOOD TYPING SEROLOGIC ABO: CPT

## 2024-01-04 PROCEDURE — 99223 1ST HOSP IP/OBS HIGH 75: CPT | Performed by: INTERNAL MEDICINE

## 2024-01-04 PROCEDURE — 36415 COLL VENOUS BLD VENIPUNCTURE: CPT

## 2024-01-04 PROCEDURE — 83615 LACTATE (LD) (LDH) ENZYME: CPT

## 2024-01-04 PROCEDURE — 80048 BASIC METABOLIC PNL TOTAL CA: CPT

## 2024-01-04 PROCEDURE — 2060000000 HC ICU INTERMEDIATE R&B

## 2024-01-04 PROCEDURE — 83036 HEMOGLOBIN GLYCOSYLATED A1C: CPT

## 2024-01-04 PROCEDURE — 83550 IRON BINDING TEST: CPT

## 2024-01-04 PROCEDURE — 82607 VITAMIN B-12: CPT

## 2024-01-04 RX ORDER — METOPROLOL TARTRATE 50 MG/1
50 TABLET, FILM COATED ORAL 2 TIMES DAILY
Status: DISCONTINUED | OUTPATIENT
Start: 2024-01-04 | End: 2024-01-09 | Stop reason: HOSPADM

## 2024-01-04 RX ORDER — SODIUM CHLORIDE 9 MG/ML
INJECTION, SOLUTION INTRAVENOUS PRN
Status: DISCONTINUED | OUTPATIENT
Start: 2024-01-04 | End: 2024-01-09 | Stop reason: HOSPADM

## 2024-01-04 RX ORDER — SODIUM CHLORIDE 0.9 % (FLUSH) 0.9 %
5-40 SYRINGE (ML) INJECTION EVERY 12 HOURS SCHEDULED
Status: DISCONTINUED | OUTPATIENT
Start: 2024-01-04 | End: 2024-01-09 | Stop reason: HOSPADM

## 2024-01-04 RX ORDER — SODIUM CHLORIDE 0.9 % (FLUSH) 0.9 %
5-40 SYRINGE (ML) INJECTION PRN
Status: DISCONTINUED | OUTPATIENT
Start: 2024-01-04 | End: 2024-01-09 | Stop reason: HOSPADM

## 2024-01-04 RX ORDER — ACETAMINOPHEN 650 MG/1
650 SUPPOSITORY RECTAL EVERY 6 HOURS PRN
Status: DISCONTINUED | OUTPATIENT
Start: 2024-01-04 | End: 2024-01-09 | Stop reason: HOSPADM

## 2024-01-04 RX ORDER — INSULIN LISPRO 100 [IU]/ML
0-4 INJECTION, SOLUTION INTRAVENOUS; SUBCUTANEOUS NIGHTLY
Status: DISCONTINUED | OUTPATIENT
Start: 2024-01-04 | End: 2024-01-09 | Stop reason: HOSPADM

## 2024-01-04 RX ORDER — ALBUTEROL SULFATE 90 UG/1
2 AEROSOL, METERED RESPIRATORY (INHALATION) 4 TIMES DAILY PRN
Status: DISCONTINUED | OUTPATIENT
Start: 2024-01-04 | End: 2024-01-09 | Stop reason: HOSPADM

## 2024-01-04 RX ORDER — ONDANSETRON 2 MG/ML
4 INJECTION INTRAMUSCULAR; INTRAVENOUS EVERY 6 HOURS PRN
Status: DISCONTINUED | OUTPATIENT
Start: 2024-01-04 | End: 2024-01-09 | Stop reason: HOSPADM

## 2024-01-04 RX ORDER — LANTHANUM CARBONATE 1000 MG/1
1000 TABLET, CHEWABLE ORAL
COMMUNITY

## 2024-01-04 RX ORDER — AMIODARONE HYDROCHLORIDE 200 MG/1
200 TABLET ORAL DAILY
COMMUNITY

## 2024-01-04 RX ORDER — DEXTROSE MONOHYDRATE 100 MG/ML
INJECTION, SOLUTION INTRAVENOUS CONTINUOUS PRN
Status: DISCONTINUED | OUTPATIENT
Start: 2024-01-04 | End: 2024-01-09 | Stop reason: HOSPADM

## 2024-01-04 RX ORDER — POLYETHYLENE GLYCOL 3350 17 G/17G
17 POWDER, FOR SOLUTION ORAL DAILY PRN
Status: DISCONTINUED | OUTPATIENT
Start: 2024-01-04 | End: 2024-01-09 | Stop reason: HOSPADM

## 2024-01-04 RX ORDER — INSULIN LISPRO 100 [IU]/ML
0-4 INJECTION, SOLUTION INTRAVENOUS; SUBCUTANEOUS
Status: DISCONTINUED | OUTPATIENT
Start: 2024-01-04 | End: 2024-01-09 | Stop reason: HOSPADM

## 2024-01-04 RX ORDER — LANOLIN ALCOHOL/MO/W.PET/CERES
3 CREAM (GRAM) TOPICAL NIGHTLY PRN
Status: DISCONTINUED | OUTPATIENT
Start: 2024-01-04 | End: 2024-01-09 | Stop reason: HOSPADM

## 2024-01-04 RX ORDER — ACETAMINOPHEN 325 MG/1
650 TABLET ORAL EVERY 6 HOURS PRN
Status: DISCONTINUED | OUTPATIENT
Start: 2024-01-04 | End: 2024-01-09 | Stop reason: HOSPADM

## 2024-01-04 RX ORDER — ATORVASTATIN CALCIUM 40 MG/1
40 TABLET, FILM COATED ORAL NIGHTLY
Status: DISCONTINUED | OUTPATIENT
Start: 2024-01-04 | End: 2024-01-09 | Stop reason: HOSPADM

## 2024-01-04 RX ORDER — ONDANSETRON 4 MG/1
4 TABLET, ORALLY DISINTEGRATING ORAL EVERY 8 HOURS PRN
Status: DISCONTINUED | OUTPATIENT
Start: 2024-01-04 | End: 2024-01-09 | Stop reason: HOSPADM

## 2024-01-04 RX ORDER — TRAMADOL HYDROCHLORIDE 50 MG/1
50 TABLET ORAL EVERY 6 HOURS PRN
Status: DISCONTINUED | OUTPATIENT
Start: 2024-01-04 | End: 2024-01-09 | Stop reason: HOSPADM

## 2024-01-04 RX ADMIN — ATORVASTATIN CALCIUM 40 MG: 40 TABLET, FILM COATED ORAL at 22:48

## 2024-01-04 RX ADMIN — SODIUM CHLORIDE, PRESERVATIVE FREE 40 MG: 5 INJECTION INTRAVENOUS at 22:40

## 2024-01-04 RX ADMIN — SODIUM CHLORIDE, PRESERVATIVE FREE 10 ML: 5 INJECTION INTRAVENOUS at 22:41

## 2024-01-04 RX ADMIN — TRAMADOL HYDROCHLORIDE 50 MG: 50 TABLET, COATED ORAL at 20:51

## 2024-01-04 ASSESSMENT — ENCOUNTER SYMPTOMS
SINUS PRESSURE: 0
VOMITING: 0
COUGH: 0
EYE PAIN: 0
CHEST TIGHTNESS: 0
NAUSEA: 0
SHORTNESS OF BREATH: 1
CONSTIPATION: 0
FACIAL SWELLING: 0
ABDOMINAL PAIN: 0
TROUBLE SWALLOWING: 0
DIARRHEA: 0
EYE REDNESS: 0
COLOR CHANGE: 0
EYE DISCHARGE: 0
BLOOD IN STOOL: 0
BACK PAIN: 0
WHEEZING: 0
RHINORRHEA: 0
SORE THROAT: 0

## 2024-01-04 ASSESSMENT — PAIN - FUNCTIONAL ASSESSMENT: PAIN_FUNCTIONAL_ASSESSMENT: NONE - DENIES PAIN

## 2024-01-04 ASSESSMENT — PAIN DESCRIPTION - ORIENTATION: ORIENTATION: RIGHT

## 2024-01-04 ASSESSMENT — PAIN DESCRIPTION - LOCATION: LOCATION: SHOULDER

## 2024-01-04 ASSESSMENT — PAIN SCALES - GENERAL: PAINLEVEL_OUTOF10: 8

## 2024-01-04 NOTE — CONSENT
Informed Consent for Blood Component Transfusion Note    I have discussed with the patient the rationale for blood component transfusion; its benefits in treating or preventing fatigue, organ damage, or death; and its risk which includes mild transfusion reactions, rare risk of blood borne infection, or more serious but rare reactions. I have discussed the alternatives to transfusion, including the risk and consequences of not receiving transfusion. The patient had an opportunity to ask questions and had agreed to proceed with transfusion of blood components.    Electronically signed by Ciro Botello MD on 1/4/24 at 5:15 PM EST

## 2024-01-04 NOTE — ED PROVIDER NOTES
abdominal tenderness. There is no guarding or rebound.   Musculoskeletal:         General: No tenderness. Normal range of motion.   Skin:     General: Skin is warm and dry.      Coloration: Skin is not pale.      Findings: No erythema or rash.   Neurological:      Mental Status: He is alert and oriented to person, place, and time.      Cranial Nerves: No cranial nerve deficit.      Sensory: No sensory deficit.      Motor: No abnormal muscle tone.      Coordination: Coordination normal.      Deep Tendon Reflexes: Reflexes normal.   Psychiatric:         Behavior: Behavior normal.         Thought Content: Thought content normal.         Judgment: Judgment normal.           MEDICAL DECISION MAKIN)  Number and Complexity of Problems  Problem List This Visit:  Low hemoglobin, chronic shortness of breath    Differential Diagnosis:  Acute on chronic anemia    Diagnoses Considered but Do Not Suspect:  None    Pertinent Comorbid Conditions:  Chronic anemia, kidney disease on dialysis, CHF, COPD    2)  Data Reviewed  Decision Rules/Scores/MIPS utilized:  None    External Documents Reviewed:  None    Imaging that is independently reviewed and interpreted by me as:  None    See more data below for the lab and radiology tests and orders.    3)  Treatment and Disposition      \"ED Course\" Notes From Epic Narrator:  ED Course as of 24u  Due to the fact the patient is dialysis patient already has a lot of fluid overload and has CHF I feel the patient needs to be admitted because he may need to wear more units of blood.  I spoke with Dr. Gurrola for the hospitalist who agrees to admit the patient. [JL]      ED Course User Index  [JL] Ciro Botello MD         PROCEDURES:  None      DATA FOR LAB AND RADIOLOGY TESTS ORDERED BELOW ARE REVIEWED BY THE ED CLINICIAN:    RADIOLOGY: All x-rays, CT, MRI, and formal ultrasound images (except ED bedside ultrasound) are read by the radiologist, see

## 2024-01-04 NOTE — H&P
[facility]   IN-PATIENT SERVICE   [facility]    HISTORY AND PHYSICAL EXAMINATION            Date:   1/4/2024  Patient name:  Sanjay Banegas  Date of admission:  1/4/2024  2:44 PM  MRN:   [id number]  Account:  [id number]  YOB: 1960  PCP:    Becca Wellington  Room:   06/06  Code Status:    Full Code    Chief Complaint:     Chief Complaint   Patient presents with    Abnormal Lab    Shortness of Breath    Dizziness       History Obtained From:     patient    History of Present Illness:     The patient is a 63 y.o.  Non- / non  male who presents with Abnormal Lab, Shortness of Breath, and Dizziness   and he is admitted to the hospital for the management of abnormal labs.    Patient has a history of chronic anemia, however labs checked in the facility he comes from today were noted to be worse with a hemoglobin of 5.5.  Hence was sent to the ER.    The patient denies bleeding, he is on Eliquis however is not aware about the indication.  Has had an upper endoscopy and colonoscopy in the past however cannot tell me when.        Past Medical History:     Past Medical History:   Diagnosis Date    Acute congestive heart failure ([facility]) 12/21/2016    Acute on chronic diastolic congestive heart failure ([facility]) 11/24/2018    Anemia 11/25/2018    Anemia of chronic renal failure 10/17/2016    Atrial fibrillation ([facility]) 01/03/2022    AVF (arteriovenous fistula) ([facility])     Bowel obstruction ([facility]) 12/26/2013    CAD (coronary artery disease) 2013    ?    Chest pain at rest 12/21/2016    CHF (congestive heart failure) ([facility]) 2013    last episode 11/2018    CHF (congestive heart failure), NYHA class I, acute on chronic, combined ([facility]) 2/9/2021    Chronic kidney disease     CKD (chronic kidney disease) stage 4, GFR 15-29 ml/min ([facility]) 10/17/2016    Coronary artery disease involving native coronary artery of native heart without angina pectoris     Diabetes mellitus ([facility]) 2012    NIDDM

## 2024-01-05 ENCOUNTER — HOSPITAL ENCOUNTER (OUTPATIENT)
Age: 64
Setting detail: SPECIMEN
Discharge: HOME | End: 2024-01-05

## 2024-01-05 PROBLEM — D53.9 MACROCYTIC ANEMIA: Status: ACTIVE | Noted: 2024-01-04

## 2024-01-05 LAB
ALBUMIN SERPL-MCNC: 3.6 G/DL (ref 3.5–5.2)
ALBUMIN SERPL-MCNC: 3.6 G/DL (ref 3.5–5.2)
ALP SERPL-CCNC: 169 U/L (ref 40–129)
ALT SERPL-CCNC: 29 U/L (ref 5–41)
ANION GAP SERPL CALCULATED.3IONS-SCNC: 15 MMOL/L (ref 9–17)
ANION GAP SERPL CALCULATED.3IONS-SCNC: 18 MMOL/L (ref 9–17)
AST SERPL-CCNC: 40 U/L
BILIRUB SERPL-MCNC: 1.1 MG/DL (ref 0.3–1.2)
BUN SERPL-MCNC: 77 MG/DL (ref 8–23)
BUN SERPL-MCNC: 77 MG/DL (ref 8–23)
CALCIUM SERPL-MCNC: 8.8 MG/DL (ref 8.6–10.4)
CALCIUM SERPL-MCNC: 8.9 MG/DL (ref 8.6–10.4)
CHLORIDE SERPL-SCNC: 94 MMOL/L (ref 98–107)
CHLORIDE SERPL-SCNC: 94 MMOL/L (ref 98–107)
CO2 SERPL-SCNC: 22 MMOL/L (ref 20–31)
CO2 SERPL-SCNC: 24 MMOL/L (ref 20–31)
CREAT SERPL-MCNC: 7.3 MG/DL (ref 0.7–1.2)
CREAT SERPL-MCNC: 7.5 MG/DL (ref 0.7–1.2)
ERYTHROCYTE [DISTWIDTH] IN BLOOD BY AUTOMATED COUNT: 22 % (ref 11.5–14.9)
ERYTHROCYTE [DISTWIDTH] IN BLOOD BY AUTOMATED COUNT: 22.4 % (ref 11.5–14.9)
ERYTHROCYTE [DISTWIDTH] IN BLOOD BY AUTOMATED COUNT: 23 % (ref 11.5–14.9)
FOLATE SERPL-MCNC: 5 NG/ML
GFR SERPL CREATININE-BSD FRML MDRD: 7 ML/MIN/1.73M2
GFR SERPL CREATININE-BSD FRML MDRD: 8 ML/MIN/1.73M2
GLUCOSE BLD-MCNC: 142 MG/DL (ref 75–110)
GLUCOSE BLD-MCNC: 146 MG/DL (ref 75–110)
GLUCOSE BLD-MCNC: 89 MG/DL (ref 75–110)
GLUCOSE SERPL-MCNC: 132 MG/DL (ref 70–99)
GLUCOSE SERPL-MCNC: 133 MG/DL (ref 70–99)
HBV CORE IGM SERPL QL IA: NONREACTIVE
HBV SURFACE AG SERPL QL IA: NONREACTIVE
HCT VFR BLD AUTO: 18.3 % (ref 41–53)
HCT VFR BLD AUTO: 21.9 % (ref 41–53)
HCT VFR BLD AUTO: 23.2 % (ref 41–53)
HGB BLD-MCNC: 6 G/DL (ref 13.5–17.5)
HGB BLD-MCNC: 7.3 G/DL (ref 13.5–17.5)
HGB BLD-MCNC: 7.4 G/DL (ref 13.5–17.5)
MCH RBC QN AUTO: 33 PG (ref 26–34)
MCH RBC QN AUTO: 33.4 PG (ref 26–34)
MCH RBC QN AUTO: 34 PG (ref 26–34)
MCHC RBC AUTO-ENTMCNC: 32.1 G/DL (ref 31–37)
MCHC RBC AUTO-ENTMCNC: 32.7 G/DL (ref 31–37)
MCHC RBC AUTO-ENTMCNC: 33.2 G/DL (ref 31–37)
MCV RBC AUTO: 100.6 FL (ref 80–100)
MCV RBC AUTO: 102.8 FL (ref 80–100)
MCV RBC AUTO: 104.1 FL (ref 80–100)
PATH REV BLD -IMP: NORMAL
PHOSPHATE SERPL-MCNC: 5.7 MG/DL (ref 2.5–4.5)
PLATELET # BLD AUTO: 136 K/UL (ref 150–450)
PLATELET # BLD AUTO: 137 K/UL (ref 150–450)
PLATELET # BLD AUTO: 146 K/UL (ref 150–450)
PMV BLD AUTO: 6.3 FL (ref 6–12)
PMV BLD AUTO: 6.3 FL (ref 6–12)
PMV BLD AUTO: 6.8 FL (ref 6–12)
POTASSIUM SERPL-SCNC: 5 MMOL/L (ref 3.7–5.3)
POTASSIUM SERPL-SCNC: 5 MMOL/L (ref 3.7–5.3)
PROT SERPL-MCNC: 7.5 G/DL (ref 6.4–8.3)
RBC # BLD AUTO: 1.76 M/UL (ref 4.5–5.9)
RBC # BLD AUTO: 2.17 M/UL (ref 4.5–5.9)
RBC # BLD AUTO: 2.26 M/UL (ref 4.5–5.9)
SODIUM SERPL-SCNC: 133 MMOL/L (ref 135–144)
SODIUM SERPL-SCNC: 134 MMOL/L (ref 135–144)
SURGICAL PATHOLOGY REPORT: NORMAL
VIT B12 SERPL-MCNC: 1201 PG/ML (ref 232–1245)
WBC OTHER # BLD: 5.3 K/UL (ref 3.5–11)
WBC OTHER # BLD: 5.8 K/UL (ref 3.5–11)
WBC OTHER # BLD: 6 K/UL (ref 3.5–11)

## 2024-01-05 PROCEDURE — C9113 INJ PANTOPRAZOLE SODIUM, VIA: HCPCS | Performed by: INTERNAL MEDICINE

## 2024-01-05 PROCEDURE — 90935 HEMODIALYSIS ONE EVALUATION: CPT

## 2024-01-05 PROCEDURE — 36415 COLL VENOUS BLD VENIPUNCTURE: CPT

## 2024-01-05 PROCEDURE — 2580000003 HC RX 258: Performed by: INTERNAL MEDICINE

## 2024-01-05 PROCEDURE — 87340 HEPATITIS B SURFACE AG IA: CPT

## 2024-01-05 PROCEDURE — 6360000002 HC RX W HCPCS: Performed by: INTERNAL MEDICINE

## 2024-01-05 PROCEDURE — 2060000000 HC ICU INTERMEDIATE R&B

## 2024-01-05 PROCEDURE — 99232 SBSQ HOSP IP/OBS MODERATE 35: CPT | Performed by: INTERNAL MEDICINE

## 2024-01-05 PROCEDURE — 99223 1ST HOSP IP/OBS HIGH 75: CPT | Performed by: INTERNAL MEDICINE

## 2024-01-05 PROCEDURE — 6370000000 HC RX 637 (ALT 250 FOR IP): Performed by: INTERNAL MEDICINE

## 2024-01-05 PROCEDURE — 80053 COMPREHEN METABOLIC PANEL: CPT

## 2024-01-05 PROCEDURE — 86705 HEP B CORE ANTIBODY IGM: CPT

## 2024-01-05 PROCEDURE — A4216 STERILE WATER/SALINE, 10 ML: HCPCS | Performed by: INTERNAL MEDICINE

## 2024-01-05 PROCEDURE — 80069 RENAL FUNCTION PANEL: CPT

## 2024-01-05 PROCEDURE — 6370000000 HC RX 637 (ALT 250 FOR IP)

## 2024-01-05 PROCEDURE — 82947 ASSAY GLUCOSE BLOOD QUANT: CPT

## 2024-01-05 PROCEDURE — P9016 RBC LEUKOCYTES REDUCED: HCPCS

## 2024-01-05 PROCEDURE — 36430 TRANSFUSION BLD/BLD COMPNT: CPT

## 2024-01-05 PROCEDURE — 85027 COMPLETE CBC AUTOMATED: CPT

## 2024-01-05 RX ORDER — LANTHANUM CARBONATE 500 MG/1
1000 TABLET, CHEWABLE ORAL
Status: DISCONTINUED | OUTPATIENT
Start: 2024-01-05 | End: 2024-01-09 | Stop reason: HOSPADM

## 2024-01-05 RX ORDER — AMIODARONE HYDROCHLORIDE 200 MG/1
200 TABLET ORAL DAILY
Status: DISCONTINUED | OUTPATIENT
Start: 2024-01-05 | End: 2024-01-09 | Stop reason: HOSPADM

## 2024-01-05 RX ORDER — MIDODRINE HYDROCHLORIDE 5 MG/1
5 TABLET ORAL
Status: DISCONTINUED | OUTPATIENT
Start: 2024-01-05 | End: 2024-01-09 | Stop reason: HOSPADM

## 2024-01-05 RX ORDER — VITAMIN B COMPLEX
2000 TABLET ORAL DAILY
Status: DISCONTINUED | OUTPATIENT
Start: 2024-01-05 | End: 2024-01-09 | Stop reason: HOSPADM

## 2024-01-05 RX ADMIN — SODIUM CHLORIDE, PRESERVATIVE FREE 40 MG: 5 INJECTION INTRAVENOUS at 20:36

## 2024-01-05 RX ADMIN — Medication 2000 UNITS: at 17:30

## 2024-01-05 RX ADMIN — SODIUM CHLORIDE, PRESERVATIVE FREE 10 ML: 5 INJECTION INTRAVENOUS at 15:36

## 2024-01-05 RX ADMIN — ATORVASTATIN CALCIUM 40 MG: 40 TABLET, FILM COATED ORAL at 20:31

## 2024-01-05 RX ADMIN — TRAMADOL HYDROCHLORIDE 50 MG: 50 TABLET, COATED ORAL at 20:31

## 2024-01-05 RX ADMIN — SODIUM CHLORIDE, PRESERVATIVE FREE 10 ML: 5 INJECTION INTRAVENOUS at 20:36

## 2024-01-05 RX ADMIN — MIDODRINE HYDROCHLORIDE 5 MG: 5 TABLET ORAL at 17:30

## 2024-01-05 RX ADMIN — AMIODARONE HYDROCHLORIDE 200 MG: 200 TABLET ORAL at 17:30

## 2024-01-05 RX ADMIN — SODIUM CHLORIDE, PRESERVATIVE FREE 40 MG: 5 INJECTION INTRAVENOUS at 15:36

## 2024-01-05 RX ADMIN — Medication 3 MG: at 00:00

## 2024-01-05 ASSESSMENT — PAIN SCALES - GENERAL
PAINLEVEL_OUTOF10: 7
PAINLEVEL_OUTOF10: 0

## 2024-01-05 ASSESSMENT — PAIN DESCRIPTION - LOCATION: LOCATION: SHOULDER

## 2024-01-05 ASSESSMENT — PAIN DESCRIPTION - DESCRIPTORS: DESCRIPTORS: ACHING

## 2024-01-05 ASSESSMENT — PAIN DESCRIPTION - ORIENTATION: ORIENTATION: RIGHT

## 2024-01-05 NOTE — PLAN OF CARE
Problem: Discharge Planning  Goal: Discharge to home or other facility with appropriate resources  Outcome: Progressing     Problem: Safety - Adult  Goal: Free from fall injury  Outcome: Progressing     Problem: Metabolic/Fluid and Electrolytes - Adult  Goal: Electrolytes maintained within normal limits  Outcome: Progressing     Problem: Metabolic/Fluid and Electrolytes - Adult  Goal: Hemodynamic stability and optimal renal function maintained  Outcome: Progressing     Problem: Metabolic/Fluid and Electrolytes - Adult  Goal: Glucose maintained within prescribed range  Outcome: Progressing     Problem: Hematologic - Adult  Goal: Maintains hematologic stability  Outcome: Progressing     Problem: Chronic Conditions and Co-morbidities  Goal: Patient's chronic conditions and co-morbidity symptoms are monitored and maintained or improved  Outcome: Progressing

## 2024-01-05 NOTE — PLAN OF CARE
Problem: Discharge Planning  Goal: Discharge to home or other facility with appropriate resources  Outcome: Progressing     Problem: Safety - Adult  Goal: Free from fall injury  Outcome: Progressing  Note: Patient weak.  Alert and oriented.  Bed alarm on.  Instructed not to get out of bed without help.     Problem: Metabolic/Fluid and Electrolytes - Adult  Goal: Electrolytes maintained within normal limits  Outcome: Progressing  Note: Labs reviewed.  Renal consulted.  Dialysis day today.  HS glucose 129.  Goal: Hemodynamic stability and optimal renal function maintained  Outcome: Progressing  Goal: Glucose maintained within prescribed range  Outcome: Progressing     Problem: Hematologic - Adult  Goal: Maintains hematologic stability  Outcome: Progressing  Note: Hg 5.5 on admission.  Repeat after one unit 6.0.  second unit to be given.  BP stable.  No signs of bleeding

## 2024-01-05 NOTE — ACP (ADVANCE CARE PLANNING)
Advance Care Planning     Advance Care Planning Activator (Inpatient)  Conversation Note      Date of ACP Conversation: 1/5/2024     Conversation Conducted with: Patient with Decision Making Capacity    ACP Activator: Mikki Chicas RN    Health Care Decision Maker:     Current Designated Health Care Decision Maker:     Click here to complete Healthcare Decision Makers including section of the Healthcare Decision Maker Relationship (ie \"Primary\")  Today we documented Decision Maker(s) consistent with Legal Next of Kin hierarchy.    Care Preferences    Ventilation:  \"If you were in your present state of health and suddenly became very ill and were unable to breathe on your own, what would your preference be about the use of a ventilator (breathing machine) if it were available to you?\"      Would the patient desire the use of ventilator (breathing machine)?: yes    \"If your health worsens and it becomes clear that your chance of recovery is unlikely, what would your preference be about the use of a ventilator (breathing machine) if it were available to you?\"     Would the patient desire the use of ventilator (breathing machine)?: No      Resuscitation  \"CPR works best to restart the heart when there is a sudden event, like a heart attack, in someone who is otherwise healthy. Unfortunately, CPR does not typically restart the heart for people who have serious health conditions or who are very sick.\"    \"In the event your heart stopped as a result of an underlying serious health condition, would you want attempts to be made to restart your heart (answer \"yes\" for attempt to resuscitate) or would you prefer a natural death (answer \"no\" for do not attempt to resuscitate)?\" yes       [] Yes   [x] No   Educated Patient / Decision Maker regarding differences between Advance Directives and portable DNR orders.    Length of ACP Conversation in minutes:      Conversation Outcomes:  ACP discussion completed    Follow-up plan:

## 2024-01-06 LAB
ERYTHROCYTE [DISTWIDTH] IN BLOOD BY AUTOMATED COUNT: 22.1 % (ref 11.5–14.9)
FREE KAPPA/LAMBDA RATIO: 1.74 (ref 0.26–1.65)
GLUCOSE BLD-MCNC: 122 MG/DL (ref 75–110)
GLUCOSE BLD-MCNC: 134 MG/DL (ref 75–110)
GLUCOSE BLD-MCNC: 144 MG/DL (ref 75–110)
GLUCOSE BLD-MCNC: 171 MG/DL (ref 75–110)
HAPTOGLOB SERPL-MCNC: 127 MG/DL (ref 30–200)
HCT VFR BLD AUTO: 21.1 % (ref 41–53)
HGB BLD-MCNC: 6.9 G/DL (ref 13.5–17.5)
IGA SERPL-MCNC: 382 MG/DL (ref 70–400)
IGG SERPL-MCNC: 1949 MG/DL (ref 700–1600)
IGM SERPL-MCNC: 153 MG/DL (ref 40–230)
KAPPA LC FREE SER-MCNC: 413 MG/L (ref 3.7–19.4)
LAMBDA LC FREE SERPL-MCNC: 237.4 MG/L (ref 5.7–26.3)
MCH RBC QN AUTO: 33.5 PG (ref 26–34)
MCHC RBC AUTO-ENTMCNC: 32.6 G/DL (ref 31–37)
MCV RBC AUTO: 102.8 FL (ref 80–100)
PLATELET # BLD AUTO: 128 K/UL (ref 150–450)
PMV BLD AUTO: 6.4 FL (ref 6–12)
RBC # BLD AUTO: 2.06 M/UL (ref 4.5–5.9)
WBC OTHER # BLD: 5.7 K/UL (ref 3.5–11)

## 2024-01-06 PROCEDURE — 86334 IMMUNOFIX E-PHORESIS SERUM: CPT

## 2024-01-06 PROCEDURE — 99221 1ST HOSP IP/OBS SF/LOW 40: CPT | Performed by: INTERNAL MEDICINE

## 2024-01-06 PROCEDURE — P9016 RBC LEUKOCYTES REDUCED: HCPCS

## 2024-01-06 PROCEDURE — 82947 ASSAY GLUCOSE BLOOD QUANT: CPT

## 2024-01-06 PROCEDURE — 36430 TRANSFUSION BLD/BLD COMPNT: CPT

## 2024-01-06 PROCEDURE — 2580000003 HC RX 258: Performed by: INTERNAL MEDICINE

## 2024-01-06 PROCEDURE — 6370000000 HC RX 637 (ALT 250 FOR IP)

## 2024-01-06 PROCEDURE — C9113 INJ PANTOPRAZOLE SODIUM, VIA: HCPCS | Performed by: INTERNAL MEDICINE

## 2024-01-06 PROCEDURE — APPNB60 APP NON BILLABLE TIME 46-60 MINS: Performed by: NURSE PRACTITIONER

## 2024-01-06 PROCEDURE — 83521 IG LIGHT CHAINS FREE EACH: CPT

## 2024-01-06 PROCEDURE — 6370000000 HC RX 637 (ALT 250 FOR IP): Performed by: INTERNAL MEDICINE

## 2024-01-06 PROCEDURE — 84155 ASSAY OF PROTEIN SERUM: CPT

## 2024-01-06 PROCEDURE — 84165 PROTEIN E-PHORESIS SERUM: CPT

## 2024-01-06 PROCEDURE — 99232 SBSQ HOSP IP/OBS MODERATE 35: CPT | Performed by: INTERNAL MEDICINE

## 2024-01-06 PROCEDURE — 82784 ASSAY IGA/IGD/IGG/IGM EACH: CPT

## 2024-01-06 PROCEDURE — 97162 PT EVAL MOD COMPLEX 30 MIN: CPT

## 2024-01-06 PROCEDURE — 83010 ASSAY OF HAPTOGLOBIN QUANT: CPT

## 2024-01-06 PROCEDURE — 36415 COLL VENOUS BLD VENIPUNCTURE: CPT

## 2024-01-06 PROCEDURE — 2060000000 HC ICU INTERMEDIATE R&B

## 2024-01-06 PROCEDURE — A4216 STERILE WATER/SALINE, 10 ML: HCPCS | Performed by: INTERNAL MEDICINE

## 2024-01-06 PROCEDURE — 6360000002 HC RX W HCPCS: Performed by: INTERNAL MEDICINE

## 2024-01-06 PROCEDURE — 85027 COMPLETE CBC AUTOMATED: CPT

## 2024-01-06 RX ORDER — PANTOPRAZOLE SODIUM 40 MG/1
40 TABLET, DELAYED RELEASE ORAL
Qty: 30 TABLET | Refills: 5 | Status: SHIPPED | OUTPATIENT
Start: 2024-01-06

## 2024-01-06 RX ORDER — SODIUM CHLORIDE 9 MG/ML
INJECTION, SOLUTION INTRAVENOUS PRN
Status: DISCONTINUED | OUTPATIENT
Start: 2024-01-06 | End: 2024-01-09 | Stop reason: HOSPADM

## 2024-01-06 RX ADMIN — Medication 2000 UNITS: at 09:07

## 2024-01-06 RX ADMIN — SODIUM CHLORIDE, PRESERVATIVE FREE 40 MG: 5 INJECTION INTRAVENOUS at 20:47

## 2024-01-06 RX ADMIN — MIDODRINE HYDROCHLORIDE 5 MG: 5 TABLET ORAL at 16:50

## 2024-01-06 RX ADMIN — Medication 3 MG: at 23:20

## 2024-01-06 RX ADMIN — AMIODARONE HYDROCHLORIDE 200 MG: 200 TABLET ORAL at 09:07

## 2024-01-06 RX ADMIN — ATORVASTATIN CALCIUM 40 MG: 40 TABLET, FILM COATED ORAL at 20:47

## 2024-01-06 RX ADMIN — SODIUM CHLORIDE, PRESERVATIVE FREE 40 MG: 5 INJECTION INTRAVENOUS at 12:37

## 2024-01-06 RX ADMIN — MIDODRINE HYDROCHLORIDE 5 MG: 5 TABLET ORAL at 15:06

## 2024-01-06 RX ADMIN — SODIUM CHLORIDE, PRESERVATIVE FREE 10 ML: 5 INJECTION INTRAVENOUS at 20:47

## 2024-01-06 RX ADMIN — METOPROLOL TARTRATE 50 MG: 50 TABLET ORAL at 09:07

## 2024-01-06 RX ADMIN — TRAMADOL HYDROCHLORIDE 50 MG: 50 TABLET, COATED ORAL at 08:42

## 2024-01-06 RX ADMIN — MIDODRINE HYDROCHLORIDE 5 MG: 5 TABLET ORAL at 09:07

## 2024-01-06 ASSESSMENT — PAIN DESCRIPTION - LOCATION: LOCATION: HEAD

## 2024-01-06 ASSESSMENT — PAIN SCALES - GENERAL: PAINLEVEL_OUTOF10: 6

## 2024-01-06 NOTE — CONSULTS
Department of Internal Medicine  Nephrology Denisse Hernandez MD  Consult Note    Reason for consultation: Management of hemodialysis dependent end-stage renal disease.    Consulting physician: Abebe Sharma MD.    History of present illness: This is a 63 y.o. male with a significant past medical history of COPD [on home oxygen 2 L/min], type 2 diabetes mellitus, Coronary artery disease [s/p PTCA/stent], chronic atrial fibrillation, systemic hypertension and end-stage renal disease secondary to hypertensive and diabetic nephropathy [on routine hemodialysis currently Monday/Wednesday/Friday at [facility] using left arm AV fistula], who presented to the emergency department 1/4/2024 for further evaluation of severe anemia.  Vital signs were stable at presentation with blood pressure 110/61 mmHg.     Laboratory studies revealed hemoglobin 5.5 g/dL BUN/creatinine 69/6.6 mg/dL and hence nephrology consultation for management of dialysis.  He did not receive hemodialysis yesterday.    Lisinopril    Past Medical History:   Diagnosis Date    Acute congestive heart failure ([facility]) 12/21/2016    Acute on chronic diastolic congestive heart failure ([facility]) 11/24/2018    Anemia 11/25/2018    Anemia of chronic renal failure 10/17/2016    Atrial fibrillation ([facility]) 01/03/2022    AVF (arteriovenous fistula) ([facility])     Bowel obstruction ([facility]) 12/26/2013    CAD (coronary artery disease) 2013    ?    Chest pain at rest 12/21/2016    CHF (congestive heart failure) ([facility]) 2013    last episode 11/2018    CHF (congestive heart failure), NYHA class I, acute on chronic, combined ([facility]) 2/9/2021    Chronic kidney disease     CKD (chronic kidney disease) stage 4, GFR 15-29 ml/min ([facility]) 10/17/2016    Coronary artery disease involving native coronary artery of native heart without angina pectoris     Diabetes mellitus ([facility]) 2012    NIDDM    Dialysis patient ([facility])     TUESDAY  /  THUR  /  SATURDAYS    ESRD (end stage renal disease) ([facility])     Essential 
   Open revision of left upper extremity AV graft   /  Dr Kellogg    VASCULAR SURGERY Left 07/18/2022    LUE FISTULAGRAM / DR KELLOGG    VASCULAR SURGERY Left 03/02/2023    LEFT OPEN GRAFT THROMBECTOMY performed by Shan Block MD at [facility]    VASCULAR SURGERY Left 08/14/2023    naz / UPPER EXTREMITY FISTULAGRAM WITH BALOON ANGIOPLASTY performed by Dianne Kellogg MD at [facility]    VASCULAR SURGERY Left 10/27/2023    LEFT UPPER EXTREMITY FISTULAGRAM WITH BALLOON ANGIOPLASTY performed by Dianne Kellogg MD at [facility]    VASECTOMY  1983       Social History:  Social History     Tobacco Use    Smoking status: Never    Smokeless tobacco: Never   Substance Use Topics    Alcohol use: No       Family History:   Family History   Problem Relation Age of Onset    Heart Disease Mother         CHF    Early Death Mother     High Blood Pressure Brother     Diabetes Brother     Asthma Brother     High Blood Pressure Brother     Diabetes Brother     High Blood Pressure Brother     Diabetes Brother        Diet:  ADULT DIET; Regular; 4 carb choices (60 gm/meal); No Added Salt (3-4 gm)    Scheduled Medications:    amiodarone  200 mg Oral Daily    lanthanum  1,000 mg Oral TID WC    Vitamin D  2,000 Units Oral Daily    midodrine  5 mg Oral TID WC    sodium chloride flush  5-40 mL IntraVENous 2 times per day    pantoprazole (PROTONIX) 40 mg in sodium chloride (PF) 0.9 % 10 mL injection  40 mg IntraVENous Q12H    insulin lispro  0-4 Units SubCUTAneous TID WC    insulin lispro  0-4 Units SubCUTAneous Nightly    atorvastatin  40 mg Oral Nightly    metoprolol tartrate  50 mg Oral BID     Infusions:    sodium chloride      sodium chloride      sodium chloride      dextrose       PRN Medications: sodium chloride, sodium chloride, sodium chloride flush, sodium chloride, ondansetron **OR** ondansetron, polyethylene glycol, acetaminophen **OR** acetaminophen, glucose, dextrose bolus **OR** dextrose bolus, glucagon 
chloride (PF) 0.9 % 10 mL injection  40 mg IntraVENous Q12H Max Gurrola MD   40 mg at 01/05/24 1536    glucose chewable tablet 16 g  4 tablet Oral PRN Max Gurrola MD        dextrose bolus 10% 125 mL  125 mL IntraVENous PRN Max Gurrola MD        Or    dextrose bolus 10% 250 mL  250 mL IntraVENous PRN Max Gurrola MD        glucagon injection 1 mg  1 mg SubCUTAneous PRN Max Gurrola MD        dextrose 10 % infusion   IntraVENous Continuous PRN Max Gurrola MD        insulin lispro (HUMALOG) injection vial 0-4 Units  0-4 Units SubCUTAneous TID WC Max Gurrola MD        insulin lispro (HUMALOG) injection vial 0-4 Units  0-4 Units SubCUTAneous Nightly Max Gurrola MD        albuterol sulfate HFA (PROVENTIL;VENTOLIN;PROAIR) 108 (90 Base) MCG/ACT inhaler 2 puff  2 puff Inhalation 4x Daily PRN Max Gurrola MD        atorvastatin (LIPITOR) tablet 40 mg  40 mg Oral Nightly Max Gurrola MD   40 mg at 01/04/24 2248    metoprolol tartrate (LOPRESSOR) tablet 50 mg  50 mg Oral BID Max Gurrola MD        traMADol (ULTRAM) tablet 50 mg  50 mg Oral Q6H PRN Max Gurrola MD   50 mg at 01/04/24 2051    melatonin tablet 3 mg  3 mg Oral Nightly PRN Argenis Mathew APRN - NP   3 mg at 01/05/24 0000       Allergies:  Lisinopril    REVIEW OF SYSTEMS:      General positive for weakness and fatigue. No unanticipated weight loss or decreased appetite. No fever or chills.   Eyes: No blurred vision, eye pain or double vision.   Ears: No hearing problems or drainage. No tinnitus.   Throat: No sore throat, problems with swallowing or dysphagia.   Respiratory: No cough, sputum or hemoptysis. No shortness of breath. No pleuritic chest pain.     Cardiovascular: No chest pain, orthopnea or PND. No lower extremity edema. No palpitation.   Gastrointestinal: No problems with swallowing. No abdominal pain or bloating. No nausea or vomiting. No diarrhea or constipation. No GI bleeding.

## 2024-01-06 NOTE — PLAN OF CARE
Problem: Discharge Planning  Goal: Discharge to home or other facility with appropriate resources  Outcome: Progressing     Problem: Safety - Adult  Goal: Free from fall injury  Outcome: Progressing     Problem: Metabolic/Fluid and Electrolytes - Adult  Goal: Electrolytes maintained within normal limits  Outcome: Progressing     Problem: Metabolic/Fluid and Electrolytes - Adult  Goal: Hemodynamic stability and optimal renal function maintained  Outcome: Progressing     Problem: Metabolic/Fluid and Electrolytes - Adult  Goal: Glucose maintained within prescribed range  Outcome: Progressing

## 2024-01-06 NOTE — PLAN OF CARE
Problem: Discharge Planning  Goal: Discharge to home or other facility with appropriate resources  1/6/2024 0051 by Emma Copeland RN  Outcome: Progressing  1/5/2024 1850 by Leila Jenkins RN  Outcome: Progressing     Problem: Safety - Adult  Goal: Free from fall injury  1/6/2024 0051 by Emma Copeland RN  Outcome: Progressing  Note: Patient weak.  Alert and oriented.  Bed alarm on   Makes needs known.  1/5/2024 1850 by Leila Jenkins RN  Outcome: Progressing     Problem: Metabolic/Fluid and Electrolytes - Adult  Goal: Electrolytes maintained within normal limits  1/6/2024 0051 by Emma Copeland RN  Outcome: Progressing  Note: Dialysis done today.  Hemoglobin stable at 7.4.  No bleeding.  1/5/2024 1850 by Leila Jenkins RN  Outcome: Progressing  Goal: Hemodynamic stability and optimal renal function maintained  1/6/2024 0051 by Emma Copeland RN  Outcome: Progressing  1/5/2024 1850 by Leila Jenkins RN  Outcome: Progressing  Goal: Glucose maintained within prescribed range  1/6/2024 0051 by Emma Copeland RN  Outcome: Progressing  1/5/2024 1850 by Leila Jenkins RN  Outcome: Progressing     Problem: Hematologic - Adult  Goal: Maintains hematologic stability  1/6/2024 0051 by Emma Copeland RN  Outcome: Progressing  1/5/2024 1850 by Leila Jenkins RN  Outcome: Progressing     Problem: Chronic Conditions and Co-morbidities  Goal: Patient's chronic conditions and co-morbidity symptoms are monitored and maintained or improved  1/6/2024 0051 by Emma Copeland RN  Outcome: Progressing  1/5/2024 1850 by Leila Jenkins RN  Outcome: Progressing     Problem: Pain  Goal: Verbalizes/displays adequate comfort level or baseline comfort level  Outcome: Progressing  Note: Patient denies pain.  Positioning self comfortably.

## 2024-01-06 NOTE — DISCHARGE INSTR - COC
Continuity of Care Form    Patient Name: Sanjay Banegas   :  1960  MRN:  [id number]    Admit date:  2024  Discharge date:  2024    Code Status Order: Full Code   Advance Directives:     Admitting Physician:  Abebe Sharma MD  PCP: Becca Wellington    Discharging Nurse: Kiki WOODS  Discharging Hospital Unit/Room#: /-01  Discharging Unit Phone Number: [phone]    Emergency Contact:   Extended Emergency Contact Information  Primary Emergency Contact: Mariana Mosher  Home Phone: [phone]  Work Phone: [phone]  Mobile Phone: [phone]  Relation: Spouse  Secondary Emergency Contact: Dash Banegas  Address: N/A           [address]  Home Phone: [phone]  Work Phone: [phone]  Mobile Phone: [phone]  Relation: Brother/Sister    Past Surgical History:  Past Surgical History:   Procedure Laterality Date    ABDOMEN SURGERY  2013    BOWEL OBSTRUCTION    AV FISTULA CREATION Left 2019    AV FISTULA REPAIR  2019    AV fistula revision, branch ligation / Percutaneous angioplasty of proximal anastomosis and outflow tract of dialysis circuit with drug coated balloon  /  Dr Kellogg    CARDIAC CATHETERIZATION  2021    Dr. Pam Girard, [place]    COLONOSCOPY      CORONARY ANGIOPLASTY WITH STENT PLACEMENT  2019    TIM LAD    CYSTOSCOPY  2017    CYSTOSCOPY N/A 2022    CYSTOSCOPY DILATION performed by Chalo Chew MD at [facility] OR    DIALYSIS FISTULA CREATION Left 2019    AV FISTULA CREATION ARM performed by Dianne Kellogg MD at [facility]    DIALYSIS FISTULA CREATION Left 10/23/2019    LEFT UPPER EXTREMITY AV GRAFT CREATION WITH [id number] ARTEGRAFT, LEFT UPPER EXTREMITY AV FISTULA LIGATION performed by Dianne Kellogg MD at [facility]    DIALYSIS FISTULA CREATION Left 06/15/2022    REVISION OF UPPER EXTREMITY AV FISTULA GRAFT, CENTRAL VENOUS CATHETER PLACEMENT (C-ARM) performed by Dianne Kellogg

## 2024-01-07 LAB
ABO/RH: NORMAL
ANTIBODY SCREEN: NEGATIVE
ARM BAND NUMBER: NORMAL
BLOOD BANK BLOOD PRODUCT EXPIRATION DATE: NORMAL
BLOOD BANK DISPENSE STATUS: NORMAL
BLOOD BANK ISBT PRODUCT BLOOD TYPE: 5100
BLOOD BANK PRODUCT CODE: NORMAL
BLOOD BANK SAMPLE EXPIRATION: NORMAL
BLOOD BANK UNIT TYPE AND RH: NORMAL
BPU ID: NORMAL
COMPONENT: NORMAL
CROSSMATCH RESULT: NORMAL
GLUCOSE BLD-MCNC: 112 MG/DL (ref 75–110)
GLUCOSE BLD-MCNC: 118 MG/DL (ref 75–110)
GLUCOSE BLD-MCNC: 128 MG/DL (ref 75–110)
GLUCOSE BLD-MCNC: 166 MG/DL (ref 75–110)
TRANSFUSION STATUS: NORMAL
UNIT DIVISION: 0
UNIT ISSUE DATE/TIME: NORMAL

## 2024-01-07 PROCEDURE — 6370000000 HC RX 637 (ALT 250 FOR IP): Performed by: INTERNAL MEDICINE

## 2024-01-07 PROCEDURE — A4216 STERILE WATER/SALINE, 10 ML: HCPCS | Performed by: INTERNAL MEDICINE

## 2024-01-07 PROCEDURE — 6370000000 HC RX 637 (ALT 250 FOR IP): Performed by: NURSE PRACTITIONER

## 2024-01-07 PROCEDURE — 99232 SBSQ HOSP IP/OBS MODERATE 35: CPT | Performed by: INTERNAL MEDICINE

## 2024-01-07 PROCEDURE — C9113 INJ PANTOPRAZOLE SODIUM, VIA: HCPCS | Performed by: INTERNAL MEDICINE

## 2024-01-07 PROCEDURE — 6370000000 HC RX 637 (ALT 250 FOR IP)

## 2024-01-07 PROCEDURE — 82947 ASSAY GLUCOSE BLOOD QUANT: CPT

## 2024-01-07 PROCEDURE — 2060000000 HC ICU INTERMEDIATE R&B

## 2024-01-07 PROCEDURE — 6360000002 HC RX W HCPCS: Performed by: INTERNAL MEDICINE

## 2024-01-07 PROCEDURE — 2580000003 HC RX 258: Performed by: INTERNAL MEDICINE

## 2024-01-07 RX ORDER — ASPIRIN 81 MG/1
81 TABLET ORAL DAILY
Status: DISCONTINUED | OUTPATIENT
Start: 2024-01-07 | End: 2024-01-09 | Stop reason: HOSPADM

## 2024-01-07 RX ORDER — DIPHENHYDRAMINE HCL 25 MG
25 TABLET ORAL EVERY 6 HOURS PRN
Status: DISCONTINUED | OUTPATIENT
Start: 2024-01-07 | End: 2024-01-09 | Stop reason: HOSPADM

## 2024-01-07 RX ADMIN — SODIUM CHLORIDE, PRESERVATIVE FREE 40 MG: 5 INJECTION INTRAVENOUS at 10:03

## 2024-01-07 RX ADMIN — Medication 3 MG: at 21:23

## 2024-01-07 RX ADMIN — TRAMADOL HYDROCHLORIDE 50 MG: 50 TABLET, COATED ORAL at 21:23

## 2024-01-07 RX ADMIN — DIPHENHYDRAMINE HYDROCHLORIDE 25 MG: 25 TABLET ORAL at 22:23

## 2024-01-07 RX ADMIN — SODIUM CHLORIDE, PRESERVATIVE FREE 10 ML: 5 INJECTION INTRAVENOUS at 10:07

## 2024-01-07 RX ADMIN — ATORVASTATIN CALCIUM 40 MG: 40 TABLET, FILM COATED ORAL at 21:14

## 2024-01-07 RX ADMIN — Medication 2000 UNITS: at 14:44

## 2024-01-07 RX ADMIN — ACETAMINOPHEN 650 MG: 325 TABLET ORAL at 10:19

## 2024-01-07 RX ADMIN — MIDODRINE HYDROCHLORIDE 5 MG: 5 TABLET ORAL at 10:03

## 2024-01-07 RX ADMIN — MIDODRINE HYDROCHLORIDE 5 MG: 5 TABLET ORAL at 17:40

## 2024-01-07 RX ADMIN — APIXABAN 5 MG: 5 TABLET, FILM COATED ORAL at 21:14

## 2024-01-07 RX ADMIN — ASPIRIN 81 MG: 81 TABLET, COATED ORAL at 17:40

## 2024-01-07 RX ADMIN — SODIUM CHLORIDE, PRESERVATIVE FREE 40 MG: 5 INJECTION INTRAVENOUS at 21:27

## 2024-01-07 RX ADMIN — MIDODRINE HYDROCHLORIDE 5 MG: 5 TABLET ORAL at 14:45

## 2024-01-07 RX ADMIN — AMIODARONE HYDROCHLORIDE 200 MG: 200 TABLET ORAL at 10:03

## 2024-01-07 RX ADMIN — SODIUM CHLORIDE, PRESERVATIVE FREE 10 ML: 5 INJECTION INTRAVENOUS at 21:14

## 2024-01-07 ASSESSMENT — PAIN DESCRIPTION - LOCATION
LOCATION: HEAD
LOCATION: LEG;FOOT;SHOULDER

## 2024-01-07 ASSESSMENT — PAIN SCALES - GENERAL
PAINLEVEL_OUTOF10: 3
PAINLEVEL_OUTOF10: 7
PAINLEVEL_OUTOF10: 6

## 2024-01-07 ASSESSMENT — PAIN SCALES - WONG BAKER: WONGBAKER_NUMERICALRESPONSE: 0

## 2024-01-07 ASSESSMENT — PAIN DESCRIPTION - ORIENTATION: ORIENTATION: RIGHT;LEFT

## 2024-01-07 ASSESSMENT — PAIN - FUNCTIONAL ASSESSMENT: PAIN_FUNCTIONAL_ASSESSMENT: ACTIVITIES ARE NOT PREVENTED

## 2024-01-07 ASSESSMENT — PAIN DESCRIPTION - DESCRIPTORS: DESCRIPTORS: ITCHING;ACHING

## 2024-01-07 NOTE — PLAN OF CARE
Problem: Discharge Planning  Goal: Discharge to home or other facility with appropriate resources  1/7/2024 0300 by Bassam Francis, RN  Outcome: Progressing     Problem: Safety - Adult  Goal: Free from fall injury  1/7/2024 0300 by Bassam Francis, RN  Outcome: Progressing     Problem: Pain  Goal: Verbalizes/displays adequate comfort level or baseline comfort level  1/7/2024 0300 by Bassam Francis, RN  Outcome: Progressing     Problem: Skin/Tissue Integrity  Goal: Absence of new skin breakdown  Description: 1.  Monitor for areas of redness and/or skin breakdown  2.  Assess vascular access sites hourly  3.  Every 4-6 hours minimum:  Change oxygen saturation probe site  4.  Every 4-6 hours:  If on nasal continuous positive airway pressure, respiratory therapy assess nares and determine need for appliance change or resting period.  1/7/2024 0300 by Bassam Francis, RN  Outcome: Progressing

## 2024-01-07 NOTE — PLAN OF CARE
Problem: Discharge Planning  Goal: Discharge to home or other facility with appropriate resources  1/7/2024 1507 by Leah Abdul, RN  Outcome: Progressing     Problem: Safety - Adult  Goal: Free from fall injury  1/7/2024 1507 by Leah Abdul, RN  Outcome: Progressing     Problem: Metabolic/Fluid and Electrolytes - Adult  Goal: Electrolytes maintained within normal limits  Outcome: Progressing     Problem: Metabolic/Fluid and Electrolytes - Adult  Goal: Hemodynamic stability and optimal renal function maintained  Outcome: Progressing     Problem: Metabolic/Fluid and Electrolytes - Adult  Goal: Glucose maintained within prescribed range  Outcome: Progressing

## 2024-01-08 LAB
ANION GAP SERPL CALCULATED.3IONS-SCNC: 15 MMOL/L (ref 9–17)
BUN SERPL-MCNC: 63 MG/DL (ref 8–23)
CALCIUM SERPL-MCNC: 8 MG/DL (ref 8.6–10.4)
CHLORIDE SERPL-SCNC: 96 MMOL/L (ref 98–107)
CO2 SERPL-SCNC: 23 MMOL/L (ref 20–31)
CREAT SERPL-MCNC: 6.3 MG/DL (ref 0.7–1.2)
GFR SERPL CREATININE-BSD FRML MDRD: 9 ML/MIN/1.73M2
GLUCOSE BLD-MCNC: 104 MG/DL (ref 75–110)
GLUCOSE BLD-MCNC: 119 MG/DL (ref 75–110)
GLUCOSE BLD-MCNC: 147 MG/DL (ref 75–110)
GLUCOSE SERPL-MCNC: 150 MG/DL (ref 70–99)
HCT VFR BLD AUTO: 25.5 % (ref 41–53)
HGB BLD-MCNC: 8.2 G/DL (ref 13.5–17.5)
POTASSIUM SERPL-SCNC: 4.3 MMOL/L (ref 3.7–5.3)
SODIUM SERPL-SCNC: 134 MMOL/L (ref 135–144)

## 2024-01-08 PROCEDURE — 6370000000 HC RX 637 (ALT 250 FOR IP): Performed by: INTERNAL MEDICINE

## 2024-01-08 PROCEDURE — 99239 HOSP IP/OBS DSCHRG MGMT >30: CPT | Performed by: INTERNAL MEDICINE

## 2024-01-08 PROCEDURE — 97116 GAIT TRAINING THERAPY: CPT

## 2024-01-08 PROCEDURE — 85014 HEMATOCRIT: CPT

## 2024-01-08 PROCEDURE — 2060000000 HC ICU INTERMEDIATE R&B

## 2024-01-08 PROCEDURE — 80048 BASIC METABOLIC PNL TOTAL CA: CPT

## 2024-01-08 PROCEDURE — 6370000000 HC RX 637 (ALT 250 FOR IP): Performed by: NURSE PRACTITIONER

## 2024-01-08 PROCEDURE — 6360000002 HC RX W HCPCS: Performed by: INTERNAL MEDICINE

## 2024-01-08 PROCEDURE — A4216 STERILE WATER/SALINE, 10 ML: HCPCS | Performed by: INTERNAL MEDICINE

## 2024-01-08 PROCEDURE — 97166 OT EVAL MOD COMPLEX 45 MIN: CPT

## 2024-01-08 PROCEDURE — 85018 HEMOGLOBIN: CPT

## 2024-01-08 PROCEDURE — 5A1D70Z PERFORMANCE OF URINARY FILTRATION, INTERMITTENT, LESS THAN 6 HOURS PER DAY: ICD-10-PCS | Performed by: INTERNAL MEDICINE

## 2024-01-08 PROCEDURE — 82947 ASSAY GLUCOSE BLOOD QUANT: CPT

## 2024-01-08 PROCEDURE — C9113 INJ PANTOPRAZOLE SODIUM, VIA: HCPCS | Performed by: INTERNAL MEDICINE

## 2024-01-08 PROCEDURE — 90935 HEMODIALYSIS ONE EVALUATION: CPT

## 2024-01-08 PROCEDURE — 99232 SBSQ HOSP IP/OBS MODERATE 35: CPT | Performed by: INTERNAL MEDICINE

## 2024-01-08 PROCEDURE — 36415 COLL VENOUS BLD VENIPUNCTURE: CPT

## 2024-01-08 PROCEDURE — 2580000003 HC RX 258: Performed by: INTERNAL MEDICINE

## 2024-01-08 RX ADMIN — APIXABAN 5 MG: 5 TABLET, FILM COATED ORAL at 21:14

## 2024-01-08 RX ADMIN — SODIUM CHLORIDE, PRESERVATIVE FREE 40 MG: 5 INJECTION INTRAVENOUS at 08:56

## 2024-01-08 RX ADMIN — MIDODRINE HYDROCHLORIDE 5 MG: 5 TABLET ORAL at 17:19

## 2024-01-08 RX ADMIN — Medication 2000 UNITS: at 16:33

## 2024-01-08 RX ADMIN — ASPIRIN 81 MG: 81 TABLET, COATED ORAL at 16:32

## 2024-01-08 RX ADMIN — SODIUM CHLORIDE, PRESERVATIVE FREE 10 ML: 5 INJECTION INTRAVENOUS at 08:57

## 2024-01-08 RX ADMIN — SODIUM CHLORIDE, PRESERVATIVE FREE 40 MG: 5 INJECTION INTRAVENOUS at 21:14

## 2024-01-08 RX ADMIN — DIPHENHYDRAMINE HYDROCHLORIDE 25 MG: 25 TABLET ORAL at 17:22

## 2024-01-08 RX ADMIN — TRAMADOL HYDROCHLORIDE 50 MG: 50 TABLET, COATED ORAL at 22:15

## 2024-01-08 RX ADMIN — ATORVASTATIN CALCIUM 40 MG: 40 TABLET, FILM COATED ORAL at 21:14

## 2024-01-08 RX ADMIN — APIXABAN 5 MG: 5 TABLET, FILM COATED ORAL at 16:32

## 2024-01-08 RX ADMIN — DIPHENHYDRAMINE HYDROCHLORIDE 25 MG: 25 TABLET ORAL at 08:52

## 2024-01-08 NOTE — PLAN OF CARE
Problem: Discharge Planning  Goal: Discharge to home or other facility with appropriate resources  Outcome: Progressing     Problem: Safety - Adult  Goal: Free from fall injury  1/8/2024 1709 by Kiki Smith RN  Outcome: Progressing     Problem: Metabolic/Fluid and Electrolytes - Adult  Goal: Electrolytes maintained within normal limits  Outcome: Progressing     Problem: Metabolic/Fluid and Electrolytes - Adult  Goal: Hemodynamic stability and optimal renal function maintained  Outcome: Progressing     Problem: Metabolic/Fluid and Electrolytes - Adult  Goal: Glucose maintained within prescribed range  Outcome: Progressing     Problem: Hematologic - Adult  Goal: Maintains hematologic stability  Outcome: Progressing     Problem: Chronic Conditions and Co-morbidities  Goal: Patient's chronic conditions and co-morbidity symptoms are monitored and maintained or improved  Outcome: Progressing     Problem: Pain  Goal: Verbalizes/displays adequate comfort level or baseline comfort level  1/8/2024 1709 by Kiki Smith RN  Outcome: Progressing     Problem: Skin/Tissue Integrity  Goal: Absence of new skin breakdown  Description: 1.  Monitor for areas of redness and/or skin breakdown  2.  Assess vascular access sites hourly  3.  Every 4-6 hours minimum:  Change oxygen saturation probe site  4.  Every 4-6 hours:  If on nasal continuous positive airway pressure, respiratory therapy assess nares and determine need for appliance change or resting period.  1/8/2024 1709 by Kiki Smith, RN  Outcome: Progressing

## 2024-01-08 NOTE — PLAN OF CARE
Problem: Safety - Adult  Goal: Free from fall injury  Outcome: Progressing     Problem: Pain  Goal: Verbalizes/displays adequate comfort level or baseline comfort level  Outcome: Progressing     Problem: Skin/Tissue Integrity  Goal: Absence of new skin breakdown  Description: 1.  Monitor for areas of redness and/or skin breakdown  2.  Assess vascular access sites hourly  3.  Every 4-6 hours minimum:  Change oxygen saturation probe site  4.  Every 4-6 hours:  If on nasal continuous positive airway pressure, respiratory therapy assess nares and determine need for appliance change or resting period.  Outcome: Progressing

## 2024-01-09 VITALS
HEART RATE: 80 BPM | RESPIRATION RATE: 18 BRPM | SYSTOLIC BLOOD PRESSURE: 121 MMHG | OXYGEN SATURATION: 100 % | DIASTOLIC BLOOD PRESSURE: 74 MMHG | WEIGHT: 246.91 LBS | TEMPERATURE: 97.7 F | BODY MASS INDEX: 35.35 KG/M2 | HEIGHT: 70 IN

## 2024-01-09 LAB
GLUCOSE BLD-MCNC: 121 MG/DL (ref 75–110)
GLUCOSE BLD-MCNC: 145 MG/DL (ref 75–110)

## 2024-01-09 PROCEDURE — 6360000002 HC RX W HCPCS: Performed by: INTERNAL MEDICINE

## 2024-01-09 PROCEDURE — 2580000003 HC RX 258: Performed by: INTERNAL MEDICINE

## 2024-01-09 PROCEDURE — C9113 INJ PANTOPRAZOLE SODIUM, VIA: HCPCS | Performed by: INTERNAL MEDICINE

## 2024-01-09 PROCEDURE — 99232 SBSQ HOSP IP/OBS MODERATE 35: CPT | Performed by: INTERNAL MEDICINE

## 2024-01-09 PROCEDURE — A4216 STERILE WATER/SALINE, 10 ML: HCPCS | Performed by: INTERNAL MEDICINE

## 2024-01-09 PROCEDURE — 82947 ASSAY GLUCOSE BLOOD QUANT: CPT

## 2024-01-09 PROCEDURE — 6370000000 HC RX 637 (ALT 250 FOR IP): Performed by: INTERNAL MEDICINE

## 2024-01-09 RX ADMIN — MIDODRINE HYDROCHLORIDE 5 MG: 5 TABLET ORAL at 08:23

## 2024-01-09 RX ADMIN — MIDODRINE HYDROCHLORIDE 5 MG: 5 TABLET ORAL at 12:33

## 2024-01-09 RX ADMIN — ASPIRIN 81 MG: 81 TABLET, COATED ORAL at 08:23

## 2024-01-09 RX ADMIN — SODIUM CHLORIDE, PRESERVATIVE FREE 10 ML: 5 INJECTION INTRAVENOUS at 08:29

## 2024-01-09 RX ADMIN — SODIUM CHLORIDE, PRESERVATIVE FREE 40 MG: 5 INJECTION INTRAVENOUS at 08:24

## 2024-01-09 RX ADMIN — AMIODARONE HYDROCHLORIDE 200 MG: 200 TABLET ORAL at 08:23

## 2024-01-09 RX ADMIN — APIXABAN 5 MG: 5 TABLET, FILM COATED ORAL at 08:24

## 2024-01-09 RX ADMIN — Medication 2000 UNITS: at 08:30

## 2024-01-09 NOTE — PROGRESS NOTES
Today's Date: 1/6/2024  Patient Name: Sanjay Banegas  Date of admission: 1/4/2024  2:44 PM  Patient's age: 63 y.o., 1960  Admission Dx: Anemia [D64.9]  Anemia, unspecified type [D64.9]      Requesting Physician: Abebe Sharma MD    CHIEF COMPLAINT: Weakness and fatigue.  Severe anemia.  End-stage renal disease on hemodialysis.    History Obtained From:  patient, electronic medical record    INTERVAL HISTORY:    Patient seen and examined  Has hemoglobin of 6.9 and platelets 128  No active bleeding noted  He feels better and he wants to go home  Denies any abdominal pain nausea vomiting    HISTORY OF PRESENT ILLNESS IN BRIEF:    The patient is a 63 y.o.  male who is admitted to the hospital for further management of severe anemia.  Patient had multiple comorbidities as stated.  He lives in [facility].  Patient had increasing weakness and fatigue.  He is on hemodialysis 3 times weekly.  He had lab tests in the facility which showed hemoglobin down to 5.5.  There was no evidence of active bleeding.  No melena or hematochezia.  No hematemesis.  No hematuria.  No fever or infections.  No weight loss or decreased appetite.  No other complaints.  Patient was hospitalized and received blood transfusion.  Dialysis was continued.  Clinically feels much better after the transfusions.    Past Medical History:   has a past medical history of Acute congestive heart failure (HCC), Acute on chronic diastolic congestive heart failure (HCC), Anemia, Anemia of chronic renal failure, Atrial fibrillation ([facility]), AVF (arteriovenous fistula) ([facility]), Bowel obstruction ([facility]), CAD (coronary artery disease), Chest pain at rest, CHF (congestive heart failure) ([facility]), CHF (congestive heart failure), NYHA class I, acute on chronic, combined ([facility]), Chronic kidney disease, CKD (chronic kidney disease) stage 4, GFR 15-29 ml/min ([facility]), Coronary artery disease involving native 
    [facility]   IN-PATIENT SERVICE   [facility]    HISTORY AND PHYSICAL EXAMINATION            Date:   1/9/2024  Patient name:  Sanjay Banegas  Date of admission:  1/4/2024  2:44 PM  MRN:   [id number]  Account:  [id number]  YOB: 1960  PCP:    Becca Wellington  Room:   [facility]  Code Status:    Full Code    Chief Complaint:     Chief Complaint   Patient presents with    Abnormal Lab    Shortness of Breath    Dizziness       History Obtained From:     patient    History of Present Illness:     The patient is a 63 y.o.  Non- / non  male who presents with Abnormal Lab, Shortness of Breath, and Dizziness   and he is admitted to the hospital for the management of abnormal labs.    Patient has a history of chronic anemia, however labs checked in the facility he comes from today were noted to be worse with a hemoglobin of 5.5.  Hence was sent to the ER.    The patient denies bleeding, he is on Eliquis however is not aware about the indication.  Has had an upper endoscopy and colonoscopy in the past however cannot tell me when.  1/5   Patient, admitted with abnormal lab  Required 2 unit of packed cell transfusion, hemoglobin is staying okay      Past Medical History:     Past Medical History:   Diagnosis Date    Acute congestive heart failure ([facility]) 12/21/2016    Acute on chronic diastolic congestive heart failure ([facility]) 11/24/2018    Anemia 11/25/2018    Anemia of chronic renal failure 10/17/2016    Atrial fibrillation ([facility]) 01/03/2022    AVF (arteriovenous fistula) ([facility])     Bowel obstruction ([facility]) 12/26/2013    CAD (coronary artery disease) 2013    ?    Chest pain at rest 12/21/2016    CHF (congestive heart failure) ([facility]) 2013    last episode 11/2018    CHF (congestive heart failure), NYHA class I, acute on chronic, combined ([facility]) 2/9/2021    Chronic kidney disease     CKD (chronic kidney disease) stage 4, GFR 15-29 ml/min ([facility]) 10/17/2016    Coronary artery disease 
    Department of Internal Medicine  Nephrology Denisse Hernandez MD  Progress Note    Reason for consultation: Management of hemodialysis dependent end-stage renal disease.    Consulting physician: Abebe Sharma MD.      Interval history: Patient was seen and examined today and he does not have any new complaints.    History of present illness: This is a 63 y.o. male with a significant past medical history of COPD [on home oxygen 2 L/min], type 2 diabetes mellitus, Coronary artery disease [s/p PTCA/stent], chronic atrial fibrillation, systemic hypertension and end-stage renal disease secondary to hypertensive and diabetic nephropathy [on routine hemodialysis currently Monday/Wednesday/Friday at [facility] using left arm AV fistula], who presented to the emergency department 1/4/2024 for further evaluation of severe anemia.  Vital signs were stable at presentation with blood pressure 110/61 mmHg.     Laboratory studies revealed hemoglobin 5.5 g/dL BUN/creatinine 69/6.6 mg/dL and hence nephrology consultation for management of dialysis.  He did not receive hemodialysis yesterday.    Scheduled Meds:   apixaban  5 mg Oral BID    aspirin EC  81 mg Oral Daily    amiodarone  200 mg Oral Daily    lanthanum  1,000 mg Oral TID WC    Vitamin D  2,000 Units Oral Daily    midodrine  5 mg Oral TID WC    sodium chloride flush  5-40 mL IntraVENous 2 times per day    pantoprazole (PROTONIX) 40 mg in sodium chloride (PF) 0.9 % 10 mL injection  40 mg IntraVENous Q12H    insulin lispro  0-4 Units SubCUTAneous TID WC    insulin lispro  0-4 Units SubCUTAneous Nightly    atorvastatin  40 mg Oral Nightly    metoprolol tartrate  50 mg Oral BID     Continuous Infusions:   sodium chloride      sodium chloride      sodium chloride      dextrose       PRN Meds:.diphenhydrAMINE, sodium chloride, sodium chloride, sodium chloride flush, sodium chloride, ondansetron **OR** ondansetron, polyethylene glycol, acetaminophen **OR** acetaminophen, glucose, 
    Department of Internal Medicine  Nephrology Denisse Hernandez MD  Progress Note    Reason for consultation: Management of hemodialysis dependent end-stage renal disease.    Consulting physician: Abebe Sharma MD.      Interval history: Patient was seen and examined today and he is asymptomatic.  He specifically denies shortness of breath, abdominal pain or chest pain.  All other practitioner entries were reviewed.    History of present illness: This is a 63 y.o. male with a significant past medical history of COPD [on home oxygen 2 L/min], type 2 diabetes mellitus, Coronary artery disease [s/p PTCA/stent], chronic atrial fibrillation, systemic hypertension and end-stage renal disease secondary to hypertensive and diabetic nephropathy [on routine hemodialysis currently Monday/Wednesday/Friday at [facility] using left arm AV fistula], who presented to the emergency department 1/4/2024 for further evaluation of severe anemia.  Vital signs were stable at presentation with blood pressure 110/61 mmHg.     Laboratory studies revealed hemoglobin 5.5 g/dL BUN/creatinine 69/6.6 mg/dL and hence nephrology consultation for management of dialysis.  He did not receive hemodialysis yesterday.    Scheduled Meds:   amiodarone  200 mg Oral Daily    lanthanum  1,000 mg Oral TID WC    Vitamin D  2,000 Units Oral Daily    midodrine  5 mg Oral TID WC    sodium chloride flush  5-40 mL IntraVENous 2 times per day    pantoprazole (PROTONIX) 40 mg in sodium chloride (PF) 0.9 % 10 mL injection  40 mg IntraVENous Q12H    insulin lispro  0-4 Units SubCUTAneous TID WC    insulin lispro  0-4 Units SubCUTAneous Nightly    atorvastatin  40 mg Oral Nightly    metoprolol tartrate  50 mg Oral BID     Continuous Infusions:   sodium chloride      sodium chloride      sodium chloride      dextrose       PRN Meds:.sodium chloride, sodium chloride, sodium chloride flush, sodium chloride, ondansetron **OR** ondansetron, polyethylene glycol, acetaminophen 
    Department of Internal Medicine  Nephrology José Luis Bee MD  Progress Note    Reason for consultation: Management of hemodialysis dependent end-stage renal disease.    Consulting physician: Abebe Sharma MD.      Interval history: Patient was seen and examined today and he does not have any new complaints.    History of present illness: This is a 63 y.o. male with a significant past medical history of COPD [on home oxygen 2 L/min], type 2 diabetes mellitus, Coronary artery disease [s/p PTCA/stent], chronic atrial fibrillation, systemic hypertension and end-stage renal disease secondary to hypertensive and diabetic nephropathy [on routine hemodialysis currently Monday/Wednesday/Friday at [facility] using left arm AV fistula], who presented to the emergency department 1/4/2024 for further evaluation of severe anemia.  Vital signs were stable at presentation with blood pressure 110/61 mmHg.     Laboratory studies revealed hemoglobin 5.5 g/dL BUN/creatinine 69/6.6 mg/dL and hence nephrology consultation for management of dialysis.  He did not receive hemodialysis yesterday.    Scheduled Meds:   apixaban  5 mg Oral BID    aspirin EC  81 mg Oral Daily    amiodarone  200 mg Oral Daily    lanthanum  1,000 mg Oral TID WC    Vitamin D  2,000 Units Oral Daily    midodrine  5 mg Oral TID WC    sodium chloride flush  5-40 mL IntraVENous 2 times per day    pantoprazole (PROTONIX) 40 mg in sodium chloride (PF) 0.9 % 10 mL injection  40 mg IntraVENous Q12H    insulin lispro  0-4 Units SubCUTAneous TID WC    insulin lispro  0-4 Units SubCUTAneous Nightly    atorvastatin  40 mg Oral Nightly    metoprolol tartrate  50 mg Oral BID     Continuous Infusions:   sodium chloride      sodium chloride      sodium chloride      dextrose       PRN Meds:.diphenhydrAMINE, sodium chloride, sodium chloride, sodium chloride flush, sodium chloride, ondansetron **OR** ondansetron, polyethylene glycol, acetaminophen **OR** acetaminophen, 
    [facility]   IN-PATIENT SERVICE   [facility]    HISTORY AND PHYSICAL EXAMINATION            Date:   1/8/2024  Patient name:  Sanjay Banegas  Date of admission:  1/4/2024  2:44 PM  MRN:   [id number]  Account:  [id number]  YOB: 1960  PCP:    Becca Wellington  Room:   [facility]  Code Status:    Full Code    Chief Complaint:     Chief Complaint   Patient presents with    Abnormal Lab    Shortness of Breath    Dizziness       History Obtained From:     patient    History of Present Illness:     The patient is a 63 y.o.  Non- / non  male who presents with Abnormal Lab, Shortness of Breath, and Dizziness   and he is admitted to the hospital for the management of abnormal labs.    Patient has a history of chronic anemia, however labs checked in the facility he comes from today were noted to be worse with a hemoglobin of 5.5.  Hence was sent to the ER.    The patient denies bleeding, he is on Eliquis however is not aware about the indication.  Has had an upper endoscopy and colonoscopy in the past however cannot tell me when.  1/5   Patient, admitted with abnormal lab  Required 2 unit of packed cell transfusion, hemoglobin is staying okay      Past Medical History:     Past Medical History:   Diagnosis Date    Acute congestive heart failure ([facility]) 12/21/2016    Acute on chronic diastolic congestive heart failure ([facility]) 11/24/2018    Anemia 11/25/2018    Anemia of chronic renal failure 10/17/2016    Atrial fibrillation ([facility]) 01/03/2022    AVF (arteriovenous fistula) ([facility])     Bowel obstruction ([facility]) 12/26/2013    CAD (coronary artery disease) 2013    ?    Chest pain at rest 12/21/2016    CHF (congestive heart failure) ([facility]) 2013    last episode 11/2018    CHF (congestive heart failure), NYHA class I, acute on chronic, combined ([facility]) 2/9/2021    Chronic kidney disease     CKD (chronic kidney disease) stage 4, GFR 15-29 ml/min ([facility]) 10/17/2016    Coronary artery disease 
    [facility]   IN-PATIENT SERVICE   [facility]    HISTORY AND PHYSICAL EXAMINATION            Date:   1/5/2024  Patient name:  Sanjay Banegas  Date of admission:  1/4/2024  2:44 PM  MRN:   [id number]  Account:  [id number]  YOB: 1960  PCP:    Becca Wellington  Room:   [facility]  Code Status:    Full Code    Chief Complaint:     Chief Complaint   Patient presents with    Abnormal Lab    Shortness of Breath    Dizziness       History Obtained From:     patient    History of Present Illness:     The patient is a 63 y.o.  Non- / non  male who presents with Abnormal Lab, Shortness of Breath, and Dizziness   and he is admitted to the hospital for the management of abnormal labs.    Patient has a history of chronic anemia, however labs checked in the facility he comes from today were noted to be worse with a hemoglobin of 5.5.  Hence was sent to the ER.    The patient denies bleeding, he is on Eliquis however is not aware about the indication.  Has had an upper endoscopy and colonoscopy in the past however cannot tell me when.  1/5   Patient, admitted with abnormal lab  Required 2 unit of packed cell transfusion, hemoglobin is staying okay      Past Medical History:     Past Medical History:   Diagnosis Date    Acute congestive heart failure ([facility]) 12/21/2016    Acute on chronic diastolic congestive heart failure ([facility]) 11/24/2018    Anemia 11/25/2018    Anemia of chronic renal failure 10/17/2016    Atrial fibrillation ([facility]) 01/03/2022    AVF (arteriovenous fistula) ([facility])     Bowel obstruction ([facility]) 12/26/2013    CAD (coronary artery disease) 2013    ?    Chest pain at rest 12/21/2016    CHF (congestive heart failure) ([facility]) 2013    last episode 11/2018    CHF (congestive heart failure), NYHA class I, acute on chronic, combined ([facility]) 2/9/2021    Chronic kidney disease     CKD (chronic kidney disease) stage 4, GFR 15-29 ml/min ([facility]) 10/17/2016    Coronary artery disease 
   01/06/24 1351   Encounter Summary   Encounter Overview/Reason  Initial Encounter   Service Provided For: Patient   Referral/Consult From: Rounding   Last Encounter  01/06/24   Complexity of Encounter Low   Spiritual/Emotional needs   Type Spiritual Support   Assessment/Intervention/Outcome   Assessment Calm   Intervention Active listening;Discussed illness injury and it’s impact;Prayer (assurance of)/[facility];Sustaining Presence/Ministry of presence   Outcome Engaged in conversation;Expressed Gratitude       
  HEMODIALYSIS PRE-TREATMENT NOTE    Patient Identifiers prior to treatment: Name//MRN    Isolation Required: No                      Isolation Type: N/A       (please document if patient is being managed as a PUI/COVID-19 patient)        Hepatitis status:                           Date Drawn                             Result  Hepatitis B Surface Antigen 2023     NEG                     Hepatitis B Surface Antibody 2023 POS     160   Hepatitis B Core Antibody 2023 NEG          How was Hepatitis Status verified: Outpatient Labs     Was a copy of the labs you documented provided to facility for the patient's chart: Yes    Hemodialysis orders verified: Yes    Access Within normal limits ( I.e. s/s of infection,...): WNL     Pre-Assessment completed: Yes by Michelle Ozuna RN    Pre-dialysis report received from: Kiki                      Time: 10:00    
  HEMODIALYSIS PRE-TREATMENT NOTE    Patient Identifiers prior to treatment: Name//MRN    Isolation Required: No                      Isolation Type: N/A       (please document if patient is being managed as a PUI/COVID-19 patient)        Hepatitis status:                           Date Drawn                             Result  Hepatitis B Surface Antigen 2023     NEG                     Hepatitis B Surface Antibody 2023 POS     160   Hepatitis B Core Antibody 2023 NEG          How was Hepatitis Status verified: care everywhere     Was a copy of the labs you documented provided to facility for the patient's chart: Yes    Hemodialysis orders verified: Yes    Access Within normal limits ( I.e. s/s of infection,...): WNL     Pre-Assessment completed: Yes by Michelle Ozuna RN    Pre-dialysis report received from: Emma                      Time: 10:00    
  Patient admitted to room 2091 per cart.  Alert and oriented.  VS taken and telemetry placed.  Patient instructed not to get out of bed per self.    
  Physician Progress Note      PATIENT:               ELIDA LAUGHLIN  CSN #:                  [id number]  :                       1960  ADMIT DATE:       2024 2:44 PM  DISCH DATE:  RESPONDING  PROVIDER #:        Abebe QUESADA MD          QUERY TEXT:    Pt admitted with low hemoglobin and has anemia documented. If possible, please   document in progress notes and discharge summary further specificity   regarding the acuity and type of anemia:    The medical record reflects the following:  Risk Factors: ESRD, Macrocytic anemia  Clinical Indicators: Per HP \"Acute on chronic macrocytic anemia, in the past   hemoglobin has been around 7-8 however more recently it was 11.  This is a   rapid drop in the last 2 weeks\", Hemoglobin on admission of 5.5 - received   transfused PRBCS  Treatment: IVF, labs, PRBCs    Thank you,  Adeola GUILLAUME RN  Options provided:  -- Anemia due to acute blood loss  -- Other - I will add my own diagnosis  -- Disagree - Not applicable / Not valid  -- Disagree - Clinically unable to determine / Unknown  -- Refer to Clinical Documentation Reviewer    PROVIDER RESPONSE TEXT:    This patient has acute blood loss anemia.    Query created by: Emma Arenas on 2024 2:43 PM      Electronically signed by:  Abebe QUESADA MD 2024 9:55 AM          
 informed writer, patient refused lab draw post transfusion. Notified Shirley Waterhouse NP.  
[facility]   OCCUPATIONAL THERAPY MISSED TREATMENT NOTE   INPATIENT   Date: 24  Patient Name: Sanjay Banegas       Room:   MRN: [id number]   Account #: [id number]    : 1960  (63 y.o.)  Gender: male                 REASON FOR MISSED TREATMENT:  RN deferred OT/PT evaluation this morning, patient receiving blood transfusion. OT will continue to follow and attempt as able.    - [id number]        Electronically signed by CA Patel on 24 at 10:30 AM EST    
Dr. George (Oncology) notified of consult.  
Dr. Hernandez called and notified of consult fpr dialysis patient.  States will see patient in am.  
Dr. Lund (Oncology) notified of consult.  
HEMODIALYSIS POST TREATMENT NOTE    Treatment time ordered: 3.5Hrs    Actual treatment time: 3.5Hrs    UltraFiltration Goal: 2Kgs  UltraFiltration Removed: 2Kgs      Pre Treatment weight: 114Kgs  Post Treatment weight: 112Kgs  Estimated Dry Weight: Unknown at this time    Access used:     Central Venous Catheter:          Tunneled or Non-tunneled: N/A           Site: N/A          Access Flow: N/A      Internal Access:       AV Fistula or AV Graft: AVF         Site: L Upper arm       Access Flow: Good       Sign and symptoms of infection: No       If YES: N/A    Medications or blood products given: N/A    Chronic outpatient schedule: [facility]    Chronic outpatient unit: [facility]     Summary of response to treatment: Tolerated well with no issues. Tolerated 2Kgs of fluid removal.     Explain if orders NOT met, was physician notified:N/A      ACES flowsheet faxed to patient unit/ placed in patient chart: Yes    Post assessment completed: Yes by Michelle Ozuna RN    Report given to: Kiki      * Intra-treatment documented Safety Checks include the followin) Access and face visible at all times.     2) All connections and blood lines are secure with no kinks.     3) NVL alarm engaged.     4) Hemosafe device applied (if applicable).     5) No collapse of Arterial or Venous blood chambers.     6) All blood lines / pump segments in the air detectors.   
HEMODIALYSIS POST TREATMENT NOTE    Treatment time ordered: 3Hrs    Actual treatment time: 3Hrs     UltraFiltration Goal: 2Kgs  UltraFiltration Removed: 2Kgs      Pre Treatment weight: 115.6Kgs  Post Treatment weight: 113.5Kgs  Estimated Dry Weight: Unknown at this time    Access used:     Central Venous Catheter:          Tunneled or Non-tunneled: N/A           Site: N/A          Access Flow: N/A      Internal Access:       AV Fistula or AV Graft: AVF         Site: L Upper arm       Access Flow: Good       Sign and symptoms of infection: No       If YES: N/A    Medications or blood products given: N/A    Chronic outpatient schedule: [facility]    Chronic outpatient unit: [facility]    Summary of response to treatment: Tolerated well with no issues    Explain if orders NOT met, was physician notified:N/A      ACES flowsheet faxed to patient unit/ placed in patient chart: Yes    Post assessment completed: Yes by Michelle Ozuna RN    Report given to: Emma Wong Intra-treatment documented Safety Checks include the followin) Access and face visible at all times.     2) All connections and blood lines are secure with no kinks.     3) NVL alarm engaged.     4) Hemosafe device applied (if applicable).     5) No collapse of Arterial or Venous blood chambers.     6) All blood lines / pump segments in the air detectors.   
Hemoglobin returned 6.0.  Miguelina MOODY notified and order received.  
MARVIN Andrade notified writer that patient wants to take off telemetry. Writer asked patient, Patient stated that the telemetry is making him itch. Patient was given benadryl, but patient still stated he not going to wear the telemetry anymore. Richa Aquino NP notified of  patient's refusal to wear telemetry.  
[facility]   OCCUPATIONAL THERAPY MISSED TREATMENT NOTE   INPATIENT   Date: 24  Patient Name: Sanjay Banegas       Room:   MRN: [id number]   Account #: [id number]    : 1960  (63 y.o.)  Gender: male                 REASON FOR MISSED TREATMENT:  24    -   pt OOR for hemodialysis. OT will check back in PM as time permits.    915       Checked pt status at 1420 - pt still OOR for hemodialysis. OT will continue to follow.    Electronically signed by CA Murphy on 24 at 9:45 AM EST    
[facility]   Occupational Therapy Evaluation  Date: 24  Patient Name: Sanjay Banegas       Room: -  MRN: [id number]  Account: [id number]   : 1960  (63 y.o.) Gender: male     Discharge Recommendations:  Further Occupational Therapy is recommended upon facility discharge.    OT Equipment Recommendations  Other: TBD    Referring Practitioner: Noé Henderson MD  Diagnosis: Macrocytic anemia      Treatment Diagnosis: Impaired self-care status    Past Medical History:  has a past medical history of Acute congestive heart failure ([facility]), Acute on chronic diastolic congestive heart failure ([facility]), Anemia, Anemia of chronic renal failure, Atrial fibrillation ([facility]), AVF (arteriovenous fistula) ([facility]), Bowel obstruction ([facility]), CAD (coronary artery disease), Chest pain at rest, CHF (congestive heart failure) ([facility]), CHF (congestive heart failure), NYHA class I, acute on chronic, combined ([facility]), Chronic kidney disease, CKD (chronic kidney disease) stage 4, GFR 15-29 ml/min ([facility]), Coronary artery disease involving native coronary artery of native heart without angina pectoris, Diabetes mellitus ([facility]), Dialysis patient ([facility]), ESRD (end stage renal disease) ([facility]), Essential hypertension, Groin swelling, Hemodialysis patient ([facility]), Hydrocele, Hyperlipidemia, Hypertension, Inguinal hernia, MI, old, NSTEMI (non-ST elevated myocardial infarction) ([facility]), On home O2, BATSHEVA (obstructive sleep apnea), Patient in clinical research study, Pneumonia, Poor historian, Prostatism, Respiratory distress, Rising PSA level, S/P arteriovenous (AV) graft placement, S/P PTCA - TIM distal LAD - Dr. Orozco 19, Sleep apnea, Small bowel obstruction ([facility]), SOB (shortness of breath), and Type 2 diabetes mellitus with chronic kidney disease on chronic dialysis ([facility]).    Past Surgical History:   has a past surgical history that includes Foot surgery (Left, ); Colonoscopy; Cystoscopy (2017); Prostate biopsy 
Notified Richa Aquino that patient refused lab draw.   
Occupational Therapy  [facility]   OCCUPATIONAL THERAPY MISSED TREATMENT NOTE   INPATIENT   Date: 24  Patient Name: Sanjay Banegas       Room:   MRN: [id number]   Account #: [id number]    : 1960  (63 y.o.)  Gender: male   Referring Practitioner: Noé Henderson MD  Diagnosis: Macrocytic anemia           REASON FOR MISSED TREATMENT:   Refusal by Patient despite increased encouragement from both STEIN/SINTIA Claribel. Pt stating \"I'm not getting up out of this chair\", \"I'm not even here for therapy\". Writer/PTA in room for encouragement and redirection due to pt agitation [id number]. OT will continue to follow.         Electronically signed by STARR Lance on 24 at 11:33 AM EST   
Patient is refusing to wear BIPAP, does not tolerate it well.   
Patient on 3L NC. Patient reports having oxygen tank for discharge. RN discharged patient down to vehicle with family. No O2 tank present in vehicle. Patient states it is at [facility] just down the road. Patient adamant about leaving and is going straight to [facility] for his portable O2 tank that is there from previous stay. Patient states he goes without O2 intermittently at home.   
Pharmacy Medication History Note      List of current medications patient is taking is complete.     Source of information: [facility] medication review report    Changes made to medication list:  Medications flagged for removal (include reason, ex. noncompliance):  Albuterol HFA - not on facility list   Diclofenac gel - not on facility list   Glimepiride - not on facility list   Metoprolol tartrate - not on facility list   Sevelamer - not on facility list   Velphoro - not on facility list     Medications removed (include reason, ex. therapy complete or physician discontinued):   None     Medications added/doses adjusted:  Amiodarone 200 mg daily   Lanthanum 1000 mg three times daily with meals  Midodrine 5 mg three times daily    Cholecalciferol 2000 units daily     Other notes (ex. Recent course of antibiotics, Coumadin dosing):  N/A     Denies use of other OTC or herbal medications.      Allergies clarified    Medication list provided to the patient: no   Medication education provided to the patient: none      Electronically signed by Elza Fernandez RPH on 1/4/2024 at 7:46 PM                                                     
Physical Therapy        Physical Therapy Cancel Note      DATE: 2024    NAME: Sanjay Banegas  MRN: [id number]   : 1960      Patient not seen this date for Physical Therapy due to:    Patient Declined: Attempted to see pt at 0840. Pt is resting in bed on arrival, declining therapy at this time. Writer introduced self with plan of care for therapy session. Pt refusing and stating he has dialysis and has that to worry about, provided encouragement but continues to refuse. Will continue to follow for PT needs.      Electronically signed by Ngoc Arzola PTA on 2024 at 8:47 AM      
Physical Therapy  DATE: 2024    NAME: Sanjay Banegas  MRN: [id number]   : 1960    Patient not seen this date for Physical Therapy due to:      [] Cancel by RN or physician due to:    [] Hemodialysis    [] Critical Lab Value Level     [] Blood transfusion in progress    [] Acute or unstable cardiovascular status   _MAP < 55 or more than >115  _HR < 40 or > 130    [] Acute or unstable pulmonary status   -FiO2 > 60%   _RR < 5 or >40    _O2 sats < 85%    [] Strict Bedrest    [] Off Unit for surgery or procedure    [] Off Unit for testing       [] Pending imaging to R/O fracture    [x] Refusal by Patient; despite increased encouragement from both STEIN and writer. Pt stating \"I'm not getting up out of this chair\", \"I'm not even here for therapy\". Writer/STEIN in room for encouragement and redirection due to pt agitation [id number]. PT will continue to follow.       [] Other      [] PT being discontinued at this time. Patient independent. No further needs.     [] PT being discontinued at this time as the patient has been transferred to hospice care. No further needs.      Electronically signed by Claribel Avalos PTA on 24 at 3:31 PM EST   
Physical Therapy  DATE: 2024    NAME: Sanjay Banegas  MRN: [id number]   : 1960    Patient not seen this date for Physical Therapy due to:      [] Cancel by RN or physician due to:    [] Hemodialysis    [] Critical Lab Value Level     [] Blood transfusion in progress    [] Acute or unstable cardiovascular status   _MAP < 55 or more than >115  _HR < 40 or > 130    [] Acute or unstable pulmonary status   -FiO2 > 60%   _RR < 5 or >40    _O2 sats < 85%    [] Strict Bedrest    [] Off Unit for surgery or procedure    [] Off Unit for testing       [] Pending imaging to R/O fracture    [x] Refusal by Patient; checked on pt @ 1406. Pt sitting up in chair stating he does not want therapy at this time. Pt stating he just wants to leave. Will continue to follow.       [] Other      [] PT being discontinued at this time. Patient independent. No further needs.     [] PT being discontinued at this time as the patient has been transferred to hospice care. No further needs.      Electronically signed by Claribel Avalos PTA on 24 at 3:28 PM EST   
Physical Therapy  Facility/Department: [facility] PROGRESSIVE CARE  Daily Treatment Note  NAME: Sanjay Banegas  : 1960  MRN: [id number]    Date of Service: 2024    Discharge Recommendations:  Patient would benefit from continued therapy after discharge, Therapy recommended at discharge   PT Equipment Recommendations  Equipment Needed: No    Patient Diagnosis(es): The primary encounter diagnosis was Anemia, unspecified type. A diagnosis of Macrocytic anemia was also pertinent to this visit.    Assessment   Activity Tolerance: Patient tolerated treatment well  Equipment Needed: No     Plan    Physical Therapy Plan  General Plan: 5-7 times per week  Current Treatment Recommendations: Strengthening;Balance training;Functional mobility training;Transfer training;Endurance training;Gait training;Safety education & training;Therapeutic activities     Restrictions  Restrictions/Precautions  Restrictions/Precautions: Fall Risk, General Precautions  Position Activity Restriction  Other position/activity restrictions: Up with assistance     Subjective    Subjective  Subjective: Pt sitting up EOB, on arrival. Agreeable to work with therapy. Co-tx with JHOAN Healy for max participation from pt.  Pain: denies pain but complains of itching  Orientation  Overall Orientation Status: Within Functional Limits  Cognition  Overall Cognitive Status: WFL  Arousal/Alertness: Appropriate responses to stimuli  Following Commands: Follows multistep commands with repitition  Attention Span: Attends with cues to redirect  Safety Judgement: Decreased awareness of need for assistance;Decreased awareness of need for safety  Problem Solving: Assistance required to generate solutions;Assistance required to implement solutions;Assistance required to identify errors made;Assistance required to correct errors made  Insights: Decreased awareness of deficits  Initiation: Requires cues for some  Sequencing: Requires cues for some     Objective 
Physical Therapy  Facility/Department: [facility] PROGRESSIVE CARE  Physical Therapy Initial Assessment    Name: Sanjay Banegas  : 1960  MRN: [id number]  Date of Service: 2024    Discharge Recommendations:  Patient would benefit from continued therapy after discharge, Therapy recommended at discharge   PT Equipment Recommendations  Equipment Needed: No      Patient Diagnosis(es): The primary encounter diagnosis was Anemia, unspecified type. A diagnosis of Macrocytic anemia was also pertinent to this visit.  Past Medical History:  has a past medical history of Acute congestive heart failure ([facility]), Acute on chronic diastolic congestive heart failure ([facility]), Anemia, Anemia of chronic renal failure, Atrial fibrillation ([facility]), AVF (arteriovenous fistula) ([facility]), Bowel obstruction ([facility]), CAD (coronary artery disease), Chest pain at rest, CHF (congestive heart failure) ([facility]), CHF (congestive heart failure), NYHA class I, acute on chronic, combined ([facility]), Chronic kidney disease, CKD (chronic kidney disease) stage 4, GFR 15-29 ml/min ([facility]), Coronary artery disease involving native coronary artery of native heart without angina pectoris, Diabetes mellitus ([facility]), Dialysis patient ([facility]), ESRD (end stage renal disease) ([facility]), Essential hypertension, Groin swelling, Hemodialysis patient ([facility]), Hydrocele, Hyperlipidemia, Hypertension, Inguinal hernia, MI, old, NSTEMI (non-ST elevated myocardial infarction) ([facility]), On home O2, BATSHEVA (obstructive sleep apnea), Patient in clinical research study, Pneumonia, Poor historian, Prostatism, Respiratory distress, Rising PSA level, S/P arteriovenous (AV) graft placement, S/P PTCA - TIM distal LAD - Dr. Orozco 19, Sleep apnea, Small bowel obstruction ([facility]), SOB (shortness of breath), and Type 2 diabetes mellitus with chronic kidney disease on chronic dialysis ([facility]).  Past Surgical History:  has a past surgical history that includes Foot surgery (Left, ); Colonoscopy; Cystoscopy 
RN called to notify Ginette d/c planner that he is able to discharge. She is going to call [facility] and see if they are able to take the patient back today.   
RN called to room. Patient on ground surrounded by other staff. Patient reported he was getting into wheel chair with assistance of a family member when the patient went to sit into the wheelchair and slide out from beneath the patient. Patient reports only hitting his bottom. No injury seen on coccyx. Patient assisted up to chair with staff by using gait belt. Vitals taken, WDL. Patient states \"I am okay\". Dr. Henderson notified of fall. Dr. Henderson still agrees with discharging patient.  
RN spoke with Dr. Davidson during rounds. Okay to d/c from his standpoint. RN also spoke with Sepideh from  who was okay with D/C and outpatient follow up.   
Three phlebotomists attempted to draw scheduled CBC.  All three unsuccessful.  Miguelina MOODY notified.  PEGGY Coleman in to attempt draw.  Patient a very difficult stick.  
**OR** dextrose bolus, glucagon (rDNA), dextrose, albuterol sulfate HFA, traMADol, melatonin    Physical Exam:    VITALS:  BP (!) 113/59   Pulse 63   Temp 97.5 °F (36.4 °C) (Axillary)   Resp 18   Ht 1.778 m (5' 10\")   Wt 113.7 kg (250 lb 10.6 oz)   SpO2 100%   BMI 35.97 kg/m²   TEMPERATURE:  Current - Temp: 97.5 °F (36.4 °C); Max - Temp  Av.5 °F (36.4 °C)  Min: 97.4 °F (36.3 °C)  Max: 97.6 °F (36.4 °C)  RESPIRATIONS RANGE: Resp  Av  Min: 18  Max: 18  PULSE RANGE: Pulse  Av.2  Min: 63  Max: 69  BLOOD PRESSURE RANGE:  Systolic (24hrs), Av , Min:102 , Max:124   ; Diastolic (24hrs), Av, Min:56, Max:76  PULSE OXIMETRY RANGE: SpO2  Av.8 %  Min: 99 %  Max: 100 %  24HR INTAKE/OUTPUT:  No intake or output data in the 24 hours ending 24 1317    Constitutional: alert, appears stated age, and cooperative    Skin: Skin color, texture, turgor normal. No rashes or lesions    Head: Normocephalic, without obvious abnormality, atraumatic     Cardiovascular/Edema: irregularly irregular rhythm    Respiratory: Lungs: clear to auscultation bilaterally    Abdomen:  bleed distended with firm subcutaneous edema; no tenderness; normal bowel sounds    Back: symmetric, no curvature. ROM normal. No CVA tenderness.    Extremities: extremities normal, atraumatic, no cyanosis or edema    Neuro:  Grossly normal    CBC:   Recent Labs     24  1024 24  1818 24  0605   WBC 6.0 5.8 5.7   HGB 7.3* 7.4* 6.9*   * 137* 128*       BMP:    Recent Labs     24  1659 24  1024   * 134*  133*   K 4.5 5.0  5.0   CL 93* 94*  94*   CO2    BUN 69* 77*  77*   CREATININE 6.6* 7.3*  7.5*   GLUCOSE 140* 133*  132*       Lab Results   Component Value Date/Time    NITRU NEGATIVE 2022 11:27 AM    COLORU Yellow 2022 11:27 AM    PHUR 6.0 2022 11:27 AM    WBCUA 5 TO 10 2022 11:27 AM    RBCUA 20 TO 50 2022 11:27 AM    MUCUS NOT REPORTED 2021 
family history includes Asthma in his brother; Diabetes in his brother, brother, and brother; Early Death in his mother; Heart Disease in his mother; High Blood Pressure in his brother, brother, and brother.  Social History:   reports that he has never smoked. He has never used smokeless tobacco. He reports that he does not drink alcohol and does not use drugs.   Medications:    Prior to Admission medications    Medication Sig Start Date End Date Taking? Authorizing Provider   pantoprazole (PROTONIX) 40 MG tablet Take 1 tablet by mouth every morning (before breakfast) 1/6/24  Yes Noé Henderson MD   amiodarone (CORDARONE) 200 MG tablet Take 1 tablet by mouth daily   Yes Elizabeth Johnson MD   vitamin D (CHOLECALCIFEROL) 25 MCG (1000 UT) TABS tablet Take 2 tablets by mouth daily   Yes Elizabeth Johnson MD   lanthanum (FOSRENOL) 1000 MG chewable tablet Take 1 tablet by mouth 3 times daily (with meals)   Yes Elizabeth Johnson MD   midodrine (PROAMATINE) 5 MG tablet Take 1 tablet by mouth 3 times daily 4/11/23   Elizabeth Johnson MD   apixaban (ELIQUIS) 5 MG TABS tablet Take 1 tablet by mouth 2 times daily 8/21/23   Rebecca Alvarenga APRN - CNP   atorvastatin (LIPITOR) 40 MG tablet Take 1 tablet by mouth nightly 8/21/23   Rebecca Alvarenga APRN - CNP   aspirin EC 81 MG EC tablet Take 1 tablet by mouth daily 12/24/21   Donis Chao DO   traMADol (ULTRAM) 50 MG tablet Take 1 tablet by mouth every 8 hours as needed for Pain. 10/21/20   Elizabeth Johnson MD   lidocaine-prilocaine (EMLA) 2.5-2.5 % cream Apply topically as needed for Pain (dialysis) 3/18/19   Elizabeth Johnson MD     Current Facility-Administered Medications   Medication Dose Route Frequency Provider Last Rate Last Admin    apixaban (ELIQUIS) tablet 5 mg  5 mg Oral BID Noé Henderson MD   5 mg at 01/07/24 2114    aspirin EC tablet 81 mg  81 mg Oral Daily Noé Henderson MD   81 mg at 01/07/24 1740    diphenhydrAMINE (BENADRYL) 
affect  Head:  normocephalic, atraumatic.  Eye: no icterus, redness, pupils equal and reactive, extraocular eye movements intact, conjunctiva clear  Ear: normal external ear, no discharge, hearing intact  Nose:  no drainage noted  Mouth: mucous membranes moist  Neck: supple, no carotid bruits,  Lungs: Bilateral equal air entry, clear to ausculation, no wheezing, rales or rhonchi, normal effort  Cardiovascular: normal rate, regular rhythm, no murmur, gallop, rub.  Abdomen: Soft, nontender, nondistended, normal bowel sounds, no hepatomegaly or splenomegaly  Neurologic: There are no new focal motor or sensory deficits, normal muscle tone and bulk, no abnormal sensation, normal speech, cranial nerves II through XII grossly intact  Skin: No gross lesions, rashes, bruising or bleeding on exposed skin area  Extremities: 1+ edema bilateral lower extremities.  Dialysis access left upper extremity, poor thrill    Investigations:      Laboratory Testing:  Recent Results (from the past 24 hour(s))   POC Glucose Fingerstick    Collection Time: 01/06/24  7:45 PM   Result Value Ref Range    POC Glucose 134 (H) 75 - 110 mg/dL   POC Glucose Fingerstick    Collection Time: 01/07/24  6:14 AM   Result Value Ref Range    POC Glucose 118 (H) 75 - 110 mg/dL   POC Glucose Fingerstick    Collection Time: 01/07/24 11:16 AM   Result Value Ref Range    POC Glucose 166 (H) 75 - 110 mg/dL       Imaging/Diagnostics:    REVIEWED    Assessment :      Primary Problem  Macrocytic anemia    Active Hospital Problems    Diagnosis Date Noted    Macrocytic anemia [D53.9] 01/04/2024    End stage renal disease (HCC) [N18.6]     Anemia [D64.9] 11/25/2018       Plan:     Patient status Admit as inpatient in the  Progressive Unit/Step down    Acute on chronic macrocytic anemia, in the past hemoglobin has been around 7-8 however more recently it was 11.  This is a rapid drop in the last 2 weeks.  Will check B12 folate reticulocyte count iron studies on the 
ecchymosis. No history of clotting problems.  Infectious disease: No fever, chills or frequent infections.   Endocrine: No polydipsia or polyuria. No temperature intolerance.  Neurologic: No headaches or dizziness. No weakness or numbness of the extremities. No changes in balance, coordination,  memory, mentation, behavior.   Allergic/Immunologic: No nasal congestion or hives. No repeated infections.       PHYSICAL EXAM:      BP (!) 127/56   Pulse 72   Temp 97.8 °F (36.6 °C) (Axillary)   Resp 18   Ht 1.778 m (5' 10\")   Wt 112 kg (246 lb 14.6 oz)   SpO2 100%   BMI 35.43 kg/m²    Temp (24hrs), Av.7 °F (36.5 °C), Min:97.5 °F (36.4 °C), Max:97.8 °F (36.6 °C)      General appearance - not in pain or distress  Mental status - alert and oriented  Eyes - pupils equal and reactive, extraocular eye movements intact  Ears - bilateral TM's and external ear canals normal  Nose - normal and patent, no erythema, discharge or polyps  Mouth - mucous membranes moist, pharynx normal without lesions  Neck - supple, no significant adenopathy  Lymphatics - no palpable lymphadenopathy, no hepatosplenomegaly  Chest - clear to auscultation, no wheezes, rales or rhonchi, symmetric air entry  Heart - normal rate, regular rhythm, normal S1, S2, no murmurs, rubs, clicks or gallops  Abdomen - soft, nontender, nondistended, no masses or organomegaly  Neurological - alert, oriented, normal speech, no focal findings or movement disorder noted  Musculoskeletal - no joint tenderness, deformity or swelling  Extremities - peripheral pulses normal, no pedal edema, no clubbing or cyanosis  Skin - normal coloration and turgor, no rashes, no suspicious skin lesions noted           DATA:      Labs:       CBC:   Recent Labs     24  0605 24  1639   WBC 5.7  --    HGB 6.9* 8.2*   HCT 21.1* 25.5*   *  --        BMP:   Recent Labs     24  1046   *   K 4.3   CO2 23   BUN 63*   CREATININE 6.3*   LABGLOM 9*   GLUCOSE 150* 
pg    MCHC 32.6 31 - 37 g/dL    RDW 22.1 (H) 11.5 - 14.9 %    Platelets 128 (L) 150 - 450 k/uL    MPV 6.4 6.0 - 12.0 fL   POC Glucose Fingerstick    Collection Time: 01/06/24  6:21 AM   Result Value Ref Range    POC Glucose 144 (H) 75 - 110 mg/dL   POC Glucose Fingerstick    Collection Time: 01/06/24 11:35 AM   Result Value Ref Range    POC Glucose 171 (H) 75 - 110 mg/dL       Imaging/Diagnostics:    REVIEWED    Assessment :      Primary Problem  Macrocytic anemia    Active Hospital Problems    Diagnosis Date Noted    Macrocytic anemia [D53.9] 01/04/2024    End stage renal disease (HCC) [N18.6]     Anemia [D64.9] 11/25/2018       Plan:     Patient status Admit as inpatient in the  Progressive Unit/Step down    Acute on chronic macrocytic anemia, in the past hemoglobin has been around 7-8 however more recently it was 11.  This is a rapid drop in the last 2 weeks.  Will check B12 folate reticulocyte count iron studies on the patient, transfuse 1 unit blood as concerns for volume overload, recheck hemoglobin.  Goal hemoglobin greater than 7.  Will consult nephrology for IV iron infusions and erythropoietin if deemed necessary based on studies above.  Patient also on baby aspirin, Eliquis  Macrocytic anemia, based on iron studies we will consult gastroenterology if appropriate  ESRD, consult nephrology for hemodialysis.  Diabetes mellitus type 2, on glimepiride.  History of combined left-sided systolic and diastolic heart failure  Paroxysmal atrial fibrillation, currently rate controlled.  Patient on Eliquis  BATSHEVA, will place for respiratory tube put CPAP  History of CAD    1/5   History of wide-complex tachycardia  Admitted with low hemoglobin, no obvious bleeding  Patient is having history of A-fib on Eliquis which is currently kept on hold  Underwent EGD back in 21, suggestive of grade D esophagitis  GI, heme-onc consulted  Eliquis kept on hold  Iron studies vitamin B12, folic acid levels seem appears to be in 
        This note is created with the assistance of a speech recognition program.  While intending to generate a document that actually reflects the content of the visit, the document can still have some errors including those of syntax and sound a like substitutions which may escape proof reading.  It such instances, actual meaning can be extrapolated by contextual diversion.

## 2024-01-09 NOTE — DISCHARGE INSTR - DIET
Good nutrition is important when healing from an illness, injury, or surgery.  Follow any nutrition recommendations given to you during your hospital stay.   If you were given an oral nutrition supplement while in the hospital, continue to take this supplement at home.  You can take it with meals, in-between meals, and/or before bedtime. These supplements can be purchased at most local grocery stores, pharmacies, and chain [employer]-stores.   If you have any questions about your diet or nutrition, call the hospital and ask for the dietitian.      Regular Diet; 4 carb; No added salt

## 2024-01-09 NOTE — PLAN OF CARE
Problem: Discharge Planning  Goal: Discharge to home or other facility with appropriate resources  Outcome: Adequate for Discharge     Problem: Safety - Adult  Goal: Free from fall injury  1/9/2024 1411 by Kiki Smith RN  Outcome: Adequate for Discharge     Problem: Metabolic/Fluid and Electrolytes - Adult  Goal: Electrolytes maintained within normal limits  Outcome: Adequate for Discharge     Problem: Metabolic/Fluid and Electrolytes - Adult  Goal: Hemodynamic stability and optimal renal function maintained  Outcome: Adequate for Discharge     Problem: Metabolic/Fluid and Electrolytes - Adult  Goal: Glucose maintained within prescribed range  Outcome: Adequate for Discharge     Problem: Hematologic - Adult  Goal: Maintains hematologic stability  Outcome: Adequate for Discharge     Problem: Chronic Conditions and Co-morbidities  Goal: Patient's chronic conditions and co-morbidity symptoms are monitored and maintained or improved  Outcome: Adequate for Discharge     Problem: Pain  Goal: Verbalizes/displays adequate comfort level or baseline comfort level  1/9/2024 1411 by Kiki Smith RN  Outcome: Adequate for Discharge     Problem: Skin/Tissue Integrity  Goal: Absence of new skin breakdown  Description: 1.  Monitor for areas of redness and/or skin breakdown  2.  Assess vascular access sites hourly  3.  Every 4-6 hours minimum:  Change oxygen saturation probe site  4.  Every 4-6 hours:  If on nasal continuous positive airway pressure, respiratory therapy assess nares and determine need for appliance change or resting period.  1/9/2024 1411 by Kiki Smith RN  Outcome: Adequate for Discharge

## 2024-01-09 NOTE — PLAN OF CARE
Problem: Safety - Adult  Goal: Free from fall injury  1/9/2024 0535 by Manuel Camp, RN  Outcome: Progressing  Note: No falls noted this shift, bed in lowest position, call light within reach, side rails up. Continue to monitor closely.      Problem: Pain  Goal: Verbalizes/displays adequate comfort level or baseline comfort level  1/9/2024 0535 by Manuel Camp, RN  Outcome: Progressing  Flowsheets (Taken 1/9/2024 0535)  Verbalizes/displays adequate comfort level or baseline comfort level: Implement non-pharmacological measures as appropriate and evaluate response     Problem: Skin/Tissue Integrity  Goal: Absence of new skin breakdown  Description: 1.  Monitor for areas of redness and/or skin breakdown  2.  Assess vascular access sites hourly  3.  Every 4-6 hours minimum:  Change oxygen saturation probe site  4.  Every 4-6 hours:  If on nasal continuous positive airway pressure, respiratory therapy assess nares and determine need for appliance change or resting period.  1/9/2024 0535 by Manuel Camp, RN  Outcome: Progressing  Note: Skin assessment as charted, assist patient with Q2 and PRN turning.

## 2024-01-10 ENCOUNTER — CLINICAL DOCUMENTATION ONLY (OUTPATIENT)
Facility: CLINIC | Age: 64
End: 2024-01-10

## 2024-01-10 ENCOUNTER — TELEPHONE (OUTPATIENT)
Dept: ONCOLOGY | Age: 64
End: 2024-01-10

## 2024-01-10 LAB
ALBUMIN PERCENT: 55 % (ref 45–65)
ALBUMIN SERPL-MCNC: 3.8 G/DL (ref 3.2–5.2)
ALPHA 2 PERCENT: 9 % (ref 6–13)
ALPHA1 GLOB SERPL ELPH-MCNC: 0.3 G/DL (ref 0.1–0.4)
ALPHA1 GLOB SERPL ELPH-MCNC: 4 % (ref 3–6)
ALPHA2 GLOB SERPL ELPH-MCNC: 0.6 G/DL (ref 0.5–0.9)
B-GLOBULIN SERPL ELPH-MCNC: 0.7 G/DL (ref 0.5–1.1)
B-GLOBULIN SERPL ELPH-MCNC: 10 % (ref 11–19)
GAMMA GLOB SERPL ELPH-MCNC: 1.6 G/DL (ref 0.5–1.5)
GAMMA GLOBULIN %: 23 % (ref 9–20)
INTERPRETATION SERPL IFE-IMP: NORMAL
PATH REV: NORMAL
PATHOLOGIST: ABNORMAL
PROT PATTERN SERPL ELPH-IMP: ABNORMAL
PROT SERPL-MCNC: 7 G/DL (ref 6.4–8.3)
TOTAL PROT. SUM,%: 101 % (ref 98–102)
TOTAL PROT. SUM: 7 G/DL (ref 6.3–8.2)

## 2024-01-10 NOTE — CARE COORDINATION
Case Management Assessment  Initial Evaluation    Date/Time of Evaluation: 1/5/2024 9:14 AM  Assessment Completed by: Mikki Chicas RN    If patient is discharged prior to next notation, then this note serves as note for discharge by case management.    Patient Name: Sanjay Banegas                   YOB: 1960  Diagnosis: Anemia [D64.9]  Anemia, unspecified type [D64.9]                   Date / Time: 1/4/2024  2:44 PM    Patient Admission Status: Inpatient   Readmission Risk (Low < 19, Mod (19-27), High > 27): Readmission Risk Score: 23.8    Current PCP: Becca Wellington  PCP verified by CM? Yes    Chart Reviewed: Yes      History Provided by: Patient  Patient Orientation: Alert and Oriented    Patient Cognition: Alert    Hospitalization in the last 30 days (Readmission):  No    If yes, Readmission Assessment in CM Navigator will be completed.    Advance Directives:      Code Status: Full Code   Patient's Primary Decision Maker is: Legal Next of Kin      Discharge Planning:    Patient lives with: Other (Comment) (SNF) Type of Home: Skilled Nursing Facility  Primary Care Giver: Other (Comment) (Facility)  Patient Support Systems include: Spouse/Significant Other   Current Financial resources: Medicare  Current community resources: ECF/Home Care  Current services prior to admission: Skilled Nursing Facility            Current DME:              Type of Home Care services:  None    ADLS  Prior functional level: Assistance with the following:, Mobility, Bathing, Cooking, Housework, Shopping  Current functional level: Assistance with the following:, Mobility, Bathing, Cooking, Housework, Shopping    PT AM-PAC:   /24  OT AM-PAC:   /24    Family can provide assistance at DC: No  Would you like Case Management to discuss the discharge plan with any other family members/significant others, and if so, who? Yes (Mariana, significant other)  Plans to Return to Present Housing: Unknown at present  Other 
Continuity of Care Form    Patient Name: Sanjay Banegas   :  1960  MRN:  [id number]    Admit date:  2024  Discharge date:  2024    Code Status Order: Full Code   Advance Directives:     Admitting Physician:  Abebe Sharma MD  PCP: Becca Wellington    Discharging Nurse: Kiki WOODS  Discharging Hospital Unit/Room#: /-01  Discharging Unit Phone Number: [phone]    Emergency Contact:   Extended Emergency Contact Information  Primary Emergency Contact: Mariana Mosher  Home Phone: [phone]  Work Phone: [phone]  Mobile Phone: [phone]  Relation: Spouse  Secondary Emergency Contact: Dash Banegas  Address: N/A           [address]  Home Phone: [phone]  Work Phone: [phone]  Mobile Phone: [phone]  Relation: Brother/Sister    Past Surgical History:  Past Surgical History:   Procedure Laterality Date    ABDOMEN SURGERY  2013    BOWEL OBSTRUCTION    AV FISTULA CREATION Left 2019    AV FISTULA REPAIR  2019    AV fistula revision, branch ligation / Percutaneous angioplasty of proximal anastomosis and outflow tract of dialysis circuit with drug coated balloon  /  Dr Kellogg    CARDIAC CATHETERIZATION  2021    Dr. Pam Girard, [place]    COLONOSCOPY      CORONARY ANGIOPLASTY WITH STENT PLACEMENT  2019    TIM LAD    CYSTOSCOPY  2017    CYSTOSCOPY N/A 2022    CYSTOSCOPY DILATION performed by hCalo Chew MD at [facility] OR    DIALYSIS FISTULA CREATION Left 2019    AV FISTULA CREATION ARM performed by Dianne Kellogg MD at [facility] CVOR    DIALYSIS FISTULA CREATION Left 10/23/2019    LEFT UPPER EXTREMITY AV GRAFT CREATION WITH [id number] ARTEGRAFT, LEFT UPPER EXTREMITY AV FISTULA LIGATION performed by Dianne Kellogg MD at [facility]    DIALYSIS FISTULA CREATION Left 06/15/2022    REVISION OF UPPER EXTREMITY AV FISTULA GRAFT, CENTRAL VENOUS CATHETER PLACEMENT (C-ARM) performed by Dianne Kellogg 
DISCHARGE PLANNING NOTE:    This writer notified by Katina from [facility] that this patient was current with their services prior to [facility]. This writer contacted Ambar from [address] to update that the patient was already on care with [facility] and to inquire as to whether the patient had been seen by their service yet (patient discharged 1/9/24).     Ambar with [address] is going to reach out to her facility and return my call.    Electronically signed by Mikki Chicas RN on 1/10/2024 at 2:44 PM    ADDENDUM:    Ambar from [address] updated me that they have not seen the patient yet, therefore at this time the patient was switched back to [facility] as he was current with them.     KULWINDER and DC orders faxed to [facility] and Katina with [facility] updated.    Electronically signed by Mikki Chicas RN on 1/10/2024 at 2:55 PM    
DISCHARGE PLANNING NOTE:    This writer spoke with the patient regarding discharge. At this time the patient states that he wishes to return home. This writer updated the LSW that we will need to verify the patient's chair time for dialysis at [facility] and that he can return on Thursday.     Per the patient he has a ride home and he has someone from his family that is willing to  his belongings from [facility], which is where he came from.     IMM letter provided to patient.  Patient offered four hours to make informed decision regarding appeal process; patient agreeable to discharge.     Electronically signed by Mikki Chicas RN on 1/9/2024 at 10:45 AM   
ONGOING DISCHARGE  NOTE:        Writer reviewed notes, Patient is agreeable to discharge to [facility]  [address] [place] [address]  Phone [phone]  Fax [phone]  Evening fax [phone]         Patient is a Bed Hold.      Patient will need a 24 hours notice for discharge      No pre cert needed      Will continue to follow for additional discharge needs.      If patient is discharged prior to next notation, then this note serves as note for discharge by case management    Electronically signed by Ginette Charles RN on 1/7/2024 at 7:58 AM    
ONGOING DISCHARGE  NOTE:      Writer reviewed notes, Patient is agreeable to discharge to [facility]  [address] [place] [address]  Phone [phone]  Fax [phone]  Evening fax [phone]       Patient is a Bed Hold.     Patient will need a 24 hours notice for discharge     No pre cert needed     Will continue to follow for additional discharge needs.     If patient is discharged prior to next notation, then this note serves as note for discharge by case management.    Electronically signed by Ginette Charles RN on 1/6/2024 at 8:22 AM    
ONGOING DISCHARGE PLAN:    Patient is alert and oriented x4.    Spoke with patient regarding discharge plan and patient confirms that plan is still [facility].    Currently in HD    Per Sarah at [facility], need updated PT/OT notes for them to submit for one time auth. Request sent to therapy.    Also, they need a repeat Hgb.    Transport set up via [facility] for 4 pm. Writer called Susy at [facility] and placed patient on will call.     IMM letter provided to patient.  Patient offered four hours to make informed decision regarding appeal process; patient agreeable to discharge.      Will continue to follow for additional discharge needs.    If patient is discharged prior to next notation, then this note serves as note for discharge by case management.    Electronically signed by Susi Dorantes RN on 1/8/2024 at 11:45 AM   
Patient is discharged, Spoke to Arcelia from [facility].  Arcelia states that she has to make sure that we have dialysis approval.  Arcelia was unable to get a hold of Dialysis.   Patient is unable to discharge without approval .//tv     
Writer placed call to Cassandra at [facility].    Cassandra states this pt still has a chair at facility. Pt can start Thursday at 0730.   Writer sent latest nephrology note and dialysis post treatment note to [phone] to update clinic.   Electronically signed by CHAPINCITO Gage on 1/9/2024 at 11:05 AM    
Writer received a call from Sarah at [facility]. [facility] denied admission. She is sending pt information to [facility]. If patient agreeable, he would need auth. He would need transport and follow at his OP hemodialysis slot. Electronically signed by Susi Dorantes RN on 1/8/2024 at 4:07 PM   
Electronically signed by Noé Henderson MD on 1/6/24 at 2:49 PM EST    Medication List    START taking these medications    START taking these medications  pantoprazole 40 MG tablet  Commonly known as: PROTONIX  Take 1 tablet by mouth every morning (before breakfast)     CONTINUE taking these medications    CONTINUE taking these medications  amiodarone 200 MG tablet  Commonly known as: CORDARONE  Take 1 tablet by mouth daily   apixaban 5 MG Tabs tablet  Commonly known as: Eliquis  Take 1 tablet by mouth 2 times daily   aspirin EC 81 MG EC tablet  Take 1 tablet by mouth daily   atorvastatin 40 MG tablet  Commonly known as: LIPITOR  Take 1 tablet by mouth nightly   lanthanum 1000 MG chewable tablet  Commonly known as: FOSRENOL  Take 1 tablet by mouth 3 times daily (with meals)   lidocaine-prilocaine 2.5-2.5 % cream  Commonly known as: EMLA  Apply topically as needed for Pain (dialysis)   midodrine 5 MG tablet  Commonly known as: PROAMATINE  Take 1 tablet by mouth 3 times daily   traMADol 50 MG tablet  Commonly known as: ULTRAM  Take 1 tablet by mouth every 8 hours as needed for Pain.   vitamin D 25 MCG (1000 UT) Tabs tablet  Commonly known as: CHOLECALCIFEROL  Take 2 tablets by mouth daily     STOP taking these medications    STOP taking these medications  albuterol sulfate  (90 Base) MCG/ACT inhaler  Commonly known as: Ventolin HFA   diclofenac sodium 1 % Gel  Commonly known as: VOLTAREN   glimepiride 1 MG tablet  Commonly known as: AMARYL   metoprolol tartrate 50 MG tablet  Commonly known as: LOPRESSOR   sevelamer 2.4 g Pack packet  Commonly known as: RENVELA   Velphoro 500 MG Chew chewable tablet  Generic drug: sucroferric oxyhydroxide   Where to Get Your Medications      These medications were sent to [id number] - [place], [place] - [address] - P [phone] - F [phone] [address] [place] [address]  Phone: [phone]       pantoprazole 40 MG tablet

## 2024-01-10 NOTE — TELEPHONE ENCOUNTER
Received fax for hosp f/u w/Dr George   Please confirm that he is a [facility]Onc Dr Byrd patient and he does want to transfer to [facility]

## 2024-07-03 NOTE — PROGRESS NOTES
[address] [facility]  CDU / OBSERVATION eNCOUnter  Attending NOte       I performed a history and physical examination of the patient and discussed management with the resident. I reviewed the residents note and agree with the documented findings and plan of care. Any areas of disagreement are noted on the chart. I was personally present for the key portions of any procedures. I have documented in the chart those procedures where I was not present during the key portions. I have reviewed the nurses notes. I agree with the chief complaint, past medical history, past surgical history, allergies, medications, social and family history as documented unless otherwise noted below. The Family history, social history, and ROS are effectively unchanged since admission unless noted elsewhere in the chart. 43-year-old male with known history of non-opbstructive CAD, admitted for chest pain. Patient has ESRD on HD. Cardiology consulted and planning a cardiac cath. He normally goes to dialysis Tuesday, Thursday, and Saturday. We will consult with nephrology. The patient is diabetic. I have reviewed the patient's chart and found the most recent A1c is 5.8. This indicates reasonable control.  Will continue to follow-up with PCP        Melisa Dias MD  Attending Emergency  Physician Having a headache.  No other concerns.

## 2024-07-08 ENCOUNTER — HOSPITAL ENCOUNTER (OUTPATIENT)
Age: 64
Setting detail: SPECIMEN
Discharge: HOME | End: 2024-07-08

## 2024-07-08 LAB
ANION GAP SERPL CALCULATED.3IONS-SCNC: 17 MMOL/L (ref 9–17)
BUN SERPL-MCNC: 32 MG/DL (ref 8–23)
BUN/CREAT SERPL: 8 (ref 9–20)
CALCIUM SERPL-MCNC: 9.5 MG/DL (ref 8.6–10.4)
CHLORIDE SERPL-SCNC: 95 MMOL/L (ref 98–107)
CHOLEST SERPL-MCNC: 108 MG/DL (ref 0–199)
CHOLESTEROL/HDL RATIO: 3
CO2 SERPL-SCNC: 25 MMOL/L (ref 20–31)
CREAT SERPL-MCNC: 4 MG/DL (ref 0.7–1.2)
ERYTHROCYTE [DISTWIDTH] IN BLOOD BY AUTOMATED COUNT: 22 % (ref 11.8–14.4)
GFR, ESTIMATED: 16 ML/MIN/1.73M2
GLUCOSE SERPL-MCNC: 166 MG/DL (ref 70–99)
HCT VFR BLD AUTO: 34.6 % (ref 40.7–50.3)
HDLC SERPL-MCNC: 39 MG/DL
HGB BLD-MCNC: 9.7 G/DL (ref 13–17)
LDLC SERPL CALC-MCNC: 49 MG/DL (ref 0–100)
MCH RBC QN AUTO: 29.4 PG (ref 25.2–33.5)
MCHC RBC AUTO-ENTMCNC: 28 G/DL (ref 28.4–34.8)
MCV RBC AUTO: 104.8 FL (ref 82.6–102.9)
NRBC BLD-RTO: 0 PER 100 WBC
PLATELET # BLD AUTO: 151 K/UL (ref 138–453)
PMV BLD AUTO: 9.5 FL (ref 8.1–13.5)
POTASSIUM SERPL-SCNC: 3.7 MMOL/L (ref 3.7–5.3)
RBC # BLD AUTO: 3.3 M/UL (ref 4.21–5.77)
SODIUM SERPL-SCNC: 137 MMOL/L (ref 135–144)
TRIGL SERPL-MCNC: 100 MG/DL (ref 0–149)
TSH SERPL DL<=0.05 MIU/L-ACNC: 12.63 UIU/ML (ref 0.3–5)
VLDLC SERPL CALC-MCNC: 20 MG/DL
WBC OTHER # BLD: 4.8 K/UL (ref 3.5–11.3)

## 2024-07-08 PROCEDURE — 80048 BASIC METABOLIC PNL TOTAL CA: CPT

## 2024-07-08 PROCEDURE — 80061 LIPID PANEL: CPT

## 2024-07-08 PROCEDURE — 36415 COLL VENOUS BLD VENIPUNCTURE: CPT

## 2024-07-08 PROCEDURE — 85027 COMPLETE CBC AUTOMATED: CPT

## 2024-07-08 PROCEDURE — P9603 ONE-WAY ALLOW PRORATED MILES: HCPCS

## 2024-07-08 PROCEDURE — 84443 ASSAY THYROID STIM HORMONE: CPT

## 2024-07-16 ENCOUNTER — HOSPITAL ENCOUNTER (OUTPATIENT)
Age: 64
Setting detail: SPECIMEN
Discharge: HOME | End: 2024-07-16

## 2024-07-17 ENCOUNTER — HOSPITAL ENCOUNTER (INPATIENT)
Age: 64
LOS: 13 days | Discharge: NURSING, REHAB OR LONG-TERM CARE | DRG: 280 | End: 2024-07-30
Attending: EMERGENCY MEDICINE | Admitting: INTERNAL MEDICINE
Payer: MEDICARE

## 2024-07-17 ENCOUNTER — APPOINTMENT (OUTPATIENT)
Dept: GENERAL RADIOLOGY | Age: 64
DRG: 280 | End: 2024-07-17
Payer: MEDICARE

## 2024-07-17 DIAGNOSIS — I21.4 NSTEMI (NON-ST ELEVATED MYOCARDIAL INFARCTION) (HCC): Primary | ICD-10-CM

## 2024-07-17 DIAGNOSIS — I50.9 CONGESTIVE HEART FAILURE, UNSPECIFIED HF CHRONICITY, UNSPECIFIED HEART FAILURE TYPE (HCC): ICD-10-CM

## 2024-07-17 DIAGNOSIS — R07.9 CHEST PAIN, UNSPECIFIED TYPE: ICD-10-CM

## 2024-07-17 DIAGNOSIS — G89.29 CHRONIC ABDOMINAL PAIN: ICD-10-CM

## 2024-07-17 DIAGNOSIS — R10.9 CHRONIC ABDOMINAL PAIN: ICD-10-CM

## 2024-07-17 LAB
ALBUMIN SERPL-MCNC: 3.9 G/DL (ref 3.5–5.2)
ALBUMIN/GLOB SERPL: 1 {RATIO} (ref 1–2.5)
ALP SERPL-CCNC: 173 U/L (ref 40–129)
ALT SERPL-CCNC: 6 U/L (ref 10–50)
ANION GAP SERPL CALCULATED.3IONS-SCNC: 12 MMOL/L (ref 9–16)
AST SERPL-CCNC: 21 U/L (ref 10–50)
BASOPHILS # BLD: 0 K/UL (ref 0–0.2)
BASOPHILS NFR BLD: 0 % (ref 0–2)
BILIRUB SERPL-MCNC: 0.6 MG/DL (ref 0–1.2)
BUN BLD-MCNC: 41 MG/DL (ref 8–26)
BUN SERPL-MCNC: 36 MG/DL (ref 8–23)
CA-I BLD-SCNC: 1.21 MMOL/L (ref 1.15–1.33)
CALCIUM SERPL-MCNC: 9.3 MG/DL (ref 8.6–10.4)
CHLORIDE BLD-SCNC: 100 MMOL/L (ref 98–107)
CHLORIDE SERPL-SCNC: 95 MMOL/L (ref 98–107)
CO2 BLD CALC-SCNC: 35 MMOL/L (ref 22–30)
CO2 SERPL-SCNC: 30 MMOL/L (ref 20–31)
CREAT SERPL-MCNC: 4.1 MG/DL (ref 0.7–1.2)
EGFR, POC: 17 ML/MIN/1.73M2
EOSINOPHIL # BLD: 0.11 K/UL (ref 0–0.4)
EOSINOPHILS RELATIVE PERCENT: 2 % (ref 1–4)
ERYTHROCYTE [DISTWIDTH] IN BLOOD BY AUTOMATED COUNT: 20.1 % (ref 11.8–14.4)
GFR, ESTIMATED: 15 ML/MIN/1.73M2
GLUCOSE BLD-MCNC: 149 MG/DL (ref 74–100)
GLUCOSE SERPL-MCNC: 142 MG/DL (ref 74–99)
HCO3 VENOUS: 33.9 MMOL/L (ref 22–29)
HCT VFR BLD AUTO: 33.3 % (ref 40.7–50.3)
HCT VFR BLD AUTO: 39 % (ref 41–53)
HGB BLD-MCNC: 9.7 G/DL (ref 13–17)
IMM GRANULOCYTES # BLD AUTO: 0.05 K/UL (ref 0–0.3)
IMM GRANULOCYTES NFR BLD: 1 %
LYMPHOCYTES NFR BLD: 0.53 K/UL (ref 1–4.8)
LYMPHOCYTES RELATIVE PERCENT: 10 % (ref 24–44)
MCH RBC QN AUTO: 29.5 PG (ref 25.2–33.5)
MCHC RBC AUTO-ENTMCNC: 29.1 G/DL (ref 28.4–34.8)
MCV RBC AUTO: 101.2 FL (ref 82.6–102.9)
MONOCYTES NFR BLD: 1.01 K/UL (ref 0.1–0.8)
MONOCYTES NFR BLD: 19 % (ref 1–7)
MORPHOLOGY: NORMAL
NEUTROPHILS NFR BLD: 68 % (ref 36–66)
NEUTS SEG NFR BLD: 3.6 K/UL (ref 1.8–7.7)
NRBC BLD-RTO: 0 PER 100 WBC
O2 SAT, VEN: 37.8 % (ref 60–85)
PARTIAL THROMBOPLASTIN TIME: >180 SEC (ref 23–36.5)
PARTIAL THROMBOPLASTIN TIME: >180 SEC (ref 23–36.5)
PCO2 VENOUS: 79 MM HG (ref 41–51)
PH VENOUS: 7.24 (ref 7.32–7.43)
PLATELET # BLD AUTO: 146 K/UL (ref 138–453)
PMV BLD AUTO: 9.7 FL (ref 8.1–13.5)
PO2 VENOUS: 27.1 MM HG (ref 30–50)
POC ANION GAP: 3 MMOL/L (ref 7–16)
POC CREATININE: 3.7 MG/DL (ref 0.51–1.19)
POC HEMOGLOBIN (CALC): 13.3 G/DL (ref 13.5–17.5)
POC LACTIC ACID: 0.9 MMOL/L (ref 0.56–1.39)
POSITIVE BASE EXCESS, VEN: 3.9 MMOL/L (ref 0–3)
POTASSIUM BLD-SCNC: 3.6 MMOL/L (ref 3.5–4.5)
POTASSIUM SERPL-SCNC: 3.7 MMOL/L (ref 3.7–5.3)
PROT SERPL-MCNC: 8.3 G/DL (ref 6.6–8.7)
RBC # BLD AUTO: 3.29 M/UL (ref 4.21–5.77)
SODIUM BLD-SCNC: 137 MMOL/L (ref 138–146)
SODIUM SERPL-SCNC: 137 MMOL/L (ref 136–145)
TROPONIN I SERPL HS-MCNC: 772 NG/L (ref 0–22)
TROPONIN I SERPL HS-MCNC: 867 NG/L (ref 0–22)
WBC OTHER # BLD: 5.3 K/UL (ref 3.5–11.3)

## 2024-07-17 PROCEDURE — 80053 COMPREHEN METABOLIC PANEL: CPT

## 2024-07-17 PROCEDURE — 71045 X-RAY EXAM CHEST 1 VIEW: CPT

## 2024-07-17 PROCEDURE — 84484 ASSAY OF TROPONIN QUANT: CPT

## 2024-07-17 PROCEDURE — 82565 ASSAY OF CREATININE: CPT

## 2024-07-17 PROCEDURE — 93005 ELECTROCARDIOGRAM TRACING: CPT | Performed by: STUDENT IN AN ORGANIZED HEALTH CARE EDUCATION/TRAINING PROGRAM

## 2024-07-17 PROCEDURE — 96376 TX/PRO/DX INJ SAME DRUG ADON: CPT

## 2024-07-17 PROCEDURE — 6360000002 HC RX W HCPCS: Performed by: STUDENT IN AN ORGANIZED HEALTH CARE EDUCATION/TRAINING PROGRAM

## 2024-07-17 PROCEDURE — 96365 THER/PROPH/DIAG IV INF INIT: CPT

## 2024-07-17 PROCEDURE — 96366 THER/PROPH/DIAG IV INF ADDON: CPT

## 2024-07-17 PROCEDURE — 84520 ASSAY OF UREA NITROGEN: CPT

## 2024-07-17 PROCEDURE — 85730 THROMBOPLASTIN TIME PARTIAL: CPT

## 2024-07-17 PROCEDURE — 80051 ELECTROLYTE PANEL: CPT

## 2024-07-17 PROCEDURE — 85025 COMPLETE CBC W/AUTO DIFF WBC: CPT

## 2024-07-17 PROCEDURE — 99223 1ST HOSP IP/OBS HIGH 75: CPT | Performed by: NURSE PRACTITIONER

## 2024-07-17 PROCEDURE — 2060000000 HC ICU INTERMEDIATE R&B

## 2024-07-17 PROCEDURE — 85014 HEMATOCRIT: CPT

## 2024-07-17 PROCEDURE — 82330 ASSAY OF CALCIUM: CPT

## 2024-07-17 PROCEDURE — 99285 EMERGENCY DEPT VISIT HI MDM: CPT

## 2024-07-17 PROCEDURE — 82803 BLOOD GASES ANY COMBINATION: CPT

## 2024-07-17 PROCEDURE — 83605 ASSAY OF LACTIC ACID: CPT

## 2024-07-17 PROCEDURE — 6370000000 HC RX 637 (ALT 250 FOR IP): Performed by: STUDENT IN AN ORGANIZED HEALTH CARE EDUCATION/TRAINING PROGRAM

## 2024-07-17 PROCEDURE — 82947 ASSAY GLUCOSE BLOOD QUANT: CPT

## 2024-07-17 RX ORDER — ASPIRIN 81 MG/1
81 TABLET ORAL DAILY
Status: DISCONTINUED | OUTPATIENT
Start: 2024-07-18 | End: 2024-07-30 | Stop reason: HOSPADM

## 2024-07-17 RX ORDER — INSULIN LISPRO 100 [IU]/ML
0-8 INJECTION, SOLUTION INTRAVENOUS; SUBCUTANEOUS
Status: DISCONTINUED | OUTPATIENT
Start: 2024-07-18 | End: 2024-07-30 | Stop reason: HOSPADM

## 2024-07-17 RX ORDER — HEPARIN SODIUM 1000 [USP'U]/ML
4000 INJECTION, SOLUTION INTRAVENOUS; SUBCUTANEOUS PRN
Status: DISCONTINUED | OUTPATIENT
Start: 2024-07-17 | End: 2024-07-19

## 2024-07-17 RX ORDER — ATORVASTATIN CALCIUM 40 MG/1
40 TABLET, FILM COATED ORAL NIGHTLY
Status: DISCONTINUED | OUTPATIENT
Start: 2024-07-17 | End: 2024-07-30 | Stop reason: HOSPADM

## 2024-07-17 RX ORDER — ASPIRIN 81 MG/1
324 TABLET, CHEWABLE ORAL ONCE
Status: COMPLETED | OUTPATIENT
Start: 2024-07-17 | End: 2024-07-17

## 2024-07-17 RX ORDER — VITAMIN B COMPLEX
2000 TABLET ORAL DAILY
Status: DISCONTINUED | OUTPATIENT
Start: 2024-07-18 | End: 2024-07-30 | Stop reason: HOSPADM

## 2024-07-17 RX ORDER — SODIUM CHLORIDE 0.9 % (FLUSH) 0.9 %
5-40 SYRINGE (ML) INJECTION PRN
Status: DISCONTINUED | OUTPATIENT
Start: 2024-07-17 | End: 2024-07-30 | Stop reason: HOSPADM

## 2024-07-17 RX ORDER — POLYETHYLENE GLYCOL 3350 17 G/17G
17 POWDER, FOR SOLUTION ORAL DAILY PRN
Status: DISCONTINUED | OUTPATIENT
Start: 2024-07-17 | End: 2024-07-30 | Stop reason: HOSPADM

## 2024-07-17 RX ORDER — SODIUM CHLORIDE 0.9 % (FLUSH) 0.9 %
5-40 SYRINGE (ML) INJECTION EVERY 12 HOURS SCHEDULED
Status: DISCONTINUED | OUTPATIENT
Start: 2024-07-17 | End: 2024-07-30 | Stop reason: HOSPADM

## 2024-07-17 RX ORDER — ONDANSETRON 2 MG/ML
4 INJECTION INTRAMUSCULAR; INTRAVENOUS EVERY 6 HOURS PRN
Status: DISCONTINUED | OUTPATIENT
Start: 2024-07-17 | End: 2024-07-27

## 2024-07-17 RX ORDER — HEPARIN SODIUM 1000 [USP'U]/ML
2000 INJECTION, SOLUTION INTRAVENOUS; SUBCUTANEOUS PRN
Status: DISCONTINUED | OUTPATIENT
Start: 2024-07-17 | End: 2024-07-19

## 2024-07-17 RX ORDER — HEPARIN SODIUM 10000 [USP'U]/100ML
5-30 INJECTION, SOLUTION INTRAVENOUS CONTINUOUS
Status: DISCONTINUED | OUTPATIENT
Start: 2024-07-17 | End: 2024-07-19

## 2024-07-17 RX ORDER — SODIUM CHLORIDE 9 MG/ML
INJECTION, SOLUTION INTRAVENOUS PRN
Status: DISCONTINUED | OUTPATIENT
Start: 2024-07-17 | End: 2024-07-20

## 2024-07-17 RX ORDER — AMIODARONE HYDROCHLORIDE 200 MG/1
200 TABLET ORAL DAILY
Status: DISCONTINUED | OUTPATIENT
Start: 2024-07-18 | End: 2024-07-30 | Stop reason: HOSPADM

## 2024-07-17 RX ORDER — HEPARIN SODIUM 1000 [USP'U]/ML
4000 INJECTION, SOLUTION INTRAVENOUS; SUBCUTANEOUS ONCE
Status: COMPLETED | OUTPATIENT
Start: 2024-07-17 | End: 2024-07-17

## 2024-07-17 RX ORDER — ONDANSETRON 4 MG/1
4 TABLET, ORALLY DISINTEGRATING ORAL EVERY 8 HOURS PRN
Status: DISCONTINUED | OUTPATIENT
Start: 2024-07-17 | End: 2024-07-27

## 2024-07-17 RX ORDER — ACETAMINOPHEN 650 MG/1
650 SUPPOSITORY RECTAL EVERY 6 HOURS PRN
Status: DISCONTINUED | OUTPATIENT
Start: 2024-07-17 | End: 2024-07-30 | Stop reason: HOSPADM

## 2024-07-17 RX ORDER — PANTOPRAZOLE SODIUM 40 MG/1
40 TABLET, DELAYED RELEASE ORAL
Status: DISCONTINUED | OUTPATIENT
Start: 2024-07-18 | End: 2024-07-30 | Stop reason: HOSPADM

## 2024-07-17 RX ORDER — TRAMADOL HYDROCHLORIDE 50 MG/1
50 TABLET ORAL EVERY 6 HOURS PRN
Status: DISCONTINUED | OUTPATIENT
Start: 2024-07-17 | End: 2024-07-18

## 2024-07-17 RX ORDER — INSULIN LISPRO 100 [IU]/ML
0-4 INJECTION, SOLUTION INTRAVENOUS; SUBCUTANEOUS NIGHTLY
Status: DISCONTINUED | OUTPATIENT
Start: 2024-07-17 | End: 2024-07-30 | Stop reason: HOSPADM

## 2024-07-17 RX ORDER — LANTHANUM CARBONATE 500 MG/1
1000 TABLET, CHEWABLE ORAL
Status: DISCONTINUED | OUTPATIENT
Start: 2024-07-18 | End: 2024-07-30 | Stop reason: HOSPADM

## 2024-07-17 RX ORDER — MIDODRINE HYDROCHLORIDE 5 MG/1
10 TABLET ORAL 3 TIMES DAILY
Status: DISCONTINUED | OUTPATIENT
Start: 2024-07-17 | End: 2024-07-19

## 2024-07-17 RX ORDER — ACETAMINOPHEN 325 MG/1
650 TABLET ORAL EVERY 6 HOURS PRN
Status: DISCONTINUED | OUTPATIENT
Start: 2024-07-17 | End: 2024-07-30 | Stop reason: HOSPADM

## 2024-07-17 RX ORDER — ATORVASTATIN CALCIUM 80 MG/1
40 TABLET, FILM COATED ORAL NIGHTLY
Status: DISCONTINUED | OUTPATIENT
Start: 2024-07-17 | End: 2024-07-17

## 2024-07-17 RX ADMIN — HEPARIN SODIUM 4000 UNITS: 1000 INJECTION INTRAVENOUS; SUBCUTANEOUS at 20:23

## 2024-07-17 RX ADMIN — ASPIRIN 324 MG: 81 TABLET, CHEWABLE ORAL at 20:05

## 2024-07-17 RX ADMIN — HEPARIN SODIUM 12 UNITS/KG/HR: 10000 INJECTION, SOLUTION INTRAVENOUS at 20:27

## 2024-07-17 ASSESSMENT — PAIN DESCRIPTION - ORIENTATION: ORIENTATION: RIGHT;LEFT

## 2024-07-17 ASSESSMENT — PAIN DESCRIPTION - LOCATION: LOCATION: FOOT

## 2024-07-17 ASSESSMENT — PAIN SCALES - GENERAL: PAINLEVEL_OUTOF10: 8

## 2024-07-17 ASSESSMENT — PAIN - FUNCTIONAL ASSESSMENT: PAIN_FUNCTIONAL_ASSESSMENT: 0-10

## 2024-07-17 NOTE — ED TRIAGE NOTES
Pt presents to ED room 06 via wheelchair. Home care nurse states that she was taking the patients bp when she said the systolic was reading in the 200s, she then states that she checked his bp on his leg and it was reading very low. Pt does receive dialysis Tuesday, Thursdays and Saturdays. Pt does wear 4L/min NC continuously. Pt only received part of his dialysis treatment on Tuesday per home care nurse. Home care nurse states that patient last received paracentesis in June. Pts stomach is distended, pt is hypotensive upon assessment.

## 2024-07-18 ENCOUNTER — APPOINTMENT (OUTPATIENT)
Dept: DIALYSIS | Age: 64
DRG: 280 | End: 2024-07-18
Payer: MEDICARE

## 2024-07-18 ENCOUNTER — APPOINTMENT (OUTPATIENT)
Dept: CT IMAGING | Age: 64
DRG: 280 | End: 2024-07-18
Payer: MEDICARE

## 2024-07-18 LAB
ANION GAP SERPL CALCULATED.3IONS-SCNC: 12 MMOL/L (ref 9–16)
BASOPHILS # BLD: 0.08 K/UL (ref 0–0.2)
BASOPHILS NFR BLD: 1 % (ref 0–2)
BUN SERPL-MCNC: 38 MG/DL (ref 8–23)
CALCIUM SERPL-MCNC: 9 MG/DL (ref 8.6–10.4)
CHLORIDE SERPL-SCNC: 95 MMOL/L (ref 98–107)
CO2 SERPL-SCNC: 28 MMOL/L (ref 20–31)
CREAT SERPL-MCNC: 4.2 MG/DL (ref 0.7–1.2)
EOSINOPHIL # BLD: 0.15 K/UL (ref 0–0.44)
EOSINOPHILS RELATIVE PERCENT: 2 % (ref 1–4)
ERYTHROCYTE [DISTWIDTH] IN BLOOD BY AUTOMATED COUNT: 19.8 % (ref 11.8–14.4)
GFR, ESTIMATED: 15 ML/MIN/1.73M2
GLUCOSE BLD-MCNC: 118 MG/DL (ref 75–110)
GLUCOSE BLD-MCNC: 119 MG/DL (ref 75–110)
GLUCOSE BLD-MCNC: 133 MG/DL (ref 75–110)
GLUCOSE SERPL-MCNC: 137 MG/DL (ref 74–99)
HBV SURFACE AG SERPL QL IA: NONREACTIVE
HCT VFR BLD AUTO: 31.2 % (ref 40.7–50.3)
HGB BLD-MCNC: 9.4 G/DL (ref 13–17)
IMM GRANULOCYTES # BLD AUTO: 0.15 K/UL (ref 0–0.3)
IMM GRANULOCYTES NFR BLD: 2 %
LYMPHOCYTES NFR BLD: 0.84 K/UL (ref 1.1–3.7)
LYMPHOCYTES RELATIVE PERCENT: 11 % (ref 24–43)
MCH RBC QN AUTO: 29.5 PG (ref 25.2–33.5)
MCHC RBC AUTO-ENTMCNC: 30.1 G/DL (ref 28.4–34.8)
MCV RBC AUTO: 97.8 FL (ref 82.6–102.9)
MONOCYTES NFR BLD: 2.13 K/UL (ref 0.1–1.2)
MONOCYTES NFR BLD: 28 % (ref 3–12)
MORPHOLOGY: ABNORMAL
NEUTROPHILS NFR BLD: 56 % (ref 36–65)
NEUTS SEG NFR BLD: 4.25 K/UL (ref 1.5–8.1)
NRBC BLD-RTO: 0.4 PER 100 WBC
PARTIAL THROMBOPLASTIN TIME: 32.5 SEC (ref 23–36.5)
PARTIAL THROMBOPLASTIN TIME: 36.5 SEC (ref 23–36.5)
PLATELET # BLD AUTO: ABNORMAL K/UL (ref 138–453)
PLATELET, FLUORESCENCE: ABNORMAL K/UL (ref 138–453)
POTASSIUM SERPL-SCNC: 4 MMOL/L (ref 3.7–5.3)
PROCALCITONIN SERPL-MCNC: 0.63 NG/ML (ref 0–0.09)
RBC # BLD AUTO: 3.19 M/UL (ref 4.21–5.77)
SODIUM SERPL-SCNC: 135 MMOL/L (ref 136–145)
TROPONIN I SERPL HS-MCNC: 735 NG/L (ref 0–22)
WBC OTHER # BLD: 7.6 K/UL (ref 3.5–11.3)

## 2024-07-18 PROCEDURE — 2580000003 HC RX 258: Performed by: NURSE PRACTITIONER

## 2024-07-18 PROCEDURE — 87340 HEPATITIS B SURFACE AG IA: CPT

## 2024-07-18 PROCEDURE — 85025 COMPLETE CBC W/AUTO DIFF WBC: CPT

## 2024-07-18 PROCEDURE — 99222 1ST HOSP IP/OBS MODERATE 55: CPT | Performed by: INTERNAL MEDICINE

## 2024-07-18 PROCEDURE — 80048 BASIC METABOLIC PNL TOTAL CA: CPT

## 2024-07-18 PROCEDURE — 6370000000 HC RX 637 (ALT 250 FOR IP): Performed by: NURSE PRACTITIONER

## 2024-07-18 PROCEDURE — 2060000000 HC ICU INTERMEDIATE R&B

## 2024-07-18 PROCEDURE — 84145 PROCALCITONIN (PCT): CPT

## 2024-07-18 PROCEDURE — 74176 CT ABD & PELVIS W/O CONTRAST: CPT

## 2024-07-18 PROCEDURE — 94660 CPAP INITIATION&MGMT: CPT

## 2024-07-18 PROCEDURE — 6360000002 HC RX W HCPCS: Performed by: STUDENT IN AN ORGANIZED HEALTH CARE EDUCATION/TRAINING PROGRAM

## 2024-07-18 PROCEDURE — 82947 ASSAY GLUCOSE BLOOD QUANT: CPT

## 2024-07-18 PROCEDURE — 85055 RETICULATED PLATELET ASSAY: CPT

## 2024-07-18 PROCEDURE — 36415 COLL VENOUS BLD VENIPUNCTURE: CPT

## 2024-07-18 PROCEDURE — 6360000002 HC RX W HCPCS: Performed by: INTERNAL MEDICINE

## 2024-07-18 PROCEDURE — 84484 ASSAY OF TROPONIN QUANT: CPT

## 2024-07-18 PROCEDURE — 85730 THROMBOPLASTIN TIME PARTIAL: CPT

## 2024-07-18 PROCEDURE — 99232 SBSQ HOSP IP/OBS MODERATE 35: CPT | Performed by: STUDENT IN AN ORGANIZED HEALTH CARE EDUCATION/TRAINING PROGRAM

## 2024-07-18 PROCEDURE — P9047 ALBUMIN (HUMAN), 25%, 50ML: HCPCS | Performed by: INTERNAL MEDICINE

## 2024-07-18 PROCEDURE — 90935 HEMODIALYSIS ONE EVALUATION: CPT

## 2024-07-18 PROCEDURE — 6370000000 HC RX 637 (ALT 250 FOR IP): Performed by: STUDENT IN AN ORGANIZED HEALTH CARE EDUCATION/TRAINING PROGRAM

## 2024-07-18 PROCEDURE — 99233 SBSQ HOSP IP/OBS HIGH 50: CPT | Performed by: INTERNAL MEDICINE

## 2024-07-18 RX ORDER — OXYCODONE HYDROCHLORIDE 5 MG/1
5 TABLET ORAL EVERY 4 HOURS PRN
Status: DISCONTINUED | OUTPATIENT
Start: 2024-07-18 | End: 2024-07-30 | Stop reason: HOSPADM

## 2024-07-18 RX ORDER — ALBUMIN (HUMAN) 12.5 G/50ML
25 SOLUTION INTRAVENOUS PRN
Status: DISCONTINUED | OUTPATIENT
Start: 2024-07-18 | End: 2024-07-30 | Stop reason: HOSPADM

## 2024-07-18 RX ADMIN — DESMOPRESSIN ACETATE 40 MG: 0.2 TABLET ORAL at 01:17

## 2024-07-18 RX ADMIN — ASPIRIN 81 MG: 81 TABLET, COATED ORAL at 07:58

## 2024-07-18 RX ADMIN — SODIUM CHLORIDE, PRESERVATIVE FREE 10 ML: 5 INJECTION INTRAVENOUS at 02:43

## 2024-07-18 RX ADMIN — OXYCODONE HYDROCHLORIDE 5 MG: 5 TABLET ORAL at 19:49

## 2024-07-18 RX ADMIN — PANTOPRAZOLE SODIUM 40 MG: 40 TABLET, DELAYED RELEASE ORAL at 07:59

## 2024-07-18 RX ADMIN — CHOLECALCIFEROL TAB 25 MCG (1000 UNIT) 2000 UNITS: 25 TAB at 07:59

## 2024-07-18 RX ADMIN — AMIODARONE HYDROCHLORIDE 200 MG: 200 TABLET ORAL at 07:57

## 2024-07-18 RX ADMIN — MIDODRINE HYDROCHLORIDE 10 MG: 5 TABLET ORAL at 19:44

## 2024-07-18 RX ADMIN — MIDODRINE HYDROCHLORIDE 10 MG: 5 TABLET ORAL at 07:59

## 2024-07-18 RX ADMIN — ERYTHROPOIETIN 3000 UNITS: 3000 INJECTION, SOLUTION INTRAVENOUS; SUBCUTANEOUS at 15:29

## 2024-07-18 RX ADMIN — MIDODRINE HYDROCHLORIDE 10 MG: 5 TABLET ORAL at 14:09

## 2024-07-18 RX ADMIN — HEPARIN SODIUM 17 UNITS/KG/HR: 10000 INJECTION, SOLUTION INTRAVENOUS at 23:56

## 2024-07-18 RX ADMIN — LANTHANUM CARBONATE 1000 MG: 500 TABLET, CHEWABLE ORAL at 11:31

## 2024-07-18 RX ADMIN — ERYTHROPOIETIN 2000 UNITS: 2000 INJECTION, SOLUTION INTRAVENOUS; SUBCUTANEOUS at 15:29

## 2024-07-18 RX ADMIN — LANTHANUM CARBONATE 1000 MG: 500 TABLET, CHEWABLE ORAL at 07:59

## 2024-07-18 RX ADMIN — HEPARIN SODIUM 4000 UNITS: 1000 INJECTION INTRAVENOUS; SUBCUTANEOUS at 08:06

## 2024-07-18 RX ADMIN — HEPARIN SODIUM 4000 UNITS: 1000 INJECTION INTRAVENOUS; SUBCUTANEOUS at 18:08

## 2024-07-18 RX ADMIN — TRAMADOL HYDROCHLORIDE 50 MG: 50 TABLET, COATED ORAL at 07:59

## 2024-07-18 RX ADMIN — ALBUMIN (HUMAN) 25 G: 0.25 INJECTION, SOLUTION INTRAVENOUS at 15:54

## 2024-07-18 RX ADMIN — DESMOPRESSIN ACETATE 40 MG: 0.2 TABLET ORAL at 19:44

## 2024-07-18 RX ADMIN — HEPARIN SODIUM 17 UNITS/KG/HR: 10000 INJECTION, SOLUTION INTRAVENOUS at 18:13

## 2024-07-18 RX ADMIN — OXYCODONE HYDROCHLORIDE 5 MG: 5 TABLET ORAL at 13:00

## 2024-07-18 ASSESSMENT — PAIN DESCRIPTION - LOCATION
LOCATION: LEG
LOCATION: LEG
LOCATION: FOOT
LOCATION: FOOT

## 2024-07-18 ASSESSMENT — PAIN SCALES - GENERAL
PAINLEVEL_OUTOF10: 8
PAINLEVEL_OUTOF10: 8
PAINLEVEL_OUTOF10: 4
PAINLEVEL_OUTOF10: 8
PAINLEVEL_OUTOF10: 0
PAINLEVEL_OUTOF10: 8

## 2024-07-18 ASSESSMENT — PAIN DESCRIPTION - ORIENTATION
ORIENTATION: RIGHT;LEFT
ORIENTATION: RIGHT;LEFT

## 2024-07-18 ASSESSMENT — PAIN DESCRIPTION - DESCRIPTORS
DESCRIPTORS: ACHING
DESCRIPTORS: SQUEEZING

## 2024-07-18 NOTE — ED NOTES
ED to inpatient nurses report      Chief Complaint:  Chief Complaint   Patient presents with    Hypotension     Present to ED from: Home    MOA:     LOC: alert and orientated to name, place, date  Mobility: Requires assistance * 2  Oxygen Baseline: 96%    Current needs required: 4L/min via nasal cannula   Pending ED orders: none  Present condition: stable    Why did the patient come to the ED? Pt presents to ED room 06 via wheelchair. Home care nurse states that she was taking the patients bp when she said the systolic was reading in the 200s, she then states that she checked his bp on his leg and it was reading very low. Pt does receive dialysis Tuesday, Thursdays and Saturdays. Pt does wear 4L/min NC continuously. Pt only received part of his dialysis treatment on Tuesday per home care nurse. Home care nurse states that patient last received paracentesis in June. Pts stomach is distended, pt is hypotensive upon assessment.   What is the plan? Admission, cardio consult  Any procedures or intervention occur? Heparin infusion  Any safety concerns?? Fall risk    Mental Status:       Psych Assessment:   Psychosocial  Psychosocial (WDL): Within Defined Limits  Vital signs   Vitals:    07/17/24 1845 07/17/24 1915 07/17/24 1945 07/17/24 2013   BP:  112/66 107/63    Pulse: 73 72 72    Resp: 26 25 24    Temp:       TempSrc:       SpO2: 99%      Weight:    83.9 kg (185 lb)        Vitals:  Patient Vitals for the past 24 hrs:   BP Temp Temp src Pulse Resp SpO2 Weight   07/17/24 2013 -- -- -- -- -- -- 83.9 kg (185 lb)   07/17/24 1945 107/63 -- -- 72 24 -- --   07/17/24 1915 112/66 -- -- 72 25 -- --   07/17/24 1845 -- -- -- 73 26 99 % --   07/17/24 1836 -- -- -- 74 19 -- --   07/17/24 1835 94/61 -- -- -- -- -- --   07/17/24 1826 (!) 75/43 96.8 °F (36 °C) Oral 87 20 97 % --      Visit Vitals  /63   Pulse 72   Temp 96.8 °F (36 °C) (Oral)   Resp 24   Wt 83.9 kg (185 lb)   SpO2 99%   BMI 26.54 kg/m²        LDAs:   Peripheral

## 2024-07-18 NOTE — ED PROVIDER NOTES
[facility] ED     Emergency Department     Faculty Attestation    I performed a history and physical examination of the patient and discussed management with the resident. I reviewed the resident’s note and agree with the documented findings and plan of care. Any areas of disagreement are noted on the chart. I was personally present for the key portions of any procedures. I have documented in the chart those procedures where I was not present during the key portions. I have reviewed the emergency nurses triage note. I agree with the chief complaint, past medical history, past surgical history, allergies, medications, social and family history as documented unless otherwise noted below. For Physician Assistant/ Nurse Practitioner cases/documentation I have personally evaluated this patient and have completed at least one if not all key elements of the E/M (history, physical exam, and MDM). Additional findings are as noted.    Note Started: 6:34 PM EDT    Patient here with low blood pressure shortness of breath all mental status accompanied by wife who provides independent history.  Patient is been ill in and out of care facilities.  Wife is concerned that he is not getting his nightly BiPAP and his oxygen is not always on when she goes to visit.  Last dialysis was yesterday but only had a half a run 2 hours due to just not feeling well.  Patient denies any specific complaint just says everything hurts but no specific chest pain or headache.  On exam patient appears ill but not toxic is awake orients to voice answers questions.  Lungs diminished throughout but no focal rales no rhonchi.  Abdomen is distended but soft.  Bilateral lower extremity pitting edema but symmetrical.  Will proceed with full workup plan to admit    EKG interpretation: Sinus rhythm 73.  Prolonged HI at 312, wide QRS at 206 with a complete right bundle branch block.  Prolonged QTc at 548.  There is a left axis.  No acute ST 
  Cardiovascular:      Rate and Rhythm: Normal rate and regular rhythm.      Pulses: Normal pulses.      Heart sounds: Normal heart sounds.   Pulmonary:      Effort: Pulmonary effort is normal. No respiratory distress.      Breath sounds: Normal breath sounds. No wheezing.   Abdominal:      General: Abdomen is flat. There is no distension.      Palpations: Abdomen is soft.      Tenderness: There is no abdominal tenderness. There is no guarding or rebound.   Musculoskeletal:         General: No tenderness, deformity or signs of injury.      Cervical back: Neck supple. No tenderness.      Right lower leg: Edema (2+) present.      Left lower leg: Edema (2+) present.   Skin:     General: Skin is warm and dry.      Findings: No erythema or rash.      Comments: Bilateral lower extremity skin thickening   Neurological:      Mental Status: He is alert and oriented to person, place, and time. Mental status is at baseline.      Sensory: No sensory deficit.      Motor: No weakness.   Psychiatric:         Mood and Affect: Mood normal.         Behavior: Behavior normal.           DDX/DIAGNOSTIC RESULTS / EMERGENCY DEPARTMENT COURSE / MDM     Medical Decision Making  64 y.o. male with PMH including ESRD on HD every T/Th/S, CHF, CAD s/p TIM, DM 2 who presents emergency department with hypotension and shortness of breath.  See HPI and physical exam for further detail.  Chronically ill-appearing male who arrives hypotensive with a normal heart rate.  Shortly after arrival, blood pressure normalized without intervention.  He is on his home nasal cannula requirements of 4 L.  He has 2+ pitting edema with skin thickening bilateral lower extremities consistent with CHF.  Speaking full and complete sentences.  Family at bedside.  He denies chest pain, abdominal pains, N/V/D, numbness and tingling in any of his 4 extremities.  Last received dialysis yesterday.  Plan for cardiac workup.  Holding off on diuresis given the soft blood 
NSTEMI after presenting for low blood pressure.  [facility] has excepted patient and awaiting admit orders.    ED Course as of 07/17/24 2220 Wed Jul 17, 2024 2015 Troponin, High Sensitivity(!!): 867  Baseline troponin roughly 330.  Concerns for cardiac etiology given his creatinine is improved from previous.  Creatinine today is 4.  Last received dialysis yesterday.  Cardiology consulted.  Discussed the case with cardiology who is recommending heparin and trending troponins [GC]   2036 Troponin, High Sensitivity(!!): 772 [GC]   2037 XR CHEST PORTABLE  IMPRESSION:  Severe cardiomegaly with pulmonary venous congestion and pulmonary edema.   [GC]   2155 CMP with elevated creatinine of 4.1 as expected.  Otherwise unremarkable CMP. [GC]   2155 CBC with differential with hemoglobin 9.7 at his baseline.  .2 consistent with macrocytic anemia.  Otherwise unremarkable. [GC]   2155 VBG 7.24/79.0/27.1/33.9.  Patient has been noted to not wear his CPAP at night.  This is likely causing the CO2 retention. [GC]   2156 [facility] consulted.  Discussed the case with them and they agreed to admit the patient. [GC]   2219 Care assumed.  Patient presented with hypotension, found to have elevated troponins with out EKG changes.  Being admitted for NSTEMI management. [DH]   2220 Comprehensive Metabolic Panel(!):    Sodium 137   Potassium 3.7   Chloride 95(!)   CARBON DIOXIDE 30   Anion Gap 12   Glucose 142(!)   BUN,BUNPL 36(!)   Creatinine 4.1(!)   Est, Glom Filt Rate 15(!)   Calcium 9.3   Total Protein 8.3   Albumin 3.9   Albumin/Globulin Ratio 1.0   Total Bilirubin 0.6   Alkaline Phosphatase 173(!)   ALT 6(!)   AST 21 [DH]   2220 CBC with Auto Differential(!):    WBC 5.3   RBC 3.29(!)   Hemoglobin Quant 9.7(!)   Hematocrit 33.3(!)   .2   MCH 29.5   MCHC 29.1   RDW 20.1(!)   Platelet Count 146   MPV 9.7   NRBC Automated 0.0   Immature Granulocytes % 1(!)   Neutrophils % 68(!)   Lymphocyte % 10(!)   Monocytes % 19(!)

## 2024-07-19 ENCOUNTER — APPOINTMENT (OUTPATIENT)
Dept: GENERAL RADIOLOGY | Age: 64
DRG: 280 | End: 2024-07-19
Payer: MEDICARE

## 2024-07-19 PROBLEM — R06.89 CO2 NARCOSIS: Status: ACTIVE | Noted: 2024-07-19

## 2024-07-19 PROBLEM — R18.8 ASCITES: Status: ACTIVE | Noted: 2024-07-19

## 2024-07-19 PROBLEM — J96.21 ACUTE ON CHRONIC RESPIRATORY FAILURE WITH HYPOXIA AND HYPERCAPNIA (HCC): Status: ACTIVE | Noted: 2024-07-19

## 2024-07-19 PROBLEM — K74.60 DECOMPENSATED HEPATIC CIRRHOSIS (HCC): Status: ACTIVE | Noted: 2024-07-19

## 2024-07-19 PROBLEM — K72.90 DECOMPENSATED HEPATIC CIRRHOSIS (HCC): Status: ACTIVE | Noted: 2024-07-19

## 2024-07-19 PROBLEM — J96.22 ACUTE ON CHRONIC RESPIRATORY FAILURE WITH HYPOXIA AND HYPERCAPNIA (HCC): Status: ACTIVE | Noted: 2024-07-19

## 2024-07-19 PROBLEM — G93.40 ENCEPHALOPATHY: Status: ACTIVE | Noted: 2024-07-19

## 2024-07-19 LAB
ALBUMIN SERPL-MCNC: 4.2 G/DL (ref 3.5–5.2)
ALBUMIN/GLOB SERPL: 1 {RATIO} (ref 1–2.5)
ALLEN TEST: POSITIVE
ALLEN TEST: POSITIVE
ALP SERPL-CCNC: 148 U/L (ref 40–129)
ALT SERPL-CCNC: <5 U/L (ref 10–50)
AMMONIA PLAS-SCNC: 38 UMOL/L (ref 16–60)
ANION GAP SERPL CALCULATED.3IONS-SCNC: 14 MMOL/L (ref 9–16)
AST SERPL-CCNC: 17 U/L (ref 10–50)
BASOPHILS # BLD: 0.06 K/UL (ref 0–0.2)
BASOPHILS NFR BLD: 1 % (ref 0–2)
BILIRUB SERPL-MCNC: 0.6 MG/DL (ref 0–1.2)
BODY TEMPERATURE: 37
BUN SERPL-MCNC: 24 MG/DL (ref 8–23)
CALCIUM SERPL-MCNC: 9.2 MG/DL (ref 8.6–10.4)
CHLORIDE SERPL-SCNC: 95 MMOL/L (ref 98–107)
CO2 SERPL-SCNC: 26 MMOL/L (ref 20–31)
COHGB MFR BLD: 1.1 % (ref 0–5)
CREAT SERPL-MCNC: 3.3 MG/DL (ref 0.7–1.2)
EKG ATRIAL RATE: 73 BPM
EKG P AXIS: -9 DEGREES
EKG P-R INTERVAL: 312 MS
EKG Q-T INTERVAL: 498 MS
EKG QRS DURATION: 206 MS
EKG QTC CALCULATION (BAZETT): 548 MS
EKG R AXIS: -80 DEGREES
EKG T AXIS: 60 DEGREES
EKG VENTRICULAR RATE: 73 BPM
EOSINOPHIL # BLD: 0.06 K/UL (ref 0–0.44)
EOSINOPHILS RELATIVE PERCENT: 1 % (ref 1–4)
ERYTHROCYTE [DISTWIDTH] IN BLOOD BY AUTOMATED COUNT: 20.1 % (ref 11.8–14.4)
FIO2 ON VENT: ABNORMAL %
FIO2: 40
FIO2: 40
GFR, ESTIMATED: 20 ML/MIN/1.73M2
GLUCOSE BLD-MCNC: 107 MG/DL (ref 75–110)
GLUCOSE BLD-MCNC: 130 MG/DL (ref 75–110)
GLUCOSE BLD-MCNC: 98 MG/DL (ref 75–110)
GLUCOSE SERPL-MCNC: 140 MG/DL (ref 74–99)
HCO3 VENOUS: 29.9 MMOL/L (ref 24–30)
HCT VFR BLD AUTO: 33.6 % (ref 40.7–50.3)
HGB BLD-MCNC: 9.4 G/DL (ref 13–17)
IMM GRANULOCYTES # BLD AUTO: 0.06 K/UL (ref 0–0.3)
IMM GRANULOCYTES NFR BLD: 1 %
INR PPP: 1.3
LYMPHOCYTES NFR BLD: 0.5 K/UL (ref 1.1–3.7)
LYMPHOCYTES RELATIVE PERCENT: 8 % (ref 24–43)
MCH RBC QN AUTO: 29.7 PG (ref 25.2–33.5)
MCHC RBC AUTO-ENTMCNC: 28 G/DL (ref 28.4–34.8)
MCV RBC AUTO: 106 FL (ref 82.6–102.9)
MODE: ABNORMAL
MONOCYTES NFR BLD: 1.2 K/UL (ref 0.1–1.2)
MONOCYTES NFR BLD: 19 % (ref 3–12)
MORPHOLOGY: ABNORMAL
MORPHOLOGY: ABNORMAL
NEUTROPHILS NFR BLD: 70 % (ref 36–65)
NEUTS SEG NFR BLD: 4.42 K/UL (ref 1.5–8.1)
NRBC BLD-RTO: 0 PER 100 WBC
O2 DELIVERY DEVICE: ABNORMAL
O2 SAT, VEN: 39.9 % (ref 60–85)
PARTIAL THROMBOPLASTIN TIME: 33.2 SEC (ref 23–36.5)
PCO2 VENOUS: 80.6 MM HG (ref 39–55)
PH VENOUS: 7.2 (ref 7.32–7.42)
PLATELET # BLD AUTO: ABNORMAL K/UL (ref 138–453)
PLATELET, FLUORESCENCE: 161 K/UL (ref 138–453)
PLATELETS.RETICULATED NFR BLD AUTO: 1.7 % (ref 1.1–10.3)
PO2 VENOUS: 24.7 MM HG (ref 30–50)
POC HCO3: 28.9 MMOL/L (ref 21–28)
POC HCO3: 33.3 MMOL/L (ref 21–28)
POC O2 SATURATION: 88.3 % (ref 94–98)
POC O2 SATURATION: 96.6 % (ref 94–98)
POC PCO2: 50.6 MM HG (ref 35–48)
POC PCO2: 86.5 MM HG (ref 35–48)
POC PH: 7.19 (ref 7.35–7.45)
POC PH: 7.37 (ref 7.35–7.45)
POC PO2: 71.2 MM HG (ref 83–108)
POC PO2: 91.3 MM HG (ref 83–108)
POSITIVE BASE EXCESS, ART: 2.8 MMOL/L (ref 0–3)
POSITIVE BASE EXCESS, ART: 3.2 MMOL/L (ref 0–3)
POSITIVE BASE EXCESS, VEN: 1 MMOL/L (ref 0–2)
POTASSIUM SERPL-SCNC: 4.1 MMOL/L (ref 3.7–5.3)
PROT SERPL-MCNC: 8 G/DL (ref 6.6–8.7)
PROTHROMBIN TIME: 16.1 SEC (ref 11.7–14.9)
RBC # BLD AUTO: 3.17 M/UL (ref 4.21–5.77)
SAMPLE SITE: ABNORMAL
SAMPLE SITE: ABNORMAL
SODIUM SERPL-SCNC: 135 MMOL/L (ref 136–145)
WBC OTHER # BLD: 6.3 K/UL (ref 3.5–11.3)

## 2024-07-19 PROCEDURE — 82140 ASSAY OF AMMONIA: CPT

## 2024-07-19 PROCEDURE — 94660 CPAP INITIATION&MGMT: CPT

## 2024-07-19 PROCEDURE — 99233 SBSQ HOSP IP/OBS HIGH 50: CPT | Performed by: STUDENT IN AN ORGANIZED HEALTH CARE EDUCATION/TRAINING PROGRAM

## 2024-07-19 PROCEDURE — 6360000002 HC RX W HCPCS

## 2024-07-19 PROCEDURE — 82805 BLOOD GASES W/O2 SATURATION: CPT

## 2024-07-19 PROCEDURE — 80053 COMPREHEN METABOLIC PANEL: CPT

## 2024-07-19 PROCEDURE — 99291 CRITICAL CARE FIRST HOUR: CPT | Performed by: STUDENT IN AN ORGANIZED HEALTH CARE EDUCATION/TRAINING PROGRAM

## 2024-07-19 PROCEDURE — 36415 COLL VENOUS BLD VENIPUNCTURE: CPT

## 2024-07-19 PROCEDURE — 85730 THROMBOPLASTIN TIME PARTIAL: CPT

## 2024-07-19 PROCEDURE — 2000000000 HC ICU R&B

## 2024-07-19 PROCEDURE — P9047 ALBUMIN (HUMAN), 25%, 50ML: HCPCS | Performed by: INTERNAL MEDICINE

## 2024-07-19 PROCEDURE — 6370000000 HC RX 637 (ALT 250 FOR IP): Performed by: NURSE PRACTITIONER

## 2024-07-19 PROCEDURE — 99233 SBSQ HOSP IP/OBS HIGH 50: CPT | Performed by: INTERNAL MEDICINE

## 2024-07-19 PROCEDURE — 87040 BLOOD CULTURE FOR BACTERIA: CPT

## 2024-07-19 PROCEDURE — 82803 BLOOD GASES ANY COMBINATION: CPT

## 2024-07-19 PROCEDURE — 2580000003 HC RX 258: Performed by: STUDENT IN AN ORGANIZED HEALTH CARE EDUCATION/TRAINING PROGRAM

## 2024-07-19 PROCEDURE — 71045 X-RAY EXAM CHEST 1 VIEW: CPT

## 2024-07-19 PROCEDURE — 88112 CYTOPATH CELL ENHANCE TECH: CPT

## 2024-07-19 PROCEDURE — 85025 COMPLETE CBC W/AUTO DIFF WBC: CPT

## 2024-07-19 PROCEDURE — 85610 PROTHROMBIN TIME: CPT

## 2024-07-19 PROCEDURE — 6370000000 HC RX 637 (ALT 250 FOR IP): Performed by: STUDENT IN AN ORGANIZED HEALTH CARE EDUCATION/TRAINING PROGRAM

## 2024-07-19 PROCEDURE — 6360000002 HC RX W HCPCS: Performed by: INTERNAL MEDICINE

## 2024-07-19 PROCEDURE — 85055 RETICULATED PLATELET ASSAY: CPT

## 2024-07-19 PROCEDURE — 82947 ASSAY GLUCOSE BLOOD QUANT: CPT

## 2024-07-19 PROCEDURE — 36600 WITHDRAWAL OF ARTERIAL BLOOD: CPT

## 2024-07-19 PROCEDURE — 88305 TISSUE EXAM BY PATHOLOGIST: CPT

## 2024-07-19 PROCEDURE — 99233 SBSQ HOSP IP/OBS HIGH 50: CPT

## 2024-07-19 PROCEDURE — 5A1D70Z PERFORMANCE OF URINARY FILTRATION, INTERMITTENT, LESS THAN 6 HOURS PER DAY: ICD-10-PCS | Performed by: INTERNAL MEDICINE

## 2024-07-19 PROCEDURE — 6370000000 HC RX 637 (ALT 250 FOR IP)

## 2024-07-19 RX ORDER — SODIUM CHLORIDE 0.9 % (FLUSH) 0.9 %
5-40 SYRINGE (ML) INJECTION EVERY 12 HOURS SCHEDULED
Status: DISCONTINUED | OUTPATIENT
Start: 2024-07-19 | End: 2024-07-30 | Stop reason: HOSPADM

## 2024-07-19 RX ORDER — SODIUM CHLORIDE 0.9 % (FLUSH) 0.9 %
5-40 SYRINGE (ML) INJECTION PRN
Status: DISCONTINUED | OUTPATIENT
Start: 2024-07-19 | End: 2024-07-20

## 2024-07-19 RX ORDER — MIDODRINE HYDROCHLORIDE 5 MG/1
10 TABLET ORAL 3 TIMES DAILY
Status: DISCONTINUED | OUTPATIENT
Start: 2024-07-19 | End: 2024-07-27

## 2024-07-19 RX ORDER — MIDODRINE HYDROCHLORIDE 5 MG/1
10 TABLET ORAL ONCE
Status: COMPLETED | OUTPATIENT
Start: 2024-07-19 | End: 2024-07-19

## 2024-07-19 RX ORDER — SODIUM CHLORIDE 9 MG/ML
INJECTION, SOLUTION INTRAVENOUS PRN
Status: DISCONTINUED | OUTPATIENT
Start: 2024-07-19 | End: 2024-07-20

## 2024-07-19 RX ORDER — SODIUM CHLORIDE 0.9 % (FLUSH) 0.9 %
5-40 SYRINGE (ML) INJECTION PRN
Status: DISCONTINUED | OUTPATIENT
Start: 2024-07-19 | End: 2024-07-30 | Stop reason: HOSPADM

## 2024-07-19 RX ORDER — SODIUM CHLORIDE 9 MG/ML
INJECTION, SOLUTION INTRAVENOUS PRN
Status: DISCONTINUED | OUTPATIENT
Start: 2024-07-19 | End: 2024-07-30 | Stop reason: HOSPADM

## 2024-07-19 RX ORDER — LACTULOSE 10 G/15ML
10 SOLUTION ORAL 3 TIMES DAILY
Status: DISCONTINUED | OUTPATIENT
Start: 2024-07-19 | End: 2024-07-21

## 2024-07-19 RX ORDER — CETIRIZINE HYDROCHLORIDE 10 MG/1
10 TABLET ORAL ONCE
Status: COMPLETED | OUTPATIENT
Start: 2024-07-19 | End: 2024-07-19

## 2024-07-19 RX ORDER — CETIRIZINE HYDROCHLORIDE 10 MG/1
5 TABLET ORAL DAILY PRN
Status: DISCONTINUED | OUTPATIENT
Start: 2024-07-19 | End: 2024-07-19

## 2024-07-19 RX ORDER — HEPARIN SODIUM 5000 [USP'U]/ML
5000 INJECTION, SOLUTION INTRAVENOUS; SUBCUTANEOUS EVERY 8 HOURS SCHEDULED
Status: COMPLETED | OUTPATIENT
Start: 2024-07-19 | End: 2024-07-19

## 2024-07-19 RX ADMIN — SODIUM CHLORIDE, PRESERVATIVE FREE 10 ML: 5 INJECTION INTRAVENOUS at 10:39

## 2024-07-19 RX ADMIN — MIDODRINE HYDROCHLORIDE 10 MG: 5 TABLET ORAL at 10:38

## 2024-07-19 RX ADMIN — LACTULOSE 10 G: 20 SOLUTION ORAL at 19:39

## 2024-07-19 RX ADMIN — OXYCODONE HYDROCHLORIDE 5 MG: 5 TABLET ORAL at 16:43

## 2024-07-19 RX ADMIN — DESMOPRESSIN ACETATE 40 MG: 0.2 TABLET ORAL at 19:40

## 2024-07-19 RX ADMIN — MIDODRINE HYDROCHLORIDE 10 MG: 5 TABLET ORAL at 16:43

## 2024-07-19 RX ADMIN — PANTOPRAZOLE SODIUM 40 MG: 40 TABLET, DELAYED RELEASE ORAL at 05:35

## 2024-07-19 RX ADMIN — LACTULOSE 10 G: 20 SOLUTION ORAL at 10:55

## 2024-07-19 RX ADMIN — SODIUM CHLORIDE, PRESERVATIVE FREE 10 ML: 5 INJECTION INTRAVENOUS at 19:40

## 2024-07-19 RX ADMIN — MIDODRINE HYDROCHLORIDE 10 MG: 5 TABLET ORAL at 01:13

## 2024-07-19 RX ADMIN — LANTHANUM CARBONATE 1000 MG: 500 TABLET, CHEWABLE ORAL at 22:02

## 2024-07-19 RX ADMIN — ALBUMIN (HUMAN) 25 G: 0.25 INJECTION, SOLUTION INTRAVENOUS at 06:59

## 2024-07-19 RX ADMIN — OXYCODONE HYDROCHLORIDE 5 MG: 5 TABLET ORAL at 05:35

## 2024-07-19 RX ADMIN — MIDODRINE HYDROCHLORIDE 10 MG: 5 TABLET ORAL at 19:40

## 2024-07-19 RX ADMIN — LANTHANUM CARBONATE 1000 MG: 500 TABLET, CHEWABLE ORAL at 10:39

## 2024-07-19 RX ADMIN — HEPARIN SODIUM 5000 UNITS: 5000 INJECTION INTRAVENOUS; SUBCUTANEOUS at 22:02

## 2024-07-19 RX ADMIN — CHOLECALCIFEROL TAB 25 MCG (1000 UNIT) 2000 UNITS: 25 TAB at 10:38

## 2024-07-19 RX ADMIN — ASPIRIN 81 MG: 81 TABLET, COATED ORAL at 10:38

## 2024-07-19 RX ADMIN — AMIODARONE HYDROCHLORIDE 200 MG: 200 TABLET ORAL at 10:38

## 2024-07-19 RX ADMIN — CETIRIZINE HYDROCHLORIDE 10 MG: 10 TABLET ORAL at 19:40

## 2024-07-19 RX ADMIN — HEPARIN SODIUM 5000 UNITS: 5000 INJECTION INTRAVENOUS; SUBCUTANEOUS at 16:43

## 2024-07-19 ASSESSMENT — PAIN SCALES - GENERAL
PAINLEVEL_OUTOF10: 0
PAINLEVEL_OUTOF10: 8
PAINLEVEL_OUTOF10: 0

## 2024-07-19 ASSESSMENT — PAIN DESCRIPTION - DESCRIPTORS: DESCRIPTORS: ACHING;DISCOMFORT

## 2024-07-19 ASSESSMENT — PAIN DESCRIPTION - LOCATION
LOCATION: LEG;FOOT
LOCATION: OTHER (COMMENT)

## 2024-07-19 ASSESSMENT — PAIN DESCRIPTION - ORIENTATION: ORIENTATION: RIGHT;LEFT

## 2024-07-19 NOTE — DISCHARGE INSTRUCTIONS
Follow up with gi  Will need mri abdomen to rule out pancreatic mass     Orthopaedic Instructions:  -Weight bearing status: Weight bearing as tolerated with the right leg  -Ice (20 minutes on and off 1 hour) and elevate above the level of the heart to reduce swelling and throbbing pain.  -Take medications as prescribed.  -Follow up with Dr. Berry in his office as needed basis. Call [phone] to schedule/confirm or with any questions/concerns.

## 2024-07-19 NOTE — H&P
both LL      Laboratory findings:-  CBC:   Recent Labs     07/18/24  0615 07/19/24  1000   WBC 7.6 6.3   HGB 9.4* 9.4*   HCT 31.2* 33.6*   PLT See Reflexed IPF Result See Reflexed IPF Result     BMP:    Lab Results   Component Value Date/Time     07/19/2024 10:00 AM    K 4.1 07/19/2024 10:00 AM    CL 95 07/19/2024 10:00 AM    CO2 26 07/19/2024 10:00 AM    BUN 24 07/19/2024 10:00 AM    CREATININE 3.3 07/19/2024 10:00 AM    GLUCOSE 140 07/19/2024 10:00 AM    GLUCOSE 147 09/08/2018 08:04 PM     S. Calcium:  Recent Labs     07/19/24  1000   CALCIUM 9.2     S. Ionized Calcium:No results for input(s): \"CAION\" in the last 72 hours.  S. Magnesium:No results for input(s): \"MG\" in the last 72 hours.  S. Phosphorus:No results for input(s): \"PHOS\" in the last 72 hours.  S. Glucose:  Recent Labs     07/18/24  1130 07/19/24  1112 07/19/24  1614   POCGLU 119* 130* 107     Glycosylated hemoglobin A1C: No results for input(s): \"LABA1C\" in the last 72 hours.  INR:   Recent Labs     07/19/24  1342   INR 1.3     Hepatic functions:   Recent Labs     07/19/24  1000   ALKPHOS 148*   ALT <5*   AST 17   BILITOT 0.6     Pancreatic functions:No results for input(s): \"LACTA\", \"AMYLASE\" in the last 72 hours.  S. Lactic Acid: No results for input(s): \"LACTA\" in the last 72 hours.  Cardiac enzymes:No results for input(s): \"CKTOTAL\", \"CKMB\", \"CKMBINDEX\", \"TROPONINI\" in the last 72 hours.  BNP:No results for input(s): \"BNP\" in the last 72 hours.  Lipid profile: No results for input(s): \"CHOL\", \"TRIG\", \"HDL\", \"LDL\" in the last 72 hours.    Invalid input(s): \"LDLCALC\"  Blood Gases:   Lab Results   Component Value Date/Time    PH 7.45 09/08/2018 08:57 PM    PCO2 33 09/08/2018 08:57 PM    PO2 131 09/08/2018 08:57 PM    HCO3 23 09/08/2018 08:57 PM     Thyroid functions:   Lab Results   Component Value Date/Time    TSH 12.63 07/08/2024 09:30 AM        Urinalysis:     Microbiology:  Cultures during this admission:     Blood cultures:                 
- 36.5 sec       Imaging/Diagnostics:  XR CHEST PORTABLE    Result Date: 7/17/2024  Severe cardiomegaly with pulmonary venous congestion and pulmonary edema.     CT head wo IV contrast    Result Date: 7/15/2024  No definite acute intracranial abnormality. Electronically signed: Richard Del Toro MD.    XR CHEST 1 VIEW    Result Date: 7/15/2024  Findings most consistent with congestive heart failure with some progression since previous examination. Also lower lobe atelectasis especially on the left Electronically signed: Brennen Botello MD.      Assessment :      Hospital Problems             Last Modified POA    * (Principal) MI, acute, non ST segment elevation (HCC) 7/17/2024 Yes    S/P PTCA - TIM distal LAD - Dr. Orozco 4/1/19 7/17/2024 Yes    Acute on chronic combined systolic (congestive) and diastolic (congestive) heart failure ([facility]) 7/17/2024 Yes    Chronic respiratory failure with hypoxia ([facility]) 7/17/2024 Yes    Anemia of chronic renal failure 7/17/2024 Yes    Type 2 diabetes mellitus with chronic kidney disease on chronic dialysis, without long-term current use of insulin ([facility]) 7/17/2024 Yes    AVF (arteriovenous fistula) ([facility]) 7/17/2024 Yes    End stage renal disease ([facility]) 7/17/2024 Yes    BATSHEVA (obstructive sleep apnea) 7/17/2024 Yes    Chronic abdominal pain 7/17/2024 Yes    Hypoxemia-due to CHF/fluid overload 7/17/2024 Yes    Paroxysmal atrial fibrillation ([facility]) 7/17/2024 Yes       Plan:     Patient status inpatient in the Progressive Unit/Step down    Elevated troponin-concerning for non-ST elevation MI  History of drug-eluting stent to distal LAD   Heparin drip per weight-based algorithm   Cardiology consulted   Continue Plavix   No beta-blocker at this time given hypotension   Resume statin    2.  End-stage renal disease-currently volume overload   Renal consulted for hemodialysis orders   Patient unable to tolerate full session yesterday    3.  Chronic respiratory failure- hypoxic and hypercapnic-not using

## 2024-07-20 PROBLEM — J96.02 ACUTE HYPERCAPNIC RESPIRATORY FAILURE (HCC): Status: ACTIVE | Noted: 2024-07-20

## 2024-07-20 PROBLEM — D63.8 CHRONIC DISEASE ANEMIA: Status: ACTIVE | Noted: 2024-07-20

## 2024-07-20 PROBLEM — R93.3 ABNORMAL FINDINGS ON DIAGNOSTIC IMAGING OF OTHER PARTS OF DIGESTIVE TRACT: Status: ACTIVE | Noted: 2024-07-20

## 2024-07-20 PROBLEM — K74.69 OTHER CIRRHOSIS OF LIVER (HCC): Status: ACTIVE | Noted: 2024-07-19

## 2024-07-20 LAB
ALBUMIN SERPL-MCNC: 3.5 G/DL (ref 3.5–5.2)
ALBUMIN/GLOB SERPL: 1 {RATIO} (ref 1–2.5)
ALP SERPL-CCNC: 139 U/L (ref 40–129)
ALT SERPL-CCNC: 6 U/L (ref 10–50)
ANION GAP SERPL CALCULATED.3IONS-SCNC: 13 MMOL/L (ref 9–16)
AST SERPL-CCNC: 20 U/L (ref 10–50)
BASOPHILS # BLD: 0.12 K/UL (ref 0–0.2)
BASOPHILS NFR BLD: 2 % (ref 0–2)
BILIRUB SERPL-MCNC: 0.6 MG/DL (ref 0–1.2)
BUN SERPL-MCNC: 31 MG/DL (ref 8–23)
CALCIUM SERPL-MCNC: 9.1 MG/DL (ref 8.6–10.4)
CHLORIDE SERPL-SCNC: 98 MMOL/L (ref 98–107)
CO2 SERPL-SCNC: 26 MMOL/L (ref 20–31)
CREAT SERPL-MCNC: 3.8 MG/DL (ref 0.7–1.2)
EOSINOPHIL # BLD: 0.06 K/UL (ref 0–0.4)
EOSINOPHILS RELATIVE PERCENT: 1 % (ref 1–4)
ERYTHROCYTE [DISTWIDTH] IN BLOOD BY AUTOMATED COUNT: 19.9 % (ref 11.8–14.4)
GFR, ESTIMATED: 17 ML/MIN/1.73M2
GLUCOSE BLD-MCNC: 79 MG/DL (ref 75–110)
GLUCOSE BLD-MCNC: 90 MG/DL (ref 75–110)
GLUCOSE BLD-MCNC: 90 MG/DL (ref 75–110)
GLUCOSE BLD-MCNC: 95 MG/DL (ref 75–110)
GLUCOSE SERPL-MCNC: 86 MG/DL (ref 74–99)
HCT VFR BLD AUTO: 29.4 % (ref 40.7–50.3)
HGB BLD-MCNC: 8.4 G/DL (ref 13–17)
IMM GRANULOCYTES # BLD AUTO: 0 K/UL (ref 0–0.3)
IMM GRANULOCYTES NFR BLD: 0 %
INR PPP: 1.4
LYMPHOCYTES NFR BLD: 0.43 K/UL (ref 1–4.8)
LYMPHOCYTES RELATIVE PERCENT: 7 % (ref 24–44)
MCH RBC QN AUTO: 29 PG (ref 25.2–33.5)
MCHC RBC AUTO-ENTMCNC: 28.6 G/DL (ref 28.4–34.8)
MCV RBC AUTO: 101.4 FL (ref 82.6–102.9)
MONOCYTES NFR BLD: 1.1 K/UL (ref 0.1–0.8)
MONOCYTES NFR BLD: 18 % (ref 1–7)
MORPHOLOGY: ABNORMAL
NEUTROPHILS NFR BLD: 72 % (ref 36–66)
NEUTS SEG NFR BLD: 4.39 K/UL (ref 1.8–7.7)
NRBC BLD-RTO: 0 PER 100 WBC
PLATELET # BLD AUTO: 147 K/UL (ref 138–453)
PMV BLD AUTO: 9.6 FL (ref 8.1–13.5)
POTASSIUM SERPL-SCNC: 4 MMOL/L (ref 3.7–5.3)
PROT SERPL-MCNC: 7.1 G/DL (ref 6.6–8.7)
PROTHROMBIN TIME: 16.6 SEC (ref 11.7–14.9)
RBC # BLD AUTO: 2.9 M/UL (ref 4.21–5.77)
SODIUM SERPL-SCNC: 137 MMOL/L (ref 136–145)
WBC OTHER # BLD: 6.1 K/UL (ref 3.5–11.3)

## 2024-07-20 PROCEDURE — 80053 COMPREHEN METABOLIC PANEL: CPT

## 2024-07-20 PROCEDURE — 6370000000 HC RX 637 (ALT 250 FOR IP)

## 2024-07-20 PROCEDURE — 85610 PROTHROMBIN TIME: CPT

## 2024-07-20 PROCEDURE — 6370000000 HC RX 637 (ALT 250 FOR IP): Performed by: NURSE PRACTITIONER

## 2024-07-20 PROCEDURE — 6360000002 HC RX W HCPCS: Performed by: INTERNAL MEDICINE

## 2024-07-20 PROCEDURE — 94660 CPAP INITIATION&MGMT: CPT

## 2024-07-20 PROCEDURE — 99233 SBSQ HOSP IP/OBS HIGH 50: CPT | Performed by: INTERNAL MEDICINE

## 2024-07-20 PROCEDURE — 90935 HEMODIALYSIS ONE EVALUATION: CPT

## 2024-07-20 PROCEDURE — 2700000000 HC OXYGEN THERAPY PER DAY

## 2024-07-20 PROCEDURE — 36415 COLL VENOUS BLD VENIPUNCTURE: CPT

## 2024-07-20 PROCEDURE — 90935 HEMODIALYSIS ONE EVALUATION: CPT | Performed by: INTERNAL MEDICINE

## 2024-07-20 PROCEDURE — 85025 COMPLETE CBC W/AUTO DIFF WBC: CPT

## 2024-07-20 PROCEDURE — 94761 N-INVAS EAR/PLS OXIMETRY MLT: CPT

## 2024-07-20 PROCEDURE — 2580000003 HC RX 258: Performed by: STUDENT IN AN ORGANIZED HEALTH CARE EDUCATION/TRAINING PROGRAM

## 2024-07-20 PROCEDURE — 6360000002 HC RX W HCPCS

## 2024-07-20 PROCEDURE — P9047 ALBUMIN (HUMAN), 25%, 50ML: HCPCS | Performed by: INTERNAL MEDICINE

## 2024-07-20 PROCEDURE — 2000000000 HC ICU R&B

## 2024-07-20 PROCEDURE — 99291 CRITICAL CARE FIRST HOUR: CPT | Performed by: INTERNAL MEDICINE

## 2024-07-20 PROCEDURE — APPNB60 APP NON BILLABLE TIME 46-60 MINS: Performed by: INTERNAL MEDICINE

## 2024-07-20 PROCEDURE — 2580000003 HC RX 258: Performed by: NURSE PRACTITIONER

## 2024-07-20 PROCEDURE — 82947 ASSAY GLUCOSE BLOOD QUANT: CPT

## 2024-07-20 PROCEDURE — 6370000000 HC RX 637 (ALT 250 FOR IP): Performed by: STUDENT IN AN ORGANIZED HEALTH CARE EDUCATION/TRAINING PROGRAM

## 2024-07-20 RX ORDER — GABAPENTIN 300 MG/1
300 CAPSULE ORAL ONCE
Status: COMPLETED | OUTPATIENT
Start: 2024-07-20 | End: 2024-07-20

## 2024-07-20 RX ORDER — HEPARIN SODIUM 5000 [USP'U]/ML
5000 INJECTION, SOLUTION INTRAVENOUS; SUBCUTANEOUS EVERY 8 HOURS SCHEDULED
Status: COMPLETED | OUTPATIENT
Start: 2024-07-20 | End: 2024-07-20

## 2024-07-20 RX ORDER — GLUCAGON 1 MG/ML
1 KIT INJECTION PRN
Status: DISCONTINUED | OUTPATIENT
Start: 2024-07-20 | End: 2024-07-30 | Stop reason: HOSPADM

## 2024-07-20 RX ORDER — GABAPENTIN 100 MG/1
100 CAPSULE ORAL 2 TIMES DAILY
Status: DISCONTINUED | OUTPATIENT
Start: 2024-07-20 | End: 2024-07-20

## 2024-07-20 RX ORDER — ALBUMIN (HUMAN) 12.5 G/50ML
25 SOLUTION INTRAVENOUS ONCE
Status: COMPLETED | OUTPATIENT
Start: 2024-07-20 | End: 2024-07-20

## 2024-07-20 RX ORDER — DEXTROSE MONOHYDRATE 100 MG/ML
INJECTION, SOLUTION INTRAVENOUS CONTINUOUS PRN
Status: DISCONTINUED | OUTPATIENT
Start: 2024-07-20 | End: 2024-07-30 | Stop reason: HOSPADM

## 2024-07-20 RX ADMIN — OXYCODONE HYDROCHLORIDE 5 MG: 5 TABLET ORAL at 10:41

## 2024-07-20 RX ADMIN — ERYTHROPOIETIN 3000 UNITS: 3000 INJECTION, SOLUTION INTRAVENOUS; SUBCUTANEOUS at 13:20

## 2024-07-20 RX ADMIN — HEPARIN SODIUM 5000 UNITS: 5000 INJECTION INTRAVENOUS; SUBCUTANEOUS at 20:58

## 2024-07-20 RX ADMIN — MIDODRINE HYDROCHLORIDE 10 MG: 5 TABLET ORAL at 20:58

## 2024-07-20 RX ADMIN — MIDODRINE HYDROCHLORIDE 10 MG: 5 TABLET ORAL at 07:51

## 2024-07-20 RX ADMIN — ALBUMIN (HUMAN) 25 G: 0.25 INJECTION, SOLUTION INTRAVENOUS at 10:05

## 2024-07-20 RX ADMIN — MIDODRINE HYDROCHLORIDE 10 MG: 5 TABLET ORAL at 14:24

## 2024-07-20 RX ADMIN — ALBUMIN (HUMAN) 25 G: 0.25 INJECTION, SOLUTION INTRAVENOUS at 10:45

## 2024-07-20 RX ADMIN — SODIUM CHLORIDE, PRESERVATIVE FREE 10 ML: 5 INJECTION INTRAVENOUS at 20:35

## 2024-07-20 RX ADMIN — DESMOPRESSIN ACETATE 40 MG: 0.2 TABLET ORAL at 20:58

## 2024-07-20 RX ADMIN — SODIUM CHLORIDE, PRESERVATIVE FREE 10 ML: 5 INJECTION INTRAVENOUS at 07:53

## 2024-07-20 RX ADMIN — GABAPENTIN 300 MG: 300 CAPSULE ORAL at 14:24

## 2024-07-20 RX ADMIN — LANTHANUM CARBONATE 1000 MG: 500 TABLET, CHEWABLE ORAL at 14:24

## 2024-07-20 RX ADMIN — PANTOPRAZOLE SODIUM 40 MG: 40 TABLET, DELAYED RELEASE ORAL at 05:35

## 2024-07-20 RX ADMIN — LANTHANUM CARBONATE 1000 MG: 500 TABLET, CHEWABLE ORAL at 09:04

## 2024-07-20 RX ADMIN — ERYTHROPOIETIN 2000 UNITS: 2000 INJECTION, SOLUTION INTRAVENOUS; SUBCUTANEOUS at 13:20

## 2024-07-20 RX ADMIN — LACTULOSE 10 G: 20 SOLUTION ORAL at 07:51

## 2024-07-20 RX ADMIN — HEPARIN SODIUM 5000 UNITS: 5000 INJECTION INTRAVENOUS; SUBCUTANEOUS at 14:24

## 2024-07-20 RX ADMIN — CHOLECALCIFEROL TAB 25 MCG (1000 UNIT) 2000 UNITS: 25 TAB at 07:51

## 2024-07-20 RX ADMIN — AMIODARONE HYDROCHLORIDE 200 MG: 200 TABLET ORAL at 07:51

## 2024-07-20 ASSESSMENT — PAIN SCALES - GENERAL
PAINLEVEL_OUTOF10: 10
PAINLEVEL_OUTOF10: 0
PAINLEVEL_OUTOF10: 10
PAINLEVEL_OUTOF10: 10

## 2024-07-20 ASSESSMENT — PAIN DESCRIPTION - ONSET
ONSET: ON-GOING
ONSET: ON-GOING

## 2024-07-20 ASSESSMENT — PAIN DESCRIPTION - ORIENTATION: ORIENTATION: RIGHT;LEFT

## 2024-07-20 ASSESSMENT — PAIN DESCRIPTION - DESCRIPTORS
DESCRIPTORS: ACHING;ITCHING;DISCOMFORT
DESCRIPTORS: ACHING;ITCHING;DISCOMFORT

## 2024-07-20 ASSESSMENT — PAIN DESCRIPTION - LOCATION
LOCATION: GENERALIZED
LOCATION: OTHER (COMMENT)

## 2024-07-20 ASSESSMENT — PAIN DESCRIPTION - FREQUENCY
FREQUENCY: CONTINUOUS
FREQUENCY: CONTINUOUS

## 2024-07-21 PROBLEM — K74.60 CIRRHOSIS OF LIVER WITH ASCITES (HCC): Status: ACTIVE | Noted: 2024-07-21

## 2024-07-21 PROBLEM — R18.8 CIRRHOSIS OF LIVER WITH ASCITES (HCC): Status: ACTIVE | Noted: 2024-07-21

## 2024-07-21 LAB
ALBUMIN SERPL-MCNC: 3.6 G/DL (ref 3.5–5.2)
ALBUMIN/GLOB SERPL: 1 {RATIO} (ref 1–2.5)
ALP SERPL-CCNC: 125 U/L (ref 40–129)
ALT SERPL-CCNC: <5 U/L (ref 10–50)
ANION GAP SERPL CALCULATED.3IONS-SCNC: 13 MMOL/L (ref 9–16)
AST SERPL-CCNC: 25 U/L (ref 10–50)
BASOPHILS # BLD: 0 K/UL (ref 0–0.2)
BASOPHILS NFR BLD: 0 % (ref 0–2)
BILIRUB SERPL-MCNC: 0.7 MG/DL (ref 0–1.2)
BUN SERPL-MCNC: 18 MG/DL (ref 8–23)
CALCIUM SERPL-MCNC: 9.5 MG/DL (ref 8.6–10.4)
CHLORIDE SERPL-SCNC: 99 MMOL/L (ref 98–107)
CO2 SERPL-SCNC: 21 MMOL/L (ref 20–31)
CREAT SERPL-MCNC: 2.9 MG/DL (ref 0.7–1.2)
EOSINOPHIL # BLD: 0.07 K/UL (ref 0–0.4)
EOSINOPHILS RELATIVE PERCENT: 1 % (ref 1–4)
ERYTHROCYTE [DISTWIDTH] IN BLOOD BY AUTOMATED COUNT: 19.9 % (ref 11.8–14.4)
GFR, ESTIMATED: 23 ML/MIN/1.73M2
GLUCOSE BLD-MCNC: 104 MG/DL (ref 75–110)
GLUCOSE BLD-MCNC: 114 MG/DL (ref 75–110)
GLUCOSE BLD-MCNC: 84 MG/DL (ref 75–110)
GLUCOSE BLD-MCNC: 94 MG/DL (ref 75–110)
GLUCOSE SERPL-MCNC: 73 MG/DL (ref 74–99)
HCT VFR BLD AUTO: 30.4 % (ref 40.7–50.3)
HGB BLD-MCNC: 8.3 G/DL (ref 13–17)
IMM GRANULOCYTES # BLD AUTO: 0 K/UL (ref 0–0.3)
IMM GRANULOCYTES NFR BLD: 0 %
LYMPHOCYTES NFR BLD: 0.51 K/UL (ref 1–4.8)
LYMPHOCYTES RELATIVE PERCENT: 7 % (ref 24–44)
MAGNESIUM SERPL-MCNC: 2 MG/DL (ref 1.6–2.4)
MCH RBC QN AUTO: 29.1 PG (ref 25.2–33.5)
MCHC RBC AUTO-ENTMCNC: 27.3 G/DL (ref 28.4–34.8)
MCV RBC AUTO: 106.7 FL (ref 82.6–102.9)
MONOCYTES NFR BLD: 0.88 K/UL (ref 0.1–0.8)
MONOCYTES NFR BLD: 12 % (ref 1–7)
MORPHOLOGY: ABNORMAL
MORPHOLOGY: ABNORMAL
NEUTROPHILS NFR BLD: 80 % (ref 36–66)
NEUTS SEG NFR BLD: 5.84 K/UL (ref 1.8–7.7)
NRBC BLD-RTO: 0.3 PER 100 WBC
NUCLEATED RED BLOOD CELLS: 1 PER 100 WBC
PHOSPHATE SERPL-MCNC: 3.7 MG/DL (ref 2.5–4.5)
PLATELET # BLD AUTO: ABNORMAL K/UL (ref 138–453)
PLATELET, FLUORESCENCE: 173 K/UL (ref 138–453)
PLATELETS.RETICULATED NFR BLD AUTO: 1 % (ref 1.1–10.3)
POTASSIUM SERPL-SCNC: 3.7 MMOL/L (ref 3.7–5.3)
PROT SERPL-MCNC: 7.1 G/DL (ref 6.6–8.7)
RBC # BLD AUTO: 2.85 M/UL (ref 4.21–5.77)
SODIUM SERPL-SCNC: 133 MMOL/L (ref 136–145)
WBC OTHER # BLD: 7.3 K/UL (ref 3.5–11.3)

## 2024-07-21 PROCEDURE — 99254 IP/OBS CNSLTJ NEW/EST MOD 60: CPT | Performed by: INTERNAL MEDICINE

## 2024-07-21 PROCEDURE — 6370000000 HC RX 637 (ALT 250 FOR IP): Performed by: NURSE PRACTITIONER

## 2024-07-21 PROCEDURE — 36556 INSERT NON-TUNNEL CV CATH: CPT | Performed by: INTERNAL MEDICINE

## 2024-07-21 PROCEDURE — 6360000002 HC RX W HCPCS: Performed by: EMERGENCY MEDICINE

## 2024-07-21 PROCEDURE — 84100 ASSAY OF PHOSPHORUS: CPT

## 2024-07-21 PROCEDURE — 2580000003 HC RX 258: Performed by: STUDENT IN AN ORGANIZED HEALTH CARE EDUCATION/TRAINING PROGRAM

## 2024-07-21 PROCEDURE — APPSS45 APP SPLIT SHARED TIME 31-45 MINUTES: Performed by: INTERNAL MEDICINE

## 2024-07-21 PROCEDURE — 85055 RETICULATED PLATELET ASSAY: CPT

## 2024-07-21 PROCEDURE — 94660 CPAP INITIATION&MGMT: CPT

## 2024-07-21 PROCEDURE — 36556 INSERT NON-TUNNEL CV CATH: CPT

## 2024-07-21 PROCEDURE — 82947 ASSAY GLUCOSE BLOOD QUANT: CPT

## 2024-07-21 PROCEDURE — 36415 COLL VENOUS BLD VENIPUNCTURE: CPT

## 2024-07-21 PROCEDURE — 2000000000 HC ICU R&B

## 2024-07-21 PROCEDURE — 80053 COMPREHEN METABOLIC PANEL: CPT

## 2024-07-21 PROCEDURE — 99232 SBSQ HOSP IP/OBS MODERATE 35: CPT | Performed by: INTERNAL MEDICINE

## 2024-07-21 PROCEDURE — 6370000000 HC RX 637 (ALT 250 FOR IP)

## 2024-07-21 PROCEDURE — 99233 SBSQ HOSP IP/OBS HIGH 50: CPT | Performed by: INTERNAL MEDICINE

## 2024-07-21 PROCEDURE — 83735 ASSAY OF MAGNESIUM: CPT

## 2024-07-21 PROCEDURE — 99291 CRITICAL CARE FIRST HOUR: CPT | Performed by: INTERNAL MEDICINE

## 2024-07-21 PROCEDURE — 85025 COMPLETE CBC W/AUTO DIFF WBC: CPT

## 2024-07-21 PROCEDURE — 06HY33Z INSERTION OF INFUSION DEVICE INTO LOWER VEIN, PERCUTANEOUS APPROACH: ICD-10-PCS | Performed by: INTERNAL MEDICINE

## 2024-07-21 RX ORDER — HEPARIN SODIUM 5000 [USP'U]/ML
5000 INJECTION, SOLUTION INTRAVENOUS; SUBCUTANEOUS EVERY 8 HOURS SCHEDULED
Status: COMPLETED | OUTPATIENT
Start: 2024-07-21 | End: 2024-07-21

## 2024-07-21 RX ORDER — NOREPINEPHRINE BITARTRATE 0.06 MG/ML
1-100 INJECTION, SOLUTION INTRAVENOUS CONTINUOUS
Status: DISCONTINUED | OUTPATIENT
Start: 2024-07-21 | End: 2024-07-27

## 2024-07-21 RX ADMIN — PANTOPRAZOLE SODIUM 40 MG: 40 TABLET, DELAYED RELEASE ORAL at 08:18

## 2024-07-21 RX ADMIN — LANTHANUM CARBONATE 1000 MG: 500 TABLET, CHEWABLE ORAL at 16:30

## 2024-07-21 RX ADMIN — MIDODRINE HYDROCHLORIDE 10 MG: 5 TABLET ORAL at 20:09

## 2024-07-21 RX ADMIN — CHOLECALCIFEROL TAB 25 MCG (1000 UNIT) 2000 UNITS: 25 TAB at 08:18

## 2024-07-21 RX ADMIN — SODIUM CHLORIDE, PRESERVATIVE FREE 10 ML: 5 INJECTION INTRAVENOUS at 08:19

## 2024-07-21 RX ADMIN — AMIODARONE HYDROCHLORIDE 200 MG: 200 TABLET ORAL at 08:18

## 2024-07-21 RX ADMIN — HEPARIN SODIUM 5000 UNITS: 5000 INJECTION INTRAVENOUS; SUBCUTANEOUS at 21:50

## 2024-07-21 RX ADMIN — SODIUM CHLORIDE, PRESERVATIVE FREE 10 ML: 5 INJECTION INTRAVENOUS at 20:09

## 2024-07-21 RX ADMIN — LANTHANUM CARBONATE 1000 MG: 500 TABLET, CHEWABLE ORAL at 12:03

## 2024-07-21 RX ADMIN — DESMOPRESSIN ACETATE 40 MG: 0.2 TABLET ORAL at 20:09

## 2024-07-21 RX ADMIN — ASPIRIN 81 MG: 81 TABLET, COATED ORAL at 08:18

## 2024-07-21 RX ADMIN — MIDODRINE HYDROCHLORIDE 10 MG: 5 TABLET ORAL at 08:18

## 2024-07-21 RX ADMIN — HEPARIN SODIUM 5000 UNITS: 5000 INJECTION INTRAVENOUS; SUBCUTANEOUS at 16:05

## 2024-07-21 RX ADMIN — MIDODRINE HYDROCHLORIDE 10 MG: 5 TABLET ORAL at 13:19

## 2024-07-21 RX ADMIN — LANTHANUM CARBONATE 1000 MG: 500 TABLET, CHEWABLE ORAL at 08:18

## 2024-07-21 ASSESSMENT — PAIN SCALES - GENERAL: PAINLEVEL_OUTOF10: 0

## 2024-07-22 ENCOUNTER — APPOINTMENT (OUTPATIENT)
Dept: ULTRASOUND IMAGING | Age: 64
DRG: 280 | End: 2024-07-22
Payer: MEDICARE

## 2024-07-22 PROBLEM — Z51.5 ENCOUNTER FOR PALLIATIVE CARE: Status: ACTIVE | Noted: 2024-07-22

## 2024-07-22 PROBLEM — I95.9 ARTERIAL HYPOTENSION: Status: ACTIVE | Noted: 2024-07-22

## 2024-07-22 PROBLEM — I50.9 CONGESTIVE HEART FAILURE (HCC): Status: ACTIVE | Noted: 2024-07-22

## 2024-07-22 PROBLEM — Z71.89 ACP (ADVANCE CARE PLANNING): Status: ACTIVE | Noted: 2024-07-22

## 2024-07-22 LAB
ALBUMIN FLD-MCNC: 2.5 G/DL
ALBUMIN SERPL-MCNC: 3.5 G/DL (ref 3.5–5.2)
ALBUMIN/GLOB SERPL: 1 {RATIO} (ref 1–2.5)
ALP SERPL-CCNC: 130 U/L (ref 40–129)
ALT SERPL-CCNC: <5 U/L (ref 10–50)
AMMONIA PLAS-SCNC: 53 UMOL/L (ref 16–60)
AMYLASE FLD-CCNC: 18 U/L
ANION GAP SERPL CALCULATED.3IONS-SCNC: 13 MMOL/L (ref 9–16)
APPEARANCE FLD: NORMAL
AST SERPL-CCNC: 16 U/L (ref 10–50)
BASOPHILS # BLD: 0 K/UL (ref 0–0.2)
BASOPHILS NFR BLD: 0 % (ref 0–2)
BILIRUB SERPL-MCNC: 0.7 MG/DL (ref 0–1.2)
BODY FLD TYPE: NORMAL
BODY TEMPERATURE: 37
BUN SERPL-MCNC: 28 MG/DL (ref 8–23)
CALCIUM SERPL-MCNC: 9.2 MG/DL (ref 8.6–10.4)
CHLORIDE SERPL-SCNC: 97 MMOL/L (ref 98–107)
CLOT CHECK: NORMAL
CO2 SERPL-SCNC: 23 MMOL/L (ref 20–31)
COHGB MFR BLD: 2.8 % (ref 0–5)
COLOR FLD: NORMAL
CREAT SERPL-MCNC: 3.8 MG/DL (ref 0.7–1.2)
EOSINOPHIL # BLD: 0.08 K/UL (ref 0–0.44)
EOSINOPHILS RELATIVE PERCENT: 1 % (ref 1–4)
ERYTHROCYTE [DISTWIDTH] IN BLOOD BY AUTOMATED COUNT: 19.9 % (ref 11.8–14.4)
FIO2 ON VENT: ABNORMAL %
GFR, ESTIMATED: 17 ML/MIN/1.73M2
GLUCOSE BLD-MCNC: 108 MG/DL (ref 75–110)
GLUCOSE BLD-MCNC: 113 MG/DL (ref 75–110)
GLUCOSE BLD-MCNC: 134 MG/DL (ref 75–110)
GLUCOSE BLD-MCNC: 135 MG/DL (ref 75–110)
GLUCOSE FLD-MCNC: 113 MG/DL
GLUCOSE SERPL-MCNC: 111 MG/DL (ref 74–99)
HCO3 VENOUS: 26.2 MMOL/L (ref 24–30)
HCT VFR BLD AUTO: 29.8 % (ref 40.7–50.3)
HGB BLD-MCNC: 8.3 G/DL (ref 13–17)
IMM GRANULOCYTES # BLD AUTO: 0.08 K/UL (ref 0–0.3)
IMM GRANULOCYTES NFR BLD: 1 %
INR PPP: 1.4
LDH FLD L TO P-CCNC: 118 U/L
LYMPHOCYTES NFR BLD: 0.49 K/UL (ref 1.1–3.7)
LYMPHOCYTES NFR FLD: 5 %
LYMPHOCYTES RELATIVE PERCENT: 6 % (ref 24–43)
MCH RBC QN AUTO: 28.8 PG (ref 25.2–33.5)
MCHC RBC AUTO-ENTMCNC: 27.9 G/DL (ref 28.4–34.8)
MCV RBC AUTO: 103.5 FL (ref 82.6–102.9)
MESOTHELIAL CELLS BODY FLUID: 2 %
MONOCYTES NFR BLD: 1.54 K/UL (ref 0.1–1.2)
MONOCYTES NFR BLD: 19 % (ref 3–12)
MONOCYTES NFR FLD: 91 %
MORPHOLOGY: ABNORMAL
MORPHOLOGY: ABNORMAL
NEGATIVE BASE EXCESS, VEN: 1 MMOL/L (ref 0–2)
NEUTROPHILS NFR BLD: 73 % (ref 36–65)
NEUTROPHILS NFR FLD: 1 %
NEUTS SEG NFR BLD: 5.91 K/UL (ref 1.5–8.1)
NRBC BLD-RTO: 0 PER 100 WBC
NUC CELL # FLD: 654 CELLS/UL
O2 SAT, VEN: 96.4 % (ref 60–85)
PCO2 VENOUS: 62.2 MM HG (ref 39–55)
PH VENOUS: 7.25 (ref 7.32–7.42)
PLATELET # BLD AUTO: 142 K/UL (ref 138–453)
PMV BLD AUTO: 9.5 FL (ref 8.1–13.5)
PO2 VENOUS: 93.3 MM HG (ref 30–50)
POTASSIUM SERPL-SCNC: 3.7 MMOL/L (ref 3.7–5.3)
PROT SERPL-MCNC: 7 G/DL (ref 6.6–8.7)
PROTHROMBIN TIME: 16.8 SEC (ref 11.7–14.9)
RBC # BLD AUTO: 2.88 M/UL (ref 4.21–5.77)
RBC # FLD: NORMAL CELLS/UL
SODIUM SERPL-SCNC: 133 MMOL/L (ref 136–145)
SPECIMEN TYPE: NORMAL
WBC OTHER # BLD: 8.1 K/UL (ref 3.5–11.3)

## 2024-07-22 PROCEDURE — 6360000002 HC RX W HCPCS

## 2024-07-22 PROCEDURE — 2000000000 HC ICU R&B

## 2024-07-22 PROCEDURE — P9047 ALBUMIN (HUMAN), 25%, 50ML: HCPCS

## 2024-07-22 PROCEDURE — 2500000003 HC RX 250 WO HCPCS

## 2024-07-22 PROCEDURE — 82042 OTHER SOURCE ALBUMIN QUAN EA: CPT

## 2024-07-22 PROCEDURE — 2709999900 US GUIDED PARACENTESIS

## 2024-07-22 PROCEDURE — 99233 SBSQ HOSP IP/OBS HIGH 50: CPT | Performed by: INTERNAL MEDICINE

## 2024-07-22 PROCEDURE — 85025 COMPLETE CBC W/AUTO DIFF WBC: CPT

## 2024-07-22 PROCEDURE — 83615 LACTATE (LD) (LDH) ENZYME: CPT

## 2024-07-22 PROCEDURE — 82150 ASSAY OF AMYLASE: CPT

## 2024-07-22 PROCEDURE — 80053 COMPREHEN METABOLIC PANEL: CPT

## 2024-07-22 PROCEDURE — 87205 SMEAR GRAM STAIN: CPT

## 2024-07-22 PROCEDURE — 6370000000 HC RX 637 (ALT 250 FOR IP): Performed by: NURSE PRACTITIONER

## 2024-07-22 PROCEDURE — 2700000000 HC OXYGEN THERAPY PER DAY

## 2024-07-22 PROCEDURE — 82945 GLUCOSE OTHER FLUID: CPT

## 2024-07-22 PROCEDURE — 82947 ASSAY GLUCOSE BLOOD QUANT: CPT

## 2024-07-22 PROCEDURE — 87075 CULTR BACTERIA EXCEPT BLOOD: CPT

## 2024-07-22 PROCEDURE — 82805 BLOOD GASES W/O2 SATURATION: CPT

## 2024-07-22 PROCEDURE — 89051 BODY FLUID CELL COUNT: CPT

## 2024-07-22 PROCEDURE — 2580000003 HC RX 258: Performed by: NURSE PRACTITIONER

## 2024-07-22 PROCEDURE — 6370000000 HC RX 637 (ALT 250 FOR IP)

## 2024-07-22 PROCEDURE — 92610 EVALUATE SWALLOWING FUNCTION: CPT

## 2024-07-22 PROCEDURE — 36415 COLL VENOUS BLD VENIPUNCTURE: CPT

## 2024-07-22 PROCEDURE — 82140 ASSAY OF AMMONIA: CPT

## 2024-07-22 PROCEDURE — 85610 PROTHROMBIN TIME: CPT

## 2024-07-22 PROCEDURE — 84157 ASSAY OF PROTEIN OTHER: CPT

## 2024-07-22 PROCEDURE — 94660 CPAP INITIATION&MGMT: CPT

## 2024-07-22 PROCEDURE — 94761 N-INVAS EAR/PLS OXIMETRY MLT: CPT

## 2024-07-22 PROCEDURE — 99232 SBSQ HOSP IP/OBS MODERATE 35: CPT | Performed by: INTERNAL MEDICINE

## 2024-07-22 PROCEDURE — 99222 1ST HOSP IP/OBS MODERATE 55: CPT | Performed by: INTERNAL MEDICINE

## 2024-07-22 PROCEDURE — 87070 CULTURE OTHR SPECIMN AEROBIC: CPT

## 2024-07-22 PROCEDURE — 0W9G3ZZ DRAINAGE OF PERITONEAL CAVITY, PERCUTANEOUS APPROACH: ICD-10-PCS | Performed by: RADIOLOGY

## 2024-07-22 PROCEDURE — 2580000003 HC RX 258: Performed by: STUDENT IN AN ORGANIZED HEALTH CARE EDUCATION/TRAINING PROGRAM

## 2024-07-22 RX ORDER — ALBUMIN (HUMAN) 12.5 G/50ML
50 SOLUTION INTRAVENOUS ONCE
Status: COMPLETED | OUTPATIENT
Start: 2024-07-22 | End: 2024-07-22

## 2024-07-22 RX ADMIN — SODIUM CHLORIDE, PRESERVATIVE FREE 10 ML: 5 INJECTION INTRAVENOUS at 08:39

## 2024-07-22 RX ADMIN — Medication 5 MCG/MIN: at 14:31

## 2024-07-22 RX ADMIN — SODIUM CHLORIDE, PRESERVATIVE FREE 10 ML: 5 INJECTION INTRAVENOUS at 08:38

## 2024-07-22 RX ADMIN — AMIODARONE HYDROCHLORIDE 200 MG: 200 TABLET ORAL at 08:36

## 2024-07-22 RX ADMIN — ALBUMIN (HUMAN) 50 G: 0.25 INJECTION, SOLUTION INTRAVENOUS at 13:56

## 2024-07-22 RX ADMIN — PANTOPRAZOLE SODIUM 40 MG: 40 TABLET, DELAYED RELEASE ORAL at 08:36

## 2024-07-22 RX ADMIN — LANTHANUM CARBONATE 1000 MG: 500 TABLET, CHEWABLE ORAL at 12:53

## 2024-07-22 RX ADMIN — MIDODRINE HYDROCHLORIDE 10 MG: 5 TABLET ORAL at 08:36

## 2024-07-22 RX ADMIN — CHOLECALCIFEROL TAB 25 MCG (1000 UNIT) 2000 UNITS: 25 TAB at 08:36

## 2024-07-22 RX ADMIN — LANTHANUM CARBONATE 1000 MG: 500 TABLET, CHEWABLE ORAL at 10:11

## 2024-07-22 RX ADMIN — ASPIRIN 81 MG: 81 TABLET, COATED ORAL at 08:36

## 2024-07-22 ASSESSMENT — PAIN DESCRIPTION - ORIENTATION: ORIENTATION: RIGHT;LEFT

## 2024-07-22 ASSESSMENT — PAIN SCALES - GENERAL: PAINLEVEL_OUTOF10: 8

## 2024-07-22 ASSESSMENT — PAIN DESCRIPTION - LOCATION: LOCATION: LEG

## 2024-07-22 NOTE — BRIEF OP NOTE
Brief Postoperative Note for Paracentesis    Sanjay Banegas  YOB: 1960  [id number]    Pre-operative Diagnosis:  Ascites     Post-operative Diagnosis: Same    Procedure: Ultrasound guided paracentesis     Anesthesia: 1% Lidocaine     Surgeons/Assistants: Vj Palmer PA-C    Complications: none    EBL: Minimal    Specimens: Were obtained    Ultrasound guided paracentesis performed. 3350 ml red fluid obtained.  Dressing applied.     Electronically signed by IFEOMA Turk on 7/22/2024 at 11:43 AM

## 2024-07-22 NOTE — ACP (ADVANCE CARE PLANNING)
Advance Care Planning      Palliative Medicine Provider (MD/NP)  Advance Care Planning (ACP) Conversation      Date of Conversation: 07/22/24  The patient and/or authorized decision maker consented to a voluntary Advance Care Planning conversation.   Individuals present for the conversation: Patient,significant other Claudia    Legal Healthcare Agent(s):    Primary Decision Maker: Sukhi Cullen - Healthcare Decision Maker - [phone]    Secondary Decision Maker: Dash Banegas - Brother/Sister - [phone]    ACP documents available in EMR prior to discussion:  -Power of  for Healthcare  -Living Will    Primary Palliative Diagnosis(es):  ESRD    Conversation Summary:  Patient too somnolent/confused.  Significant other is HCPOA.    Resuscitation Status:    Code Status: Full Code    Outcomes / Completed Documentation:  An explanation of advance directives and their importance was provided and the following forms completed:    -No new documents completed.    If new document completed, original was provided to patient and/or family member.    Copy was placed for scanning into the [facility] EMR.      I spent 10 minutes providing separately identifiable ACP services with the patient and/or surrogate decision maker in a voluntary, in-person conversation discussing the patient's wishes and goals as detailed in the above note.       PHOEBE TAYLOR, DO

## 2024-07-23 ENCOUNTER — APPOINTMENT (OUTPATIENT)
Dept: GENERAL RADIOLOGY | Age: 64
DRG: 280 | End: 2024-07-23
Payer: MEDICARE

## 2024-07-23 PROBLEM — K72.90 DECOMPENSATION OF CIRRHOSIS OF LIVER (HCC): Status: ACTIVE | Noted: 2024-07-21

## 2024-07-23 PROBLEM — J44.1 COPD EXACERBATION (HCC): Status: ACTIVE | Noted: 2024-07-23

## 2024-07-23 LAB
ALBUMIN SERPL-MCNC: 3.8 G/DL (ref 3.5–5.2)
ALBUMIN/GLOB SERPL: 1 {RATIO} (ref 1–2.5)
ALP SERPL-CCNC: 120 U/L (ref 40–129)
ALT SERPL-CCNC: <5 U/L (ref 10–50)
ANION GAP SERPL CALCULATED.3IONS-SCNC: 16 MMOL/L (ref 9–16)
AST SERPL-CCNC: 24 U/L (ref 10–50)
BASOPHILS # BLD: 0.08 K/UL (ref 0–0.2)
BASOPHILS NFR BLD: 1 % (ref 0–2)
BILIRUB SERPL-MCNC: 0.7 MG/DL (ref 0–1.2)
BODY TEMPERATURE: 37
BUN SERPL-MCNC: 37 MG/DL (ref 8–23)
CALCIUM SERPL-MCNC: 9.5 MG/DL (ref 8.6–10.4)
CASE NUMBER:: NORMAL
CASE NUMBER:: NORMAL
CHLORIDE SERPL-SCNC: 98 MMOL/L (ref 98–107)
CO2 SERPL-SCNC: 21 MMOL/L (ref 20–31)
COHGB MFR BLD: 1.3 % (ref 0–5)
CREAT SERPL-MCNC: 4.6 MG/DL (ref 0.7–1.2)
EOSINOPHIL # BLD: 0.08 K/UL (ref 0–0.4)
EOSINOPHILS RELATIVE PERCENT: 1 % (ref 1–4)
ERYTHROCYTE [DISTWIDTH] IN BLOOD BY AUTOMATED COUNT: 19.8 % (ref 11.8–14.4)
FIO2 ON VENT: ABNORMAL %
GFR, ESTIMATED: 13 ML/MIN/1.73M2
GLUCOSE BLD-MCNC: 117 MG/DL (ref 75–110)
GLUCOSE BLD-MCNC: 129 MG/DL (ref 75–110)
GLUCOSE BLD-MCNC: 133 MG/DL (ref 75–110)
GLUCOSE BLD-MCNC: 141 MG/DL (ref 75–110)
GLUCOSE SERPL-MCNC: 131 MG/DL (ref 74–99)
HCO3 VENOUS: 24.9 MMOL/L (ref 24–30)
HCT VFR BLD AUTO: 33.3 % (ref 40.7–50.3)
HGB BLD-MCNC: 9.3 G/DL (ref 13–17)
IMM GRANULOCYTES # BLD AUTO: 0.08 K/UL (ref 0–0.3)
IMM GRANULOCYTES NFR BLD: 1 %
LYMPHOCYTES NFR BLD: 0.42 K/UL (ref 1–4.8)
LYMPHOCYTES RELATIVE PERCENT: 5 % (ref 24–44)
MCH RBC QN AUTO: 29.1 PG (ref 25.2–33.5)
MCHC RBC AUTO-ENTMCNC: 27.9 G/DL (ref 28.4–34.8)
MCV RBC AUTO: 104.1 FL (ref 82.6–102.9)
MONOCYTES NFR BLD: 1.33 K/UL (ref 0.1–0.8)
MONOCYTES NFR BLD: 16 % (ref 1–7)
MORPHOLOGY: ABNORMAL
NEGATIVE BASE EXCESS, VEN: 2.1 MMOL/L (ref 0–2)
NEUTROPHILS NFR BLD: 76 % (ref 36–66)
NEUTS SEG NFR BLD: 6.31 K/UL (ref 1.8–7.7)
NRBC BLD-RTO: 0 PER 100 WBC
O2 SAT, VEN: 87.8 % (ref 60–85)
PCO2 VENOUS: 58.5 MM HG (ref 39–55)
PH VENOUS: 7.25 (ref 7.32–7.42)
PLATELET # BLD AUTO: 155 K/UL (ref 138–453)
PMV BLD AUTO: 9.7 FL (ref 8.1–13.5)
PO2 VENOUS: 59.2 MM HG (ref 30–50)
POTASSIUM SERPL-SCNC: 4.4 MMOL/L (ref 3.7–5.3)
PROT SERPL-MCNC: 7.1 G/DL (ref 6.6–8.7)
RBC # BLD AUTO: 3.2 M/UL (ref 4.21–5.77)
SODIUM SERPL-SCNC: 135 MMOL/L (ref 136–145)
SPECIMEN DESCRIPTION: NORMAL
WBC OTHER # BLD: 8.3 K/UL (ref 3.5–11.3)

## 2024-07-23 PROCEDURE — 74230 X-RAY XM SWLNG FUNCJ C+: CPT

## 2024-07-23 PROCEDURE — 2700000000 HC OXYGEN THERAPY PER DAY

## 2024-07-23 PROCEDURE — 99232 SBSQ HOSP IP/OBS MODERATE 35: CPT | Performed by: INTERNAL MEDICINE

## 2024-07-23 PROCEDURE — 82947 ASSAY GLUCOSE BLOOD QUANT: CPT

## 2024-07-23 PROCEDURE — 90935 HEMODIALYSIS ONE EVALUATION: CPT

## 2024-07-23 PROCEDURE — 99232 SBSQ HOSP IP/OBS MODERATE 35: CPT | Performed by: STUDENT IN AN ORGANIZED HEALTH CARE EDUCATION/TRAINING PROGRAM

## 2024-07-23 PROCEDURE — 94761 N-INVAS EAR/PLS OXIMETRY MLT: CPT

## 2024-07-23 PROCEDURE — 80053 COMPREHEN METABOLIC PANEL: CPT

## 2024-07-23 PROCEDURE — 6370000000 HC RX 637 (ALT 250 FOR IP): Performed by: STUDENT IN AN ORGANIZED HEALTH CARE EDUCATION/TRAINING PROGRAM

## 2024-07-23 PROCEDURE — 90935 HEMODIALYSIS ONE EVALUATION: CPT | Performed by: INTERNAL MEDICINE

## 2024-07-23 PROCEDURE — 2580000003 HC RX 258: Performed by: NURSE PRACTITIONER

## 2024-07-23 PROCEDURE — 36415 COLL VENOUS BLD VENIPUNCTURE: CPT

## 2024-07-23 PROCEDURE — 92611 MOTION FLUOROSCOPY/SWALLOW: CPT

## 2024-07-23 PROCEDURE — 6370000000 HC RX 637 (ALT 250 FOR IP): Performed by: INTERNAL MEDICINE

## 2024-07-23 PROCEDURE — 6370000000 HC RX 637 (ALT 250 FOR IP): Performed by: NURSE PRACTITIONER

## 2024-07-23 PROCEDURE — 2000000000 HC ICU R&B

## 2024-07-23 PROCEDURE — 85025 COMPLETE CBC W/AUTO DIFF WBC: CPT

## 2024-07-23 PROCEDURE — 94660 CPAP INITIATION&MGMT: CPT

## 2024-07-23 PROCEDURE — 82805 BLOOD GASES W/O2 SATURATION: CPT

## 2024-07-23 PROCEDURE — 6370000000 HC RX 637 (ALT 250 FOR IP)

## 2024-07-23 PROCEDURE — 2580000003 HC RX 258: Performed by: STUDENT IN AN ORGANIZED HEALTH CARE EDUCATION/TRAINING PROGRAM

## 2024-07-23 PROCEDURE — 6360000002 HC RX W HCPCS: Performed by: INTERNAL MEDICINE

## 2024-07-23 RX ORDER — LACTULOSE 10 G/15ML
20 SOLUTION ORAL 3 TIMES DAILY
Status: DISCONTINUED | OUTPATIENT
Start: 2024-07-23 | End: 2024-07-30 | Stop reason: HOSPADM

## 2024-07-23 RX ORDER — CAFFEINE 200 MG
200 TABLET ORAL ONCE
Status: COMPLETED | OUTPATIENT
Start: 2024-07-23 | End: 2024-07-23

## 2024-07-23 RX ORDER — CAFFEINE 200 MG
200 TABLET ORAL 2 TIMES DAILY
Status: DISCONTINUED | OUTPATIENT
Start: 2024-07-23 | End: 2024-07-23

## 2024-07-23 RX ADMIN — PANTOPRAZOLE SODIUM 40 MG: 40 TABLET, DELAYED RELEASE ORAL at 14:32

## 2024-07-23 RX ADMIN — MIDODRINE HYDROCHLORIDE 10 MG: 5 TABLET ORAL at 14:30

## 2024-07-23 RX ADMIN — CAFFEINE 200 MG: 200 TABLET ORAL at 14:29

## 2024-07-23 RX ADMIN — LACTULOSE 20 G: 20 SOLUTION ORAL at 21:53

## 2024-07-23 RX ADMIN — AMIODARONE HYDROCHLORIDE 200 MG: 200 TABLET ORAL at 14:30

## 2024-07-23 RX ADMIN — OXYCODONE HYDROCHLORIDE 5 MG: 5 TABLET ORAL at 18:37

## 2024-07-23 RX ADMIN — SODIUM CHLORIDE, PRESERVATIVE FREE 10 ML: 5 INJECTION INTRAVENOUS at 07:50

## 2024-07-23 RX ADMIN — APIXABAN 5 MG: 5 TABLET, FILM COATED ORAL at 21:53

## 2024-07-23 RX ADMIN — MIDODRINE HYDROCHLORIDE 10 MG: 5 TABLET ORAL at 21:53

## 2024-07-23 RX ADMIN — RIFAXIMIN 550 MG: 550 TABLET ORAL at 14:31

## 2024-07-23 RX ADMIN — SODIUM CHLORIDE, PRESERVATIVE FREE 10 ML: 5 INJECTION INTRAVENOUS at 21:53

## 2024-07-23 RX ADMIN — ASPIRIN 81 MG: 81 TABLET, COATED ORAL at 14:31

## 2024-07-23 RX ADMIN — ERYTHROPOIETIN 3000 UNITS: 3000 INJECTION, SOLUTION INTRAVENOUS; SUBCUTANEOUS at 13:44

## 2024-07-23 RX ADMIN — CHOLECALCIFEROL TAB 25 MCG (1000 UNIT) 2000 UNITS: 25 TAB at 14:33

## 2024-07-23 RX ADMIN — LACTULOSE 20 G: 20 SOLUTION ORAL at 14:30

## 2024-07-23 RX ADMIN — LANTHANUM CARBONATE 1000 MG: 500 TABLET, CHEWABLE ORAL at 18:37

## 2024-07-23 RX ADMIN — ERYTHROPOIETIN 2000 UNITS: 2000 INJECTION, SOLUTION INTRAVENOUS; SUBCUTANEOUS at 13:44

## 2024-07-23 RX ADMIN — APIXABAN 5 MG: 5 TABLET, FILM COATED ORAL at 14:30

## 2024-07-23 RX ADMIN — DESMOPRESSIN ACETATE 40 MG: 0.2 TABLET ORAL at 21:53

## 2024-07-23 ASSESSMENT — PAIN DESCRIPTION - DESCRIPTORS
DESCRIPTORS: ACHING;ITCHING
DESCRIPTORS: ACHING;ITCHING

## 2024-07-23 ASSESSMENT — PAIN DESCRIPTION - ORIENTATION
ORIENTATION: RIGHT;LEFT
ORIENTATION: RIGHT;LEFT;LOWER;UPPER

## 2024-07-23 ASSESSMENT — PAIN SCALES - GENERAL
PAINLEVEL_OUTOF10: 6
PAINLEVEL_OUTOF10: 6
PAINLEVEL_OUTOF10: 3
PAINLEVEL_OUTOF10: 8

## 2024-07-23 ASSESSMENT — PAIN DESCRIPTION - LOCATION
LOCATION: GENERALIZED
LOCATION: LEG

## 2024-07-23 ASSESSMENT — PAIN DESCRIPTION - FREQUENCY
FREQUENCY: CONTINUOUS
FREQUENCY: CONTINUOUS

## 2024-07-23 ASSESSMENT — PAIN DESCRIPTION - PAIN TYPE
TYPE: CHRONIC PAIN
TYPE: CHRONIC PAIN

## 2024-07-23 ASSESSMENT — PAIN DESCRIPTION - ONSET
ONSET: ON-GOING
ONSET: ON-GOING

## 2024-07-23 NOTE — ACP (ADVANCE CARE PLANNING)
Advance Care Planning      Palliative Medicine Provider (MD/NP)  Advance Care Planning (ACP) Conversation      Date of Conversation: 07/23/24  The patient and/or authorized decision maker consented to a voluntary Advance Care Planning conversation.   Individuals present for the conversation:   Patient and Legal healthcare agent named below    Legal Healthcare Agent(s):    Primary Decision Maker: Sukhi Cullen - Healthcare Decision Maker - [phone]    Secondary Decision Maker: Dash Banegas - Brother/Sister - [phone]    ACP documents available in EMR prior to discussion:  -Power of  for Healthcare  -Living Will    Primary Palliative Diagnosis(es):  ESRD    Conversation Summary:  Patient electing DNR-CCA  Does not want intubation  Sig other is HCPOA as previously mentioned.    Resuscitation Status:    Code Status: DNR-CCA    Outcomes / Completed Documentation:  An explanation of advance directives and their importance was provided and the following forms completed:    -Portable DNR    If new document completed, original was provided to patient and/or family member.    Copy was placed for scanning into the [facility] EMR.      I spent 10 minutes providing separately identifiable ACP services with the patient and/or surrogate decision maker in a voluntary, in-person conversation discussing the patient's wishes and goals as detailed in the above note.       PHOEBE TAYLOR,

## 2024-07-24 ENCOUNTER — APPOINTMENT (OUTPATIENT)
Dept: CT IMAGING | Age: 64
DRG: 280 | End: 2024-07-24
Payer: MEDICARE

## 2024-07-24 ENCOUNTER — APPOINTMENT (OUTPATIENT)
Dept: GENERAL RADIOLOGY | Age: 64
DRG: 280 | End: 2024-07-24
Payer: MEDICARE

## 2024-07-24 ENCOUNTER — APPOINTMENT (OUTPATIENT)
Age: 64
DRG: 280 | End: 2024-07-24
Payer: MEDICARE

## 2024-07-24 PROBLEM — R57.9 SHOCK (HCC): Status: ACTIVE | Noted: 2024-07-24

## 2024-07-24 LAB
ALBUMIN SERPL-MCNC: 3.8 G/DL (ref 3.5–5.2)
ALBUMIN/GLOB SERPL: 1 {RATIO} (ref 1–2.5)
ALP SERPL-CCNC: 137 U/L (ref 40–129)
ALT SERPL-CCNC: 5 U/L (ref 10–50)
ANION GAP SERPL CALCULATED.3IONS-SCNC: 10 MMOL/L (ref 9–16)
AST SERPL-CCNC: 16 U/L (ref 10–50)
BASOPHILS # BLD: 0 K/UL (ref 0–0.2)
BASOPHILS NFR BLD: 0 % (ref 0–2)
BILIRUB SERPL-MCNC: 0.7 MG/DL (ref 0–1.2)
BUN SERPL-MCNC: 22 MG/DL (ref 8–23)
CALCIUM SERPL-MCNC: 9 MG/DL (ref 8.6–10.4)
CHLORIDE SERPL-SCNC: 97 MMOL/L (ref 98–107)
CO2 SERPL-SCNC: 28 MMOL/L (ref 20–31)
CORTIS SERPL-MCNC: 7.6 UG/DL (ref 2.5–19.5)
CREAT SERPL-MCNC: 3.4 MG/DL (ref 0.7–1.2)
EOSINOPHIL # BLD: 0 K/UL (ref 0–0.4)
EOSINOPHILS RELATIVE PERCENT: 0 % (ref 1–4)
ERYTHROCYTE [DISTWIDTH] IN BLOOD BY AUTOMATED COUNT: 19.8 % (ref 11.8–14.4)
GFR, ESTIMATED: 19 ML/MIN/1.73M2
GLUCOSE BLD-MCNC: 110 MG/DL (ref 75–110)
GLUCOSE BLD-MCNC: 150 MG/DL (ref 75–110)
GLUCOSE BLD-MCNC: 161 MG/DL (ref 75–110)
GLUCOSE BLD-MCNC: 183 MG/DL (ref 75–110)
GLUCOSE SERPL-MCNC: 140 MG/DL (ref 74–99)
HCT VFR BLD AUTO: 31.8 % (ref 40.7–50.3)
HGB BLD-MCNC: 8.9 G/DL (ref 13–17)
IMM GRANULOCYTES # BLD AUTO: 0 K/UL (ref 0–0.3)
IMM GRANULOCYTES NFR BLD: 0 %
LACTIC ACID, WHOLE BLOOD: 2.2 MMOL/L (ref 0.7–2.1)
LYMPHOCYTES NFR BLD: 0.2 K/UL (ref 1–4.8)
LYMPHOCYTES RELATIVE PERCENT: 3 % (ref 24–44)
MAGNESIUM SERPL-MCNC: 1.7 MG/DL (ref 1.6–2.4)
MCH RBC QN AUTO: 28.7 PG (ref 25.2–33.5)
MCHC RBC AUTO-ENTMCNC: 28 G/DL (ref 28.4–34.8)
MCV RBC AUTO: 102.6 FL (ref 82.6–102.9)
MICROORGANISM SPEC CULT: NORMAL
MICROORGANISM SPEC CULT: NORMAL
MONOCYTES NFR BLD: 0.2 K/UL (ref 0.1–0.8)
MONOCYTES NFR BLD: 3 % (ref 1–7)
MORPHOLOGY: ABNORMAL
MORPHOLOGY: ABNORMAL
NEUTROPHILS NFR BLD: 94 % (ref 36–66)
NEUTS SEG NFR BLD: 6.4 K/UL (ref 1.8–7.7)
NRBC BLD-RTO: 0 PER 100 WBC
PLATELET # BLD AUTO: 142 K/UL (ref 138–453)
PMV BLD AUTO: 8.5 FL (ref 8.1–13.5)
POTASSIUM SERPL-SCNC: 3.5 MMOL/L (ref 3.7–5.3)
PROT FLD-MCNC: 4.4 G/DL
PROT SERPL-MCNC: 7.1 G/DL (ref 6.6–8.7)
RBC # BLD AUTO: 3.1 M/UL (ref 4.21–5.77)
SERVICE CMNT-IMP: NORMAL
SERVICE CMNT-IMP: NORMAL
SODIUM SERPL-SCNC: 135 MMOL/L (ref 136–145)
SPECIMEN DESCRIPTION: NORMAL
SPECIMEN DESCRIPTION: NORMAL
SPECIMEN TYPE: NORMAL
SURGICAL PATHOLOGY REPORT: NORMAL
WBC OTHER # BLD: 6.8 K/UL (ref 3.5–11.3)

## 2024-07-24 PROCEDURE — 73590 X-RAY EXAM OF LOWER LEG: CPT

## 2024-07-24 PROCEDURE — 03HY32Z INSERTION OF MONITORING DEVICE INTO UPPER ARTERY, PERCUTANEOUS APPROACH: ICD-10-PCS

## 2024-07-24 PROCEDURE — 2000000000 HC ICU R&B

## 2024-07-24 PROCEDURE — 6360000002 HC RX W HCPCS: Performed by: INTERNAL MEDICINE

## 2024-07-24 PROCEDURE — 71045 X-RAY EXAM CHEST 1 VIEW: CPT

## 2024-07-24 PROCEDURE — 87040 BLOOD CULTURE FOR BACTERIA: CPT

## 2024-07-24 PROCEDURE — 73560 X-RAY EXAM OF KNEE 1 OR 2: CPT

## 2024-07-24 PROCEDURE — 2700000000 HC OXYGEN THERAPY PER DAY

## 2024-07-24 PROCEDURE — 83605 ASSAY OF LACTIC ACID: CPT

## 2024-07-24 PROCEDURE — 94761 N-INVAS EAR/PLS OXIMETRY MLT: CPT

## 2024-07-24 PROCEDURE — 4A133J1 MONITORING OF ARTERIAL PULSE, PERIPHERAL, PERCUTANEOUS APPROACH: ICD-10-PCS

## 2024-07-24 PROCEDURE — 82947 ASSAY GLUCOSE BLOOD QUANT: CPT

## 2024-07-24 PROCEDURE — 2580000003 HC RX 258: Performed by: NURSE PRACTITIONER

## 2024-07-24 PROCEDURE — 6370000000 HC RX 637 (ALT 250 FOR IP): Performed by: INTERNAL MEDICINE

## 2024-07-24 PROCEDURE — 6370000000 HC RX 637 (ALT 250 FOR IP): Performed by: NURSE PRACTITIONER

## 2024-07-24 PROCEDURE — 4A133B1 MONITORING OF ARTERIAL PRESSURE, PERIPHERAL, PERCUTANEOUS APPROACH: ICD-10-PCS

## 2024-07-24 PROCEDURE — 82533 TOTAL CORTISOL: CPT

## 2024-07-24 PROCEDURE — 2580000003 HC RX 258: Performed by: STUDENT IN AN ORGANIZED HEALTH CARE EDUCATION/TRAINING PROGRAM

## 2024-07-24 PROCEDURE — 83735 ASSAY OF MAGNESIUM: CPT

## 2024-07-24 PROCEDURE — 85025 COMPLETE CBC W/AUTO DIFF WBC: CPT

## 2024-07-24 PROCEDURE — 6370000000 HC RX 637 (ALT 250 FOR IP)

## 2024-07-24 PROCEDURE — 80053 COMPREHEN METABOLIC PANEL: CPT

## 2024-07-24 PROCEDURE — 73700 CT LOWER EXTREMITY W/O DYE: CPT

## 2024-07-24 PROCEDURE — 99232 SBSQ HOSP IP/OBS MODERATE 35: CPT | Performed by: INTERNAL MEDICINE

## 2024-07-24 PROCEDURE — 36620 INSERTION CATHETER ARTERY: CPT

## 2024-07-24 PROCEDURE — 36415 COLL VENOUS BLD VENIPUNCTURE: CPT

## 2024-07-24 PROCEDURE — 94660 CPAP INITIATION&MGMT: CPT

## 2024-07-24 PROCEDURE — 2500000003 HC RX 250 WO HCPCS

## 2024-07-24 RX ORDER — MAGNESIUM SULFATE IN WATER 40 MG/ML
2000 INJECTION, SOLUTION INTRAVENOUS ONCE
Status: COMPLETED | OUTPATIENT
Start: 2024-07-24 | End: 2024-07-25

## 2024-07-24 RX ADMIN — MIDODRINE HYDROCHLORIDE 10 MG: 5 TABLET ORAL at 14:02

## 2024-07-24 RX ADMIN — LACTULOSE 20 G: 20 SOLUTION ORAL at 08:05

## 2024-07-24 RX ADMIN — ASPIRIN 81 MG: 81 TABLET, COATED ORAL at 08:05

## 2024-07-24 RX ADMIN — APIXABAN 5 MG: 5 TABLET, FILM COATED ORAL at 21:18

## 2024-07-24 RX ADMIN — MIDODRINE HYDROCHLORIDE 10 MG: 5 TABLET ORAL at 08:05

## 2024-07-24 RX ADMIN — SODIUM CHLORIDE, PRESERVATIVE FREE 10 ML: 5 INJECTION INTRAVENOUS at 21:18

## 2024-07-24 RX ADMIN — Medication 3 MCG/MIN: at 02:25

## 2024-07-24 RX ADMIN — MAGNESIUM SULFATE HEPTAHYDRATE 2000 MG: 40 INJECTION, SOLUTION INTRAVENOUS at 23:16

## 2024-07-24 RX ADMIN — APIXABAN 5 MG: 5 TABLET, FILM COATED ORAL at 08:30

## 2024-07-24 RX ADMIN — SODIUM CHLORIDE, PRESERVATIVE FREE 10 ML: 5 INJECTION INTRAVENOUS at 21:19

## 2024-07-24 RX ADMIN — ACETAMINOPHEN 650 MG: 325 TABLET ORAL at 09:21

## 2024-07-24 RX ADMIN — LACTULOSE 20 G: 20 SOLUTION ORAL at 21:18

## 2024-07-24 RX ADMIN — LANTHANUM CARBONATE 1000 MG: 500 TABLET, CHEWABLE ORAL at 12:30

## 2024-07-24 RX ADMIN — AMIODARONE HYDROCHLORIDE 200 MG: 200 TABLET ORAL at 08:05

## 2024-07-24 RX ADMIN — SODIUM CHLORIDE, PRESERVATIVE FREE 10 ML: 5 INJECTION INTRAVENOUS at 08:06

## 2024-07-24 RX ADMIN — CHOLECALCIFEROL TAB 25 MCG (1000 UNIT) 2000 UNITS: 25 TAB at 08:05

## 2024-07-24 RX ADMIN — LANTHANUM CARBONATE 1000 MG: 500 TABLET, CHEWABLE ORAL at 08:05

## 2024-07-24 RX ADMIN — LACTULOSE 20 G: 20 SOLUTION ORAL at 14:03

## 2024-07-24 RX ADMIN — MIDODRINE HYDROCHLORIDE 10 MG: 5 TABLET ORAL at 21:18

## 2024-07-24 RX ADMIN — RIFAXIMIN 550 MG: 550 TABLET ORAL at 21:18

## 2024-07-24 RX ADMIN — LANTHANUM CARBONATE 1000 MG: 500 TABLET, CHEWABLE ORAL at 17:11

## 2024-07-24 RX ADMIN — DESMOPRESSIN ACETATE 40 MG: 0.2 TABLET ORAL at 21:18

## 2024-07-24 RX ADMIN — PANTOPRAZOLE SODIUM 40 MG: 40 TABLET, DELAYED RELEASE ORAL at 08:04

## 2024-07-24 RX ADMIN — RIFAXIMIN 550 MG: 550 TABLET ORAL at 08:30

## 2024-07-24 ASSESSMENT — PAIN SCALES - GENERAL
PAINLEVEL_OUTOF10: 0

## 2024-07-24 NOTE — DISCHARGE INSTR - DIET

## 2024-07-24 NOTE — DISCHARGE INSTR - COC
Sat    Patient's personal belongings (please select all that are sent with patient):  Cell phone & blanket    RN SIGNATURE:  Electronically signed by JESSICA MACIEL, RN on 7/30/24 at 11:08 AM EDT    CASE MANAGEMENT/SOCIAL WORK SECTION    Inpatient Status Date: ***    Readmission Risk Assessment Score:  Readmission Risk              Risk of Unplanned Readmission:  23       Open [facility] case - Cassandra Monzon is  ([phone])    Discharging to Facility/ Agency   Name: [facility]  Address:  Phone: [phone]  Fax:    Dialysis Facility (if applicable)   Name:  Address:  Dialysis Schedule:  Phone:  Fax:    / signature: Electronically signed by ULISES VELASQUEZ RN on 7/30/24 at 1:05 PM EDT    PHYSICIAN SECTION    Prognosis: Guarded    Condition at Discharge: Stable    Rehab Potential (if transferring to Rehab):     Recommended Labs or Other Treatments After Discharge:   Monitor for bleeding at paracentesis site.  Continue O2 NC with target SPO2 92  Check Hb Q 12 hr.  Plan to start Eliquis 5mg BID and Aspirin 81 mg, if Hb stable after 3 days  Palliative care should be following Mr. Banegas at his SNF.      Physician Certification: I certify the above information and transfer of Sanjay Banegas  is necessary for the continuing treatment of the diagnosis listed and that he requires LTAC for less 30 days.     Update Admission H&P: No change in H&P    PHYSICIAN SIGNATURE:  Electronically signed by Filiberto Paz MD on 7/30/24 at 9:32 AM EDT

## 2024-07-25 PROBLEM — N18.9 ACUTE KIDNEY INJURY SUPERIMPOSED ON CHRONIC KIDNEY DISEASE (HCC): Status: ACTIVE | Noted: 2024-07-25

## 2024-07-25 PROBLEM — J96.01 ACUTE RESPIRATORY FAILURE WITH HYPOXIA AND HYPERCAPNIA (HCC): Status: ACTIVE | Noted: 2024-07-20

## 2024-07-25 PROBLEM — N17.9 ACUTE KIDNEY INJURY SUPERIMPOSED ON CHRONIC KIDNEY DISEASE (HCC): Status: ACTIVE | Noted: 2024-07-25

## 2024-07-25 LAB
ALBUMIN SERPL-MCNC: 4 G/DL (ref 3.5–5.2)
ALBUMIN/GLOB SERPL: 1 {RATIO} (ref 1–2.5)
ALP SERPL-CCNC: 146 U/L (ref 40–129)
ALT SERPL-CCNC: <5 U/L (ref 10–50)
ANION GAP SERPL CALCULATED.3IONS-SCNC: 14 MMOL/L (ref 9–16)
AST SERPL-CCNC: 20 U/L (ref 10–50)
BASOPHILS # BLD: 0 K/UL (ref 0–0.2)
BASOPHILS NFR BLD: 0 % (ref 0–2)
BILIRUB SERPL-MCNC: 0.8 MG/DL (ref 0–1.2)
BUN SERPL-MCNC: 32 MG/DL (ref 8–23)
CALCIUM SERPL-MCNC: 9.7 MG/DL (ref 8.6–10.4)
CHLORIDE SERPL-SCNC: 96 MMOL/L (ref 98–107)
CO2 SERPL-SCNC: 25 MMOL/L (ref 20–31)
CREAT SERPL-MCNC: 4.2 MG/DL (ref 0.7–1.2)
EKG ATRIAL RATE: 74 BPM
EKG P AXIS: 19 DEGREES
EKG P-R INTERVAL: 304 MS
EKG Q-T INTERVAL: 546 MS
EKG QRS DURATION: 208 MS
EKG QTC CALCULATION (BAZETT): 606 MS
EKG R AXIS: -66 DEGREES
EKG T AXIS: 94 DEGREES
EKG VENTRICULAR RATE: 74 BPM
EOSINOPHIL # BLD: 0 K/UL (ref 0–0.44)
EOSINOPHILS RELATIVE PERCENT: 0 % (ref 1–4)
ERYTHROCYTE [DISTWIDTH] IN BLOOD BY AUTOMATED COUNT: 19.7 % (ref 11.8–14.4)
GFR, ESTIMATED: 15 ML/MIN/1.73M2
GLUCOSE BLD-MCNC: 148 MG/DL (ref 75–110)
GLUCOSE BLD-MCNC: 156 MG/DL (ref 75–110)
GLUCOSE BLD-MCNC: 171 MG/DL (ref 75–110)
GLUCOSE BLD-MCNC: 173 MG/DL (ref 75–110)
GLUCOSE SERPL-MCNC: 157 MG/DL (ref 74–99)
HCT VFR BLD AUTO: 32.3 % (ref 40.7–50.3)
HGB BLD-MCNC: 9.5 G/DL (ref 13–17)
IMM GRANULOCYTES # BLD AUTO: 0.12 K/UL (ref 0–0.3)
IMM GRANULOCYTES NFR BLD: 1 %
LYMPHOCYTES NFR BLD: 0.59 K/UL (ref 1.1–3.7)
LYMPHOCYTES RELATIVE PERCENT: 5 % (ref 24–43)
MCH RBC QN AUTO: 29.3 PG (ref 25.2–33.5)
MCHC RBC AUTO-ENTMCNC: 29.4 G/DL (ref 28.4–34.8)
MCV RBC AUTO: 99.7 FL (ref 82.6–102.9)
MONOCYTES NFR BLD: 1.99 K/UL (ref 0.1–1.2)
MONOCYTES NFR BLD: 17 % (ref 3–12)
MORPHOLOGY: ABNORMAL
NEUTROPHILS NFR BLD: 77 % (ref 36–65)
NEUTS SEG NFR BLD: 9 K/UL (ref 1.5–8.1)
NRBC BLD-RTO: 0 PER 100 WBC
PLATELET # BLD AUTO: 182 K/UL (ref 138–453)
PMV BLD AUTO: 9.1 FL (ref 8.1–13.5)
POTASSIUM SERPL-SCNC: 3.8 MMOL/L (ref 3.7–5.3)
PROT SERPL-MCNC: 7.7 G/DL (ref 6.6–8.7)
RBC # BLD AUTO: 3.24 M/UL (ref 4.21–5.77)
SODIUM SERPL-SCNC: 135 MMOL/L (ref 136–145)
WBC OTHER # BLD: 11.7 K/UL (ref 3.5–11.3)

## 2024-07-25 PROCEDURE — 6370000000 HC RX 637 (ALT 250 FOR IP): Performed by: NURSE PRACTITIONER

## 2024-07-25 PROCEDURE — 2000000000 HC ICU R&B

## 2024-07-25 PROCEDURE — 94640 AIRWAY INHALATION TREATMENT: CPT

## 2024-07-25 PROCEDURE — 87641 MR-STAPH DNA AMP PROBE: CPT

## 2024-07-25 PROCEDURE — 93005 ELECTROCARDIOGRAM TRACING: CPT | Performed by: STUDENT IN AN ORGANIZED HEALTH CARE EDUCATION/TRAINING PROGRAM

## 2024-07-25 PROCEDURE — 90935 HEMODIALYSIS ONE EVALUATION: CPT | Performed by: INTERNAL MEDICINE

## 2024-07-25 PROCEDURE — P9047 ALBUMIN (HUMAN), 25%, 50ML: HCPCS | Performed by: INTERNAL MEDICINE

## 2024-07-25 PROCEDURE — 6360000002 HC RX W HCPCS

## 2024-07-25 PROCEDURE — 2500000003 HC RX 250 WO HCPCS

## 2024-07-25 PROCEDURE — 82947 ASSAY GLUCOSE BLOOD QUANT: CPT

## 2024-07-25 PROCEDURE — 6360000002 HC RX W HCPCS: Performed by: INTERNAL MEDICINE

## 2024-07-25 PROCEDURE — 2700000000 HC OXYGEN THERAPY PER DAY

## 2024-07-25 PROCEDURE — 2580000003 HC RX 258

## 2024-07-25 PROCEDURE — 6370000000 HC RX 637 (ALT 250 FOR IP)

## 2024-07-25 PROCEDURE — 90935 HEMODIALYSIS ONE EVALUATION: CPT

## 2024-07-25 PROCEDURE — 94660 CPAP INITIATION&MGMT: CPT

## 2024-07-25 PROCEDURE — 80053 COMPREHEN METABOLIC PANEL: CPT

## 2024-07-25 PROCEDURE — 94761 N-INVAS EAR/PLS OXIMETRY MLT: CPT

## 2024-07-25 PROCEDURE — 99232 SBSQ HOSP IP/OBS MODERATE 35: CPT | Performed by: INTERNAL MEDICINE

## 2024-07-25 PROCEDURE — 6370000000 HC RX 637 (ALT 250 FOR IP): Performed by: INTERNAL MEDICINE

## 2024-07-25 PROCEDURE — 2580000003 HC RX 258: Performed by: STUDENT IN AN ORGANIZED HEALTH CARE EDUCATION/TRAINING PROGRAM

## 2024-07-25 PROCEDURE — 85025 COMPLETE CBC W/AUTO DIFF WBC: CPT

## 2024-07-25 RX ORDER — IPRATROPIUM BROMIDE AND ALBUTEROL SULFATE 2.5; .5 MG/3ML; MG/3ML
1 SOLUTION RESPIRATORY (INHALATION) 3 TIMES DAILY
Status: DISCONTINUED | OUTPATIENT
Start: 2024-07-26 | End: 2024-07-30 | Stop reason: HOSPADM

## 2024-07-25 RX ORDER — IPRATROPIUM BROMIDE AND ALBUTEROL SULFATE 2.5; .5 MG/3ML; MG/3ML
1 SOLUTION RESPIRATORY (INHALATION)
Status: DISCONTINUED | OUTPATIENT
Start: 2024-07-25 | End: 2024-07-25

## 2024-07-25 RX ORDER — ALBUMIN (HUMAN) 12.5 G/50ML
25 SOLUTION INTRAVENOUS ONCE
Status: COMPLETED | OUTPATIENT
Start: 2024-07-25 | End: 2024-07-25

## 2024-07-25 RX ADMIN — LACTULOSE 20 G: 20 SOLUTION ORAL at 15:45

## 2024-07-25 RX ADMIN — MIDODRINE HYDROCHLORIDE 10 MG: 5 TABLET ORAL at 21:24

## 2024-07-25 RX ADMIN — RIFAXIMIN 550 MG: 550 TABLET ORAL at 21:24

## 2024-07-25 RX ADMIN — ERYTHROPOIETIN 3000 UNITS: 3000 INJECTION, SOLUTION INTRAVENOUS; SUBCUTANEOUS at 12:51

## 2024-07-25 RX ADMIN — WATER 60 MG: 1 INJECTION INTRAMUSCULAR; INTRAVENOUS; SUBCUTANEOUS at 15:59

## 2024-07-25 RX ADMIN — ERYTHROPOIETIN 2000 UNITS: 2000 INJECTION, SOLUTION INTRAVENOUS; SUBCUTANEOUS at 12:51

## 2024-07-25 RX ADMIN — DESMOPRESSIN ACETATE 40 MG: 0.2 TABLET ORAL at 21:24

## 2024-07-25 RX ADMIN — CHOLECALCIFEROL TAB 25 MCG (1000 UNIT) 2000 UNITS: 25 TAB at 09:13

## 2024-07-25 RX ADMIN — MIDODRINE HYDROCHLORIDE 10 MG: 5 TABLET ORAL at 09:13

## 2024-07-25 RX ADMIN — IPRATROPIUM BROMIDE AND ALBUTEROL SULFATE 1 DOSE: 2.5; .5 SOLUTION RESPIRATORY (INHALATION) at 16:02

## 2024-07-25 RX ADMIN — AMIODARONE HYDROCHLORIDE 200 MG: 200 TABLET ORAL at 09:13

## 2024-07-25 RX ADMIN — RIFAXIMIN 550 MG: 550 TABLET ORAL at 09:13

## 2024-07-25 RX ADMIN — PANTOPRAZOLE SODIUM 40 MG: 40 TABLET, DELAYED RELEASE ORAL at 09:13

## 2024-07-25 RX ADMIN — PIPERACILLIN AND TAZOBACTAM 3375 MG: 3; .375 INJECTION, POWDER, LYOPHILIZED, FOR SOLUTION INTRAVENOUS at 15:56

## 2024-07-25 RX ADMIN — ASPIRIN 81 MG: 81 TABLET, COATED ORAL at 09:13

## 2024-07-25 RX ADMIN — APIXABAN 5 MG: 5 TABLET, FILM COATED ORAL at 09:13

## 2024-07-25 RX ADMIN — SODIUM CHLORIDE, PRESERVATIVE FREE 10 ML: 5 INJECTION INTRAVENOUS at 21:24

## 2024-07-25 RX ADMIN — MIDODRINE HYDROCHLORIDE 10 MG: 5 TABLET ORAL at 15:45

## 2024-07-25 RX ADMIN — Medication 6 MCG/MIN: at 01:25

## 2024-07-25 RX ADMIN — ALBUMIN (HUMAN) 25 G: 0.25 INJECTION, SOLUTION INTRAVENOUS at 10:31

## 2024-07-25 RX ADMIN — SODIUM CHLORIDE, PRESERVATIVE FREE 10 ML: 5 INJECTION INTRAVENOUS at 09:13

## 2024-07-25 RX ADMIN — APIXABAN 5 MG: 5 TABLET, FILM COATED ORAL at 21:24

## 2024-07-25 RX ADMIN — IPRATROPIUM BROMIDE AND ALBUTEROL SULFATE 1 DOSE: 2.5; .5 SOLUTION RESPIRATORY (INHALATION) at 20:20

## 2024-07-25 RX ADMIN — WATER 60 MG: 1 INJECTION INTRAMUSCULAR; INTRAVENOUS; SUBCUTANEOUS at 21:28

## 2024-07-25 RX ADMIN — LACTULOSE 20 G: 20 SOLUTION ORAL at 09:13

## 2024-07-25 RX ADMIN — LANTHANUM CARBONATE 1000 MG: 500 TABLET, CHEWABLE ORAL at 15:45

## 2024-07-25 RX ADMIN — CALAMINE AND PRAMOXINE HYDROCHLORIDE: 80; 10 LOTION TOPICAL at 21:29

## 2024-07-25 NOTE — CONSULTS
Nephrology Consult Note    Reason for Consult: End-stage renal disease on maintenance hemodialysis  Requesting Physician: Dr. Shabazz    Chief Complaint: Hypotension.  Changes in mental status  History Obtained From:  patient, electronic medical record    History of Present Illness:              This is a 64 y.o. male who has a past medical history significant for end-stage renal disease and on hemodialysis Tuesday Thursday Saturday under the care of Dr. Michelle and at [facility] dialysis unit.  Patient also has longstanding type 2 diabetes, history of hypertension, history of atrial fibrillation as well as coronary artery disease and stent placement.  According to her daughter who was present at the bedside states that patient was recently admitted at [facility].  During that hospitalization he was diagnosed with obstructive sleep apnea and he was sent to rehab with the instruction of BiPAP at night.  In the meantime arrangement for home BiPAP machine was in progress.  According to his family member who was present at the bedside states that she noticed close to 5 years prior to this hospitalization patient was getting a little bit confused.  She mention to the nurses.  But they checked his blood pressure and vitals and he was doing fine.  He was not sure if he was receiving BiPAP while he was at nursing home.  However on the day of admission patient was very lethargic and and he went for dialysis.  He was not been able to stay for dialysis for an hour because of sacral pain.  He was sent back to nursing home on his request and subsequently he was transferred to [facility] because of low blood pressure and changes in mental status.  His blood pressure was 70/40 on arrival and his blood gases shows pH of 7.24 with pCO2 of 79.  Patient also found to have elevated troponin.  Patient was also appeared volume overloaded at the time of initial presentation..  Patient was admitted to stepdown unit.  His blood pressure 
  Orthopaedic Surgery Consult  (Dr. Berry)    Time if Evaluation: 8:20P    CC/Reason for consult:  concern for lateral tibial plateau fracture    HPI:      The patient is a 64 y.o.  male who presented to [facility] emergency department due to hypotension and shortness of breath on 7/17/2024.  The patient was then found to have an NSTEMI and was admitted for further workup.  On 7/19 the patient was found to be lethargic and had ABG consistent with respiratory acidosis and was placed on BiPAP. On 7/24/2024 it was document that the patient had tenderness and swelling of the right knee. Radiographs of the right knee were ordered at that time and orthopedic surgery consulted.    On examination the patient is resting comfortably in bed. Per family member, patient has been complaining of right knee pain since mid February. The patient has been nonambulatory at baseline since that time.  Cannot recall and inciting incident or traumatic event involving the right knee. Patient was seen for arthritis of the right knee at [facility] on 5/21/2024. Other prior orthopedic history includes an unspecified left foot fracture which required surgical fixation.  Patient is currently on 5 mg of Eliquis twice daily and 81 mg of aspirin daily. Patient has an extensive past medical history including ESRD on hemodialysis, CAD, A-fib, CHF, DM.      Past Medical History:    Past Medical History:   Diagnosis Date    Acute congestive heart failure (HCC) 12/21/2016    Acute on chronic diastolic congestive heart failure ([facility]) 11/24/2018    Anemia 11/25/2018    Anemia of chronic renal failure 10/17/2016    Atrial fibrillation ([facility]) 01/03/2022    AVF (arteriovenous fistula) ([facility])     Bowel obstruction ([facility]) 12/26/2013    CAD (coronary artery disease) 2013    ?    Chest pain at rest 12/21/2016    CHF (congestive heart failure) ([facility]) 2013    last episode 11/2018    CHF (congestive heart failure), NYHA class I, acute on chronic, combined ([facility]) 2/9/2021 
  Palliative Care Inpatient Consult    NAME:  Sanjay Banegas  MEDICAL RECORD NUMBER:  [id number]  AGE: 64 y.o.   GENDER: male  : 1960  TODAY'S DATE:  2024    Reasons for Consultation:    Symptom and/or pain management  Provision of information regarding PC and/or hospice philosophies  Complex, time-intensive communication and interdisciplinary psychosocial support  Clarification of goals of care and/or assistance with difficult decision-making  Guidance in regards to resources and transition(s)    Members of PC team contributing to this consultation are: Isaac Pagan, DO    Plan      Palliative Interaction:  Sanjay was seen and examined this morning on rounds.  His girlfriend and daughter are present at bedside.    After introductions, I explained my role as the palliative care service.    The patient's significant other, Julian is present. She explains that she and his brother Dash make decisions for Sanjay via previous paperwork. After review of chart, it appears that she is durable power of  (as is Dash, alternate), but Dash is his HCPOA via documentation in May 2023. This was clarified, as she brought in paperwork from this year that names her primary decision maker for health.    Julian tells me that Sanjay has been on dialysis for 5-6 years. He has really been declining over the last year or so. He has been in significant pain and has constant itching. He has progressively gotten weaker, living in a bed to recliner existence. He does still have his mind, although he does have periods of confusion/forgetfulness.    We discussed palliative care vs hospice care. She is able to explain to me the difference adequately. We discussed that hospice care would likely mean discontinuing dialysis and allowing Sanjay to pass peacefully, if that is what he wishes. However, they want him to be the one to make that decision.    She notes that she tried to get palliative care involved earlier. However, 
VAT consult note: Consulted for midline placement. Arrived, patient refused piv or midline placement. Awake and oriented. RN aware.   
to GIs note, patient received several units of blood during his admission.  No overt signs of GI bleeding.  Patient had EGD and colonoscopy in January 2024.  EGD showed 4 cm hiatal hernia, gastritis, duodenitis, duodenal ulcer clip x 2.  Colonoscopy showed 7 polyps under poor bowel prep.    Patient had CT A/P 6/18/2020 completed showing bulky pancreatic head with heterogeneous texture.  MRI recommended.  Plan was for outpatient EUS to further evaluate pancreas      Summary of imaging completed at this time:  CT A/P  IMPRESSION:  1. Hepatic cirrhosis with large ascites.  2. Mild bilateral pleural effusions.  3. Pancreatic head is slightly bulky measuring approximately 3.5 x 3.2 cm.  There is no parenchymal calcification or ductal dilatation. Recommend further  evaluation with contrast-enhanced CT or MRI.  4. Left-sided inguinal hernia contains nonobstructed bowel loops. Right-sided  inguinal hernia contains large amount of ascites fluid.  5. Generalized body wall edema.         Previous GI history:     SUMMARY TABLE    Last EGD  - 1/22/2024 - Donis Stark MD   Impression:            - Normal upper third of esophagus, middle third                                 of esophagus and lower third of esophagus.                                 - Z-line irregular, 41 cm from the incisors.                                 Biopsied.                                 - 4 cm hiatal hernia.                                 - Gastritis. Biopsied.                                 - Duodenitis. Biopsied.                                 - Non-bleeding duodenal ulcer with pigmented                                 material. Clip was placed. Clip :                                 Trivie.                                 - Non-bleeding duodenal ulcer with pigmented                                 material. Clip was placed. Clip :                                 Cloudant.       Last colonoscopy  -1/22/2024 - 
Provider, MD Elizabeth   apixaban (ELIQUIS) 5 MG TABS tablet Take 1 tablet by mouth 2 times daily 8/21/23   Rebecca Alvarenga APRN - CNP   atorvastatin (LIPITOR) 40 MG tablet Take 1 tablet by mouth nightly 8/21/23   Rebecca Alvarenga APRN - CNP   aspirin EC 81 MG EC tablet Take 1 tablet by mouth daily 12/24/21   Donis Chao DO   traMADol (ULTRAM) 50 MG tablet Take 1 tablet by mouth every 8 hours as needed for Pain. 10/21/20   Elizabeth Johnson MD   lidocaine-prilocaine (EMLA) 2.5-2.5 % cream Apply topically as needed for Pain (dialysis) 3/18/19   Provider, MD Elizabeth       Allergies:  Lisinopril    Social History:   reports that he has never smoked. He has never used smokeless tobacco. He reports that he does not drink alcohol and does not use drugs.     Family History: family history includes Asthma in his brother; Diabetes in his brother, brother, and brother; Early Death in his mother; Heart Disease in his mother; High Blood Pressure in his brother, brother, and brother.     REVIEW OF SYSTEMS:    Constitutional: Negative for fatigue, weight loss, loss of appetite   Cardiovascular: as per HPI  Respiratory: as per HPI  Gastrointestinal: Negative for abdominal pain, N/V  Genitourinary: No dysuria, trouble voiding, or hematuria.  Musculoskeletal:  No gait disturbance, No weakness or joint complaints.  Neurological: No headache, diplopia, change in muscle strength, numbness or tingling. No change in gate.   Endocrine: No temperature intolerance. No excessive thirst, fluid intake, or urination. No tremor.  Hematologic/Lymphatic: No abnormal bruising or bleeding    PHYSICAL EXAM:      BP (!) 105/57   Pulse 76   Temp 97.6 °F (36.4 °C) (Axillary)   Resp 13   Ht 1.778 m (5' 10\")   Wt 84.8 kg (186 lb 15.2 oz)   SpO2 99%   BMI 26.82 kg/m²    Constitutional and General Appearance: alert, cooperative, in no distress   HEENT: atraumatic, normocephalic.   Respiratory:  Clear to auscultation

## 2024-07-25 NOTE — ACP (ADVANCE CARE PLANNING)
Advance Care Planning      Palliative Medicine Provider (MD/NP)  Advance Care Planning (ACP) Conversation      Date of Conversation: 07/25/24  The patient and/or authorized decision maker consented to a voluntary Advance Care Planning conversation.   Individuals present for the conversation:   Patient and Legal healthcare agent named below    Legal Healthcare Agent(s):    Primary Decision Maker: Jose Cullenmer - Healthcare Decision Maker - [phone]    Secondary Decision Maker: Dash Banegas - Brother/Sister - [phone]    ACP documents available in EMR prior to discussion:  -Power of  for Healthcare  -Living Will    Primary Palliative Diagnosis(es):  ESRD    Conversation Summary:  Patient now wanting full resuscitation.  Previously DNR-CCA, no intubation.  Significant other is at bedside and is supporting his wishes.  He does not want a trach.  He tells me that he would not want LTACH treatment on mechanical ventilation.    Resuscitation Status:    Code Status: Full Code    Outcomes / Completed Documentation:  An explanation of advance directives and their importance was provided and the following forms completed:    -No new documents completed.    If new document completed, original was provided to patient and/or family member.    Copy was placed for scanning into the [facility] EMR.      I spent 10 minutes providing separately identifiable ACP services with the patient and/or surrogate decision maker in a voluntary, in-person conversation discussing the patient's wishes and goals as detailed in the above note.       PHOEBE TAYLOR,

## 2024-07-25 NOTE — FLOWSHEET NOTE
Patient requires frequent stimulation to remain alert enough to answer questions. Answers orientation questions properly but gets confused in conversation and intermittently forgets he is in the hospital and says inappropriate words for the situation/conversation.

## 2024-07-26 PROBLEM — M25.561 RIGHT KNEE PAIN: Status: ACTIVE | Noted: 2024-07-26

## 2024-07-26 PROBLEM — N18.6 END STAGE RENAL DISEASE (HCC): Status: ACTIVE | Noted: 2024-07-26

## 2024-07-26 PROBLEM — K74.60 CIRRHOSIS OF LIVER WITH ASCITES (HCC): Status: ACTIVE | Noted: 2024-07-26

## 2024-07-26 PROBLEM — R18.8 CIRRHOSIS OF LIVER WITH ASCITES (HCC): Status: ACTIVE | Noted: 2024-07-26

## 2024-07-26 LAB
ALBUMIN SERPL-MCNC: 4.1 G/DL (ref 3.5–5.2)
ALBUMIN/GLOB SERPL: 1 {RATIO} (ref 1–2.5)
ALP SERPL-CCNC: 137 U/L (ref 40–129)
ALT SERPL-CCNC: 5 U/L (ref 10–50)
ANION GAP SERPL CALCULATED.3IONS-SCNC: 13 MMOL/L (ref 9–16)
AST SERPL-CCNC: 14 U/L (ref 10–50)
BASOPHILS # BLD: 0 K/UL (ref 0–0.2)
BASOPHILS NFR BLD: 0 % (ref 0–2)
BILIRUB SERPL-MCNC: 0.7 MG/DL (ref 0–1.2)
BUN SERPL-MCNC: 21 MG/DL (ref 8–23)
CALCIUM SERPL-MCNC: 9.5 MG/DL (ref 8.6–10.4)
CHLORIDE SERPL-SCNC: 97 MMOL/L (ref 98–107)
CO2 SERPL-SCNC: 26 MMOL/L (ref 20–31)
CREAT SERPL-MCNC: 3.2 MG/DL (ref 0.7–1.2)
EOSINOPHIL # BLD: 0 K/UL (ref 0–0.4)
EOSINOPHILS RELATIVE PERCENT: 0 % (ref 1–4)
ERYTHROCYTE [DISTWIDTH] IN BLOOD BY AUTOMATED COUNT: 19.5 % (ref 11.8–14.4)
GFR, ESTIMATED: 21 ML/MIN/1.73M2
GLUCOSE BLD-MCNC: 181 MG/DL (ref 75–110)
GLUCOSE BLD-MCNC: 199 MG/DL (ref 75–110)
GLUCOSE BLD-MCNC: 259 MG/DL (ref 75–110)
GLUCOSE BLD-MCNC: 269 MG/DL (ref 75–110)
GLUCOSE SERPL-MCNC: 249 MG/DL (ref 74–99)
HCT VFR BLD AUTO: 31.4 % (ref 40.7–50.3)
HGB BLD-MCNC: 9.1 G/DL (ref 13–17)
IMM GRANULOCYTES # BLD AUTO: 0 K/UL (ref 0–0.3)
IMM GRANULOCYTES NFR BLD: 0 %
LYMPHOCYTES NFR BLD: 0.31 K/UL (ref 1–4.8)
LYMPHOCYTES RELATIVE PERCENT: 5 % (ref 24–44)
MCH RBC QN AUTO: 28.9 PG (ref 25.2–33.5)
MCHC RBC AUTO-ENTMCNC: 29 G/DL (ref 28.4–34.8)
MCV RBC AUTO: 99.7 FL (ref 82.6–102.9)
MONOCYTES NFR BLD: 0.18 K/UL (ref 0.1–0.8)
MONOCYTES NFR BLD: 3 % (ref 1–7)
MORPHOLOGY: ABNORMAL
MRSA, DNA, NASAL: NEGATIVE
NEUTROPHILS NFR BLD: 92 % (ref 36–66)
NEUTS SEG NFR BLD: 5.61 K/UL (ref 1.8–7.7)
NRBC BLD-RTO: 0 PER 100 WBC
NUCLEATED RED BLOOD CELLS: 1 PER 100 WBC
PLATELET # BLD AUTO: 144 K/UL (ref 138–453)
PMV BLD AUTO: 9.6 FL (ref 8.1–13.5)
POTASSIUM SERPL-SCNC: 4.1 MMOL/L (ref 3.7–5.3)
PROT SERPL-MCNC: 7.7 G/DL (ref 6.6–8.7)
RBC # BLD AUTO: 3.15 M/UL (ref 4.21–5.77)
SODIUM SERPL-SCNC: 136 MMOL/L (ref 136–145)
SPECIMEN DESCRIPTION: NORMAL
WBC OTHER # BLD: 6.1 K/UL (ref 3.5–11.3)

## 2024-07-26 PROCEDURE — 2580000003 HC RX 258

## 2024-07-26 PROCEDURE — 2580000003 HC RX 258: Performed by: NURSE PRACTITIONER

## 2024-07-26 PROCEDURE — 6360000002 HC RX W HCPCS

## 2024-07-26 PROCEDURE — 82947 ASSAY GLUCOSE BLOOD QUANT: CPT

## 2024-07-26 PROCEDURE — 99232 SBSQ HOSP IP/OBS MODERATE 35: CPT | Performed by: INTERNAL MEDICINE

## 2024-07-26 PROCEDURE — 6370000000 HC RX 637 (ALT 250 FOR IP)

## 2024-07-26 PROCEDURE — 6370000000 HC RX 637 (ALT 250 FOR IP): Performed by: NURSE PRACTITIONER

## 2024-07-26 PROCEDURE — 36415 COLL VENOUS BLD VENIPUNCTURE: CPT

## 2024-07-26 PROCEDURE — 2000000000 HC ICU R&B

## 2024-07-26 PROCEDURE — 99231 SBSQ HOSP IP/OBS SF/LOW 25: CPT | Performed by: INTERNAL MEDICINE

## 2024-07-26 PROCEDURE — 2580000003 HC RX 258: Performed by: STUDENT IN AN ORGANIZED HEALTH CARE EDUCATION/TRAINING PROGRAM

## 2024-07-26 PROCEDURE — 85025 COMPLETE CBC W/AUTO DIFF WBC: CPT

## 2024-07-26 PROCEDURE — 05HA33Z INSERTION OF INFUSION DEVICE INTO LEFT BRACHIAL VEIN, PERCUTANEOUS APPROACH: ICD-10-PCS | Performed by: INTERNAL MEDICINE

## 2024-07-26 PROCEDURE — C1751 CATH, INF, PER/CENT/MIDLINE: HCPCS

## 2024-07-26 PROCEDURE — 99222 1ST HOSP IP/OBS MODERATE 55: CPT | Performed by: STUDENT IN AN ORGANIZED HEALTH CARE EDUCATION/TRAINING PROGRAM

## 2024-07-26 PROCEDURE — 6370000000 HC RX 637 (ALT 250 FOR IP): Performed by: INTERNAL MEDICINE

## 2024-07-26 PROCEDURE — 94640 AIRWAY INHALATION TREATMENT: CPT

## 2024-07-26 PROCEDURE — 80053 COMPREHEN METABOLIC PANEL: CPT

## 2024-07-26 PROCEDURE — 94761 N-INVAS EAR/PLS OXIMETRY MLT: CPT

## 2024-07-26 PROCEDURE — 6370000000 HC RX 637 (ALT 250 FOR IP): Performed by: STUDENT IN AN ORGANIZED HEALTH CARE EDUCATION/TRAINING PROGRAM

## 2024-07-26 PROCEDURE — 2700000000 HC OXYGEN THERAPY PER DAY

## 2024-07-26 PROCEDURE — 76937 US GUIDE VASCULAR ACCESS: CPT

## 2024-07-26 RX ADMIN — APIXABAN 5 MG: 5 TABLET, FILM COATED ORAL at 21:30

## 2024-07-26 RX ADMIN — WATER 60 MG: 1 INJECTION INTRAMUSCULAR; INTRAVENOUS; SUBCUTANEOUS at 09:22

## 2024-07-26 RX ADMIN — APIXABAN 5 MG: 5 TABLET, FILM COATED ORAL at 08:43

## 2024-07-26 RX ADMIN — LANTHANUM CARBONATE 1000 MG: 500 TABLET, CHEWABLE ORAL at 16:32

## 2024-07-26 RX ADMIN — PIPERACILLIN AND TAZOBACTAM 3375 MG: 3; .375 INJECTION, POWDER, LYOPHILIZED, FOR SOLUTION INTRAVENOUS at 16:30

## 2024-07-26 RX ADMIN — MIDODRINE HYDROCHLORIDE 10 MG: 5 TABLET ORAL at 12:05

## 2024-07-26 RX ADMIN — AMIODARONE HYDROCHLORIDE 200 MG: 200 TABLET ORAL at 09:15

## 2024-07-26 RX ADMIN — DESMOPRESSIN ACETATE 40 MG: 0.2 TABLET ORAL at 21:30

## 2024-07-26 RX ADMIN — RIFAXIMIN 550 MG: 550 TABLET ORAL at 09:15

## 2024-07-26 RX ADMIN — SODIUM CHLORIDE, PRESERVATIVE FREE 10 ML: 5 INJECTION INTRAVENOUS at 09:25

## 2024-07-26 RX ADMIN — PIPERACILLIN AND TAZOBACTAM 3375 MG: 3; .375 INJECTION, POWDER, LYOPHILIZED, FOR SOLUTION INTRAVENOUS at 03:57

## 2024-07-26 RX ADMIN — RIFAXIMIN 550 MG: 550 TABLET ORAL at 21:30

## 2024-07-26 RX ADMIN — IPRATROPIUM BROMIDE AND ALBUTEROL SULFATE 1 DOSE: 2.5; .5 SOLUTION RESPIRATORY (INHALATION) at 20:46

## 2024-07-26 RX ADMIN — MIDODRINE HYDROCHLORIDE 10 MG: 5 TABLET ORAL at 09:15

## 2024-07-26 RX ADMIN — INSULIN LISPRO 4 UNITS: 100 INJECTION, SOLUTION INTRAVENOUS; SUBCUTANEOUS at 12:05

## 2024-07-26 RX ADMIN — SODIUM CHLORIDE, PRESERVATIVE FREE 10 ML: 5 INJECTION INTRAVENOUS at 09:26

## 2024-07-26 RX ADMIN — LANTHANUM CARBONATE 1000 MG: 500 TABLET, CHEWABLE ORAL at 09:22

## 2024-07-26 RX ADMIN — OXYCODONE HYDROCHLORIDE 5 MG: 5 TABLET ORAL at 15:34

## 2024-07-26 RX ADMIN — ASPIRIN 81 MG: 81 TABLET, COATED ORAL at 09:28

## 2024-07-26 RX ADMIN — PANTOPRAZOLE SODIUM 40 MG: 40 TABLET, DELAYED RELEASE ORAL at 09:21

## 2024-07-26 RX ADMIN — CHOLECALCIFEROL TAB 25 MCG (1000 UNIT) 2000 UNITS: 25 TAB at 10:02

## 2024-07-26 RX ADMIN — MIDODRINE HYDROCHLORIDE 10 MG: 5 TABLET ORAL at 21:30

## 2024-07-26 RX ADMIN — SODIUM CHLORIDE, PRESERVATIVE FREE 10 ML: 5 INJECTION INTRAVENOUS at 21:32

## 2024-07-26 RX ADMIN — LANTHANUM CARBONATE 1000 MG: 500 TABLET, CHEWABLE ORAL at 11:55

## 2024-07-26 RX ADMIN — INSULIN LISPRO 4 UNITS: 100 INJECTION, SOLUTION INTRAVENOUS; SUBCUTANEOUS at 09:13

## 2024-07-26 RX ADMIN — WATER 60 MG: 1 INJECTION INTRAMUSCULAR; INTRAVENOUS; SUBCUTANEOUS at 21:30

## 2024-07-26 ASSESSMENT — PAIN DESCRIPTION - LOCATION: LOCATION: GENERALIZED

## 2024-07-26 ASSESSMENT — PAIN SCALES - GENERAL
PAINLEVEL_OUTOF10: 0
PAINLEVEL_OUTOF10: 8
PAINLEVEL_OUTOF10: 0
PAINLEVEL_OUTOF10: 0

## 2024-07-26 NOTE — RT PROTOCOL NOTE
RT Nebulizer Bronchodilator Protocol Note    There is a bronchodilator order in the chart from a provider indicating to follow the RT Bronchodilator Protocol and there is an “Initiate RT Bronchodilator Protocol” order as well (see protocol at bottom of note).    CXR Findings:  XR CHEST PORTABLE    Result Date: 7/24/2024  Cardiomegaly with pulmonary congestion and bibasilar infiltrates.       The findings from the last RT Protocol Assessment were as follows:  Smoking: Chronic pulmonary disease  Respiratory Pattern: Mild dyspnea at rest, irregular pattern, or RR 21-25 bpm  Breath Sounds: Slightly diminished and/or crackles  Cough: Strong, spontaneous, non-productive  Indication for Bronchodilator Therapy:    Bronchodilator Assessment Score: 8    Aerosolized bronchodilator medication orders have been revised according to the RT Nebulizer Bronchodilator Protocol below.    Respiratory Therapist to perform RT Therapy Protocol Assessment initially then follow the protocol.  Repeat RT Therapy Protocol Assessment PRN for score 0-3 or on second treatment, BID, and PRN for scores above 3.    No Indications - adjust the frequency to every 6 hours PRN wheezing or bronchospasm, if no treatments needed after 48 hours then discontinue using Per Protocol order mode.     If indication present, adjust the RT bronchodilator orders based on the Bronchodilator Assessment Score as indicated below.  If a patient is on this medication at home then do not decrease Frequency below that used at home.    0-3 - enter or revise RT bronchodilator order(s) to equivalent RT Bronchodilator order with Frequency of every 4 hours PRN for wheezing or increased work of breathing using Per Protocol order mode.       4-6 - enter or revise RT Bronchodilator order(s) to two equivalent RT bronchodilator orders with one order with BID Frequency and one order with Frequency of every 4 hours PRN wheezing or increased work of breathing using Per Protocol order

## 2024-07-27 LAB
ALBUMIN SERPL-MCNC: 3.9 G/DL (ref 3.5–5.2)
ALBUMIN/GLOB SERPL: 1 {RATIO} (ref 1–2.5)
ALP SERPL-CCNC: 126 U/L (ref 40–129)
ALT SERPL-CCNC: <5 U/L (ref 10–50)
ANION GAP SERPL CALCULATED.3IONS-SCNC: 15 MMOL/L (ref 9–16)
AST SERPL-CCNC: 12 U/L (ref 10–50)
BASOPHILS # BLD: 0 K/UL (ref 0–0.2)
BASOPHILS NFR BLD: 0 % (ref 0–2)
BILIRUB SERPL-MCNC: 0.7 MG/DL (ref 0–1.2)
BUN SERPL-MCNC: 30 MG/DL (ref 8–23)
CALCIUM SERPL-MCNC: 9.7 MG/DL (ref 8.6–10.4)
CHLORIDE SERPL-SCNC: 96 MMOL/L (ref 98–107)
CO2 SERPL-SCNC: 23 MMOL/L (ref 20–31)
CREAT SERPL-MCNC: 3.7 MG/DL (ref 0.7–1.2)
EKG ATRIAL RATE: 75 BPM
EKG P AXIS: -26 DEGREES
EKG P-R INTERVAL: 288 MS
EKG Q-T INTERVAL: 494 MS
EKG QRS DURATION: 196 MS
EKG QTC CALCULATION (BAZETT): 551 MS
EKG R AXIS: -79 DEGREES
EKG T AXIS: 74 DEGREES
EKG VENTRICULAR RATE: 75 BPM
EOSINOPHIL # BLD: 0 K/UL (ref 0–0.44)
EOSINOPHILS RELATIVE PERCENT: 0 % (ref 1–4)
ERYTHROCYTE [DISTWIDTH] IN BLOOD BY AUTOMATED COUNT: 19.7 % (ref 11.8–14.4)
GFR, ESTIMATED: 17 ML/MIN/1.73M2
GLUCOSE BLD-MCNC: 227 MG/DL (ref 75–110)
GLUCOSE BLD-MCNC: 235 MG/DL (ref 75–110)
GLUCOSE BLD-MCNC: 237 MG/DL (ref 75–110)
GLUCOSE BLD-MCNC: 304 MG/DL (ref 75–110)
GLUCOSE SERPL-MCNC: 265 MG/DL (ref 74–99)
HCT VFR BLD AUTO: 31.8 % (ref 40.7–50.3)
HGB BLD-MCNC: 9.3 G/DL (ref 13–17)
IMM GRANULOCYTES # BLD AUTO: 0 K/UL (ref 0–0.3)
IMM GRANULOCYTES NFR BLD: 0 %
LYMPHOCYTES NFR BLD: 0.18 K/UL (ref 1.1–3.7)
LYMPHOCYTES RELATIVE PERCENT: 2 % (ref 24–43)
MCH RBC QN AUTO: 29.5 PG (ref 25.2–33.5)
MCHC RBC AUTO-ENTMCNC: 29.2 G/DL (ref 28.4–34.8)
MCV RBC AUTO: 101 FL (ref 82.6–102.9)
MICROORGANISM SPEC CULT: NORMAL
MICROORGANISM/AGENT SPEC: NORMAL
MONOCYTES NFR BLD: 0.27 K/UL (ref 0.1–1.2)
MONOCYTES NFR BLD: 3 % (ref 3–12)
MORPHOLOGY: ABNORMAL
NEUTROPHILS NFR BLD: 95 % (ref 36–65)
NEUTS SEG NFR BLD: 8.65 K/UL (ref 1.5–8.1)
NRBC BLD-RTO: 0 PER 100 WBC
PLATELET # BLD AUTO: 168 K/UL (ref 138–453)
PMV BLD AUTO: 8.9 FL (ref 8.1–13.5)
POTASSIUM SERPL-SCNC: 4 MMOL/L (ref 3.7–5.3)
PROT SERPL-MCNC: 7.5 G/DL (ref 6.6–8.7)
RBC # BLD AUTO: 3.15 M/UL (ref 4.21–5.77)
SODIUM SERPL-SCNC: 134 MMOL/L (ref 136–145)
SPECIMEN DESCRIPTION: NORMAL
WBC OTHER # BLD: 9.1 K/UL (ref 3.5–11.3)

## 2024-07-27 PROCEDURE — 6360000002 HC RX W HCPCS: Performed by: INTERNAL MEDICINE

## 2024-07-27 PROCEDURE — 90935 HEMODIALYSIS ONE EVALUATION: CPT

## 2024-07-27 PROCEDURE — 2000000000 HC ICU R&B

## 2024-07-27 PROCEDURE — 6370000000 HC RX 637 (ALT 250 FOR IP): Performed by: STUDENT IN AN ORGANIZED HEALTH CARE EDUCATION/TRAINING PROGRAM

## 2024-07-27 PROCEDURE — 6370000000 HC RX 637 (ALT 250 FOR IP)

## 2024-07-27 PROCEDURE — 94660 CPAP INITIATION&MGMT: CPT

## 2024-07-27 PROCEDURE — 6370000000 HC RX 637 (ALT 250 FOR IP): Performed by: NURSE PRACTITIONER

## 2024-07-27 PROCEDURE — 6370000000 HC RX 637 (ALT 250 FOR IP): Performed by: INTERNAL MEDICINE

## 2024-07-27 PROCEDURE — 6360000002 HC RX W HCPCS

## 2024-07-27 PROCEDURE — 82947 ASSAY GLUCOSE BLOOD QUANT: CPT

## 2024-07-27 PROCEDURE — P9047 ALBUMIN (HUMAN), 25%, 50ML: HCPCS | Performed by: INTERNAL MEDICINE

## 2024-07-27 PROCEDURE — 2700000000 HC OXYGEN THERAPY PER DAY

## 2024-07-27 PROCEDURE — 2580000003 HC RX 258: Performed by: STUDENT IN AN ORGANIZED HEALTH CARE EDUCATION/TRAINING PROGRAM

## 2024-07-27 PROCEDURE — 2580000003 HC RX 258: Performed by: NURSE PRACTITIONER

## 2024-07-27 PROCEDURE — 2580000003 HC RX 258

## 2024-07-27 PROCEDURE — 93005 ELECTROCARDIOGRAM TRACING: CPT | Performed by: STUDENT IN AN ORGANIZED HEALTH CARE EDUCATION/TRAINING PROGRAM

## 2024-07-27 PROCEDURE — 94640 AIRWAY INHALATION TREATMENT: CPT

## 2024-07-27 PROCEDURE — 90935 HEMODIALYSIS ONE EVALUATION: CPT | Performed by: INTERNAL MEDICINE

## 2024-07-27 PROCEDURE — 94761 N-INVAS EAR/PLS OXIMETRY MLT: CPT

## 2024-07-27 PROCEDURE — 80053 COMPREHEN METABOLIC PANEL: CPT

## 2024-07-27 PROCEDURE — 2500000003 HC RX 250 WO HCPCS

## 2024-07-27 PROCEDURE — 85025 COMPLETE CBC W/AUTO DIFF WBC: CPT

## 2024-07-27 RX ORDER — NOREPINEPHRINE BITARTRATE 0.06 MG/ML
1-100 INJECTION, SOLUTION INTRAVENOUS CONTINUOUS
Status: DISCONTINUED | OUTPATIENT
Start: 2024-07-27 | End: 2024-07-30

## 2024-07-27 RX ORDER — MIDODRINE HYDROCHLORIDE 5 MG/1
15 TABLET ORAL 3 TIMES DAILY
Status: DISCONTINUED | OUTPATIENT
Start: 2024-07-27 | End: 2024-07-30 | Stop reason: HOSPADM

## 2024-07-27 RX ADMIN — SODIUM CHLORIDE, PRESERVATIVE FREE 10 ML: 5 INJECTION INTRAVENOUS at 08:02

## 2024-07-27 RX ADMIN — ALBUMIN (HUMAN) 25 G: 0.25 INJECTION, SOLUTION INTRAVENOUS at 10:22

## 2024-07-27 RX ADMIN — ERYTHROPOIETIN 2000 UNITS: 2000 INJECTION, SOLUTION INTRAVENOUS; SUBCUTANEOUS at 12:38

## 2024-07-27 RX ADMIN — SODIUM CHLORIDE, PRESERVATIVE FREE 10 ML: 5 INJECTION INTRAVENOUS at 20:57

## 2024-07-27 RX ADMIN — RIFAXIMIN 550 MG: 550 TABLET ORAL at 08:00

## 2024-07-27 RX ADMIN — IPRATROPIUM BROMIDE AND ALBUTEROL SULFATE 1 DOSE: 2.5; .5 SOLUTION RESPIRATORY (INHALATION) at 21:08

## 2024-07-27 RX ADMIN — IPRATROPIUM BROMIDE AND ALBUTEROL SULFATE 1 DOSE: 2.5; .5 SOLUTION RESPIRATORY (INHALATION) at 08:26

## 2024-07-27 RX ADMIN — OXYCODONE HYDROCHLORIDE 5 MG: 5 TABLET ORAL at 16:14

## 2024-07-27 RX ADMIN — DESMOPRESSIN ACETATE 40 MG: 0.2 TABLET ORAL at 20:57

## 2024-07-27 RX ADMIN — CHOLECALCIFEROL TAB 25 MCG (1000 UNIT) 2000 UNITS: 25 TAB at 08:00

## 2024-07-27 RX ADMIN — SODIUM CHLORIDE, PRESERVATIVE FREE 10 ML: 5 INJECTION INTRAVENOUS at 08:03

## 2024-07-27 RX ADMIN — SODIUM CHLORIDE: 9 INJECTION, SOLUTION INTRAVENOUS at 00:21

## 2024-07-27 RX ADMIN — PIPERACILLIN AND TAZOBACTAM 3375 MG: 3; .375 INJECTION, POWDER, LYOPHILIZED, FOR SOLUTION INTRAVENOUS at 16:37

## 2024-07-27 RX ADMIN — LANTHANUM CARBONATE 1000 MG: 500 TABLET, CHEWABLE ORAL at 16:10

## 2024-07-27 RX ADMIN — LACTULOSE 20 G: 20 SOLUTION ORAL at 16:09

## 2024-07-27 RX ADMIN — LACTULOSE 20 G: 20 SOLUTION ORAL at 20:56

## 2024-07-27 RX ADMIN — PIPERACILLIN AND TAZOBACTAM 3375 MG: 3; .375 INJECTION, POWDER, LYOPHILIZED, FOR SOLUTION INTRAVENOUS at 03:01

## 2024-07-27 RX ADMIN — LANTHANUM CARBONATE 1000 MG: 500 TABLET, CHEWABLE ORAL at 11:44

## 2024-07-27 RX ADMIN — APIXABAN 5 MG: 5 TABLET, FILM COATED ORAL at 20:57

## 2024-07-27 RX ADMIN — ERYTHROPOIETIN 3000 UNITS: 3000 INJECTION, SOLUTION INTRAVENOUS; SUBCUTANEOUS at 12:38

## 2024-07-27 RX ADMIN — MIDODRINE HYDROCHLORIDE 10 MG: 5 TABLET ORAL at 08:00

## 2024-07-27 RX ADMIN — Medication 5 MCG/MIN: at 00:22

## 2024-07-27 RX ADMIN — INSULIN LISPRO 2 UNITS: 100 INJECTION, SOLUTION INTRAVENOUS; SUBCUTANEOUS at 16:14

## 2024-07-27 RX ADMIN — MIDODRINE HYDROCHLORIDE 15 MG: 5 TABLET ORAL at 14:33

## 2024-07-27 RX ADMIN — OXYCODONE HYDROCHLORIDE 5 MG: 5 TABLET ORAL at 21:03

## 2024-07-27 RX ADMIN — ASPIRIN 81 MG: 81 TABLET, COATED ORAL at 10:32

## 2024-07-27 RX ADMIN — LANTHANUM CARBONATE 1000 MG: 500 TABLET, CHEWABLE ORAL at 08:01

## 2024-07-27 RX ADMIN — INSULIN LISPRO 6 UNITS: 100 INJECTION, SOLUTION INTRAVENOUS; SUBCUTANEOUS at 08:00

## 2024-07-27 RX ADMIN — AMIODARONE HYDROCHLORIDE 200 MG: 200 TABLET ORAL at 08:00

## 2024-07-27 RX ADMIN — MIDODRINE HYDROCHLORIDE 15 MG: 5 TABLET ORAL at 20:56

## 2024-07-27 RX ADMIN — PANTOPRAZOLE SODIUM 40 MG: 40 TABLET, DELAYED RELEASE ORAL at 08:14

## 2024-07-27 RX ADMIN — APIXABAN 5 MG: 5 TABLET, FILM COATED ORAL at 14:35

## 2024-07-27 RX ADMIN — INSULIN LISPRO 2 UNITS: 100 INJECTION, SOLUTION INTRAVENOUS; SUBCUTANEOUS at 11:44

## 2024-07-27 RX ADMIN — IPRATROPIUM BROMIDE AND ALBUTEROL SULFATE 1 DOSE: 2.5; .5 SOLUTION RESPIRATORY (INHALATION) at 16:00

## 2024-07-27 RX ADMIN — RIFAXIMIN 550 MG: 550 TABLET ORAL at 20:56

## 2024-07-27 ASSESSMENT — PAIN DESCRIPTION - ORIENTATION: ORIENTATION: OTHER (COMMENT)

## 2024-07-27 ASSESSMENT — PAIN DESCRIPTION - DESCRIPTORS: DESCRIPTORS: DISCOMFORT

## 2024-07-27 ASSESSMENT — PAIN SCALES - GENERAL
PAINLEVEL_OUTOF10: 0
PAINLEVEL_OUTOF10: 8
PAINLEVEL_OUTOF10: 8
PAINLEVEL_OUTOF10: 0

## 2024-07-27 ASSESSMENT — PAIN DESCRIPTION - LOCATION: LOCATION: OTHER (COMMENT)

## 2024-07-28 LAB
ALBUMIN SERPL-MCNC: 4.1 G/DL (ref 3.5–5.2)
ALBUMIN/GLOB SERPL: 1 {RATIO} (ref 1–2.5)
ALP SERPL-CCNC: 146 U/L (ref 40–129)
ALT SERPL-CCNC: 7 U/L (ref 10–50)
ANION GAP SERPL CALCULATED.3IONS-SCNC: 13 MMOL/L (ref 9–16)
APPEARANCE FLD: NORMAL
AST SERPL-CCNC: 12 U/L (ref 10–50)
BASOPHILS # BLD: 0 K/UL (ref 0–0.2)
BASOPHILS NFR BLD: 0 % (ref 0–2)
BILIRUB SERPL-MCNC: 0.6 MG/DL (ref 0–1.2)
BODY FLD TYPE: NORMAL
BUN SERPL-MCNC: 22 MG/DL (ref 8–23)
CALCIUM SERPL-MCNC: 9 MG/DL (ref 8.6–10.4)
CHLORIDE SERPL-SCNC: 96 MMOL/L (ref 98–107)
CLOT CHECK: NORMAL
CO2 SERPL-SCNC: 27 MMOL/L (ref 20–31)
COLOR FLD: NORMAL
CREAT SERPL-MCNC: 2.7 MG/DL (ref 0.7–1.2)
EKG ATRIAL RATE: 75 BPM
EKG P AXIS: -26 DEGREES
EKG P-R INTERVAL: 288 MS
EKG Q-T INTERVAL: 494 MS
EKG QRS DURATION: 196 MS
EKG QTC CALCULATION (BAZETT): 551 MS
EKG R AXIS: -79 DEGREES
EKG T AXIS: 74 DEGREES
EKG VENTRICULAR RATE: 75 BPM
EOSINOPHIL # BLD: 0 K/UL (ref 0–0.4)
EOSINOPHILS RELATIVE PERCENT: 0 % (ref 1–4)
ERYTHROCYTE [DISTWIDTH] IN BLOOD BY AUTOMATED COUNT: 19.5 % (ref 11.8–14.4)
GFR, ESTIMATED: 25 ML/MIN/1.73M2
GLUCOSE BLD-MCNC: 117 MG/DL (ref 75–110)
GLUCOSE BLD-MCNC: 139 MG/DL (ref 75–110)
GLUCOSE BLD-MCNC: 169 MG/DL (ref 75–110)
GLUCOSE BLD-MCNC: 209 MG/DL (ref 75–110)
GLUCOSE SERPL-MCNC: 196 MG/DL (ref 74–99)
HCT VFR BLD AUTO: 28.9 % (ref 40.7–50.3)
HGB BLD-MCNC: 8.3 G/DL (ref 13–17)
IMM GRANULOCYTES # BLD AUTO: 0 K/UL (ref 0–0.3)
IMM GRANULOCYTES NFR BLD: 0 %
LYMPHOCYTES NFR BLD: 0.26 K/UL (ref 1–4.8)
LYMPHOCYTES NFR FLD: 9 %
LYMPHOCYTES RELATIVE PERCENT: 3 % (ref 24–44)
MAGNESIUM SERPL-MCNC: 1.8 MG/DL (ref 1.6–2.4)
MCH RBC QN AUTO: 28.7 PG (ref 25.2–33.5)
MCHC RBC AUTO-ENTMCNC: 28.7 G/DL (ref 28.4–34.8)
MCV RBC AUTO: 100 FL (ref 82.6–102.9)
MESOTHELIAL CELLS BODY FLUID: 4 %
MONOCYTES NFR BLD: 0.52 K/UL (ref 0.1–0.8)
MONOCYTES NFR BLD: 6 % (ref 1–7)
MONOCYTES NFR FLD: 75 %
MORPHOLOGY: ABNORMAL
NEUTROPHILS NFR BLD: 91 % (ref 36–66)
NEUTROPHILS NFR FLD: 5 %
NEUTS SEG NFR BLD: 7.92 K/UL (ref 1.8–7.7)
NRBC BLD-RTO: 0 PER 100 WBC
NUC CELL # FLD: 204 CELLS/UL
PLATELET # BLD AUTO: 161 K/UL (ref 138–453)
PMV BLD AUTO: 9.7 FL (ref 8.1–13.5)
POTASSIUM SERPL-SCNC: 3.5 MMOL/L (ref 3.7–5.3)
PROT SERPL-MCNC: 7 G/DL (ref 6.6–8.7)
RBC # BLD AUTO: 2.89 M/UL (ref 4.21–5.77)
RBC # FLD: NORMAL CELLS/UL
SODIUM SERPL-SCNC: 136 MMOL/L (ref 136–145)
WBC OTHER # BLD: 8.7 K/UL (ref 3.5–11.3)

## 2024-07-28 PROCEDURE — 99232 SBSQ HOSP IP/OBS MODERATE 35: CPT | Performed by: INTERNAL MEDICINE

## 2024-07-28 PROCEDURE — 94660 CPAP INITIATION&MGMT: CPT

## 2024-07-28 PROCEDURE — 2700000000 HC OXYGEN THERAPY PER DAY

## 2024-07-28 PROCEDURE — 83735 ASSAY OF MAGNESIUM: CPT

## 2024-07-28 PROCEDURE — 94761 N-INVAS EAR/PLS OXIMETRY MLT: CPT

## 2024-07-28 PROCEDURE — 6370000000 HC RX 637 (ALT 250 FOR IP): Performed by: STUDENT IN AN ORGANIZED HEALTH CARE EDUCATION/TRAINING PROGRAM

## 2024-07-28 PROCEDURE — 6360000002 HC RX W HCPCS

## 2024-07-28 PROCEDURE — P9047 ALBUMIN (HUMAN), 25%, 50ML: HCPCS

## 2024-07-28 PROCEDURE — 97530 THERAPEUTIC ACTIVITIES: CPT

## 2024-07-28 PROCEDURE — 87070 CULTURE OTHR SPECIMN AEROBIC: CPT

## 2024-07-28 PROCEDURE — 97162 PT EVAL MOD COMPLEX 30 MIN: CPT

## 2024-07-28 PROCEDURE — 87075 CULTR BACTERIA EXCEPT BLOOD: CPT

## 2024-07-28 PROCEDURE — 6370000000 HC RX 637 (ALT 250 FOR IP): Performed by: NURSE PRACTITIONER

## 2024-07-28 PROCEDURE — 85025 COMPLETE CBC W/AUTO DIFF WBC: CPT

## 2024-07-28 PROCEDURE — 2580000003 HC RX 258: Performed by: NURSE PRACTITIONER

## 2024-07-28 PROCEDURE — 89051 BODY FLUID CELL COUNT: CPT

## 2024-07-28 PROCEDURE — 2580000003 HC RX 258: Performed by: STUDENT IN AN ORGANIZED HEALTH CARE EDUCATION/TRAINING PROGRAM

## 2024-07-28 PROCEDURE — 6370000000 HC RX 637 (ALT 250 FOR IP)

## 2024-07-28 PROCEDURE — 97167 OT EVAL HIGH COMPLEX 60 MIN: CPT

## 2024-07-28 PROCEDURE — 80053 COMPREHEN METABOLIC PANEL: CPT

## 2024-07-28 PROCEDURE — 87205 SMEAR GRAM STAIN: CPT

## 2024-07-28 PROCEDURE — 0W9G3ZZ DRAINAGE OF PERITONEAL CAVITY, PERCUTANEOUS APPROACH: ICD-10-PCS | Performed by: STUDENT IN AN ORGANIZED HEALTH CARE EDUCATION/TRAINING PROGRAM

## 2024-07-28 PROCEDURE — 94640 AIRWAY INHALATION TREATMENT: CPT

## 2024-07-28 PROCEDURE — 6370000000 HC RX 637 (ALT 250 FOR IP): Performed by: INTERNAL MEDICINE

## 2024-07-28 PROCEDURE — 2000000000 HC ICU R&B

## 2024-07-28 PROCEDURE — 97535 SELF CARE MNGMENT TRAINING: CPT

## 2024-07-28 PROCEDURE — 82947 ASSAY GLUCOSE BLOOD QUANT: CPT

## 2024-07-28 PROCEDURE — 2580000003 HC RX 258

## 2024-07-28 RX ORDER — POTASSIUM CHLORIDE 20 MEQ/1
20 TABLET, EXTENDED RELEASE ORAL ONCE
Status: COMPLETED | OUTPATIENT
Start: 2024-07-28 | End: 2024-07-28

## 2024-07-28 RX ORDER — ALBUMIN (HUMAN) 12.5 G/50ML
25 SOLUTION INTRAVENOUS 2 TIMES DAILY
Status: COMPLETED | OUTPATIENT
Start: 2024-07-28 | End: 2024-07-29

## 2024-07-28 RX ADMIN — MIDODRINE HYDROCHLORIDE 15 MG: 5 TABLET ORAL at 08:46

## 2024-07-28 RX ADMIN — POTASSIUM CHLORIDE 20 MEQ: 1500 TABLET, EXTENDED RELEASE ORAL at 11:56

## 2024-07-28 RX ADMIN — LANTHANUM CARBONATE 1000 MG: 500 TABLET, CHEWABLE ORAL at 08:45

## 2024-07-28 RX ADMIN — INSULIN LISPRO 2 UNITS: 100 INJECTION, SOLUTION INTRAVENOUS; SUBCUTANEOUS at 12:01

## 2024-07-28 RX ADMIN — DESMOPRESSIN ACETATE 40 MG: 0.2 TABLET ORAL at 20:52

## 2024-07-28 RX ADMIN — LANTHANUM CARBONATE 1000 MG: 500 TABLET, CHEWABLE ORAL at 16:55

## 2024-07-28 RX ADMIN — RIFAXIMIN 550 MG: 550 TABLET ORAL at 08:46

## 2024-07-28 RX ADMIN — IPRATROPIUM BROMIDE AND ALBUTEROL SULFATE 1 DOSE: 2.5; .5 SOLUTION RESPIRATORY (INHALATION) at 16:11

## 2024-07-28 RX ADMIN — RIFAXIMIN 550 MG: 550 TABLET ORAL at 20:52

## 2024-07-28 RX ADMIN — CHOLECALCIFEROL TAB 25 MCG (1000 UNIT) 2000 UNITS: 25 TAB at 08:47

## 2024-07-28 RX ADMIN — OXYCODONE HYDROCHLORIDE 5 MG: 5 TABLET ORAL at 12:32

## 2024-07-28 RX ADMIN — PANTOPRAZOLE SODIUM 40 MG: 40 TABLET, DELAYED RELEASE ORAL at 05:48

## 2024-07-28 RX ADMIN — SODIUM CHLORIDE, PRESERVATIVE FREE 10 ML: 5 INJECTION INTRAVENOUS at 21:01

## 2024-07-28 RX ADMIN — ASPIRIN 81 MG: 81 TABLET, COATED ORAL at 08:44

## 2024-07-28 RX ADMIN — LACTULOSE 20 G: 20 SOLUTION ORAL at 20:52

## 2024-07-28 RX ADMIN — APIXABAN 5 MG: 5 TABLET, FILM COATED ORAL at 20:52

## 2024-07-28 RX ADMIN — PIPERACILLIN AND TAZOBACTAM 3375 MG: 3; .375 INJECTION, POWDER, LYOPHILIZED, FOR SOLUTION INTRAVENOUS at 18:44

## 2024-07-28 RX ADMIN — ALBUMIN (HUMAN) 25 G: 0.25 INJECTION, SOLUTION INTRAVENOUS at 20:57

## 2024-07-28 RX ADMIN — LACTULOSE 20 G: 20 SOLUTION ORAL at 14:03

## 2024-07-28 RX ADMIN — LANTHANUM CARBONATE 1000 MG: 500 TABLET, CHEWABLE ORAL at 12:02

## 2024-07-28 RX ADMIN — PIPERACILLIN AND TAZOBACTAM 3375 MG: 3; .375 INJECTION, POWDER, LYOPHILIZED, FOR SOLUTION INTRAVENOUS at 04:35

## 2024-07-28 RX ADMIN — OXYCODONE HYDROCHLORIDE 5 MG: 5 TABLET ORAL at 20:52

## 2024-07-28 RX ADMIN — IPRATROPIUM BROMIDE AND ALBUTEROL SULFATE 1 DOSE: 2.5; .5 SOLUTION RESPIRATORY (INHALATION) at 08:18

## 2024-07-28 RX ADMIN — POTASSIUM CHLORIDE 20 MEQ: 1500 TABLET, EXTENDED RELEASE ORAL at 06:42

## 2024-07-28 RX ADMIN — MIDODRINE HYDROCHLORIDE 15 MG: 5 TABLET ORAL at 14:03

## 2024-07-28 RX ADMIN — AMIODARONE HYDROCHLORIDE 200 MG: 200 TABLET ORAL at 08:44

## 2024-07-28 RX ADMIN — ALBUMIN (HUMAN) 25 G: 0.25 INJECTION, SOLUTION INTRAVENOUS at 13:46

## 2024-07-28 RX ADMIN — MIDODRINE HYDROCHLORIDE 15 MG: 5 TABLET ORAL at 20:52

## 2024-07-28 RX ADMIN — LACTULOSE 20 G: 20 SOLUTION ORAL at 08:45

## 2024-07-28 RX ADMIN — IPRATROPIUM BROMIDE AND ALBUTEROL SULFATE 1 DOSE: 2.5; .5 SOLUTION RESPIRATORY (INHALATION) at 20:28

## 2024-07-28 ASSESSMENT — PAIN SCALES - GENERAL
PAINLEVEL_OUTOF10: 7
PAINLEVEL_OUTOF10: 5

## 2024-07-28 ASSESSMENT — PAIN DESCRIPTION - ORIENTATION: ORIENTATION: LEFT;RIGHT

## 2024-07-28 ASSESSMENT — PAIN DESCRIPTION - LOCATION: LOCATION: LEG

## 2024-07-28 ASSESSMENT — PAIN - FUNCTIONAL ASSESSMENT: PAIN_FUNCTIONAL_ASSESSMENT: ACTIVITIES ARE NOT PREVENTED

## 2024-07-28 ASSESSMENT — PAIN DESCRIPTION - PAIN TYPE: TYPE: CHRONIC PAIN

## 2024-07-28 ASSESSMENT — PAIN DESCRIPTION - ONSET: ONSET: ON-GOING

## 2024-07-28 ASSESSMENT — PAIN DESCRIPTION - FREQUENCY: FREQUENCY: CONTINUOUS

## 2024-07-29 ENCOUNTER — APPOINTMENT (OUTPATIENT)
Age: 64
DRG: 280 | End: 2024-07-29
Payer: MEDICARE

## 2024-07-29 LAB
ALBUMIN SERPL-MCNC: 3.9 G/DL (ref 3.5–5.2)
ALBUMIN/GLOB SERPL: 2 {RATIO} (ref 1–2.5)
ALP SERPL-CCNC: 109 U/L (ref 40–129)
ALT SERPL-CCNC: 6 U/L (ref 10–50)
ANION GAP SERPL CALCULATED.3IONS-SCNC: 12 MMOL/L (ref 9–16)
AST SERPL-CCNC: 12 U/L (ref 10–50)
BASOPHILS # BLD: 0 K/UL (ref 0–0.2)
BASOPHILS NFR BLD: 0 % (ref 0–2)
BILIRUB SERPL-MCNC: 0.6 MG/DL (ref 0–1.2)
BUN SERPL-MCNC: 31 MG/DL (ref 8–23)
CALCIUM SERPL-MCNC: 9.1 MG/DL (ref 8.6–10.4)
CHLORIDE SERPL-SCNC: 98 MMOL/L (ref 98–107)
CO2 SERPL-SCNC: 27 MMOL/L (ref 20–31)
CREAT SERPL-MCNC: 3.4 MG/DL (ref 0.7–1.2)
EOSINOPHIL # BLD: 0.07 K/UL (ref 0–0.44)
EOSINOPHILS RELATIVE PERCENT: 1 % (ref 1–4)
ERYTHROCYTE [DISTWIDTH] IN BLOOD BY AUTOMATED COUNT: 19.6 % (ref 11.8–14.4)
GFR, ESTIMATED: 19 ML/MIN/1.73M2
GLUCOSE BLD-MCNC: 121 MG/DL (ref 75–110)
GLUCOSE BLD-MCNC: 133 MG/DL (ref 75–110)
GLUCOSE BLD-MCNC: 159 MG/DL (ref 75–110)
GLUCOSE BLD-MCNC: 173 MG/DL (ref 75–110)
GLUCOSE SERPL-MCNC: 139 MG/DL (ref 74–99)
HCT VFR BLD AUTO: 24.2 % (ref 40.7–50.3)
HCT VFR BLD AUTO: 25.6 % (ref 40.7–50.3)
HCT VFR BLD AUTO: 26.7 % (ref 40.7–50.3)
HGB BLD-MCNC: 7.2 G/DL (ref 13–17)
HGB BLD-MCNC: 7.4 G/DL (ref 13–17)
HGB BLD-MCNC: 8 G/DL (ref 13–17)
IMM GRANULOCYTES # BLD AUTO: 0.07 K/UL (ref 0–0.3)
IMM GRANULOCYTES NFR BLD: 1 %
INR PPP: 1.9
LYMPHOCYTES NFR BLD: 0.28 K/UL (ref 1.1–3.7)
LYMPHOCYTES RELATIVE PERCENT: 4 % (ref 24–43)
MCH RBC QN AUTO: 28.9 PG (ref 25.2–33.5)
MCHC RBC AUTO-ENTMCNC: 28.9 G/DL (ref 28.4–34.8)
MCV RBC AUTO: 100 FL (ref 82.6–102.9)
MICROORGANISM SPEC CULT: NORMAL
MICROORGANISM SPEC CULT: NORMAL
MONOCYTES NFR BLD: 0.99 K/UL (ref 0.1–1.2)
MONOCYTES NFR BLD: 14 % (ref 3–12)
MORPHOLOGY: ABNORMAL
NEUTROPHILS NFR BLD: 80 % (ref 36–65)
NEUTS SEG NFR BLD: 5.69 K/UL (ref 1.5–8.1)
NRBC BLD-RTO: 0 PER 100 WBC
PLATELET # BLD AUTO: 137 K/UL (ref 138–453)
PMV BLD AUTO: 9.1 FL (ref 8.1–13.5)
POTASSIUM SERPL-SCNC: 4 MMOL/L (ref 3.7–5.3)
PROT SERPL-MCNC: 6.3 G/DL (ref 6.6–8.7)
PROTHROMBIN TIME: 21.7 SEC (ref 11.7–14.9)
RBC # BLD AUTO: 2.56 M/UL (ref 4.21–5.77)
SERVICE CMNT-IMP: NORMAL
SODIUM SERPL-SCNC: 137 MMOL/L (ref 136–145)
SPECIMEN DESCRIPTION: NORMAL
SPECIMEN DESCRIPTION: NORMAL
WBC OTHER # BLD: 7.1 K/UL (ref 3.5–11.3)

## 2024-07-29 PROCEDURE — 6370000000 HC RX 637 (ALT 250 FOR IP): Performed by: INTERNAL MEDICINE

## 2024-07-29 PROCEDURE — 80053 COMPREHEN METABOLIC PANEL: CPT

## 2024-07-29 PROCEDURE — 99232 SBSQ HOSP IP/OBS MODERATE 35: CPT | Performed by: INTERNAL MEDICINE

## 2024-07-29 PROCEDURE — 2580000003 HC RX 258: Performed by: NURSE PRACTITIONER

## 2024-07-29 PROCEDURE — 99291 CRITICAL CARE FIRST HOUR: CPT | Performed by: INTERNAL MEDICINE

## 2024-07-29 PROCEDURE — 6370000000 HC RX 637 (ALT 250 FOR IP): Performed by: STUDENT IN AN ORGANIZED HEALTH CARE EDUCATION/TRAINING PROGRAM

## 2024-07-29 PROCEDURE — 85018 HEMOGLOBIN: CPT

## 2024-07-29 PROCEDURE — 2580000003 HC RX 258

## 2024-07-29 PROCEDURE — 6370000000 HC RX 637 (ALT 250 FOR IP)

## 2024-07-29 PROCEDURE — 97110 THERAPEUTIC EXERCISES: CPT

## 2024-07-29 PROCEDURE — 85014 HEMATOCRIT: CPT

## 2024-07-29 PROCEDURE — 94761 N-INVAS EAR/PLS OXIMETRY MLT: CPT

## 2024-07-29 PROCEDURE — 85610 PROTHROMBIN TIME: CPT

## 2024-07-29 PROCEDURE — 82947 ASSAY GLUCOSE BLOOD QUANT: CPT

## 2024-07-29 PROCEDURE — 2500000003 HC RX 250 WO HCPCS

## 2024-07-29 PROCEDURE — 2000000000 HC ICU R&B

## 2024-07-29 PROCEDURE — 6360000002 HC RX W HCPCS

## 2024-07-29 PROCEDURE — 2700000000 HC OXYGEN THERAPY PER DAY

## 2024-07-29 PROCEDURE — 94640 AIRWAY INHALATION TREATMENT: CPT

## 2024-07-29 PROCEDURE — 6370000000 HC RX 637 (ALT 250 FOR IP): Performed by: NURSE PRACTITIONER

## 2024-07-29 PROCEDURE — 6360000004 HC RX CONTRAST MEDICATION: Performed by: EMERGENCY MEDICINE

## 2024-07-29 PROCEDURE — 2580000003 HC RX 258: Performed by: STUDENT IN AN ORGANIZED HEALTH CARE EDUCATION/TRAINING PROGRAM

## 2024-07-29 PROCEDURE — 85025 COMPLETE CBC W/AUTO DIFF WBC: CPT

## 2024-07-29 PROCEDURE — 36415 COLL VENOUS BLD VENIPUNCTURE: CPT

## 2024-07-29 PROCEDURE — 74177 CT ABD & PELVIS W/CONTRAST: CPT

## 2024-07-29 PROCEDURE — 94660 CPAP INITIATION&MGMT: CPT

## 2024-07-29 RX ADMIN — RIFAXIMIN 550 MG: 550 TABLET ORAL at 09:25

## 2024-07-29 RX ADMIN — SODIUM CHLORIDE, PRESERVATIVE FREE 10 ML: 5 INJECTION INTRAVENOUS at 20:04

## 2024-07-29 RX ADMIN — MIDODRINE HYDROCHLORIDE 15 MG: 5 TABLET ORAL at 09:17

## 2024-07-29 RX ADMIN — PANTOPRAZOLE SODIUM 40 MG: 40 TABLET, DELAYED RELEASE ORAL at 06:12

## 2024-07-29 RX ADMIN — LANTHANUM CARBONATE 1000 MG: 500 TABLET, CHEWABLE ORAL at 09:21

## 2024-07-29 RX ADMIN — LACTULOSE 20 G: 20 SOLUTION ORAL at 20:05

## 2024-07-29 RX ADMIN — IPRATROPIUM BROMIDE AND ALBUTEROL SULFATE 1 DOSE: 2.5; .5 SOLUTION RESPIRATORY (INHALATION) at 08:40

## 2024-07-29 RX ADMIN — SODIUM CHLORIDE, PRESERVATIVE FREE 10 ML: 5 INJECTION INTRAVENOUS at 09:17

## 2024-07-29 RX ADMIN — Medication 5 MCG/MIN: at 00:12

## 2024-07-29 RX ADMIN — SODIUM CHLORIDE, PRESERVATIVE FREE 10 ML: 5 INJECTION INTRAVENOUS at 20:03

## 2024-07-29 RX ADMIN — LANTHANUM CARBONATE 1000 MG: 500 TABLET, CHEWABLE ORAL at 12:43

## 2024-07-29 RX ADMIN — LACTULOSE 20 G: 20 SOLUTION ORAL at 09:16

## 2024-07-29 RX ADMIN — AMIODARONE HYDROCHLORIDE 200 MG: 200 TABLET ORAL at 09:17

## 2024-07-29 RX ADMIN — MIDODRINE HYDROCHLORIDE 15 MG: 5 TABLET ORAL at 20:02

## 2024-07-29 RX ADMIN — PIPERACILLIN AND TAZOBACTAM 3375 MG: 3; .375 INJECTION, POWDER, LYOPHILIZED, FOR SOLUTION INTRAVENOUS at 04:15

## 2024-07-29 RX ADMIN — CHOLECALCIFEROL TAB 25 MCG (1000 UNIT) 2000 UNITS: 25 TAB at 09:16

## 2024-07-29 RX ADMIN — IOPAMIDOL 75 ML: 755 INJECTION, SOLUTION INTRAVENOUS at 13:24

## 2024-07-29 RX ADMIN — MIDODRINE HYDROCHLORIDE 15 MG: 5 TABLET ORAL at 12:46

## 2024-07-29 RX ADMIN — LANTHANUM CARBONATE 1000 MG: 500 TABLET, CHEWABLE ORAL at 20:02

## 2024-07-29 RX ADMIN — DESMOPRESSIN ACETATE 40 MG: 0.2 TABLET ORAL at 20:02

## 2024-07-29 RX ADMIN — PIPERACILLIN AND TAZOBACTAM 3375 MG: 3; .375 INJECTION, POWDER, LYOPHILIZED, FOR SOLUTION INTRAVENOUS at 15:52

## 2024-07-29 RX ADMIN — OXYCODONE HYDROCHLORIDE 5 MG: 5 TABLET ORAL at 22:26

## 2024-07-29 RX ADMIN — THROMBIN, TOPICAL (BOVINE) 5000 UNITS: KIT at 06:03

## 2024-07-29 RX ADMIN — RIFAXIMIN 550 MG: 550 TABLET ORAL at 20:30

## 2024-07-29 RX ADMIN — Medication 1 EACH: at 06:03

## 2024-07-29 RX ADMIN — IPRATROPIUM BROMIDE AND ALBUTEROL SULFATE 1 DOSE: 2.5; .5 SOLUTION RESPIRATORY (INHALATION) at 16:29

## 2024-07-29 ASSESSMENT — ENCOUNTER SYMPTOMS
ABDOMINAL DISTENTION: 1
WHEEZING: 0
SHORTNESS OF BREATH: 0
ALLERGIC/IMMUNOLOGIC NEGATIVE: 1

## 2024-07-29 ASSESSMENT — PAIN SCALES - GENERAL
PAINLEVEL_OUTOF10: 0
PAINLEVEL_OUTOF10: 8
PAINLEVEL_OUTOF10: 0

## 2024-07-29 ASSESSMENT — PAIN DESCRIPTION - ORIENTATION: ORIENTATION: RIGHT;LEFT

## 2024-07-29 ASSESSMENT — PAIN DESCRIPTION - LOCATION: LOCATION: LEG;KNEE;FOOT

## 2024-07-29 NOTE — PROCEDURES
INSTRUMENTAL SWALLOW REPORT  MODIFIED BARIUM SWALLOW    NAME: Sanjay Banegas   : 1960  MRN: [id number]       Date of Eval: 2024              Referring Diagnosis(es):      Past Medical History:  has a past medical history of Acute congestive heart failure ([facility]), Acute on chronic diastolic congestive heart failure ([facility]), Anemia, Anemia of chronic renal failure, Atrial fibrillation ([facility]), AVF (arteriovenous fistula) ([facility]), Bowel obstruction ([facility]), CAD (coronary artery disease), Chest pain at rest, CHF (congestive heart failure) ([facility]), CHF (congestive heart failure), NYHA class I, acute on chronic, combined ([facility]), Chronic kidney disease, CKD (chronic kidney disease) stage 4, GFR 15-29 ml/min ([facility]), Coronary artery disease involving native coronary artery of native heart without angina pectoris, Diabetes mellitus ([facility]), Dialysis patient ([facility]), ESRD (end stage renal disease) ([facility]), Essential hypertension, Groin swelling, Hemodialysis patient ([facility]), Hydrocele, Hyperlipidemia, Hypertension, Inguinal hernia, MI, old, NSTEMI (non-ST elevated myocardial infarction) ([facility]), On home O2, BATSHEVA (obstructive sleep apnea), Patient in clinical research study, Pneumonia, Poor historian, Prostatism, Respiratory distress, Rising PSA level, S/P arteriovenous (AV) graft placement, S/P PTCA - TIM distal LAD - Dr. Orozco 19, Sleep apnea, Small bowel obstruction ([facility]), SOB (shortness of breath), and Type 2 diabetes mellitus with chronic kidney disease on chronic dialysis ([facility]).  Past Surgical History:  has a past surgical history that includes Foot surgery (Left, ); Colonoscopy; Cystoscopy (2017); Prostate biopsy (N/A, 2017); Abdomen surgery (); Prostate Biopsy (2018); pr unlisted procedure male genital system (N/A, 2018); Tonsillectomy (); Vasectomy (); Middle ear surgery (); Nose surgery (); Mouth surgery (); AV fistula creation (Left, 2019); Dialysis fistula 
    PROCEDURE NOTE - ARTERIAL LINE PLACEMENT    PATIENT NAME: Sanjay Banegas  MEDICAL RECORD NO. [id number]  DATE: 7/24/2024  ATTENDING PHYSICIAN:  Dr. Garza      PREOPERATIVE DIAGNOSIS:  Need for blood pressure monitoring  POSTOPERATIVE DIAGNOSIS:  Same  PROCEDURE PERFORMED: Right Radial Arterial Line Insertion  PERFORMING PHYSICIAN:  Zackery Calderon MD  ESTIMATED BLOOD LOSS:  Less than 25 ml  COMPLICATIONS:  None immediately appreciated.     DISCUSSION:  Sanjay Banegas is a 64 y.o. male who requires invasive pressure monitoring. The history and physical examination were reviewed and confirmed.      CONSENT: The patient provided verbal consent for this procedure.     PROCEDURE:  A timeout was initiated by the bedside nurse and was confirmed by those present.  The patient was placed in a supine position. The skin overlying the Right Radial was prepped with chlorhexadine. Through this region, the introducer needle through catheter was inserted into artery until pulsatile bright blood was seen in collection tubing. Guidewire was advanced with no resistance. Catheter was advanced into the artery, wire was pulled with brisk bleeding noted. Pressure monitoring setup was connected to the catheter, it aspirated and flushed easily. The catheter was secured to the wrist with 3-0 silk.     No immediate complication was evident.  All sponge, instrument and needle counts were correct at the completion of the procedure.      Zackery Calderon MD  3:53 PM, 7/24/24     Attending Physician Statement    I was present     Electronically signed by KELLI GARZA MD on   7/24/24 at 9:46 PM EDT    Please note that this chart was generated using voice recognition Dragon dictation software.  Although every effort was made to ensure the accuracy of this automated transcription, some errors in transcription may have occurred.    
PARACENTESIS NOTE  Indications: Large ascites  Consent obtained: Yes  Operators: Hermelindo Garzon  Ultrasound guidance: Used    Pre-procedure diagnosis: Large ascites  Timeout was performed  Consent was collected    Procedure:  Using sterile technique the abdomen was prepped with chlorhexidine and draped.  10 mLs of 1% plain Lidocaine was used to raise a wheal and anesthetize the needle tract. Using an scalpel, a small incision was made in the skin. Using a paracentesis needle/catheter the abdominal cavity was entered and ascitic fluid was withdrawn.  The catheter kept inside the peritoneal cavity and the needle was retracted.      Findings:  Fluid appearance: Serosanguineous/bloody  Volume of ascites removed: 4000 mL  Number of attempts: 1  Diagnostic studies:  - cell count and differential  - gram stain and culture  - albumin  50 g of albumin was administered post-procedure.   I was present for the entire procedure. There were no immediate or anticipated complications.  I spent 50 minutes with the patient.       HERMELINDO GARZA MD  Pulmonary and critical care attending physician   [facility] respiratory specialist    Office phone number [phone]   
PROCEDURE NOTE  Date: 7/24/2024   Name: Sanjay Banegas  YOB: 1960    PROCEDURE NOTE - ARTERIAL LINE PLACEMENT    PATIENT NAME: Sanjay Banegas  MEDICAL RECORD NO. [id number]  DATE: 7/24/2024  ATTENDING PHYSICIAN:  Dr. Hale      PREOPERATIVE DIAGNOSIS:  Need for blood pressure monitoring  POSTOPERATIVE DIAGNOSIS:  Same  PROCEDURE PERFORMED: Left Radial Arterial Line Insertion  PERFORMING PHYSICIAN:  Emil Bergeron MD  ESTIMATED BLOOD LOSS:  Less than 25 ml  COMPLICATIONS:  None immediately appreciated.     DISCUSSION:  Sanjay Banegas is a 64 y.o. male who requires invasive pressure monitoring. The history and physical examination were reviewed and confirmed.      CONSENT: The patient provided verbal consent for this procedure.     PROCEDURE:  A timeout was initiated by the bedside nurse and was confirmed by those present.  The patient was placed in a supine position. The skin overlying the Left Radial was prepped with chlorhexadine. Through this region, the introducer needle through catheter was inserted into Left radial artery until pulsatile bright blood was seen in collection tubing. Guidewire was advanced with no resistance. Catheter was advanced into the artery, wire was pulled with brisk bleeding noted. Pressure monitoring setup was connected to the catheter, it aspirated and flushed easily. The catheter was secured to the wrist with 3-0 silk.     No immediate complication was evident.  All sponge, instrument and needle counts were correct at the completion of the procedure.      Emil Bergeron MD  3:55 PM, 7/24/24            
PROCEDURE NOTE  Date: 7/26/2024   Name: Sanjay Banegas  YOB: 1960    Procedures        Midline insertion note: PICC order - Midline ordered in comments.     Prescribed therapy: ***  Product type: BARD MST PowerMidline  Insertion site: Right Left ***   Vein measures 0.*** cm with an area CVR of ***%.(Preffered area based CVR would be less than 20%).  History/Labs/Allergies Reviewed  Placed By: *** - RN IV Team  Assistant: Staff -RN  Time out Performed using Two Identifiers  Lot #: ***  Expiration date: ***  Catheter size: *** Frisian  Total length: *** cm  External catheter length: *** cm  Number of attempts: ***  Estimated blood loss: 1 ml  Placement verified by: positive blood return & flushes easily  Special equipment used: ultrasound & micro-introducer technique   Catheter securement: Adhesive Worldcoo securement device  *** Catheter securement adhesive (SecureportIV) utilized at insertion site  Dressing applied: Tegaderm CHG  Lidocaine administered intradermally conc.1%: approx 1 mL    Midline education:     [  ] Post care line insertion was discussed with patient/Family or POA prior to procedure.  Risks, benefits, alternatives, and reason for procedure were discussed and teaching was reinforced. An educational handout on post insertion line care and maintenance was left at bedside with patient or in chart. Patient (Family or POA) acknowledged understanding of information relayed.      [  ] Post care of line insertion was not discussed with patient/family or POA due to pts medical status at time of procedure. Patient's family or POA not available to discuss line education. An educational handout on post insertion line care and maintenance was left at bedside or in chart.     *** vessel picture   
PROCEDURE NOTE  Date: 7/26/2024   Name: Sanjay Banegas  YOB: 1960    Procedures      Extended Dwell peripheral catheter insertion note:    Reason for placement: poor  access   Device inserted 20g 10cm extended dwell Catheter  Ultrasound preassessment done. Target vessel is left brachial vein.   Vessel depth = 0.5 cm.   Vein measures 0.31 cm. CVR is 12.1 %. (Preffered area-based CVR is less than 20%).  Placed using ANTT fashion, US prepared with sterile probe cover.   Visualization of needle entry into vessel, 1 attempt using AST technique.   Total 10 cm inserted, 0cm external.   Lot # = [id number]  Expires = 07/31/2025  Easy aspiration of dark non-pulsating blood. Flushes easily with 10ml of 0.9 NS.   Catheter secured with adhesive securement device.    Catheter securement adhesive utilized at insertion site.  Dressing placed - CHG-TSM.   Injection cap and swab cap applied.   No bleeding or hematoma noted.   Estimated blood loss = 0ml.   Device should have blood return, if no blood return assess for infiltration and/or call VAT for consult.    Instructions given on insertion and care.  RN aware no lab draws without a physician order, line is power injectable, keep clamped when not in use, pulsatile 10ml NS flush every 8 hours when not in use or after intermittent use with medication. Line ready for use.    Extended dwell catheter education:     x  ] Post care line insertion was discussed with patient/Family or POA prior to procedure.  Risks, benefits, alternatives, and reason for procedure were discussed and teaching was reinforced. An educational handout on post insertion line care and maintenance was left at bedside with patient or in chart. Patient (Family or POA) acknowledged understanding of information relayed.        
sterile dressing was applied.  No immediate complication was evident.  All sponge, instrument and needle counts were correct at the completion of the procedure.    A post procedure X-ray was not indicated. The patient tolerated the procedure well with no immediate complication evident.     Winter Duckworth MD  10:18 AM, 7/21/24    Attending Physician Statement  I have discussed the care of Sanjay Banegas, including pertinent history and exam findings,  with the resident. I have seen and examined the patient and the key elements of all parts of the encounter have been performed by me.  I agree with the assessment, plan and orders as documented by the resident with additions .        Electronically signed by SIN MCCALL MD on   7/21/24 at 4:31 PM EDT    Please note that this chart was generated using voice recognition Dragon dictation software.  Although every effort was made to ensure the accuracy of this automated transcription, some errors in transcription may have occurred.

## 2024-07-30 VITALS
HEIGHT: 70 IN | HEART RATE: 78 BPM | RESPIRATION RATE: 29 BRPM | DIASTOLIC BLOOD PRESSURE: 40 MMHG | OXYGEN SATURATION: 100 % | BODY MASS INDEX: 20.29 KG/M2 | WEIGHT: 141.76 LBS | TEMPERATURE: 97.6 F | SYSTOLIC BLOOD PRESSURE: 97 MMHG

## 2024-07-30 LAB
ALBUMIN SERPL-MCNC: 3.6 G/DL (ref 3.5–5.2)
ALBUMIN/GLOB SERPL: 2 {RATIO} (ref 1–2.5)
ALP SERPL-CCNC: 86 U/L (ref 40–129)
ALT SERPL-CCNC: 6 U/L (ref 10–50)
ANION GAP SERPL CALCULATED.3IONS-SCNC: 14 MMOL/L (ref 9–16)
AST SERPL-CCNC: 14 U/L (ref 10–50)
BASOPHILS # BLD: 0 K/UL (ref 0–0.2)
BASOPHILS NFR BLD: 0 % (ref 0–2)
BILIRUB SERPL-MCNC: 1 MG/DL (ref 0–1.2)
BUN SERPL-MCNC: 40 MG/DL (ref 8–23)
CALCIUM SERPL-MCNC: 9.1 MG/DL (ref 8.6–10.4)
CHLORIDE SERPL-SCNC: 97 MMOL/L (ref 98–107)
CO2 SERPL-SCNC: 24 MMOL/L (ref 20–31)
CREAT SERPL-MCNC: 4 MG/DL (ref 0.7–1.2)
EOSINOPHIL # BLD: 0.18 K/UL (ref 0–0.44)
EOSINOPHILS RELATIVE PERCENT: 2 % (ref 1–4)
ERYTHROCYTE [DISTWIDTH] IN BLOOD BY AUTOMATED COUNT: 20.1 % (ref 11.8–14.4)
GFR, ESTIMATED: 16 ML/MIN/1.73M2
GLUCOSE BLD-MCNC: 127 MG/DL (ref 75–110)
GLUCOSE BLD-MCNC: 180 MG/DL (ref 75–110)
GLUCOSE SERPL-MCNC: 116 MG/DL (ref 74–99)
HCT VFR BLD AUTO: 22.4 % (ref 40.7–50.3)
HCT VFR BLD AUTO: 24.9 % (ref 40.7–50.3)
HGB BLD-MCNC: 6.6 G/DL (ref 13–17)
HGB BLD-MCNC: 7.5 G/DL (ref 13–17)
IMM GRANULOCYTES # BLD AUTO: 0.09 K/UL (ref 0–0.3)
IMM GRANULOCYTES NFR BLD: 1 %
LYMPHOCYTES NFR BLD: 0.36 K/UL (ref 1.1–3.7)
LYMPHOCYTES RELATIVE PERCENT: 4 % (ref 24–43)
MCH RBC QN AUTO: 29 PG (ref 25.2–33.5)
MCHC RBC AUTO-ENTMCNC: 30.1 G/DL (ref 28.4–34.8)
MCV RBC AUTO: 96.1 FL (ref 82.6–102.9)
MONOCYTES NFR BLD: 1.08 K/UL (ref 0.1–1.2)
MONOCYTES NFR BLD: 12 % (ref 3–12)
MORPHOLOGY: ABNORMAL
NEUTROPHILS NFR BLD: 81 % (ref 36–65)
NEUTS SEG NFR BLD: 7.29 K/UL (ref 1.5–8.1)
NRBC BLD-RTO: 0 PER 100 WBC
PLATELET # BLD AUTO: 135 K/UL (ref 138–453)
PMV BLD AUTO: 8.9 FL (ref 8.1–13.5)
POTASSIUM SERPL-SCNC: 4.5 MMOL/L (ref 3.7–5.3)
PROT SERPL-MCNC: 6 G/DL (ref 6.6–8.7)
RBC # BLD AUTO: 2.59 M/UL (ref 4.21–5.77)
SODIUM SERPL-SCNC: 135 MMOL/L (ref 136–145)
WBC OTHER # BLD: 9 K/UL (ref 3.5–11.3)

## 2024-07-30 PROCEDURE — P9047 ALBUMIN (HUMAN), 25%, 50ML: HCPCS | Performed by: INTERNAL MEDICINE

## 2024-07-30 PROCEDURE — 6370000000 HC RX 637 (ALT 250 FOR IP): Performed by: STUDENT IN AN ORGANIZED HEALTH CARE EDUCATION/TRAINING PROGRAM

## 2024-07-30 PROCEDURE — 86900 BLOOD TYPING SEROLOGIC ABO: CPT

## 2024-07-30 PROCEDURE — 94761 N-INVAS EAR/PLS OXIMETRY MLT: CPT

## 2024-07-30 PROCEDURE — 94660 CPAP INITIATION&MGMT: CPT

## 2024-07-30 PROCEDURE — 2580000003 HC RX 258: Performed by: STUDENT IN AN ORGANIZED HEALTH CARE EDUCATION/TRAINING PROGRAM

## 2024-07-30 PROCEDURE — 30233N1 TRANSFUSION OF NONAUTOLOGOUS RED BLOOD CELLS INTO PERIPHERAL VEIN, PERCUTANEOUS APPROACH: ICD-10-PCS

## 2024-07-30 PROCEDURE — 2500000003 HC RX 250 WO HCPCS

## 2024-07-30 PROCEDURE — 90935 HEMODIALYSIS ONE EVALUATION: CPT | Performed by: INTERNAL MEDICINE

## 2024-07-30 PROCEDURE — 2700000000 HC OXYGEN THERAPY PER DAY

## 2024-07-30 PROCEDURE — 6370000000 HC RX 637 (ALT 250 FOR IP): Performed by: NURSE PRACTITIONER

## 2024-07-30 PROCEDURE — 6370000000 HC RX 637 (ALT 250 FOR IP)

## 2024-07-30 PROCEDURE — 94640 AIRWAY INHALATION TREATMENT: CPT

## 2024-07-30 PROCEDURE — P9016 RBC LEUKOCYTES REDUCED: HCPCS

## 2024-07-30 PROCEDURE — 2580000003 HC RX 258

## 2024-07-30 PROCEDURE — 36430 TRANSFUSION BLD/BLD COMPNT: CPT

## 2024-07-30 PROCEDURE — 85025 COMPLETE CBC W/AUTO DIFF WBC: CPT

## 2024-07-30 PROCEDURE — 2580000003 HC RX 258: Performed by: NURSE PRACTITIONER

## 2024-07-30 PROCEDURE — 36415 COLL VENOUS BLD VENIPUNCTURE: CPT

## 2024-07-30 PROCEDURE — 86850 RBC ANTIBODY SCREEN: CPT

## 2024-07-30 PROCEDURE — 82947 ASSAY GLUCOSE BLOOD QUANT: CPT

## 2024-07-30 PROCEDURE — 80053 COMPREHEN METABOLIC PANEL: CPT

## 2024-07-30 PROCEDURE — 6360000002 HC RX W HCPCS: Performed by: INTERNAL MEDICINE

## 2024-07-30 PROCEDURE — 86920 COMPATIBILITY TEST SPIN: CPT

## 2024-07-30 PROCEDURE — 85014 HEMATOCRIT: CPT

## 2024-07-30 PROCEDURE — 6370000000 HC RX 637 (ALT 250 FOR IP): Performed by: INTERNAL MEDICINE

## 2024-07-30 PROCEDURE — 85018 HEMOGLOBIN: CPT

## 2024-07-30 PROCEDURE — 90935 HEMODIALYSIS ONE EVALUATION: CPT

## 2024-07-30 PROCEDURE — 99291 CRITICAL CARE FIRST HOUR: CPT | Performed by: INTERNAL MEDICINE

## 2024-07-30 PROCEDURE — 6360000002 HC RX W HCPCS

## 2024-07-30 PROCEDURE — 86901 BLOOD TYPING SEROLOGIC RH(D): CPT

## 2024-07-30 RX ORDER — LACTULOSE 10 G/15ML
20 SOLUTION ORAL 3 TIMES DAILY
Qty: 1 EACH | Refills: 1 | Status: SHIPPED | OUTPATIENT
Start: 2024-07-30

## 2024-07-30 RX ORDER — IPRATROPIUM BROMIDE AND ALBUTEROL SULFATE 2.5; .5 MG/3ML; MG/3ML
3 SOLUTION RESPIRATORY (INHALATION) 3 TIMES DAILY
Qty: 360 ML | Refills: 0 | Status: SHIPPED | OUTPATIENT
Start: 2024-07-30

## 2024-07-30 RX ORDER — MIDODRINE HYDROCHLORIDE 5 MG/1
15 TABLET ORAL 3 TIMES DAILY
Qty: 90 TABLET | Refills: 3 | Status: CANCELLED | OUTPATIENT
Start: 2024-07-30

## 2024-07-30 RX ORDER — MIDODRINE HYDROCHLORIDE 5 MG/1
15 TABLET ORAL 3 TIMES DAILY
Qty: 90 TABLET | Refills: 3 | Status: SHIPPED | OUTPATIENT
Start: 2024-07-30

## 2024-07-30 RX ORDER — SODIUM CHLORIDE 9 MG/ML
INJECTION, SOLUTION INTRAVENOUS PRN
Status: DISCONTINUED | OUTPATIENT
Start: 2024-07-30 | End: 2024-07-30 | Stop reason: HOSPADM

## 2024-07-30 RX ORDER — OXYCODONE HYDROCHLORIDE 5 MG/1
5 TABLET ORAL EVERY 4 HOURS PRN
Qty: 30 TABLET | Refills: 0 | Status: CANCELLED | OUTPATIENT
Start: 2024-07-30 | End: 2024-08-04

## 2024-07-30 RX ORDER — NOREPINEPHRINE BITARTRATE 0.06 MG/ML
1-100 INJECTION, SOLUTION INTRAVENOUS CONTINUOUS
Status: DISCONTINUED | OUTPATIENT
Start: 2024-07-30 | End: 2024-07-30 | Stop reason: HOSPADM

## 2024-07-30 RX ORDER — PANTOPRAZOLE SODIUM 40 MG/1
40 TABLET, DELAYED RELEASE ORAL
Qty: 30 TABLET | Refills: 3 | Status: SHIPPED | OUTPATIENT
Start: 2024-07-31

## 2024-07-30 RX ADMIN — SODIUM CHLORIDE, PRESERVATIVE FREE 10 ML: 5 INJECTION INTRAVENOUS at 08:26

## 2024-07-30 RX ADMIN — ALBUMIN (HUMAN) 25 G: 0.25 INJECTION, SOLUTION INTRAVENOUS at 10:32

## 2024-07-30 RX ADMIN — RIFAXIMIN 550 MG: 550 TABLET ORAL at 08:20

## 2024-07-30 RX ADMIN — SODIUM CHLORIDE, PRESERVATIVE FREE 10 ML: 5 INJECTION INTRAVENOUS at 08:25

## 2024-07-30 RX ADMIN — ERYTHROPOIETIN 2000 UNITS: 2000 INJECTION, SOLUTION INTRAVENOUS; SUBCUTANEOUS at 12:59

## 2024-07-30 RX ADMIN — SODIUM CHLORIDE: 9 INJECTION, SOLUTION INTRAVENOUS at 01:49

## 2024-07-30 RX ADMIN — OXYCODONE HYDROCHLORIDE 5 MG: 5 TABLET ORAL at 03:08

## 2024-07-30 RX ADMIN — MIDODRINE HYDROCHLORIDE 15 MG: 5 TABLET ORAL at 13:43

## 2024-07-30 RX ADMIN — LACTULOSE 20 G: 20 SOLUTION ORAL at 08:20

## 2024-07-30 RX ADMIN — MIDODRINE HYDROCHLORIDE 15 MG: 5 TABLET ORAL at 08:32

## 2024-07-30 RX ADMIN — SODIUM CHLORIDE, PRESERVATIVE FREE 10 ML: 5 INJECTION INTRAVENOUS at 08:27

## 2024-07-30 RX ADMIN — OXYCODONE HYDROCHLORIDE 5 MG: 5 TABLET ORAL at 08:29

## 2024-07-30 RX ADMIN — CHOLECALCIFEROL TAB 25 MCG (1000 UNIT) 2000 UNITS: 25 TAB at 08:23

## 2024-07-30 RX ADMIN — AMIODARONE HYDROCHLORIDE 200 MG: 200 TABLET ORAL at 08:22

## 2024-07-30 RX ADMIN — Medication 5 MCG/MIN: at 01:47

## 2024-07-30 RX ADMIN — CALAMINE AND PRAMOXINE HYDROCHLORIDE: 80; 10 LOTION TOPICAL at 02:47

## 2024-07-30 RX ADMIN — PIPERACILLIN AND TAZOBACTAM 3375 MG: 3; .375 INJECTION, POWDER, LYOPHILIZED, FOR SOLUTION INTRAVENOUS at 03:40

## 2024-07-30 RX ADMIN — PANTOPRAZOLE SODIUM 40 MG: 40 TABLET, DELAYED RELEASE ORAL at 08:22

## 2024-07-30 RX ADMIN — IPRATROPIUM BROMIDE AND ALBUTEROL SULFATE 1 DOSE: 2.5; .5 SOLUTION RESPIRATORY (INHALATION) at 08:15

## 2024-07-30 RX ADMIN — LANTHANUM CARBONATE 1000 MG: 500 TABLET, CHEWABLE ORAL at 08:20

## 2024-07-30 RX ADMIN — LACTULOSE 20 G: 20 SOLUTION ORAL at 13:46

## 2024-07-30 RX ADMIN — ERYTHROPOIETIN 3000 UNITS: 3000 INJECTION, SOLUTION INTRAVENOUS; SUBCUTANEOUS at 12:58

## 2024-07-30 ASSESSMENT — PAIN SCALES - GENERAL
PAINLEVEL_OUTOF10: 0
PAINLEVEL_OUTOF10: 6
PAINLEVEL_OUTOF10: 8
PAINLEVEL_OUTOF10: 8
PAINLEVEL_OUTOF10: 5

## 2024-07-30 ASSESSMENT — PAIN DESCRIPTION - DESCRIPTORS: DESCRIPTORS: ACHING

## 2024-07-30 ASSESSMENT — ENCOUNTER SYMPTOMS
ALLERGIC/IMMUNOLOGIC NEGATIVE: 1
SHORTNESS OF BREATH: 0
ABDOMINAL DISTENTION: 1
WHEEZING: 0

## 2024-07-30 ASSESSMENT — PAIN DESCRIPTION - LOCATION
LOCATION: FOOT;LEG
LOCATION: BUTTOCKS;FOOT

## 2024-07-30 ASSESSMENT — PAIN DESCRIPTION - ORIENTATION
ORIENTATION: RIGHT;LEFT
ORIENTATION: RIGHT;LEFT

## 2024-07-30 NOTE — FLOWSHEET NOTE
Report called to Adeola at [facility] at [phone].  All questions answered.    Electronically signed by JESSICA MACIEL RN on 7/30/2024 at 1:16 PM

## 2024-07-30 NOTE — PLAN OF CARE
Problem: Discharge Planning  Goal: Discharge to home or other facility with appropriate resources  7/22/2024 2239 by Lachelle Felix RN  Outcome: Progressing  7/22/2024 1659 by Sarah Matos RN  Outcome: Not Progressing     Problem: Nutrition Deficit:  Goal: Optimize nutritional status  7/22/2024 2239 by Lachelle Felix RN  Outcome: Progressing  7/22/2024 1659 by Sarah Matos RN  Outcome: Not Progressing     Problem: Safety - Adult  Goal: Free from fall injury  7/22/2024 2239 by Lachelle Felix RN  Outcome: Progressing  7/22/2024 1659 by Sarah Matos RN  Outcome: Progressing     Problem: Pain  Goal: Verbalizes/displays adequate comfort level or baseline comfort level  7/22/2024 2239 by Lachelle Felix RN  Outcome: Progressing  7/22/2024 1659 by Sarah Matos RN  Outcome: Progressing     Problem: Skin/Tissue Integrity  Goal: Absence of new skin breakdown  Description: 1.  Monitor for areas of redness and/or skin breakdown  2.  Assess vascular access sites hourly  3.  Every 4-6 hours minimum:  Change oxygen saturation probe site  4.  Every 4-6 hours:  If on nasal continuous positive airway pressure, respiratory therapy assess nares and determine need for appliance change or resting period.  7/22/2024 2239 by Lachelle Felix RN  Outcome: Progressing  7/22/2024 1659 by Sarah Matos RN  Outcome: Progressing     Problem: ABCDS Injury Assessment  Goal: Absence of physical injury  7/22/2024 2239 by Lachelle Felix RN  Outcome: Progressing  7/22/2024 1659 by Sarah Matos RN  Outcome: Progressing     Problem: Chronic Conditions and Co-morbidities  Goal: Patient's chronic conditions and co-morbidity symptoms are monitored and maintained or improved  7/22/2024 2239 by Lachelle Felix RN  Outcome: Progressing  7/22/2024 1659 by Sarah Matos RN  Outcome: Progressing     Problem: Respiratory - Adult  Goal: Achieves optimal ventilation and oxygenation  Outcome: Progressing     
  Problem: Discharge Planning  Goal: Discharge to home or other facility with appropriate resources  7/23/2024 0712 by Sarah Matos RN  Outcome: Not Progressing     Problem: Nutrition Deficit:  Goal: Optimize nutritional status  7/23/2024 0712 by Sarah Matos RN  Outcome: Not Progressing     Problem: Safety - Adult  Goal: Free from fall injury  7/23/2024 0712 by Sarah Matos RN  Outcome: Progressing     Problem: Pain  Goal: Verbalizes/displays adequate comfort level or baseline comfort level  7/23/2024 0712 by Sarah Matos RN  Outcome: Progressing     Problem: Skin/Tissue Integrity  Goal: Absence of new skin breakdown  Description: 1.  Monitor for areas of redness and/or skin breakdown  2.  Assess vascular access sites hourly  3.  Every 4-6 hours minimum:  Change oxygen saturation probe site  4.  Every 4-6 hours:  If on nasal continuous positive airway pressure, respiratory therapy assess nares and determine need for appliance change or resting period.  7/23/2024 0712 by Sarah Matos RN  Outcome: Progressing     Problem: ABCDS Injury Assessment  Goal: Absence of physical injury  7/23/2024 0712 by Sarah Matos RN  Outcome: Progressing     Problem: Chronic Conditions and Co-morbidities  Goal: Patient's chronic conditions and co-morbidity symptoms are monitored and maintained or improved  7/23/2024 0712 by Sarah Matos RN  Outcome: Progressing     
  Problem: Discharge Planning  Goal: Discharge to home or other facility with appropriate resources  Outcome: Not Progressing     Problem: Nutrition Deficit:  Goal: Optimize nutritional status  Outcome: Not Progressing     Problem: Safety - Adult  Goal: Free from fall injury  Outcome: Progressing     Problem: Pain  Goal: Verbalizes/displays adequate comfort level or baseline comfort level  Outcome: Progressing     Problem: Skin/Tissue Integrity  Goal: Absence of new skin breakdown  Description: 1.  Monitor for areas of redness and/or skin breakdown  2.  Assess vascular access sites hourly  3.  Every 4-6 hours minimum:  Change oxygen saturation probe site  4.  Every 4-6 hours:  If on nasal continuous positive airway pressure, respiratory therapy assess nares and determine need for appliance change or resting period.  Outcome: Progressing     Problem: ABCDS Injury Assessment  Goal: Absence of physical injury  Outcome: Progressing     Problem: Chronic Conditions and Co-morbidities  Goal: Patient's chronic conditions and co-morbidity symptoms are monitored and maintained or improved  Outcome: Progressing        
  Problem: Respiratory - Adult  Goal: Achieves optimal ventilation and oxygenation  7/23/2024 0144 by Latoya Christianson RCP  Flowsheets (Taken 7/23/2024 0144)  Achieves optimal ventilation and oxygenation:   Respiratory therapy support as indicated   Assess for changes in respiratory status   Assess for changes in mentation and behavior   Oxygen supplementation based on oxygen saturation or arterial blood gases   Encourage broncho-pulmonary hygiene including cough, deep breathe, incentive spirometry   Assess and instruct to report shortness of breath or any respiratory difficulty   NON INVASIVE VENTILATION  PROVIDE OPTIMAL VENTILATION/ACCEPTABLE SP02  IMPLEMENT NON INVASIVE VENTILATION PROTOCOL  ASSESSMENT SKIN INTEGRITY  PATIENT EDUCATION AS NEEDED  BIPAP AS NEEDED  
  Problem: Respiratory - Adult  Goal: Achieves optimal ventilation and oxygenation  7/25/2024 2023 by Latoya Christianson, EARLE  Flowsheets (Taken 7/25/2024 2023)  Achieves optimal ventilation and oxygenation:   Respiratory therapy support as indicated   Assess for changes in respiratory status   Assess and instruct to report shortness of breath or any respiratory difficulty   Encourage broncho-pulmonary hygiene including cough, deep breathe, incentive spirometry   Oxygen supplementation based on oxygen saturation or arterial blood gases   Assess for changes in mentation and behavior     
  Problem: Respiratory - Adult  Goal: Achieves optimal ventilation and oxygenation  7/27/2024 0840 by Delaney Nazario RCP  Outcome: Progressing   BRONCHOSPASM/BRONCHOCONSTRICTION     [x]         IMPROVE AERATION/BREATH SOUNDS  [x]   ADMINISTER BRONCHODILATOR THERAPY AS APPROPRIATE  [x]   ASSESS BREATH SOUNDS  []   IMPLEMENT AEROSOL/MDI PROTOCOL  [x]   PATIENT EDUCATION AS NEEDED  NONINVASIVE VENTILATION    PROVIDE OPTIMAL VENTILATION/ACCEPTABLE SPO2   IMPLEMENT NONINVASIVE VENTILATION PROTOCOL   MAINTAIN ACCEPTABLE SPO2   ASSESS SKIN INTEGRITY/BREAKDOWN SCORE   PATIENT EDUCATION AS NEEDED   BIPAP AS NEEDED        
  Problem: Respiratory - Adult  Goal: Achieves optimal ventilation and oxygenation  7/30/2024 0023 by Jaida Teresa, EARLE  Outcome: Progressing   BRONCHOSPASM/BRONCHOCONSTRICTION     [x]         IMPROVE AERATION/BREATH SOUNDS  [x]   ADMINISTER BRONCHODILATOR THERAPY AS APPROPRIATE  [x]   ASSESS BREATH SOUNDS  [x]   IMPLEMENT AEROSOL/MDI PROTOCOL  [x]   PATIENT EDUCATION AS NEEDED  NON INVASIVE VENTILATION  PROVIDE OPTIMAL VENTILATION/ACCEPTABLE SP02  IMPLEMENT NON INVASIVE VENTILATION PROTOCOL  ASSESSMENT SKIN INTEGRITY  PATIENT EDUCATION AS NEEDED  BIPAP AS NEEDED  
  Problem: Respiratory - Adult  Goal: Achieves optimal ventilation and oxygenation  7/30/2024 0818 by Amanda Briceno, RCCASSIE  Outcome: Progressing     
  Problem: Respiratory - Adult  Goal: Achieves optimal ventilation and oxygenation  Flowsheets (Taken 7/25/2024 0133)  Achieves optimal ventilation and oxygenation:   Respiratory therapy support as indicated   Assess for changes in respiratory status   Assess for changes in mentation and behavior   Oxygen supplementation based on oxygen saturation or arterial blood gases   Encourage broncho-pulmonary hygiene including cough, deep breathe, incentive spirometry   Assess and instruct to report shortness of breath or any respiratory difficulty   NON INVASIVE VENTILATION  PROVIDE OPTIMAL VENTILATION/ACCEPTABLE SP02  IMPLEMENT NON INVASIVE VENTILATION PROTOCOL  ASSESSMENT SKIN INTEGRITY  PATIENT EDUCATION AS NEEDED  BIPAP AS NEEDED  
  Problem: Respiratory - Adult  Goal: Achieves optimal ventilation and oxygenation  Outcome: Progressing   BRONCHOSPASM/BRONCHOCONSTRICTION     [x]         IMPROVE AERATION/BREATH SOUNDS  [x]   ADMINISTER BRONCHODILATOR THERAPY AS APPROPRIATE  [x]   ASSESS BREATH SOUNDS  []   IMPLEMENT AEROSOL/MDI PROTOCOL  [x]   PATIENT EDUCATION AS NEEDED  NONINVASIVE VENTILATION    PROVIDE OPTIMAL VENTILATION/ACCEPTABLE SPO2   IMPLEMENT NONINVASIVE VENTILATION PROTOCOL   MAINTAIN ACCEPTABLE SPO2   ASSESS SKIN INTEGRITY/BREAKDOWN SCORE   PATIENT EDUCATION AS NEEDED   BIPAP AS NEEDED        
  Problem: Respiratory - Adult  Goal: Achieves optimal ventilation and oxygenation  Outcome: Progressing   NONINVASIVE VENTILATION    PROVIDE OPTIMAL VENTILATION/ACCEPTABLE SPO2   IMPLEMENT NONINVASIVE VENTILATION PROTOCOL   MAINTAIN ACCEPTABLE SPO2   ASSESS SKIN INTEGRITY/BREAKDOWN SCORE   PATIENT EDUCATION AS NEEDED   BIPAP AS NEEDED      PROVIDE ADEQUATE OXYGENATION WITH ACCEPTABLE SP02/ABG'S    [x]  IDENTIFY APPROPRIATE OXYGEN THERAPY  [x]   MONITOR SP02/ABG'S AS NEEDED   [x]   PATIENT EDUCATION AS NEEDED    
  Problem: Safety - Adult  Goal: Free from fall injury  7/18/2024 1805 by Debra Gomes RN  Outcome: Progressing  7/18/2024 0500 by Jyoti Ledezma RN  Outcome: Progressing     Problem: Discharge Planning  Goal: Discharge to home or other facility with appropriate resources  7/18/2024 1805 by Debra Gomes RN  Outcome: Progressing  7/18/2024 0500 by Jyoti Ledezma RN  Outcome: Progressing     Problem: Pain  Goal: Verbalizes/displays adequate comfort level or baseline comfort level  7/18/2024 1805 by Debra Gomes RN  Outcome: Progressing  7/18/2024 0500 by Jyoti Ledezma RN  Outcome: Progressing     Problem: Skin/Tissue Integrity  Goal: Absence of new skin breakdown  Description: 1.  Monitor for areas of redness and/or skin breakdown  2.  Assess vascular access sites hourly  3.  Every 4-6 hours minimum:  Change oxygen saturation probe site  4.  Every 4-6 hours:  If on nasal continuous positive airway pressure, respiratory therapy assess nares and determine need for appliance change or resting period.  7/18/2024 1805 by Debra Gomes RN  Outcome: Progressing  7/18/2024 0500 by Jyoti Ledezma RN  Outcome: Progressing     Problem: ABCDS Injury Assessment  Goal: Absence of physical injury  7/18/2024 1805 by Debra Gomes RN  Outcome: Progressing  7/18/2024 0500 by Jyoti Ledezma RN  Outcome: Progressing     
  Problem: Safety - Adult  Goal: Free from fall injury  7/19/2024 2009 by Kyara Garcia RN  Outcome: Progressing  Flowsheets (Taken 7/19/2024 2000)  Free From Fall Injury: Instruct family/caregiver on patient safety  7/19/2024 1716 by Amanda Briggs RN  Outcome: Progressing     Problem: Discharge Planning  Goal: Discharge to home or other facility with appropriate resources  7/19/2024 2009 by Kyara Garcia RN  Outcome: Progressing  7/19/2024 1716 by Amanda Briggs RN  Outcome: Progressing     Problem: Pain  Goal: Verbalizes/displays adequate comfort level or baseline comfort level  7/19/2024 2009 by Kyara Garcia RN  Outcome: Progressing  Flowsheets (Taken 7/19/2024 2000)  Verbalizes/displays adequate comfort level or baseline comfort level: Encourage patient to monitor pain and request assistance  7/19/2024 1716 by Amanda Briggs RN  Outcome: Progressing     Problem: Skin/Tissue Integrity  Goal: Absence of new skin breakdown  Description: 1.  Monitor for areas of redness and/or skin breakdown  2.  Assess vascular access sites hourly  3.  Every 4-6 hours minimum:  Change oxygen saturation probe site  4.  Every 4-6 hours:  If on nasal continuous positive airway pressure, respiratory therapy assess nares and determine need for appliance change or resting period.  7/19/2024 2009 by Kyara Garcia RN  Outcome: Progressing  7/19/2024 1716 by Amanda Briggs RN  Outcome: Progressing     Problem: ABCDS Injury Assessment  Goal: Absence of physical injury  7/19/2024 2009 by Kyara Garcia RN  Outcome: Progressing  Flowsheets (Taken 7/19/2024 2000)  Absence of Physical Injury: Implement safety measures based on patient assessment  7/19/2024 1716 by Amanda Briggs RN  Outcome: Progressing     Problem: Chronic Conditions and Co-morbidities  Goal: Patient's chronic conditions and co-morbidity symptoms are monitored and maintained or improved  7/19/2024 2009 by Kyara Garcia RN  Outcome: 
  Problem: Safety - Adult  Goal: Free from fall injury  7/20/2024 0818 by Amanda Briggs RN  Outcome: Progressing     Problem: Discharge Planning  Goal: Discharge to home or other facility with appropriate resources  7/20/2024 0818 by Amanda Brigsg RN  Outcome: Progressing     Problem: Pain  Goal: Verbalizes/displays adequate comfort level or baseline comfort level  7/20/2024 0818 by Amanda Briggs RN  Outcome: Progressing     Problem: Skin/Tissue Integrity  Goal: Absence of new skin breakdown  Description: 1.  Monitor for areas of redness and/or skin breakdown  2.  Assess vascular access sites hourly  3.  Every 4-6 hours minimum:  Change oxygen saturation probe site  4.  Every 4-6 hours:  If on nasal continuous positive airway pressure, respiratory therapy assess nares and determine need for appliance change or resting period.  7/20/2024 0818 by Amanda Briggs RN  Outcome: Progressing     Problem: ABCDS Injury Assessment  Goal: Absence of physical injury  7/20/2024 0818 by Amanda Briggs RN  Outcome: Progressing     Problem: Chronic Conditions and Co-morbidities  Goal: Patient's chronic conditions and co-morbidity symptoms are monitored and maintained or improved  7/20/2024 0818 by Amanda Briggs RN  Outcome: Progressing     Problem: Respiratory - Adult  Goal: Achieves optimal ventilation and oxygenation  7/20/2024 0818 by Amanda Briggs RN  Outcome: Progressing     Electronically signed by Amanda Briggs RN on 7/20/2024 at 8:19 AM    
  Problem: Safety - Adult  Goal: Free from fall injury  7/21/2024 2323 by Lachelle Felix RN  Outcome: Progressing  7/21/2024 1028 by Amanda Briggs RN  Outcome: Progressing     Problem: Discharge Planning  Goal: Discharge to home or other facility with appropriate resources  7/21/2024 2323 by Lachelle Felix RN  Outcome: Progressing  7/21/2024 1028 by Amanda Briggs RN  Outcome: Progressing     Problem: Pain  Goal: Verbalizes/displays adequate comfort level or baseline comfort level  7/21/2024 2323 by Lachelle Felix RN  Outcome: Progressing  7/21/2024 1028 by Amanda Briggs RN  Outcome: Progressing     Problem: Skin/Tissue Integrity  Goal: Absence of new skin breakdown  Description: 1.  Monitor for areas of redness and/or skin breakdown  2.  Assess vascular access sites hourly  3.  Every 4-6 hours minimum:  Change oxygen saturation probe site  4.  Every 4-6 hours:  If on nasal continuous positive airway pressure, respiratory therapy assess nares and determine need for appliance change or resting period.  7/21/2024 2323 by Lachelle Felix RN  Outcome: Progressing  7/21/2024 1028 by Amanda Briggs RN  Outcome: Progressing     Problem: ABCDS Injury Assessment  Goal: Absence of physical injury  7/21/2024 2323 by Lachelle Felix RN  Outcome: Progressing  7/21/2024 1028 by Amanda Briggs RN  Outcome: Progressing     Problem: Chronic Conditions and Co-morbidities  Goal: Patient's chronic conditions and co-morbidity symptoms are monitored and maintained or improved  7/21/2024 2323 by Lachelle Felix RN  Outcome: Progressing  7/21/2024 1028 by Amanda Briggs RN  Outcome: Progressing     Problem: Respiratory - Adult  Goal: Achieves optimal ventilation and oxygenation  7/21/2024 2323 by Lachelle Felix RN  Outcome: Progressing  7/21/2024 1028 by Amanda Briggs RN  Outcome: Progressing     
  Problem: Safety - Adult  Goal: Free from fall injury  7/25/2024 0956 by Tonny Craig RN  Outcome: Progressing  7/25/2024 0501 by Nova Frost RN  Outcome: Progressing     Problem: Discharge Planning  Goal: Discharge to home or other facility with appropriate resources  7/25/2024 0956 by Tonny Craig RN  Outcome: Progressing  7/25/2024 0501 by Nova Frost RN  Outcome: Progressing     Problem: Pain  Goal: Verbalizes/displays adequate comfort level or baseline comfort level  7/25/2024 0956 by Tonny Craig RN  Outcome: Progressing  7/25/2024 0501 by Nova Frost RN  Outcome: Progressing     Problem: Skin/Tissue Integrity  Goal: Absence of new skin breakdown  Description: 1.  Monitor for areas of redness and/or skin breakdown  2.  Assess vascular access sites hourly  3.  Every 4-6 hours minimum:  Change oxygen saturation probe site  4.  Every 4-6 hours:  If on nasal continuous positive airway pressure, respiratory therapy assess nares and determine need for appliance change or resting period.  7/25/2024 0956 by Tonny Craig RN  Outcome: Progressing  7/25/2024 0501 by Nova Frost RN  Outcome: Progressing     Problem: ABCDS Injury Assessment  Goal: Absence of physical injury  7/25/2024 0956 by Tonny Craig RN  Outcome: Progressing  7/25/2024 0501 by Nova Frost RN  Outcome: Progressing     Problem: Chronic Conditions and Co-morbidities  Goal: Patient's chronic conditions and co-morbidity symptoms are monitored and maintained or improved  7/25/2024 0956 by Tonny Craig RN  Outcome: Progressing  7/25/2024 0501 by Nova Frost RN  Outcome: Progressing     Problem: Respiratory - Adult  Goal: Achieves optimal ventilation and oxygenation  7/25/2024 0956 by Tonny Craig RN  Outcome: Progressing  7/25/2024 0501 by Nova Frost RN  Outcome: Progressing  7/25/2024 0133 by Latoya Christianson RCP  Flowsheets (Taken 7/25/2024 0133)  Achieves optimal ventilation and oxygenation:   Respiratory therapy 
  Problem: Safety - Adult  Goal: Free from fall injury  7/26/2024 1310 by Quin Callahan RN  Outcome: Progressing     Problem: Discharge Planning  Goal: Discharge to home or other facility with appropriate resources  7/26/2024 1310 by Quin Callahan RN  Outcome: Progressing     Problem: Pain  Goal: Verbalizes/displays adequate comfort level or baseline comfort level  7/26/2024 1310 by Quin Callahan RN  Outcome: Progressing  Flowsheets  Taken 7/26/2024 1200  Verbalizes/displays adequate comfort level or baseline comfort level:   Encourage patient to monitor pain and request assistance   Assess pain using appropriate pain scale  Taken 7/26/2024 0800  Verbalizes/displays adequate comfort level or baseline comfort level:   Encourage patient to monitor pain and request assistance   Assess pain using appropriate pain scale     Problem: Skin/Tissue Integrity  Goal: Absence of new skin breakdown  Description: 1.  Monitor for areas of redness and/or skin breakdown  2.  Assess vascular access sites hourly  3.  Every 4-6 hours minimum:  Change oxygen saturation probe site  4.  Every 4-6 hours:  If on nasal continuous positive airway pressure, respiratory therapy assess nares and determine need for appliance change or resting period.  7/26/2024 1310 by Quin Callahan, RN  Outcome: Progressing     
  Problem: Safety - Adult  Goal: Free from fall injury  7/28/2024 1539 by Leah Dumont RN  Outcome: Progressing  7/28/2024 0250 by Becca Scott RN  Outcome: Progressing     Problem: Discharge Planning  Goal: Discharge to home or other facility with appropriate resources  Outcome: Progressing     Problem: Pain  Goal: Verbalizes/displays adequate comfort level or baseline comfort level  7/28/2024 1539 by Leah Dumont RN  Outcome: Progressing  7/28/2024 0250 by Becca Scott RN  Outcome: Progressing  Flowsheets (Taken 7/27/2024 1614 by Quin Callahan RN)  Verbalizes/displays adequate comfort level or baseline comfort level:   Encourage patient to monitor pain and request assistance   Assess pain using appropriate pain scale     Problem: Skin/Tissue Integrity  Goal: Absence of new skin breakdown  Description: 1.  Monitor for areas of redness and/or skin breakdown  2.  Assess vascular access sites hourly  3.  Every 4-6 hours minimum:  Change oxygen saturation probe site  4.  Every 4-6 hours:  If on nasal continuous positive airway pressure, respiratory therapy assess nares and determine need for appliance change or resting period.  7/28/2024 1539 by Leah Dumont, RN  Outcome: Progressing  7/28/2024 0250 by Becca Scott RN  Outcome: Progressing     Problem: ABCDS Injury Assessment  Goal: Absence of physical injury  7/28/2024 1539 by Leah Dumont RN  Outcome: Progressing  7/28/2024 0250 by Becca Scott RN  Outcome: Progressing     Problem: Chronic Conditions and Co-morbidities  Goal: Patient's chronic conditions and co-morbidity symptoms are monitored and maintained or improved  Outcome: Progressing     Problem: Respiratory - Adult  Goal: Achieves optimal ventilation and oxygenation  7/28/2024 1539 by Leah Dumont, RN  Outcome: Progressing  7/28/2024 0823 by Delaney Nazario RCP  Outcome: Progressing     Problem: Neurosensory - Adult  Goal: Achieves stable or improved neurological 
  Problem: Safety - Adult  Goal: Free from fall injury  7/29/2024 0454 by Becca Scott RN  Outcome: Progressing     Problem: Discharge Planning  Goal: Discharge to home or other facility with appropriate resources  7/29/2024 0454 by Becca Scott RN  Outcome: Progressing     Problem: Pain  Goal: Verbalizes/displays adequate comfort level or baseline comfort level  7/29/2024 0454 by Becca Scott RN  Outcome: Progressing     Problem: Skin/Tissue Integrity  Goal: Absence of new skin breakdown  7/29/2024 0454 by Becca Scott RN  Outcome: Progressing       
  Problem: Safety - Adult  Goal: Free from fall injury  7/29/2024 1155 by Vasiliy Schmitt RN  Outcome: Progressing  7/29/2024 0454 by Becca Scott RN  Outcome: Progressing     Problem: Discharge Planning  Goal: Discharge to home or other facility with appropriate resources  7/29/2024 1155 by Vasiliy Schmitt RN  Outcome: Progressing  7/29/2024 0454 by Becca Scott RN  Outcome: Progressing     Problem: Pain  Goal: Verbalizes/displays adequate comfort level or baseline comfort level  7/29/2024 1155 by Vasiliy Schmitt RN  Outcome: Progressing  7/29/2024 0454 by Becca Scott RN  Outcome: Progressing     Problem: ABCDS Injury Assessment  Goal: Absence of physical injury  7/29/2024 1155 by Vasiliy Schmitt RN  Outcome: Progressing  7/29/2024 0454 by Becca Scott RN  Outcome: Progressing     Problem: Respiratory - Adult  Goal: Achieves optimal ventilation and oxygenation  Outcome: Progressing     Problem: Neurosensory - Adult  Goal: Achieves stable or improved neurological status  Outcome: Progressing  Goal: Achieves maximal functionality and self care  Outcome: Progressing     Problem: Cardiovascular - Adult  Goal: Maintains optimal cardiac output and hemodynamic stability  Outcome: Progressing  Goal: Absence of cardiac dysrhythmias or at baseline  Outcome: Progressing     Problem: Skin/Tissue Integrity - Adult  Goal: Skin integrity remains intact  Outcome: Progressing  Goal: Incisions, wounds, or drain sites healing without S/S of infection  Outcome: Progressing  Goal: Oral mucous membranes remain intact  Outcome: Progressing     Problem: Musculoskeletal - Adult  Goal: Return mobility to safest level of function  Outcome: Progressing  Goal: Maintain proper alignment of affected body part  Outcome: Progressing  Goal: Return ADL status to a safe level of function  Outcome: Progressing     Problem: Gastrointestinal - Adult  Goal: Maintains or returns to baseline bowel function  Outcome: Progressing  Goal: Maintains 
  Problem: Safety - Adult  Goal: Free from fall injury  7/30/2024 1429 by Arlen Mobley RN  Outcome: Completed  7/30/2024 0345 by Nova Frost RN  Outcome: Progressing     Problem: Discharge Planning  Goal: Discharge to home or other facility with appropriate resources  7/30/2024 1429 by Arlen Mobley RN  Outcome: Completed  7/30/2024 0345 by Nova Frost RN  Outcome: Progressing     Problem: Pain  Goal: Verbalizes/displays adequate comfort level or baseline comfort level  7/30/2024 1429 by Arlen Mobley RN  Outcome: Completed  7/30/2024 0345 by Nova Frost RN  Outcome: Progressing     Problem: Skin/Tissue Integrity  Goal: Absence of new skin breakdown  Description: 1.  Monitor for areas of redness and/or skin breakdown  2.  Assess vascular access sites hourly  3.  Every 4-6 hours minimum:  Change oxygen saturation probe site  4.  Every 4-6 hours:  If on nasal continuous positive airway pressure, respiratory therapy assess nares and determine need for appliance change or resting period.  7/30/2024 1429 by Arlen Mobley RN  Outcome: Completed  7/30/2024 0345 by Nova Frost RN  Outcome: Progressing     Problem: ABCDS Injury Assessment  Goal: Absence of physical injury  7/30/2024 1429 by Arlen Mobley RN  Outcome: Completed  7/30/2024 0345 by Nova Frost RN  Outcome: Progressing     Problem: Chronic Conditions and Co-morbidities  Goal: Patient's chronic conditions and co-morbidity symptoms are monitored and maintained or improved  7/30/2024 1429 by Arlen Mobley RN  Outcome: Completed  7/30/2024 0345 by Nova Frost RN  Outcome: Progressing     Problem: Respiratory - Adult  Goal: Achieves optimal ventilation and oxygenation  7/30/2024 1429 by Arlen Mobley RN  Outcome: Completed  7/30/2024 0818 by Amanda Briceno RCP  Outcome: Progressing  7/30/2024 0345 by Nova Frost RN  Outcome: Progressing     Problem: Neurosensory - Adult  Goal: Achieves stable or improved neurological status  7/30/2024 1429 by 
  Problem: Safety - Adult  Goal: Free from fall injury  Outcome: Progressing     Problem: Discharge Planning  Goal: Discharge to home or other facility with appropriate resources  Outcome: Progressing     Problem: Pain  Goal: Verbalizes/displays adequate comfort level or baseline comfort level  Outcome: Progressing     Problem: Skin/Tissue Integrity  Goal: Absence of new skin breakdown  Description: 1.  Monitor for areas of redness and/or skin breakdown  2.  Assess vascular access sites hourly  3.  Every 4-6 hours minimum:  Change oxygen saturation probe site  4.  Every 4-6 hours:  If on nasal continuous positive airway pressure, respiratory therapy assess nares and determine need for appliance change or resting period.  Outcome: Progressing     Problem: ABCDS Injury Assessment  Goal: Absence of physical injury  Outcome: Progressing     
  Problem: Safety - Adult  Goal: Free from fall injury  Outcome: Progressing     Problem: Discharge Planning  Goal: Discharge to home or other facility with appropriate resources  Outcome: Progressing     Problem: Pain  Goal: Verbalizes/displays adequate comfort level or baseline comfort level  Outcome: Progressing     Problem: Skin/Tissue Integrity  Goal: Absence of new skin breakdown  Description: 1.  Monitor for areas of redness and/or skin breakdown  2.  Assess vascular access sites hourly  3.  Every 4-6 hours minimum:  Change oxygen saturation probe site  4.  Every 4-6 hours:  If on nasal continuous positive airway pressure, respiratory therapy assess nares and determine need for appliance change or resting period.  Outcome: Progressing     Problem: ABCDS Injury Assessment  Goal: Absence of physical injury  Outcome: Progressing     Problem: Chronic Conditions and Co-morbidities  Goal: Patient's chronic conditions and co-morbidity symptoms are monitored and maintained or improved  Outcome: Progressing     Electronically signed by Amanda Briggs RN on 7/19/2024 at 5:16 PM    
  Problem: Safety - Adult  Goal: Free from fall injury  Outcome: Progressing     Problem: Discharge Planning  Goal: Discharge to home or other facility with appropriate resources  Outcome: Progressing     Problem: Pain  Goal: Verbalizes/displays adequate comfort level or baseline comfort level  Outcome: Progressing     Problem: Skin/Tissue Integrity  Goal: Absence of new skin breakdown  Description: 1.  Monitor for areas of redness and/or skin breakdown  2.  Assess vascular access sites hourly  3.  Every 4-6 hours minimum:  Change oxygen saturation probe site  4.  Every 4-6 hours:  If on nasal continuous positive airway pressure, respiratory therapy assess nares and determine need for appliance change or resting period.  Outcome: Progressing     Problem: ABCDS Injury Assessment  Goal: Absence of physical injury  Outcome: Progressing     Problem: Chronic Conditions and Co-morbidities  Goal: Patient's chronic conditions and co-morbidity symptoms are monitored and maintained or improved  Outcome: Progressing     Problem: Respiratory - Adult  Goal: Achieves optimal ventilation and oxygenation  7/25/2024 0501 by Nova Frost RN  Outcome: Progressing     Problem: Nutrition Deficit:  Goal: Optimize nutritional status  Outcome: Not Progressing     
  Problem: Safety - Adult  Goal: Free from fall injury  Outcome: Progressing     Problem: Discharge Planning  Goal: Discharge to home or other facility with appropriate resources  Outcome: Progressing     Problem: Pain  Goal: Verbalizes/displays adequate comfort level or baseline comfort level  Outcome: Progressing     Problem: Skin/Tissue Integrity  Goal: Absence of new skin breakdown  Description: 1.  Monitor for areas of redness and/or skin breakdown  2.  Assess vascular access sites hourly  3.  Every 4-6 hours minimum:  Change oxygen saturation probe site  4.  Every 4-6 hours:  If on nasal continuous positive airway pressure, respiratory therapy assess nares and determine need for appliance change or resting period.  Outcome: Progressing     Problem: ABCDS Injury Assessment  Goal: Absence of physical injury  Outcome: Progressing     Problem: Chronic Conditions and Co-morbidities  Goal: Patient's chronic conditions and co-morbidity symptoms are monitored and maintained or improved  Outcome: Progressing     Problem: Respiratory - Adult  Goal: Achieves optimal ventilation and oxygenation  7/26/2024 0120 by Reina Hogan RN  Outcome: Progressing     Problem: Neurosensory - Adult  Goal: Achieves stable or improved neurological status  Outcome: Progressing     
  Problem: Safety - Adult  Goal: Free from fall injury  Outcome: Progressing     Problem: Discharge Planning  Goal: Discharge to home or other facility with appropriate resources  Outcome: Progressing     Problem: Pain  Goal: Verbalizes/displays adequate comfort level or baseline comfort level  Outcome: Progressing     Problem: Skin/Tissue Integrity  Goal: Absence of new skin breakdown  Description: 1.  Monitor for areas of redness and/or skin breakdown  2.  Assess vascular access sites hourly  3.  Every 4-6 hours minimum:  Change oxygen saturation probe site  4.  Every 4-6 hours:  If on nasal continuous positive airway pressure, respiratory therapy assess nares and determine need for appliance change or resting period.  Outcome: Progressing     Problem: ABCDS Injury Assessment  Goal: Absence of physical injury  Outcome: Progressing     Problem: Chronic Conditions and Co-morbidities  Goal: Patient's chronic conditions and co-morbidity symptoms are monitored and maintained or improved  Outcome: Progressing     Problem: Respiratory - Adult  Goal: Achieves optimal ventilation and oxygenation  7/30/2024 0345 by Nova Frost RN  Outcome: Progressing    Problem: Nutrition Deficit:  Goal: Optimize nutritional status  Outcome: Progressing     Problem: Confusion  Goal: Confusion, delirium, dementia, or psychosis is improved or at baseline  Description: INTERVENTIONS:  1. Assess for possible contributors to thought disturbance, including medications, impaired vision or hearing, underlying metabolic abnormalities, dehydration, psychiatric diagnoses, and notify attending LIP  2. [place] high risk fall precautions, as indicated  3. Provide frequent short contacts to provide reality reorientation, refocusing and direction  4. Decrease environmental stimuli, including noise as appropriate  5. Monitor and intervene to maintain adequate nutrition, hydration, elimination, sleep and activity  6. If unable to ensure safety without 
  Problem: Safety - Adult  Goal: Free from fall injury  Outcome: Progressing     Problem: Discharge Planning  Goal: Discharge to home or other facility with appropriate resources  Outcome: Progressing     Problem: Pain  Goal: Verbalizes/displays adequate comfort level or baseline comfort level  Outcome: Progressing     Problem: Skin/Tissue Integrity  Goal: Absence of new skin breakdown  Description: 1.  Monitor for areas of redness and/or skin breakdown  2.  Assess vascular access sites hourly  3.  Every 4-6 hours minimum:  Change oxygen saturation probe site  4.  Every 4-6 hours:  If on nasal continuous positive airway pressure, respiratory therapy assess nares and determine need for appliance change or resting period.  Outcome: Progressing     Problem: ABCDS Injury Assessment  Goal: Absence of physical injury  Outcome: Progressing     Problem: Chronic Conditions and Co-morbidities  Goal: Patient's chronic conditions and co-morbidity symptoms are monitored and maintained or improved  Outcome: Progressing     Problem: Respiratory - Adult  Goal: Achieves optimal ventilation and oxygenation  Outcome: Progressing     Electronically signed by Amanda Briggs RN on 7/21/2024 at 10:28 AM    
  Problem: Safety - Adult  Goal: Free from fall injury  Outcome: Progressing     Problem: Discharge Planning  Goal: Discharge to home or other facility with appropriate resources  Outcome: Progressing     Problem: Pain  Goal: Verbalizes/displays adequate comfort level or baseline comfort level  Outcome: Progressing     Problem: Skin/Tissue Integrity  Goal: Absence of new skin breakdown  Description: 1.  Monitor for areas of redness and/or skin breakdown  2.  Assess vascular access sites hourly  3.  Every 4-6 hours minimum:  Change oxygen saturation probe site  4.  Every 4-6 hours:  If on nasal continuous positive airway pressure, respiratory therapy assess nares and determine need for appliance change or resting period.  Outcome: Progressing     Problem: ABCDS Injury Assessment  Goal: Absence of physical injury  Outcome: Progressing     Problem: Chronic Conditions and Co-morbidities  Goal: Patient's chronic conditions and co-morbidity symptoms are monitored and maintained or improved  Outcome: Progressing     Problem: Respiratory - Adult  Goal: Achieves optimal ventilation and oxygenation  Outcome: Progressing     Problem: Nutrition Deficit:  Goal: Optimize nutritional status  Outcome: Not Progressing     
  Problem: Safety - Adult  Goal: Free from fall injury  Outcome: Progressing     Problem: Discharge Planning  Goal: Discharge to home or other facility with appropriate resources  Outcome: Progressing     Problem: Pain  Goal: Verbalizes/displays adequate comfort level or baseline comfort level  Outcome: Progressing  Flowsheets (Taken 7/27/2024 0800)  Verbalizes/displays adequate comfort level or baseline comfort level:   Encourage patient to monitor pain and request assistance   Assess pain using appropriate pain scale     Problem: Skin/Tissue Integrity  Goal: Absence of new skin breakdown  Description: 1.  Monitor for areas of redness and/or skin breakdown  2.  Assess vascular access sites hourly  3.  Every 4-6 hours minimum:  Change oxygen saturation probe site  4.  Every 4-6 hours:  If on nasal continuous positive airway pressure, respiratory therapy assess nares and determine need for appliance change or resting period.  Outcome: Progressing     
  Problem: Safety - Adult  Goal: Free from fall injury  Outcome: Progressing     Problem: Pain  Goal: Verbalizes/displays adequate comfort level or baseline comfort level  Outcome: Progressing    Problem: Skin/Tissue Integrity  Goal: Absence of new skin breakdown  Outcome: Progressing     Problem: ABCDS Injury Assessment  Goal: Absence of physical injury  Outcome: Progressing      
I reviewed the cardiac cath from 2023 with Dr. Hawkins and it shows minimal disease. Patient have troponin's elevation above the baseline which is likely due to underlying ESRD. Patient have atypical chest pain and EKG have no new changes.  We will cancel the cardiac cath for now. Please let us know if there is any change in the status of the patient. RN informed.      Layne Jiménez MD.  Cardiovascular Fellow,   [facility], [place], [place].    
NON INVASIVE VENTILATION  PROVIDE OPTIMAL VENTILATION/ACCEPTABLE SP02  IMPLEMENT NON INVASIVE VENTILATION PROTOCOL  ASSESSMENT SKIN INTEGRITY  PATIENT EDUCATION AS NEEDED  BIPAP AS NEEDED  
Per nursing staff rapid response was called due to difficulty obtaining access.  Patient is supposed to be getting albumin for low blood pressure however was not receiving it due to no access. MAP was 60 on initial evaluation. Cuff location was changed and MAP was 65. Primary team arrived at bedside to access the pt and states his BP is normally in this range and no need for ICU at this point. Pt is at his baseline mentation and saturating well on NC. HR is stable. Nursing staff at bedside obtaining access now.       Bassam Greenwood M.D.  Internal Medicine Chief Resident PGY-3  [facility],  [place], [place].    
Rapid response is called due to low BP and poor IV access. Nurse could not reach out to Intermed NP due to perfect serve shut down. Nephro was called gave pt midodrine. Because pt max out on it and cannot get albumin due to poor access recommended to either transfer to ICU or call rapid.   MAP >60. No change in mentation. Pt evaluated at bedside. Plan is to obtain IV access and then give albumin.   
monitored and maintained or improved  7/18/2024 2136 by Steffi Gusman, RN  Outcome: Progressing  Flowsheets (Taken 7/18/2024 1945)  Care Plan - Patient's Chronic Conditions and Co-Morbidity Symptoms are Monitored and Maintained or Improved:   Monitor and assess patient's chronic conditions and comorbid symptoms for stability, deterioration, or improvement   Collaborate with multidisciplinary team to address chronic and comorbid conditions and prevent exacerbation or deterioration   Update acute care plan with appropriate goals if chronic or comorbid symptoms are exacerbated and prevent overall improvement and discharge  7/18/2024 1805 by Debra Gomes, RN  Outcome: Progressing

## 2024-07-30 NOTE — FLOWSHEET NOTE
Patient discharged to [facility].  Patient accompanied by [facility] via stretcher.  Patient's cell phone & blanket sent with patient.  Alyse, wife, present and aware of discharge.      Electronically signed by JESSICA MACIEL RN on 7/30/2024 at 3:00 PM

## 2024-07-30 NOTE — CONSENT
Informed Consent for Blood Component Transfusion Note    I have discussed with the patient the rationale for blood component transfusion; its benefits in treating or preventing fatigue, organ damage, or death; and its risk which includes mild transfusion reactions, rare risk of blood borne infection, or more serious but rare reactions. I have discussed the alternatives to transfusion, including the risk and consequences of not receiving transfusion. The patient had an opportunity to ask questions and had agreed to proceed with transfusion of blood components.      Electronically signed by Winter Duckworth MD on 7/30/2024 at 2:20 AM    Winter Duckworth MD  Internal Medicine Resident, PGY- 2  [facility],  [place], [place].  2:20 AM

## 2024-07-30 NOTE — ADT AUTH CERT
with TIM to LAD in 2019/Essential HTN/ Hyperlipidemia  Cardiology consulted : no active intervention or workup required at this time.  On Amiodarone  Atorvastatin  Vasopressors: Midodrine 15 mg thrice daily                             Levophed 1mc     Pulmonary: Dx-COPD/ Acute on Chronic Respiratory Failure  Patient saturates well on Nasal cannula alternating with BIPAP  COPD exacerbation protocol started on 7/25: Methylprednisolone and Zosyn   Bronchodilators: Duoneb  Abx: Zosyn for 5 days (7/30)     GI/Nutrition  Diet: ADULT DIET; Regular; 5 carb choices (75 gm/meal); Low Sodium (2 gm); Low Phosphorus (Less than 1000 mg); 1500 ml  ADULT ORAL NUTRITION SUPPLEMENT; Lunch; Renal Oral Supplement     Renal/Fluid/Electrolyte: ESRD on Dialysis  Patient undergoing dialysis as scheduled. TTS     Intake/Output Summary (Last 24 hours) at 7/29/2024 0830  Last data filed at 7/29/2024 0652        Gross per 24 hour   Intake 613.24 ml   Output 4000 ml   Net -3386.76 ml      Monitor electrolytes, replace PRN         MSK: Ortho consulted              No active intervention at the moment.            Hematology: Anemia secondary to ESRD                 -drop in Hb from 8.3 to 7.4, Monitor H&H                 -currently on Epoetin kenneth  Endocrine: DM: Patient is on medium dose sliding scale.  glucose controlled - most recent BGL is 159   .     Discharge Needs:  PT, OT, ST, SW, and Case Management       CODE STATUS: Full Code     DISPOSITION:  [x] To remain ICU     ===NEPHROLOGY     Assessment:     ESRD on HD on TTS schedule at [facility] under Dr. Michelle via [facility].  Acute Metabolic Encephalopathy secondary to CO2 narcosis.  BATSHEVA on CPAP.    CAD with history of CABG.  A-fib.  Cirrhosis with ascites requires paracentesis.  Hypotension      Plan:   No acute need of HD today. Will continue HD as per TTS schedule.   Keep renal diet including restrictions to less than 1500 cc/day.  Will recommend to hold paracentesis today due to low

## 2024-07-30 NOTE — DISCHARGE SUMMARY
respiratory failure, also hypotension preventing full treatment of dialysis.  Patient mentation significantly improved with BiPAP, repeat gas in afternoon significant for improvement in pCO2 down to 50, pH 7.36, patient mentation back to baseline, was maintaining blood pressure with MAP above 65, SBP above 90 on midodrine 10 3 times daily, nephrology wants to hold off dialysis until 7/20 morning. Patient was also found to have cirrhosis with large ascites Later,  Paracentesis done with removal of 3.3L fluid. Negative for cultures or SBP.  On 7/24, Patient had fever overnight. WBC wnl. Xray right knee, blood cultures and  chest x ray were ordered.Patient is started on COPD exacerbation treatment.Patient is undergoing scheduled dialysis.Patient refused paracentesis for two days. Patient is weaned off levophed and started on Midodrine 15mg TID for hypotension.The day following Paracentesis was done, for about 4 L of fluid was removed from the abdomen. There was bleeding from paracentesis site, Aspirin and Eliquis was held, 1 PRBC given and CT abdomen ordered which showed noo abdominal wall hematoma formation. Today bleeding controlled and there is no further bleed from paracentesis site. Patient is being transferred to LTAC and plan to start Aspirin and Eliquis 3 days later after Hb being stable and no bleeding from paracentesis site.      Consults:   nephrology    Procedures:  Therapeutic Paracentesis     Any Hospital Acquired Infections: None    PATIENT'S DISCHARGE CONDITION:      PATIENT/FAMILY INSTRUCTIONS:   Current Discharge Medication List        START taking these medications    Details   ipratropium 0.5 mg-albuterol 2.5 mg (DUONEB) 0.5-2.5 (3) MG/3ML SOLN nebulizer solution Inhale 3 mLs into the lungs in the morning, at noon, and at bedtime  Qty: 360 mL, Refills: 0      lactulose (CHRONULAC) 10 GM/15ML solution Take 30 mLs by mouth 3 times daily  Qty: 1 each, Refills: 1      rifAXIMin (XIFAXAN) 550 MG tablet

## 2024-07-30 NOTE — CARE COORDINATION
Discharge Report    [facility]  Clinical Case Management Department  Written by: ULISES VELASQUEZ RN    Patient Name: Sanjay Banegas  Attending Provider: No att. providers found  Admit Date: 2024  6:29 PM  MRN: [id number]  Account: [id number]                     : 1960  Discharge Date: 2024      [facility]     ULISES VELASQUEZ RN      
Called and left message with [facility] , with updates. Will keep her updated on discharge plans  
Noted plans for discharge today to [facility].  Called [facility] , and left message with above discharge plan.  
Received call from APS  Cassandra Na, [phone]. APS had received a referral from [facility], unable to complete home assessment as pt was readmitted to hospital.  Cassandra will be coming to the hospital later today to meet with pt.  Notified CM of above.  SW will follow and update case management on discharge plan.    
Received call from [facility]PS  ([phone]). Stated she may try to visit pt tomorrow. Will keep Cassandra updated on discharge plans.  
TASH faxed pt dialysis packet to [facility] at [phone]  
Transitional Planning    1004 PC from [facility], will need a peer to peer for LTAC admission.      Phone: [phone] option #5  Deadline:  7/25/24 2:00 PM  Reference number: [id number]     1018 PS MD with PTP contact information     1020 Responded to contact intermed team     1023 PS Dr. Lang with PTP information     1538 PC from [facility], peer to peer was not done, will wait and file appeal if pt ready to d/c to LTAC tomorrow.    
Transitional planning. BiPAP/ 4L NC, Paracentesis today, PT/OT/SLP ordered. HD T--TH-S @ [facility]. [facility] is LT[facility] choice- [facility] informed. SNF choices are [facility] and [facility]- referrals faxed to all.     Spoke to pt's SO and pt concerning LTACH/SNF choices. Explained the difference in LOC between SNF and LTACH.  [facility] is LTACH choice. SNF choices in order of choice are:  1) [facility]  2) [facility]  Referrals faxed.   
Transitional planning. HD today, 4L NC, Levo gtt. Kristen shields/ [employer] confirmed a 2pm ALS transport to [facility] today. Informed her that pt will be going with Levo gtt. Pt will have to have ART line removed.   Informed pt's Zulay WOODS of above information and [facility] of transport time.     Report: [phone]  FAX: [phone]    Pt's Zulay WOODS informed pt and pt's wife of transport time.   Faxed AVS/KULWINDER to number above and provided report number to pt's Zulay WOODS.   
Transitional planning. Informed by [facility]/ [facility] that appeal is completed and they approved pt for dc to LTACH. Informed Dr. Elise. Pt had paracentesis 7/28 and has had bloody drainage from site. They are monitoring pt's Hbg and informed CM that plans are to dc pt to LTACH 7/30 if pt is stable.   4L NC/ BiPAP @ HS, Levo gtt, HD T-TH-S, Monitoring Hbg, pre-cert approved through appeal for [place] [facility].     1238 informed [facility] that writer will schedule transport for noon, [facility] informed CM that they do not take ART lines.   
Transitional planning. Pt's SO, Alyse, called she asked that referral be faxed to [facility] as #1 Skilled Nursing choice. Referral faxed.     8698 Neeraj shields/ [facility] called, they can not accept pt, cost of care outweighs reimbursement     1100 3L NC/ BiPAP, Levo gtt, HD today, HD T-Th-S @ [facility].   [facility] accepted, will review and start pre-cert if appropriate.   
Transitional planning. Spoke to Amberly w/ pt's insurance, LTACH pre-cert has been determined and is denied, informed [facility]/ [facility], she started appeal today.     4L NC, Pt refused IR Paracentesis today. SEMAJ T-Th-S (at [facility]) SNF referrals to [facility])  
unspecified heart failure type (HCC) [I50.9]    IF APPLICABLE: The Patient and/or patient representative Sanjay and his family were provided with a choice of provider and agrees with the discharge plan. Freedom of choice list with basic dialogue that supports the patient's individualized plan of care/goals and shares the quality data associated with the providers was provided to: (P) Patient Representative   Patient Representative Name: (P) pt's [place]     The Patient and/or Patient Representative Agree with the Discharge Plan? (P) Yes (home w/ HC vs SNF)    ULISES VELASQUEZ RN  Case Management Department  Ph: [phone] Fax: [phone]

## 2024-07-30 NOTE — PROGRESS NOTES
Nephrology Progress Note       History of Present Illness:              This is a 64 y.o. male who has a past medical history significant for end-stage renal disease and on hemodialysis Tuesday Thursday Saturday under the care of Dr. Michelle and at [facility] dialysis unit. He had dialysis on 7/18/2024 with difficulty removing any significant amount of fluid secondary to low blood pressure.  I spoke with nurse and Hospitalist regarding moving him to ICU today secondary to altered mentation, inability to follow commands or speak to me when prompted. I spoke with the patient's significant other in the room and brother by phone regarding the acuity of his illness. I asked ABG to be done and Bipap restarted.  I requested evaluation for possible transfer to the ICU. If have improvement in blood pressure will consider further dalysis for clearance and fluid removal.   Patient also has longstanding type 2 diabetes, history of hypertension, history of atrial fibrillation as well as coronary artery disease and stent placement.  According to her daughter who was present at the bedside states that patient was recently admitted at [facility].  During that hospitalization he was diagnosed with obstructive sleep apnea and he was sent to rehab with the instruction of BiPAP at night.  In the meantime arrangement for home BiPAP machine was in progress.  According to his family member who was present at the bedside states that she noticed close to 5 days prior to this hospitalization patient was getting a little bit confused.  She mention to the nurses.  But they checked his blood pressure and vitals and he was doing fine.  He was not sure if he was receiving BiPAP while he was at nursing home.  However on the day of admission patient was very lethargic and and he went for dialysis.  He was not been able to stay for dialysis for an hour because of sacral pain.  He was sent back to nursing home on his request and subsequently he was 
    Nephrology Progress Note       Subjective:    Patient seen and examined.   No acute issues. Crit care placed central line for IV access.   Dialysis yesterday. Able to removed 1.5 L with albumin and midodrine given.   Has midodrine 10mg TID with hold parameters on board.   Cardiology to plan ischemic work up prior to discharge.   Na 133 K 3.7 CO2 21 BUN 18 Cr 2.9, neha 9.5, mag 2.0, phos 3.7      In Summary:  This is a 64 y.o. male who has a past medical history significant for end-stage renal disease and on hemodialysis Tuesday Thursday Saturday under the care of Dr. Michelle and at [facility] dialysis unit. He had dialysis on 7/18/2024 with difficulty removing any significant amount of fluid secondary to low blood pressure.  I spoke with nurse and Hospitalist regarding moving him to ICU on 7/19/2024 secondary to altered mentation, inability to follow commands or speak to me when prompted. I spoke with the patient's significant other in the room and brother by phone regarding the acuity of his illness. I asked ABG to be done and Bipap restarted.  I requested evaluation for possible transfer to the ICU. If have improvement in blood pressure will consider further dalysis for clearance and fluid removal.   Patient also has longstanding type 2 diabetes, history of hypertension, history of atrial fibrillation as well as coronary artery disease and stent placement.  According to her daughter who was present at the bedside states that patient was recently admitted at [facility].  During that hospitalization he was diagnosed with obstructive sleep apnea and he was sent to rehab with the instruction of BiPAP at night.  In the meantime arrangement for home BiPAP machine was in progress.  According to his family member who was present at the bedside states that she noticed close to 5 days prior to this hospitalization patient was getting a little bit confused.  She mention to the nurses.  But they checked his blood pressure and 
    Nephrology Progress Note       Subjective:    Patient seen and examined.   On RA, SBP running  this am. Has midodrine 10mg TID with hold parameters on board.   No acute overnight events.   Cardiology to plan ischemic work up prior to discharge.   Na 137 K 4.0 CO2 26 BUN 31 Cr 3.8, neha 9.1      In Summary:  This is a 64 y.o. male who has a past medical history significant for end-stage renal disease and on hemodialysis Tuesday Thursday Saturday under the care of Dr. Michelle and at [facility] dialysis unit. He had dialysis on 7/18/2024 with difficulty removing any significant amount of fluid secondary to low blood pressure.  I spoke with nurse and Hospitalist regarding moving him to ICU today secondary to altered mentation, inability to follow commands or speak to me when prompted. I spoke with the patient's significant other in the room and brother by phone regarding the acuity of his illness. I asked ABG to be done and Bipap restarted.  I requested evaluation for possible transfer to the ICU. If have improvement in blood pressure will consider further dalysis for clearance and fluid removal.   Patient also has longstanding type 2 diabetes, history of hypertension, history of atrial fibrillation as well as coronary artery disease and stent placement.  According to her daughter who was present at the bedside states that patient was recently admitted at [facility].  During that hospitalization he was diagnosed with obstructive sleep apnea and he was sent to rehab with the instruction of BiPAP at night.  In the meantime arrangement for home BiPAP machine was in progress.  According to his family member who was present at the bedside states that she noticed close to 5 days prior to this hospitalization patient was getting a little bit confused.  She mention to the nurses.  But they checked his blood pressure and vitals and he was doing fine.  He was not sure if he was receiving BiPAP while he was at nursing home.  
  PALLIATIVE CARE PROGRESS NOTE     NAME:  Sanjay Banegas  MEDICAL RECORD NUMBER:  [id number]  AGE: 64 y.o.   GENDER: male  : 1960  TODAY'S DATE:  2024  Room: 3026/3026-01    Reason For Consult:  Goals of care evaluation  Distress management  Symptom Management  Guidance and support  Facilitate communications  Assistance in coordinating care  Recommendations for the above    Plan      Palliative Interaction:  The palliative care team was able to meet with Sanjay this afternoon.  His significant other is present at bedside.  We discussed their plans to get  in the hospital in the next days.    He was scheduled to be taken down to paracentesis.  However, discussion about CODE STATUS came up.  I was asked to see the patient urgently.    We discussed resuscitation.  We reviewed the conversation from 2 days ago when he had elected and was agreeable to no CPR and no intubation and to be let go \"peacefully\" if something were to happen to his heart and breathing.  He does recall having this conversation.    We had a very long conversation regarding this.  He continues to mention \" if he had a heart attack, then I would want to be worked on.\"  I did try to delineate the difference between a cardiac arrest and a heart attack.  Unfortunately, I do not believe that the patient understands the difference.    I explained the process of a cardiac arrest which includes CPR, being placed on mechanical ventilation and in a medically induced coma.  At the end of the day, he has expressed wishes to remain a full code and would only want to be kept alive on mechanical ventilation for 10 days.  He tells me that he would want an attempt to be resuscitated as he wants to keep living.  He leans heavily on his future wife for guidance.  He would not want a tracheostomy and long-term care at a nursing facility.  He made that clear today.    Discussed with primary team who was present at bedside.    We will follow-up tomorrow 
  PALLIATIVE CARE PROGRESS NOTE     NAME:  Sanjay Banegas  MEDICAL RECORD NUMBER:  [id number]  AGE: 64 y.o.   GENDER: male  : 1960  TODAY'S DATE:  2024  Room: 3026/3026-01    Reason For Consult:  Goals of care evaluation  Distress management  Symptom Management  Guidance and support  Facilitate communications  Assistance in coordinating care  Recommendations for the above    Plan      Palliative Interaction:  The palliative care team was able to meet with Sanjay this afternoon.  Family has been in and out today, as a bedside wedding is being arranged.  I was able to be in the room when nursing staff was asking him orientation questions.  Unfortunately, he is quite lethargic and is unable to tell us where he is at.  He certainly is not appropriate to readdress CODE STATUS wishes.  He did make his wishes known yesterday with resuscitative measures to be implemented and mechanical ventilation only for 10 days.  His significant other was at bedside for this.  I do have concerns about his overall prognosis.  Over the weekend, if his mentation is appropriate, would recommend readdressing CODE STATUS to see if his previously made wishes of full resuscitation within days of time-limited trial mechanical ventilation stay the same.    Please reach out over the weekend if there are any questions. We will follow up again on Monday.      IMPRESSION/ PLAN  Symptom management/pain control    We feel the patient's symptoms are being controlled. Their current regimen has been reviewed by myself and discussed with the staff. We recommend adjusting their medications as follows: None    Goals of care evaluation  The patient goals of care are improve or maintain function/quality of life, preserve independence/autonomy/control, and support for family/caregiver  Long discussion to ensure the patient's and family's understanding of goals of care, and theconcept of palliative care.    Code Status:  full code, only wants a time 
 Pt bleeding through multiple pressure dressings at paracentesis site.  CC resident notified and orders placed .    
BRONCHOSPASM/BRONCHOCONSTRICTION     [x]         IMPROVE AERATION/BREATH SOUNDS  [x]   ADMINISTER BRONCHODILATOR THERAPY AS APPROPRIATE  [x]   ASSESS BREATH SOUNDS  [x]   IMPLEMENT AEROSOL/MDI PROTOCOL  [x]   PATIENT EDUCATION AS NEEDED    
[facility]  Office: [phone]  Miah Reid DO, Nabil Marmolejo, DO, Ray Levya DO, Sridhar Guerrero, DO, Roxanna Del Angel MD, Charlotte Alfonso MD, America Coelho MD, Sandie Fermin MD,  Emil Lamb MD, Ngoc Ayon MD, Sabrina Blue MD,  Donis Chao DO, Rene Shabazz MD, Jasper Davidson MD, Raymond Reid DO, Sheron Jones MD,  Anthony Fuentes DO, Divina Del Toro MD, Danitza Barcenas MD, Yaneth Moreno MD, Mary Childress MD,  Petey George MD, Elle Schmidt MD, Radha Macias MD, Crow Sanchez MD, Dano Lua MD, Jai Red MD, Angel Pagan DO, Damian Ann DO, Wayne Bravo MD,  Adan Sauceda MD, Shirley Waterhouse, CNP,  Sudha Walker CNP, Ren Crawley, CNP,  Di Lee, DNP, Tanesha Chirinos, CNP, Nehal Arenas, CNP, Chelsie Hartley, CNP, Luciana Carrera, CNP, Lorenza Joyce, PA-C, Cayla Alvares PA-C, Mahsa Cazares, CNP, Veena Simpson, CNP, Seun Sal, CNP, Kristen Anderson, CNP, Shayy Dudley, CNP, Rosenda Downing, CNS, Indu Chávez, CNP, Krystal Vernon CNP, Tracy Schwab, CNP         [facility]   IN-PATIENT SERVICE   [facility]    Progress Note    7/19/2024    12:19 PM    Name:   Sanjay Banegas  MRN:     [id number]     Acct:      [id number]   Room:   2021/2021-01  IP Day:  2  Admit Date:  7/17/2024  6:29 PM    PCP:   Becca Wellington MD  Code Status:  Full Code    Subjective:     C/C:   Chief Complaint   Patient presents with    Hypotension     Interval History Status: not changed.     Patient was awake but doesn't answer all orientation questions, he was eating and told me to talk to his wife. As we were talking patient started to move his bed up and down and told his wife that he is playing.  As per wife patient is confused than baseline  No one ever told them about cirrhosis  Abdomen is distended, no pain reported  Ct imaging updates given  Overnight concerns for bp but patient remains stable around 90 systolic.  Labs 
Comprehensive Nutrition Assessment    Type and Reason for Visit:  Initial    Nutrition Recommendations/Plan:   Monitor for MBSS and restart of oral diet.  Will liberalize diet as able.  Will monitor labs, weights, and plan of care.     Malnutrition Assessment:  Malnutrition Status:  Insufficient data (07/22/24 1605)    Context:  Acute Illness     Findings of the 6 clinical characteristics of malnutrition:  Energy Intake:  Mild decrease in energy intake   Weight Loss: 26% x 6 months per chart review - ? due to fluids.    Body Fat Loss:  Unable to assess (d/t edema)   Muscle Mass Loss:  Unable to assess  Fluid Accumulation:  Mild (to Moderate) Generalized, Extremities   Strength:  Not Performed    Nutrition Assessment:    RN called as family with questions about current diet.  Family reports pt after several days of hospital admission will usually want something to eat that is not on the menu.  Discussed bringing in outside food for pt and discussed current diet restrictions.  At home pt does follow a renal diet.  Pt currently NPO due to failed bedside swallow study and plans for MBSS.  Pt admitted for hypotension and progressive SOB.  PMH: CHF, chronic anemia, A.fib, CAD, ESRD on hemodialysis as per TTS schedule, HTN, hyperlipidemia, COPD obstructive sleep apnea, type 2 DM.  Per chart review, pt has been consuming % of meals.  Will monitor for restart of oral diet as able.  Per chart review, weight loss of 26% x 6 months noted.  S/p paracentesis today with removal of 3350 mL - partially contributing to weight losses.  Labs reviewed: Na 133 mmol/L.  Meds reviewed.    Nutrition Related Findings:    Labs/Meds reviewed.  Hypoactive bowel sounds  +1 non-pitting generalized, +2 b/l LE, and +1 b/l UE/facial edema. Wound Type: None       Current Nutrition Intake & Therapies:    Average Meal Intake: NPO - Did not eat lunch today but has been eating % of meals.  Average Supplements Intake: NPO  Diet 
Comprehensive Nutrition Assessment    Type and Reason for Visit:  Reassess    Nutrition Recommendations/Plan:   Continue current diet with Renal oral supplements x 1 per day.  Encourage/monitor intakes as tolerated.  Will monitor labs, weights, and plan of care.     Malnutrition Assessment:  Malnutrition Status:  At risk for malnutrition (07/24/24 1445)    Context:  Acute Illness     Findings of the 6 clinical characteristics of malnutrition:  Energy Intake:  75% or less of estimated energy requirements for 7 or more days  Weight Loss:  Unable to assess - H/o significant weight losses/fluctuations - ? fluids d/t dialysis and paracentesis.   Body Fat Loss:  Unable to assess   Muscle Mass Loss:  Unable to assess  Fluid Accumulation:  Mild Generalized   Strength:  Not Performed    Nutrition Assessment:    Pt ate about 75% of lunch today along with drinking some of the Renal oral supplement.  Recorded PO intakes of 50-75% per chart reviewed.  Weight fluctuations noted - s/p paracentesis yesterday with 4L removed.  Labs reviewed: Glucose 139-179 mg/dL.  Meds include: Lactulose.    Nutrition Related Findings:    Hyperactive bowel sounds.  Last BM 7/28. Wound Type:  (Wound to sacrum.)       Current Nutrition Intake & Therapies:    Average Meal Intake: 26-50%, 51-75%  Average Supplements Intake: 26-50%, 51-75%  ADULT DIET; Regular; 5 carb choices (75 gm/meal); Low Sodium (2 gm); Low Phosphorus (Less than 1000 mg); 1500 ml  ADULT ORAL NUTRITION SUPPLEMENT; Lunch; Renal Oral Supplement  Additional Calorie Sources:  None    Anthropometric Measures:  Height: 177.8 cm (5' 10\")  Ideal Body Weight (IBW): 166 lbs (75 kg)    Admission Body Weight: 84.8 kg (186 lb 15.2 oz)  Current Body Weight: 74.1 kg (163 lb 5.8 oz), 98.4 % IBW. Weight Source: Bed Scale  Current BMI (kg/m2): 23.4  Usual Body Weight: 112 kg (246 lb 15 oz) (1/8/24 bed scale per chart review)  % Weight Change (Calculated): -26  Weight Adjustment For: No 
Comprehensive Nutrition Assessment    Type and Reason for Visit:  Reassess    Nutrition Recommendations/Plan:   Liberalize current diet as able to encourage PO intakes.  Will provide Nepro oral supplements x 1 per day.    Monitor labs, weights, and plan of care.     Malnutrition Assessment:  Malnutrition Status:  At risk for malnutrition (07/24/24 1445)    Context:  Acute Illness     Findings of the 6 clinical characteristics of malnutrition:  Energy Intake:  75% or less of estimated energy requirements for 7 or more days  Weight Loss:  Unable to assess - H/o significant weight losses/fluctuations - ? fluids d/t dialysis and paracentesis.    Body Fat Loss:  Unable to assess   Muscle Mass Loss:  Unable to assess  Fluid Accumulation:  Mild Generalized   Strength:  Not Performed    Nutrition Assessment:    Pt passed a MBSS yesterday for a regular diet with thin liquids.  This morning pt ate less than 50% of his breakfast.  Discussed with RN about liberalizing diet to help improve intakes at meals.  Mentioned to RN that RD discussed with pt's family about bringing in food for pt as well.  Weight fluctuations noted - likely due to fluids d/t recent paracentesis.  Labs reviewed; Na 135 mmol/L, K 3.5 mmol/L.  Meds include: Lactulose.    Nutrition Related Findings:    Labs/Meds reviewed.  Hypoactive bowel sounds.  +1 non-pitting generalized and +1 facial edema.  Last BM 7/24.   Wound Type:  (Wound to sacrum.)       Current Nutrition Intake & Therapies:    Average Meal Intake: 26-50%  Average Supplements Intake: None Ordered  ADULT DIET; Regular; 5 carb choices (75 gm/meal); Low Sodium (2 gm); Low Phosphorus (Less than 1000 mg); 1500 ml  Additional Calorie Sources:  None    Anthropometric Measures:  Height: 177.8 cm (5' 10\")  Ideal Body Weight (IBW): 166 lbs (75 kg)    Admission Body Weight: 84.8 kg (186 lb 15.2 oz)  Current Body Weight: 77.1 kg (169 lb 15.6 oz), 102.4 % IBW. Weight Source: Bed Scale  Current BMI 
Dialysis Post Treatment Note  Vitals:    07/18/24 1800   BP: (!) 99/53   Pulse: 66   Resp: 18   Temp: 97.2 °F (36.2 °C)   SpO2: 96%     Pre-Weight = 85 kg  Post-weight = Weight - Scale: 84.2 kg (185 lb 10 oz)  Total Liters Processed = Blood Volume Processed (Liters): 66.12 L (Simultaneous filing. User may not have seen previous data.)  Rinseback Volume (mL) = Rinseback Volume (ml): 20 ml (Simultaneous filing. User may not have seen previous data.)  Net Removal (mL) =  700  Patient's dry weight=82.5 kg  Type of access used=left upper fistula  Length of treatment=210      Post treatment all blood returned, pressure held band aids applied.  Pt tolerated treatment well, to ER/ CT post treatment. Pt had continuous low bp's.  Post treatment  99/53 pulse 66. Removed 700 ml.  Post report called to Debra WOODS on floor. Pt on telemetry and oxygen 4 lit entire treatment.  Heparin gtt assessed and changed by RN TL per protocol.   
Dialysis Post Treatment Note  Vitals:    07/20/24 1400   BP: (!) 100/57   Pulse: 67   Resp: 19   Temp: 97.3 °F (36.3 °C)   SpO2: 97%     Pre-Weight = 84 kg  Post-weight = Weight - Scale: 83 kg (182 lb 15.7 oz) (less sheets and pillows)  Total Liters Processed = Blood Volume Processed (Liters): 82.8 L  Rinseback Volume (mL) = Rinseback Volume (ml): 300 ml  Net Removal (mL) =  1500 ml  Patient's dry weight=82.5 kg  Type of access used= AV Graft @L ARM  Length of treatment=212 minutes      Pt completed HD TX with soft BP, Albumin pre & mid HD TX given. No issue with arterial/venous cannulation running well with max BFR , bleeding time is less than ten minutes. High TMP value gave bloodlines 50 ml saline flushes every 30 minutes. Notified Dr. Marmolejo @bedside.    
Dialysis Post Treatment Note  Vitals:    07/23/24 1419   BP: (!) 100/56   Pulse: 75   Resp: 20   Temp:    SpO2: 97%     Pre-Weight = 79.8kg  Post-weight = Weight - Scale: 77.4 kg (170 lb 10.2 oz)  Total Liters Processed = Blood Volume Processed (Liters): 63.82 L  Rinseback Volume (mL) = Rinseback Volume (ml): 300 ml  Net Removal (mL) = 1800   Patient's dry weight= 82.5kg  Type of access used= AV graft L   Length of treatment=214 mins    Pt completed HD tx with 1.8L removed in 3.5 hours. BP soft through out tx, pt on Levo drip. With 48 mins left in tx, system began to clot. Returned all of pt blood and started a new circuit line. High TMP values gave bloodlines 50 ml saline flushes every 30 mins. Primary RN titrated Levo with 30 mins left in tx. No issue with arterial/venous cannulation, bleeding time less than 10 mins. Report given to primary RN Sarah.     
Dialysis Post Treatment Note  Vitals:    07/25/24 1415   BP: (!) 111/49   Pulse: 73   Resp: (!) 8   Temp: 97.7 F (36.6 C)   SpO2: 100%     Pre-Weight = 77 kg  Post-weight = Weight - Scale: 76.1 kg (167 lb 12.3 oz)  Total Liters Processed = Blood Volume Processed (Liters): 70.53 L  Rinseback Volume (mL) = Rinseback Volume (ml): 300 ml  Net Removal (mL) =  1800 mL  Patient's dry weight=  Type of access used= AVG left  Length of treatment=210 mins    Patient was hypotensive throughout HD tx. Albumin given upon start of treatment per orders. ICU floor nurse titrated levo drip of patient. Able to remove 1.8L of fluid. No access issues throughout treatment. Report given to Renato WOODS of [facility]. Patient remained in ICU room post HD.    
Dialysis Post Treatment Note  Vitals:    07/27/24 1316   BP: (!) 109/49   Pulse: 73   Resp: 13   Temp: 97.7 °F (36.5 °C)   SpO2: 100%     Pre-Weight = 72 kg  Post-weight = Weight - Scale: 70.6 kg (155 lb 10.3 oz)  Total Liters Processed = Blood Volume Processed (Liters): 86.02 L  Rinseback Volume (mL) = Rinseback Volume (ml): 220 ml  Net Removal (mL) = 1579   Patient's dry weight= 82.5 kg  Type of access used= Left arm AVG  Length of treatment=210 mins    Pt tolerated tx well; no acute distress noted pre, during or post tx; [facility] RN Quin aware of pt's status during and rept given    
Dialysis Post Treatment Note  Vitals:    07/30/24 1315   BP: (!) 97/40   Pulse: 74   Resp: 21   Temp: 97.6 °F (36.4 °C)   SpO2: 99%     Pre-Weight = 64.6kg  Post-weight = Weight - Scale: 64.3 kg (141 lb 12.1 oz)  Total Liters Processed = Blood Volume Processed (Liters): 78.9 L  Rinseback Volume (mL) = Rinseback Volume (ml): 240 ml  Net Removal (mL) = 960mL   Patient's dry weight=82.5kg  Type of access used= L AVG  Length of treatment=212 mins    Bedside HD completed and tolerated fair with 960mL in 3.5 hours. BP soft requiring Albumin x1. No other issues. Report given to primary nurse Zulay RN.   
Dialysis Time Out  To be done by RN and tech or 2 RNs  Staff Names Elidia RN and Richa RN    [x]  Identity of the patient using 2 patient identifiers  [x]  Consent for treatment  [x]  Equipment-proper machine and dialyzer  [x]  B-Hep B status neg on 7/18/24  [x]  Orders- to include bath, blood flow, dialyzer, time and fluid removal  [x]  Access-Correct site and in working order  [x]  Time for patient to ask questions.    
Dialysis Time Out  To be done by RN and tech or 2 RNs  Staff Names Melanie Burch RN    [x]  Identity of the patient using 2 patient identifiers  [x]  Consent for treatment  [x]  Equipment-proper machine and dialyzer  [x]  B-Hep B status Hep Bs Ag negative 7-18-24  [x]  Orders- to include bath, blood flow, dialyzer, time and fluid removal  [x]  Access-Correct site and in working order  [x]  Time for patient to ask questions.   
Dialysis Time Out  To be done by RN and tech or 2 RNs  Staff Names Melanie Bustamante RN    [x]  Identity of the patient using 2 patient identifiers  [x]  Consent for treatment  [x]  Equipment-proper machine and dialyzer  [x]  B-Hep B status Hep Bs Ag Negative 7-18-24  [x]  Orders- to include bath, blood flow, dialyzer, time and fluid removal  [x]  Access-Correct site and in working order  [x]  Time for patient to ask questions.   
Dialysis Time Out  To be done by RN and tech or 2 RNs  Staff Names Myriam WHITE RN and Mavis FONG RN    [x]  Identity of the patient using 2 patient identifiers  [x]  Consent for treatment  [x]  Equipment-proper machine and dialyzer  [x]  B-Hep B status Hep BsAG nonreactive 7.18.24  [x]  Orders- to include bath, blood flow, dialyzer, time and fluid removal  [x]  Access-Correct site and in working order  [x]  Time for patient to ask questions.    
Dialysis Time Out  To be done by RN and tech or 2 RNs  Staff Names Puja PRAJAPATI RN & Jose Guadalupe RN ([id number]).    [x]  Identity of the patient using 2 patient identifiers  [x]  Consent for treatment  [x]  Equipment-proper machine and dialyzer  [x]  B-Hep B status (HBsAg Negative 7/18/24)  [x]  Orders- to include bath, blood flow, dialyzer, time and fluid removal  [x]  Access-Correct site and in working order  [x]  Time for patient to ask questions.    
Dialysis Time Out  To be done by RN and tech or 2 RNs  Staff Names Richa AGUILAR    [x]  Identity of the patient using 2 patient identifiers  [x]  Consent for treatment  [x]  Equipment-proper machine and dialyzer  [x]  B-Hep B status 7/18/24  [x]  Orders- to include bath, blood flow, dialyzer, time and fluid removal  [x]  Access-Correct site and in working order  [x]  Time for patient to ask questions.    
[facility]  Office: [phone]  Miah Reid DO, Nabil Marmolejo, DO, Ray Leyva DO, Sridhar Guerrero, DO, Roxanna Del Angel MD, Charlotte Alfonso MD, America Coelho MD, Sandie Fermin MD,  Emil Lamb MD, Ngoc Ayon MD, Sabrina Blue MD,  Donis Chao DO, Rene Shabazz MD, Jasper Davidson MD, Raymond Reid DO, Sheron Jones MD,  Anthony Fuentes DO, Divina Del Toro MD, Danitza Barcenas MD, Yaneth Moreno MD, Mary Childress MD,  Petey George MD, Elle Schmidt MD, Radha Macias MD, Crow Sanchez MD, Dano Lua MD, Jai Red MD, Angel Pagan DO, Damian Ann DO, Wayne Bravo MD,  Adan Sauceda MD, Shirley Waterhouse, CNP,  Sudha Walker CNP, Ren Crawley, CNP,  Di Lee, DNP, Tanesha Chirinos, CNP, Nehal Arenas, CNP, Chelsie Hartley, CNP, Luciana Carrera, CNP, Lorenza Joyce, PA-C, Cayla Alvares PA-C, Mahsa Cazares, CNP, Veena Simpson, CNP, Seun Sal, CNP, Kristen Anderson, CNP, Shayy Dudley, CNP, Rosenda Downing, CNS, Indu Chávez, CNP, Krystal Vernon, CNP, Tracy Schwab, CNP         [facility]   IN-PATIENT SERVICE   [facility]    Second Visit Note  For more detailed information please refer to the progress note of the day      7/19/2024    1:37 PM    Name:   Sanjay Banegas  MRN:     [id number]     Acct:      [id number]   Room:   2021/2021-01  IP Day:  2  Admit Date:  7/17/2024  6:29 PM    PCP:   Becca Wellington MD  Code Status:  Full Code      Case discussed with Dr Marmolejo  Patient was no verbal and not following any commands  He was placed on bipap and blood gases were ordered.  As I saw the patient he got slightly better, he told me his name, started moving his arm. He did not give any answers. He moved his fingers but not followed commands.  He was just started on bipap.    Updated plan :     Blood gases reviewed, co2>80 with ph 7.1  Continue bipap, repeat blood gas in 2 hrs on bipap  For now patient is maintaining 
[facility]  PROGRESS NOTE    Shift date: 7/18/2024  Shift day: Thursday   Shift # 3    Room # 2021/2021-01   Name: Sanjay Banegas                Episcopal: [employer]   Place of Gnosticism: Unknown    Referral: Rapid Response    Admit Date & Time: 7/17/2024  6:29 PM    Assessment:  Sanjay Banegas is a 64 y.o. male in the hospital because of \"Low Blood Pressure and Poor IV Access.\" Patient was being tended to by response team, when  arrived. Per report, patient arrived to the hospital due to a \"Heart Attack.\"     Intervention:   responded to page and gathered patient information outside room.  visited with patient after response team completed assessement. Patient indicated no needs at time of visit.  experienced patient as \"fatigued\" at end of encounter.     Outcome:  Patient's bedside nurse remained with patient at end of visit.    Plan:  Chaplains will remain available to offer spiritual and emotional support as needed.         07/19/24 0718   Encounter Summary   Encounter Overview/Reason Crisis   Service Provided For Patient   Referral/Consult From Multi-disciplinary team  (RRT Alert)   Support System Family members   Last Encounter  07/18/24   Complexity of Encounter Low   Begin Time 0718   End Time  0735   Total Time Calculated 17 min   Crisis   Type Rapid Response   Spiritual/Emotional needs   Type Spiritual Support   Assessment/Intervention/Outcome   Assessment Coping;Powerlessness   Intervention Sustaining Presence/Ministry of presence   Outcome Receptive   Plan and Referrals   Plan/Referrals Continue to visit, (comment)  (as needed)     Electronically signed by Chaplain Be, on 7/19/2024 at 7:21 AM.  [facility]  [phone]      
INTENSIVE CARE UNIT  Resident Physician Progress Note    Patient - Sanjay Banegas  Date of Admission -  7/17/2024  6:29 PM  Date of Evaluation -  7/25/2024  Room and Bed Number -  3026/3026-01   Hospital Day - 8    SUBJECTIVE:   OVERNIGHT EVENTS:      -No acute events overnight    TODAY:    My Evaluation:  -afebrile, hemodynamically stable, saturating well      Arterial Blood Gas result:    No results for input(s): \"POCPH\", \"POCPCO2\", \"POCPO2\", \"POCHCO3\" in the last 72 hours.  I's/O's:  In: 577.2 [P.O.:325; I.V.:204.5]  Out: -     Net IO Since Admission: -2,259.63 mL [07/25/24 1000]       Consults: Nephrology      Ortho      BRIEF PLAN:  Start COPD exacerbation protocol.  Started on Solumedrol ,Zosyn, and Duoneb  IR guided paracentesis with culture.      HISTORY OF PRESENT ILLNESS:     History was obtained from chart review.       Sanjay Banegas is a 64 y.o. male  presented to the ED on 7/17 with a chief complaint of hypotension and progressive shortness of breath.  He has a past medical history significant for chronic diastolic congestive heart failure and Right heart failure , chronic anemia, atrial fibrillation, CAD s/p PCI with TIM to LAD in 2019, ESRD on hemodialysis as per TTS schedule through AV fistula, essential hypertension, hyperlipidemia, COPD on home O 2 3-4 lt/min, obstructive sleep apnea and type 2 diabetes mellitus.      As per chart review, patient presented to the ED with a chief complaint of progressive shortness of breath and hypotension.  As per patient's wife, she checked patient's blood pressure in the arm, it was in 200s/100s and recheck on the leg showed that it was systolic in 80s and that is when she decided to bring the patient to the ED. Patient endorses having left sided chest pain near the shoulder joint with radiation to the back. He denies any neck pain or radiation to the arm. He has chronic shortness of breath for which he is on oxygen at home. As per patient's wife, he was 
INTENSIVE CARE UNIT  Resident Physician Progress Note    Patient - Sanjay Banegas  Date of Admission -  7/17/2024  6:29 PM  Date of Evaluation -  7/27/2024  Room and Bed Number -  3026/3026-01   Hospital Day - 10    SUBJECTIVE:   OVERNIGHT EVENTS:      -No acute events overnight    TODAY:    My Evaluation:  -on nasal cannula  Vasopressors: Levo @ 2    I's/O's:  In: 1810.4 [P.O.:1642; I.V.:55.3]  Out: -     Net IO Since Admission: -1,801.21 mL [07/27/24 1316]       Consults:      BRIEF PLAN:  Patient undergoing HD today as scheduled.  Increase midodrine to 15 TID.    HISTORY OF PRESENT ILLNESS:    History was obtained from chart review.       Sanjay Banegas is a 64 y.o. male  presented to the ED on 7/17 with a chief complaint of hypotension and progressive shortness of breath.  He has a past medical history significant for chronic diastolic congestive heart failure and Right heart failure , chronic anemia, atrial fibrillation, CAD s/p PCI with TIM to LAD in 2019, ESRD on hemodialysis as per TTS schedule through AV fistula, essential hypertension, hyperlipidemia, COPD on home O 2 3-4 lt/min, obstructive sleep apnea and type 2 diabetes mellitus.      As per chart review, patient presented to the ED with a chief complaint of progressive shortness of breath and hypotension.  As per patient's wife, she checked patient's blood pressure in the arm, it was in 200s/100s and recheck on the leg showed that it was systolic in 80s and that is when she decided to bring the patient to the ED. Patient endorses having left sided chest pain near the shoulder joint with radiation to the back. He denies any neck pain or radiation to the arm. He has chronic shortness of breath for which he is on oxygen at home. As per patient's wife, he was able to ambulate until February of 2024.      Initial labs in the ED showed BMP was unremarkable.  Troponin was elevated at 867 and trended down to 772-735.  His baseline appears to be around 200s 
INTENSIVE CARE UNIT  Resident Physician Progress Note    Patient - Sanjay Banegas  Date of Admission -  7/17/2024  6:29 PM  Date of Evaluation -  7/28/2024  Room and Bed Number -  3026/3026-01   Hospital Day - 11    SUBJECTIVE:   OVERNIGHT EVENTS:      - Patient had bleeding from AV Fistula, dressing was done and no further bleeding was noted.     TODAY:    My Evaluation:  -On pressure support, see pressors below    Vasopressors: Levo @ 1mcg      I's/O's:  In: 1743.7 [P.O.:1274; I.V.:127.3]  Out: 1799     Net IO Since Admission: -2,547.19 mL [07/28/24 0907]     Intake/Output Summary (Last 24 hours) at 7/28/2024 0910  Last data filed at 7/28/2024 0628  Gross per 24 hour   Intake 1442 ml   Output 1799 ml   Net -357 ml        Consults: Nephrology      BRIEF PLAN:      HISTORY OF PRESENT ILLNESS:    History was obtained from chart review.       Sanjay Banegas is a 64 y.o. male  presented to the ED on 7/17 with a chief complaint of hypotension and progressive shortness of breath.  He has a past medical history significant for chronic diastolic congestive heart failure and Right heart failure , chronic anemia, atrial fibrillation, CAD s/p PCI with TIM to LAD in 2019, ESRD on hemodialysis as per TTS schedule through AV fistula, essential hypertension, hyperlipidemia, COPD on home O 2 3-4 lt/min, obstructive sleep apnea and type 2 diabetes mellitus.      As per chart review, patient presented to the ED with a chief complaint of progressive shortness of breath and hypotension.  As per patient's wife, she checked patient's blood pressure in the arm, it was in 200s/100s and recheck on the leg showed that it was systolic in 80s and that is when she decided to bring the patient to the ED. Patient endorses having left sided chest pain near the shoulder joint with radiation to the back. He denies any neck pain or radiation to the arm. He has chronic shortness of breath for which he is on oxygen at home. As per patient's wife, 
IR has attempted to send for patient on 2 different occasions for paracentesis.  Pt has refused both times to come to IR.  Order discontinued  Please re consult IR when patient agreeable  
IV team at bedside, Patient is Aox4. Pt is refusing IV insertion, This RN educated patient the risks and dangers of not getting a IV in case of emergency, pt stated he understands and is still refusing. Pt current b/p: 93/50. Will continue to monitor closely.   
Nephrology Progress Note        Subjective: patient evaluated on dialysis. Pt is stable on dialysis. Access cannulated without problems. No new issues overnite. Stable hemodynamics.   Tolerating treatment well.  Left AV fistula works well.  Plan for discharge to [facility] specialty hospital today.  Encephalopathy better.  Hemoglobin stable.  No further oozing from his paracentesis site.  Hemodynamically stable.  Labs today show sodium 135 potassium 4.5 chloride 97 bicarb 24 BUN 40 creatinine 4 glucose 116 calcium 9.1 phosphorus 3.7, albumin 3.6, hemoglobin 7.5 white count 9 platelets 135    History reviewed  Known history of ESRD on hemodialysis  at the [facility] dialysis unit via left AV fistula under Dr. Michelle.  Came into the hospital as his wife had noticed that he was hypotensive.  Blood pressure initially was 200 thereafter dropped into the 80s.  He is also complaining of left-sided chest pain.  Had a slight bump in troponin.  In the hospital became confused developed hypercapnic respiratory failure.  Was transferred to the ICU, placed on pressors which have now been switched over to midodrine.  Thereafter found to have ascites which was tapped after initially refused.  Paracentesis done yesterday 4 L removed, appears to be hemorrhagic tap.  Has been oozing from that site now.  Mentation is at baseline.  Nephrology following for renal placement therapy.    Objective:  CURRENT TEMPERATURE:  Temp: 97.6 °F (36.4 °C)  MAXIMUM TEMPERATURE OVER 24HRS:  Temp (24hrs), Av.2 °F (36.8 °C), Min:97.6 °F (36.4 °C), Max:98.8 °F (37.1 °C)    CURRENT RESPIRATORY RATE:  Respirations: 15  CURRENT PULSE:  Pulse: 78  CURRENT BLOOD PRESSURE:  BP: (!) 103/39  24HR BLOOD PRESSURE RANGE:  Systolic (24hrs), Av , Min:103 , Max:111   ; Diastolic (24hrs), Av, Min:39, Max:41    24HR INTAKE/OUTPUT:    Intake/Output Summary (Last 24 hours) at 2024 1129  Last data filed at 2024 1004  Gross per 24 
Nephrology Progress Note    Patient:  Sanjay Banegas; 64 y.o. MRN# [id number]  Location:  3026/3026-01  Attending:  Hermelindo Hale MD  Admit Date:  2024   Hospital Day: 11    Subjective   Patient  admitted on 2024 for AMS/metabolic encephalopathy.  We have been consulted for ESRD/fluid and electrolyte management    Patient seen and evaluated in room, no acute events overnight  Vital signs stable, Levophed almost weaned off completely, tolerating low-flow nasal cannula  Potassium low this morning at 3.5  Paracentesis today.  States he feels about the same today as he did yesterday  Tolerate dialysis well yesterday with 1.5 L of fluid removed      Recent Labs     24  0459 24  0429 24  0448    134* 136   K 4.1 4.0 3.5*   CL 97* 96* 96*   CO2 26 23 27   BUN 21 30* 22   CREATININE 3.2* 3.7* 2.7*   GLUCOSE 249* 265* 196*   CALCIUM 9.5 9.7 9.0       Objective   VS: BP (!) 109/49   Pulse 67   Temp 98.8 °F (37.1 °C) (Oral)   Resp 15   Ht 1.778 m (5' 10\")   Wt 74.6 kg (164 lb 7.4 oz)   SpO2 99%   BMI 23.60 kg/m²   MAXIMUM TEMPERATURE OVER 24 HRS:  Temp (24hrs), Av.9 °F (36.6 °C), Min:97.2 °F (36.2 °C), Max:98.8 °F (37.1 °C)    24 HR BLOOD PRESSURE RANGE:  Systolic (24hrs), Av , Min:109 , Max:109   ; Diastolic (24hrs), Av, Min:49, Max:49    24 HR INTAKE/OUTPUT:    Intake/Output Summary (Last 24 hours) at 2024 1128  Last data filed at 2024 0837  Gross per 24 hour   Intake 1190.62 ml   Output 1799 ml   Net -608.38 ml     WEIGHT: Patient Vitals for the past 96 hrs (Last 3 readings):   Weight   24 0515 74.6 kg (164 lb 7.4 oz)   24 1316 70.6 kg (155 lb 10.3 oz)   24 0930 72 kg (158 lb 11.7 oz)       Current Medications    Scheduled Meds:    midodrine  15 mg Oral TID    piperacillin-tazobactam  3,375 mg IntraVENous Q12H    ipratropium 0.5 mg-albuterol 2.5 mg  1 Dose Inhalation TID    apixaban  5 mg Oral BID    lactulose  20 g Oral TID    rifAXIMin  
Occupational Therapy    [facility]  Occupational Therapy Not Seen Note    DATE: 2024    NAME: Sanjay Banegas  MRN: [id number]   : 1960      Patient not seen this date for Occupational Therapy due to:    Hemodialysis:    Next Scheduled Treatment:     Electronically signed by STARR Cooper on 2024 at 1:58 PM   
PATIENT REFUSES TO WEAR BIPAP     [x] Risks and benefits explained to patient   [x] Patient refuses to wear Bipap   [x] Patient verbalizes understanding of information presented.   
Patient here for ultrasound paracentesis.Here with floor RN on monitors.Consent done. Vj DIA in room. Ultrasound done to abdomen. Right side of abdomen prepped, draped and numbed with lidocaine. Needle in without difficulty.  3350 ml of red luid drained. Yueh out, post scan done and dressing on. Patient discharged to room with floor RN on monitors. Patient tolerated well. Specimen sent  
Physical Therapy        Physical Therapy Cancel Note      DATE: 2024    NAME: Sanjay Banegas  MRN: [id number]   : 1960      Patient not seen this date for Physical Therapy due to:    Patient is not appropriate for PT evaluation/treatment at this time d/t on dialysis, now following cues      Electronically signed by Eleni Shelton PT on 2024 at 1:07 PM      
Physical Therapy        Physical Therapy Cancel Note      DATE: 2024    NAME: Sanjay Banegas  MRN: [id number]   : 1960      Patient not seen this date for Physical Therapy due to:    Hemodialysis: Will continue to pursue as able.       Electronically signed by Aparna Pereira PTA on 2024 at 12:03 PM   
Physical Therapy        Physical Therapy Cancel Note      DATE: 2024    NAME: Sanjay Banegas  MRN: [id number]   : 1960      Patient not seen this date for Physical Therapy due to:    Patient is not appropriate for PT evaluation/treatment at this time      Electronically signed by Eleni Shelton PT on 2024 at 4:35 PM      
Physical Therapy        Physical Therapy Cancel Note      DATE: 2024    NAME: Sanjay Banegas  MRN: [id number]   : 1960      Patient not seen this date for Physical Therapy due to:    Other: Per RN, pt not following commands with declined cognition. Will check back as able and appropriate.       Electronically signed by REAGAN GALICIA on 2024 at 2:40 PM      
Physical Therapy  Facility/Department: [facility] 3- MICU  Physical Therapy Treatment Note    Name: Sanjay Banegas  : 1960  MRN: [id number]  Date of Service: 2024    Discharge Recommendations:  Patient would benefit from continued therapy after discharge   PT Equipment Recommendations  Equipment Needed: No      Patient Diagnosis(es): The primary encounter diagnosis was NSTEMI (non-ST elevated myocardial infarction) ([facility]). Diagnoses of Congestive heart failure, unspecified HF chronicity, unspecified heart failure type (HCC) and Chest pain, unspecified type were also pertinent to this visit.  Past Medical History:  has a past medical history of Acute congestive heart failure (HCC), Acute on chronic diastolic congestive heart failure (HCC), Anemia, Anemia of chronic renal failure, Atrial fibrillation ([facility]), AVF (arteriovenous fistula) ([facility]), Bowel obstruction ([facility]), CAD (coronary artery disease), Chest pain at rest, CHF (congestive heart failure) ([facility]), CHF (congestive heart failure), NYHA class I, acute on chronic, combined ([facility]), Chronic kidney disease, CKD (chronic kidney disease) stage 4, GFR 15-29 ml/min ([facility]), Coronary artery disease involving native coronary artery of native heart without angina pectoris, Diabetes mellitus ([facility]), Dialysis patient ([facility]), ESRD (end stage renal disease) ([facility]), Essential hypertension, Groin swelling, Hemodialysis patient ([facility]), Hydrocele, Hyperlipidemia, Hypertension, Inguinal hernia, MI, old, NSTEMI (non-ST elevated myocardial infarction) ([facility]), On home O2, BATSHEVA (obstructive sleep apnea), Patient in clinical research study, Pneumonia, Poor historian, Prostatism, Respiratory distress, Rising PSA level, S/P arteriovenous (AV) graft placement, S/P PTCA - TIM distal LAD - Dr. Orozco 19, Sleep apnea, Small bowel obstruction ([facility]), SOB (shortness of breath), and Type 2 diabetes mellitus with chronic kidney disease on chronic dialysis ([facility]).  Past Surgical History:  has a past 
[facility]  Office: [phone]  Miah Reid DO, Nabil Marmolejo, DO, Ray Leyva DO, Sridhar Guerrero DO, Roxanna Del Angel MD, Charlotte Alfonso MD, America Coelho MD, Sandie Fermin MD,  Emil Lamb MD, Ngoc Ayon MD, Sabrina Blue MD,  Donis Chao DO, Rene Shabazz MD, Jasper Davidson MD, Raymond Reid DO, Sheron Jones MD,  Anthony Fuentes DO, Divina Del Toro MD, Danitza Barcenas MD, Yaneth Moreno MD, Mary Childress MD,  Petey George MD, Elle Schmidt MD, Radha Macias MD, Crow Sanchez MD, Dano Lua MD, Jai Red MD, Angel Pagan DO, Damian Ann DO, Wayne Bravo MD,  Adan Sauceda MD, Shirley Waterhouse, CNP,  Sudha Walker CNP, Ren Crawley, CNP,  Di Lee, DNP, Tanesha Chirinos, CNP, Nehal Arenas, CNP, Chelsie Hartley, CNP, Luciana Carrera, CNP, Lorenza Joyce, PA-C, Cayla Alvares PA-C, Mahsa Cazares, CNP, Veena Simpson, CNP, Seun Sal, CNP, Kristen Anderson, CNP, Shayy Dudley, CNP, Rosenda Downing, CNS, Indu Chávez, CNP, Krystal Vernon CNP, Tracy Schwab, CNP         [facility]   IN-PATIENT SERVICE   [facility]    Progress Note    7/18/2024    9:02 AM    Name:   Sanjay Banegas  MRN:     [id number]     Acct:      [id number]   Room:   2021/2021-01   Day:  1  Admit Date:  7/17/2024  6:29 PM    PCP:   Becca Wellington MD  Code Status:  Full Code    Subjective:     C/C:   Chief Complaint   Patient presents with    Hypotension     Interval History Status: not changed.     Patient is very sleepy during the conversations. His daughter tells me that he is supposed to get sleep study done and has known sleep apnea.  He says that he has some mid-sternal chest pain, non radiating.  Bp lower side  Labs show creatinine 4.2  Hb 9.4  Wbc 7.6  Trop 730s  Brief History:     58-year-old male with past medical history of ESRD on hemodialysis, history of CAD with drug-eluting stent to the distal LAD in 2019, diabetes 
Pt previous paracentisis site bleeding consistenly. Eliquis and asprin held this morning. Site cleaned and leaking puncture wound secured with surgicell via the resident and Dr. Paz at bedside. No new bleeding has occurred from site.   
Pt transferred to room 3026. Report called to accepting nurse. All questions answered at this time. All belongings accounted for and taken by family.  
RN was in the process of taking patient for IR paracentesis when patient refused stating, \"take me back, I'm not going to do it.\" RN verified twice that patient wishes to refuse the procedure and educated of the importance of the procedure. Patient stated again that he does not want the procedure. IR was called and informed of patient's decision as well as Dr. Loya and Dr. Bergeron. Patient returned to room.  
Renal Progress Note    Patient :  Sanjay Banegas; 64 y.o. MRN# [id number]  Location:  3026/3026-01  Attending:  Hermelindo Hale MD  Admit Date:  7/17/2024   Hospital Day: 8    Subjective:     The patient is seen and examined on hemodialysis.  He is lying flat.  He is interactive but less awake and alert than earlier in the week, according to his significant other.  Patient is on supplemental oxygen by nasal cannula but not currently on BiPAP.  He has been requiring BiPAP at times.  AV access is AV graft for dialysis.       I personally reviewed the dialysis orders with the dialysis nurse at the bedside.  I personally placed the hemodialysis orders into the record.    Patient does not express any complaints today.  Patient normally gets dialysis as per TTS schedule.    Labs from today showed sodium 135 with potassium 3.8, chloride 96 and CO2 25 with BUN 32 and creatinine 4.2 and calcium 9.7 with albumin 4, alkaline phosphatase 146, total bilirubin 0.8 remainder of liver function test within normal limits.  White blood cells are 11.7 with hemoglobin 9.5 and platelets 182.    Patient did get paracentesis done 7/22/2024 and about 3.3 L of red fluid was removed by IR.  Outpatient Medications:     Medications Prior to Admission: pantoprazole (PROTONIX) 40 MG tablet, Take 1 tablet by mouth every morning (before breakfast)  amiodarone (CORDARONE) 200 MG tablet, Take 1 tablet by mouth daily  vitamin D (CHOLECALCIFEROL) 25 MCG (1000 UT) TABS tablet, Take 2 tablets by mouth daily  lanthanum (FOSRENOL) 1000 MG chewable tablet, Take 1 tablet by mouth 3 times daily (with meals)  midodrine (PROAMATINE) 5 MG tablet, Take 1 tablet by mouth 3 times daily  apixaban (ELIQUIS) 5 MG TABS tablet, Take 1 tablet by mouth 2 times daily  atorvastatin (LIPITOR) 40 MG tablet, Take 1 tablet by mouth nightly  aspirin EC 81 MG EC tablet, Take 1 tablet by mouth daily  traMADol (ULTRAM) 50 MG tablet, Take 1 tablet by mouth every 8 hours as needed 
Renal Progress Note    Patient :  Sanjay Banegas; 64 y.o. MRN# [id number]  Location:  3026/3026-01  Attending:  Giuseppe Barcenas MD  Admit Date:  7/17/2024   Hospital Day: 5    Subjective:     Patient was seen and examined.  Did have some altered mental status overnight, today seems to be doing better.  Patient normally gets dialysis as per TTS schedule.  Had regular dialysis this Saturday and ran for 212 minutes and got about 1.5 L removed.  Patient did get paracentesis done today 7/22/2024 and got about 3.3 L red fluid removed by IR.  BMP results from today reviewed electrolytes stable with sodium 133, potassium 3.7, chloride 97, bicarb 23, calcium 9.2.    Outpatient Medications:     Medications Prior to Admission: pantoprazole (PROTONIX) 40 MG tablet, Take 1 tablet by mouth every morning (before breakfast)  amiodarone (CORDARONE) 200 MG tablet, Take 1 tablet by mouth daily  vitamin D (CHOLECALCIFEROL) 25 MCG (1000 UT) TABS tablet, Take 2 tablets by mouth daily  lanthanum (FOSRENOL) 1000 MG chewable tablet, Take 1 tablet by mouth 3 times daily (with meals)  midodrine (PROAMATINE) 5 MG tablet, Take 1 tablet by mouth 3 times daily  apixaban (ELIQUIS) 5 MG TABS tablet, Take 1 tablet by mouth 2 times daily  atorvastatin (LIPITOR) 40 MG tablet, Take 1 tablet by mouth nightly  aspirin EC 81 MG EC tablet, Take 1 tablet by mouth daily  traMADol (ULTRAM) 50 MG tablet, Take 1 tablet by mouth every 8 hours as needed for Pain.  lidocaine-prilocaine (EMLA) 2.5-2.5 % cream, Apply topically as needed for Pain (dialysis)    Current Medications:     Scheduled Meds:    sodium chloride flush  5-40 mL IntraVENous 2 times per day    sodium chloride flush  5-40 mL IntraVENous 2 times per day    [Held by provider] apixaban  5 mg Oral BID    midodrine  10 mg Oral TID    epoetin  2,000 Units IntraVENous Once per day on Tue Thu Sat    And    epoetin  3,000 Units IntraVENous Once per day on Tue Thu Sat    amiodarone  200 mg Oral Daily    
Renal Progress Note    Patient :  Sanjay Banegas; 64 y.o. MRN# [id number]  Location:  3026/3026-01  Attending:  Giuseppe Barcenas MD  Admit Date:  7/17/2024   Hospital Day: 6    Subjective:     Patient was seen and examined on HD.  Tolerating the procedure well.  No new issues reported overnight.  BMP results from today reviewed sodium 135, potassium 4.4, chloride 98, bicarb 21, calcium 9.5, BUN 37, creatinine 4.6 mg/dl.  Patient normally gets dialysis as per TTS schedule.    Patient did get paracentesis done 7/22/2024 and got about 3.3 L red fluid removed by IR.  Outpatient Medications:     Medications Prior to Admission: pantoprazole (PROTONIX) 40 MG tablet, Take 1 tablet by mouth every morning (before breakfast)  amiodarone (CORDARONE) 200 MG tablet, Take 1 tablet by mouth daily  vitamin D (CHOLECALCIFEROL) 25 MCG (1000 UT) TABS tablet, Take 2 tablets by mouth daily  lanthanum (FOSRENOL) 1000 MG chewable tablet, Take 1 tablet by mouth 3 times daily (with meals)  midodrine (PROAMATINE) 5 MG tablet, Take 1 tablet by mouth 3 times daily  apixaban (ELIQUIS) 5 MG TABS tablet, Take 1 tablet by mouth 2 times daily  atorvastatin (LIPITOR) 40 MG tablet, Take 1 tablet by mouth nightly  aspirin EC 81 MG EC tablet, Take 1 tablet by mouth daily  traMADol (ULTRAM) 50 MG tablet, Take 1 tablet by mouth every 8 hours as needed for Pain.  lidocaine-prilocaine (EMLA) 2.5-2.5 % cream, Apply topically as needed for Pain (dialysis)    Current Medications:     Scheduled Meds:    apixaban  5 mg Oral BID    Caffeine  200 mg Oral Once    lactulose  20 g Oral TID    rifAXIMin  400 mg Oral TID    sodium chloride flush  5-40 mL IntraVENous 2 times per day    sodium chloride flush  5-40 mL IntraVENous 2 times per day    midodrine  10 mg Oral TID    epoetin  2,000 Units IntraVENous Once per day on Tue Thu Sat    And    epoetin  3,000 Units IntraVENous Once per day on Tue Thu Sat    amiodarone  200 mg Oral Daily    aspirin EC  81 mg Oral Daily    
Renal Progress Note    Patient :  Sanjay Banegas; 64 y.o. MRN# [id number]  Location:  3026/3026-01  Attending:  Giuseppe Barcenas MD  Admit Date:  7/17/2024   Hospital Day: 7    Subjective:     Patient was seen and examined.   No new issues overnight.     BMP results from today reviewed sodium 135, potassium 3.5, chloride 97, bicarb 28, calcium 9.0, BUN 22, creatinine 3.4 mg/dl.  Magnesium 1.7.  Patient regular dialysis yesterday ran for 214 minutes and got about 1.8 removed.    Patient normally gets dialysis as per TTS schedule.    Patient did get paracentesis done 7/22/2024 and got about 3.3 L red fluid removed by IR.  Outpatient Medications:     Medications Prior to Admission: pantoprazole (PROTONIX) 40 MG tablet, Take 1 tablet by mouth every morning (before breakfast)  amiodarone (CORDARONE) 200 MG tablet, Take 1 tablet by mouth daily  vitamin D (CHOLECALCIFEROL) 25 MCG (1000 UT) TABS tablet, Take 2 tablets by mouth daily  lanthanum (FOSRENOL) 1000 MG chewable tablet, Take 1 tablet by mouth 3 times daily (with meals)  midodrine (PROAMATINE) 5 MG tablet, Take 1 tablet by mouth 3 times daily  apixaban (ELIQUIS) 5 MG TABS tablet, Take 1 tablet by mouth 2 times daily  atorvastatin (LIPITOR) 40 MG tablet, Take 1 tablet by mouth nightly  aspirin EC 81 MG EC tablet, Take 1 tablet by mouth daily  traMADol (ULTRAM) 50 MG tablet, Take 1 tablet by mouth every 8 hours as needed for Pain.  lidocaine-prilocaine (EMLA) 2.5-2.5 % cream, Apply topically as needed for Pain (dialysis)    Current Medications:     Scheduled Meds:    apixaban  5 mg Oral BID    lactulose  20 g Oral TID    rifAXIMin  550 mg Oral BID    sodium chloride flush  5-40 mL IntraVENous 2 times per day    sodium chloride flush  5-40 mL IntraVENous 2 times per day    midodrine  10 mg Oral TID    epoetin  2,000 Units IntraVENous Once per day on Tue Thu Sat    And    epoetin  3,000 Units IntraVENous Once per day on Tue Thu Sat    amiodarone  200 mg Oral Daily    
SPIRITUAL HEALTH - [facility] [place]  [place]ESS [place]    Shift date: 7/20/24  Shift day: Saturday   Shift # 1    Room # 3026/3026-01   Name: Sanjay Banegas                [employer]ism: [place]   Place of [place]: none    Referral: Routine Visit    Admit Date & Time: 7/17/2024  6:29 PM    Assessment:  Sanjay Banegas is a 64 y.o. male referred to Spiritual Care as patient expressed interest in changing code status.  Patient is supported by his significant other, who serves as his primary decision maker, along with being supported by his siblings and children. Upon going on the floor,  was approached by Mr. Banegas's s/o who stated she was very concerned that Mr. Banegas be able to make his own decision regarding code status independent of her input and/or that of his siblings/children.  Mr. Banegas's s/o went on to further state she had requested the support of Spiritual Care and Mr. Banegas's medical care team so that Mr. Banegas felt free to process his decision without influence or concern about its impact on her or his family. It appeared family dynamics may have been complicated as Mr. Banegas's support system expressed varied opinions about the decision.  Writer engaged as part of Mr. Banegas's care team, along with Mr. Banegas's primary nurse and his physician who explained the code statuses and implications related to each decision.  Throughout the encounter Mr. Banegas repeatedly expressed a desire for his life to end as a means to terminate the \"unbearable itching\" resulting from \"end stage kidney failure.\"  During the encounter Mr. Banegas's physician assessed Mr. Banegas's ability to determine a change in code status and assessed him as having the capacity at this time.      . Intervention:  Writer offered a supportive presence through active listening and participation in patient's care conference with patient as he explored the impact of his illness and explored options for future care. Writer also 
[facility]   Progress Note                   Date:   7/20/2024  Patient name: Sanjay Banegas  Date of admission:  7/17/2024  6:29 PM  MRN:   [id number]  YOB: 1960  PCP: Becca Wellington MD    Reason for consult: Elevated troponin's    Subjective:     Patient seen and examined at the bed side: No acute issues overnight, currently on room air, NSR 70, 95/53.    Medications:   Scheduled Meds:   sodium chloride flush  5-40 mL IntraVENous 2 times per day    sodium chloride flush  5-40 mL IntraVENous 2 times per day    lactulose  10 g Oral TID    [Held by provider] apixaban  5 mg Oral BID    midodrine  10 mg Oral TID    epoetin  2,000 Units IntraVENous Once per day on Tue Thu Sat    And    epoetin  3,000 Units IntraVENous Once per day on Tue Thu Sat    amiodarone  200 mg Oral Daily    aspirin EC  81 mg Oral Daily    atorvastatin  40 mg Oral Nightly    lanthanum  1,000 mg Oral TID WC    pantoprazole  40 mg Oral QAM AC    Vitamin D  2,000 Units Oral Daily    sodium chloride flush  5-40 mL IntraVENous 2 times per day    insulin lispro  0-8 Units SubCUTAneous TID WC    insulin lispro  0-4 Units SubCUTAneous Nightly     Continuous Infusions:   sodium chloride      sodium chloride      sodium chloride       CBC:   Recent Labs     07/18/24  0615 07/19/24  1000 07/20/24  0413   WBC 7.6 6.3 6.1   HGB 9.4* 9.4* 8.4*   PLT See Reflexed IPF Result See Reflexed IPF Result 147     BMP:    Recent Labs     07/18/24  0615 07/19/24  1000 07/20/24  0413   * 135* 137   K 4.0 4.1 4.0   CL 95* 95* 98   CO2 28 26 26   BUN 38* 24* 31*   CREATININE 4.2* 3.3* 3.8*   GLUCOSE 137* 140* 86     Hepatic:   Recent Labs     07/17/24  1855 07/19/24  1000 07/20/24  0413   AST 21 17 20   ALT 6* <5* 6*   BILITOT 0.6 0.6 0.6   ALKPHOS 173* 148* 139*     Troponin: No results for input(s): \"TROPONINI\" in the last 72 hours.  BNP: No results for input(s): \"BNP\" in the last 72 hours.  Lipids: No results for input(s): 
[facility]   Progress Note                   Date:   7/21/2024  Patient name: Sanjay Banegas  Date of admission:  7/17/2024  6:29 PM  MRN:   [id number]  YOB: 1960  PCP: Becca Wellington MD    Reason for consult: Elevated troponin's    Subjective:     Patient seen and examined at the bed side. No acute issues overnight, currently on oxygen supplementation via NC alternating with BiPAP, NSR 74, 98/56 with the MAP of 70. Patient continues to have PACs on telemetry.    Medications:   Scheduled Meds:   sodium chloride flush  5-40 mL IntraVENous 2 times per day    sodium chloride flush  5-40 mL IntraVENous 2 times per day    [Held by provider] lactulose  10 g Oral TID    [Held by provider] apixaban  5 mg Oral BID    midodrine  10 mg Oral TID    epoetin  2,000 Units IntraVENous Once per day on Tue Thu Sat    And    epoetin  3,000 Units IntraVENous Once per day on Tue Thu Sat    amiodarone  200 mg Oral Daily    aspirin EC  81 mg Oral Daily    atorvastatin  40 mg Oral Nightly    lanthanum  1,000 mg Oral TID WC    pantoprazole  40 mg Oral QAM AC    Vitamin D  2,000 Units Oral Daily    sodium chloride flush  5-40 mL IntraVENous 2 times per day    insulin lispro  0-8 Units SubCUTAneous TID WC    insulin lispro  0-4 Units SubCUTAneous Nightly     Continuous Infusions:   dextrose      sodium chloride       CBC:   Recent Labs     07/19/24  1000 07/20/24  0413 07/21/24  0407   WBC 6.3 6.1 7.3   HGB 9.4* 8.4* 8.3*   PLT See Reflexed IPF Result 147 See Reflexed IPF Result     BMP:    Recent Labs     07/19/24  1000 07/20/24  0413 07/21/24  0407   * 137 133*   K 4.1 4.0 3.7   CL 95* 98 99   CO2 26 26 21   BUN 24* 31* 18   CREATININE 3.3* 3.8* 2.9*   GLUCOSE 140* 86 73*     Hepatic:   Recent Labs     07/19/24  1000 07/20/24  0413 07/21/24  0407   AST 17 20 25   ALT <5* 6* <5*   BILITOT 0.6 0.6 0.7   ALKPHOS 148* 139* 125     Troponin: No results for input(s): \"TROPONINI\" in the last 72 
[facility]   Progress Note                   Date:   7/22/2024  Patient name: Sanjay Banegas  Date of admission:  7/17/2024  6:29 PM  MRN:   [id number]  YOB: 1960  PCP: Becca Wellington MD    Reason for consult: Elevated troponin's    Subjective:     Patient seen and examined at the bed side. He remained afebrile overnight. No acute issues overnight, currently on oxygen supplementation via NC alternating with BiPAP, NSR 77, 95/55 with the MAP of 68. Patient denies any chest pain/ pressure but endorses shortness of breath. He wore BiPAP overnight. Plan for abdominal paracentesis today. Discussed with the RN, no other acute issues noted.    Medications:   Scheduled Meds:   sodium chloride flush  5-40 mL IntraVENous 2 times per day    sodium chloride flush  5-40 mL IntraVENous 2 times per day    [Held by provider] apixaban  5 mg Oral BID    midodrine  10 mg Oral TID    epoetin  2,000 Units IntraVENous Once per day on Tue Thu Sat    And    epoetin  3,000 Units IntraVENous Once per day on Tue Thu Sat    amiodarone  200 mg Oral Daily    aspirin EC  81 mg Oral Daily    atorvastatin  40 mg Oral Nightly    lanthanum  1,000 mg Oral TID WC    pantoprazole  40 mg Oral QAM AC    Vitamin D  2,000 Units Oral Daily    sodium chloride flush  5-40 mL IntraVENous 2 times per day    insulin lispro  0-8 Units SubCUTAneous TID WC    insulin lispro  0-4 Units SubCUTAneous Nightly     Continuous Infusions:   norepinephrine      dextrose      sodium chloride       CBC:   Recent Labs     07/20/24  0413 07/21/24  0407 07/22/24  0233   WBC 6.1 7.3 8.1   HGB 8.4* 8.3* 8.3*    See Reflexed IPF Result 142     BMP:    Recent Labs     07/20/24  0413 07/21/24  0407 07/22/24  0233    133* 133*   K 4.0 3.7 3.7   CL 98 99 97*   CO2 26 21 23   BUN 31* 18 28*   CREATININE 3.8* 2.9* 3.8*   GLUCOSE 86 73* 111*     Hepatic:   Recent Labs     07/20/24  0413 07/21/24  0407 07/22/24  0233   AST 20 25 16   ALT 6* 
[facility] Nurse referral noted to \"evaluate sacral wound\"  Patient out of room at time of visit. Noted zinc oxide documented as in use for intergluteal cleft wound; photo form 7/15 reviewed.  Will try to see tomorrow.,  Current plan of car eis appropriate.   
07/19/24  1000   AST 21 17   ALT 6* <5*   BILITOT 0.6 0.6   ALKPHOS 173* 148*     Troponin:   Recent Labs     07/17/24  1855 07/17/24  1955 07/18/24  0615   TROPHS 867* 772* 735*     BNP: No results for input(s): \"BNP\" in the last 72 hours.  Lipids: No results for input(s): \"CHOL\", \"HDL\" in the last 72 hours.    Invalid input(s): \"LDLCALCU\"  INR: No results for input(s): \"INR\" in the last 72 hours.    Objective:   Vitals: BP (!) 89/47   Pulse 71   Temp 97.5 °F (36.4 °C) (Oral)   Resp 16   Ht 1.778 m (5' 10\")   Wt 84.2 kg (185 lb 10 oz)   SpO2 100%   BMI 26.63 kg/m²   General appearance: alert and cooperative with exam  HEENT: Head: Normocephalic, no lesions, without obvious abnormality.  Neck:no JVD, trachea midline, no adenopathy  Lungs: Clear to auscultation  Heart: Regular rate and rhythm, s1/s2 auscultated, no murmurs  Abdomen: soft, non-tender, bowel sounds active  Extremities: no edema  Neurologic: not done    ECHO:   Left     Ventricle: The left ventricle is mildly enlarged. Global left     ventricular systolic function is at lower limits of normal. The EF is     50 % visually. Left ventricular wall thickness is mildly increased.     The septum is abnormal, consistent with RV volume and/or pressure     overload. The changes are consistent with eccentric hypertrophy. Right     Ventricle: The right ventricle is severely enlarged. Severely reduced     right ventricular systolic function. Left Atrium: The left atrium     appears enlarged. Tricuspid Valve: Flow reversal is seen in the     hepatic veins suggestive of severe tricuspid regurgitation. There is     malcoaptation of the tricuspid valve leaflets .      Stress Test:   3/15/2019  IMPRESSION:  1. No discrete perfusion abnormality to suggest myocardial  ischemia/infarction.  2. Inferior and inferoseptal hypokinesis.  Calculated ejection fraction of  36%.  3. Risk stratification: Intermediate.     Cardiac Angiography:  1/25/23  -Minimal CAD  -Patent 
5-40 mL IntraVENous 2 times per day    midodrine  10 mg Oral TID    epoetin  2,000 Units IntraVENous Once per day on Tue Thu Sat    And    epoetin  3,000 Units IntraVENous Once per day on Tue Thu Sat    amiodarone  200 mg Oral Daily    aspirin EC  81 mg Oral Daily    atorvastatin  40 mg Oral Nightly    lanthanum  1,000 mg Oral TID WC    pantoprazole  40 mg Oral QAM AC    Vitamin D  2,000 Units Oral Daily    sodium chloride flush  5-40 mL IntraVENous 2 times per day    insulin lispro  0-8 Units SubCUTAneous TID WC    insulin lispro  0-4 Units SubCUTAneous Nightly     Continuous Infusions:    norepinephrine Stopped (24 0259)    dextrose      sodium chloride       PRN Meds:  glucose, dextrose bolus **OR** dextrose bolus, glucagon (rDNA), dextrose, Pramoxine-Calamine, sodium chloride flush, sodium chloride, oxyCODONE, albumin human 25%, sodium chloride flush, ondansetron **OR** ondansetron, acetaminophen **OR** acetaminophen, polyethylene glycol    Input/Output:       I/O last 3 completed shifts:  In: 1161.5 [P.O.:525; I.V.:202; IV Piggyback:134.5]  Out: 2100 .    Patient Vitals for the past 96 hrs (Last 3 readings):   Weight   24 0633 75.8 kg (167 lb 1.7 oz)   24 1415 76.1 kg (167 lb 12.3 oz)   24 1015 77 kg (169 lb 12.1 oz)       Vital Signs:   Temperature:  Temp: 97.6 °F (36.4 °C)  TMax:   Temp (24hrs), Av.7 °F (36.5 °C), Min:97.4 °F (36.3 °C), Max:98.3 °F (36.8 °C)    Respirations:  Respirations: 23  Pulse:   Pulse: 72  BP:    BP: (!) 93/55  BP Range: Systolic (24hrs), Av , Min:93 , Max:131       Diastolic (24hrs), Av, Min:47, Max:56      Physical Examination:     General:  Alert and awake but seems to have confusion off and on.  HEENT: Atraumatic, normocephalic, no throat congestion, moist mucosa.  Eyes:   Pupils equal, round and reactive to light, EOMI.  Neck:   Supple  Chest:   Bilateral vesicular breath sounds, no rales or wheezes.  Cardiac:  S1 S2 RR, no murmurs, gallops or 
Patient;Significant other   Referral/Consult From Patient   Support System Significant other   Last Encounter  07/24/24   Complexity of Encounter Low   Begin Time 1300   End Time  1330   Total Time Calculated 30 min   Spiritual/Emotional needs   Type Other (comment)  (emotional support)   Assessment/Intervention/Outcome   Assessment Calm;Coping   Intervention Active listening;Other (Comment)  (Education and Referral)   Outcome Engaged in conversation;Receptive       
glucagon (rDNA), dextrose, Pramoxine-Calamine, sodium chloride flush, sodium chloride, oxyCODONE, albumin human 25%, sodium chloride flush, ondansetron **OR** ondansetron, acetaminophen **OR** acetaminophen, polyethylene glycol      Data Review:  LABS and IMAGING:     CBC  Recent Labs     07/19/24  1000 07/20/24  0413 07/21/24  0407   WBC 6.3 6.1 7.3   HGB 9.4* 8.4* 8.3*   HCT 33.6* 29.4* 30.4*   .0* 101.4 106.7*   MCHC 28.0* 28.6 27.3*   RDW 20.1* 19.9* 19.9*   PLT See Reflexed IPF Result 147 See Reflexed IPF Result       Immature PLTs   Lab Results   Component Value Date/Time    PLTFLUORE 173 07/21/2024 04:07 AM    PLTFLUORE 161 07/19/2024 10:00 AM    PLTFLUORE Platelet clumps present, count appears adequate. 07/18/2024 06:15 AM       PT/INR  Recent Labs     07/19/24  1342 07/20/24  0413   PROTIME 16.1* 16.6*   INR 1.3 1.4       ANEMIA STUDIES  No results for input(s): \"IRONPERSAT\", \"TIBC\", \"IRON\", \"FERRITIN\", \"UQJVBKAZ03\", \"FOLATE\", \"OCCULTBLD\" in the last 72 hours.    BMP  Recent Labs     07/19/24  1000 07/20/24  0413 07/21/24  0407   * 137 133*   K 4.1 4.0 3.7   CL 95* 98 99   CO2 26 26 21   BUN 24* 31* 18   CREATININE 3.3* 3.8* 2.9*   GLUCOSE 140* 86 73*   CALCIUM 9.2 9.1 9.5   MG  --   --  2.0       LFTS  Recent Labs     07/19/24  1000 07/20/24  0413 07/21/24  0407   ALKPHOS 148* 139* 125   ALT <5* 6* <5*   AST 17 20 25   BILITOT 0.6 0.6 0.7   ALBUMIN 4.2 3.5 3.6       AMYLASE/LIPASE/AMMONIA  Recent Labs     07/19/24  1342   AMMONIA 38       Acute Hepatitis Panel   Lab Results   Component Value Date/Time    HEPBSAG NONREACTIVE 07/18/2024 10:57 AM    HEPCAB NONREACTIVE 11/13/2021 12:04 PM    HEPBIGM NONREACTIVE 01/05/2024 10:24 AM       HCV Genotype   No components found for: \"HEPATITISCGENOTYPE\"    HCV Quantitative   No results found for: \"HCVQNT\"    LIVER WORK UP:    AFP  No results found for: \"AFP\"    Alpha 1 antitrypsin   No results found for: \"A1A\"    Anti - Liver/Kidney Ab  No results found 
mobility training, Balance training, Pain management, Safety education & training, Patient/Caregiver education & training, Home management training, Self-Care / ADL, Strengthening, ROM, Equipment evaluation, education, & procurement     Restrictions  Restrictions/Precautions  Restrictions/Precautions: Fall Risk, General Precautions  Required Braces or Orthoses?: No  Position Activity Restriction  Other position/activity restrictions: up w/ A    Subjective   General  Patient assessed for rehabilitation services?: Yes  Family / Caregiver Present: No  Diagnosis: Hypotension, resp failure, osteopenia, pt refusing paracentesis, sacral wound  Subjective  Subjective: RN approved therapy session, pt states having at 5/10 pain level at B/L feet-pt unable to clarify if chronic or acute     Social/Functional History  Social/Functional History  Lives With: Alone  Type of Home: Apartment  Home Layout: Two level, Able to Live on Main level with bedroom/bathroom  Home Access: Stairs to enter without rails  Entrance Stairs - Number of Steps: 1  Bathroom Shower/Tub: Tub/Shower unit, Walk-in shower  Bathroom Toilet: Standard  Bathroom Equipment: Shower chair  Home Equipment: Wheelchair - Electric, Wheelchair - Manual, Mechanical lift, Hospital bed, Oxygen  ADL Assistance: Needs assistance  Toileting: Needs assistance  Homemaking Assistance: Needs assistance  Homemaking Responsibilities: No  Ambulation Assistance: Needs assistance  Transfer Assistance: Needs assistance  Active : No  Patient's  Info: SO drives  Mode of Transportation: Family  Occupation: Retired  Additional Comments: Pt is a questionable historian, pt from a SNF-pt unable to mention which one       Objective   Temp: 98.8 °F (37.1 °C)  Pulse: 67  Heart Rate Source: Monitor  Respirations: 15  SpO2: 99 %  O2 Device: Nasal cannula             Safety Devices  Type of Devices: Patient at risk for falls;Left in bed;Gait belt;Call light within reach;Nurse 
  XR CHEST PORTABLE   Final Result   Severe cardiomegaly with pulmonary venous congestion and pulmonary edema.         FL MODIFIED BARIUM SWALLOW W VIDEO    (Results Pending)           ENDOSCOPY     Principal Problem:    MI, acute, non ST segment elevation (HCC)  Active Problems:    S/P PTCA - TIM distal LAD - Dr. Orozco 4/1/19    Acute on chronic combined systolic (congestive) and diastolic (congestive) heart failure (HCC)    Congestive heart failure (HCC)    Chronic respiratory failure with hypoxia (HCC)    Metabolic encephalopathy    Anemia of chronic renal failure    Type 2 diabetes mellitus with chronic kidney disease on chronic dialysis, without long-term current use of insulin (HCC)    AVF (arteriovenous fistula) (HCC)    ESRD on dialysis (HCC)    BATSHEVA (obstructive sleep apnea)    Chronic abdominal pain    Hypoxemia-due to CHF/fluid overload    Paroxysmal atrial fibrillation (HCC)    Encephalopathy    Other cirrhosis of liver (HCC)    Ascites    CO2 narcosis    Acute on chronic respiratory failure with hypoxia and hypercapnia (HCC)    Acute hypercapnic respiratory failure (HCC)    Abnormal findings on diagnostic imaging of other parts of digestive tract    Chronic disease anemia    Cirrhosis of liver with ascites (HCC)    Encounter for palliative care    ACP (advance care planning)    Arterial hypotension  Resolved Problems:    * No resolved hospital problems. *       GI Assessment:    1.  Decompensated cardiac cirrhosis c/b ascites  -Patient was [facility] at [facility] by GI 06/2024.  Paracentesis for ascites completed 06/03/2024  With 4.4 L bloody ascites removed.  Repeat paracentesis 6/18/2024 with 5 L bloody ascites removed. Fluid analysis consistent with cardiac ascites.  (Total protein 3.7)  No SBP.  - Echo showed EF 50%.  RV volume and/or pressure overload consistent with eccentric hypertrophy.  Severely reduced RV systolic function.    Paracentesis 07/22/2024 - 78661 ML blood-tinged fluid SAAG 1.0 - T protein 
([facility] [facility] On home O2, BATSHEVA (obstructive sleep apnea), Patient in clinical research study, Pneumonia, Poor historian, Prostatism, Respiratory distress, Rising PSA level, S/P arteriovenous (AV) graft placement, S/P PTCA - TIM distal LAD - Dr. Orozco 4/1/19, Sleep apnea, Small bowel obstruction ([facility]), SOB (shortness of breath), and Type 2 diabetes mellitus with chronic kidney disease on chronic dialysis ([facility]).    Family History   Problem Relation Age of Onset    Heart Disease Mother         CHF    Early Death Mother     High Blood Pressure Brother     Diabetes Brother     Asthma Brother     High Blood Pressure Brother     Diabetes Brother     High Blood Pressure Brother     Diabetes Brother        Social History     Tobacco Use    Smoking status: Never    Smokeless tobacco: Never   Vaping Use    Vaping Use: Never used   Substance Use Topics    Alcohol use: No    Drug use: No     REVIEW OF SYSTEMS - limited due to confusion  CONSTITUTIONAL:  negative for fevers, chills, sweats, fatigue, weight loss  HEENT:  negative for vision, hearing changes, runny nose, throat pain  RESPIRATORY:  negative for shortness of breath, cough, congestion, wheezing  CARDIOVASCULAR:  negative for chest pain  GASTROINTESTINAL:  negative for nausea, vomiting, diarrhea, constipation, change in bowel habits, abdominal pain   GENITOURINARY:  negative for difficulty of urination, burning with urination, frequency   ALLERGIC/IMMUNOLOGIC:  wife and daughter both state that he has frequent itching of his skin  MUSCULOSKELETAL:  negative joint pains, muscle aches, swelling of joints  NEUROLOGICAL:  negative for headaches, dizziness, lightheadedness, numbness, pain, tingling extremities  BEHAVIOR/PSYCH:  negative for depression, anxiety     PHYSICAL ASSESSMENT:  General Appearance: somnolent, chronically ill appearing, and in no acute distress, resting supine in bed  Mental status: oriented to person and time only  Head: normocephalic, atraumatic  Eye: no 
12/31/2021 04:23 PM    BACTERIA NOT REPORTED 12/31/2021 04:23 PM    LEUKOCYTESUR NEGATIVE 04/01/2022 11:27 AM    UROBILINOGEN Normal 04/01/2022 11:27 AM    BILIRUBINUR NEGATIVE 04/01/2022 11:27 AM    GLUCOSEU NEGATIVE 04/01/2022 11:27 AM    KETUA NEGATIVE 04/01/2022 11:27 AM    AMORPHOUS NOT REPORTED 12/31/2021 04:23 PM     Radiology:     Reviewed.     Assessment:     ESRD on HD on TTS schedule at [facility] under Dr. Michelle via LAVG.  Metabolic encephalopathy.  BATSHEVA on CPAP.    CAD with history of CABG.  A-fib.  Cirrhosis with ascites requires paracentesis.  Hypotension.    Plan:   Patient was seen and examined on HD at bedside. Orders were confirmed with the HD nurse.   Keep renal diet including restrictions to less than 1500 cc/day.  Will recommend to hold paracentesis today due to low blood pressures.  Wean off pressor support as tolerated.  BMP in AM.  Will follow.    Nutrition   Please ensure that patient is on a renal diet/TF. Avoid nephrotoxic drugs/contrast exposure.    Samaria Pelayo  Internal Medicine Resident PGY2  [facility]  7/27/2024now@     Attending Physician Statement  I have discussed the care of this patient, including pertinent history and exam findings, with the Resident/CNP. I have seen and examined the patient myself. I have reviewed and edited the key elements of all parts of the encounter with the Resident/CNP.  I agree with the assessment, plan and orders as documented by the Resident/CNP. In addition patient was seen and examined on HD.  Tolerating the procedure well.  Patient has refused his paracentesis yesterday and day before that as well.  Today has been having hypotension requiring pressor support.  Recommend holding off paracentesis today unless emergent.  Albumin ordered with HD.  Case was discussed with critical care.  BMP in AM.  Continue dialysis as per TTS schedule.  Will follow.    Edgardo Del Angel MD   [facility]    This note is created 
aspiration.  Results and recommendations reported to RN.         Treatment Plan  Requires SLP Intervention: Yes     D/C Recommendations: Ongoing speech therapy is recommended during this hospitalization   Frequency: 3-5 x per week    Recommended Diet and Intervention  Diet Solids Recommendation: NPO  Liquid Consistency Recommendation: NPO     Recommendations: Modified barium swallow study;NPO       Treatment/Goals  Short-term Goals  Goal 1: Pt. will complete a Modified Barium Swallow Study.    General  Chart Reviewed: Yes  Behavior/Cognition: Alert;Cooperative  Respiratory Status: O2 via nasual cannula  O2 Device: Nasal cannula  Communication Observation: Functional  Follows Directions: Simple  Dentition: Adequate  Patient Positioning: Upright in bed  Consistencies Administered: Soft and Bite-Sized;Pureed;Mildly Thick - straw;Thin - straw      Oral Motor Deficits  Labial: No impairment  Dentition: Intact  Lingual: No impairment  Consistencies Administered: Soft and Bite-Sized;Pureed;Mildly Thick - straw;Thin - straw    Oral Phase Dysfunction  Oral Phase  Oral Phase: Exceptions  Pt. Presents with extended mastication of the soft solids.     Indicators of Pharyngeal Phase Dysfunction   Pharyngeal Phase  Pharyngeal Phase: Exceptions  Pharyngeal Phase   Pharyngeal Phase: Exceptions  Pt. Presents with + cough for consistencies tested.    Prognosis  Prognosis: Good  Prognosis Considerations: Family/Community Support;Age  Consulted and agree with results and recommendations: Patient;Family member;RN  Family member consulted: Significant Other  RN Name: Sarah Sanchez  Patient Education: Yes  Patient Education Response: Verbalizes understanding             Therapy Time   2917-1594            Completed by Kiki Whitley  Clinician    Co-signed by Chelsie Bah M.A.CCC/SLP  
Center  7/29/2024    Patient seen with resident  Seems to be doing well.  Did have CT scan done of his abdomen to assess for persistent bleeding after paracentesis yesterday.  Off pressors, continues on ProAmatine.  Denies any complaints.  Mentally awake and able to hold a coherent conversation.  Hemodynamically stable.  Clinical exam unremarkable  Impression  1.  ESRD on hemodialysis TTS at [facility] dialysis unit under Dr. Michelle via left AV graft  2.  Acute encephalopathy secondary from CO2 narcosis resolved  3.  COPD on home oxygen  4.  Sleep apnea on CPAP  5.  Liver cirrhosis with ascites requiring paracentesis  6.  Hypotension  Plan  1.  Hemodialysis tomorrow  2.  Continue ProAmatine 53 times a day  3.  Agree with holding Eliquis for now  4.  Will follow with you    Attending Physician Statement  I have discussed the care of Sanjay BENNY PolkBanegas, including pertinent history and exam findings with the resident/fellow. I have reviewed the key elements of all parts of the encounter with the resident/fellow. I have seen and examined the patient with the resident/fellow.  I agree with the assessment and plan and status of the problem list as documented.      .  Electronically signed by Arias Luciano MD on 7/29/2024 at 2:00 PM    
assistance;Maximum assistance  Sit to Supine: Moderate assistance  Scooting: Maximal assistance  Bed Mobility Comments: HOB elevated ~35 degrees with use of bedrail.  Pt with significant R lateral trunk lean upon reaching sitting EOB.  Increased time/effort to perform.  Verbal cues for sequencing.  Transfers  Sit to Stand: 2 Person Assistance;Maximum Assistance  Stand to Sit: Maximum Assistance;2 Person Assistance  Comment: Transfers attempted x2 with seat height elevated, despite max-A x2 pt only able to achieve ~50% of full standing.        Balance  Posture: Fair  Sitting - Static: Fair  Sitting - Dynamic: Fair;-  Comments: sitting balance assessed at the EOB.                                                AM-PAC - Mobility    AM-PAC Basic Mobility - Inpatient   How much help is needed turning from your back to your side while in a flat bed without using bedrails?: A Lot  How much help is needed moving from lying on your back to sitting on the side of a flat bed without using bedrails?: A Lot  How much help is needed moving to and from a bed to a chair?: Total  How much help is needed standing up from a chair using your arms?: Total  How much help is needed walking in hospital room?: Total  How much help is needed climbing 3-5 steps with a railing?: Total  AM-PAC Inpatient Mobility Raw Score : 8  AM-PAC Inpatient T-Scale Score : 28.52  Mobility Inpatient CMS 0-100% Score: 86.62  Mobility Inpatient CMS G-Code Modifier : CM        Goals  Short Term Goals  Time Frame for Short Term Goals: 14 visits  Short Term Goal 1: Pt will perform sit<>stand transfer with mod-A.  Short Term Goal 2: Pt will ambulate 3 feet with a RW and min-A.  Short Term Goal 3: Pt will demonstrate good- dynamic sitting balance to decrease fall risk.  Short Term Goal 4: Pt will perform 300 feet of manual wheelchair propulsion with SBA  Short Term Goal 5: Pt will perform supine<>sit transfer with SBA       Education  Patient Education  Education 
11:27 AM    AMORPHOUS NOT REPORTED 12/31/2021 04:23 PM       HgBA1c:    Lab Results   Component Value Date/Time    LABA1C 4.6 01/04/2024 04:59 PM     TSH:    Lab Results   Component Value Date/Time    TSH 12.63 07/08/2024 09:30 AM     Lactic Acid: No results found for: \"LACTA\"   Troponin: No results for input(s): \"TROPONINI\" in the last 72 hours.      Radiology/Imaging:  XR CHEST PORTABLE   Final Result   Cardiomegaly with pulmonary venous congestion and perihilar pulmonary   consolidation. Findings are suggestive of congestive heart failure.   Pulmonary congestion has decreased from prior examination.         CT ABDOMEN PELVIS WO CONTRAST Additional Contrast? Radiologist Recommendation   Final Result   1. Hepatic cirrhosis with large ascites.   2. Mild bilateral pleural effusions.   3. Pancreatic head is slightly bulky measuring approximately 3.5 x 3.2 cm.   There is no parenchymal calcification or ductal dilatation. Recommend further   evaluation with contrast-enhanced CT or MRI.   4. Left-sided inguinal hernia contains nonobstructed bowel loops. Right-sided   inguinal hernia contains large amount of ascites fluid.   5. Generalized body wall edema.         XR CHEST PORTABLE   Final Result   Severe cardiomegaly with pulmonary venous congestion and pulmonary edema.         US GUIDED PARACENTESIS    (Results Pending)       ASSESSMENT:     Patient Active Problem List    Diagnosis Date Noted    Acute on chronic combined systolic (congestive) and diastolic (congestive) heart failure ([facility]) 02/09/2021    S/P PTCA - TIM distal LAD - Dr. Orozco 4/1/19 04/01/2019    Longstanding persistent atrial fibrillation (H[facility]) 03/05/2023    AV graft thrombosis, initial encounter ([facility]) 03/04/2023    AV fistula occlusion, initial encounter ([facility]) 03/03/2023    Clotted renal dialysis AV graft, initial encounter ([facility]) 03/02/2023    Metabolic acidosis 01/24/2023    Pneumonia due to infectious organism 01/23/2023    Leg swelling 07/17/2022    
  Swallowing mechanism grossly within normal limits without evidence of   aspiration.      Please see separate speech pathology report for full discussion of findings   and recommendations.         US GUIDED PARACENTESIS   Final Result   Successful ultrasound-guided therapeutic/diagnostic paracentesis.         XR CHEST PORTABLE   Final Result   Cardiomegaly with pulmonary venous congestion and perihilar pulmonary   consolidation. Findings are suggestive of congestive heart failure.   Pulmonary congestion has decreased from prior examination.         CT ABDOMEN PELVIS WO CONTRAST Additional Contrast? Radiologist Recommendation   Final Result   1. Hepatic cirrhosis with large ascites.   2. Mild bilateral pleural effusions.   3. Pancreatic head is slightly bulky measuring approximately 3.5 x 3.2 cm.   There is no parenchymal calcification or ductal dilatation. Recommend further   evaluation with contrast-enhanced CT or MRI.   4. Left-sided inguinal hernia contains nonobstructed bowel loops. Right-sided   inguinal hernia contains large amount of ascites fluid.   5. Generalized body wall edema.         XR CHEST PORTABLE   Final Result   Severe cardiomegaly with pulmonary venous congestion and pulmonary edema.             ASSESSMENT:     Patient Active Problem List    Diagnosis Date Noted    Congestive heart failure ([facility]) 07/22/2024    Acute on chronic combined systolic (congestive) and diastolic (congestive) heart failure ([facility]) 02/09/2021    S/P PTCA - TIM distal LAD - Dr. Orozco 4/1/19 04/01/2019    Longstanding persistent atrial fibrillation ([facility]) 03/05/2023    AV graft thrombosis, initial encounter ([facility]) 03/04/2023    AV fistula occlusion, initial encounter ([facility]) 03/03/2023    Clotted renal dialysis AV graft, initial encounter ([facility]) 03/02/2023    Metabolic acidosis 01/24/2023    Pneumonia due to infectious organism 01/23/2023    Leg swelling 07/17/2022    Metabolic encephalopathy 06/16/2022    S/P arteriovenous (AV) 
PHOS 4.6 09/20/2018 12:00 AM     S. Calcium:  Recent Labs     07/23/24  0505   CALCIUM 9.5     S. Ionized Calcium:Invalid input(s): \"IONCA\"      Urinalysis:   Lab Results   Component Value Date/Time    NITRU NEGATIVE 04/01/2022 11:27 AM    COLORU Yellow 04/01/2022 11:27 AM    PHUR 6.0 04/01/2022 11:27 AM    WBCUA 5 TO 10 04/01/2022 11:27 AM    RBCUA 20 TO 50 04/01/2022 11:27 AM    MUCUS NOT REPORTED 12/31/2021 04:23 PM    TRICHOMONAS NOT REPORTED 12/31/2021 04:23 PM    YEAST FEW 12/31/2021 04:23 PM    BACTERIA NOT REPORTED 12/31/2021 04:23 PM    LEUKOCYTESUR NEGATIVE 04/01/2022 11:27 AM    UROBILINOGEN Normal 04/01/2022 11:27 AM    BILIRUBINUR NEGATIVE 04/01/2022 11:27 AM    GLUCOSEU NEGATIVE 04/01/2022 11:27 AM    KETUA NEGATIVE 04/01/2022 11:27 AM    AMORPHOUS NOT REPORTED 12/31/2021 04:23 PM       CARDIAC ENZYMES: No results for input(s): \"CKMB\", \"CKMBINDEX\", \"TROPONINI\" in the last 72 hours.    Invalid input(s): \"CKTOTAL;3\"  BNP: No results for input(s): \"BNP\" in the last 72 hours.    LFTS  Recent Labs     07/21/24  0407 07/22/24  0233 07/23/24  0505   ALKPHOS 125 130* 120   ALT <5* <5* <5*   AST 25 16 24   BILITOT 0.7 0.7 0.7       AMYLASE/LIPASE/AMMONIA  Recent Labs     07/22/24  1426   AMMONIA 53       Last 3 Blood Glucose:   Recent Labs     07/21/24  0407 07/22/24  0233 07/23/24  0505   GLUCOSE 73* 111* 131*      HgBA1c:    Lab Results   Component Value Date/Time    LABA1C 4.6 01/04/2024 04:59 PM         TSH:    Lab Results   Component Value Date/Time    TSH 12.63 07/08/2024 09:30 AM     ANEMIA STUDIES  No results for input(s): \"TIBC\", \"FERRITIN\", \"JBIPDFSC36\", \"FOLATE\", \"OCCULTBLD\" in the last 72 hours.    Invalid input(s): \"LABIRON\"      Cultures during this admission:     Blood cultures:                 [] None drawn      [x] Negative             []  Positive (Details:  )  Urine Culture:                   [] None drawn      [] Negative             []  Positive (Details:  )  Sputum Culture:               
07/17/2022    Acute metabolic encephalopathy 06/16/2022    S/P arteriovenous (AV) graft repair 06/15/2022    Chronic respiratory failure with hypoxia ([facility]) 06/15/2022    Pain of left upper extremity     AV graft malfunction, initial encounter ([facility])     Lymphedema     Acute hypercapnic respiratory failure ([facility]) 07/20/2024    Abnormal findings on diagnostic imaging of other parts of digestive tract 07/20/2024    Chronic disease anemia 07/20/2024    Encephalopathy 07/19/2024    Other cirrhosis of liver ([facility]) 07/19/2024    Ascites 07/19/2024    CO2 narcosis 07/19/2024    Acute on chronic respiratory failure with hypoxia and hypercapnia ([facility]) 07/19/2024    MI, acute, non ST segment elevation ([facility]) 07/17/2024    Macrocytic anemia 01/04/2024    Dialysis AV fistula malfunction ([facility]) 01/01/2022    Arteriovenous fistula occlusion ([facility])     ESRD on hemodialysis ([facility]) 12/31/2021    Paroxysmal atrial fibrillation ([facility]) 12/24/2021    Hyponatremia 12/24/2021    Hypoxemia-due to CHF/fluid overload 11/14/2021    Chronic abdominal pain 11/13/2021    Acute on chronic combined systolic and diastolic CHF (congestive heart failure) ([facility]) 11/12/2021    Chest pain 11/11/2021    Acute congestive heart failure ([facility]) 05/01/2021    Small bowel obstruction ([facility]) 05/01/2021    End stage renal disease ([facility])     Coronary artery disease involving native coronary artery of native heart without angina pectoris     BATSHEVA (obstructive sleep apnea)     S/P arteriovenous (AV) graft placement     NSTEMI (non-ST elevated myocardial infarction) ([facility]) 06/17/2019    Precordial chest pain 03/15/2019    AVF (arteriovenous fistula) ([facility])     Anemia 11/25/2018    Hypertension, essential 11/25/2018    Respiratory distress 11/25/2018    Acute on chronic diastolic congestive heart failure ([facility]) 11/24/2018    Rising PSA level 11/17/2017    Prostatism 11/17/2017    Chest pain at rest 12/21/2016    Pneumonia     CKD (chronic kidney disease) stage 4, GFR 15-29 ml/min 
currently covered with Surgicel.  It was bleeding that was from the last paracentesis.  Paracentesis yesterday has oozing of bloody fluid came back to the chest was again.  Abdomen is slightly distended nonrigid abdomen bowel sounds are positive.  Patient is on nasal cannula 4 L.  Not been on BiPAP.  Will get CT scan of the abdomen.  Will check INR.  Will follow hemoglobin hematocrit.  Eliquis is put on hold patient had received Eliquis yesterday aspirin is on hold.      Discussed with nursing staff, treatment and plan discussed.  Discussed with respiratory therapist.    Total critical care time caring for this patient with life threatening, unstable organ failure, including direct patient contact, management of life support systems, review of data including imaging and labs, discussions with other team members and physicians at least 35  Min so far today, excluding procedures.       Please note that this chart was generated using voice recognition Dragon dictation software. Although every effort was made to ensure the accuracy of this automated transcription, some errors in transcription may have occurred.     Filiberto Paz MD  7/29/2024 11:56 AM   
status.  -Pressors as needed.  Wean as tolerated.  -IV steroid, bronchodilator nebulizer every 6 hours.  -Dialysis as per nephrology.  -Guideline directed medical therapy for chronic CAD.  -Glycemic control.  -Continue midodrine for low blood pressure.  -DVT and GI prophylaxis.  -The plan of care discussed the nurse at bedside.    Total critical care time caring for this patient with life threatening, unstable organ failure, including direct patient contact, management of life support systems, review of data including imaging and labs, discussions with other team members and physicians at least 35  Min so far today, excluding procedures.     Electronically signed by KELLI GARZA MD on   7/24/24 at 9:37 PM EDT    Please note that this chart was generated using voice recognition Dragon dictation software.  Although every effort was made to ensure the accuracy of this automated transcription, some errors in transcription may have occurred.    
cuffs, Heparin SQ, continue to Hold Eliquis till after paracentesis    Discharge Needs:  PT, OT, and Case Management      CODE STATUS: Full Code    DISPOSITION:  [x] To remain ICU:   [] OK for out of ICU from Critical Care standpoint    Winter Duckworth MD  Critical Care, PGY-2  [facility], [place]   07/20/24 7:48 AM   Attending Physician Statement  I have discussed the care of Sanjay Banegas, including pertinent history and exam findings,  with the resident. I have seen and examined the patient and the key elements of all parts of the encounter have been performed by me.  I agree with the assessment, plan and orders as documented by the resident with additions .  Acute on chr hyper capnic hypercapnic  respiratory failure   Hypotension with right heart failure   Decompensated Liver cirrhosis with ascitics   ESRD on hd   Volume over load   Pulmonary edema   Plan   Hd today plan for 1.7 l fluid removal   Hold paracentesis today   Cont bipap with breaks   Midodrine   Family updated about current medical conditions     Total critical care time caring for this patient with life threatening, unstable organ failure, including direct patient contact, management of life support systems, review of data including imaging and labs, discussions with other team members and physicians at least 35   Min so far today, excluding procedures.   Electronically signed by SIN MCCALL MD on   7/20/24 at 1:55 PM EDT    Please note that this chart was generated using voice recognition Dragon dictation software.  Although every effort was made to ensure the accuracy of this automated transcription, some errors in transcription may have occurred.    
though he got 1 unit of packed RBC and his hemoglobin is 7.5.  Currently Eliquis is on hold and would advise to continue to hold Eliquis for next few days until hemoglobin remained stable and then resume anticoagulation.  He is finished with Zosyn he is on rifaximin will continue.  Hemodialysis today.  During hemodialysis he has low-dose of Levophed will continue with Levophed low-dose he has chronic lower blood pressure because of severe cardiomyopathy and history of end-stage renal disease.  Continue with midodrine and wean off Levophed.  Discharge planning to LTAC today.        Discussed with nursing staff, treatment and plan discussed.  Total critical care time caring for this patient with life threatening, unstable organ failure, including direct patient contact, management of life support systems, review of data including imaging and labs, discussions with other team members and physicians at least 35  Min so far today, excluding procedures.       Please note that this chart was generated using voice recognition Dragon dictation software. Although every effort was made to ensure the accuracy of this automated transcription, some errors in transcription may have occurred.     Filiberto Paz MD  7/30/2024 9:30 AM

## 2024-07-31 LAB
ABO/RH: NORMAL
ANTIBODY SCREEN: NEGATIVE
ARM BAND NUMBER: NORMAL
BLOOD BANK BLOOD PRODUCT EXPIRATION DATE: NORMAL
BLOOD BANK DISPENSE STATUS: NORMAL
BLOOD BANK ISBT PRODUCT BLOOD TYPE: 5100
BLOOD BANK PRODUCT CODE: NORMAL
BLOOD BANK SAMPLE EXPIRATION: NORMAL
BLOOD BANK UNIT TYPE AND RH: NORMAL
BPU ID: NORMAL
COMPONENT: NORMAL
CROSSMATCH RESULT: NORMAL
TRANSFUSION STATUS: NORMAL
UNIT DIVISION: 0
UNIT ISSUE DATE/TIME: NORMAL

## 2024-08-02 LAB
MICROORGANISM SPEC CULT: NORMAL
MICROORGANISM/AGENT SPEC: NORMAL
SERVICE CMNT-IMP: NORMAL
SPECIMEN DESCRIPTION: NORMAL

## 2024-08-13 ENCOUNTER — HOSPITAL ENCOUNTER (OUTPATIENT)
Dept: INTERVENTIONAL RADIOLOGY/VASCULAR | Age: 64
Discharge: HOME | End: 2024-08-15
Payer: MEDICARE

## 2024-08-13 VITALS
TEMPERATURE: 98.1 F | HEIGHT: 70 IN | HEART RATE: 73 BPM | OXYGEN SATURATION: 100 % | BODY MASS INDEX: 20.47 KG/M2 | RESPIRATION RATE: 19 BRPM | SYSTOLIC BLOOD PRESSURE: 96 MMHG | WEIGHT: 143 LBS | DIASTOLIC BLOOD PRESSURE: 54 MMHG

## 2024-08-13 DIAGNOSIS — T82.49XA CLOTTED DIALYSIS ACCESS, INITIAL ENCOUNTER (HCC): ICD-10-CM

## 2024-08-13 LAB
INR PPP: 1.7
PLATELET # BLD AUTO: 110 K/UL (ref 138–453)
POTASSIUM SERPL-SCNC: 4 MMOL/L (ref 3.7–5.3)
PROTHROMBIN TIME: 20.1 SEC (ref 11.5–14.2)

## 2024-08-13 PROCEDURE — C1769 GUIDE WIRE: HCPCS

## 2024-08-13 PROCEDURE — 85049 AUTOMATED PLATELET COUNT: CPT

## 2024-08-13 PROCEDURE — 7100000001 HC PACU RECOVERY - ADDTL 15 MIN: Performed by: RADIOLOGY

## 2024-08-13 PROCEDURE — 85610 PROTHROMBIN TIME: CPT

## 2024-08-13 PROCEDURE — 36902 INTRO CATH DIALYSIS CIRCUIT: CPT

## 2024-08-13 PROCEDURE — 7100000000 HC PACU RECOVERY - FIRST 15 MIN: Performed by: RADIOLOGY

## 2024-08-13 PROCEDURE — 6360000004 HC RX CONTRAST MEDICATION: Performed by: RADIOLOGY

## 2024-08-13 PROCEDURE — 84132 ASSAY OF SERUM POTASSIUM: CPT

## 2024-08-13 PROCEDURE — 2580000003 HC RX 258: Performed by: RADIOLOGY

## 2024-08-13 RX ORDER — ASPIRIN 81 MG/1
81 TABLET ORAL DAILY
COMMUNITY

## 2024-08-13 RX ORDER — SODIUM CHLORIDE 9 MG/ML
INJECTION, SOLUTION INTRAVENOUS CONTINUOUS
Status: DISCONTINUED | OUTPATIENT
Start: 2024-08-13 | End: 2024-08-16 | Stop reason: HOSPADM

## 2024-08-13 RX ORDER — LEUCINE, PHENYLALANINE, LYSINE, METHIONINE, ISOLEUCINE, VALINE, HISTIDINE, THREONINE, TRYPTOPHAN, ALANINE, GLYCINE, ARGININE, PROLINE, SERINE, TYROSINE, SODIUM ACETATE, DIBASIC POTASSIUM PHOSPHATE, MAGNESIUM CHLORIDE, SODIUM CHLORIDE, CALCIUM CHLORIDE, DEXTROSE 311; 238; 247; 170; 255; 247; 204; 179; 77; 880; 438; 489; 289; 213; 17; 297; 261; 51; 77; 33; 10 MG/100ML; MG/100ML; MG/100ML; MG/100ML; MG/100ML; MG/100ML; MG/100ML; MG/100ML; MG/100ML; MG/100ML; MG/100ML; MG/100ML; MG/100ML; MG/100ML; MG/100ML; MG/100ML; MG/100ML; MG/100ML; MG/100ML; MG/100ML; G/100ML
50 INJECTION INTRAVENOUS DAILY
COMMUNITY

## 2024-08-13 RX ORDER — EPOETIN ALFA-EPBX 10000 [IU]/ML
3000 INJECTION, SOLUTION INTRAVENOUS; SUBCUTANEOUS
COMMUNITY

## 2024-08-13 RX ORDER — IODIXANOL 320 MG/ML
INJECTION, SOLUTION INTRAVASCULAR PRN
Status: COMPLETED | OUTPATIENT
Start: 2024-08-13 | End: 2024-08-13

## 2024-08-13 RX ORDER — NUT.TX.IMPAIRED RENAL FXN,SOY 0.09G-2/ML
1 LIQUID (ML) ORAL DAILY
COMMUNITY

## 2024-08-13 RX ORDER — INSULIN ASPART 100 [IU]/ML
1 INJECTION, SOLUTION INTRAVENOUS; SUBCUTANEOUS 4 TIMES DAILY
COMMUNITY

## 2024-08-13 RX ORDER — VIT B COMP NO.3/FOLIC/C/BIOTIN 1 MG-60 MG
1 TABLET ORAL
COMMUNITY

## 2024-08-13 RX ADMIN — SODIUM CHLORIDE: 9 INJECTION, SOLUTION INTRAVENOUS at 09:35

## 2024-08-13 RX ADMIN — IODIXANOL 100 ML: 320 INJECTION, SOLUTION INTRAVASCULAR at 09:44

## 2024-08-13 ASSESSMENT — PAIN DESCRIPTION - DESCRIPTORS: DESCRIPTORS: ITCHING

## 2024-08-13 ASSESSMENT — PAIN - FUNCTIONAL ASSESSMENT: PAIN_FUNCTIONAL_ASSESSMENT: 0-10

## 2024-08-13 NOTE — H&P
Left 03/02/2023    IR THROMBECTOMY PERCUT AVF  01/03/2022    IR THROMBECTOMY PERCUT AVF 1/3/2022 [facility] SPECIAL PROCEDURES    MIDDLE EAR SURGERY  1966    EAR DRUM REPAIRED    MOUTH SURGERY  2007    1 WISDOM TOOTH REMOVED    NOSE SURGERY  1968    CAUTERY TO STOP NOSE BEEDS    OTHER SURGICAL HISTORY Left 03/06/2019    fistulagram    OTHER SURGICAL HISTORY Left 10/27/2023    upper extremity fistulagram    [place] UNLISTED PROCEDURE MALE GENITAL SYSTEM N/A 02/16/2018    US  PROSTATE BIOPSY WITH SATURATION performed by Chalo Chew MD at [facility] OR    PROSTATE BIOPSY N/A 11/17/2017    CYSTOSCOPY PROSTATE US BIOPSY performed by Chalo Chew MD at [facility] OR    PROSTATE BIOPSY  02/16/2018    TONSILLECTOMY  1968    TUNNELED VENOUS CATHETER PLACEMENT Right 06/15/2022    Placement of tunneled central venous hemodialysis catheter  /  Dr Kaufman    UPPER GASTROINTESTINAL ENDOSCOPY N/A 09/27/2021    EGD BIOPSY performed by Siva Villavicencio MD at [facility]    VASCULAR SURGERY  09/20/2019    LUE FISTULAGRAM  /  DR KAUFMAN  /  Findings: Severe stenosis of proximal cephalic vein. / Will plan for LUE AVG placement.    VASCULAR SURGERY Left 06/15/2022    Open revision of left upper extremity AV graft   /  Dr Kaufman    VASCULAR SURGERY Left 07/18/2022    LUE FISTULAGRAM / DR KAUFMAN    VASCULAR SURGERY Left 03/02/2023    LEFT OPEN GRAFT THROMBECTOMY performed by Shan Block MD at [facility]    VASCULAR SURGERY Left 08/14/2023    naz / UPPER EXTREMITY FISTULAGRAM WITH BALOON ANGIOPLASTY performed by Dianne Kaufman MD at [facility]    VASCULAR SURGERY Left 10/27/2023    LEFT UPPER EXTREMITY FISTULAGRAM WITH BALLOON ANGIOPLASTY performed by Dianne Kaufman MD at [facility]    VASECTOMY  1983    WISDOM TOOTH EXTRACTION          Social History:     Tobacco:    reports that he has never smoked. He has never used smokeless tobacco.  Alcohol:      reports no history of alcohol use.  Drug Use:  reports no history of drug

## 2024-08-13 NOTE — FLOWSHEET NOTE
Pt appears lethargic but rouses when name called able to answer all questions appropriately is aware of reason for  coming to hospital when questioned about mentation pt replies \"I'm sleepy.\" Denies any discomfort

## 2024-08-13 NOTE — BRIEF OP NOTE
Brief Postoperative Note    Sanjay Banegas  YOB: 1960  [id number]    Pre-operative Diagnosis: Malfunctioning left UE dialysis graft    Post-operative Diagnosis: Same    Procedure: Fistulogram and PTA    Medications Given: none    Anesthesia: Local    Surgeons/Assistants: STAN GERONIMO MD    Estimated Blood Loss: minimal    Complications: none    Specimens: were not obtained    Findings: Patent left brachial to axillary or high basilic vein C-graft; moderate narrowing venous anastomosis treated with 7 mm x 4 cm balloon with good results. No additional stenosis suspected. Pt tolerated well.      Electronically signed by STAN GERONIMO MD on 8/13/2024 at 10:19 AM

## (undated) DEVICE — INTRODUCER SHTH 7FR CANN L11CM 0.038IN STD ORNG W/ MINI

## (undated) DEVICE — GLOVE SURG SZ 8 L12IN FNGR THK87MIL WHT LTX FREE

## (undated) DEVICE — ADHESIVE SKIN CLOSURE TOP 36 CC HI VISC DERMBND MINI

## (undated) DEVICE — TOTAL TRAY, 16FR 10ML SIL FOLEY, URN: Brand: MEDLINE

## (undated) DEVICE — SUTURE PERMAHAND SZ 4-0 L18IN NONABSORBABLE BLK SILK BRAID A183H

## (undated) DEVICE — TUBING AMB

## (undated) DEVICE — SUTURE PERMAHAND SZ 3-0 L18IN NONABSORBABLE BLK SILK BRAID A184H

## (undated) DEVICE — COVER,LIGHT HANDLE,FLX,2/PK: Brand: MEDLINE INDUSTRIES, INC.

## (undated) DEVICE — Device

## (undated) DEVICE — BAG DRAINAGE URIN LEG LG 25 OZ ANTIREFLX VLV STRP DOVER DISP

## (undated) DEVICE — LARGE (BLUE) FOR GRAFTS UP TO 10 MM, 20 1/2" / 52 CM (1/PKG): Brand: SCANLAN® VASCULAR TUNNELER SHEATHS AND BULLET TIPS

## (undated) DEVICE — SET EXTN IV L30IN TBNG DIA0.1IN PRIMING 4ML MACBOR FEM ADPT

## (undated) DEVICE — GLOVE SURG SZ 7 L12IN FNGR THK79MIL GRN LTX FREE

## (undated) DEVICE — DECANTER BAG 9": Brand: MEDLINE INDUSTRIES, INC.

## (undated) DEVICE — E-Z CLEAN, NON-STICK, PTFE COATED, ELECTROSURGICAL BLADE ELECTRODE, MODIFIED EXTENDED INSULATION, 2.5 INCH (6.35 CM): Brand: MEGADYNE

## (undated) DEVICE — APPLICATOR MEDICATED 26 CC SOLUTION HI LT ORNG CHLORAPREP

## (undated) DEVICE — CHLORAPREP 26ML ORANGE

## (undated) DEVICE — DRAPE,UTILITY,XL,4/PK,STERILE: Brand: MEDLINE

## (undated) DEVICE — 3M™ STERI-STRIP™ COMPOUND BENZOIN TINCTURE 40 BAGS/CARTON 4 CARTONS/CASE C1544: Brand: 3M™ STERI-STRIP™

## (undated) DEVICE — GLOVE SURG SZ 8 CRM LTX FREE POLYISOPRENE POLYMER BEAD ANTI

## (undated) DEVICE — GLOVE SURG SZ 65 CRM LTX FREE POLYISOPRENE POLYMER BEAD ANTI

## (undated) DEVICE — DRAIN SURG W0.5XL18IN FLAT GRAV FOR OPN WND PENROSE

## (undated) DEVICE — GLOVE SURG SZ 65 L12IN FNGR THK87MIL WHT LTX FREE

## (undated) DEVICE — SUTURE NONABSORBABLE MONOFILAMENT 7-0 BV-1 1X24 IN PROLENE 8702H

## (undated) DEVICE — GOWN,AURORA,NONREINFORCED,LARGE: Brand: MEDLINE

## (undated) DEVICE — STRIP,CLOSURE,WOUND,MEDI-STRIP,1/2X4: Brand: MEDLINE

## (undated) DEVICE — KIT PTCA HEMSTAS VLV L BOR W/ MTL WIRE INTRO TORQ DEV ACCS

## (undated) DEVICE — SET PEN AND LBL AND L BWL LBL PAL PEN AND LBLS PAL700]

## (undated) DEVICE — GLOVE SURG SZ 75 L12IN FNGR THK87MIL WHT LTX FREE

## (undated) DEVICE — KIT HAD L40CM CATH SYMMETRIC TIP 2 LUMN CATH SFTY SHTH TISS

## (undated) DEVICE — FORCEPS BX L240CM WRK CHN 2.8MM STD CAP W/ NDL MIC MESH

## (undated) DEVICE — GLOVE SURG SZ 7 L12IN FNGR THK87MIL WHT LTX FREE

## (undated) DEVICE — SYRINGE MED 10ML POLYPR LUERSLIP TIP FLAT TOP W/O SFTY DISP

## (undated) DEVICE — SYRINGE MED 10ML TRNSLUC BRL PLUNG BLK MRK POLYPR CTRL

## (undated) DEVICE — KIT MICRO INTRO 4FR STIFF 40CM NIGHTENALL TUNGSTEN 7SMT

## (undated) DEVICE — PROVE COVER: Brand: UNBRANDED

## (undated) DEVICE — COVER LT HNDL BLU PLAS

## (undated) DEVICE — GLOVE SURG SZ 6 CRM LTX FREE POLYISOPRENE POLYMER BEAD ANTI

## (undated) DEVICE — THE STERILE LIGHT HANDLE COVER IS USED WITH STERIS SURGICAL LIGHTING AND VISUALIZATION SYSTEMS.

## (undated) DEVICE — SYRINGE, LUER LOCK, 10ML: Brand: MEDLINE

## (undated) DEVICE — DEVICE CTRL FLOWSWITCH 24 PER BX

## (undated) DEVICE — SUTURE MCRYL SZ 5-0 L18IN ABSRB VLT P-3 L13MM 3/8 CIR REV Y463G

## (undated) DEVICE — TOWEL,OR,DSP,ST,BLUE,DLX,XR,4/PK,20PK/CS: Brand: MEDLINE

## (undated) DEVICE — BLADE ES ELASTOMERIC COAT INSUL DURABLE BEND UPTO 90DEG

## (undated) DEVICE — PORT VLV 2 W NDL FREE SMRTSITE

## (undated) DEVICE — CONTAINER SPEC 33OZ URIN COLL BIODEGRADABLE PAPER DISP

## (undated) DEVICE — STRAP,POSITIONING,KNEE/BODY,FOAM,4X60": Brand: MEDLINE

## (undated) DEVICE — TOWEL,OR,DSP,ST,NATURAL,DLX,4/PK,20PK/CS: Brand: MEDLINE

## (undated) DEVICE — SUTURE VCRL + SZ 3-0 L27IN ABSRB UD L26MM SH 1/2 CIR VCP416H

## (undated) DEVICE — CLIP INT M L GRN TI TRNSVRS GRV CHEVRON SHP W/ PRECIS TIP

## (undated) DEVICE — DECANTER FLD 9IN ST BG FOR ASEP TRNSF OF FLD

## (undated) DEVICE — SPONGE LAP W18XL18IN WHT COT 4 PLY FLD STRUNG RADPQ DISP ST

## (undated) DEVICE — DRESSING TRNSPAR W2XL2.75IN FLM SHT SEMIPERMEABLE WIND

## (undated) DEVICE — PINNACLE R/O II HIFLO INTRODUCER SHEATH WITH RADIOPAQUE MARKER: Brand: PINNACLE

## (undated) DEVICE — SUTURE MCRYL + SZ 4 0 L18IN ABSRB UD PC 3 L16MM 3 8 CIR PRIM MCP845G

## (undated) DEVICE — PATIENT RETURN ELECTRODE, SINGLE-USE, CONTACT QUALITY MONITORING, ADULT, WITH 9FT CORD, FOR PATIENTS WEIGING OVER 33LBS. (15KG): Brand: MEGADYNE

## (undated) DEVICE — PAD N ADH W3XL4IN POLY COT SFT PERF FLM EASILY CUT ABSRB

## (undated) DEVICE — C-ARM: Brand: UNBRANDED

## (undated) DEVICE — CATHETER ETER IV 18GA L125IN POLYUR STR RADPQ INTROCAN SFTY

## (undated) DEVICE — SYRINGE 20ML LL S/C 50

## (undated) DEVICE — DEVICE SURGICAL SWITCH FLOW HIGH PRESSURE M/F LL

## (undated) DEVICE — APPLICATOR MEDICATED 10.5 CC SOLUTION HI LT ORNG CHLORAPREP

## (undated) DEVICE — DEVICE INFL 20ML 30ATM DGT FLD DISPNS SYR W ACCS 9 BLU DMND

## (undated) DEVICE — GUIDEWIRE VASC L180CM DIA0.035IN TAPR 3CM HYDRPHLC NIT ANG

## (undated) DEVICE — SURGICAL SUCTION CONNECTING TUBE WITH MALE CONNECTOR AND SUCTION CLAMP, 2 FT. LONG (.6 M), 5 MM I.D.: Brand: CONMED

## (undated) DEVICE — RADIFOCUS GLIDEWIRE: Brand: GLIDEWIRE

## (undated) DEVICE — CABLE ENDOSCP L10FT ACT DISP

## (undated) DEVICE — GLOVE SURG SZ 7.5 L11.73IN FNGR THK9.8MIL STRW LTX POLYMER

## (undated) DEVICE — GLOVE SURG SZ 75 L12IN FNGR THK79MIL GRN LTX FREE

## (undated) DEVICE — SUTURE ETHLN SZ 4-0 L18IN NONABSORBABLE BLK L19MM PS-2 3/8 1667H

## (undated) DEVICE — GAUZE,SPONGE,4"X4",16PLY,XRAY,STRL,LF: Brand: MEDLINE

## (undated) DEVICE — SUTURE PROL SZ 6-0 L24IN NONABSORBABLE BLU L9.3MM BV-1 3/8 8805H

## (undated) DEVICE — GUIDEWIRE VASC L75CM DIA0.035IN TIP L7CM PTFE COAT S STL

## (undated) DEVICE — Z DUP USE 2522782 SOLUTION IRRIG 1000ML STRL H2O PLAS CONTAINER UROMATIC

## (undated) DEVICE — SUTURE PERMA-HAND SZ 2-0 L30IN NONABSORBABLE BLK L26MM SH K833H

## (undated) DEVICE — DUP USE 240185 SOLUTION IV IRRIG WATER 1000ML IRRIG BAG 2B7114

## (undated) DEVICE — PROTECTOR ULN NRV PUR FOAM HK LOOP STRP ANATOMICALLY

## (undated) DEVICE — GLOVE SURG SZ 85 CRM LTX FREE POLYISOPRENE POLYMER BEAD ANTI

## (undated) DEVICE — Z INACTIVE USE 2653177 SPONGE GZ W2XL2IN NONWOVEN 4 PLY FASTER WICKING ABIL AVANT

## (undated) DEVICE — INTENDED FOR TISSUE SEPARATION, AND OTHER PROCEDURES THAT REQUIRE A SHARP SURGICAL BLADE TO PUNCTURE OR CUT.: Brand: BARD-PARKER ® CARBON RIB-BACK BLADES

## (undated) DEVICE — SHEET, T, LAPAROTOMY, STERILE: Brand: MEDLINE

## (undated) DEVICE — PAD GEN USE BORDERED ADH 14IN 2IN AND 12IN 4IN GZ UNIV ST

## (undated) DEVICE — GOWN,AURORA,NONRNF,XL,30/CS: Brand: MEDLINE

## (undated) DEVICE — CATHETER IV 18GA L1.16IN OD1.27-1.3462MM ID0.9398-1.016MM

## (undated) DEVICE — TOWEL,OR,DSP,ST,BLUE,STD,4/PK,20PK/CS: Brand: MEDLINE

## (undated) DEVICE — FOGARTY - HYDRAGRIP SURGICAL - CLAMP INSERTS: Brand: FOGARTY SOFTJAW

## (undated) DEVICE — MAX-CORE® DISPOSABLE CORE BIOPSY INSTRUMENT, 18G X 20CM: Brand: MAX-CORE

## (undated) DEVICE — GLOVE SURG SZ 7 CRM LTX FREE POLYISOPRENE POLYMER BEAD ANTI

## (undated) DEVICE — SNAP KAP: Brand: UNBRANDED

## (undated) DEVICE — SUTURE NONABSORBABLE MONOFILAMENT 6-0 C-1 1X30 IN PROLENE 8706H

## (undated) DEVICE — SURGICAL PROCEDURE TRAY CRD CATH SVMMC

## (undated) DEVICE — STANDARD HYPODERMIC NEEDLE,POLYPROPYLENE HUB: Brand: MONOJECT

## (undated) DEVICE — CONTAINER,SPECIMEN,4OZ,OR STRL: Brand: MEDLINE

## (undated) DEVICE — 4F 80 CM LEMAITRE EMBOLECTOMY CATHETER: Brand: LEMAITRE EMBOLECTOMY CATHETER

## (undated) DEVICE — SHEET,DRAPE,70X100,STERILE: Brand: MEDLINE

## (undated) DEVICE — SVMMC VASC MAJ PK

## (undated) DEVICE — SUTURE VCRL + SZ 4-0 L27IN ABSRB UD L26MM SH 1/2 CIR VCP415H

## (undated) DEVICE — SUTURE PROL SZ 7-0 L30IN NONABSORBABLE BLU L9.3MM BV-1 1/2 8703H

## (undated) DEVICE — CATHETER ANGIOPLSTY OTW 035 6 FRX135 CM 8X80 MM EVERCROSS

## (undated) DEVICE — SUTURE MCRYL SZ 5-0 L18IN ABSRB UD PC-3 L16MM 3/8 CIR Y844G

## (undated) DEVICE — COVER US PRB W15XL244CM ST SURGICAL/INTRAOPERATIVE W/